# Patient Record
Sex: FEMALE | Race: BLACK OR AFRICAN AMERICAN | Employment: OTHER | URBAN - METROPOLITAN AREA
[De-identification: names, ages, dates, MRNs, and addresses within clinical notes are randomized per-mention and may not be internally consistent; named-entity substitution may affect disease eponyms.]

---

## 2017-05-24 ENCOUNTER — HOSPITAL ENCOUNTER (OUTPATIENT)
Dept: ULTRASOUND IMAGING | Age: 62
Discharge: HOME | End: 2017-05-24
Attending: INTERNAL MEDICINE | Admitting: INTERNAL MEDICINE

## 2017-05-24 DIAGNOSIS — R59.0 AXILLARY ADENOPATHY: ICD-10-CM

## 2017-05-24 DIAGNOSIS — R92.8 ABNORMAL MAMMOGRAM: ICD-10-CM

## 2017-05-24 DIAGNOSIS — Z09 FOLLOW-UP EXAM, 3-6 MONTHS SINCE PREVIOUS EXAM: ICD-10-CM

## 2017-06-08 ENCOUNTER — HOSPITAL ENCOUNTER (OUTPATIENT)
Dept: GENERAL RADIOLOGY | Age: 62
Discharge: HOME | End: 2017-06-08
Attending: INTERNAL MEDICINE | Admitting: INTERNAL MEDICINE

## 2017-06-08 DIAGNOSIS — M54.2 NECK PAIN ON LEFT SIDE: ICD-10-CM

## 2017-06-20 LAB
CHOLESTEROL, TOTAL: 234 MG/DL
CHOLESTEROL/HDL RATIO: NORMAL
HDLC SERPL-MCNC: 66 MG/DL (ref 35–70)
LDL CHOLESTEROL CALCULATED: 142 MG/DL (ref 0–160)
TRIGL SERPL-MCNC: 130 MG/DL
VLDLC SERPL CALC-MCNC: 26 MG/DL

## 2017-12-29 ENCOUNTER — HOSPITAL ENCOUNTER (OUTPATIENT)
Dept: ULTRASOUND IMAGING | Age: 62
Discharge: HOME | End: 2017-12-29
Attending: FAMILY MEDICINE | Admitting: FAMILY MEDICINE

## 2017-12-29 DIAGNOSIS — R92.8 ABNORMAL ULTRASOUND OF BREAST: ICD-10-CM

## 2018-09-06 LAB — HIV AG/AB: NORMAL

## 2019-02-01 ENCOUNTER — HOSPITAL ENCOUNTER (OUTPATIENT)
Dept: WOMENS IMAGING | Age: 64
Discharge: HOME | End: 2019-02-01
Payer: COMMERCIAL

## 2019-02-01 ENCOUNTER — HOSPITAL ENCOUNTER (OUTPATIENT)
Dept: ULTRASOUND IMAGING | Age: 64
Discharge: HOME | End: 2019-02-01
Payer: COMMERCIAL

## 2019-02-01 DIAGNOSIS — Z09 FOLLOW-UP EXAM: ICD-10-CM

## 2019-02-01 DIAGNOSIS — R92.8 ABNORMAL MAMMOGRAM: ICD-10-CM

## 2019-02-01 PROCEDURE — 76882 US LMTD JT/FCL EVL NVASC XTR: CPT

## 2019-02-01 PROCEDURE — 77066 DX MAMMO INCL CAD BI: CPT

## 2019-03-04 ENCOUNTER — OFFICE VISIT (OUTPATIENT)
Dept: SURGERY | Age: 64
End: 2019-03-04
Payer: COMMERCIAL

## 2019-03-04 VITALS
DIASTOLIC BLOOD PRESSURE: 80 MMHG | BODY MASS INDEX: 26.05 KG/M2 | HEART RATE: 72 BPM | SYSTOLIC BLOOD PRESSURE: 122 MMHG | RESPIRATION RATE: 16 BRPM | WEIGHT: 147 LBS

## 2019-03-04 DIAGNOSIS — R59.0 LYMPHADENOPATHY, AXILLARY: Primary | ICD-10-CM

## 2019-03-04 PROCEDURE — 99203 OFFICE O/P NEW LOW 30 MIN: CPT | Performed by: SURGERY

## 2019-03-04 ASSESSMENT — ENCOUNTER SYMPTOMS
STRIDOR: 0
ANAL BLEEDING: 0
BACK PAIN: 0
SORE THROAT: 0
APNEA: 0
CONSTIPATION: 0
COLOR CHANGE: 0
PHOTOPHOBIA: 0
EYE ITCHING: 0
CHOKING: 0
RECTAL PAIN: 0
EYE REDNESS: 0

## 2019-03-11 ENCOUNTER — ANESTHESIA EVENT (OUTPATIENT)
Dept: OPERATING ROOM | Age: 64
End: 2019-03-11
Payer: COMMERCIAL

## 2019-03-12 ENCOUNTER — ANESTHESIA (OUTPATIENT)
Dept: OPERATING ROOM | Age: 64
End: 2019-03-12
Payer: COMMERCIAL

## 2019-03-12 ENCOUNTER — HOSPITAL ENCOUNTER (OUTPATIENT)
Age: 64
Setting detail: OUTPATIENT SURGERY
Discharge: HOME | End: 2019-03-12
Attending: SURGERY | Admitting: SURGERY
Payer: COMMERCIAL

## 2019-03-12 VITALS
SYSTOLIC BLOOD PRESSURE: 105 MMHG | TEMPERATURE: 97.3 F | DIASTOLIC BLOOD PRESSURE: 65 MMHG | HEART RATE: 91 BPM | WEIGHT: 147 LBS | BODY MASS INDEX: 26.05 KG/M2 | OXYGEN SATURATION: 93 % | HEIGHT: 63 IN | RESPIRATION RATE: 16 BRPM

## 2019-03-12 VITALS
OXYGEN SATURATION: 95 % | SYSTOLIC BLOOD PRESSURE: 120 MMHG | DIASTOLIC BLOOD PRESSURE: 79 MMHG | RESPIRATION RATE: 8 BRPM

## 2019-03-12 DIAGNOSIS — Z80.0 FAMILY HISTORY OF MALIGNANT NEOPLASM OF GASTROINTESTINAL TRACT: Primary | ICD-10-CM

## 2019-03-12 PROCEDURE — 2580000003 HC RX 258: Performed by: NURSE ANESTHETIST, CERTIFIED REGISTERED

## 2019-03-12 PROCEDURE — 3600000003 HC SURGERY LEVEL 3 BASE: Performed by: SURGERY

## 2019-03-12 PROCEDURE — 3700000001 HC ADD 15 MINUTES (ANESTHESIA): Performed by: SURGERY

## 2019-03-12 PROCEDURE — 88307 TISSUE EXAM BY PATHOLOGIST: CPT

## 2019-03-12 PROCEDURE — 38525 BIOPSY/REMOVAL LYMPH NODES: CPT | Performed by: SURGERY

## 2019-03-12 PROCEDURE — 6360000002 HC RX W HCPCS: Performed by: NURSE ANESTHETIST, CERTIFIED REGISTERED

## 2019-03-12 PROCEDURE — 2500000003 HC RX 250 WO HCPCS: Performed by: SURGERY

## 2019-03-12 PROCEDURE — 3700000000 HC ANESTHESIA ATTENDED CARE: Performed by: SURGERY

## 2019-03-12 PROCEDURE — 87186 SC STD MICRODIL/AGAR DIL: CPT

## 2019-03-12 PROCEDURE — 87071 CULTURE AEROBIC QUANT OTHER: CPT

## 2019-03-12 PROCEDURE — 3600000013 HC SURGERY LEVEL 3 ADDTL 15MIN: Performed by: SURGERY

## 2019-03-12 PROCEDURE — 88312 SPECIAL STAINS GROUP 1: CPT

## 2019-03-12 PROCEDURE — 7100000011 HC PHASE II RECOVERY - ADDTL 15 MIN: Performed by: SURGERY

## 2019-03-12 PROCEDURE — 87073 CULTURE BACTERIA ANAEROBIC: CPT

## 2019-03-12 PROCEDURE — 87205 SMEAR GRAM STAIN: CPT

## 2019-03-12 PROCEDURE — 2709999900 HC NON-CHARGEABLE SUPPLY: Performed by: SURGERY

## 2019-03-12 PROCEDURE — 87076 CULTURE ANAEROBE IDENT EACH: CPT

## 2019-03-12 PROCEDURE — 7100000010 HC PHASE II RECOVERY - FIRST 15 MIN: Performed by: SURGERY

## 2019-03-12 PROCEDURE — 2580000003 HC RX 258: Performed by: ANESTHESIOLOGY

## 2019-03-12 RX ORDER — MIDAZOLAM HYDROCHLORIDE 1 MG/ML
INJECTION INTRAMUSCULAR; INTRAVENOUS PRN
Status: DISCONTINUED | OUTPATIENT
Start: 2019-03-12 | End: 2019-03-12 | Stop reason: SDUPTHER

## 2019-03-12 RX ORDER — ONDANSETRON 2 MG/ML
INJECTION INTRAMUSCULAR; INTRAVENOUS PRN
Status: DISCONTINUED | OUTPATIENT
Start: 2019-03-12 | End: 2019-03-12 | Stop reason: SDUPTHER

## 2019-03-12 RX ORDER — ACETAMINOPHEN 10 MG/ML
1000 INJECTION, SOLUTION INTRAVENOUS ONCE
Status: COMPLETED | OUTPATIENT
Start: 2019-03-12 | End: 2019-03-12

## 2019-03-12 RX ORDER — PROPOFOL 10 MG/ML
INJECTION, EMULSION INTRAVENOUS PRN
Status: DISCONTINUED | OUTPATIENT
Start: 2019-03-12 | End: 2019-03-12 | Stop reason: SDUPTHER

## 2019-03-12 RX ORDER — DEXAMETHASONE SODIUM PHOSPHATE 4 MG/ML
INJECTION, SOLUTION INTRA-ARTICULAR; INTRALESIONAL; INTRAMUSCULAR; INTRAVENOUS; SOFT TISSUE PRN
Status: DISCONTINUED | OUTPATIENT
Start: 2019-03-12 | End: 2019-03-12 | Stop reason: SDUPTHER

## 2019-03-12 RX ORDER — HYDROCODONE BITARTRATE AND ACETAMINOPHEN 5; 325 MG/1; MG/1
1 TABLET ORAL EVERY 6 HOURS PRN
Qty: 10 TABLET | Refills: 0 | Status: SHIPPED | OUTPATIENT
Start: 2019-03-12 | End: 2019-03-19

## 2019-03-12 RX ORDER — SODIUM CHLORIDE, SODIUM LACTATE, POTASSIUM CHLORIDE, CALCIUM CHLORIDE 600; 310; 30; 20 MG/100ML; MG/100ML; MG/100ML; MG/100ML
INJECTION, SOLUTION INTRAVENOUS CONTINUOUS PRN
Status: DISCONTINUED | OUTPATIENT
Start: 2019-03-12 | End: 2019-03-12 | Stop reason: SDUPTHER

## 2019-03-12 RX ORDER — FENTANYL CITRATE 50 UG/ML
INJECTION, SOLUTION INTRAMUSCULAR; INTRAVENOUS PRN
Status: DISCONTINUED | OUTPATIENT
Start: 2019-03-12 | End: 2019-03-12 | Stop reason: SDUPTHER

## 2019-03-12 RX ORDER — LIDOCAINE HYDROCHLORIDE 10 MG/ML
INJECTION, SOLUTION EPIDURAL; INFILTRATION; INTRACAUDAL; PERINEURAL
Status: COMPLETED | OUTPATIENT
Start: 2019-03-12 | End: 2019-03-12

## 2019-03-12 RX ORDER — SODIUM CHLORIDE, SODIUM LACTATE, POTASSIUM CHLORIDE, CALCIUM CHLORIDE 600; 310; 30; 20 MG/100ML; MG/100ML; MG/100ML; MG/100ML
INJECTION, SOLUTION INTRAVENOUS ONCE
Status: COMPLETED | OUTPATIENT
Start: 2019-03-12 | End: 2019-03-12

## 2019-03-12 RX ORDER — LIDOCAINE HYDROCHLORIDE 10 MG/ML
INJECTION, SOLUTION INFILTRATION; PERINEURAL
Status: COMPLETED | OUTPATIENT
Start: 2019-03-12 | End: 2019-03-12

## 2019-03-12 RX ADMIN — LIDOCAINE HYDROCHLORIDE 100 MG: 20 INJECTION, SOLUTION INTRAVENOUS at 12:57

## 2019-03-12 RX ADMIN — ACETAMINOPHEN 1000 MG: 10 INJECTION, SOLUTION INTRAVENOUS at 12:59

## 2019-03-12 RX ADMIN — FENTANYL CITRATE 50 MCG: 50 INJECTION INTRAMUSCULAR; INTRAVENOUS at 12:57

## 2019-03-12 RX ADMIN — PROPOFOL 10 MG: 10 INJECTION, EMULSION INTRAVENOUS at 13:11

## 2019-03-12 RX ADMIN — ONDANSETRON 4 MG: 2 INJECTION INTRAMUSCULAR; INTRAVENOUS at 12:54

## 2019-03-12 RX ADMIN — PROPOFOL 30 MG: 10 INJECTION, EMULSION INTRAVENOUS at 12:56

## 2019-03-12 RX ADMIN — PROPOFOL 20 MG: 10 INJECTION, EMULSION INTRAVENOUS at 12:57

## 2019-03-12 RX ADMIN — SODIUM CHLORIDE, POTASSIUM CHLORIDE, SODIUM LACTATE AND CALCIUM CHLORIDE: 600; 310; 30; 20 INJECTION, SOLUTION INTRAVENOUS at 11:23

## 2019-03-12 RX ADMIN — DEXAMETHASONE SODIUM PHOSPHATE 8 MG: 4 INJECTION, SOLUTION INTRAMUSCULAR; INTRAVENOUS at 12:54

## 2019-03-12 RX ADMIN — PROPOFOL 10 MG: 10 INJECTION, EMULSION INTRAVENOUS at 13:00

## 2019-03-12 RX ADMIN — MIDAZOLAM HYDROCHLORIDE 2 MG: 1 INJECTION, SOLUTION INTRAMUSCULAR; INTRAVENOUS at 12:50

## 2019-03-12 RX ADMIN — SODIUM CHLORIDE, POTASSIUM CHLORIDE, SODIUM LACTATE AND CALCIUM CHLORIDE: 600; 310; 30; 20 INJECTION, SOLUTION INTRAVENOUS at 12:50

## 2019-03-12 RX ADMIN — FENTANYL CITRATE 50 MCG: 50 INJECTION INTRAMUSCULAR; INTRAVENOUS at 12:54

## 2019-03-12 ASSESSMENT — PULMONARY FUNCTION TESTS
PIF_VALUE: 2
PIF_VALUE: 1
PIF_VALUE: 0
PIF_VALUE: 1
PIF_VALUE: 0
PIF_VALUE: 1
PIF_VALUE: 1
PIF_VALUE: 0
PIF_VALUE: 0
PIF_VALUE: 1
PIF_VALUE: 1
PIF_VALUE: 0
PIF_VALUE: 1
PIF_VALUE: 1
PIF_VALUE: 0
PIF_VALUE: 1
PIF_VALUE: 35
PIF_VALUE: 1
PIF_VALUE: 10
PIF_VALUE: 21
PIF_VALUE: 1
PIF_VALUE: 1
PIF_VALUE: 0
PIF_VALUE: 0
PIF_VALUE: 1

## 2019-03-12 ASSESSMENT — PAIN - FUNCTIONAL ASSESSMENT: PAIN_FUNCTIONAL_ASSESSMENT: 0-10

## 2019-03-16 LAB
CULTURE: ABNORMAL
GRAM SMEAR: ABNORMAL
Lab: ABNORMAL
SPECIMEN: ABNORMAL

## 2019-03-25 ENCOUNTER — OFFICE VISIT (OUTPATIENT)
Dept: SURGERY | Age: 64
End: 2019-03-25

## 2019-03-25 VITALS
OXYGEN SATURATION: 98 % | BODY MASS INDEX: 26.05 KG/M2 | WEIGHT: 147.05 LBS | DIASTOLIC BLOOD PRESSURE: 72 MMHG | HEART RATE: 91 BPM | HEIGHT: 63 IN | SYSTOLIC BLOOD PRESSURE: 110 MMHG

## 2019-03-25 DIAGNOSIS — R59.0 LYMPHADENOPATHY, AXILLARY: Primary | ICD-10-CM

## 2019-03-25 PROCEDURE — 99024 POSTOP FOLLOW-UP VISIT: CPT | Performed by: SURGERY

## 2019-04-28 NOTE — PROGRESS NOTES
Chief Complaint   Patient presents with    Post-Op Check     Right Axillary Node Excision, 3/12/19         SUBJECTIVE:  Patient here for post op visit. Pain is minimal.  Wounds: minbruising and no discharge.     Past Surgical History:   Procedure Laterality Date    COLONOSCOPY  2015    normal colon    TONSILLECTOMY  as a kid   [facility]ndt TUBAL LIGATION  20+ yrs ago     Past Medical History:   Diagnosis Date    Bipolar 1 disorder ([address]     \"on Depakote for this\"     Family History   Problem Relation Age of Onset    Breast Cancer Mother     High Blood Pressure Mother     Cancer Father         \"cancer in the bowel\"     Social History     Socioeconomic History    Marital status:      Spouse name: Not on file    Number of children: Not on file    Years of education: Not on file    Highest education level: Not on file   Occupational History    Not on file   Social Needs    Financial resource strain: Not on file    Food insecurity:     Worry: Not on file     Inability: Not on file    Transportation needs:     Medical: Not on file     Non-medical: Not on file   Tobacco Use    Smoking status: Former Smoker     Packs/day: 1.00     Years: 10.00     Pack years: 10.00     Types: Cigarettes     Last attempt to quit: 1988     Years since quittin.3    Smokeless tobacco: Never Used   Substance and Sexual Activity    Alcohol use: No    Drug use: No    Sexual activity: Not on file   Lifestyle    Physical activity:     Days per week: Not on file     Minutes per session: Not on file    Stress: Not on file   Relationships    Social connections:     Talks on phone: Not on file     Gets together: Not on file     Attends Muslim service: Not on file     Active member of club or organization: Not on file     Attends meetings of clubs or organizations: Not on file     Relationship status: Not on file    Intimate partner violence:     Fear of current or ex partner: Not on file     Emotionally abused: Not on file     Physically abused: Not on file     Forced sexual activity: Not on file   Other Topics Concern    Not on file   Social History Narrative    Not on file       OBJECTIVE:   Physical Exam    Wound well healed without signs of active infection. Suture line intact. Abdomen soft, nontender, nondistended. Path reveals:   Final Pathologic Diagnosis:  Lymph node, right axillary, excision:  - Granulomatous lymphadenitis. See comment and microscopic  description. ASSESSMENT:  Patient doing well on this post operative check. Wounds well healed. 1. Lymphadenopathy, axillary        PLAN:  Continue same  Increase activity as tolerated        No orders of the defined types were placed in this encounter. No orders of the defined types were placed in this encounter. Follow Up: No follow-ups on file.     Ankush Coulter MD

## 2019-10-23 ENCOUNTER — OFFICE VISIT (OUTPATIENT)
Dept: FAMILY MEDICINE CLINIC | Age: 64
End: 2019-10-23
Payer: COMMERCIAL

## 2019-10-23 VITALS
SYSTOLIC BLOOD PRESSURE: 124 MMHG | BODY MASS INDEX: 25.02 KG/M2 | WEIGHT: 141.2 LBS | HEART RATE: 95 BPM | OXYGEN SATURATION: 98 % | DIASTOLIC BLOOD PRESSURE: 88 MMHG | HEIGHT: 63 IN

## 2019-10-23 DIAGNOSIS — K21.9 GASTROESOPHAGEAL REFLUX DISEASE WITHOUT ESOPHAGITIS: ICD-10-CM

## 2019-10-23 DIAGNOSIS — Z80.0 FAMILY HISTORY OF MALIGNANT NEOPLASM OF GASTROINTESTINAL TRACT: ICD-10-CM

## 2019-10-23 DIAGNOSIS — R19.7 DIARRHEA IN ADULT PATIENT: Primary | ICD-10-CM

## 2019-10-23 PROCEDURE — 99213 OFFICE O/P EST LOW 20 MIN: CPT | Performed by: PHYSICIAN ASSISTANT

## 2019-10-23 RX ORDER — IBUPROFEN 800 MG/1
800 TABLET ORAL 3 TIMES DAILY PRN
Qty: 90 TABLET | Refills: 2 | Status: CANCELLED | OUTPATIENT
Start: 2019-10-23

## 2019-10-23 RX ORDER — DIVALPROEX SODIUM 500 MG/1
TABLET, DELAYED RELEASE ORAL
Qty: 90 TABLET | Refills: 2 | Status: CANCELLED | OUTPATIENT
Start: 2019-10-23

## 2019-10-23 ASSESSMENT — PATIENT HEALTH QUESTIONNAIRE - PHQ9
2. FEELING DOWN, DEPRESSED OR HOPELESS: 0
SUM OF ALL RESPONSES TO PHQ9 QUESTIONS 1 & 2: 0
1. LITTLE INTEREST OR PLEASURE IN DOING THINGS: 0
SUM OF ALL RESPONSES TO PHQ QUESTIONS 1-9: 0
SUM OF ALL RESPONSES TO PHQ QUESTIONS 1-9: 0

## 2019-11-06 ENCOUNTER — TELEPHONE (OUTPATIENT)
Dept: FAMILY MEDICINE CLINIC | Age: 64
End: 2019-11-06

## 2019-11-06 DIAGNOSIS — R19.7 DIARRHEA IN ADULT PATIENT: Primary | ICD-10-CM

## 2019-11-06 DIAGNOSIS — Z80.0 FAMILY HISTORY OF MALIGNANT NEOPLASM OF GASTROINTESTINAL TRACT: ICD-10-CM

## 2019-11-06 DIAGNOSIS — K21.9 GASTROESOPHAGEAL REFLUX DISEASE WITHOUT ESOPHAGITIS: ICD-10-CM

## 2019-11-20 ENCOUNTER — HOSPITAL ENCOUNTER (OUTPATIENT)
Age: 64
Discharge: HOME | End: 2019-11-20
Payer: COMMERCIAL

## 2019-11-20 LAB
ALBUMIN SERPL-MCNC: 4.9 GM/DL (ref 3.4–5)
ALP BLD-CCNC: 66 IU/L (ref 40–129)
ALT SERPL-CCNC: 11 U/L (ref 10–40)
ANION GAP SERPL CALCULATED.3IONS-SCNC: 14 MMOL/L (ref 4–16)
AST SERPL-CCNC: 33 IU/L (ref 15–37)
BASOPHILS ABSOLUTE: 0 K/CU MM
BASOPHILS RELATIVE PERCENT: 0.4 % (ref 0–1)
BILIRUB SERPL-MCNC: 0.5 MG/DL (ref 0–1)
BUN BLDV-MCNC: 15 MG/DL (ref 6–23)
C-REACTIVE PROTEIN, HIGH SENSITIVITY: 2.5 MG/L
CALCIUM SERPL-MCNC: 10.9 MG/DL (ref 8.3–10.6)
CHLORIDE BLD-SCNC: 98 MMOL/L (ref 99–110)
CO2: 25 MMOL/L (ref 21–32)
CREAT SERPL-MCNC: 1.2 MG/DL (ref 0.6–1.1)
DIFFERENTIAL TYPE: ABNORMAL
EOSINOPHILS ABSOLUTE: 0.1 K/CU MM
EOSINOPHILS RELATIVE PERCENT: 1.1 % (ref 0–3)
ERYTHROCYTE SEDIMENTATION RATE: 14 MM/HR (ref 0–30)
FERRITIN: 507 NG/ML (ref 15–150)
FOLATE: 19.1 NG/ML (ref 3.1–17.5)
GFR AFRICAN AMERICAN: 55 ML/MIN/1.73M2
GFR NON-AFRICAN AMERICAN: 45 ML/MIN/1.73M2
GLUCOSE BLD-MCNC: 73 MG/DL (ref 70–99)
HCT VFR BLD CALC: 37.6 % (ref 37–47)
HEMOGLOBIN: 11.7 GM/DL (ref 12.5–16)
IGA: 87 MG/DL (ref 69–382)
IMMATURE NEUTROPHIL %: 1.1 % (ref 0–0.43)
IRON: 31 UG/DL (ref 37–145)
LYMPHOCYTES ABSOLUTE: 1.2 K/CU MM
LYMPHOCYTES RELATIVE PERCENT: 25.7 % (ref 24–44)
MCH RBC QN AUTO: 27.3 PG (ref 27–31)
MCHC RBC AUTO-ENTMCNC: 31.1 % (ref 32–36)
MCV RBC AUTO: 87.9 FL (ref 78–100)
MONOCYTES ABSOLUTE: 0.5 K/CU MM
MONOCYTES RELATIVE PERCENT: 11.5 % (ref 0–4)
NUCLEATED RBC %: 0 %
PCT TRANSFERRIN: 12 % (ref 10–44)
PDW BLD-RTO: 13.3 % (ref 11.7–14.9)
PLATELET # BLD: 264 K/CU MM (ref 140–440)
PMV BLD AUTO: 10.6 FL (ref 7.5–11.1)
POTASSIUM SERPL-SCNC: 4.3 MMOL/L (ref 3.5–5.1)
RBC # BLD: 4.28 M/CU MM (ref 4.2–5.4)
SEGMENTED NEUTROPHILS ABSOLUTE COUNT: 2.7 K/CU MM
SEGMENTED NEUTROPHILS RELATIVE PERCENT: 60.2 % (ref 36–66)
SODIUM BLD-SCNC: 137 MMOL/L (ref 135–145)
TOTAL IMMATURE NEUTOROPHIL: 0.05 K/CU MM
TOTAL IRON BINDING CAPACITY: 249 UG/DL (ref 250–450)
TOTAL NUCLEATED RBC: 0 K/CU MM
TOTAL PROTEIN: 7.3 GM/DL (ref 6.4–8.2)
TSH HIGH SENSITIVITY: 1.84 UIU/ML (ref 0.27–4.2)
UNSATURATED IRON BINDING CAPACITY: 218 UG/DL (ref 110–370)
VITAMIN B-12: 1167 PG/ML (ref 211–911)
WBC # BLD: 4.5 K/CU MM (ref 4–10.5)

## 2019-11-20 PROCEDURE — 84443 ASSAY THYROID STIM HORMONE: CPT

## 2019-11-20 PROCEDURE — 85025 COMPLETE CBC W/AUTO DIFF WBC: CPT

## 2019-11-20 PROCEDURE — 82746 ASSAY OF FOLIC ACID SERUM: CPT

## 2019-11-20 PROCEDURE — 86140 C-REACTIVE PROTEIN: CPT

## 2019-11-20 PROCEDURE — 83540 ASSAY OF IRON: CPT

## 2019-11-20 PROCEDURE — 83550 IRON BINDING TEST: CPT

## 2019-11-20 PROCEDURE — 85652 RBC SED RATE AUTOMATED: CPT

## 2019-11-20 PROCEDURE — 82607 VITAMIN B-12: CPT

## 2019-11-20 PROCEDURE — 80053 COMPREHEN METABOLIC PANEL: CPT

## 2019-11-20 PROCEDURE — 83516 IMMUNOASSAY NONANTIBODY: CPT

## 2019-11-20 PROCEDURE — 82784 ASSAY IGA/IGD/IGG/IGM EACH: CPT

## 2019-11-20 PROCEDURE — 36415 COLL VENOUS BLD VENIPUNCTURE: CPT

## 2019-11-20 PROCEDURE — 82728 ASSAY OF FERRITIN: CPT

## 2019-11-21 ENCOUNTER — HOSPITAL ENCOUNTER (OUTPATIENT)
Age: 64
Setting detail: SPECIMEN
Discharge: HOME | End: 2019-11-21
Payer: COMMERCIAL

## 2019-11-21 PROCEDURE — 83993 ASSAY FOR CALPROTECTIN FECAL: CPT

## 2019-11-22 LAB
TISSUE TRANSGLUTAMINASE ANTIBODY: 1 U/ML (ref 0–5)
TRANSGLUTAMINASE IGA: 0 U/ML (ref 0–3)

## 2019-11-24 LAB
CALPROTECTIN, FECAL: 615 UG/G
CALPROTECTIN, FECAL: ABNORMAL UG/G

## 2019-12-04 ENCOUNTER — HOSPITAL ENCOUNTER (OUTPATIENT)
Age: 64
Setting detail: SPECIMEN
Discharge: HOME | End: 2019-12-04
Payer: COMMERCIAL

## 2019-12-04 PROCEDURE — 88342 IMHCHEM/IMCYTCHM 1ST ANTB: CPT

## 2019-12-04 PROCEDURE — 88305 TISSUE EXAM BY PATHOLOGIST: CPT

## 2019-12-20 ENCOUNTER — HOSPITAL ENCOUNTER (OUTPATIENT)
Age: 64
Setting detail: SPECIMEN
Discharge: HOME | End: 2019-12-20
Payer: COMMERCIAL

## 2019-12-20 PROCEDURE — 88305 TISSUE EXAM BY PATHOLOGIST: CPT

## 2019-12-31 NOTE — TELEPHONE ENCOUNTER
MED REFILL: (COMPLETELY OUT OF THIS) FOUND SHE HAS CANCER AND ALREADY HAS SHINGLES    DEPAKOTE 500 MG      PHARMACY:   [address] [place] [address]

## 2020-01-02 ENCOUNTER — HOSPITAL ENCOUNTER (OUTPATIENT)
Age: 65
Discharge: HOME | End: 2020-01-02
Payer: COMMERCIAL

## 2020-01-02 LAB
ALBUMIN SERPL-MCNC: 4.1 GM/DL (ref 3.4–5)
ALP BLD-CCNC: 57 IU/L (ref 40–128)
ALT SERPL-CCNC: 7 U/L (ref 10–40)
ANION GAP SERPL CALCULATED.3IONS-SCNC: 16 MMOL/L (ref 4–16)
AST SERPL-CCNC: 22 IU/L (ref 15–37)
BILIRUB SERPL-MCNC: 0.5 MG/DL (ref 0–1)
BUN BLDV-MCNC: 13 MG/DL (ref 6–23)
CALCIUM SERPL-MCNC: 10.2 MG/DL (ref 8.3–10.6)
CHLORIDE BLD-SCNC: 100 MMOL/L (ref 99–110)
CO2: 20 MMOL/L (ref 21–32)
CREAT SERPL-MCNC: 1 MG/DL (ref 0.6–1.1)
ERYTHROCYTE SEDIMENTATION RATE: 18 MM/HR (ref 0–30)
GFR AFRICAN AMERICAN: >60 ML/MIN/1.73M2
GFR NON-AFRICAN AMERICAN: 56 ML/MIN/1.73M2
GLUCOSE BLD-MCNC: 92 MG/DL (ref 70–99)
HAV IGM SER IA-ACNC: NON REACTIVE
HCT VFR BLD CALC: 30.5 % (ref 37–47)
HEMOGLOBIN: 9.5 GM/DL (ref 12.5–16)
HEPATITIS B CORE IGM ANTIBODY: NON REACTIVE
HEPATITIS B SURFACE ANTIGEN: NON REACTIVE
HEPATITIS C ANTIBODY: NON REACTIVE
LACTATE DEHYDROGENASE: 381 IU/L (ref 120–246)
MCH RBC QN AUTO: 27.4 PG (ref 27–31)
MCHC RBC AUTO-ENTMCNC: 31.1 % (ref 32–36)
MCV RBC AUTO: 87.9 FL (ref 78–100)
PDW BLD-RTO: 14.5 % (ref 11.7–14.9)
PLATELET # BLD: 379 K/CU MM (ref 140–440)
PMV BLD AUTO: 9.9 FL (ref 7.5–11.1)
POTASSIUM SERPL-SCNC: 4.2 MMOL/L (ref 3.5–5.1)
RBC # BLD: 3.47 M/CU MM (ref 4.2–5.4)
SODIUM BLD-SCNC: 136 MMOL/L (ref 135–145)
TOTAL PROTEIN: 6.4 GM/DL (ref 6.4–8.2)
WBC # BLD: 4.9 K/CU MM (ref 4–10.5)

## 2020-01-02 PROCEDURE — 83615 LACTATE (LD) (LDH) ENZYME: CPT

## 2020-01-02 PROCEDURE — 80053 COMPREHEN METABOLIC PANEL: CPT

## 2020-01-02 PROCEDURE — 85027 COMPLETE CBC AUTOMATED: CPT

## 2020-01-02 PROCEDURE — 87389 HIV-1 AG W/HIV-1&-2 AB AG IA: CPT

## 2020-01-02 PROCEDURE — 36415 COLL VENOUS BLD VENIPUNCTURE: CPT

## 2020-01-02 PROCEDURE — 85652 RBC SED RATE AUTOMATED: CPT

## 2020-01-02 PROCEDURE — 80074 ACUTE HEPATITIS PANEL: CPT

## 2020-01-03 ENCOUNTER — TELEPHONE (OUTPATIENT)
Dept: SURGERY | Age: 65
End: 2020-01-03

## 2020-01-03 LAB — HIV SCREEN: NON REACTIVE

## 2020-01-03 RX ORDER — VALACYCLOVIR HYDROCHLORIDE 1 G/1
1000 TABLET, FILM COATED ORAL 3 TIMES DAILY
COMMUNITY
End: 2020-02-11

## 2020-01-03 RX ORDER — ONDANSETRON HYDROCHLORIDE 8 MG/1
8 TABLET, FILM COATED ORAL EVERY 8 HOURS PRN
COMMUNITY
End: 2020-02-11

## 2020-01-03 RX ORDER — PREDNISONE 50 MG/1
50 TABLET ORAL DAILY
Status: ON HOLD | COMMUNITY
End: 2020-02-18 | Stop reason: ALTCHOICE

## 2020-01-03 NOTE — PROGRESS NOTES
Surgery:  [email or web address]                        Arrival time: 1230  Nothing to eat or drink after midnight unless instructed to take certain medications by the doctor or the nurse the am of surgery  Arrive at the front information desk -1st floor /[facility] is on [address] [facility]  Please leave money and all other valuables at home. Wear comfortable clothing. If you wear contacts please bring a case. No make up. You may be asked to remove rings or body piercing. Please bring insurance cards and picture ID am of procedure. Please bring any consent or paper work from your doctor. If you become ill,such as a cold, sore throat or fever contact your doctor. Please bathe or shower am of procedure.   Medications to take AM of procedure:     Any questions call [facility]  [phone]

## 2020-01-03 NOTE — TELEPHONE ENCOUNTER
Dr. Mason Ou pt returned call; provided surgery information, however pt wanted to verify if she was able to take an ibuprofen the day before surgery like she normally does. Please advise.

## 2020-01-06 ENCOUNTER — ANESTHESIA EVENT (OUTPATIENT)
Dept: OPERATING ROOM | Age: 65
End: 2020-01-06
Payer: COMMERCIAL

## 2020-01-06 RX ORDER — DIVALPROEX SODIUM 500 MG/1
TABLET, DELAYED RELEASE ORAL
Qty: 90 TABLET | Refills: 1 | Status: ON HOLD | OUTPATIENT
Start: 2020-01-06 | End: 2020-03-02

## 2020-01-06 ASSESSMENT — ENCOUNTER SYMPTOMS
NAUSEA: 0
STRIDOR: 0
DIARRHEA: 1
CONSTIPATION: 0
VOMITING: 0
BACK PAIN: 0
EYE REDNESS: 0
PHOTOPHOBIA: 0
ABDOMINAL PAIN: 0
SORE THROAT: 0
EYE ITCHING: 0
APNEA: 0
ANAL BLEEDING: 0
RECTAL PAIN: 0
COLOR CHANGE: 0
CHOKING: 0

## 2020-01-06 NOTE — ANESTHESIA PRE PROCEDURE
CMP:   Lab Results   Component Value Date     01/02/2020    K 4.2 01/02/2020     01/02/2020    CO2 20 01/02/2020    BUN 13 01/02/2020    CREATININE 1.0 01/02/2020    GFRAA >60 01/02/2020    LABGLOM 56 01/02/2020    GLUCOSE 92 01/02/2020    PROT 6.4 01/02/2020    CALCIUM 10.2 01/02/2020    BILITOT 0.5 01/02/2020    ALKPHOS 57 01/02/2020    AST 22 01/02/2020    ALT 7 01/02/2020       POC Tests: No results for input(s): POCGLU, POCNA, POCK, POCCL, POCBUN, POCHEMO, POCHCT in the last 72 hours. Coags: No results found for: PROTIME, INR, APTT    HCG (If Applicable): No results found for: PREGTESTUR, PREGSERUM, HCG, HCGQUANT     ABGs: No results found for: PHART, PO2ART, DMA1EOF, JGB8HQF, BEART, I5KYMOLE     Type & Screen (If Applicable):  No results found for: LABABO, [address]    Anesthesia Evaluation  Patient summary reviewed  Airway: Mallampati: II  TM distance: >3 FB   Neck ROM: full  Mouth opening: > = 3 FB Dental:    (+) partials      Pulmonary: breath sounds clear to auscultation                             Cardiovascular:            Rhythm: regular  Rate: normal           Beta Blocker:  Not on Beta Blocker         Neuro/Psych:   (+) psychiatric history: stable with treatment            GI/Hepatic/Renal:             Endo/Other:              Pt had no PAT visit       Abdominal:           Vascular:                                    Anesthesia Plan      MAC     ASA 2     (Chart review)  Induction: intravenous. Anesthetic plan and risks discussed with patient. Plan discussed with CRNA.     Attending anesthesiologist reviewed and agrees with Pre Eval content            DIONI Baker - CRNA   1/6/2020

## 2020-01-06 NOTE — H&P
Cresencio Stephenson MD      General Surgery       Subjective:     Patient is a 59 y.o.  female scheduled for mediport placement. Indications for procedure are B-cell lymphoma requiring chemotherapy. Patient Active Problem List    Diagnosis Date Noted    Family history of malignant neoplasm of gastrointestinal tract      Past Medical History:   Diagnosis Date    Bipolar 1 disorder ([facility] [address]     \"on Depakote for this\"      Past Surgical History:   Procedure Laterality Date    COLONOSCOPY  9/4/2015    normal colon    TONSILLECTOMY  as a kid    TUBAL LIGATION  20+ yrs ago      Prior to Admission medications    Medication Sig Start Date End Date Taking? Authorizing Provider   Polysaccharide Iron Complex (PROFE PO) Take 100 mg by mouth 3 times daily   Yes Historical Provider, MD   predniSONE (DELTASONE) 50 MG tablet Take 50 mg by mouth daily   Yes Historical Provider, MD   valACYclovir (VALTREX) 1 g tablet Take 1,000 mg by mouth 3 times daily   Yes Historical Provider, MD   ondansetron (ZOFRAN) 8 MG tablet Take 8 mg by mouth every 8 hours as needed for Nausea or Vomiting   Yes Historical Provider, MD   divalproex (DEPAKOTE) 500 MG DR tablet TAKE 1 TAB IN THE MORNING AND 2 TABLETS IN THE EVENING. 1/6/20   Matt Barbosa MD   esomeprazole (NEXIUM) 20 MG delayed release capsule Take 1 capsule by mouth every morning (before breakfast) 10/23/19   Deion Gimenez PA-C   MULTIPLE VITAMIN PO Take by mouth daily    Historical Provider, MD   calcium carbonate 600 MG TABS tablet Take 1 tablet by mouth daily    Historical Provider, MD   ibuprofen (ADVIL;MOTRIN) 800 MG tablet Take 800 mg by mouth every 6 hours as needed for Pain.     Historical Provider, MD     Allergies   Allergen Reactions    Abilify [Aripiprazole]     Codeine Nausea And Vomiting      Social History     Tobacco Use    Smoking status: Former Smoker     Packs/day: 1.00     Years: 10.00     Pack years: 10.00     Types: Cigarettes Last attempt to quit: 1988     Years since quittin.0    Smokeless tobacco: Never Used   Substance Use Topics    Alcohol use: No      Family History   Problem Relation Age of Onset    Breast Cancer Mother     High Blood Pressure Mother     Cancer Father         \"cancer in the bowel\"          Review of Systems  Review of Systems   Constitutional: Negative for chills and fever. HENT: Negative for ear pain, mouth sores, sore throat and tinnitus. Eyes: Negative for photophobia, redness and itching. Respiratory: Negative for apnea, choking and stridor. Cardiovascular: Negative for chest pain and palpitations. Gastrointestinal: Positive for diarrhea. Negative for abdominal pain, anal bleeding, constipation, nausea, rectal pain and vomiting. Endocrine: Negative for polydipsia. Genitourinary: Negative for enuresis, flank pain and hematuria. Musculoskeletal: Negative for back pain, joint swelling and myalgias. Skin: Negative for color change and pallor. Allergic/Immunologic: Negative for environmental allergies. Neurological: Negative for syncope and speech difficulty. Psychiatric/Behavioral: Negative for confusion and hallucinations. Objective:     No data found. Physical Exam  Constitutional:       Appearance: She is well-developed. HENT:      Head: Normocephalic. Eyes:      Pupils: Pupils are equal, round, and reactive to light. Neck:      Musculoskeletal: Normal range of motion and neck supple. Cardiovascular:      Rate and Rhythm: Normal rate. Pulmonary:      Effort: Pulmonary effort is normal.   Abdominal:      General: There is no distension. Palpations: Abdomen is soft. There is no mass. Tenderness: There is no tenderness. There is no guarding or rebound. Musculoskeletal: Normal range of motion. Skin:     General: Skin is warm. Neurological:      Mental Status: She is alert and oriented to person, place, and time.          Data Review  CBC:

## 2020-01-07 ENCOUNTER — ANESTHESIA (OUTPATIENT)
Dept: OPERATING ROOM | Age: 65
End: 2020-01-07
Payer: COMMERCIAL

## 2020-01-07 ENCOUNTER — APPOINTMENT (OUTPATIENT)
Dept: GENERAL RADIOLOGY | Age: 65
End: 2020-01-07
Attending: SURGERY
Payer: COMMERCIAL

## 2020-01-07 ENCOUNTER — HOSPITAL ENCOUNTER (OUTPATIENT)
Age: 65
Setting detail: OUTPATIENT SURGERY
Discharge: HOME | End: 2020-01-07
Attending: SURGERY | Admitting: SURGERY
Payer: COMMERCIAL

## 2020-01-07 VITALS
HEIGHT: 63 IN | DIASTOLIC BLOOD PRESSURE: 75 MMHG | BODY MASS INDEX: 24.98 KG/M2 | HEART RATE: 68 BPM | RESPIRATION RATE: 16 BRPM | SYSTOLIC BLOOD PRESSURE: 127 MMHG | OXYGEN SATURATION: 94 % | TEMPERATURE: 97.4 F | WEIGHT: 141 LBS

## 2020-01-07 VITALS — SYSTOLIC BLOOD PRESSURE: 108 MMHG | DIASTOLIC BLOOD PRESSURE: 66 MMHG | OXYGEN SATURATION: 98 %

## 2020-01-07 PROCEDURE — 2580000003 HC RX 258: Performed by: SURGERY

## 2020-01-07 PROCEDURE — 2580000003 HC RX 258: Performed by: NURSE ANESTHETIST, CERTIFIED REGISTERED

## 2020-01-07 PROCEDURE — 2500000003 HC RX 250 WO HCPCS: Performed by: NURSE ANESTHETIST, CERTIFIED REGISTERED

## 2020-01-07 PROCEDURE — 2709999900 HC NON-CHARGEABLE SUPPLY: Performed by: SURGERY

## 2020-01-07 PROCEDURE — 6360000002 HC RX W HCPCS: Performed by: PHYSICIAN ASSISTANT

## 2020-01-07 PROCEDURE — 3700000000 HC ANESTHESIA ATTENDED CARE: Performed by: SURGERY

## 2020-01-07 PROCEDURE — 36561 INSERT TUNNELED CV CATH: CPT | Performed by: SURGERY

## 2020-01-07 PROCEDURE — 3600000013 HC SURGERY LEVEL 3 ADDTL 15MIN: Performed by: SURGERY

## 2020-01-07 PROCEDURE — 2580000003 HC RX 258

## 2020-01-07 PROCEDURE — 99999 PR OFFICE/OUTPT VISIT,PROCEDURE ONLY: CPT | Performed by: PHYSICIAN ASSISTANT

## 2020-01-07 PROCEDURE — 76000 FLUOROSCOPY <1 HR PHYS/QHP: CPT

## 2020-01-07 PROCEDURE — 3700000001 HC ADD 15 MINUTES (ANESTHESIA): Performed by: SURGERY

## 2020-01-07 PROCEDURE — 71045 X-RAY EXAM CHEST 1 VIEW: CPT

## 2020-01-07 PROCEDURE — 2500000003 HC RX 250 WO HCPCS: Performed by: SURGERY

## 2020-01-07 PROCEDURE — C1788 PORT, INDWELLING, IMP: HCPCS | Performed by: SURGERY

## 2020-01-07 PROCEDURE — 6360000002 HC RX W HCPCS: Performed by: NURSE ANESTHETIST, CERTIFIED REGISTERED

## 2020-01-07 PROCEDURE — 7100000010 HC PHASE II RECOVERY - FIRST 15 MIN: Performed by: SURGERY

## 2020-01-07 PROCEDURE — 7100000011 HC PHASE II RECOVERY - ADDTL 15 MIN: Performed by: SURGERY

## 2020-01-07 PROCEDURE — 3600000003 HC SURGERY LEVEL 3 BASE: Performed by: SURGERY

## 2020-01-07 PROCEDURE — 6360000002 HC RX W HCPCS: Performed by: SURGERY

## 2020-01-07 DEVICE — PORT INFUS L55CM 0.016ML 0.4ML CATH OD2.2MM ID1.4MM INTRO: Type: IMPLANTABLE DEVICE | Site: CHEST | Status: FUNCTIONAL

## 2020-01-07 RX ORDER — FENTANYL CITRATE 50 UG/ML
INJECTION, SOLUTION INTRAMUSCULAR; INTRAVENOUS PRN
Status: DISCONTINUED | OUTPATIENT
Start: 2020-01-07 | End: 2020-01-07 | Stop reason: SDUPTHER

## 2020-01-07 RX ORDER — SODIUM CHLORIDE, SODIUM LACTATE, POTASSIUM CHLORIDE, CALCIUM CHLORIDE 600; 310; 30; 20 MG/100ML; MG/100ML; MG/100ML; MG/100ML
INJECTION, SOLUTION INTRAVENOUS ONCE
Status: COMPLETED | OUTPATIENT
Start: 2020-01-07 | End: 2020-01-07

## 2020-01-07 RX ORDER — LIDOCAINE HYDROCHLORIDE 20 MG/ML
INJECTION, SOLUTION INFILTRATION; PERINEURAL PRN
Status: DISCONTINUED | OUTPATIENT
Start: 2020-01-07 | End: 2020-01-07 | Stop reason: SDUPTHER

## 2020-01-07 RX ORDER — SODIUM CHLORIDE, SODIUM LACTATE, POTASSIUM CHLORIDE, CALCIUM CHLORIDE 600; 310; 30; 20 MG/100ML; MG/100ML; MG/100ML; MG/100ML
INJECTION, SOLUTION INTRAVENOUS
Status: COMPLETED
Start: 2020-01-07 | End: 2020-01-07

## 2020-01-07 RX ORDER — CEFAZOLIN SODIUM 2 G/100ML
INJECTION, SOLUTION INTRAVENOUS
Status: COMPLETED
Start: 2020-01-07 | End: 2020-01-07

## 2020-01-07 RX ORDER — PROPOFOL 10 MG/ML
INJECTION, EMULSION INTRAVENOUS PRN
Status: DISCONTINUED | OUTPATIENT
Start: 2020-01-07 | End: 2020-01-07 | Stop reason: SDUPTHER

## 2020-01-07 RX ORDER — SODIUM CHLORIDE 9 MG/ML
INJECTION, SOLUTION INTRAVENOUS CONTINUOUS PRN
Status: COMPLETED | OUTPATIENT
Start: 2020-01-07 | End: 2020-01-07

## 2020-01-07 RX ORDER — PROPOFOL 10 MG/ML
INJECTION, EMULSION INTRAVENOUS CONTINUOUS PRN
Status: DISCONTINUED | OUTPATIENT
Start: 2020-01-07 | End: 2020-01-07 | Stop reason: SDUPTHER

## 2020-01-07 RX ORDER — HEPARIN SODIUM 5000 [USP'U]/ML
INJECTION, SOLUTION INTRAVENOUS; SUBCUTANEOUS
Status: COMPLETED | OUTPATIENT
Start: 2020-01-07 | End: 2020-01-07

## 2020-01-07 RX ORDER — CEFAZOLIN SODIUM 2 G/100ML
2 INJECTION, SOLUTION INTRAVENOUS ONCE
Status: COMPLETED | OUTPATIENT
Start: 2020-01-07 | End: 2020-01-07

## 2020-01-07 RX ORDER — LIDOCAINE HYDROCHLORIDE 10 MG/ML
INJECTION, SOLUTION INFILTRATION; PERINEURAL
Status: COMPLETED | OUTPATIENT
Start: 2020-01-07 | End: 2020-01-07

## 2020-01-07 RX ORDER — SODIUM CHLORIDE, SODIUM LACTATE, POTASSIUM CHLORIDE, CALCIUM CHLORIDE 600; 310; 30; 20 MG/100ML; MG/100ML; MG/100ML; MG/100ML
INJECTION, SOLUTION INTRAVENOUS CONTINUOUS PRN
Status: DISCONTINUED | OUTPATIENT
Start: 2020-01-07 | End: 2020-01-07 | Stop reason: SDUPTHER

## 2020-01-07 RX ADMIN — FENTANYL CITRATE 25 MCG: 50 INJECTION INTRAMUSCULAR; INTRAVENOUS at 15:46

## 2020-01-07 RX ADMIN — PROPOFOL 20 MG: 10 INJECTION, EMULSION INTRAVENOUS at 15:17

## 2020-01-07 RX ADMIN — PROPOFOL 120 MCG/KG/MIN: 10 INJECTION, EMULSION INTRAVENOUS at 15:20

## 2020-01-07 RX ADMIN — CEFAZOLIN SODIUM 2 G: 2 INJECTION, SOLUTION INTRAVENOUS at 15:17

## 2020-01-07 RX ADMIN — FENTANYL CITRATE 25 MCG: 50 INJECTION INTRAMUSCULAR; INTRAVENOUS at 15:29

## 2020-01-07 RX ADMIN — PROPOFOL 20 MG: 10 INJECTION, EMULSION INTRAVENOUS at 15:20

## 2020-01-07 RX ADMIN — PHENYLEPHRINE HYDROCHLORIDE 100 MCG: 10 INJECTION INTRAVENOUS at 15:49

## 2020-01-07 RX ADMIN — PHENYLEPHRINE HYDROCHLORIDE 100 MCG: 10 INJECTION INTRAVENOUS at 15:42

## 2020-01-07 RX ADMIN — FENTANYL CITRATE 25 MCG: 50 INJECTION INTRAMUSCULAR; INTRAVENOUS at 15:38

## 2020-01-07 RX ADMIN — LIDOCAINE HYDROCHLORIDE 60 MG: 20 INJECTION, SOLUTION INFILTRATION; PERINEURAL at 15:14

## 2020-01-07 RX ADMIN — FENTANYL CITRATE 25 MCG: 50 INJECTION INTRAMUSCULAR; INTRAVENOUS at 15:20

## 2020-01-07 RX ADMIN — SODIUM CHLORIDE, SODIUM LACTATE, POTASSIUM CHLORIDE, CALCIUM CHLORIDE: 600; 310; 30; 20 INJECTION, SOLUTION INTRAVENOUS at 12:10

## 2020-01-07 RX ADMIN — SODIUM CHLORIDE, POTASSIUM CHLORIDE, SODIUM LACTATE AND CALCIUM CHLORIDE: 600; 310; 30; 20 INJECTION, SOLUTION INTRAVENOUS at 15:02

## 2020-01-07 RX ADMIN — SODIUM CHLORIDE, POTASSIUM CHLORIDE, SODIUM LACTATE AND CALCIUM CHLORIDE: 600; 310; 30; 20 INJECTION, SOLUTION INTRAVENOUS at 12:10

## 2020-01-07 RX ADMIN — PROPOFOL 60 MG: 10 INJECTION, EMULSION INTRAVENOUS at 15:14

## 2020-01-07 RX ADMIN — PHENYLEPHRINE HYDROCHLORIDE 100 MCG: 10 INJECTION INTRAVENOUS at 15:32

## 2020-01-07 ASSESSMENT — PULMONARY FUNCTION TESTS
PIF_VALUE: 1
PIF_VALUE: 1
PIF_VALUE: 0
PIF_VALUE: 1
PIF_VALUE: 0
PIF_VALUE: 1
PIF_VALUE: 0
PIF_VALUE: 1
PIF_VALUE: 0
PIF_VALUE: 1
PIF_VALUE: 0
PIF_VALUE: 1
PIF_VALUE: 0
PIF_VALUE: 1
PIF_VALUE: 0
PIF_VALUE: 1
PIF_VALUE: 1
PIF_VALUE: 0
PIF_VALUE: 1
PIF_VALUE: 0

## 2020-01-07 ASSESSMENT — PAIN - FUNCTIONAL ASSESSMENT: PAIN_FUNCTIONAL_ASSESSMENT: 0-10

## 2020-01-07 ASSESSMENT — PAIN SCALES - GENERAL
PAINLEVEL_OUTOF10: 0

## 2020-01-07 NOTE — PROGRESS NOTES
Resting quietly. States nausea down. No c/o pain. Home inst given w/understanding voiced.  Incisions dry/intact

## 2020-01-08 NOTE — OP NOTE
C-arm to the proper length just at the superior vena cava. The wire was exchanged with the catheter using a Seldinger technique and the wire was visualized at the end in the proper position. The incision sites were all closed in 2 layers with 3-0 Vicryl deep dermal and 4-0 Vicryl in subcuticular fashion. Steri-Strips and 2 x 2 followed by Tegaderm were applied as the final dressing. I did access the port using the Rogers needle and the port flushes easily. Instrument and lap counts were correct at the end of the case.      Aiden Rodriguez MD

## 2020-01-16 ENCOUNTER — HOSPITAL ENCOUNTER (OUTPATIENT)
Dept: INFUSION THERAPY | Age: 65
Discharge: HOME | End: 2020-01-16
Payer: COMMERCIAL

## 2020-01-16 ENCOUNTER — OFFICE VISIT (OUTPATIENT)
Dept: SURGERY | Age: 65
End: 2020-01-16

## 2020-01-16 ENCOUNTER — HOSPITAL ENCOUNTER (OUTPATIENT)
Dept: NON INVASIVE DIAGNOSTICS | Age: 65
Discharge: HOME | End: 2020-01-16
Payer: COMMERCIAL

## 2020-01-16 VITALS
SYSTOLIC BLOOD PRESSURE: 98 MMHG | HEIGHT: 63 IN | BODY MASS INDEX: 25 KG/M2 | OXYGEN SATURATION: 97 % | DIASTOLIC BLOOD PRESSURE: 68 MMHG | WEIGHT: 141.09 LBS

## 2020-01-16 LAB
ALBUMIN SERPL-MCNC: 4.3 GM/DL (ref 3.4–5)
ALP BLD-CCNC: 63 IU/L (ref 40–129)
ALT SERPL-CCNC: 5 U/L (ref 10–40)
ANION GAP SERPL CALCULATED.3IONS-SCNC: 15 MMOL/L (ref 4–16)
AST SERPL-CCNC: 24 IU/L (ref 15–37)
BILIRUB SERPL-MCNC: 0.7 MG/DL (ref 0–1)
BUN BLDV-MCNC: 11 MG/DL (ref 6–23)
CALCIUM SERPL-MCNC: 10.4 MG/DL (ref 8.3–10.6)
CHLORIDE BLD-SCNC: 99 MMOL/L (ref 99–110)
CO2: 26 MMOL/L (ref 21–32)
CREAT SERPL-MCNC: 1 MG/DL (ref 0.6–1.1)
DIFFERENTIAL TYPE: ABNORMAL
EOSINOPHILS ABSOLUTE: 0.1 K/CU MM
EOSINOPHILS RELATIVE PERCENT: 1 % (ref 0–3)
ERYTHROCYTE SEDIMENTATION RATE: 25 MM/HR (ref 0–30)
GFR AFRICAN AMERICAN: >60 ML/MIN/1.73M2
GFR NON-AFRICAN AMERICAN: 56 ML/MIN/1.73M2
GLUCOSE BLD-MCNC: 94 MG/DL (ref 70–99)
HAV IGM SER IA-ACNC: NON REACTIVE
HBV SURFACE AB TITR SER: 282.3 {TITER}
HCT VFR BLD CALC: 30 % (ref 37–47)
HEMOGLOBIN: 10.2 GM/DL (ref 12.5–16)
HEPATITIS B CORE IGM ANTIBODY: NON REACTIVE
HEPATITIS C ANTIBODY: NON REACTIVE
LACTATE DEHYDROGENASE: 339 IU/L (ref 120–246)
LV EF: 50 %
LVEF MODALITY: NORMAL
LYMPHOCYTES ABSOLUTE: 0.9 K/CU MM
LYMPHOCYTES RELATIVE PERCENT: 17.6 % (ref 24–44)
MCH RBC QN AUTO: 28.6 PG (ref 27–31)
MCHC RBC AUTO-ENTMCNC: 34 % (ref 32–36)
MCV RBC AUTO: 84 FL (ref 78–100)
MONOCYTES ABSOLUTE: 0.7 K/CU MM
MONOCYTES RELATIVE PERCENT: 13.9 % (ref 0–4)
PDW BLD-RTO: 15.1 % (ref 11.7–14.9)
PLATELET # BLD: 256 K/CU MM (ref 140–440)
PMV BLD AUTO: 9.8 FL (ref 7.5–11.1)
POTASSIUM SERPL-SCNC: 4.7 MMOL/L (ref 3.5–5.1)
RBC # BLD: 3.57 M/CU MM (ref 4.2–5.4)
SEGMENTED NEUTROPHILS ABSOLUTE COUNT: 3.2 K/CU MM
SEGMENTED NEUTROPHILS RELATIVE PERCENT: 67.5 % (ref 36–66)
SEGMENTED NEUTROPHILS RELATIVE PERCENT: ABNORMAL % (ref 36–66)
SODIUM BLD-SCNC: 140 MMOL/L (ref 135–145)
TOTAL PROTEIN: 6.7 GM/DL (ref 6.4–8.2)
WBC # BLD: 4.9 K/CU MM (ref 4–10.5)

## 2020-01-16 PROCEDURE — 93306 TTE W/DOPPLER COMPLETE: CPT

## 2020-01-16 PROCEDURE — 99024 POSTOP FOLLOW-UP VISIT: CPT | Performed by: SURGERY

## 2020-01-16 PROCEDURE — 85652 RBC SED RATE AUTOMATED: CPT

## 2020-01-16 PROCEDURE — 85025 COMPLETE CBC W/AUTO DIFF WBC: CPT

## 2020-01-16 PROCEDURE — 86706 HEP B SURFACE ANTIBODY: CPT

## 2020-01-16 PROCEDURE — 86709 HEPATITIS A IGM ANTIBODY: CPT

## 2020-01-16 PROCEDURE — 36415 COLL VENOUS BLD VENIPUNCTURE: CPT

## 2020-01-16 PROCEDURE — 86803 HEPATITIS C AB TEST: CPT

## 2020-01-16 PROCEDURE — 83615 LACTATE (LD) (LDH) ENZYME: CPT

## 2020-01-16 PROCEDURE — 80053 COMPREHEN METABOLIC PANEL: CPT

## 2020-01-16 PROCEDURE — 86705 HEP B CORE ANTIBODY IGM: CPT

## 2020-01-16 RX ORDER — ONDANSETRON 8 MG/1
TABLET, ORALLY DISINTEGRATING ORAL
COMMUNITY
Start: 2019-12-31 | End: 2020-08-27

## 2020-01-16 RX ORDER — ALLOPURINOL 100 MG/1
100 TABLET ORAL DAILY
COMMUNITY
End: 2020-11-09 | Stop reason: SDUPTHER

## 2020-01-17 ENCOUNTER — HOSPITAL ENCOUNTER (OUTPATIENT)
Dept: INFUSION THERAPY | Age: 65
Discharge: HOME | End: 2020-01-17
Payer: COMMERCIAL

## 2020-01-17 PROCEDURE — 6370000000 HC RX 637 (ALT 250 FOR IP)

## 2020-01-17 PROCEDURE — 96413 CHEMO IV INFUSION 1 HR: CPT

## 2020-01-17 PROCEDURE — 96375 TX/PRO/DX INJ NEW DRUG ADDON: CPT

## 2020-01-17 PROCEDURE — 2580000003 HC RX 258: Performed by: INTERNAL MEDICINE

## 2020-01-17 PROCEDURE — 6360000002 HC RX W HCPCS: Performed by: INTERNAL MEDICINE

## 2020-01-17 PROCEDURE — 6360000002 HC RX W HCPCS

## 2020-01-17 PROCEDURE — 96415 CHEMO IV INFUSION ADDL HR: CPT

## 2020-01-17 PROCEDURE — 96411 CHEMO IV PUSH ADDL DRUG: CPT

## 2020-01-17 PROCEDURE — 96417 CHEMO IV INFUS EACH ADDL SEQ: CPT

## 2020-01-17 PROCEDURE — 96367 TX/PROPH/DG ADDL SEQ IV INF: CPT

## 2020-01-17 RX ORDER — PALONOSETRON 0.05 MG/ML
0.25 INJECTION, SOLUTION INTRAVENOUS ONCE
Status: DISCONTINUED | OUTPATIENT
Start: 2020-01-17 | End: 2020-01-18 | Stop reason: HOSPADM

## 2020-01-17 RX ORDER — ACETAMINOPHEN 325 MG/1
TABLET ORAL
Status: DISPENSED
Start: 2020-01-17 | End: 2020-01-17

## 2020-01-17 RX ORDER — DOXORUBICIN HYDROCHLORIDE 2 MG/ML
77 INJECTION, SOLUTION INTRAVENOUS ONCE
Status: DISCONTINUED | OUTPATIENT
Start: 2020-01-17 | End: 2020-01-18 | Stop reason: HOSPADM

## 2020-01-17 RX ORDER — PALONOSETRON 0.05 MG/ML
INJECTION, SOLUTION INTRAVENOUS
Status: DISCONTINUED
Start: 2020-01-17 | End: 2020-01-17 | Stop reason: HOSPADM

## 2020-01-17 RX ORDER — ACETAMINOPHEN 325 MG/1
650 TABLET ORAL ONCE
Status: DISCONTINUED | OUTPATIENT
Start: 2020-01-17 | End: 2020-01-18 | Stop reason: HOSPADM

## 2020-01-17 RX ORDER — DIPHENHYDRAMINE HYDROCHLORIDE 50 MG/ML
25 INJECTION INTRAMUSCULAR; INTRAVENOUS ONCE
Status: DISCONTINUED | OUTPATIENT
Start: 2020-01-17 | End: 2020-01-18 | Stop reason: HOSPADM

## 2020-01-17 RX ORDER — ACETAMINOPHEN 325 MG/1
TABLET ORAL
Status: DISCONTINUED
Start: 2020-01-17 | End: 2020-01-17 | Stop reason: HOSPADM

## 2020-01-17 RX ORDER — DIPHENHYDRAMINE HYDROCHLORIDE 50 MG/ML
INJECTION INTRAMUSCULAR; INTRAVENOUS
Status: DISCONTINUED
Start: 2020-01-17 | End: 2020-01-17 | Stop reason: HOSPADM

## 2020-01-27 ENCOUNTER — HOSPITAL ENCOUNTER (OUTPATIENT)
Dept: PET IMAGING | Age: 65
Discharge: HOME | End: 2020-01-27
Payer: COMMERCIAL

## 2020-01-27 PROCEDURE — 2580000003 HC RX 258: Performed by: INTERNAL MEDICINE

## 2020-01-27 PROCEDURE — 78815 PET IMAGE W/CT SKULL-THIGH: CPT

## 2020-01-27 PROCEDURE — 3430000000 HC RX DIAGNOSTIC RADIOPHARMACEUTICAL: Performed by: INTERNAL MEDICINE

## 2020-01-27 PROCEDURE — A9552 F18 FDG: HCPCS | Performed by: INTERNAL MEDICINE

## 2020-01-27 RX ORDER — FLUDEOXYGLUCOSE F 18 200 MCI/ML
12.97 INJECTION, SOLUTION INTRAVENOUS
Status: COMPLETED | OUTPATIENT
Start: 2020-01-27 | End: 2020-01-27

## 2020-01-27 RX ORDER — SODIUM CHLORIDE 0.9 % (FLUSH) 0.9 %
10 SYRINGE (ML) INJECTION PRN
Status: COMPLETED | OUTPATIENT
Start: 2020-01-27 | End: 2020-01-27

## 2020-01-27 RX ADMIN — FLUDEOXYGLUCOSE F 18 12.97 MILLICURIE: 200 INJECTION, SOLUTION INTRAVENOUS at 14:20

## 2020-01-27 RX ADMIN — SODIUM CHLORIDE, PRESERVATIVE FREE 10 ML: 5 INJECTION INTRAVENOUS at 14:20

## 2020-01-27 NOTE — PROGRESS NOTES
Chief Complaint   Patient presents with    Post-Op Check     PO Mediport placement 20         SUBJECTIVE:  Patient here for post op visit. Pain is minimal.  Wounds: minbruising and no discharge.     Past Surgical History:   Procedure Laterality Date    COLONOSCOPY  2015    normal colon    PORT SURGERY N/A 2020    PORT INSERTION performed by Missy Bill MD at [address] [place] [address]  as a kid    TUBAL LIGATION  20+ yrs ago     Past Medical History:   Diagnosis Date    [facility]cell [facility] ([facility] [address]     Bipolar 1 disorder (H[facility])     \"on Depakote for this\"     Family History   Problem Relation Age of Onset    Breast Cancer Mother     High Blood Pressure Mother     Cancer Father         \"cancer in the bowel\"     Social History     Socioeconomic History    Marital status:      Spouse name: Not on file    Number of children: Not on file    Years of education: Not on file    Highest education level: Not on file   Occupational History    Not on file   Social Needs    Financial resource strain: Not on file    Food insecurity:     Worry: Not on file     Inability: Not on file    Transportation needs:     Medical: Not on file     Non-medical: Not on file   Tobacco Use    Smoking status: Former Smoker     Packs/day: 1.00     Years: 10.00     Pack years: 10.00     Types: Cigarettes     Last attempt to quit: 1988     Years since quittin.0    Smokeless tobacco: Never Used   Substance and Sexual Activity    Alcohol use: No    Drug use: No    Sexual activity: Not on file   Lifestyle    Physical activity:     Days per week: Not on file     Minutes per session: Not on file    Stress: Not on file   Relationships    Social connections:     Talks on phone: Not on file     Gets together: Not on file     Attends Buddhism service: Not on file     Active member of club or organization: Not on file     Attends meetings of clubs or organizations: Not on file     Relationship status: Not on file

## 2020-01-28 ENCOUNTER — HOSPITAL ENCOUNTER (OUTPATIENT)
Dept: INFUSION THERAPY | Age: 65
Discharge: HOME | End: 2020-01-28
Payer: COMMERCIAL

## 2020-01-28 LAB
ALBUMIN SERPL-MCNC: 3.9 GM/DL (ref 3.4–5)
ALP BLD-CCNC: 59 IU/L (ref 40–129)
ALT SERPL-CCNC: 6 U/L (ref 10–40)
ANION GAP SERPL CALCULATED.3IONS-SCNC: 17 MMOL/L (ref 4–16)
AST SERPL-CCNC: 11 IU/L (ref 15–37)
BASOPHILS ABSOLUTE: 0 K/CU MM
BASOPHILS RELATIVE PERCENT: 9 % (ref 0–1)
BILIRUB SERPL-MCNC: 0.8 MG/DL (ref 0–1)
BUN BLDV-MCNC: 9 MG/DL (ref 6–23)
CALCIUM SERPL-MCNC: 9.8 MG/DL (ref 8.3–10.6)
CHLORIDE BLD-SCNC: 93 MMOL/L (ref 99–110)
CO2: 23 MMOL/L (ref 21–32)
CREAT SERPL-MCNC: 0.9 MG/DL (ref 0.6–1.1)
DIFFERENTIAL TYPE: ABNORMAL
GFR AFRICAN AMERICAN: >60 ML/MIN/1.73M2
GFR NON-AFRICAN AMERICAN: >60 ML/MIN/1.73M2
GLUCOSE BLD-MCNC: 149 MG/DL (ref 70–99)
HCT VFR BLD CALC: 24.4 % (ref 37–47)
HEMOGLOBIN: 8.6 GM/DL (ref 12.5–16)
LACTATE DEHYDROGENASE: 167 IU/L (ref 120–246)
LYMPHOCYTES ABSOLUTE: 0.2 K/CU MM
LYMPHOCYTES RELATIVE PERCENT: 43 % (ref 24–44)
MCH RBC QN AUTO: 28.8 PG (ref 27–31)
MCHC RBC AUTO-ENTMCNC: 35.2 % (ref 32–36)
MCV RBC AUTO: 81.6 FL (ref 78–100)
MONOCYTES ABSOLUTE: 0.1 K/CU MM
MONOCYTES RELATIVE PERCENT: 42 % (ref 0–4)
PDW BLD-RTO: 14.3 % (ref 11.7–14.9)
PLATELET # BLD: 139 K/CU MM (ref 140–440)
PMV BLD AUTO: 10.2 FL (ref 7.5–11.1)
POTASSIUM SERPL-SCNC: 4.5 MMOL/L (ref 3.5–5.1)
RBC # BLD: 2.99 M/CU MM (ref 4.2–5.4)
SEGMENTED NEUTROPHILS ABSOLUTE COUNT: 0 K/CU MM
SEGMENTED NEUTROPHILS RELATIVE PERCENT: 6 % (ref 36–66)
SODIUM BLD-SCNC: 133 MMOL/L (ref 135–145)
TOTAL PROTEIN: 6.4 GM/DL (ref 6.4–8.2)
WBC # BLD: ABNORMAL K/CU MM (ref 4–10.5)

## 2020-01-28 PROCEDURE — 96372 THER/PROPH/DIAG INJ SC/IM: CPT

## 2020-01-28 PROCEDURE — 99214 OFFICE O/P EST MOD 30 MIN: CPT

## 2020-01-28 PROCEDURE — 85025 COMPLETE CBC W/AUTO DIFF WBC: CPT

## 2020-01-28 PROCEDURE — 83615 LACTATE (LD) (LDH) ENZYME: CPT

## 2020-01-28 PROCEDURE — 6360000002 HC RX W HCPCS: Performed by: INTERNAL MEDICINE

## 2020-01-28 PROCEDURE — 36415 COLL VENOUS BLD VENIPUNCTURE: CPT

## 2020-01-28 PROCEDURE — 80053 COMPREHEN METABOLIC PANEL: CPT

## 2020-01-29 ENCOUNTER — HOSPITAL ENCOUNTER (OUTPATIENT)
Dept: INFUSION THERAPY | Age: 65
Discharge: HOME | End: 2020-01-29
Payer: COMMERCIAL

## 2020-01-29 PROCEDURE — 96372 THER/PROPH/DIAG INJ SC/IM: CPT

## 2020-01-29 PROCEDURE — 6360000002 HC RX W HCPCS: Performed by: INTERNAL MEDICINE

## 2020-01-30 ENCOUNTER — HOSPITAL ENCOUNTER (OUTPATIENT)
Dept: INFUSION THERAPY | Age: 65
Discharge: HOME | End: 2020-01-30
Payer: COMMERCIAL

## 2020-01-30 PROCEDURE — 96372 THER/PROPH/DIAG INJ SC/IM: CPT

## 2020-01-30 PROCEDURE — 6360000002 HC RX W HCPCS: Performed by: INTERNAL MEDICINE

## 2020-02-06 ENCOUNTER — HOSPITAL ENCOUNTER (OUTPATIENT)
Dept: INFUSION THERAPY | Age: 65
Discharge: HOME | End: 2020-02-06
Payer: COMMERCIAL

## 2020-02-06 ENCOUNTER — OFFICE VISIT (OUTPATIENT)
Dept: SURGERY | Age: 65
End: 2020-02-06

## 2020-02-06 VITALS
DIASTOLIC BLOOD PRESSURE: 68 MMHG | WEIGHT: 102.3 LBS | HEIGHT: 63 IN | OXYGEN SATURATION: 91 % | SYSTOLIC BLOOD PRESSURE: 100 MMHG | BODY MASS INDEX: 18.12 KG/M2 | HEART RATE: 93 BPM

## 2020-02-06 LAB
ALBUMIN SERPL-MCNC: 4 GM/DL (ref 3.4–5)
ALP BLD-CCNC: 70 IU/L (ref 40–129)
ALT SERPL-CCNC: <5 U/L (ref 10–40)
ANION GAP SERPL CALCULATED.3IONS-SCNC: 17 MMOL/L (ref 4–16)
AST SERPL-CCNC: 15 IU/L (ref 15–37)
BILIRUB SERPL-MCNC: 0.4 MG/DL (ref 0–1)
BUN BLDV-MCNC: 9 MG/DL (ref 6–23)
CALCIUM SERPL-MCNC: 9.6 MG/DL (ref 8.3–10.6)
CHLORIDE BLD-SCNC: 96 MMOL/L (ref 99–110)
CO2: 23 MMOL/L (ref 21–32)
CREAT SERPL-MCNC: 0.8 MG/DL (ref 0.6–1.1)
DIFFERENTIAL TYPE: ABNORMAL
GFR AFRICAN AMERICAN: >60 ML/MIN/1.73M2
GFR NON-AFRICAN AMERICAN: >60 ML/MIN/1.73M2
GLUCOSE BLD-MCNC: 83 MG/DL (ref 70–99)
HCT VFR BLD CALC: 27.7 % (ref 37–47)
HEMOGLOBIN: 9.3 GM/DL (ref 12.5–16)
LACTATE DEHYDROGENASE: 315 IU/L (ref 120–246)
LYMPHOCYTES ABSOLUTE: 0.2 K/CU MM
LYMPHOCYTES RELATIVE PERCENT: 4 % (ref 24–44)
MCH RBC QN AUTO: 28.9 PG (ref 27–31)
MCHC RBC AUTO-ENTMCNC: 33.6 % (ref 32–36)
MCV RBC AUTO: 86 FL (ref 78–100)
MONOCYTES ABSOLUTE: 0.9 K/CU MM
MONOCYTES RELATIVE PERCENT: 14 % (ref 0–4)
PDW BLD-RTO: 16.8 % (ref 11.7–14.9)
PLATELET # BLD: 473 K/CU MM (ref 140–440)
PMV BLD AUTO: 9.5 FL (ref 7.5–11.1)
POTASSIUM SERPL-SCNC: 4.1 MMOL/L (ref 3.5–5.1)
RBC # BLD: 3.22 M/CU MM (ref 4.2–5.4)
SEGMENTED NEUTROPHILS ABSOLUTE COUNT: 5 K/CU MM
SEGMENTED NEUTROPHILS RELATIVE PERCENT: 82 % (ref 36–66)
SODIUM BLD-SCNC: 136 MMOL/L (ref 135–145)
TOTAL PROTEIN: 6.4 GM/DL (ref 6.4–8.2)
WBC # BLD: ABNORMAL K/CU MM (ref 4–10.5)

## 2020-02-06 PROCEDURE — 36415 COLL VENOUS BLD VENIPUNCTURE: CPT

## 2020-02-06 PROCEDURE — 83615 LACTATE (LD) (LDH) ENZYME: CPT

## 2020-02-06 PROCEDURE — 6360000002 HC RX W HCPCS

## 2020-02-06 PROCEDURE — 85025 COMPLETE CBC W/AUTO DIFF WBC: CPT

## 2020-02-06 PROCEDURE — 36593 DECLOT VASCULAR DEVICE: CPT

## 2020-02-06 PROCEDURE — 96523 IRRIG DRUG DELIVERY DEVICE: CPT

## 2020-02-06 PROCEDURE — 6360000002 HC RX W HCPCS: Performed by: INTERNAL MEDICINE

## 2020-02-06 PROCEDURE — 80053 COMPREHEN METABOLIC PANEL: CPT

## 2020-02-06 PROCEDURE — 99024 POSTOP FOLLOW-UP VISIT: CPT | Performed by: SURGERY

## 2020-02-06 RX ORDER — HEPARIN SODIUM (PORCINE) LOCK FLUSH IV SOLN 100 UNIT/ML 100 UNIT/ML
SOLUTION INTRAVENOUS
Status: DISCONTINUED
Start: 2020-02-06 | End: 2020-02-07 | Stop reason: HOSPADM

## 2020-02-06 RX ORDER — MEGESTROL ACETATE 125 MG/ML
625 SUSPENSION ORAL DAILY
Qty: 150 ML | Refills: 1 | Status: SHIPPED | OUTPATIENT
Start: 2020-02-06 | End: 2020-08-27

## 2020-02-07 ENCOUNTER — HOSPITAL ENCOUNTER (OUTPATIENT)
Dept: INFUSION THERAPY | Age: 65
Discharge: HOME | End: 2020-02-07
Payer: COMMERCIAL

## 2020-02-07 PROCEDURE — 96411 CHEMO IV PUSH ADDL DRUG: CPT

## 2020-02-07 PROCEDURE — 96415 CHEMO IV INFUSION ADDL HR: CPT

## 2020-02-07 PROCEDURE — 2580000003 HC RX 258: Performed by: INTERNAL MEDICINE

## 2020-02-07 PROCEDURE — 6370000000 HC RX 637 (ALT 250 FOR IP)

## 2020-02-07 PROCEDURE — 96375 TX/PRO/DX INJ NEW DRUG ADDON: CPT

## 2020-02-07 PROCEDURE — 96417 CHEMO IV INFUS EACH ADDL SEQ: CPT

## 2020-02-07 PROCEDURE — 6360000002 HC RX W HCPCS: Performed by: INTERNAL MEDICINE

## 2020-02-07 PROCEDURE — 96377 APPLICATON ON-BODY INJECTOR: CPT

## 2020-02-07 PROCEDURE — 96367 TX/PROPH/DG ADDL SEQ IV INF: CPT

## 2020-02-07 PROCEDURE — 6360000002 HC RX W HCPCS

## 2020-02-07 PROCEDURE — 96413 CHEMO IV INFUSION 1 HR: CPT

## 2020-02-07 RX ORDER — ACETAMINOPHEN 325 MG/1
650 TABLET ORAL ONCE
Status: DISCONTINUED | OUTPATIENT
Start: 2020-02-07 | End: 2020-02-08 | Stop reason: HOSPADM

## 2020-02-07 RX ORDER — HEPARIN SODIUM (PORCINE) LOCK FLUSH IV SOLN 100 UNIT/ML 100 UNIT/ML
SOLUTION INTRAVENOUS
Status: DISCONTINUED
Start: 2020-02-07 | End: 2020-02-08 | Stop reason: HOSPADM

## 2020-02-07 RX ORDER — PALONOSETRON 0.05 MG/ML
INJECTION, SOLUTION INTRAVENOUS
Status: DISPENSED
Start: 2020-02-07 | End: 2020-02-07

## 2020-02-07 RX ORDER — DIPHENHYDRAMINE HYDROCHLORIDE 50 MG/ML
INJECTION INTRAMUSCULAR; INTRAVENOUS
Status: DISPENSED
Start: 2020-02-07 | End: 2020-02-07

## 2020-02-07 RX ORDER — ACETAMINOPHEN 325 MG/1
TABLET ORAL
Status: DISPENSED
Start: 2020-02-07 | End: 2020-02-07

## 2020-02-07 RX ORDER — DIPHENHYDRAMINE HYDROCHLORIDE 50 MG/ML
25 INJECTION INTRAMUSCULAR; INTRAVENOUS ONCE
Status: DISCONTINUED | OUTPATIENT
Start: 2020-02-07 | End: 2020-02-08 | Stop reason: HOSPADM

## 2020-02-07 RX ORDER — DOXORUBICIN HYDROCHLORIDE 2 MG/ML
70 INJECTION, SOLUTION INTRAVENOUS ONCE
Status: DISCONTINUED | OUTPATIENT
Start: 2020-02-07 | End: 2020-02-08 | Stop reason: HOSPADM

## 2020-02-07 RX ORDER — PALONOSETRON 0.05 MG/ML
0.25 INJECTION, SOLUTION INTRAVENOUS ONCE
Status: DISCONTINUED | OUTPATIENT
Start: 2020-02-07 | End: 2020-02-08 | Stop reason: HOSPADM

## 2020-02-07 NOTE — PROGRESS NOTES
Chief Complaint   Patient presents with    Follow-up     Pt mediport malfunction         SUBJECTIVE:  Patient here for post op visit. Patient  Pain is minimal.  Wounds: minbruising and no discharge.     Past Surgical History:   Procedure Laterality Date    COLONOSCOPY  2015    normal colon    PORT SURGERY N/A 2020    PORT INSERTION performed by Brionna Yoon MD at [address] [place] [address]  as a kid    TUBAL LIGATION  20+ yrs ago     Past Medical History:   Diagnosis Date    [facility] [address]     Bipolar 1 disorder (HCC)     \"on Depakote for this\"     Family History   Problem Relation Age of Onset    Breast Cancer Mother     High Blood Pressure Mother     Cancer Father         \"cancer in the bowel\"     Social History     Socioeconomic History    Marital status:      Spouse name: Not on file    Number of children: Not on file    Years of education: Not on file    Highest education level: Not on file   Occupational History    Not on file   Social Needs    Financial resource strain: Not on file    Food insecurity:     Worry: Not on file     Inability: Not on file    Transportation needs:     Medical: Not on file     Non-medical: Not on file   Tobacco Use    Smoking status: Former Smoker     Packs/day: 1.00     Years: 10.00     Pack years: 10.00     Types: Cigarettes     Last attempt to quit: 1988     Years since quittin.1    Smokeless tobacco: Never Used   Substance and Sexual Activity    Alcohol use: No    Drug use: No    Sexual activity: Not on file   Lifestyle    Physical activity:     Days per week: Not on file     Minutes per session: Not on file    Stress: Not on file   Relationships    Social connections:     Talks on phone: Not on file     Gets together: Not on file     Attends Taoist service: Not on file     Active member of club or organization: Not on file     Attends meetings of clubs or organizations: Not on file     Relationship status: Not on file

## 2020-02-11 ENCOUNTER — INITIAL CONSULT (OUTPATIENT)
Dept: CARDIOLOGY CLINIC | Age: 65
End: 2020-02-11
Payer: COMMERCIAL

## 2020-02-11 VITALS
SYSTOLIC BLOOD PRESSURE: 80 MMHG | DIASTOLIC BLOOD PRESSURE: 50 MMHG | HEART RATE: 88 BPM | WEIGHT: 106.2 LBS | BODY MASS INDEX: 18.82 KG/M2 | HEIGHT: 63 IN

## 2020-02-11 PROBLEM — I05.9 MITRAL VALVE DISEASE: Status: ACTIVE | Noted: 2020-02-11

## 2020-02-11 PROBLEM — I95.0 IDIOPATHIC HYPOTENSION: Status: ACTIVE | Noted: 2020-02-11

## 2020-02-11 PROCEDURE — 99244 OFF/OP CNSLTJ NEW/EST MOD 40: CPT | Performed by: INTERNAL MEDICINE

## 2020-02-11 PROCEDURE — 93000 ELECTROCARDIOGRAM COMPLETE: CPT | Performed by: INTERNAL MEDICINE

## 2020-02-11 RX ORDER — MIDODRINE HYDROCHLORIDE 5 MG/1
5 TABLET ORAL 3 TIMES DAILY
Qty: 90 TABLET | Refills: 3 | Status: SHIPPED | OUTPATIENT
Start: 2020-02-11 | End: 2020-10-06

## 2020-02-11 RX ORDER — POLYETHYLENE GLYCOL 3350 17 G/17G
17 POWDER, FOR SOLUTION ORAL DAILY PRN
COMMUNITY
End: 2020-07-06

## 2020-02-11 RX ORDER — ASPIRIN 81 MG
100 TABLET, DELAYED RELEASE (ENTERIC COATED) ORAL PRN
COMMUNITY
End: 2020-07-06

## 2020-02-11 RX ORDER — DICYCLOMINE HCL 20 MG
20 TABLET ORAL EVERY 6 HOURS PRN
COMMUNITY
End: 2020-09-23

## 2020-02-11 RX ORDER — FAMOTIDINE 20 MG/1
20 TABLET, FILM COATED ORAL NIGHTLY
COMMUNITY
End: 2020-08-27

## 2020-02-11 NOTE — PROGRESS NOTES
CARDIOLOGY CONSULT  NOTE    Chief Complaint: Hypotention    HPI:   David Sanchez is a 59y.o. year old who has Past medical history as noted below. Very pleasant lady who has history of bipolar disorder was recently diagnosed of lymphoma. She has had 2 sessions of chemotherapy so far she has been feeling lightheaded dizzy blood pressures been running in the 80s. She was not on any medication before she is was diagnosed of came of lymphoma her echo showed possible mitral valve prolapse although there is moderate mitral regurgitation  Denies any signs of congestive heart failure like ankle swelling shortness of breath her main complaints of feeling tired and fatigued low energy      Current Outpatient Medications   Medication Sig Dispense Refill    famotidine (PEPCID) 20 MG tablet Take 20 mg by mouth nightly      dicyclomine (BENTYL) 20 MG tablet Take 20 mg by mouth every 6 hours as needed      Docusate Sodium 100 MG TABS Take 100 mg by mouth as needed      polyethylene glycol (GLYCOLAX) packet Take 17 g by mouth daily as needed for Constipation      Loratadine-Pseudoephedrine (CLARITIN-D 12 HOUR PO) Take by mouth Daily      midodrine (PROAMATINE) 5 MG tablet Take 1 tablet by mouth 3 times daily 90 tablet 3    megestrol (MEGACE ES) 625 MG/5ML suspension Take 5 mLs by mouth daily 150 mL 1    ondansetron (ZOFRAN-ODT) 8 MG TBDP disintegrating tablet       allopurinol (ZYLOPRIM) 100 MG tablet Take 100 mg by mouth daily      divalproex (DEPAKOTE) 500 MG DR tablet TAKE 1 TAB IN THE MORNING AND 2 TABLETS IN THE EVENING. 90 tablet 1    Polysaccharide Iron Complex (PROFE PO) Take 100 mg by mouth 3 times daily      predniSONE (DELTASONE) 50 MG tablet Take 50 mg by mouth daily      MULTIPLE VITAMIN PO Take by mouth daily      calcium carbonate 600 MG TABS tablet Take 1 tablet by mouth daily       No current facility-administered medications for this visit.         Allergies: lb 1.5 oz (64 kg)     Body mass index is 18.81 kg/m². Vitals:    02/11/20 1455   BP: (!) 80/50   Pulse: 88        General Appearance:  No distress, conversant  Constitutional:  Well developed, Well nourished, No acute distress, Non-toxic appearance. HENT:  Normocephalic, Atraumatic, Bilateral external ears normal, Oropharynx moist, No oral exudates, Nose normal. Neck- Normal range of motion, No tenderness, Supple, No stridor,no apical-carotid delay  Eyes:  PERRL, EOMI, Conjunctiva normal, No discharge. Respiratory:  Normal breath sounds, No respiratory distress, No wheezing, No chest tenderness. ,no use of accessory muscles, NO crackles  Cardiovascular: (PMI) apex non displaced,no lifts no thrills,S1 and S2 audible, systolic murmur sounds, No signs of ankle edema, or volume overload, No evidence of JVD, No crackles  GI:  Bowel sounds normal, Soft, No tenderness, No masses, No gross visceromegaly   :  No costovertebral angle tenderness   Musculoskeletal:  No edema, no tenderness, no deformities. Back- no tenderness  Integument:  Well hydrated, no rash   Lymphatic:  No lymphadenopathy noted   Neurologic:  Alert & oriented x 3, CN 2-12 normal, normal motor function, normal sensory function, no focal deficits noted   Psychiatric:  Speech and behavior appropriate       Medical decision making and Data review:  DATA:  No results found for: TROPONINT  BNP:  No results found for: PROBNP  PT/INR:  No results found for: PTINR  No results found for: LABA1C  Lab Results   Component Value Date    CHOL 234 06/19/2017    TRIG 130 06/19/2017    HDL 66 06/19/2017    LDLCALC 142 06/19/2017     Lab Results   Component Value Date    ALT <5 (L) 02/06/2020    AST 15 02/06/2020     No results for input(s): WBC, HGB, HCT, MCV, PLT in the last 72 hours.   TSH: No results found for: TSH  Lab Results   Component Value Date    AST 15 02/06/2020    ALT <5 (L) 02/06/2020    BILITOT 0.4 02/06/2020    ALKPHOS 70 02/06/2020     No results found for: PROBNP  No results found for: LABA1C  Lab Results   Component Value Date    WBC  02/06/2020     6.1  WBC/DIFFERENTIAL SUSPECT FLAG NOTED ON ANALYZER. PLEASE REVIEW AND CONSIDER SENDING OUT IF CLINICALLY INDICATED. HGB 9.3 (L) 02/06/2020    HCT 27.7 (L) 02/06/2020     (H) 02/06/2020     Echo 1/16/2020   Summary   Left ventricular systolic function is low normal.   Ejection fraction is visually estimated at 50%. Paradoxical motion of the interventricular septum; possible conduction   delay. Indeterminate diastolic function; E/A flow reversal is noted. Mitral valve prolapse is present. Moderate to severe mitral regurgitation is present. Moderate tricuspid regurgitation is present. RVSP is 25 mmHg. No evidence of any pericardial effusion. Mobile interatrial septum. Incidental finding: cyst vs mass near/attached to the right kidney. All labs, medications and tests reviewed by myself including data and history from outside source , patient and available family . Assessment & Plan:      1. Essential hypertension    2. B-cell lymphoma of intrapelvic lymph nodes, unspecified B-cell lymphoma type ([facility] [address]    3. Idiopathic hypotension    4. Mitral valve disease         Idiopathic hypotension  Very concerned about her low blood pressures will start her on midodrine 5 mg 3 times daily. Her TSH was normal if it continues to be low I think she needs to be ruled out for infection or sepsis although clinically she does not appear to be that sick she tells me that her blood pressure chronically runs low she may need stress dose steroids. Tells me that she may need surgery for kidney mass?   We will continue to monitor for now    Mitral valve disease  She does seems to have moderate mitral regurgitation with mitral valve prolapse we will plan a STEVEN when she is more physically improved at this stage we will continue to monitor her mitral valve she does not appear to be in heart failure or seem

## 2020-02-11 NOTE — LETTER
CLINICAL STAFF DOCUMENTATION    [facility]  1955  [id number]    Have you had any Chest Pain - No      Have you had any Shortness of Breath - Yes  If Yes - When on exertion    Have you had any dizziness - No      Have you had any palpitations - No      Do you have any edema - No    Do you have a surgery or procedure scheduled in the near future - No      Ask patient if they want to sign up for MyChart if they are not already signed up    Check to see if we have an E-MAIL on file for the patient    Check medication list thoroughly!!!  BE SURE TO ASK PATIENT IF THEY NEED MEDICATION REFILLS

## 2020-02-18 ENCOUNTER — APPOINTMENT (OUTPATIENT)
Dept: GENERAL RADIOLOGY | Age: 65
DRG: 981 | End: 2020-02-18
Attending: INTERNAL MEDICINE
Payer: COMMERCIAL

## 2020-02-18 ENCOUNTER — HOSPITAL ENCOUNTER (INPATIENT)
Age: 65
LOS: 21 days | Discharge: NURSING, REHAB OR LONG-TERM CARE | DRG: 981 | End: 2020-03-10
Attending: INTERNAL MEDICINE | Admitting: INTERNAL MEDICINE
Payer: COMMERCIAL

## 2020-02-18 ENCOUNTER — HOSPITAL ENCOUNTER (OUTPATIENT)
Dept: ULTRASOUND IMAGING | Age: 65
Discharge: HOME | DRG: 981 | End: 2020-02-18
Payer: COMMERCIAL

## 2020-02-18 ENCOUNTER — HOSPITAL ENCOUNTER (OUTPATIENT)
Dept: INFUSION THERAPY | Age: 65
Discharge: HOME | DRG: 981 | End: 2020-02-18
Payer: COMMERCIAL

## 2020-02-18 PROBLEM — D61.818 PANCYTOPENIA (HCC): Status: ACTIVE | Noted: 2020-02-18

## 2020-02-18 LAB
ALBUMIN SERPL-MCNC: 3.1 GM/DL (ref 3.4–5)
ALP BLD-CCNC: 50 IU/L (ref 40–128)
ALT SERPL-CCNC: 10 U/L (ref 10–40)
ANION GAP SERPL CALCULATED.3IONS-SCNC: 15 MMOL/L (ref 4–16)
AST SERPL-CCNC: 14 IU/L (ref 15–37)
BACTERIA: NEGATIVE /HPF
BILIRUB SERPL-MCNC: 0.8 MG/DL (ref 0–1)
BILIRUBIN URINE: NEGATIVE MG/DL
BLOOD, URINE: ABNORMAL
BUN BLDV-MCNC: 12 MG/DL (ref 6–23)
CALCIUM SERPL-MCNC: 8.2 MG/DL (ref 8.3–10.6)
CHLORIDE BLD-SCNC: 102 MMOL/L (ref 99–110)
CLARITY: CLEAR
CO2: 17 MMOL/L (ref 21–32)
COLOR: YELLOW
CREAT SERPL-MCNC: 0.9 MG/DL (ref 0.6–1.1)
GFR AFRICAN AMERICAN: >60 ML/MIN/1.73M2
GFR NON-AFRICAN AMERICAN: >60 ML/MIN/1.73M2
GLUCOSE BLD-MCNC: 128 MG/DL (ref 70–99)
GLUCOSE, URINE: 50 MG/DL
KETONES, URINE: NEGATIVE MG/DL
LACTATE DEHYDROGENASE: 176 IU/L (ref 120–246)
LEUKOCYTE ESTERASE, URINE: NEGATIVE
MUCUS: ABNORMAL HPF
NITRITE URINE, QUANTITATIVE: NEGATIVE
PH, URINE: 5 (ref 5–8)
POTASSIUM SERPL-SCNC: 4.8 MMOL/L (ref 3.5–5.1)
PROTEIN UA: 30 MG/DL
RBC URINE: <1 /HPF (ref 0–6)
SODIUM BLD-SCNC: 134 MMOL/L (ref 135–145)
SPECIFIC GRAVITY UA: 1.01 (ref 1–1.03)
SQUAMOUS EPITHELIAL: 1 /HPF
TOTAL PROTEIN: 5.5 GM/DL (ref 6.4–8.2)
TRICHOMONAS: ABNORMAL /HPF
UROBILINOGEN, URINE: 2 MG/DL (ref 0.2–1)
WBC CLUMP: ABNORMAL /HPF
WBC UA: <1 /HPF (ref 0–5)

## 2020-02-18 PROCEDURE — 96374 THER/PROPH/DIAG INJ IV PUSH: CPT

## 2020-02-18 PROCEDURE — 36593 DECLOT VASCULAR DEVICE: CPT

## 2020-02-18 PROCEDURE — 87040 BLOOD CULTURE FOR BACTERIA: CPT

## 2020-02-18 PROCEDURE — 86850 RBC ANTIBODY SCREEN: CPT

## 2020-02-18 PROCEDURE — 2580000003 HC RX 258: Performed by: INTERNAL MEDICINE

## 2020-02-18 PROCEDURE — 6370000000 HC RX 637 (ALT 250 FOR IP): Performed by: PHYSICIAN ASSISTANT

## 2020-02-18 PROCEDURE — 36415 COLL VENOUS BLD VENIPUNCTURE: CPT

## 2020-02-18 PROCEDURE — 83615 LACTATE (LD) (LDH) ENZYME: CPT

## 2020-02-18 PROCEDURE — 81001 URINALYSIS AUTO W/SCOPE: CPT

## 2020-02-18 PROCEDURE — 86901 BLOOD TYPING SEROLOGIC RH(D): CPT

## 2020-02-18 PROCEDURE — 87186 SC STD MICRODIL/AGAR DIL: CPT

## 2020-02-18 PROCEDURE — 6360000002 HC RX W HCPCS

## 2020-02-18 PROCEDURE — 86922 COMPATIBILITY TEST ANTIGLOB: CPT

## 2020-02-18 PROCEDURE — 6360000002 HC RX W HCPCS: Performed by: INTERNAL MEDICINE

## 2020-02-18 PROCEDURE — 36430 TRANSFUSION BLD/BLD COMPNT: CPT

## 2020-02-18 PROCEDURE — 85027 COMPLETE CBC AUTOMATED: CPT

## 2020-02-18 PROCEDURE — 86900 BLOOD TYPING SEROLOGIC ABO: CPT

## 2020-02-18 PROCEDURE — 96361 HYDRATE IV INFUSION ADD-ON: CPT

## 2020-02-18 PROCEDURE — 96375 TX/PRO/DX INJ NEW DRUG ADDON: CPT

## 2020-02-18 PROCEDURE — 85007 BL SMEAR W/DIFF WBC COUNT: CPT

## 2020-02-18 PROCEDURE — 6370000000 HC RX 637 (ALT 250 FOR IP): Performed by: INTERNAL MEDICINE

## 2020-02-18 PROCEDURE — 87150 DNA/RNA AMPLIFIED PROBE: CPT

## 2020-02-18 PROCEDURE — 80053 COMPREHEN METABOLIC PANEL: CPT

## 2020-02-18 PROCEDURE — 2000000000 HC ICU R&B

## 2020-02-18 PROCEDURE — P9016 RBC LEUKOCYTES REDUCED: HCPCS

## 2020-02-18 PROCEDURE — 71046 X-RAY EXAM CHEST 2 VIEWS: CPT

## 2020-02-18 PROCEDURE — 93971 EXTREMITY STUDY: CPT

## 2020-02-18 RX ORDER — DIVALPROEX SODIUM 250 MG/1
1000 TABLET, DELAYED RELEASE ORAL EVERY EVENING
Status: DISCONTINUED | OUTPATIENT
Start: 2020-02-18 | End: 2020-03-02 | Stop reason: SDUPTHER

## 2020-02-18 RX ORDER — DIPHENHYDRAMINE HCL 25 MG
25 TABLET ORAL ONCE
Status: COMPLETED | OUTPATIENT
Start: 2020-02-18 | End: 2020-02-18

## 2020-02-18 RX ORDER — SODIUM CHLORIDE 0.9 % (FLUSH) 0.9 %
10 SYRINGE (ML) INJECTION EVERY 12 HOURS SCHEDULED
Status: DISCONTINUED | OUTPATIENT
Start: 2020-02-18 | End: 2020-02-27 | Stop reason: SDUPTHER

## 2020-02-18 RX ORDER — SODIUM CHLORIDE 0.9 % (FLUSH) 0.9 %
10 SYRINGE (ML) INJECTION PRN
Status: DISCONTINUED | OUTPATIENT
Start: 2020-02-18 | End: 2020-03-11 | Stop reason: HOSPADM

## 2020-02-18 RX ORDER — ACETAMINOPHEN 325 MG/1
650 TABLET ORAL ONCE
Status: COMPLETED | OUTPATIENT
Start: 2020-02-18 | End: 2020-02-18

## 2020-02-18 RX ORDER — 0.9 % SODIUM CHLORIDE 0.9 %
20 INTRAVENOUS SOLUTION INTRAVENOUS ONCE
Status: DISCONTINUED | OUTPATIENT
Start: 2020-02-18 | End: 2020-03-05

## 2020-02-18 RX ORDER — MIDODRINE HYDROCHLORIDE 5 MG/1
5 TABLET ORAL 3 TIMES DAILY
Status: DISCONTINUED | OUTPATIENT
Start: 2020-02-18 | End: 2020-02-18

## 2020-02-18 RX ORDER — MIDODRINE HYDROCHLORIDE 5 MG/1
5 TABLET ORAL 3 TIMES DAILY
Status: DISCONTINUED | OUTPATIENT
Start: 2020-02-19 | End: 2020-02-25

## 2020-02-18 RX ORDER — ALLOPURINOL 100 MG/1
100 TABLET ORAL DAILY
Status: DISCONTINUED | OUTPATIENT
Start: 2020-02-18 | End: 2020-03-11 | Stop reason: HOSPADM

## 2020-02-18 RX ORDER — DIVALPROEX SODIUM 250 MG/1
500 TABLET, DELAYED RELEASE ORAL EVERY MORNING
Status: DISCONTINUED | OUTPATIENT
Start: 2020-02-19 | End: 2020-03-02 | Stop reason: SDUPTHER

## 2020-02-18 RX ORDER — POLYETHYLENE GLYCOL 3350 17 G/17G
17 POWDER, FOR SOLUTION ORAL DAILY PRN
Status: DISCONTINUED | OUTPATIENT
Start: 2020-02-18 | End: 2020-03-11 | Stop reason: HOSPADM

## 2020-02-18 RX ORDER — FUROSEMIDE 10 MG/ML
20 INJECTION INTRAMUSCULAR; INTRAVENOUS ONCE
Status: COMPLETED | OUTPATIENT
Start: 2020-02-18 | End: 2020-02-18

## 2020-02-18 RX ORDER — UREA 10 %
1 LOTION (ML) TOPICAL NIGHTLY PRN
Status: DISCONTINUED | OUTPATIENT
Start: 2020-02-18 | End: 2020-03-11 | Stop reason: HOSPADM

## 2020-02-18 RX ORDER — DEXAMETHASONE SODIUM PHOSPHATE 4 MG/ML
4 INJECTION, SOLUTION INTRA-ARTICULAR; INTRALESIONAL; INTRAMUSCULAR; INTRAVENOUS; SOFT TISSUE EVERY 6 HOURS
Status: DISCONTINUED | OUTPATIENT
Start: 2020-02-18 | End: 2020-02-19 | Stop reason: HOSPADM

## 2020-02-18 RX ORDER — ONDANSETRON 2 MG/ML
4 INJECTION INTRAMUSCULAR; INTRAVENOUS ONCE
Status: DISCONTINUED | OUTPATIENT
Start: 2020-02-18 | End: 2020-02-19 | Stop reason: HOSPADM

## 2020-02-18 RX ORDER — HEPARIN SODIUM (PORCINE) LOCK FLUSH IV SOLN 100 UNIT/ML 100 UNIT/ML
SOLUTION INTRAVENOUS
Status: DISCONTINUED
Start: 2020-02-18 | End: 2020-02-19 | Stop reason: HOSPADM

## 2020-02-18 RX ORDER — CALCIUM CARBONATE 500(1250)
500 TABLET ORAL
Status: DISCONTINUED | OUTPATIENT
Start: 2020-02-19 | End: 2020-03-11 | Stop reason: HOSPADM

## 2020-02-18 RX ORDER — ACETAMINOPHEN 325 MG/1
650 TABLET ORAL EVERY 4 HOURS PRN
Status: DISCONTINUED | OUTPATIENT
Start: 2020-02-18 | End: 2020-03-11 | Stop reason: HOSPADM

## 2020-02-18 RX ORDER — ONDANSETRON 2 MG/ML
4 INJECTION INTRAMUSCULAR; INTRAVENOUS EVERY 6 HOURS PRN
Status: DISCONTINUED | OUTPATIENT
Start: 2020-02-18 | End: 2020-03-11 | Stop reason: HOSPADM

## 2020-02-18 RX ORDER — 0.9 % SODIUM CHLORIDE 0.9 %
20 INTRAVENOUS SOLUTION INTRAVENOUS ONCE
Status: COMPLETED | OUTPATIENT
Start: 2020-02-18 | End: 2020-02-18

## 2020-02-18 RX ADMIN — SODIUM CHLORIDE 20 ML: 9 INJECTION, SOLUTION INTRAVENOUS at 17:54

## 2020-02-18 RX ADMIN — DIVALPROEX SODIUM 1000 MG: 250 TABLET, DELAYED RELEASE ORAL at 21:22

## 2020-02-18 RX ADMIN — MIDODRINE HYDROCHLORIDE 5 MG: 5 TABLET ORAL at 18:31

## 2020-02-18 RX ADMIN — ACETAMINOPHEN 650 MG: 325 TABLET ORAL at 21:21

## 2020-02-18 RX ADMIN — ACETAMINOPHEN 650 MG: 325 TABLET ORAL at 17:49

## 2020-02-18 RX ADMIN — SODIUM CHLORIDE, PRESERVATIVE FREE 10 ML: 5 INJECTION INTRAVENOUS at 21:22

## 2020-02-18 RX ADMIN — FUROSEMIDE 20 MG: 10 INJECTION, SOLUTION INTRAVENOUS at 21:22

## 2020-02-18 RX ADMIN — ALLOPURINOL 100 MG: 100 TABLET ORAL at 18:31

## 2020-02-18 RX ADMIN — Medication 1 MG: at 21:22

## 2020-02-18 RX ADMIN — TBO-FILGRASTIM 300 MCG: 300 INJECTION, SOLUTION SUBCUTANEOUS at 21:21

## 2020-02-18 RX ADMIN — DIPHENHYDRAMINE HYDROCHLORIDE 25 MG: 25 TABLET ORAL at 17:49

## 2020-02-18 ASSESSMENT — PAIN SCALES - GENERAL
PAINLEVEL_OUTOF10: 0
PAINLEVEL_OUTOF10: 0
PAINLEVEL_OUTOF10: 8

## 2020-02-18 NOTE — PROGRESS NOTES
Received patient direct admit from cancer center. Prior to patient arrival, RN noted patient Hgb was 3.6 and WBC count 0.2. Phone call made to Charge RN Eliud Caceres) who spoke with Alexandra Pugh (Supervisor). Per supervisor, patient ok to come to the unit since plan is for transfusion and to dc home. TC made to Dr. Majo Cheung upon patient arrival to verify that it is ok to access patient mediport. Patient states that her mediport does not always draw. While speaking with Dr. Majo Cheung, RN informed hat patient will be admitted and that Dr. Majo Cheung had spoken to the hospitalist. Dr. Londono Cure in to see the patient and order received to transfer the patient to ICU due to Hgb, WBC count, and hypotension. Patient transferred to 2129 and report given at bedside to [facility].

## 2020-02-18 NOTE — H&P
History and Physical  Internal Medicine Hospitalist      Name:  Melina Olivarez /Age/Sex: 1955  (59 y.o. female)   MRN & CSN:  [id number] & [id number] Admission Date/Time: 2020  3:46 PM   Location:  [address] [place] [address]-A PCP: Melanie Varela, [facility] Day: 1          Chief Complaint:  Abnormal CBC       Assessment and Plan:   Melina Olivarez is a 59 y.o.  female who presents with pancytopenia    - Pancytopenia ? BM involvement by lymphoma vs chemotherapy induced  - symptomatic severe anemia (hb 3.6 g/dl). Baseline Hb 9.3 g/dL on 20.   - severe Leukopenia , Neutropenia (total )  - neutropenic fever. Need to r/o sepsis  - Thrombocytopenia (67K)  - Lymphoma dx dec 2019 on R-CHOP, last received 20  - Sinus tachycardia due to anemia  - Bipolar disorder - continue depakote  - Extensive left upper extremity acute DVT ( IJ, subclavian, axillary, brachial and basili veins). ? Likely related to vascular catheter (she has left IJ mediport). Plan  - transfuse 2 units of RBC  - Get blood Cx, CXR, UA, resp virus panel  - empiric Cefepime   - daily CBC with differentials  - get retic count, peripheral smear and LDH  - Lovenox 50 mg daily for DVT per oncology as long as no active bleeding.   - Oncology consult. She may need BM biopsy  - Admit to ICU. - Monitor closely for bleeding  - plan discussed with oncologist     Current diagnosis and plan of management discussed with the patient  and family at the time of admission in lay language who agree to the above plan and disposition of admission for further care. All concerns and questions addressed.          Diet DIET GENERAL;    DVT Prophylaxis [x] Lovenox, []  Heparin, [x] SCDs, [] Ambulation  [] Long term AC   GI Prophylaxis [x] PPI,  [] H2 Blocker,  [] Carafate,  [] Diet,  [] No GI prophylaxis,   Code Status Full Code   Disposition Patient requires admission due to pancytopenia   MDM [] Low, [] Moderate,[x]  High     History of Present ([facility]     \"on Depakote for this\"     Past Surgery History:  Patient  has a past surgical history that includes Tubal ligation (20+ yrs ago); Tonsillectomy (as a kid); Colonoscopy (2015); and Port Surgery (N/A, 2020). Social History:    FAM HX: Assessed: family history includes Breast Cancer in her mother; Cancer in her father; High Blood Pressure in her mother. Soc HX:   Social History     Socioeconomic History    Marital status:      Spouse name: Not on file    Number of children: Not on file    Years of education: Not on file    Highest education level: Not on file   Occupational History    Not on file   Social Needs    Financial resource strain: Not on file    Food insecurity:     Worry: Not on file     Inability: Not on file    Transportation needs:     Medical: Not on file     Non-medical: Not on file   Tobacco Use    Smoking status: Former Smoker     Packs/day: 1.00     Years: 10.00     Pack years: 10.00     Types: Cigarettes     Last attempt to quit: 1988     Years since quittin.1    Smokeless tobacco: Never Used   Substance and Sexual Activity    Alcohol use: No    Drug use: No    Sexual activity: Not on file   Lifestyle    Physical activity:     Days per week: Not on file     Minutes per session: Not on file    Stress: Not on file   Relationships    Social connections:     Talks on phone: Not on file     Gets together: Not on file     Attends [employer] service: Not on file     Active member of club or organization: Not on file     Attends meetings of clubs or organizations: Not on file     Relationship status: Not on file    Intimate partner violence:     Fear of current or ex partner: Not on file     Emotionally abused: Not on file     Physically abused: Not on file     Forced sexual activity: Not on file   Other Topics Concern    Not on file   Social History Narrative    Not on file     TOBACCO:   reports that she quit smoking about 32 years ago.  Her smoking use included cigarettes. She has a 10.00 pack-year smoking history. She has never used smokeless tobacco.  ETOH:   reports no history of alcohol use. Drugs:  reports no history of drug use. Allergies: Allergies   Allergen Reactions    Abilify [Aripiprazole]     Codeine Nausea And Vomiting     Medications:   Medications:    sodium chloride  20 mL Intravenous Once    acetaminophen  650 mg Oral Once    diphenhydrAMINE  25 mg Oral Once    furosemide  20 mg Intravenous Once    tbo-filgrastim  300 mcg Subcutaneous QPM    cefepime  2 g Intravenous Q12H    allopurinol  100 mg Oral Daily    calcium carbonate  1 tablet Oral Daily    [START ON 2/19/2020] divalproex  500 mg Oral QAM    divalproex  1,000 mg Oral QPM    midodrine  5 mg Oral TID    sodium chloride flush  10 mL Intravenous 2 times per day      Infusions:   PRN Meds: sodium chloride flush, 10 mL, PRN  ondansetron, 4 mg, Q6H PRN  acetaminophen, 650 mg, Q4H PRN  polyethylene glycol, 17 g, Daily PRN      Prior to Admission Meds:  Prior to Admission medications    Medication Sig Start Date End Date Taking?  Authorizing Provider   famotidine (PEPCID) 20 MG tablet Take 20 mg by mouth nightly    Historical Provider, MD   dicyclomine (BENTYL) 20 MG tablet Take 20 mg by mouth every 6 hours as needed    Historical Provider, MD   Docusate Sodium 100 MG TABS Take 100 mg by mouth as needed    Historical Provider, MD   polyethylene glycol (GLYCOLAX) packet Take 17 g by mouth daily as needed for Constipation    Historical Provider, MD   Loratadine-Pseudoephedrine (CLARITIN-D 12 HOUR PO) Take by mouth Daily    Historical Provider, MD   midodrine (PROAMATINE) 5 MG tablet Take 1 tablet by mouth 3 times daily 2/11/20   Bull Don MD   megestrol (MEGACE ES) 625 MG/5ML suspension Take 5 mLs by mouth daily 2/6/20   Marguerite Garrett MD   ondansetron (ZOFRAN-ODT) 8 MG TBDP disintegrating tablet  12/31/19   Historical Provider, MD   allopurinol (ZYLOPRIM) 100 MG tablet Take 100 mg by mouth daily    Historical Provider, MD   divalproex (DEPAKOTE) 500 MG DR tablet TAKE 1 TAB IN THE MORNING AND 2 TABLETS IN THE EVENING. 1/6/20   Breann Quach MD   Polysaccharide Iron Complex (PROFE PO) Take 100 mg by mouth 3 times daily    Historical Provider, MD   predniSONE (DELTASONE) 50 MG tablet Take 50 mg by mouth daily    Historical Provider, MD   MULTIPLE VITAMIN PO Take by mouth daily    Historical Provider, MD   calcium carbonate 600 MG TABS tablet Take 1 tablet by mouth daily    Historical Provider, MD     Data:     Laboratory this visit:  Reviewed  Recent Labs     02/18/20  1356   WBC 0.2  WBC & HGB GIVEN TO  [facility] ON 02/18/2020 BY SALOME MLS   RESULTS READ BACK  *   HGB 3.6*   HCT 10.9*   PLT 67*      No results for input(s): NA, K, CL, CO2, PHOS, BUN, CREATININE in the last 72 hours. Invalid input(s): CA  No results for input(s): AST, ALT, ALB, BILIDIR, BILITOT, ALKPHOS in the last 72 hours. No results for input(s): INR in the last 72 hours. No results for input(s): CKTOTAL, CKMB, CKMBINDEX in the last 72 hours. Invalid input(s): Marlee Reynolds input(s): PRO-BNP    Radiology this visit:  Reviewed. Pet Ct Skull Base To Mid Thigh    Addendum Date: 2/6/2020    ADDENDUM: CT scan of the abdomen and pelvis dated 12/23/2019 is now available for comparison. The large mesenteric mass measuring 8 x 4.7 cm is decreased in size from 10.9 x 5.9 cm 12/23/2019. The right pelvic mass measuring 6 x 4.3 cm measured 6.6 x 6.3 cm previously. Direct comparison to the left pelvic mass is limited due to absence of contrast on the current exam. Right hydronephrosis is mildly improved. Overall impression: The metabolically active abdominal and pelvic masses have mildly decreased in size since the CT scan 12/23/2019.      Result Date: 2/6/2020  EXAMINATION: WHOLE BODY PET/CT 1/27/2020 TECHNIQUE: Following IV injection of 13 mCi of F 18 -FDG, PET  tumor imaging was acquired from the

## 2020-02-19 ENCOUNTER — APPOINTMENT (OUTPATIENT)
Dept: CT IMAGING | Age: 65
DRG: 981 | End: 2020-02-19
Attending: INTERNAL MEDICINE
Payer: COMMERCIAL

## 2020-02-19 PROBLEM — E43 SEVERE MALNUTRITION (HCC): Chronic | Status: ACTIVE | Noted: 2020-02-19

## 2020-02-19 LAB
ADENOVIRUS DETECTION BY PCR: NOT DETECTED
ANION GAP SERPL CALCULATED.3IONS-SCNC: 17 MMOL/L (ref 4–16)
ANISOCYTOSIS: ABNORMAL
ANISOCYTOSIS: ABNORMAL
BANDED NEUTROPHILS ABSOLUTE COUNT: 0.02 K/CU MM
BANDED NEUTROPHILS ABSOLUTE COUNT: 0.05 K/CU MM
BANDED NEUTROPHILS ABSOLUTE COUNT: ABNORMAL K/CU MM
BANDED NEUTROPHILS RELATIVE PERCENT: 12 % (ref 5–11)
BANDED NEUTROPHILS RELATIVE PERCENT: 16 % (ref 5–11)
BANDED NEUTROPHILS RELATIVE PERCENT: ABNORMAL % (ref 5–11)
BORDETELLA PERTUSSIS PCR: NOT DETECTED
BUN BLDV-MCNC: 10 MG/DL (ref 6–23)
CALCIUM SERPL-MCNC: 8.7 MG/DL (ref 8.3–10.6)
CELLS COUNTED: 14
CHLAMYDOPHILA PNEUMONIA PCR: NOT DETECTED
CHLORIDE BLD-SCNC: 103 MMOL/L (ref 99–110)
CO2: 17 MMOL/L (ref 21–32)
COMMENT: ABNORMAL
COMMENT: ABNORMAL
CORONAVIRUS 229E PCR: NOT DETECTED
CORONAVIRUS HKU1 PCR: NOT DETECTED
CORONAVIRUS NL63 PCR: NOT DETECTED
CORONAVIRUS OC43 PCR: NOT DETECTED
CREAT SERPL-MCNC: 1 MG/DL (ref 0.6–1.1)
DIFFERENTIAL TYPE: ABNORMAL
GFR AFRICAN AMERICAN: >60 ML/MIN/1.73M2
GFR NON-AFRICAN AMERICAN: 56 ML/MIN/1.73M2
GLUCOSE BLD-MCNC: 130 MG/DL (ref 70–99)
HCT VFR BLD CALC: 10.9 % (ref 37–47)
HCT VFR BLD CALC: 19.8 % (ref 37–47)
HCT VFR BLD CALC: 27.1 % (ref 37–47)
HCT VFR BLD CALC: ABNORMAL % (ref 37–47)
HCT VFR BLD CALC: ABNORMAL % (ref 37–47)
HEMOCCULT SP1 STL QL: NEGATIVE
HEMOGLOBIN: 3.6 GM/DL (ref 12.5–16)
HEMOGLOBIN: 8.6 GM/DL (ref 12.5–16)
HEMOGLOBIN: ABNORMAL GM/DL (ref 12.5–16)
HUMAN METAPNEUMOVIRUS PCR: NOT DETECTED
HYPOCHROMIA: ABNORMAL
INFLUENZA A BY PCR: NOT DETECTED
INFLUENZA A H1 (2009) PCR: NOT DETECTED
INFLUENZA A H1 PANDEMIC PCR: NOT DETECTED
INFLUENZA A H3 PCR: NOT DETECTED
INFLUENZA B BY PCR: NOT DETECTED
LYMPHOCYTES ABSOLUTE: 0 K/CU MM
LYMPHOCYTES ABSOLUTE: 0.1 K/CU MM
LYMPHOCYTES ABSOLUTE: ABNORMAL K/CU MM
LYMPHOCYTES RELATIVE PERCENT: 16 % (ref 24–44)
LYMPHOCYTES RELATIVE PERCENT: 24 % (ref 24–44)
LYMPHOCYTES RELATIVE PERCENT: ABNORMAL % (ref 24–44)
MCH RBC QN AUTO: 27.1 PG (ref 27–31)
MCH RBC QN AUTO: 28.3 PG (ref 27–31)
MCH RBC QN AUTO: 29.1 PG (ref 27–31)
MCH RBC QN AUTO: 29.2 PG (ref 27–31)
MCHC RBC AUTO-ENTMCNC: 31.7 % (ref 32–36)
MCHC RBC AUTO-ENTMCNC: 31.9 % (ref 32–36)
MCHC RBC AUTO-ENTMCNC: 32.2 % (ref 32–36)
MCHC RBC AUTO-ENTMCNC: 33 % (ref 32–36)
MCV RBC AUTO: 85.5 FL (ref 78–100)
MCV RBC AUTO: 85.8 FL (ref 78–100)
MCV RBC AUTO: 90.3 FL (ref 78–100)
MCV RBC AUTO: 91.5 FL (ref 78–100)
METAMYELOCYTES ABSOLUTE COUNT: 0.01 K/CU MM
METAMYELOCYTES PERCENT: 2 %
MONOCYTES ABSOLUTE: 0.1 K/CU MM
MONOCYTES ABSOLUTE: 0.1 K/CU MM
MONOCYTES ABSOLUTE: ABNORMAL K/CU MM
MONOCYTES RELATIVE PERCENT: 28 % (ref 0–4)
MONOCYTES RELATIVE PERCENT: 32 % (ref 0–4)
MONOCYTES RELATIVE PERCENT: ABNORMAL % (ref 0–4)
MYCOPLASMA PNEUMONIAE PCR: NOT DETECTED
NUCLEATED RED BLOOD CELLS: 2
OTHER CELL MORPHOLOGY: ABNORMAL
OTHER CELL MORPHOLOGY: ABNORMAL
PARAINFLUENZA 1 PCR: NOT DETECTED
PARAINFLUENZA 2 PCR: NOT DETECTED
PARAINFLUENZA 3 PCR: NOT DETECTED
PARAINFLUENZA 4 PCR: NOT DETECTED
PDW BLD-RTO: 16.3 % (ref 11.7–14.9)
PDW BLD-RTO: 16.7 % (ref 11.7–14.9)
PDW BLD-RTO: 17.3 % (ref 11.7–14.9)
PDW BLD-RTO: 17.6 % (ref 11.7–14.9)
PLATELET # BLD: 67 K/CU MM (ref 140–440)
PLATELET # BLD: 74 K/CU MM (ref 140–440)
PLATELET # BLD: 78 K/CU MM (ref 140–440)
PLATELET # BLD: 82 K/CU MM (ref 140–440)
PMV BLD AUTO: 10.1 FL (ref 7.5–11.1)
PMV BLD AUTO: 10.8 FL (ref 7.5–11.1)
PMV BLD AUTO: 11.2 FL (ref 7.5–11.1)
PMV BLD AUTO: 9.7 FL (ref 7.5–11.1)
POTASSIUM SERPL-SCNC: 4.6 MMOL/L (ref 3.5–5.1)
RBC # BLD: 1.27 M/CU MM (ref 4.2–5.4)
RBC # BLD: 1.3 M/CU MM (ref 4.2–5.4)
RBC # BLD: 1.34 M/CU MM (ref 4.2–5.4)
RBC # BLD: 3.17 M/CU MM (ref 4.2–5.4)
RBC # BLD: ABNORMAL 10*6/UL
RBC # BLD: ABNORMAL 10*6/UL
RETICULOCYTE COUNT PCT: 0.8 % (ref 0.2–2.2)
RHINOVIRUS ENTEROVIRUS PCR: NOT DETECTED
RSV PCR: NOT DETECTED
SEGMENTED NEUTROPHILS ABSOLUTE COUNT: 0 K/CU MM
SEGMENTED NEUTROPHILS ABSOLUTE COUNT: 0.1 K/CU MM
SEGMENTED NEUTROPHILS ABSOLUTE COUNT: ABNORMAL K/CU MM
SEGMENTED NEUTROPHILS RELATIVE PERCENT: 16 % (ref 36–66)
SEGMENTED NEUTROPHILS RELATIVE PERCENT: 32 % (ref 36–66)
SEGMENTED NEUTROPHILS RELATIVE PERCENT: ABNORMAL % (ref 36–66)
SMEAR REVIEW: NORMAL
SODIUM BLD-SCNC: 137 MMOL/L (ref 135–145)
UNDIFFERENTIATED CELL: 14 %
UNDIFFERENTIATED CELL: 8 %
UNDIFFERENTIATED CELL: ABNORMAL %
WBC # BLD: ABNORMAL K/CU MM (ref 4–10.5)

## 2020-02-19 PROCEDURE — 6370000000 HC RX 637 (ALT 250 FOR IP): Performed by: INTERNAL MEDICINE

## 2020-02-19 PROCEDURE — 2580000003 HC RX 258: Performed by: INTERNAL MEDICINE

## 2020-02-19 PROCEDURE — G0328 FECAL BLOOD SCRN IMMUNOASSAY: HCPCS

## 2020-02-19 PROCEDURE — 85045 AUTOMATED RETICULOCYTE COUNT: CPT

## 2020-02-19 PROCEDURE — 36415 COLL VENOUS BLD VENIPUNCTURE: CPT

## 2020-02-19 PROCEDURE — 2500000003 HC RX 250 WO HCPCS: Performed by: INTERNAL MEDICINE

## 2020-02-19 PROCEDURE — 87486 CHLMYD PNEUM DNA AMP PROBE: CPT

## 2020-02-19 PROCEDURE — 6360000002 HC RX W HCPCS: Performed by: INTERNAL MEDICINE

## 2020-02-19 PROCEDURE — 85007 BL SMEAR W/DIFF WBC COUNT: CPT

## 2020-02-19 PROCEDURE — 85014 HEMATOCRIT: CPT

## 2020-02-19 PROCEDURE — 74176 CT ABD & PELVIS W/O CONTRAST: CPT

## 2020-02-19 PROCEDURE — 87581 M.PNEUMON DNA AMP PROBE: CPT

## 2020-02-19 PROCEDURE — C1751 CATH, INF, PER/CENT/MIDLINE: HCPCS

## 2020-02-19 PROCEDURE — 99223 1ST HOSP IP/OBS HIGH 75: CPT | Performed by: INTERNAL MEDICINE

## 2020-02-19 PROCEDURE — 85027 COMPLETE CBC AUTOMATED: CPT

## 2020-02-19 PROCEDURE — 36410 VNPNXR 3YR/> PHY/QHP DX/THER: CPT

## 2020-02-19 PROCEDURE — 85018 HEMOGLOBIN: CPT

## 2020-02-19 PROCEDURE — 76937 US GUIDE VASCULAR ACCESS: CPT

## 2020-02-19 PROCEDURE — 80048 BASIC METABOLIC PNL TOTAL CA: CPT

## 2020-02-19 PROCEDURE — 99254 IP/OBS CNSLTJ NEW/EST MOD 60: CPT | Performed by: SURGERY

## 2020-02-19 PROCEDURE — 2000000000 HC ICU R&B

## 2020-02-19 PROCEDURE — 36430 TRANSFUSION BLD/BLD COMPNT: CPT

## 2020-02-19 PROCEDURE — 87633 RESP VIRUS 12-25 TARGETS: CPT

## 2020-02-19 PROCEDURE — 87798 DETECT AGENT NOS DNA AMP: CPT

## 2020-02-19 PROCEDURE — P9016 RBC LEUKOCYTES REDUCED: HCPCS

## 2020-02-19 PROCEDURE — 02HV33Z INSERTION OF INFUSION DEVICE INTO SUPERIOR VENA CAVA, PERCUTANEOUS APPROACH: ICD-10-PCS | Performed by: INTERNAL MEDICINE

## 2020-02-19 PROCEDURE — APPSS60 APP SPLIT SHARED TIME 46-60 MINUTES: Performed by: PHYSICIAN ASSISTANT

## 2020-02-19 RX ORDER — 0.9 % SODIUM CHLORIDE 0.9 %
20 INTRAVENOUS SOLUTION INTRAVENOUS ONCE
Status: COMPLETED | OUTPATIENT
Start: 2020-02-19 | End: 2020-02-19

## 2020-02-19 RX ORDER — SODIUM CHLORIDE 0.9 % (FLUSH) 0.9 %
10 SYRINGE (ML) INJECTION EVERY 12 HOURS SCHEDULED
Status: DISCONTINUED | OUTPATIENT
Start: 2020-02-19 | End: 2020-02-27 | Stop reason: SDUPTHER

## 2020-02-19 RX ORDER — TRAMADOL HYDROCHLORIDE 50 MG/1
50 TABLET ORAL EVERY 6 HOURS PRN
Status: DISCONTINUED | OUTPATIENT
Start: 2020-02-19 | End: 2020-03-11 | Stop reason: HOSPADM

## 2020-02-19 RX ORDER — SODIUM CHLORIDE 0.9 % (FLUSH) 0.9 %
10 SYRINGE (ML) INJECTION PRN
Status: DISCONTINUED | OUTPATIENT
Start: 2020-02-19 | End: 2020-02-27 | Stop reason: SDUPTHER

## 2020-02-19 RX ORDER — LIDOCAINE HYDROCHLORIDE 10 MG/ML
5 INJECTION, SOLUTION EPIDURAL; INFILTRATION; INTRACAUDAL; PERINEURAL ONCE
Status: COMPLETED | OUTPATIENT
Start: 2020-02-19 | End: 2020-02-19

## 2020-02-19 RX ORDER — 0.9 % SODIUM CHLORIDE 0.9 %
250 INTRAVENOUS SOLUTION INTRAVENOUS ONCE
Status: COMPLETED | OUTPATIENT
Start: 2020-02-19 | End: 2020-02-20

## 2020-02-19 RX ADMIN — ALLOPURINOL 100 MG: 100 TABLET ORAL at 08:33

## 2020-02-19 RX ADMIN — SODIUM CHLORIDE, PRESERVATIVE FREE 10 ML: 5 INJECTION INTRAVENOUS at 20:50

## 2020-02-19 RX ADMIN — SODIUM CHLORIDE 20 ML: 9 INJECTION, SOLUTION INTRAVENOUS at 16:00

## 2020-02-19 RX ADMIN — LIDOCAINE HYDROCHLORIDE ANHYDROUS 5 ML: 10 INJECTION, SOLUTION INFILTRATION at 14:45

## 2020-02-19 RX ADMIN — CEFEPIME HYDROCHLORIDE 2 G: 2 INJECTION, POWDER, FOR SOLUTION INTRAVENOUS at 16:00

## 2020-02-19 RX ADMIN — TRAMADOL HYDROCHLORIDE 50 MG: 50 TABLET, FILM COATED ORAL at 08:46

## 2020-02-19 RX ADMIN — TBO-FILGRASTIM 300 MCG: 300 INJECTION, SOLUTION SUBCUTANEOUS at 20:49

## 2020-02-19 RX ADMIN — ENOXAPARIN SODIUM 50 MG: 60 INJECTION SUBCUTANEOUS at 08:33

## 2020-02-19 RX ADMIN — DIVALPROEX SODIUM 500 MG: 250 TABLET, DELAYED RELEASE ORAL at 08:42

## 2020-02-19 RX ADMIN — MIDODRINE HYDROCHLORIDE 5 MG: 5 TABLET ORAL at 15:18

## 2020-02-19 RX ADMIN — Medication 500 MG: at 08:33

## 2020-02-19 RX ADMIN — CEFEPIME HYDROCHLORIDE 2 G: 2 INJECTION, POWDER, FOR SOLUTION INTRAVENOUS at 00:14

## 2020-02-19 RX ADMIN — DIVALPROEX SODIUM 1000 MG: 250 TABLET, DELAYED RELEASE ORAL at 20:49

## 2020-02-19 RX ADMIN — MIDODRINE HYDROCHLORIDE 5 MG: 5 TABLET ORAL at 06:05

## 2020-02-19 RX ADMIN — MIDODRINE HYDROCHLORIDE 5 MG: 5 TABLET ORAL at 20:50

## 2020-02-19 ASSESSMENT — PAIN DESCRIPTION - PROGRESSION
CLINICAL_PROGRESSION: NOT CHANGED

## 2020-02-19 ASSESSMENT — ENCOUNTER SYMPTOMS
CHOKING: 0
ANAL BLEEDING: 0
RECTAL PAIN: 0
BACK PAIN: 0
APNEA: 0
STRIDOR: 0
EYE ITCHING: 0
SORE THROAT: 0
EYE REDNESS: 0
COLOR CHANGE: 0
PHOTOPHOBIA: 0
CONSTIPATION: 0

## 2020-02-19 ASSESSMENT — PAIN DESCRIPTION - LOCATION
LOCATION: OTHER (COMMENT)
LOCATION: OTHER (COMMENT)

## 2020-02-19 ASSESSMENT — PAIN DESCRIPTION - FREQUENCY
FREQUENCY: CONTINUOUS

## 2020-02-19 ASSESSMENT — PAIN DESCRIPTION - ONSET
ONSET: ON-GOING

## 2020-02-19 ASSESSMENT — PAIN DESCRIPTION - ORIENTATION
ORIENTATION: LEFT

## 2020-02-19 ASSESSMENT — PAIN SCALES - GENERAL
PAINLEVEL_OUTOF10: 8
PAINLEVEL_OUTOF10: 8
PAINLEVEL_OUTOF10: 0
PAINLEVEL_OUTOF10: 6

## 2020-02-19 ASSESSMENT — PAIN DESCRIPTION - PAIN TYPE
TYPE: ACUTE PAIN

## 2020-02-19 ASSESSMENT — PAIN - FUNCTIONAL ASSESSMENT
PAIN_FUNCTIONAL_ASSESSMENT: ACTIVITIES ARE NOT PREVENTED

## 2020-02-19 ASSESSMENT — PAIN DESCRIPTION - DESCRIPTORS
DESCRIPTORS: ACHING;DISCOMFORT

## 2020-02-19 NOTE — CONSULTS
[facility]  Gastroenterology Consultation    2020  9:18 AM    Patient:    Jennifer Shelley  : 1955   59 y.o. MRN: [id number]  Admitted: 2020  3:46 PM ATT: Meagan Acevedo MD   -A  AdmitDx: Hemoglobin low [D64.9]  Pancytopenia ([facility] [B76.232]  PCP: Jovanni Montgomery MD    Reason for Consult:  Anemia, concern for GI bleed    Requesting Physician:  Meagan Aceveod MD      History Obtained From:  Patient and review of all records    HISTORY OF PRESENT ILLNESS:                The patient is a 59 y.o. female with significant   Past Medical History:   Diagnosis Date    [facility]cell [facility] ([facility]     Bipolar 1 disorder ([facility]     \"on Depakote for this\"    who presented to [facility] per recommendations from Dr. Janet Sr due to decline in hemoglobin. Per Dr. Shanice Orozco note:  significant past medical history of DLBCL with colon ulcer who presents with above. She has B-symptoms. She started R-CHOP in 2020. Dose modification was made and she received body injector of G-CSF. Second cycle of chemo was on 2020. Yesterday at cancer ctr she was found to have pancytopenia  With Hg 3.6. She was admitted for further evaluation. He did rectal exam and sent stool for occult bleeding. Rectal vault was empty. Last BM was 2 days ago. She received 3 unit of PRBC. She was placed on cefepime due to neutropenic fever. BC, UC were ordered. She is afebrile this morning. GI consulted by Dr. Janet Sr d/t concern for GI bleed secondary to colon ulcer. No gross blood noted. Denies abdominal pain  No N/V   Last BM yesterday, reports loose stool. States she is lactose intolerant. Reports decreased po intake d/t not sure of what she is able to eat. Discussed at length need for nutrition. Advised will consult dietitian for meal planning     DVT noted CHEKO, was receiving Lovenox. Received am dose. Discontinued after decliine in hgb.  Discontinue with hematology prior to discontinuing, agree with plan. Dr. Triny Neely consulted for possible removal of port    History of EGD  History of colonoscopy 12/2019    Past Medical History:        Diagnosis Date    [facility]cell [facility] ([facility]     Bipolar 1 disorder ([facility]     \"on Depakote for this\"       Past Surgical History:        Procedure Laterality Date    COLONOSCOPY  9/4/2015    normal colon    PORT SURGERY N/A 1/7/2020    PORT INSERTION performed by Pieter Langston MD at [address] [place] [address]  as a kid    TUBAL LIGATION  20+ yrs ago         Current Medications:    Medications    Scheduled Medications:    sodium chloride  250 mL Intravenous Once    tbo-filgrastim  300 mcg Subcutaneous QPM    cefepime  2 g Intravenous Q12H    allopurinol  100 mg Oral Daily    calcium elemental  500 mg Oral Daily with breakfast    divalproex  500 mg Oral QAM    divalproex  1,000 mg Oral QPM    sodium chloride flush  10 mL Intravenous 2 times per day    midodrine  5 mg Oral TID    sodium chloride  20 mL Intravenous Once     PRN Medications: traMADol, sodium chloride flush, ondansetron, acetaminophen, polyethylene glycol, melatonin      Allergies:  Abilify [aripiprazole]; Iodine; Penicillins; and Codeine    Social History:   TOBACCO:   reports that she quit smoking about 32 years ago. Her smoking use included cigarettes. She has a 10.00 pack-year smoking history. She has never used smokeless tobacco.  ETOH:   reports no history of alcohol use. Family History:       Problem Relation Age of Onset    Breast Cancer Mother     High Blood Pressure Mother     Cancer Father         \"cancer in the bowel\"       REVIEW OF SYSTEMS:    The positive ROS will be identified in bold, otherwise ROS are negative     CONSTITUTIONAL:  Neg fever, chills or night sweats, + fatigue, weight loss  EYES:  Neg  Blurriness, earing, itching or acute change in vision  EARS:  Neg  hearing loss, tinnitus, vertigo, discharge or earache.   NOSE:  Neg  Rhinorrhea, sneezing, initiated chemotherapy, R-CHOP 1/2020  Hgb 3.9, receiving PRBCs, currently on 4th unit. Planning CBC Q8 hrs  Discontinued Lovenox after discussion with Dr. Mccracken Headings may be secondary to chemotherapy. Will monitor hgb and for evidence of gross blood    Nutrition:  Patient reports lactose intolerance  Recommend dietitian consult   Encourage po diet      Discussed plan of care with patient and family     Patient clinical, biochemical, and radiological information discussed with Dr. Marleen Gallegos. He agrees with the assessment and plan. Teofilo Diamond CNP  2/19/2020  9:18 AM     No evidence of GI bleed  Anemia probably bone marrow suppression  Continue supportive care  Will hold off on GI work up at this time    I have seen and examined this patient personally, and independently of the nurse practitioner. The plan was developed mutually at the time of the visit with the patient. Markel Spencer and myself have spoken with patient, nursing staff and provided written and verbal instructions .     The above note has been reviewed and I agree with the Assessment,  Diagnosis, and Treatment plan as suggested by Markel Spencer CNP      [address] [facility] gastroenterology

## 2020-02-19 NOTE — PROGRESS NOTES
Lab called at 0030 to draw post PRBCs transfusion H&H at 0130. At [address] [facility] called lab to verify lab was coming to room to draw H&H, lab verified phlebot to room; at [address] [place] [address]  called for results of H&H, lab had not been drawn and RN was informed that  had made H&H \"STAT\" so that floor phlebot would see lab and come draw H&H-  calling floor phlebot again to draw lab.

## 2020-02-19 NOTE — CONSULTS
300 mcg, 300 mcg, Subcutaneous, QPM  cefepime (MAXIPIME) 2 g in dextrose 5 % 50 mL IVPB, 2 g, Intravenous, Q12H  allopurinol (ZYLOPRIM) tablet 100 mg, 100 mg, Oral, Daily  calcium elemental (OSCAL) tablet 500 mg, 500 mg, Oral, Daily with breakfast  divalproex (DEPAKOTE) DR tablet 500 mg, 500 mg, Oral, QAM  divalproex (DEPAKOTE) DR tablet 1,000 mg, 1,000 mg, Oral, QPM  sodium chloride flush 0.9 % injection 10 mL, 10 mL, Intravenous, 2 times per day  sodium chloride flush 0.9 % injection 10 mL, 10 mL, Intravenous, PRN  ondansetron (ZOFRAN) injection 4 mg, 4 mg, Intravenous, Q6H PRN  acetaminophen (TYLENOL) tablet 650 mg, 650 mg, Oral, Q4H PRN  polyethylene glycol (GLYCOLAX) packet 17 g, 17 g, Oral, Daily PRN  enoxaparin (LOVENOX) injection 50 mg, 1 mg/kg, Subcutaneous, Daily  melatonin tablet 1 mg, 1 mg, Oral, Nightly PRN  midodrine (PROAMATINE) tablet 5 mg, 5 mg, Oral, TID  0.9 % sodium chloride bolus, 20 mL, Intravenous, Once    Allergies:  Abilify [aripiprazole]; Iodine; Penicillins; and Codeine    Social History:    TOBACCO:   reports that she quit smoking about 32 years ago. Her smoking use included cigarettes. She has a 10.00 pack-year smoking history. She has never used smokeless tobacco.  ETOH:   reports no history of alcohol use. DRUGS:   reports no history of drug use. Family History:       Problem Relation Age of Onset    Breast Cancer Mother     High Blood Pressure Mother     Cancer Father         \"cancer in the bowel\"     REVIEW OF SYSTEMS:    Weak, no headache. Pain to mediport site. No NVD. No fever or chills this morning. The remainder of ROS is unremarkable.     PHYSICAL EXAM:      Vitals:    /70   Pulse 110   Temp 97.9 °F (36.6 °C) (Oral)   Resp 17   Ht 5' 2.99\" (1.6 m)   Wt 106 lb 3.2 oz (48.2 kg)   LMP 01/11/2000   SpO2 100%   BMI 18.82 kg/m²     CONSTITUTIONAL:  awake, alert, cooperative and no apparent distress  EYES:  pale, extra-ocular muscles intact and sclera clear  NECK: GI, Dr Adelfo Mejia to see her for ? GIB. Receiving blood transfusion. If she received 5 units PRBC within one day, I may consider to transfuse FFP and platelet. I check stat FOB. 3. LUE DVT related to mediport. Dr Capo Wilburn put mediport. On lovenox for now. Thank for allowing me to participate in her care. Will follow up.

## 2020-02-20 ENCOUNTER — ANESTHESIA EVENT (OUTPATIENT)
Dept: OPERATING ROOM | Age: 65
DRG: 981 | End: 2020-02-20
Payer: COMMERCIAL

## 2020-02-20 ENCOUNTER — ANESTHESIA (OUTPATIENT)
Dept: OPERATING ROOM | Age: 65
DRG: 981 | End: 2020-02-20
Payer: COMMERCIAL

## 2020-02-20 VITALS — OXYGEN SATURATION: 100 % | DIASTOLIC BLOOD PRESSURE: 50 MMHG | SYSTOLIC BLOOD PRESSURE: 66 MMHG

## 2020-02-20 LAB
HCT VFR BLD CALC: 26.9 % (ref 37–47)
HEMOGLOBIN: 8.6 GM/DL (ref 12.5–16)
HIGH SENSITIVE C-REACTIVE PROTEIN: 141.3 MG/L
MCH RBC QN AUTO: 27.2 PG (ref 27–31)
MCHC RBC AUTO-ENTMCNC: 32 % (ref 32–36)
MCV RBC AUTO: 85.1 FL (ref 78–100)
PDW BLD-RTO: 16.5 % (ref 11.7–14.9)
PLATELET # BLD: 86 K/CU MM (ref 140–440)
PMV BLD AUTO: 11.1 FL (ref 7.5–11.1)
PROCALCITONIN: 2.31
RBC # BLD: 3.16 M/CU MM (ref 4.2–5.4)
WBC # BLD: 2.5 K/CU MM (ref 4–10.5)

## 2020-02-20 PROCEDURE — 02HV33Z INSERTION OF INFUSION DEVICE INTO SUPERIOR VENA CAVA, PERCUTANEOUS APPROACH: ICD-10-PCS | Performed by: SURGERY

## 2020-02-20 PROCEDURE — 0JPV0WZ REMOVAL OF TOTALLY IMPLANTABLE VASCULAR ACCESS DEVICE FROM UPPER EXTREMITY SUBCUTANEOUS TISSUE AND FASCIA, OPEN APPROACH: ICD-10-PCS | Performed by: SURGERY

## 2020-02-20 PROCEDURE — 6360000002 HC RX W HCPCS: Performed by: INTERNAL MEDICINE

## 2020-02-20 PROCEDURE — 99233 SBSQ HOSP IP/OBS HIGH 50: CPT | Performed by: PHYSICIAN ASSISTANT

## 2020-02-20 PROCEDURE — 93005 ELECTROCARDIOGRAM TRACING: CPT | Performed by: INTERNAL MEDICINE

## 2020-02-20 PROCEDURE — 2580000003 HC RX 258: Performed by: PHYSICIAN ASSISTANT

## 2020-02-20 PROCEDURE — 36589 REMOVAL TUNNELED CV CATH: CPT | Performed by: PHYSICIAN ASSISTANT

## 2020-02-20 PROCEDURE — 99255 IP/OBS CONSLTJ NEW/EST HI 80: CPT | Performed by: INTERNAL MEDICINE

## 2020-02-20 PROCEDURE — 2580000003 HC RX 258: Performed by: NURSE ANESTHETIST, CERTIFIED REGISTERED

## 2020-02-20 PROCEDURE — 3700000001 HC ADD 15 MINUTES (ANESTHESIA): Performed by: SURGERY

## 2020-02-20 PROCEDURE — 99232 SBSQ HOSP IP/OBS MODERATE 35: CPT | Performed by: NURSE PRACTITIONER

## 2020-02-20 PROCEDURE — 84145 PROCALCITONIN (PCT): CPT

## 2020-02-20 PROCEDURE — 6360000002 HC RX W HCPCS: Performed by: PHYSICIAN ASSISTANT

## 2020-02-20 PROCEDURE — 3700000000 HC ANESTHESIA ATTENDED CARE: Performed by: SURGERY

## 2020-02-20 PROCEDURE — 0JH60WZ INSERTION OF TOTALLY IMPLANTABLE VASCULAR ACCESS DEVICE INTO CHEST SUBCUTANEOUS TISSUE AND FASCIA, OPEN APPROACH: ICD-10-PCS | Performed by: SURGERY

## 2020-02-20 PROCEDURE — 2709999900 HC NON-CHARGEABLE SUPPLY: Performed by: SURGERY

## 2020-02-20 PROCEDURE — 6370000000 HC RX 637 (ALT 250 FOR IP): Performed by: PHYSICIAN ASSISTANT

## 2020-02-20 PROCEDURE — 3600000012 HC SURGERY LEVEL 2 ADDTL 15MIN: Performed by: SURGERY

## 2020-02-20 PROCEDURE — 2000000000 HC ICU R&B

## 2020-02-20 PROCEDURE — 2580000003 HC RX 258: Performed by: INTERNAL MEDICINE

## 2020-02-20 PROCEDURE — 2500000003 HC RX 250 WO HCPCS: Performed by: SURGERY

## 2020-02-20 PROCEDURE — 3600000002 HC SURGERY LEVEL 2 BASE: Performed by: SURGERY

## 2020-02-20 PROCEDURE — 86141 C-REACTIVE PROTEIN HS: CPT

## 2020-02-20 PROCEDURE — 87040 BLOOD CULTURE FOR BACTERIA: CPT

## 2020-02-20 PROCEDURE — 88300 SURGICAL PATH GROSS: CPT

## 2020-02-20 PROCEDURE — 6360000002 HC RX W HCPCS: Performed by: NURSE ANESTHETIST, CERTIFIED REGISTERED

## 2020-02-20 PROCEDURE — 36589 REMOVAL TUNNELED CV CATH: CPT | Performed by: SURGERY

## 2020-02-20 PROCEDURE — 85027 COMPLETE CBC AUTOMATED: CPT

## 2020-02-20 RX ORDER — PROPOFOL 10 MG/ML
INJECTION, EMULSION INTRAVENOUS CONTINUOUS PRN
Status: DISCONTINUED | OUTPATIENT
Start: 2020-02-20 | End: 2020-02-20 | Stop reason: SDUPTHER

## 2020-02-20 RX ORDER — SODIUM CHLORIDE 9 MG/ML
INJECTION, SOLUTION INTRAVENOUS CONTINUOUS PRN
Status: DISCONTINUED | OUTPATIENT
Start: 2020-02-20 | End: 2020-02-20 | Stop reason: SDUPTHER

## 2020-02-20 RX ORDER — LIDOCAINE HYDROCHLORIDE 10 MG/ML
INJECTION, SOLUTION INFILTRATION; PERINEURAL
Status: COMPLETED | OUTPATIENT
Start: 2020-02-20 | End: 2020-02-20

## 2020-02-20 RX ORDER — SODIUM CHLORIDE 9 MG/ML
INJECTION, SOLUTION INTRAVENOUS CONTINUOUS
Status: DISCONTINUED | OUTPATIENT
Start: 2020-02-20 | End: 2020-02-22

## 2020-02-20 RX ORDER — LIDOCAINE HYDROCHLORIDE 20 MG/ML
INJECTION, SOLUTION INTRAVENOUS PRN
Status: DISCONTINUED | OUTPATIENT
Start: 2020-02-20 | End: 2020-02-20 | Stop reason: SDUPTHER

## 2020-02-20 RX ADMIN — SODIUM CHLORIDE, PRESERVATIVE FREE 10 ML: 5 INJECTION INTRAVENOUS at 20:56

## 2020-02-20 RX ADMIN — SODIUM CHLORIDE: 9 INJECTION, SOLUTION INTRAVENOUS at 12:47

## 2020-02-20 RX ADMIN — ONDANSETRON 4 MG: 2 INJECTION INTRAMUSCULAR; INTRAVENOUS at 15:36

## 2020-02-20 RX ADMIN — SODIUM CHLORIDE 250 ML: 9 INJECTION, SOLUTION INTRAVENOUS at 13:56

## 2020-02-20 RX ADMIN — MIDODRINE HYDROCHLORIDE 5 MG: 5 TABLET ORAL at 17:23

## 2020-02-20 RX ADMIN — PHENYLEPHRINE HYDROCHLORIDE 150 MCG: 10 INJECTION INTRAVENOUS at 13:08

## 2020-02-20 RX ADMIN — SODIUM CHLORIDE, PRESERVATIVE FREE 10 ML: 5 INJECTION INTRAVENOUS at 20:57

## 2020-02-20 RX ADMIN — PROPOFOL 200 MCG/KG/MIN: 10 INJECTION, EMULSION INTRAVENOUS at 12:55

## 2020-02-20 RX ADMIN — CEFEPIME HYDROCHLORIDE 2 G: 2 INJECTION, POWDER, FOR SOLUTION INTRAVENOUS at 03:41

## 2020-02-20 RX ADMIN — PHENYLEPHRINE HYDROCHLORIDE 150 MCG: 10 INJECTION INTRAVENOUS at 13:12

## 2020-02-20 RX ADMIN — HYDROMORPHONE HYDROCHLORIDE 1 MG: 1 INJECTION, SOLUTION INTRAMUSCULAR; INTRAVENOUS; SUBCUTANEOUS at 17:10

## 2020-02-20 RX ADMIN — LIDOCAINE HYDROCHLORIDE 100 MG: 20 INJECTION, SOLUTION INTRAVENOUS at 12:55

## 2020-02-20 RX ADMIN — PHENYLEPHRINE HYDROCHLORIDE 150 MCG: 10 INJECTION INTRAVENOUS at 13:04

## 2020-02-20 RX ADMIN — SODIUM CHLORIDE, PRESERVATIVE FREE 10 ML: 5 INJECTION INTRAVENOUS at 11:32

## 2020-02-20 RX ADMIN — PHENYLEPHRINE HYDROCHLORIDE 50 MCG: 10 INJECTION INTRAVENOUS at 13:01

## 2020-02-20 RX ADMIN — CEFEPIME HYDROCHLORIDE 2 G: 2 INJECTION, POWDER, FOR SOLUTION INTRAVENOUS at 15:59

## 2020-02-20 RX ADMIN — SODIUM CHLORIDE: 9 INJECTION, SOLUTION INTRAVENOUS at 20:55

## 2020-02-20 RX ADMIN — PHENYLEPHRINE HYDROCHLORIDE 150 MCG: 10 INJECTION INTRAVENOUS at 13:14

## 2020-02-20 ASSESSMENT — ENCOUNTER SYMPTOMS
CHOKING: 0
STRIDOR: 0
EYE REDNESS: 0
COLOR CHANGE: 0
RECTAL PAIN: 0
ANAL BLEEDING: 0
BACK PAIN: 0
APNEA: 0
CONSTIPATION: 0
EYE ITCHING: 0
PHOTOPHOBIA: 0
SORE THROAT: 0

## 2020-02-20 ASSESSMENT — PULMONARY FUNCTION TESTS
PIF_VALUE: 0
PIF_VALUE: 1
PIF_VALUE: 0
PIF_VALUE: 1
PIF_VALUE: 0
PIF_VALUE: 1
PIF_VALUE: 0
PIF_VALUE: 1
PIF_VALUE: 0
PIF_VALUE: 1
PIF_VALUE: 0
PIF_VALUE: 1
PIF_VALUE: 2
PIF_VALUE: 0
PIF_VALUE: 1
PIF_VALUE: 0
PIF_VALUE: 0

## 2020-02-20 ASSESSMENT — PAIN SCALES - GENERAL
PAINLEVEL_OUTOF10: 1
PAINLEVEL_OUTOF10: 0
PAINLEVEL_OUTOF10: 5
PAINLEVEL_OUTOF10: 5
PAINLEVEL_OUTOF10: 0
PAINLEVEL_OUTOF10: 0

## 2020-02-20 ASSESSMENT — PAIN DESCRIPTION - DESCRIPTORS: DESCRIPTORS: CRAMPING;OTHER (COMMENT)

## 2020-02-20 ASSESSMENT — PAIN DESCRIPTION - PROGRESSION: CLINICAL_PROGRESSION: GRADUALLY WORSENING

## 2020-02-20 ASSESSMENT — PAIN DESCRIPTION - ORIENTATION: ORIENTATION: MID

## 2020-02-20 ASSESSMENT — PAIN DESCRIPTION - PAIN TYPE: TYPE: ACUTE PAIN

## 2020-02-20 ASSESSMENT — PAIN DESCRIPTION - ONSET: ONSET: PROGRESSIVE

## 2020-02-20 ASSESSMENT — PAIN - FUNCTIONAL ASSESSMENT: PAIN_FUNCTIONAL_ASSESSMENT: PREVENTS OR INTERFERES SOME ACTIVE ACTIVITIES AND ADLS

## 2020-02-20 ASSESSMENT — PAIN DESCRIPTION - LOCATION: LOCATION: ABDOMEN

## 2020-02-20 ASSESSMENT — PAIN DESCRIPTION - FREQUENCY: FREQUENCY: CONTINUOUS

## 2020-02-20 NOTE — PROGRESS NOTES
General Surgery-[facility] Day: 3    ChiefComplaint on Admission: pancytopenia      Subjective:     Margaret Rainey is a 59 y.o. female with a LUE DVT, likely related to her mediport. Patient reports that her pain to the left upper chest and shoulder pain is improved. Tolerating Dietary Nutrition Supplements: Standard High Calorie Oral Supplement  DIET GENERAL;. + BM.     ROS:  Review of Systems   Constitutional: Negative for chills and fever. HENT: Negative for ear pain, mouth sores, sore throat and tinnitus. Eyes: Negative for photophobia, redness and itching. Respiratory: Negative for apnea, choking and stridor. Cardiovascular: Negative for chest pain and palpitations. Gastrointestinal: Negative for anal bleeding, constipation and rectal pain. Endocrine: Negative for polydipsia. Genitourinary: Negative for enuresis, flank pain and hematuria. Musculoskeletal: Positive for arthralgias and joint swelling. Negative for back pain and myalgias. Skin: Negative for color change and pallor. Allergic/Immunologic: Negative for environmental allergies. Neurological: Negative for syncope and speech difficulty. Psychiatric/Behavioral: Negative for confusion and hallucinations. Allergies  Abilify [aripiprazole]; Iodine; Penicillins; and Codeine          Diagnosis Date    B-cell lymphoma ([facility]     Bipolar 1 disorder ([facility]     \"on Depakote for this\"       Objective:     Vitals:    20 1358   BP: 103/68   Pulse: 104   Resp: 13   Temp: 97.8 °F (36.6 °C)   SpO2: 99%       TEMPERATURE:  Current - Temp: 97.8 °F (36.6 °C); Max - Temp  Av.7 °F (36.5 °C)  Min: 97 °F (36.1 °C)  Max: 98.2 °F (36.8 °C)    I/O this shift:  In: 200 [I.V.:200]  Out: 2 [Blood:2]I/O last 3 completed shifts: In: 1375.8 [Blood:1375.8]  Out: 1150 [Urine:1150]      Physical Exam:  Physical Exam  Constitutional:       Appearance: She is well-developed. HENT:      Head: Normocephalic.    Eyes:      Pupils: Pupils are equal, round, and reactive to light. Neck:      Musculoskeletal: Normal range of motion and neck supple. Cardiovascular:      Rate and Rhythm: Normal rate. Pulmonary:      Effort: Pulmonary effort is normal.   Abdominal:      General: There is no distension. Palpations: Abdomen is soft. There is no mass. Tenderness: There is no abdominal tenderness. There is no guarding or rebound. Musculoskeletal: Normal range of motion. General: Swelling (LUE) and tenderness (L shoulder and upper chest) present. Skin:     General: Skin is warm. Neurological:      Mental Status: She is alert and oriented to person, place, and time.              Scheduled Meds:   sodium chloride  250 mL Intravenous Once    sodium chloride flush  10 mL Intravenous 2 times per day    cefepime  2 g Intravenous Q12H    allopurinol  100 mg Oral Daily    calcium elemental  500 mg Oral Daily with breakfast    divalproex  500 mg Oral QAM    divalproex  1,000 mg Oral QPM    sodium chloride flush  10 mL Intravenous 2 times per day    midodrine  5 mg Oral TID    sodium chloride  20 mL Intravenous Once     Continuous Infusions:  PRN Meds:traMADol, sodium chloride flush, sodium chloride flush, ondansetron, acetaminophen, polyethylene glycol, melatonin      Labs/Imaging Results:   Lab Results   Component Value Date    WBC 2.5 (L) 02/20/2020    HGB 8.6 (L) 02/20/2020    HCT 26.9 (L) 02/20/2020    MCV 85.1 02/20/2020    PLT 86 (L) 02/20/2020     Lab Results   Component Value Date     02/19/2020    K 4.6 02/19/2020     02/19/2020    CO2 17 (L) 02/19/2020    BUN 10 02/19/2020    CREATININE 1.0 02/19/2020    GLUCOSE 130 (H) 02/19/2020    CALCIUM 8.7 02/19/2020    PROT 5.5 (L) 02/18/2020    LABALBU 3.1 (L) 02/18/2020    BILITOT 0.8 02/18/2020    ALKPHOS 50 02/18/2020    AST 14 (L) 02/18/2020    ALT 10 02/18/2020    LABGLOM 56 (L) 02/19/2020    GFRAA >60 02/19/2020       Assessment:     Patient Active Problem

## 2020-02-20 NOTE — ANESTHESIA PRE PROCEDURE
Department of Anesthesiology  Preprocedure Note       Name:  Cherelle Matias   Age:  59 y.o.  :  1955                                          MRN:  [id number]         Date:  2020      Surgeon: Rober Ivy):  Frank Ibrahim MD    Procedure: PORT REMOVAL LEFT (Left )    Medications prior to admission:   Prior to Admission medications    Medication Sig Start Date End Date Taking? Authorizing Provider   famotidine (PEPCID) 20 MG tablet Take 20 mg by mouth nightly    Historical Provider, MD   dicyclomine (BENTYL) 20 MG tablet Take 20 mg by mouth every 6 hours as needed    Historical Provider, MD   Docusate Sodium 100 MG TABS Take 100 mg by mouth as needed    Historical Provider, MD   polyethylene glycol (GLYCOLAX) packet Take 17 g by mouth daily as needed for Constipation    Historical Provider, MD   midodrine (PROAMATINE) 5 MG tablet Take 1 tablet by mouth 3 times daily 20   Romero Dougherty MD   megestrol (MEGACE ES) 625 MG/5ML suspension Take 5 mLs by mouth daily 20   Frank Ibrahim MD   ondansetron (ZOFRAN-ODT) 8 MG TBDP disintegrating tablet  19   Historical Provider, MD   allopurinol (ZYLOPRIM) 100 MG tablet Take 100 mg by mouth daily    Historical Provider, MD   divalproex (DEPAKOTE) 500 MG DR tablet TAKE 1 TAB IN THE MORNING AND 2 TABLETS IN THE EVENING. 20   Travis Sykes MD   MULTIPLE VITAMIN PO Take by mouth daily    Historical Provider, MD   calcium carbonate 600 MG TABS tablet Take 1 tablet by mouth daily    Historical Provider, MD       Current medications:    No current facility-administered medications for this visit. No current outpatient medications on file.      Facility-Administered Medications Ordered in Other Visits   Medication Dose Route Frequency Provider Last Rate Last Dose    0.9 % sodium chloride bolus  250 mL Intravenous Once Lisa Jin PA-C        traMADol (ULTRAM) tablet 50 mg  50 mg Oral Q6H PRN Andres Hatfield MD   50 mg at 20 0846    sodium chloride flush 0.9 % injection 10 mL  10 mL Intravenous 2 times per day Barbara Abbott MD   10 mL at 02/19/20 2050    sodium chloride flush 0.9 % injection 10 mL  10 mL Intravenous PRN Barbara Abbott MD        cefepime (MAXIPIME) 2 g in dextrose 5 % 50 mL IVPB  2 g Intravenous Q12H Barbara Abbott MD   Stopped at 02/20/20 0522    allopurinol (ZYLOPRIM) tablet 100 mg  100 mg Oral Daily Danielle Aiken MD   Stopped at 02/20/20 1131    calcium elemental (OSCAL) tablet 500 mg  500 mg Oral Daily with breakfast Danielle Aiken MD   Stopped at 02/20/20 1131    divalproex (DEPAKOTE) DR tablet 500 mg  500 mg Oral QAM Danielle Aiken MD   Stopped at 02/20/20 1132    divalproex (DEPAKOTE) DR tablet 1,000 mg  1,000 mg Oral QPM Danielle Aiken MD   1,000 mg at 02/19/20 2049    sodium chloride flush 0.9 % injection 10 mL  10 mL Intravenous 2 times per day Danielle Aiken MD   10 mL at 02/20/20 1132    sodium chloride flush 0.9 % injection 10 mL  10 mL Intravenous PRN Danielle Aiken MD        ondansetron (ZOFRAN) injection 4 mg  4 mg Intravenous Q6H PRN Danielle Aiken MD        acetaminophen (TYLENOL) tablet 650 mg  650 mg Oral Q4H PRN Danielle Aiken MD   650 mg at 02/18/20 2121    polyethylene glycol (GLYCOLAX) packet 17 g  17 g Oral Daily PRN Danielle Aiken MD        melatonin tablet 1 mg  1 mg Oral Nightly PRN FRANKLYN Weaver   1 mg at 02/18/20 2122    midodrine (PROAMATINE) tablet 5 mg  5 mg Oral TID Danielle Aiken MD   5 mg at 02/19/20 2050    0.9 % sodium chloride bolus  20 mL Intravenous Once Barbara Abbott MD           Allergies:     Allergies   Allergen Reactions    Abilify [Aripiprazole]     Iodine     Penicillins     Codeine Nausea And Vomiting       Problem List:    Patient Active Problem List   Diagnosis Code    Family history of malignant neoplasm of gastrointestinal tract Z80.0    B-cell lymphoma (HCC) C85.10    Idiopathic hypotension I95.0    Mitral valve

## 2020-02-20 NOTE — ANESTHESIA POSTPROCEDURE EVALUATION
Department of Anesthesiology  Postprocedure Note    Patient: Ernesto Turcios  MRN: [id number]  YOB: 1955  Date of evaluation: 2/20/2020  Time:  1:44 PM     Procedure Summary     Date:  02/20/20 Room / Location:  [address]    Anesthesia Start:  1247 Anesthesia Stop:  1344    Procedure:  PORT REMOVAL LEFT (Left ) Diagnosis:  (LEFT ARM DVT)    Surgeon:  Diana Chacon MD Responsible Provider:  Farrah Allred MD    Anesthesia Type:  MAC ASA Status:  3          Anesthesia Type: MAC    Jimena Phase I:      Jimena Phase II:      Last vitals: Reviewed and per EMR flowsheets.        Anesthesia Post Evaluation    Patient location during evaluation: bedside  Patient participation: complete - patient participated  Level of consciousness: awake and alert  Airway patency: patent  Nausea & Vomiting: no vomiting and no nausea  Complications: no  Cardiovascular status: blood pressure returned to baseline and hemodynamically stable  Respiratory status: acceptable, nonlabored ventilation, room air and spontaneous ventilation  Hydration status: stable

## 2020-02-20 NOTE — PROGRESS NOTES
[facility]        Progress Note       2020  10:31 AM    Patient:    Rodrigo Abbasi  : 1955   59 y.o. MRN: [id number]  Admitted: 2020  3:46 PM ATT: Lexus Naranjo MD   -A  AdmitDx: Hemoglobin low [D64.9]  Pancytopenia ([address] [C59.399]  PCP: Myrna Ridley MD    SUBJECTIVE:  Chart reviewed, events noted  Patient feeling better today. No BM today. Tolerating po diet. No N/V. Denies abdominal pain.     ROS:  The positive ROS will be identified in bold     CONSTITUTIONAL:  Neg  Fever, + weight loss, fatigue   MOUTH/THROAT:  Neg  Bleeding gums, hoarseness or sore throat  RESPIRATORY:   Neg SOB, wheeze, cough, hemoptysis or bronchitis  CARDIOVASCULAR:  Neg Chest pain, palpitations, dyspnea on exertion, edema  GASTROINTESTINAL:  SEE HPI  HEMATOLOGIC/LYMPHATIC:  + Anemia, bleeding tendency  MUSCULOSKELETAL: Neg  New myalgias, joint pain, swelling or stiffness  NEUROLOGICAL:  Neg  Loss of Consciousness, memory loss, forgetfulness, periods of confusion, difficulty concentrating, seizures, insomnia, aphasia    SKIN:  Neg No itching, rashes, or sores  PSYCHIATRIC:  Neg Depression, personality changes, anxiety    OBJECTIVE:      BP 96/72   Pulse 88   Temp 98.2 °F (36.8 °C) (Oral)   Resp 18   Ht 5' 2.99\" (1.6 m)   Wt 106 lb 3.2 oz (48.2 kg)   LMP 2000   SpO2 100%   BMI 18.82 kg/m²     NAD, appears comfortable, sitting up in bed  Lips and mucous membranes pink and moist  RRR, Nl s1s2   Lungs CTA bilaterally, respirations even and unlabored   Abdomen soft, ND, NT, no HSM, bowel sounds normal  Skin pink, warm, dry and CR brisk   No edema bilateral lower extremities   LUE swelling noted, skin warm to touch  AAOx3     CBC:   Recent Labs     20  1229 20  2310 20  0425   WBC 0.9  CALLED 2N JASSON WANG [id number] CECY MLT  RESULTS READ BACK  * 1.6  WBC CALLED TO 2N TO [facility] DUMONT,RN AT 23:40 ON 20 BY SISSY GARCIA MLS  RESULTS READ

## 2020-02-20 NOTE — PROGRESS NOTES
bacteremia   MDM [] Low, [] Moderate,[x]  High     History of Present Illness:     Patient seen and examined. She is feeling better today. Her left breast hurts. Denies  Fever or chills. Blood counts are improving. C showed large left breast hematoma. Ten point ROS reviewed negative, unless as noted above    Objective: Intake/Output Summary (Last 24 hours) at 2/20/2020 1027  Last data filed at 2/20/2020 0347  Gross per 24 hour   Intake 917.5 ml   Output 950 ml   Net -32.5 ml      Vitals:   Vitals:    02/20/20 0702   BP: 96/72   Pulse: 88   Resp: 18   Temp:    SpO2:      Physical Exam:   GEN  -Awake, alert, female, Appears given age. RESP  -LS CTA equal bilat, no wheezes, rales or rhonchi. Symmetric chest movement. No respiratory distress noted. C/V  -S1/S2 auscultated. RRR without appreciable M/R/G. Left chest wall mediport present. GI  -Abdomen is soft non-distended, no significant tenderness. No masses or guarding. + BS in all quadrants. MS  - LUE swelling present. SKIN  -Normal coloration, warm, dry. No open wounds or ulcers. NEURO  -normal speech, no lateralizing weakness.   [facility]  -Awake, alert, oriented x 3    Medications:   Medications:    sodium chloride  250 mL Intravenous Once    sodium chloride flush  10 mL Intravenous 2 times per day    cefepime  2 g Intravenous Q12H    allopurinol  100 mg Oral Daily    calcium elemental  500 mg Oral Daily with breakfast    divalproex  500 mg Oral QAM    divalproex  1,000 mg Oral QPM    sodium chloride flush  10 mL Intravenous 2 times per day    midodrine  5 mg Oral TID    sodium chloride  20 mL Intravenous Once      Infusions:   PRN Meds: traMADol, 50 mg, Q6H PRN  sodium chloride flush, 10 mL, PRN  sodium chloride flush, 10 mL, PRN  ondansetron, 4 mg, Q6H PRN  acetaminophen, 650 mg, Q4H PRN  polyethylene glycol, 17 g, Daily PRN  melatonin, 1 mg, Nightly PRN        CBC   Recent Labs     02/19/20  1229 02/19/20  2310 02/20/20  0425   WBC 0. 9  CALLED 2N JASSON WANG [id number] [address] MLT  RESULTS READ BACK  * 1.6  WBC CALLED TO 2N TO [facility] DUMONT,RN AT 23:40 ON 2.19.20 BY SISSY GARCIAMLS  RESULTS READ BACK  * 2.5*   HGB 3.8  CALLED 2N JASSON WANG [id number] [address]ime[facility] MLT  RESULTS READ BACK  * 8.6* 8.6*   HCT 11.9  CALLED 2N TKEYS RN [id number] Animeeple[facility]NEY MLT  RESULTS READ BACK  * 27.1* 26.9*   PLT 82* 74* 86*      BMP   Recent Labs     02/18/20  1352 02/19/20  0339   * 137   K 4.8 4.6    103   CO2 17* 17*   BUN 12 10   CREATININE 0.9 1.0       Radiology report reviewed     Electronically signed by Cherie Russ MD on 2/20/2020 at 10:27 AM

## 2020-02-20 NOTE — PROGRESS NOTES
HEMATOLOGY & ONCOLOGY progress note  [facility]      Patient was seen and examined today. Not in any acute distress and no overnight events. Reports she slept well. Has discomfort in left arm and over port site. Hgb improved 8.6  WBC 2.5  No signs of bleeding    PHYSICAL EXAM    Vitals: BP 96/72   Pulse 88   Temp 98.2 °F (36.8 °C) (Oral)   Resp 18   Ht 5' 2.99\" (1.6 m)   Wt 106 lb 3.2 oz (48.2 kg)   LMP 01/11/2000   SpO2 100%   BMI 18.82 kg/m²   General appearance: alert, appears stated age and cooperative. Skin: Skin color, texture, turgor normal. No rashes or lesions  HEENT: Head: Normocephalic, no lesions, without obvious abnormality. Neck: no adenopathy,   Heart: regular rate and rhythm, S1, S2 normal, no murmur, click, rub or gallop  Abdomen: soft, non-tender; bowel sounds normal; no masses,  no organomegaly  Extremities: Left upper extremity edema -   Neurologic: Mental status: Alert, oriented, thought content appropriate    LABORATORY RESULTS  CBC:   Recent Labs     02/19/20  1229 02/19/20  2310 02/20/20  0425   WBC 0.9  CALLED 2N JASSON WANG [id number] [facility] MLT  RESULTS READ BACK  * 1.6  WBC CALLED TO 2N TO [facility] DUMONT,RN AT 23:40 ON 2.19.20 BY SISSY GARCIA MLS  RESULTS READ BACK  * 2.5*   HGB 3.8  CALLED 2N JASSON WANG [id number] [facility] MLT  RESULTS READ BACK  * 8.6* 8.6*   PLT 82* 74* 86*     BMP:    Recent Labs     02/18/20  1352 02/19/20  0339   * 137   K 4.8 4.6    103   CO2 17* 17*   BUN 12 10   CREATININE 0.9 1.0   GLUCOSE 128* 130*     Hepatic:   Recent Labs     02/18/20  1352   AST 14*   ALT 10   BILITOT 0.8   ALKPHOS 50     INR: No results for input(s): INR in the last 72 hours.     RADIOLOGY REPORTS  Narrative   EXAMINATION:   CT OF THE ABDOMEN AND PELVIS WITHOUT CONTRAST 2/19/2020 3:51 pm       TECHNIQUE:   CT of the abdomen and pelvis was performed without the administration of   intravenous contrast. Multiplanar reformatted images are provided for review. Dose modulation, iterative reconstruction, and/or weight based adjustment of   the mA/kV was utilized to reduce the radiation dose to as low as reasonably   achievable.       COMPARISON:   12/23/2019.  PET-CT 01/27/2020.       HISTORY:   ORDERING SYSTEM PROVIDED HISTORY: poss bleed   TECHNOLOGIST PROVIDED HISTORY:   Reason for exam:->poss bleed   Additional Contrast?->None   Reason for Exam: poss bleed   Acuity: Acute   Type of Exam: Initial   Additional signs and symptoms: none   Relevant Medical/Surgical History: no sx       FINDINGS:   Lower Chest: Trace left pleural effusion.  Dependent left lower lobe   opacification, likely atelectasis or possibly infiltrate.       Organs: The unenhanced liver, spleen, pancreas and adrenal glands are   unremarkable.  No renal or ureteral calculi or significant hydronephrosis. The gallbladder is contracted.  Increased attenuation within the gallbladder   lumen, possibly sludge.       GI/Bowel: Mild gas and stool throughout the colon.  The appendix is   unremarkable.  No small bowel distension. Miachel Paolo is redemonstration of   involvement of the 3rd portion of the duodenum by the kiki mass in the   mesenteric region.  The stomach and duodenal sweep are otherwise unremarkable.       Pelvis: There is redemonstration of pelvic masses, similar in size to the   recent PET-CT imaging and improved compared to the earlier conventional CT.    Direct correlation is difficult due to the lack of intravenous contrast.  No   free pelvic fluid.  No evidence of significant pelvic hematoma.  Mild   distention of the urinary bladder.       Peritoneum/Retroperitoneum: There is continued interval decrease in size of   the kiki mass in the base of the mesentery measuring 3.5 x 5.5 cm compared   with 4.7 x 8.0 cm on the PET-CT.  Additional retroperitoneal adenopathy   primarily in the upper abdomen appears grossly stable although is difficult   to quantitate.  No evidence

## 2020-02-20 NOTE — CONSULTS
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 Anemic heart failure   Multi-morbidity: per PMHx  Plan:   Agree with cefepime, pending  identification and susceptibility of cultures, will then adjust antimicrobials if necessary   Anticipate 2 weeks of IV abx after removal of MediPort   Check CRP and pct   Repeat blood cx after removal of MediPort  Thank you for allowing me to consult in the care of this patient.  ------------------------  REASON FOR CONSULT: Infective syndrome   Requested by: Dr. Mansoor Diallo is a 59 y.o. -American female with DLBCL of colon on R-CHOP via MediPort (s/p 2nd cycle on 2020) who was admitted 2020 for further evaluation and management of generalized weakness, and a fall at home. Found to have pancytopenia with severe anemia. Cefepime was commenced empirically after 2 sets of blood cx went positive for P aeruginosa. Denies cough, abdominal pain, diarrhea. ?  Infectious diseases service was consulted to evaluate the pt, and recommend further investigative and therapeutic measures. ROS: Other systems reviewed Including eyes, ENT, respiratory, cardiovascular, GI, , dermatologic, neurologic, psych, hem/lymphatic, musculoskeletal and endocrine were negative other than what is mentioned above.    Patient Active Problem List    Diagnosis Date Noted    Severe malnutrition ([address] 2020    Pancytopenia ([address] 2020    Idiopathic hypotension 2020    Mitral valve disease 2020    B-cell lymphoma ([address]     Family history of malignant neoplasm of gastrointestinal tract      Past Medical History:   Diagnosis pain  Catheter Site: Left ACW Mediport site is tender, right arm PICC line without erythema or tenderness  Neuro: Mental status intact. CN 2-12 intact and no focal sensory or motor deficits    ? Diagnostic Studies: reviewed  ? ? I have examined this patient and available medical records on this date and have made the above observations, conclusions and recommendations.   Electronically signed by: Electronically signed by Shagufta Ramos MD on 2/20/2020 at 11:19 AM

## 2020-02-21 LAB
ANISOCYTOSIS: ABNORMAL
BANDED NEUTROPHILS ABSOLUTE COUNT: 2.92 K/CU MM
BANDED NEUTROPHILS RELATIVE PERCENT: 27 % (ref 5–11)
CULTURE: ABNORMAL
CULTURE: ABNORMAL
DIFFERENTIAL TYPE: ABNORMAL
DOHLE BODIES: PRESENT
EKG ATRIAL RATE: 135 BPM
EKG DIAGNOSIS: NORMAL
EKG P AXIS: 71 DEGREES
EKG P-R INTERVAL: 96 MS
EKG Q-T INTERVAL: 284 MS
EKG QRS DURATION: 66 MS
EKG QTC CALCULATION (BAZETT): 426 MS
EKG R AXIS: 38 DEGREES
EKG T AXIS: 260 DEGREES
EKG VENTRICULAR RATE: 135 BPM
HCT VFR BLD CALC: 22.9 % (ref 37–47)
HCT VFR BLD CALC: 23.5 % (ref 37–47)
HCT VFR BLD CALC: 25 % (ref 37–47)
HEMOGLOBIN: 7.4 GM/DL (ref 12.5–16)
HEMOGLOBIN: 8 GM/DL (ref 12.5–16)
HEMOGLOBIN: ABNORMAL GM/DL (ref 12.5–16)
HYPOCHROMIA: ABNORMAL
LYMPHOCYTES ABSOLUTE: 1.5 K/CU MM
LYMPHOCYTES RELATIVE PERCENT: 14 % (ref 24–44)
Lab: ABNORMAL
MCH RBC QN AUTO: 27.5 PG (ref 27–31)
MCHC RBC AUTO-ENTMCNC: 31.5 % (ref 32–36)
MCV RBC AUTO: 87.4 FL (ref 78–100)
METAMYELOCYTES ABSOLUTE COUNT: 0.11 K/CU MM
METAMYELOCYTES PERCENT: 1 %
MICROCYTES: ABNORMAL
MONOCYTES ABSOLUTE: 1 K/CU MM
MONOCYTES RELATIVE PERCENT: 9 % (ref 0–4)
MYELOCYTE PERCENT: 2 %
MYELOCYTES ABSOLUTE COUNT: 0.22 K/CU MM
NUCLEATED RED BLOOD CELLS: 2
PDW BLD-RTO: 17.3 % (ref 11.7–14.9)
PLATELET # BLD: 107 K/CU MM (ref 140–440)
PMV BLD AUTO: 11.5 FL (ref 7.5–11.1)
RBC # BLD: 2.69 M/CU MM (ref 4.2–5.4)
SEGMENTED NEUTROPHILS ABSOLUTE COUNT: 5 K/CU MM
SEGMENTED NEUTROPHILS RELATIVE PERCENT: 47 % (ref 36–66)
SPECIMEN: ABNORMAL
TOXIC GRANULATION: PRESENT
WBC # BLD: 10.8 K/CU MM (ref 4–10.5)

## 2020-02-21 PROCEDURE — 6370000000 HC RX 637 (ALT 250 FOR IP): Performed by: PHYSICIAN ASSISTANT

## 2020-02-21 PROCEDURE — 36415 COLL VENOUS BLD VENIPUNCTURE: CPT

## 2020-02-21 PROCEDURE — 2580000003 HC RX 258: Performed by: PHYSICIAN ASSISTANT

## 2020-02-21 PROCEDURE — 36430 TRANSFUSION BLD/BLD COMPNT: CPT

## 2020-02-21 PROCEDURE — P9016 RBC LEUKOCYTES REDUCED: HCPCS

## 2020-02-21 PROCEDURE — 85018 HEMOGLOBIN: CPT

## 2020-02-21 PROCEDURE — 85014 HEMATOCRIT: CPT

## 2020-02-21 PROCEDURE — 94761 N-INVAS EAR/PLS OXIMETRY MLT: CPT

## 2020-02-21 PROCEDURE — 2580000003 HC RX 258: Performed by: INTERNAL MEDICINE

## 2020-02-21 PROCEDURE — 99024 POSTOP FOLLOW-UP VISIT: CPT | Performed by: PHYSICIAN ASSISTANT

## 2020-02-21 PROCEDURE — 99232 SBSQ HOSP IP/OBS MODERATE 35: CPT | Performed by: INTERNAL MEDICINE

## 2020-02-21 PROCEDURE — 6360000002 HC RX W HCPCS: Performed by: INTERNAL MEDICINE

## 2020-02-21 PROCEDURE — 93010 ELECTROCARDIOGRAM REPORT: CPT | Performed by: INTERNAL MEDICINE

## 2020-02-21 PROCEDURE — 1200000000 HC SEMI PRIVATE

## 2020-02-21 PROCEDURE — 6360000002 HC RX W HCPCS: Performed by: PHYSICIAN ASSISTANT

## 2020-02-21 PROCEDURE — 85007 BL SMEAR W/DIFF WBC COUNT: CPT

## 2020-02-21 PROCEDURE — APPNB30 APP NON BILLABLE TIME 0-30 MINS: Performed by: PHYSICIAN ASSISTANT

## 2020-02-21 PROCEDURE — 85027 COMPLETE CBC AUTOMATED: CPT

## 2020-02-21 RX ORDER — 0.9 % SODIUM CHLORIDE 0.9 %
20 INTRAVENOUS SOLUTION INTRAVENOUS ONCE
Status: COMPLETED | OUTPATIENT
Start: 2020-02-21 | End: 2020-02-21

## 2020-02-21 RX ORDER — LIDOCAINE HYDROCHLORIDE 10 MG/ML
5 INJECTION, SOLUTION EPIDURAL; INFILTRATION; INTRACAUDAL; PERINEURAL ONCE
Status: DISCONTINUED | OUTPATIENT
Start: 2020-02-21 | End: 2020-02-24

## 2020-02-21 RX ORDER — SODIUM CHLORIDE 0.9 % (FLUSH) 0.9 %
10 SYRINGE (ML) INJECTION EVERY 12 HOURS SCHEDULED
Status: DISCONTINUED | OUTPATIENT
Start: 2020-02-21 | End: 2020-02-27 | Stop reason: SDUPTHER

## 2020-02-21 RX ORDER — SODIUM CHLORIDE 0.9 % (FLUSH) 0.9 %
10 SYRINGE (ML) INJECTION PRN
Status: DISCONTINUED | OUTPATIENT
Start: 2020-02-21 | End: 2020-02-27 | Stop reason: SDUPTHER

## 2020-02-21 RX ADMIN — MIDODRINE HYDROCHLORIDE 5 MG: 5 TABLET ORAL at 22:07

## 2020-02-21 RX ADMIN — SODIUM CHLORIDE, PRESERVATIVE FREE 10 ML: 5 INJECTION INTRAVENOUS at 09:00

## 2020-02-21 RX ADMIN — ENOXAPARIN SODIUM 50 MG: 60 INJECTION SUBCUTANEOUS at 22:06

## 2020-02-21 RX ADMIN — SODIUM CHLORIDE, PRESERVATIVE FREE 10 ML: 5 INJECTION INTRAVENOUS at 08:59

## 2020-02-21 RX ADMIN — TRAMADOL HYDROCHLORIDE 50 MG: 50 TABLET, FILM COATED ORAL at 23:10

## 2020-02-21 RX ADMIN — TRAMADOL HYDROCHLORIDE 50 MG: 50 TABLET, FILM COATED ORAL at 16:58

## 2020-02-21 RX ADMIN — DIVALPROEX SODIUM 500 MG: 250 TABLET, DELAYED RELEASE ORAL at 08:59

## 2020-02-21 RX ADMIN — MIDODRINE HYDROCHLORIDE 5 MG: 5 TABLET ORAL at 06:43

## 2020-02-21 RX ADMIN — MIDODRINE HYDROCHLORIDE 5 MG: 5 TABLET ORAL at 14:19

## 2020-02-21 RX ADMIN — SODIUM CHLORIDE, PRESERVATIVE FREE 10 ML: 5 INJECTION INTRAVENOUS at 23:11

## 2020-02-21 RX ADMIN — Medication 500 MG: at 08:58

## 2020-02-21 RX ADMIN — SODIUM CHLORIDE, PRESERVATIVE FREE 10 ML: 5 INJECTION INTRAVENOUS at 10:35

## 2020-02-21 RX ADMIN — DIVALPROEX SODIUM 1000 MG: 250 TABLET, DELAYED RELEASE ORAL at 22:07

## 2020-02-21 RX ADMIN — ENOXAPARIN SODIUM 50 MG: 60 INJECTION SUBCUTANEOUS at 11:23

## 2020-02-21 RX ADMIN — ALLOPURINOL 100 MG: 100 TABLET ORAL at 08:58

## 2020-02-21 RX ADMIN — SODIUM CHLORIDE, PRESERVATIVE FREE 10 ML: 5 INJECTION INTRAVENOUS at 22:07

## 2020-02-21 RX ADMIN — CEFEPIME HYDROCHLORIDE 2 G: 2 INJECTION, POWDER, FOR SOLUTION INTRAVENOUS at 16:13

## 2020-02-21 RX ADMIN — CEFEPIME HYDROCHLORIDE 2 G: 2 INJECTION, POWDER, FOR SOLUTION INTRAVENOUS at 04:41

## 2020-02-21 RX ADMIN — SODIUM CHLORIDE 20 ML: 9 INJECTION, SOLUTION INTRAVENOUS at 16:50

## 2020-02-21 RX ADMIN — MIDODRINE HYDROCHLORIDE 5 MG: 5 TABLET ORAL at 00:08

## 2020-02-21 RX ADMIN — SODIUM CHLORIDE: 9 INJECTION, SOLUTION INTRAVENOUS at 11:08

## 2020-02-21 ASSESSMENT — ENCOUNTER SYMPTOMS
PHOTOPHOBIA: 0
BACK PAIN: 0
CHOKING: 0
EYE REDNESS: 0
STRIDOR: 0
COLOR CHANGE: 0
CONSTIPATION: 0
APNEA: 0
RECTAL PAIN: 0
EYE ITCHING: 0
ANAL BLEEDING: 0
SORE THROAT: 0

## 2020-02-21 ASSESSMENT — PAIN SCALES - GENERAL
PAINLEVEL_OUTOF10: 6
PAINLEVEL_OUTOF10: 5
PAINLEVEL_OUTOF10: 6
PAINLEVEL_OUTOF10: 0

## 2020-02-21 ASSESSMENT — PAIN DESCRIPTION - ONSET
ONSET: ON-GOING
ONSET: ON-GOING

## 2020-02-21 ASSESSMENT — PAIN DESCRIPTION - FREQUENCY
FREQUENCY: INTERMITTENT
FREQUENCY: INTERMITTENT

## 2020-02-21 ASSESSMENT — PAIN DESCRIPTION - PROGRESSION
CLINICAL_PROGRESSION: GRADUALLY IMPROVING
CLINICAL_PROGRESSION: NOT CHANGED

## 2020-02-21 ASSESSMENT — PAIN DESCRIPTION - ORIENTATION
ORIENTATION: LEFT
ORIENTATION: LEFT

## 2020-02-21 ASSESSMENT — PAIN DESCRIPTION - LOCATION
LOCATION: ARM;SHOULDER
LOCATION: ARM;SHOULDER

## 2020-02-21 NOTE — PROGRESS NOTES
Received report of KATHLEEN Yao. All questions answered. Pt in bed, resting at this time. This RN will continue to monitor.

## 2020-02-21 NOTE — PROGRESS NOTES
hypotension    Mitral valve disease    Pancytopenia (HCC)    Severe malnutrition (HCC)    Acute deep vein thrombosis (DVT) of left upper extremity (HCC)       Active Problems  Active Problems:    Pancytopenia (HCC)    Severe malnutrition (HCC)    Acute deep vein thrombosis (DVT) of left upper extremity (HCC)  Resolved Problems:    * No resolved hospital problems.  *    Electronically signed by: Electronically signed by Helena Wild MD on 2/21/2020 at 12:36 PM

## 2020-02-21 NOTE — CONSULTS
Consult completed. Both lumens of #5Fr Double Lumen PowerMidLine return blood briskly with application of tourniquet and extension of arm perpendicular to body. Both lumens flush without any resistance or abnormalities. New caps applied, infusion reinitiated, and SwabCap applied to other lumen. Pt denies other c/o or needs. Quang Ayala, Primary RN, notified.

## 2020-02-21 NOTE — PROGRESS NOTES
Unable to assess  · Anthropometric Measures:  · Ht: 5' 2.99\" (160 cm)   · Current Body Wt: 106 lb (48.1 kg)  · Admission Body Wt: 106 lb (48.1 kg)  · Usual Body Wt: 147 lb (66.7 kg)  · % Weight Change: -25% in 1 month  · Ideal Body Wt: 115 lb (52.2 kg), % Ideal Body 92%  · BMI Classification: BMI 18.5 - 24.9 Normal Weight    Nutrition Interventions:   Continue current diet, Continue current ONS  Continued Inpatient Monitoring, Education Completed, Coordination of Care    Nutrition Evaluation:   · Evaluation: Progressing toward goals   · Goals: pt will consume greater than 75% of her meals and supplements    · Monitoring: Meal Intake, Supplement Intake, Pertinent Labs, Weight, Monitor Bowel Function      Electronically signed by Jarrod Serra RD, LD on 9/92/27 at 1:57 PM    Contact Number: [phone]

## 2020-02-22 LAB
ABO/RH: NORMAL
ABO/RH: NORMAL
ANTIBODY SCREEN: NEGATIVE
ANTIBODY SCREEN: NEGATIVE
COMPONENT: NORMAL
CROSSMATCH RESULT: NORMAL
HCT VFR BLD CALC: 32.4 % (ref 37–47)
HCT VFR BLD CALC: 33.2 % (ref 37–47)
HCT VFR BLD CALC: 40.1 % (ref 37–47)
HEMOGLOBIN: 10.4 GM/DL (ref 12.5–16)
HEMOGLOBIN: 10.9 GM/DL (ref 12.5–16)
HEMOGLOBIN: 11.8 GM/DL (ref 12.5–16)
MCH RBC QN AUTO: 28.2 PG (ref 27–31)
MCHC RBC AUTO-ENTMCNC: 29.4 % (ref 32–36)
MCV RBC AUTO: 95.9 FL (ref 78–100)
PDW BLD-RTO: 16.1 % (ref 11.7–14.9)
PLATELET # BLD: 122 K/CU MM (ref 140–440)
PMV BLD AUTO: 10.7 FL (ref 7.5–11.1)
RBC # BLD: 4.18 M/CU MM (ref 4.2–5.4)
STATUS: NORMAL
TRANSFUSION STATUS: NORMAL
UNIT DIVISION: 0
UNIT NUMBER: NORMAL
WBC # BLD: 18.1 K/CU MM (ref 4–10.5)

## 2020-02-22 PROCEDURE — 86900 BLOOD TYPING SEROLOGIC ABO: CPT

## 2020-02-22 PROCEDURE — 6370000000 HC RX 637 (ALT 250 FOR IP): Performed by: PHYSICIAN ASSISTANT

## 2020-02-22 PROCEDURE — 99024 POSTOP FOLLOW-UP VISIT: CPT | Performed by: SURGERY

## 2020-02-22 PROCEDURE — 97116 GAIT TRAINING THERAPY: CPT

## 2020-02-22 PROCEDURE — 85018 HEMOGLOBIN: CPT

## 2020-02-22 PROCEDURE — 85027 COMPLETE CBC AUTOMATED: CPT

## 2020-02-22 PROCEDURE — 85014 HEMATOCRIT: CPT

## 2020-02-22 PROCEDURE — 2580000003 HC RX 258: Performed by: PHYSICIAN ASSISTANT

## 2020-02-22 PROCEDURE — 97530 THERAPEUTIC ACTIVITIES: CPT

## 2020-02-22 PROCEDURE — 99232 SBSQ HOSP IP/OBS MODERATE 35: CPT | Performed by: INTERNAL MEDICINE

## 2020-02-22 PROCEDURE — 94761 N-INVAS EAR/PLS OXIMETRY MLT: CPT

## 2020-02-22 PROCEDURE — 6360000002 HC RX W HCPCS: Performed by: PHYSICIAN ASSISTANT

## 2020-02-22 PROCEDURE — 86850 RBC ANTIBODY SCREEN: CPT

## 2020-02-22 PROCEDURE — 36415 COLL VENOUS BLD VENIPUNCTURE: CPT

## 2020-02-22 PROCEDURE — 86901 BLOOD TYPING SEROLOGIC RH(D): CPT

## 2020-02-22 PROCEDURE — 97162 PT EVAL MOD COMPLEX 30 MIN: CPT

## 2020-02-22 PROCEDURE — 6360000002 HC RX W HCPCS: Performed by: INTERNAL MEDICINE

## 2020-02-22 PROCEDURE — 1200000000 HC SEMI PRIVATE

## 2020-02-22 PROCEDURE — 2580000003 HC RX 258: Performed by: INTERNAL MEDICINE

## 2020-02-22 RX ADMIN — DIVALPROEX SODIUM 1000 MG: 250 TABLET, DELAYED RELEASE ORAL at 22:09

## 2020-02-22 RX ADMIN — SODIUM CHLORIDE, PRESERVATIVE FREE 10 ML: 5 INJECTION INTRAVENOUS at 11:15

## 2020-02-22 RX ADMIN — ONDANSETRON 4 MG: 2 INJECTION INTRAMUSCULAR; INTRAVENOUS at 19:51

## 2020-02-22 RX ADMIN — DIVALPROEX SODIUM 500 MG: 250 TABLET, DELAYED RELEASE ORAL at 11:14

## 2020-02-22 RX ADMIN — Medication 500 MG: at 11:13

## 2020-02-22 RX ADMIN — ENOXAPARIN SODIUM 50 MG: 60 INJECTION SUBCUTANEOUS at 22:09

## 2020-02-22 RX ADMIN — TRAMADOL HYDROCHLORIDE 50 MG: 50 TABLET, FILM COATED ORAL at 22:09

## 2020-02-22 RX ADMIN — CEFEPIME HYDROCHLORIDE 2 G: 2 INJECTION, POWDER, FOR SOLUTION INTRAVENOUS at 04:17

## 2020-02-22 RX ADMIN — MIDODRINE HYDROCHLORIDE 5 MG: 5 TABLET ORAL at 22:09

## 2020-02-22 RX ADMIN — CEFEPIME HYDROCHLORIDE 2 G: 2 INJECTION, POWDER, FOR SOLUTION INTRAVENOUS at 17:15

## 2020-02-22 RX ADMIN — MIDODRINE HYDROCHLORIDE 5 MG: 5 TABLET ORAL at 05:01

## 2020-02-22 RX ADMIN — MIDODRINE HYDROCHLORIDE 5 MG: 5 TABLET ORAL at 15:20

## 2020-02-22 RX ADMIN — ENOXAPARIN SODIUM 50 MG: 60 INJECTION SUBCUTANEOUS at 11:14

## 2020-02-22 RX ADMIN — ALLOPURINOL 100 MG: 100 TABLET ORAL at 11:14

## 2020-02-22 RX ADMIN — SODIUM CHLORIDE, PRESERVATIVE FREE 10 ML: 5 INJECTION INTRAVENOUS at 19:51

## 2020-02-22 RX ADMIN — SODIUM CHLORIDE: 9 INJECTION, SOLUTION INTRAVENOUS at 04:17

## 2020-02-22 ASSESSMENT — PAIN DESCRIPTION - ORIENTATION: ORIENTATION: LEFT

## 2020-02-22 ASSESSMENT — PAIN DESCRIPTION - LOCATION: LOCATION: SHOULDER

## 2020-02-22 ASSESSMENT — PAIN SCALES - GENERAL
PAINLEVEL_OUTOF10: 0
PAINLEVEL_OUTOF10: 7

## 2020-02-22 ASSESSMENT — PAIN DESCRIPTION - PROGRESSION
CLINICAL_PROGRESSION: GRADUALLY WORSENING
CLINICAL_PROGRESSION: GRADUALLY WORSENING

## 2020-02-22 ASSESSMENT — PAIN DESCRIPTION - DESCRIPTORS: DESCRIPTORS: ACHING

## 2020-02-22 ASSESSMENT — PAIN DESCRIPTION - PAIN TYPE: TYPE: ACUTE PAIN

## 2020-02-22 ASSESSMENT — PAIN DESCRIPTION - FREQUENCY: FREQUENCY: CONTINUOUS

## 2020-02-22 ASSESSMENT — PAIN DESCRIPTION - ONSET: ONSET: ON-GOING

## 2020-02-22 NOTE — PROGRESS NOTES
PRN.    Recommendations for NURSING mobility: CGA x 1 with gait belt and FWW. Requires inc time for ambulation due to fear of falling.      Time:   Time in: 10:30  Time out: 11:45  Timed treatment minutes: 60  Total time: 75    Electronically signed by:    Sophie Rosario, PT  2/22/2020, 2:09 PM

## 2020-02-22 NOTE — PROGRESS NOTES
ONCOLOGY HEMATOLOGY CARE ([facility])  PROGRESS NOTE      2020  6:47 PM    Patient:    Kristopher Soulier  : 1955   59 y.o. MRN: [id number]  Admitted: 2020  3:46 PM ATT: Jud Mendoza MD   [id number]/[id number]  AdmitDx: Hemoglobin low [D64.9]  Pancytopenia ([facility] [H72.837]  PCP: Jaret Hodges MD      Patient was seen and examined today. Not in any acute distress and no overnight events. Feels much better today. Left upper chest pain resolving. No fever. No cough. No bleeding      REVIEW OF SYSTEMS      Constitutional:  Denies fever, chills  HEENT:  Denies swelling of neck glands  Respiratory:  Denies cough, shortness of breath or hemoptysis  Cardiovascular:  Denies chest pain, palpitations or swelling   GI:  Denies abdominal pain, nausea, vomiting, constipation or diarrhea   Musculoskeletal:  Denies back pain   Skin: hematoma  Neurologic:  Denies headache, focal weakness or sensory changes   PSYCHIATRIC:  Neg depression, personality changes, anxiety.   ALL/IMM:  Neg reactions to drugs other than listed    All systems negative except  for symptoms of HPI and above    PHYSICAL EXAM :    Vitals: BP (!) 99/58   Pulse 94   Temp 97.7 °F (36.5 °C) (Oral)   Resp 17   Ht 5' 2.99\" (1.6 m)   Wt 106 lb 3.2 oz (48.2 kg)   LMP 2000   SpO2 99%   BMI 18.82 kg/m²     CONSTITUTIONAL: awake, alert, cooperative, on the chair, tired appearing  EYES:No palor or icetrus  ENT: ATNC  NECK: supple, symmetrical, no jugular venous distension and no carotid bruits   HEMATOLOGIC/LYMPHATIC: no cervical, supraclavicular or axillary lymphadenopathy   LUNGS: ctab, sutures intact, hematoma left upper chest  CARDIOVASCULAR: regular rate and rhythm, normal S1 and S2, no murmur noted  ABDOMEN: normal bowel sounds x 4, soft, non-distended, non-tender, no masses palpated, no hepatosplenomgaly   NEUROLOGIC: GI  SKIN: as above  EXTREMITIES: no LE edema edyta    LABORATORY RESULTS  CBC:   Recent Labs     20  7335 02/21/20  0406  02/22/20  0009 02/22/20  0643 02/22/20  1408   WBC 2.5*  --  10.8*  --   --   --  18.1*   HGB 8.6*   < > 7.4*   < > 10.4* 10.9* 11.8*   PLT 86*  --  107*  --   --   --  122*    < > = values in this interval not displayed. BMP:  No results for input(s): NA, K, CL, CO2, BUN, CREATININE, GLUCOSE in the last 72 hours. Hepatic: No results for input(s): AST, ALT, ALB, BILITOT, ALKPHOS in the last 72 hours. INR: No results for input(s): INR in the last 72 hours. RADIOLOGY REPORTS  2/18/20: Venous dopplers:  Extensive left upper extremity DVT with thrombosis of the internal jugular,   subclavian, axillary, brachial and basilic veins. 2/19/20: CT abdomen and pelvis:  1. Partially imaged large hematoma with gas in the inferior aspect of the   left breast.  If further imaging is indicated, consider either ultrasound or   chest CT. 2. Redemonstration of kiki masses in the abdomen and pelvis.  Interval   decrease in size of the mesenteric mass, presumably indicating response to   treatment. 3. No evidence of retroperitoneal or pelvic bleed or significant free fluid. 4. Trace left pleural effusion.  Dependent left lower lobe opacification,   possibly infiltrate or atelectasis. ASSESSMENT & RECOMMENDATIONS:  DLBCL- stage IIB s/p 2 cycles R-CHOP    Pancytopenia: sec to chemotherapy, blood loss. Counts normalizing. Continue transfusion support. Left breast hematoma: sec to fall. Monitor for now    Blood cultures +; pseudomonas  - ID recommended IV antibiotic for 2 weeks. LUE DVT- Port removed. AC if OK with surgery and no active bleeding     This plan was discussed with the patient and she verbalized understanding. We will continue to follow the patient. Thank you for allowing us to participate in the care of this patient.      Charly Awan MD, 2/22/2020, 6:47 PM

## 2020-02-22 NOTE — PROGRESS NOTES
Hospitalist Progress Note      Name:  Margaret Rainey /Age/Sex: 1955  (59 y.o. female)   MRN & CSN:  [id number] & [id number] Admission Date/Time: 2020  3:46 PM   Location:  [address] [place] [address]-A PCP: Heidi Acevedo MD         Hospital Day: 5    Assessment and Plan:   Margaret Rainey is a 59 y.o.  female who presents with pancytopenia     - Pancytopenia suspected to chemotherapy induced. Improving s/p pegfilgratim X 3 doses. - Severe anemia (hb 3.6 g/dl on admission) due to BM suppression and blood loss. S/p total 8 units of RBC. Hb today is 10.2 g/dL. Baseline Hb 9.3 g/dL. FOBT neg. No endoscopic eval by GI.  - Severe Leukopenia (0.3) - resolved. - Neutropenia  - resolved. - Neutropenic fever - better. Viral panel neg. No PNA on CXR. UA neg for pyuria or bacteriuria. - Pseudomonas aeruginosa bacteremia 20. Repeat cultures  no growth so far. - Thrombocytopenia (78K) - improving.    - Left breast hematoma (11.5 X 4.2cm), likely secondary to fall at home. - Biopsy proven DLBCL of rectum dx dec 20 2019 on R-CHOP, s/p 2 cycles, last received 20 - CT abd shows response to treatment. - Sinus tachycardia due to anemia - better. - Bipolar disorder - continue depakote  - Extensive left upper extremity acute DVT ( IJ, subclavian, axillary, brachial and basili veins). Likely related to vascular catheter (she has left IJ mediport). Off Lovenox due to hematoma. Mediport removed on 20.  - severe malnutrition due to chronic medical problems. On general diet and supplements. Dietitian on board. Plan  - get CBC  - continue cefepime. ID recommends at least 2 weeks of IV antibiotics from date of mediport removal.   - stop IV fluid. - keep left UE elevated.   - neutropenic precautions  - Monitor closely for bleeding    Diet Dietary Nutrition Supplements: Standard High Calorie Oral Supplement  DIET GENERAL;   DVT Prophylaxis [] Lovenox, []  Heparin, [x] SCDs, [] Ambulation   GI Prophylaxis Patient's SO, Meghna called and asked if they could cancel the patient's post hospital follow up clinic appointment as the patient is doing fine, he is getting home care and he did not have surgery and therefore shouldn't follow up with the CV surgery PAs.  Meghna states she spoke to someone by phone, but she couldn't remember who, and was told they did not need the follow up appointment and that Dr Bowers will contact them when he is ready to speak with the patient about replacing his aortic valve. Patient has history of aortic stenosis with ascending aortic aneurysm/CAD s/p aortic valve replacement and aortic root replacement in April 2016).  Patient was hospitalized on 10/06/17 with altered mental status.  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   Patient is to have 3 months of antibiotics and Neurocritical Care agreed he would be best optimized by allowing his neurological insult to correct/improve prior to attempting cardiothoracic surgery.  Patient's SO would like a return call letting them know when to follow up with Dr Bowers in clinic.  Will speak to Dr Bowers and call patient's SO back.     Per Dr Bowers, he will call the patient's SO tomorrow (12/06) with his recommendations.  Meghna notified and she verbalized understanding.     PRN  traMADol, 50 mg, Q6H PRN  sodium chloride flush, 10 mL, PRN  sodium chloride flush, 10 mL, PRN  ondansetron, 4 mg, Q6H PRN  acetaminophen, 650 mg, Q4H PRN  polyethylene glycol, 17 g, Daily PRN  melatonin, 1 mg, Nightly PRN        CBC   Recent Labs     02/19/20  2310 02/20/20  0425  02/21/20  0406 02/21/20  1404 02/22/20  0009 02/22/20  0643   WBC 1.6  WBC CALLED TO 2N TO [facility] DUMONT,RN AT 23:40 ON 2.19.20 BY SISSY GARCIA MLS  RESULTS READ BACK  * 2.5*  --  10.8*  --   --   --    HGB 8.6* 8.6*   < > 7.4* 6.5  HGB CALLED TO ICU CHARGE KATHLEEN NUNES @1423 [id number] GHASSAN PATEL   RESULTS READ BACK  * 10.4* 10.9*   HCT 27.1* 26.9*   < > 23.5* 22.9* 32.4* 33.2*   PLT 74* 86*  --  107*  --   --   --     < > = values in this interval not displayed. BMP   No results for input(s): NA, K, CL, CO2, PHOS, BUN, CREATININE in the last 72 hours.     Invalid input(s): CA    Radiology report reviewed     Electronically signed by Marlene Guthrie MD on 2/22/2020 at 9:36 AM

## 2020-02-23 LAB
CULTURE: NORMAL
HCT VFR BLD CALC: 38 % (ref 37–47)
HEMOGLOBIN: 12.5 GM/DL (ref 12.5–16)
Lab: NORMAL
MCH RBC QN AUTO: 28.7 PG (ref 27–31)
MCHC RBC AUTO-ENTMCNC: 32.9 % (ref 32–36)
MCV RBC AUTO: 87.4 FL (ref 78–100)
PDW BLD-RTO: 16.2 % (ref 11.7–14.9)
PLATELET # BLD: 146 K/CU MM (ref 140–440)
PMV BLD AUTO: 10.3 FL (ref 7.5–11.1)
RBC # BLD: 4.35 M/CU MM (ref 4.2–5.4)
SPECIMEN: NORMAL
WBC # BLD: 17.9 K/CU MM (ref 4–10.5)

## 2020-02-23 PROCEDURE — 94761 N-INVAS EAR/PLS OXIMETRY MLT: CPT

## 2020-02-23 PROCEDURE — 85027 COMPLETE CBC AUTOMATED: CPT

## 2020-02-23 PROCEDURE — 2580000003 HC RX 258: Performed by: INTERNAL MEDICINE

## 2020-02-23 PROCEDURE — 99232 SBSQ HOSP IP/OBS MODERATE 35: CPT | Performed by: INTERNAL MEDICINE

## 2020-02-23 PROCEDURE — 2580000003 HC RX 258: Performed by: PHYSICIAN ASSISTANT

## 2020-02-23 PROCEDURE — 6360000002 HC RX W HCPCS: Performed by: PHYSICIAN ASSISTANT

## 2020-02-23 PROCEDURE — 1200000000 HC SEMI PRIVATE

## 2020-02-23 PROCEDURE — 6370000000 HC RX 637 (ALT 250 FOR IP): Performed by: PHYSICIAN ASSISTANT

## 2020-02-23 PROCEDURE — 6360000002 HC RX W HCPCS: Performed by: INTERNAL MEDICINE

## 2020-02-23 RX ADMIN — MIDODRINE HYDROCHLORIDE 5 MG: 5 TABLET ORAL at 21:57

## 2020-02-23 RX ADMIN — ONDANSETRON 4 MG: 2 INJECTION INTRAMUSCULAR; INTRAVENOUS at 14:06

## 2020-02-23 RX ADMIN — ALLOPURINOL 100 MG: 100 TABLET ORAL at 10:02

## 2020-02-23 RX ADMIN — DIVALPROEX SODIUM 1000 MG: 250 TABLET, DELAYED RELEASE ORAL at 21:57

## 2020-02-23 RX ADMIN — SODIUM CHLORIDE, PRESERVATIVE FREE 10 ML: 5 INJECTION INTRAVENOUS at 21:58

## 2020-02-23 RX ADMIN — Medication 500 MG: at 10:02

## 2020-02-23 RX ADMIN — SODIUM CHLORIDE, PRESERVATIVE FREE 10 ML: 5 INJECTION INTRAVENOUS at 10:03

## 2020-02-23 RX ADMIN — CEFEPIME HYDROCHLORIDE 2 G: 2 INJECTION, POWDER, FOR SOLUTION INTRAVENOUS at 04:11

## 2020-02-23 RX ADMIN — TRAMADOL HYDROCHLORIDE 50 MG: 50 TABLET, FILM COATED ORAL at 21:57

## 2020-02-23 RX ADMIN — ENOXAPARIN SODIUM 50 MG: 60 INJECTION SUBCUTANEOUS at 10:02

## 2020-02-23 RX ADMIN — MIDODRINE HYDROCHLORIDE 5 MG: 5 TABLET ORAL at 14:06

## 2020-02-23 RX ADMIN — ENOXAPARIN SODIUM 50 MG: 60 INJECTION SUBCUTANEOUS at 21:57

## 2020-02-23 RX ADMIN — DIVALPROEX SODIUM 500 MG: 250 TABLET, DELAYED RELEASE ORAL at 10:02

## 2020-02-23 RX ADMIN — MIDODRINE HYDROCHLORIDE 5 MG: 5 TABLET ORAL at 06:32

## 2020-02-23 RX ADMIN — CEFEPIME HYDROCHLORIDE 2 G: 2 INJECTION, POWDER, FOR SOLUTION INTRAVENOUS at 16:09

## 2020-02-23 ASSESSMENT — PAIN DESCRIPTION - ORIENTATION: ORIENTATION: LEFT

## 2020-02-23 ASSESSMENT — PAIN DESCRIPTION - ONSET: ONSET: ON-GOING

## 2020-02-23 ASSESSMENT — PAIN - FUNCTIONAL ASSESSMENT: PAIN_FUNCTIONAL_ASSESSMENT: PREVENTS OR INTERFERES SOME ACTIVE ACTIVITIES AND ADLS

## 2020-02-23 ASSESSMENT — PAIN SCALES - GENERAL
PAINLEVEL_OUTOF10: 0
PAINLEVEL_OUTOF10: 7

## 2020-02-23 ASSESSMENT — PAIN DESCRIPTION - LOCATION: LOCATION: SHOULDER

## 2020-02-23 ASSESSMENT — PAIN DESCRIPTION - PROGRESSION: CLINICAL_PROGRESSION: GRADUALLY WORSENING

## 2020-02-23 ASSESSMENT — PAIN DESCRIPTION - FREQUENCY: FREQUENCY: CONTINUOUS

## 2020-02-23 ASSESSMENT — PAIN DESCRIPTION - DESCRIPTORS: DESCRIPTORS: ACHING

## 2020-02-23 NOTE — CONSULTS
Consult for Midline not drawing blood. Please see note from PICC RN on 2/21/20. Arm must per perpendicular and application of tourniquet to withdraw blood. As this is a midline catheter, Cathflo declot can not be performed.

## 2020-02-23 NOTE — PROGRESS NOTES
Hospitalist Progress Note      Name:  Jesus Lozano /Age/Sex: 1955  (59 y.o. female)   MRN & CSN:  [id number] & [id number] Admission Date/Time: 2020  3:46 PM   [facility]  [facility] PCP: Volodymyr Hernandez MD         Hospital Day: 6    Assessment and Plan:   Jesus Lozano is a 59 y.o.  female who presents with pancytopenia     - Pancytopenia suspected to chemotherapy induced. Resolved s/p pegfilgratim X 3 doses. - Severe anemia (hb 3.6 g/dl on admission) due to BM suppression and blood loss. S/p total 8 units of RBC. Hb today is 12.5 g/dL. Baseline Hb 9.3 g/dL. FOBT neg. No endoscopic eval by GI.  - Severe Leukopenia (0.3) - resolved. - Neutropenia  - resolved. - Neutropenic fever - better. Viral panel neg. No PNA on CXR. UA neg for pyuria or bacteriuria. - Pseudomonas aeruginosa bacteremia 20. Repeat cultures  no growth so far. - Thrombocytopenia (78K) - resolved   - Left breast hematoma (11.5 X 4.2cm), likely secondary to fall at home - improving.  - Biopsy proven DLBCL of rectum dx dec 20 2019 on R-CHOP, s/p 2 cycles, last received 20 - CT abd shows response to treatment. - Sinus tachycardia due to anemia - better. - Bipolar disorder - continue depakote  - extensive left upper extremity acute DVT ( IJ, subclavian, axillary, brachial and basili veins). Related to vascular catheter (she has left IJ mediport). On Lovenox. Mediport removed on 20.  - severe malnutrition due to chronic medical problems. On general diet and supplements. Dietitian on board. Plan  - continue cefepime. ID recommends at least 2 weeks of IV antibiotics from date of mediport removal.   - keep left UE elevated.   - Possible replacement of picc line tomorrow (unable to draw labs from it)    Diet Dietary Nutrition Supplements: Standard High Calorie Oral Supplement  DIET GENERAL;   DVT Prophylaxis [] Lovenox, []  Heparin, [x] SCDs, [] Ambulation   GI Prophylaxis [x] PPI,  [] H2 Blocker,  []

## 2020-02-23 NOTE — PROGRESS NOTES
02/21/20  0406  02/22/20  0643 02/22/20  1408 02/23/20  0632   WBC 10.8*  --   --  18.1* 17.9*   HGB 7.4*   < > 10.9* 11.8* 12.5   *  --   --  122* 146    < > = values in this interval not displayed. BMP:  No results for input(s): NA, K, CL, CO2, BUN, CREATININE, GLUCOSE in the last 72 hours. Hepatic: No results for input(s): AST, ALT, ALB, BILITOT, ALKPHOS in the last 72 hours. INR: No results for input(s): INR in the last 72 hours. RADIOLOGY REPORTS    2/18/20: Venous dopplers:  Extensive left upper extremity DVT with thrombosis of the internal jugular,   subclavian, axillary, brachial and basilic veins.      6/38/10: CT abdomen and pelvis:  1. Partially imaged large hematoma with gas in the inferior aspect of the   left breast.  If further imaging is indicated, consider either ultrasound or   chest CT. 2. Redemonstration of kiki masses in the abdomen and pelvis.  Interval   decrease in size of the mesenteric mass, presumably indicating response to   treatment. 3. No evidence of retroperitoneal or pelvic bleed or significant free fluid. 4. Trace left pleural effusion.  Dependent left lower lobe opacification,   possibly infiltrate or atelectasis. ASSESSMENT & RECOMMENDATIONS:  DLBCL- stage IIB s/p 2 cycles R-CHOP. Followed By Dr Atul Llanes     Pancytopenia: sec to chemotherapy, blood loss. Counts normalizing. Transfusional support as needed     Left breast hematoma: sec to fall. Monitor closely     Blood cultures +; pseudomonas  - ID recommended IV antibiotic for 2 weeks.     LUE DVT- Port removed. Continue Lovenox for a week and transition to [address] if no bleeding, worsening hematoma. Continue AC for 6 weeks to 3M. Continue other medical care. She is Ok to be discharged per oncology    This plan was discussed with the patient and she verbalized understanding. We will continue to follow the patient. Thank you for allowing us to participate in the care of this patient. Cheryl Maloney MD, 2/23/2020, 4:23 PM

## 2020-02-24 ENCOUNTER — APPOINTMENT (OUTPATIENT)
Dept: CT IMAGING | Age: 65
DRG: 981 | End: 2020-02-24
Attending: INTERNAL MEDICINE
Payer: COMMERCIAL

## 2020-02-24 LAB
ANION GAP SERPL CALCULATED.3IONS-SCNC: 15 MMOL/L (ref 4–16)
BUN BLDV-MCNC: 19 MG/DL (ref 6–23)
CALCIUM SERPL-MCNC: 8.9 MG/DL (ref 8.3–10.6)
CHLORIDE BLD-SCNC: 112 MMOL/L (ref 99–110)
CO2: 15 MMOL/L (ref 21–32)
CREAT SERPL-MCNC: 0.8 MG/DL (ref 0.6–1.1)
GFR AFRICAN AMERICAN: >60 ML/MIN/1.73M2
GFR NON-AFRICAN AMERICAN: >60 ML/MIN/1.73M2
GLUCOSE BLD-MCNC: 107 MG/DL (ref 70–99)
HCT VFR BLD CALC: 42.3 % (ref 37–47)
HEMOGLOBIN: 12.8 GM/DL (ref 12.5–16)
MCH RBC QN AUTO: 28.4 PG (ref 27–31)
MCHC RBC AUTO-ENTMCNC: 30.3 % (ref 32–36)
MCV RBC AUTO: 94 FL (ref 78–100)
PDW BLD-RTO: 16.9 % (ref 11.7–14.9)
PLATELET # BLD: 210 K/CU MM (ref 140–440)
PMV BLD AUTO: 10.6 FL (ref 7.5–11.1)
POTASSIUM SERPL-SCNC: 3.7 MMOL/L (ref 3.5–5.1)
RBC # BLD: 4.5 M/CU MM (ref 4.2–5.4)
SODIUM BLD-SCNC: 142 MMOL/L (ref 135–145)
WBC # BLD: 25 K/CU MM (ref 4–10.5)

## 2020-02-24 PROCEDURE — 6370000000 HC RX 637 (ALT 250 FOR IP): Performed by: PHYSICIAN ASSISTANT

## 2020-02-24 PROCEDURE — 97116 GAIT TRAINING THERAPY: CPT

## 2020-02-24 PROCEDURE — 6360000002 HC RX W HCPCS: Performed by: INTERNAL MEDICINE

## 2020-02-24 PROCEDURE — 85027 COMPLETE CBC AUTOMATED: CPT

## 2020-02-24 PROCEDURE — 99232 SBSQ HOSP IP/OBS MODERATE 35: CPT | Performed by: INTERNAL MEDICINE

## 2020-02-24 PROCEDURE — 80048 BASIC METABOLIC PNL TOTAL CA: CPT

## 2020-02-24 PROCEDURE — 6360000002 HC RX W HCPCS: Performed by: PHYSICIAN ASSISTANT

## 2020-02-24 PROCEDURE — 36415 COLL VENOUS BLD VENIPUNCTURE: CPT

## 2020-02-24 PROCEDURE — 2580000003 HC RX 258: Performed by: INTERNAL MEDICINE

## 2020-02-24 PROCEDURE — 99231 SBSQ HOSP IP/OBS SF/LOW 25: CPT | Performed by: NURSE PRACTITIONER

## 2020-02-24 PROCEDURE — 1200000000 HC SEMI PRIVATE

## 2020-02-24 PROCEDURE — 97530 THERAPEUTIC ACTIVITIES: CPT

## 2020-02-24 PROCEDURE — 2580000003 HC RX 258: Performed by: PHYSICIAN ASSISTANT

## 2020-02-24 PROCEDURE — 94761 N-INVAS EAR/PLS OXIMETRY MLT: CPT

## 2020-02-24 PROCEDURE — 70450 CT HEAD/BRAIN W/O DYE: CPT

## 2020-02-24 RX ORDER — SODIUM CHLORIDE 0.9 % (FLUSH) 0.9 %
10 SYRINGE (ML) INJECTION EVERY 12 HOURS SCHEDULED
Status: DISCONTINUED | OUTPATIENT
Start: 2020-02-24 | End: 2020-03-11 | Stop reason: HOSPADM

## 2020-02-24 RX ORDER — CEFEPIME HYDROCHLORIDE 2 G/50ML
2 INJECTION, SOLUTION INTRAVENOUS EVERY 12 HOURS
Qty: 20 EACH | Refills: 0 | Status: SHIPPED | OUTPATIENT
Start: 2020-02-24 | End: 2020-03-05

## 2020-02-24 RX ORDER — LIDOCAINE HYDROCHLORIDE 10 MG/ML
5 INJECTION, SOLUTION EPIDURAL; INFILTRATION; INTRACAUDAL; PERINEURAL ONCE
Status: DISCONTINUED | OUTPATIENT
Start: 2020-02-24 | End: 2020-03-05

## 2020-02-24 RX ORDER — SODIUM CHLORIDE 0.9 % (FLUSH) 0.9 %
10 SYRINGE (ML) INJECTION PRN
Status: DISCONTINUED | OUTPATIENT
Start: 2020-02-24 | End: 2020-03-11 | Stop reason: HOSPADM

## 2020-02-24 RX ADMIN — SODIUM CHLORIDE, PRESERVATIVE FREE 10 ML: 5 INJECTION INTRAVENOUS at 10:23

## 2020-02-24 RX ADMIN — ALLOPURINOL 100 MG: 100 TABLET ORAL at 09:50

## 2020-02-24 RX ADMIN — MIDODRINE HYDROCHLORIDE 5 MG: 5 TABLET ORAL at 05:57

## 2020-02-24 RX ADMIN — CEFEPIME HYDROCHLORIDE 2 G: 2 INJECTION, POWDER, FOR SOLUTION INTRAVENOUS at 16:08

## 2020-02-24 RX ADMIN — SODIUM CHLORIDE, PRESERVATIVE FREE 10 ML: 5 INJECTION INTRAVENOUS at 10:24

## 2020-02-24 RX ADMIN — SODIUM CHLORIDE, PRESERVATIVE FREE 10 ML: 5 INJECTION INTRAVENOUS at 21:30

## 2020-02-24 RX ADMIN — DIVALPROEX SODIUM 1000 MG: 250 TABLET, DELAYED RELEASE ORAL at 21:29

## 2020-02-24 RX ADMIN — MIDODRINE HYDROCHLORIDE 5 MG: 5 TABLET ORAL at 21:29

## 2020-02-24 RX ADMIN — ONDANSETRON 4 MG: 2 INJECTION INTRAMUSCULAR; INTRAVENOUS at 12:55

## 2020-02-24 RX ADMIN — ENOXAPARIN SODIUM 50 MG: 60 INJECTION SUBCUTANEOUS at 09:49

## 2020-02-24 RX ADMIN — DIVALPROEX SODIUM 500 MG: 250 TABLET, DELAYED RELEASE ORAL at 09:50

## 2020-02-24 RX ADMIN — CEFEPIME HYDROCHLORIDE 2 G: 2 INJECTION, POWDER, FOR SOLUTION INTRAVENOUS at 04:20

## 2020-02-24 RX ADMIN — ENOXAPARIN SODIUM 50 MG: 60 INJECTION SUBCUTANEOUS at 21:29

## 2020-02-24 RX ADMIN — Medication 500 MG: at 09:50

## 2020-02-24 RX ADMIN — SODIUM CHLORIDE, PRESERVATIVE FREE 10 ML: 5 INJECTION INTRAVENOUS at 22:28

## 2020-02-24 RX ADMIN — MIDODRINE HYDROCHLORIDE 5 MG: 5 TABLET ORAL at 15:03

## 2020-02-24 RX ADMIN — SODIUM CHLORIDE, PRESERVATIVE FREE 10 ML: 5 INJECTION INTRAVENOUS at 10:22

## 2020-02-24 ASSESSMENT — PAIN SCALES - GENERAL
PAINLEVEL_OUTOF10: 0

## 2020-02-24 NOTE — PROGRESS NOTES
single UE support. 20ft with RW CGA to S[place] for safety and occasional weight shift assistance laterally. Assistance needed for navigating RW (unable to only follow verbal cues given). Very slow, step to step gait pattern. Minimal foot clearance and dec stride bilat. Cues for attending to task and directional. Was able to take a few steps with reciprocal gait pattern when verbally cued and therapist assisting with consistent fwd advancement of RW. Fatigue at end of distance. Standing balance: CGA to Saba with at least single UE support (performed with [facility] and at [facility]). Slightly posterior upon standing. Unable to assist with managing pull ups after toileting needing total assist to complete ADL task. Sitting balance: SBA at [facility]. Unable to assist with donning/doffing socks needing total assist from therapist.   Educated pt on POC, role of PT, DME, discharge recommendations, SNF vs  PT. VCs for sequencing, UE/LE placement, and weight shift to inc safety and indep with mobility. Assessment / Impression:    Patient's tolerance of treatment:  Good    Adverse Reaction: no  Significant change in status and impact:  Inc assistance required for functional transfers and dec ambulation tolerance since initial eval.   Barriers to improvement:  Activity tolerance ,strength, balance, pain, cognition. Discharge recommendations: Since initial eval, pt has shown a functional decline needing increased overall assistance to complete functional tasks and is limited with activity tolerance now ambulating less than household distances. At baseline, she is indep without use of AD. She is functioning well below her typical baseline and would benefit from continued therapy services in subacute setting to address her current deficits, dec potential fall risk, dec burden of care, and restore function. Plan for Next Session:    Activity tolerance, strength, balance, gait, transfers, bed mobility.    Time in:  3795  Time out:

## 2020-02-24 NOTE — PROGRESS NOTES
ONCOLOGY HEMATOLOGY CARE ([facility])  PROGRESS NOTE      2020  8:21 AM    Patient:    Myesha Pedersen  : 1955   59 y.o. MRN: [id number]  Admitted: 2020  3:46 PM ATT: Orestes Crisostomo MD   [id number]-[id number]  AdmitDx: Hemoglobin low [D64.9]  Pancytopenia ([address] [S26.389]  PCP: Lan Alonso MD      Patient was seen and examined today. Has been up to shower this morning, now feeling tired. Not in any acute distress and no overnight events. Reports she is a bit nervous about returning home- she does have in home support of , son and daughter in law. REVIEW OF SYSTEMS      Constitutional:  Denies fever, chills, loss of appetite, weight loss  HEENT:  Denies swelling of neck glands  Respiratory:  Denies cough, shortness of breath or hemoptysis  Cardiovascular:  Denies chest pain, palpitations or swelling   GI:  Denies abdominal pain, nausea, vomiting, constipation or diarrhea   Musculoskeletal:  Denies back pain   Skin:  Denies rash   Neurologic:  Denies headache, focal weakness or sensory changes   PSYCHIATRIC:  Neg depression, personality changes, anxiety.   ALL/IMM:  Neg  reactions to drugs other than listed    All systems negative except  for symptoms of HPI and as marked as above    PHYSICAL EXAM :    Vitals: /76   Pulse 108   Temp 97.9 °F (36.6 °C) (Oral)   Resp 16   Ht 5' 2.99\" (1.6 m)   Wt 123 lb 14.4 oz (56.2 kg)   LMP 2000   SpO2 99%   BMI 21.95 kg/m²     CONSTITUTIONAL: awake, alert, cooperative, on the chair, tired appearing  EYES:No palor or icetrus  ENT: ATNC  NECK: supple, symmetrical, no jugular venous distension and no carotid bruits   HEMATOLOGIC/LYMPHATIC: no cervical, supraclavicular or axillary lymphadenopathy   LUNGS: ctab, sutures intact, hematoma left upper chest  CARDIOVASCULAR: regular rate and rhythm, normal S1 and S2, no murmur noted  ABDOMEN: normal bowel sounds x 4, soft, non-distended, non-tender, no masses palpated, no hepatosplenomgaly   NEUROLOGIC: GI  SKIN: as above  EXTREMITIES: no LE edema edyta    LABORATORY RESULTS  CBC:   Recent Labs     02/22/20  0643 02/22/20  1408 02/23/20  0632   WBC  --  18.1* 17.9*   HGB 10.9* 11.8* 12.5   PLT  --  122* 146     BMP:  No results for input(s): NA, K, CL, CO2, BUN, CREATININE, GLUCOSE in the last 72 hours. Hepatic: No results for input(s): AST, ALT, ALB, BILITOT, ALKPHOS in the last 72 hours. INR: No results for input(s): INR in the last 72 hours. RADIOLOGY REPORTS    2/18/20: Venous dopplers:  Extensive left upper extremity DVT with thrombosis of the internal jugular,   subclavian, axillary, brachial and basilic veins.      0/96/09: CT abdomen and pelvis:  1. Partially imaged large hematoma with gas in the inferior aspect of the   left breast.  If further imaging is indicated, consider either ultrasound or   chest CT. 2. Redemonstration of kiki masses in the abdomen and pelvis.  Interval   decrease in size of the mesenteric mass, presumably indicating response to   treatment. 3. No evidence of retroperitoneal or pelvic bleed or significant free fluid. 4. Trace left pleural effusion.  Dependent left lower lobe opacification,   possibly infiltrate or atelectasis. ASSESSMENT & RECOMMENDATIONS:  DLBCL- stage IIB s/p 2 cycles R-CHOP. Followed By Dr Renata Aguiar     Pancytopenia: sec to chemotherapy, blood loss. Counts normalizing. Transfusional support as needed     Left breast hematoma: sec to fall. Monitor closely     Blood cultures +; pseudomonas  - ID recommended IV antibiotic for 2 weeks.     BLANCAE DVT- Port removed. Continue Lovenox. We will consider to change to DOAC at follow up in office. Continue AC for 6 weeks to 3M. Continue other medical care. She is Ok to be discharged per oncology    This plan was discussed with the patient and she verbalized understanding. We will continue to follow the patient.     Thank you for allowing us to participate in the care of this patient.        DIONI Mckay - CNP, 2/24/2020, 8:21 AM

## 2020-02-25 ENCOUNTER — APPOINTMENT (OUTPATIENT)
Dept: CT IMAGING | Age: 65
DRG: 981 | End: 2020-02-25
Attending: INTERNAL MEDICINE
Payer: COMMERCIAL

## 2020-02-25 LAB
ANION GAP SERPL CALCULATED.3IONS-SCNC: 19 MMOL/L (ref 4–16)
BACTERIA: ABNORMAL /HPF
BILIRUBIN URINE: NEGATIVE MG/DL
BLOOD, URINE: NEGATIVE
BUN BLDV-MCNC: 18 MG/DL (ref 6–23)
CALCIUM SERPL-MCNC: 8.1 MG/DL (ref 8.3–10.6)
CHLORIDE BLD-SCNC: 109 MMOL/L (ref 99–110)
CLARITY: CLEAR
CO2: 8 MMOL/L (ref 21–32)
COLOR: YELLOW
CREAT SERPL-MCNC: 0.8 MG/DL (ref 0.6–1.1)
CULTURE: NORMAL
CULTURE: NORMAL
EKG ATRIAL RATE: 172 BPM
EKG DIAGNOSIS: NORMAL
EKG P AXIS: 80 DEGREES
EKG P-R INTERVAL: 148 MS
EKG Q-T INTERVAL: 224 MS
EKG QRS DURATION: 56 MS
EKG QTC CALCULATION (BAZETT): 378 MS
EKG R AXIS: 50 DEGREES
EKG T AXIS: -76 DEGREES
EKG VENTRICULAR RATE: 172 BPM
GFR AFRICAN AMERICAN: >60 ML/MIN/1.73M2
GFR NON-AFRICAN AMERICAN: >60 ML/MIN/1.73M2
GLUCOSE BLD-MCNC: 132 MG/DL (ref 70–99)
GLUCOSE BLD-MCNC: 135 MG/DL (ref 70–99)
GLUCOSE, URINE: NEGATIVE MG/DL
HCT VFR BLD CALC: 26.7 % (ref 37–47)
HCT VFR BLD CALC: 31.8 % (ref 37–47)
HCT VFR BLD CALC: 32.1 % (ref 37–47)
HEMOGLOBIN: 7.6 GM/DL (ref 12.5–16)
HEMOGLOBIN: 9 GM/DL (ref 12.5–16)
HEMOGLOBIN: 9.3 GM/DL (ref 12.5–16)
HIGH SENSITIVE C-REACTIVE PROTEIN: 277 MG/L
HYALINE CASTS: 2 /LPF
KETONES, URINE: ABNORMAL MG/DL
LACTATE: 4.3 MMOL/L (ref 0.4–2)
LACTATE: ABNORMAL MMOL/L (ref 0.4–2)
LEUKOCYTE ESTERASE, URINE: NEGATIVE
Lab: NORMAL
Lab: NORMAL
MAGNESIUM: 1.9 MG/DL (ref 1.8–2.4)
MAGNESIUM: 2 MG/DL (ref 1.8–2.4)
MCH RBC QN AUTO: 28.5 PG (ref 27–31)
MCH RBC QN AUTO: 28.8 PG (ref 27–31)
MCH RBC QN AUTO: 29.2 PG (ref 27–31)
MCHC RBC AUTO-ENTMCNC: 28.3 % (ref 32–36)
MCHC RBC AUTO-ENTMCNC: 28.5 % (ref 32–36)
MCHC RBC AUTO-ENTMCNC: 29 % (ref 32–36)
MCV RBC AUTO: 100 FL (ref 78–100)
MCV RBC AUTO: 100.9 FL (ref 78–100)
MCV RBC AUTO: 101.6 FL (ref 78–100)
MUCUS: ABNORMAL HPF
NITRITE URINE, QUANTITATIVE: NEGATIVE
PDW BLD-RTO: 17 % (ref 11.7–14.9)
PDW BLD-RTO: 17.1 % (ref 11.7–14.9)
PDW BLD-RTO: 17.1 % (ref 11.7–14.9)
PH, URINE: 6 (ref 5–8)
PLATELET # BLD: 192 K/CU MM (ref 140–440)
PLATELET # BLD: 215 K/CU MM (ref 140–440)
PLATELET # BLD: 267 K/CU MM (ref 140–440)
PMV BLD AUTO: 10.7 FL (ref 7.5–11.1)
PMV BLD AUTO: 10.9 FL (ref 7.5–11.1)
PMV BLD AUTO: 11.2 FL (ref 7.5–11.1)
POTASSIUM SERPL-SCNC: 4.6 MMOL/L (ref 3.5–5.1)
PROCALCITONIN: 14.22
PROTEIN UA: 30 MG/DL
RBC # BLD: 2.67 M/CU MM (ref 4.2–5.4)
RBC # BLD: 3.13 M/CU MM (ref 4.2–5.4)
RBC # BLD: 3.18 M/CU MM (ref 4.2–5.4)
RBC URINE: 1 /HPF (ref 0–6)
REASON FOR REJECTION: NORMAL
REASON FOR REJECTION: NORMAL
REJECTED TEST: NORMAL
SODIUM BLD-SCNC: 136 MMOL/L (ref 135–145)
SPECIFIC GRAVITY UA: 1.01 (ref 1–1.03)
SPECIMEN: NORMAL
SPECIMEN: NORMAL
SQUAMOUS EPITHELIAL: 1 /HPF
TRANSITIONAL EPITHELIAL: <1 /HPF
TRICHOMONAS: ABNORMAL /HPF
TSH HIGH SENSITIVITY: 1.28 UIU/ML (ref 0.27–4.2)
UROBILINOGEN, URINE: NORMAL MG/DL (ref 0.2–1)
WBC # BLD: 24 K/CU MM (ref 4–10.5)
WBC # BLD: 37.7 K/CU MM (ref 4–10.5)
WBC # BLD: ABNORMAL K/CU MM (ref 4–10.5)
WBC UA: 2 /HPF (ref 0–5)
YEAST: ABNORMAL /HPF

## 2020-02-25 PROCEDURE — 85027 COMPLETE CBC AUTOMATED: CPT

## 2020-02-25 PROCEDURE — 99233 SBSQ HOSP IP/OBS HIGH 50: CPT | Performed by: INTERNAL MEDICINE

## 2020-02-25 PROCEDURE — 82962 GLUCOSE BLOOD TEST: CPT

## 2020-02-25 PROCEDURE — 6360000002 HC RX W HCPCS: Performed by: PHYSICIAN ASSISTANT

## 2020-02-25 PROCEDURE — 6370000000 HC RX 637 (ALT 250 FOR IP): Performed by: PHYSICIAN ASSISTANT

## 2020-02-25 PROCEDURE — 36415 COLL VENOUS BLD VENIPUNCTURE: CPT

## 2020-02-25 PROCEDURE — 2580000003 HC RX 258: Performed by: PHYSICIAN ASSISTANT

## 2020-02-25 PROCEDURE — 93005 ELECTROCARDIOGRAM TRACING: CPT | Performed by: PHYSICIAN ASSISTANT

## 2020-02-25 PROCEDURE — 6360000002 HC RX W HCPCS: Performed by: INTERNAL MEDICINE

## 2020-02-25 PROCEDURE — 6360000004 HC RX CONTRAST MEDICATION: Performed by: INTERNAL MEDICINE

## 2020-02-25 PROCEDURE — 6360000002 HC RX W HCPCS

## 2020-02-25 PROCEDURE — 2580000003 HC RX 258: Performed by: INTERNAL MEDICINE

## 2020-02-25 PROCEDURE — 2000000000 HC ICU R&B

## 2020-02-25 PROCEDURE — 87040 BLOOD CULTURE FOR BACTERIA: CPT

## 2020-02-25 PROCEDURE — 2580000003 HC RX 258: Performed by: FAMILY MEDICINE

## 2020-02-25 PROCEDURE — 2500000003 HC RX 250 WO HCPCS: Performed by: INTERNAL MEDICINE

## 2020-02-25 PROCEDURE — 99253 IP/OBS CNSLTJ NEW/EST LOW 45: CPT | Performed by: INTERNAL MEDICINE

## 2020-02-25 PROCEDURE — 87086 URINE CULTURE/COLONY COUNT: CPT

## 2020-02-25 PROCEDURE — 2500000003 HC RX 250 WO HCPCS: Performed by: FAMILY MEDICINE

## 2020-02-25 PROCEDURE — 80048 BASIC METABOLIC PNL TOTAL CA: CPT

## 2020-02-25 PROCEDURE — 2580000003 HC RX 258

## 2020-02-25 PROCEDURE — 84145 PROCALCITONIN (PCT): CPT

## 2020-02-25 PROCEDURE — 81001 URINALYSIS AUTO W/SCOPE: CPT

## 2020-02-25 PROCEDURE — 51702 INSERT TEMP BLADDER CATH: CPT

## 2020-02-25 PROCEDURE — 86141 C-REACTIVE PROTEIN HS: CPT

## 2020-02-25 PROCEDURE — 71250 CT THORAX DX C-: CPT

## 2020-02-25 PROCEDURE — 99232 SBSQ HOSP IP/OBS MODERATE 35: CPT | Performed by: INTERNAL MEDICINE

## 2020-02-25 PROCEDURE — 83605 ASSAY OF LACTIC ACID: CPT

## 2020-02-25 PROCEDURE — 87324 CLOSTRIDIUM AG IA: CPT

## 2020-02-25 PROCEDURE — 84443 ASSAY THYROID STIM HORMONE: CPT

## 2020-02-25 PROCEDURE — 94761 N-INVAS EAR/PLS OXIMETRY MLT: CPT

## 2020-02-25 PROCEDURE — 93010 ELECTROCARDIOGRAM REPORT: CPT | Performed by: INTERNAL MEDICINE

## 2020-02-25 PROCEDURE — 99255 IP/OBS CONSLTJ NEW/EST HI 80: CPT | Performed by: NURSE PRACTITIONER

## 2020-02-25 PROCEDURE — 83735 ASSAY OF MAGNESIUM: CPT

## 2020-02-25 RX ORDER — PROMETHAZINE HYDROCHLORIDE 25 MG/ML
12.5 INJECTION, SOLUTION INTRAMUSCULAR; INTRAVENOUS EVERY 6 HOURS PRN
Status: DISCONTINUED | OUTPATIENT
Start: 2020-02-25 | End: 2020-02-26

## 2020-02-25 RX ORDER — ADENOSINE 3 MG/ML
INJECTION, SOLUTION INTRAVENOUS
Status: COMPLETED | OUTPATIENT
Start: 2020-02-25 | End: 2020-02-25

## 2020-02-25 RX ORDER — DIGOXIN 0.25 MG/ML
500 INJECTION INTRAMUSCULAR; INTRAVENOUS ONCE
Status: COMPLETED | OUTPATIENT
Start: 2020-02-25 | End: 2020-02-25

## 2020-02-25 RX ORDER — PROMETHAZINE HYDROCHLORIDE 25 MG/ML
INJECTION, SOLUTION INTRAMUSCULAR; INTRAVENOUS
Status: COMPLETED
Start: 2020-02-25 | End: 2020-02-25

## 2020-02-25 RX ORDER — SODIUM CHLORIDE 9 MG/ML
INJECTION, SOLUTION INTRAVENOUS CONTINUOUS
Status: DISCONTINUED | OUTPATIENT
Start: 2020-02-25 | End: 2020-02-25

## 2020-02-25 RX ORDER — SODIUM CHLORIDE 9 MG/ML
INJECTION, SOLUTION INTRAVENOUS CONTINUOUS PRN
Status: COMPLETED | OUTPATIENT
Start: 2020-02-25 | End: 2020-02-25

## 2020-02-25 RX ORDER — METOPROLOL TARTRATE 5 MG/5ML
5 INJECTION INTRAVENOUS EVERY 6 HOURS
Status: DISCONTINUED | OUTPATIENT
Start: 2020-02-25 | End: 2020-03-07

## 2020-02-25 RX ORDER — DIGOXIN 0.25 MG/ML
INJECTION INTRAMUSCULAR; INTRAVENOUS
Status: COMPLETED
Start: 2020-02-25 | End: 2020-02-25

## 2020-02-25 RX ADMIN — CEFTOLOZANE AND TAZOBACTAM 1.5 G: 1; .5 INJECTION, POWDER, LYOPHILIZED, FOR SOLUTION INTRAVENOUS at 15:20

## 2020-02-25 RX ADMIN — ONDANSETRON 4 MG: 2 INJECTION INTRAMUSCULAR; INTRAVENOUS at 09:04

## 2020-02-25 RX ADMIN — METRONIDAZOLE 500 MG: 500 INJECTION, SOLUTION INTRAVENOUS at 14:07

## 2020-02-25 RX ADMIN — METOPROLOL TARTRATE 5 MG: 5 INJECTION INTRAVENOUS at 15:52

## 2020-02-25 RX ADMIN — ADENOSINE 12 MG: 3 INJECTION, SOLUTION INTRAVENOUS at 05:57

## 2020-02-25 RX ADMIN — DIGOXIN 500 MCG: 0.25 INJECTION INTRAMUSCULAR; INTRAVENOUS at 06:15

## 2020-02-25 RX ADMIN — SODIUM CHLORIDE 1000 ML: 9 INJECTION, SOLUTION INTRAVENOUS at 06:05

## 2020-02-25 RX ADMIN — IOHEXOL 50 ML: 240 INJECTION, SOLUTION INTRATHECAL; INTRAVASCULAR; INTRAVENOUS; ORAL at 11:35

## 2020-02-25 RX ADMIN — SODIUM BICARBONATE: 84 INJECTION, SOLUTION INTRAVENOUS at 17:32

## 2020-02-25 RX ADMIN — CEFTOLOZANE AND TAZOBACTAM 1.5 G: 1; .5 INJECTION, POWDER, LYOPHILIZED, FOR SOLUTION INTRAVENOUS at 22:15

## 2020-02-25 RX ADMIN — CEFEPIME HYDROCHLORIDE 2 G: 2 INJECTION, POWDER, FOR SOLUTION INTRAVENOUS at 06:12

## 2020-02-25 RX ADMIN — VANCOMYCIN HYDROCHLORIDE 750 MG: 5 INJECTION, POWDER, LYOPHILIZED, FOR SOLUTION INTRAVENOUS at 15:20

## 2020-02-25 RX ADMIN — METRONIDAZOLE 500 MG: 500 INJECTION, SOLUTION INTRAVENOUS at 20:55

## 2020-02-25 RX ADMIN — ENOXAPARIN SODIUM 40 MG: 40 INJECTION SUBCUTANEOUS at 15:52

## 2020-02-25 RX ADMIN — SODIUM CHLORIDE: 9 INJECTION, SOLUTION INTRAVENOUS at 10:30

## 2020-02-25 RX ADMIN — PROMETHAZINE HYDROCHLORIDE 12.5 MG: 25 INJECTION INTRAMUSCULAR; INTRAVENOUS at 12:17

## 2020-02-25 RX ADMIN — SODIUM CHLORIDE, PRESERVATIVE FREE 10 ML: 5 INJECTION INTRAVENOUS at 08:44

## 2020-02-25 RX ADMIN — PROMETHAZINE HYDROCHLORIDE 12.5 MG: 25 INJECTION, SOLUTION INTRAMUSCULAR; INTRAVENOUS at 12:17

## 2020-02-25 RX ADMIN — METOPROLOL TARTRATE 5 MG: 5 INJECTION INTRAVENOUS at 10:29

## 2020-02-25 RX ADMIN — SODIUM CHLORIDE, PRESERVATIVE FREE 10 ML: 5 INJECTION INTRAVENOUS at 08:43

## 2020-02-25 RX ADMIN — SODIUM CHLORIDE, PRESERVATIVE FREE 10 ML: 5 INJECTION INTRAVENOUS at 08:42

## 2020-02-25 RX ADMIN — ADENOSINE 6 MG: 3 INJECTION, SOLUTION INTRAVENOUS at 05:55

## 2020-02-25 ASSESSMENT — PAIN SCALES - GENERAL
PAINLEVEL_OUTOF10: 6
PAINLEVEL_OUTOF10: 0

## 2020-02-25 NOTE — CONSULTS
flush 0.9 % injection 10 mL, PRN  cefepime (MAXIPIME) 2 g in dextrose 5 % 50 mL IVPB, Q12H  allopurinol (ZYLOPRIM) tablet 100 mg, Daily  calcium elemental (OSCAL) tablet 500 mg, Daily with breakfast  divalproex (DEPAKOTE) DR tablet 500 mg, QAM  divalproex (DEPAKOTE) DR tablet 1,000 mg, QPM  sodium chloride flush 0.9 % injection 10 mL, 2 times per day  sodium chloride flush 0.9 % injection 10 mL, PRN  ondansetron (ZOFRAN) injection 4 mg, Q6H PRN  acetaminophen (TYLENOL) tablet 650 mg, Q4H PRN  polyethylene glycol (GLYCOLAX) packet 17 g, Daily PRN  melatonin tablet 1 mg, Nightly PRN  midodrine (PROAMATINE) tablet 5 mg, TID  0.9 % sodium chloride bolus, Once      Current Facility-Administered Medications   Medication Dose Route Frequency Provider Last Rate Last Dose    0.9 % sodium chloride infusion   Intravenous Continuous Roman Felder MD        metoprolol (LOPRESSOR) injection 5 mg  5 mg Intravenous Q6H Lorena Arshad MD        lidocaine PF 1 % injection 5 mL  5 mL Intradermal Once Jericho Mitchell MD        sodium chloride flush 0.9 % injection 10 mL  10 mL Intravenous 2 times per day Jericho Mitchell MD   10 mL at 02/25/20 0842    sodium chloride flush 0.9 % injection 10 mL  10 mL Intravenous PRN Jericho Mitchell MD        sodium chloride flush 0.9 % injection 10 mL  10 mL Intravenous 2 times per day Jericho Mitchell MD   10 mL at 02/25/20 0843    sodium chloride flush 0.9 % injection 10 mL  10 mL Intravenous PRN Jericho Mitchell MD        traMADol Wadell Hidalgo) tablet 50 mg  50 mg Oral Q6H PRN Jeimy Marrufo PA-C   50 mg at 02/23/20 2157    sodium chloride flush 0.9 % injection 10 mL  10 mL Intravenous 2 times per day Jeimy Marrufo PA-C   10 mL at 02/25/20 0843    sodium chloride flush 0.9 % injection 10 mL  10 mL Intravenous PRN Jeimy Marrufo PA-C        cefepime (MAXIPIME) 2 g in dextrose 5 % 50 mL IVPB  2 g Intravenous Q12H Isabella Galeas PA-C   Stopped at 02/25/20 0700    allopurinol (ZYLOPRIM) tablet 100 mg  100 mg Oral Daily [place] Bur, PA-C   100 mg at 02/24/20 2802    calcium elemental (OSCAL) tablet 500 mg  500 mg Oral Daily with breakfast Springfieldvijaya Quiroga, PA-C   500 mg at 02/24/20 0987    divalproex (DEPAKOTE) DR tablet 500 mg  500 mg Oral QAM Michealvijaya Quiroga, PA-C   500 mg at 02/24/20 6845    divalproex (DEPAKOTE) DR tablet 1,000 mg  1,000 mg Oral QPM Micheal Bur, PA-C   1,000 mg at 02/24/20 2129    sodium chloride flush 0.9 % injection 10 mL  10 mL Intravenous 2 times per day Micheal Bur, PA-C   10 mL at 02/25/20 2627    sodium chloride flush 0.9 % injection 10 mL  10 mL Intravenous PRN Micheal Bur, PA-C        ondansetron [facility]) injection 4 mg  4 mg Intravenous Q6H PRN Micheal Bur, PA-C   4 mg at 02/25/20 5304    acetaminophen (TYLENOL) tablet 650 mg  650 mg Oral Q4H PRN [place] Bur, PA-C   650 mg at 02/18/20 2121    polyethylene glycol (GLYCOLAX) packet 17 g  17 g Oral Daily PRN Micheal Bur, PA-C        melatonin tablet 1 mg  1 mg Oral Nightly PRN [place] Bur, PA-C   1 mg at 02/18/20 2122    midodrine (PROAMATINE) tablet 5 mg  5 mg Oral TID Springfieldvijaya Quiroga, PA-C   5 mg at 02/24/20 2129    0.9 % sodium chloride bolus  20 mL Intravenous Once Micheal Quiroga, PA-C         Review of Systems:   · Constitutional: No Fever or Weight Loss   · Eyes: No Decreased Vision  · ENT: No Headaches, Hearing Loss or Vertigo  · Cardiovascular: No chest pain, dyspnea on exertion, palpitations or loss of consciousness  · Respiratory: No cough or wheezing    · Gastrointestinal: No abdominal pain, appetite loss, blood in stools, constipation, diarrhea or heartburn  · Genitourinary: No dysuria, trouble voiding, or hematuria  · Musculoskeletal:  No gait disturbance, weakness or joint complaints  · Integumentary: No rash or pruritis  · Neurological: No TIA or stroke symptoms  · Psychiatric: No anxiety or depression  · Endocrine: No malaise, fatigue or temperature intolerance  · Hematologic/Lymphatic: No bleeding problems, blood clots or swollen lymph nodes  · Allergic/Immunologic: No nasal congestion or hives  All systems negative except as marked. ·   ·      Physical Examination:    Vitals:    02/25/20 1002   BP: 100/62   Pulse: 150   Resp: 29   Temp: 100.2 °F (37.9 °C)   SpO2: 97%      Wt Readings from Last 3 Encounters:   02/25/20 95 lb 7.4 oz (43.3 kg)   02/11/20 106 lb 3.2 oz (48.2 kg)   02/06/20 102 lb 4.8 oz (46.4 kg)     Body mass index is 16.91 kg/m². General Appearance:  No distress, conversant    Constitutional:  Well developed, Well nourished, No acute distress, Non-toxic appearance. HENT:  Normocephalic, Atraumatic, Bilateral external ears normal, Oropharynx moist, No oral exudates, Nose normal. Neck- Normal range of motion, No tenderness, Supple, No stridor,no apical-carotid delay, no carotid bruit  Eyes:  PERRL, EOMI, Conjunctiva normal, No discharge. Respiratory:  Normal breath sounds, No respiratory distress, No wheezing, No chest tenderness. ,no use of accessory muscles, diaphragm movement is normal  Cardiovascular: (PMI) apex non displaced,no lifts no thrills, no s3,no s4, Normal heart rate, Normal rhythm, No murmurs, No rubs, No gallops. Carotid arteries pulse and amplitude are normal no bruit, no abdominal bruit noted ( normal abdominal aorta ausculation), femoral arteries pulse and amplitude are normal no bruit, pedal pulses are normal  GI:  Bowel sounds normal, Soft, No tenderness, No masses, No pulsatile masses, no hepatosplenomegally, no bruits  : External genitalia appear normal, No masses or lesions. No discharge. No CVA tenderness. Musculoskeletal:  Intact distal pulses, No edema, No tenderness, No cyanosis, No clubbing. Good range of motion in all major joints. No tenderness to palpation or major deformities noted. Back- No tenderness. Integument:  Warm, Dry, No erythema, No rash. Skin: no rash, no ulcers  Lymphatic:  No lymphadenopathy noted.    Neurologic:  Alert & oriented x 3, Normal motor function, Normal sensory function, No focal deficits noted. Psychiatric:  Affect normal, Judgment normal, Mood normal.   Lab Review   Recent Labs     02/25/20  0857   WBC 40.2  CHECKS  *   HGB 9.3*   HCT 32.1*         Recent Labs     02/25/20  0603      K 4.6      CO2 8*   BUN 18   CREATININE 0.8     No results for input(s): AST, ALT, ALB, BILIDIR, BILITOT, ALKPHOS in the last 72 hours. No results for input(s): TROPONINI in the last 72 hours. No results found for: BNP  No results found for: INR, PROTIME      EKG:sinus tachycardia 172    Chest Xray:    ECHO:  Labs, echo, meds reviewed  Assessment: 59 y. o.year old with PMH of  has a past medical history of B-cell lymphoma ([facility] [address] and Bipolar 1 disorder ([facility] [address] Recommendations:    1. Sinus tachycardia: secondary to her underlying acute illness and possinle PE, as she already had left arm DVT, WILL get VQ SCAN to r/o PE, start IV fluids NS 75CC. HR, continue anticoaulation ( it was stopped due to anemia, if required, can transfuse PRBC), recommend to d.c midodrine as it causes tachycardia,, if she requires pressors, will recommend neosynephrine . 2. Lymphoma\" as per oncology  3. Anemia: as per oncology  All labs, medications and tests reviewed, continue all other medications of all above medical condition listed as is.          Zain Ramírez MD, 2/25/2020 10:16 AM

## 2020-02-25 NOTE — CONSULTS
lb 7.4 oz (43.3 kg)   LMP 01/11/2000   SpO2 100%   BMI 16.91 kg/m²       CONSTITUTIONAL:  sleepy, no apparent distress, and appears stated age  NECK:  Supple, symmetrical, skin normal  LUNGS: BBS clear to auscultation. No wheeze, rhonchi, no rales. No accessory muscle use, dressing in place over left chest at site of recent Mediport removal - dressing dry and intact. CARDIOVASCULAR:  normal S1 and S2, no edema and no JVD, elevated HR - 120  ABDOMEN:  normal bowel sounds, non-distended and no masses palpated, and no tenderness to palpation. No hepatospleenomegaly  LYMPHADENOPATHY:  no axillary or supraclavicular adenopathy. No cervical adnenopathy  PSYCHIATRIC: Oriented to person place and time. No obvious depression or anxiety. MUSCULOSKELETAL: No obvious misalignment or effusion of the joints. No clubbing, cyanosis of the digits. SKIN:  normal skin color, texture, turgor and no redness, warmth, or swelling. No palpable nodules    DATA:    CBC:  Recent Labs     02/24/20  0840 02/25/20  0603 02/25/20  0857   WBC 25.0* 37.7* 40.2  CHECKS  *   RBC 4.50 3.13* 3.18*   HGB 12.8 9.0* 9.3*   HCT 42.3 31.8* 32.1*    215 267   MCV 94.0 101.6* 100.9*   MCH 28.4 28.8 29.2   MCHC 30.3* 28.3* 29.0*   RDW 16.9* 17.1* 17.0*      BMP:  Recent Labs     02/24/20  1303 02/25/20  0603    136   K 3.7 4.6   * 109   CO2 15* 8*   BUN 19 18   CREATININE 0.8 0.8   CALCIUM 8.9 8.1*   GLUCOSE 107* 135*      ABG:  No results for input(s): PH, PO2ART, QSG6PMA, HCO3, BEART, O2SAT in the last 72 hours. BNP  No results found for: BNP   D-Dimer:  No results found for: DDIMER     Cultures:  Blood: Blood culture #1 from 2/18/2020 positive for pseudomonas aeruginosa, Blood culture #2 from 2/18/2020 negative  for growth at 5 days.   Blood cultures 2 of 2 from 2/20/2020 negative for growth at 5 days  Sputum: not collected  Urine: 2/25/2020 pending  C difficile: 2/25/2020 pending    Radiology:  2/24/2020 CT head without

## 2020-02-25 NOTE — PROGRESS NOTES
subcutaneous fat, Severe muscle loss, Diet history of poor intake, Weight loss greater than or equal to 5% in 1 month    Objective Information:  · Wound Type: None  · Current Nutrition Therapies:  · Oral Diet Orders: General   · Oral Diet intake: 1-25%, 26-50%  · Oral Nutrition Supplement (ONS) Orders: Standard High Calorie Oral Supplement  · ONS intake: 26-50%  · Anthropometric Measures:  · Ht: 5' 3\" (160 cm)   · Current Body Wt: 95 lb (43.1 kg)  · Admission Body Wt: 106 lb (48.1 kg)  · Usual Body Wt: 147 lb (66.7 kg)  · % Weight Change: -25% in 1 month  · Ideal Body Wt: 115 lb (52.2 kg), % Ideal Body 83%  · BMI Classification: BMI <18.5 Underweight(16.9)    Nutrition Interventions:   Continue current diet, Continue current ONS, Start Tube Feeding  Continued Inpatient Monitoring, Education Not Indicated, Coordination of Care    Nutrition Evaluation:   · Evaluation: Progress towards goals declining   · Goals: pt will consume greater than 75% of her meals and supplements    · Monitoring: Meal Intake, Supplement Intake, Pertinent Labs, Weight, Monitor Bowel Function    Electronically signed by Jewel Caro RD, LD on 2/25/20 at 1:01 PM    Contact Number: [phone]

## 2020-02-25 NOTE — PROGRESS NOTES
PHARMACY CONSULT FOR RENAL DOSING OF ZERBAXA PER DR PHILLIP    RENAL LAB EVALUATION  CrCl cannot be calculated (Unknown ideal weight. ).   Recent Labs     02/24/20  1303 02/25/20  0603   BUN 19 18   CREATININE 0.8 0.8     Indication: Pseudomonas bacteremia   Goal dose: Zerbaxa 3 g q8h IV    CrCL estimated to be between 45-50 mL/min, dose adjusted to 1/5 g q8h IV    Sushil PascualFreeman Orthopaedics & Sports Medicine  2/25/2020  4:12 PM

## 2020-02-25 NOTE — SIGNIFICANT EVENT
Responded to rapid response 5:45 AM.  Patient had SVT heart rate 170s initial blood pressure 95/50. Patient was given adenosine 6 mg IV with no response subsequently was given 12 mg IV heart rate went down to the 80s briefly then went back up to 150s. Normal saline 500 IV bolus infusing. Blood pressure 105/60. Cardiology has been contacted. Commended digoxin 0.5 mg IV x1 if no response initiate amiodarone drip. If patient became hypotensive will need synchronized cardioversion. Transfer patient to stepdown.

## 2020-02-25 NOTE — PROGRESS NOTES
Infectious Disease Progress Note  2020   Patient Name: Tanner Lares : 1955   Impression  · Acute blood loss Anemia  ? Marked leukocytosis and 3g/dL drop in anemia  ? Likely stress reaction from GI bleed, will still need to consider infective causes GENE, CLABSI, C diff  · SVT  ? ?PE but seems less likely as she was on Lovenox  · CRBSI-Pseudomonas aeruginosa  ? P aeruginosa in blood cx:  on 2020, pansusceptible  ? Repeat blood cx -  NGTD  ? MediPort removed on , apparently not sent for cultures  · Chemotherapy induced pancytopenia  ? Resolved with G-CSF  · Extensive left upper extremity DVT  · Anaemia  · Multi-morbidity: per PMHx  Plan:  · discontinue cefepime  · Repeat blood cx, crp, ESR, pct  · Start vancomycin, Zerbaxa and metronidazole  · Needs urgent GI consult  · F/u CT chest, abd/pelvis  · D/c C diff order  · D/w Dr. Antonio Toribio    Ongoing Antimicrobial Therapy  Vancomycin   Zerbaxa   Metronidazole ? Completed Antimicrobial Therapy  Cefepime -? History:? Interval history noted: CRBSI P aeruginosa, chemotherapy induced pancytopenia  Denies n/v/d/f or untoward effects of antibiotics. Feels weak, vomiting dark red blood   Physical Exam:  Vital Signs: BP 98/67   Pulse 115   Temp 99.7 °F (37.6 °C) (Rectal)   Resp 24   Ht 5' 3\" (1.6 m)   Wt 95 lb 7.4 oz (43.3 kg)   LMP 2000   SpO2 100%   BMI 16.91 kg/m²     Gen: alert and oriented X3, no distress  Skin: no stigmata of endocarditis  Wounds: Left ACW surgical site is clean, glue intact, there is surrounding hematoma, right arm swelling  HEMT: AT/NC Oropharynx pink, moist, and without lesions or exudates; dentition in good state of repair  Eyes: PERRLA, EOMI, conjunctiva pink, sclera anicteric. Neck: Supple. Trachea midline. No LAD. Chest: no distress and CTA. Good air movement. Heart: RRR and no MRG. Abd: soft, non-distended, umbilical tenderness, no hepatomegaly. Normoactive bowel sounds.   Ext: left arm swelling, ecchymoses and pain  Catheter Site:  right arm PICC line without erythema or tenderness  Neuro: Mental status intact. CN 2-12 intact and no focal sensory or motor deficits     Radiologic / Imaging / TESTING  No results found.      Labs:    Recent Results (from the past 24 hour(s))   Basic metabolic panel    Collection Time: 02/24/20  1:03 PM   Result Value Ref Range    Sodium 142 135 - 145 MMOL/L    Potassium 3.7 3.5 - 5.1 MMOL/L    Chloride 112 (H) 99 - 110 mMol/L    CO2 15 (L) 21 - 32 MMOL/L    Anion Gap 15 4 - 16    BUN 19 6 - 23 MG/DL    CREATININE 0.8 0.6 - 1.1 MG/DL    Glucose 107 (H) 70 - 99 MG/DL    Calcium 8.9 8.3 - 10.6 MG/DL    GFR Non-African American >60 >60 mL/min/1.73m2    GFR African American >60 >60 mL/min/1.73m2   EKG 12 Lead    Collection Time: 02/25/20  5:48 AM   Result Value Ref Range    Ventricular Rate 172 BPM    Atrial Rate 172 BPM    P-R Interval 148 ms    QRS Duration 56 ms    Q-T Interval 224 ms    QTc Calculation (Bazett) 378 ms    P Axis 80 degrees    R Axis 50 degrees    T Axis -76 degrees    Diagnosis       Sinus tachycardia  Nonspecific ST and T wave abnormality  Abnormal ECG  When compared with ECG of 20-FEB-2020 16:38,  ST now depressed in Anterior leads     POCT Glucose    Collection Time: 02/25/20  6:01 AM   Result Value Ref Range    POC Glucose 132 (H) 70 - 99 MG/DL   CBC    Collection Time: 02/25/20  6:03 AM   Result Value Ref Range    WBC 37.7 (HH) 4.0 - 10.5 K/CU MM    RBC 3.13 (L) 4.2 - 5.4 M/CU MM    Hemoglobin 9.0 (L) 12.5 - 16.0 GM/DL    Hematocrit 31.8 (L) 37 - 47 %    .6 (H) 78 - 100 FL    MCH 28.8 27 - 31 PG    MCHC 28.3 (L) 32.0 - 36.0 %    RDW 17.1 (H) 11.7 - 14.9 %    Platelets 720 884 - 135 K/CU MM    MPV 10.7 7.5 - 11.1 FL   Basic metabolic panel    Collection Time: 02/25/20  6:03 AM   Result Value Ref Range    Sodium 136 135 - 145 MMOL/L    Potassium 4.6 3.5 - 5.1 MMOL/L    Chloride 109 99 - 110 mMol/L    CO2 8 (L) 21 - 32 MMOL/L    Anion Gap 19 (H) 4 - 16    BUN 18 6 - 23 MG/DL    CREATININE 0.8 0.6 - 1.1 MG/DL    Glucose 135 (H) 70 - 99 MG/DL    Calcium 8.1 (L) 8.3 - 10.6 MG/DL    GFR Non-African American >60 >60 mL/min/1.73m2    GFR African American >60 >60 mL/min/1.73m2   Magnesium    Collection Time: 02/25/20  6:03 AM   Result Value Ref Range    Magnesium 1.9 1.8 - 2.4 mg/dl   SPECIMEN REJECTION    Collection Time: 02/25/20  6:03 AM   Result Value Ref Range    Rejected Test TSHS     Reason for Rejection UNABLE TO PERFORM TESTING:     Reason for Rejection SPECIMEN QUANTITY NOT SUFFICIENT    Lactic acid, plasma    Collection Time: 02/25/20  6:29 AM   Result Value Ref Range    Lactate (HH) 0.4 - 2.0 mMOL/L     3.6  LACTIC CALLED TO LEIDY BISHOP RN @ [phone] BY NYLA FERRELL  RESULTS READ BACK     TSH without Reflex    Collection Time: 02/25/20  8:57 AM   Result Value Ref Range    TSH, High Sensitivity 1.280 0.270 - 4.20 uIu/ml   Lactic Acid, Plasma    Collection Time: 02/25/20  8:57 AM   Result Value Ref Range    Lactate 4.3 (HH) 0.4 - 2.0 mMOL/L   CBC    Collection Time: 02/25/20  8:57 AM   Result Value Ref Range    WBC 40.2  CHECKS   (HH) 4.0 - 10.5 K/CU MM    RBC 3.18 (L) 4.2 - 5.4 M/CU MM    Hemoglobin 9.3 (L) 12.5 - 16.0 GM/DL    Hematocrit 32.1 (L) 37 - 47 %    .9 (H) 78 - 100 FL    MCH 29.2 27 - 31 PG    MCHC 29.0 (L) 32.0 - 36.0 %    RDW 17.0 (H) 11.7 - 14.9 %    Platelets 537 558 - 627 K/CU MM    MPV 10.9 7.5 - 11.1 FL   Urinalysis    Collection Time: 02/25/20  9:15 AM   Result Value Ref Range    Color, UA YELLOW YELLOW    Clarity, UA CLEAR CLEAR    Glucose, Urine NEGATIVE NEGATIVE MG/DL    Bilirubin Urine NEGATIVE NEGATIVE MG/DL    Ketones, Urine SMALL (A) NEGATIVE MG/DL    Specific Gravity, UA 1.012 1.001 - 1.035    Blood, Urine NEGATIVE NEGATIVE    pH, Urine 6.0 5.0 - 8.0    Protein, UA 30 (A) NEGATIVE MG/DL    Urobilinogen, Urine NORMAL 0.2 - 1.0 MG/DL    Nitrite Urine, Quantitative NEGATIVE NEGATIVE    Leukocyte Esterase, Urine NEGATIVE NEGATIVE    RBC, UA 1 0 - 6 /HPF    WBC, UA 2 0 - 5 /HPF    Bacteria, UA RARE (A) NEGATIVE /HPF    Yeast, UA RARE /HPF    Squam Epithel, UA 1 /HPF    Trans Epithel, UA <1 /HPF    Mucus, UA RARE (A) NEGATIVE HPF    Trichomonas, UA NONE SEEN NONE SEEN /HPF    Hyaline Casts, UA 2 /LPF     CULTURE results: Invalid input(s): BLOOD CULTURE,  URINE CULTURE, SURGICAL CULTURE    Diagnosis:  Patient Active Problem List   Diagnosis    Family history of malignant neoplasm of gastrointestinal tract    B-cell lymphoma (HCC)    Idiopathic hypotension    Mitral valve disease    Pancytopenia (HCC)    Severe malnutrition (HCC)    Acute deep vein thrombosis (DVT) of left upper extremity (HCC)    Hematoma       Active Problems  Active Problems:    Pancytopenia (HCC)    Severe malnutrition (HCC)    Acute deep vein thrombosis (DVT) of left upper extremity (HCC)    Hematoma  Resolved Problems:    * No resolved hospital problems.  *    Electronically signed by: Electronically signed by Mark Russell MD on 2/25/2020 at 12:09 PM

## 2020-02-26 ENCOUNTER — APPOINTMENT (OUTPATIENT)
Dept: CT IMAGING | Age: 65
DRG: 981 | End: 2020-02-26
Attending: INTERNAL MEDICINE
Payer: COMMERCIAL

## 2020-02-26 ENCOUNTER — APPOINTMENT (OUTPATIENT)
Dept: ULTRASOUND IMAGING | Age: 65
DRG: 981 | End: 2020-02-26
Attending: INTERNAL MEDICINE
Payer: COMMERCIAL

## 2020-02-26 ENCOUNTER — APPOINTMENT (OUTPATIENT)
Dept: INTERVENTIONAL RADIOLOGY/VASCULAR | Age: 65
DRG: 981 | End: 2020-02-26
Attending: INTERNAL MEDICINE
Payer: COMMERCIAL

## 2020-02-26 LAB
ANION GAP SERPL CALCULATED.3IONS-SCNC: 13 MMOL/L (ref 4–16)
BUN BLDV-MCNC: 36 MG/DL (ref 6–23)
CALCIUM SERPL-MCNC: 7.9 MG/DL (ref 8.3–10.6)
CHLORIDE BLD-SCNC: 110 MMOL/L (ref 99–110)
CO2: 18 MMOL/L (ref 21–32)
CREAT SERPL-MCNC: 0.8 MG/DL (ref 0.6–1.1)
GFR AFRICAN AMERICAN: >60 ML/MIN/1.73M2
GFR NON-AFRICAN AMERICAN: >60 ML/MIN/1.73M2
GLUCOSE BLD-MCNC: 104 MG/DL (ref 70–99)
HCT VFR BLD CALC: 16.9 % (ref 37–47)
HCT VFR BLD CALC: 22.1 % (ref 37–47)
HCT VFR BLD CALC: 23.3 % (ref 37–47)
HEMOGLOBIN: 7.8 GM/DL (ref 12.5–16)
HEMOGLOBIN: ABNORMAL GM/DL (ref 12.5–16)
HEMOGLOBIN: ABNORMAL GM/DL (ref 12.5–16)
LACTATE: 1.9 MMOL/L (ref 0.4–2)
MCH RBC QN AUTO: 28.8 PG (ref 27–31)
MCH RBC QN AUTO: 29.5 PG (ref 27–31)
MCH RBC QN AUTO: 29.5 PG (ref 27–31)
MCHC RBC AUTO-ENTMCNC: 29.9 % (ref 32–36)
MCHC RBC AUTO-ENTMCNC: 33.1 % (ref 32–36)
MCHC RBC AUTO-ENTMCNC: 33.5 % (ref 32–36)
MCV RBC AUTO: 88.3 FL (ref 78–100)
MCV RBC AUTO: 88.9 FL (ref 78–100)
MCV RBC AUTO: 96.5 FL (ref 78–100)
PDW BLD-RTO: 15.6 % (ref 11.7–14.9)
PDW BLD-RTO: 15.9 % (ref 11.7–14.9)
PDW BLD-RTO: 17 % (ref 11.7–14.9)
PLATELET # BLD: 150 K/CU MM (ref 140–440)
PLATELET # BLD: 190 K/CU MM (ref 140–440)
PLATELET # BLD: 202 K/CU MM (ref 140–440)
PMV BLD AUTO: 10.8 FL (ref 7.5–11.1)
PMV BLD AUTO: 11 FL (ref 7.5–11.1)
PMV BLD AUTO: 11.3 FL (ref 7.5–11.1)
POTASSIUM SERPL-SCNC: 4 MMOL/L (ref 3.5–5.1)
PROCALCITONIN: 13.1
RBC # BLD: 1.9 M/CU MM (ref 4.2–5.4)
RBC # BLD: 2.29 M/CU MM (ref 4.2–5.4)
RBC # BLD: 2.64 M/CU MM (ref 4.2–5.4)
REASON FOR REJECTION: NORMAL
REASON FOR REJECTION: NORMAL
REJECTED TEST: NORMAL
SODIUM BLD-SCNC: 141 MMOL/L (ref 135–145)
WBC # BLD: 14 K/CU MM (ref 4–10.5)
WBC # BLD: 16.7 K/CU MM (ref 4–10.5)
WBC # BLD: 18.2 K/CU MM (ref 4–10.5)

## 2020-02-26 PROCEDURE — 86850 RBC ANTIBODY SCREEN: CPT

## 2020-02-26 PROCEDURE — 6360000004 HC RX CONTRAST MEDICATION: Performed by: HOSPITALIST

## 2020-02-26 PROCEDURE — 93971 EXTREMITY STUDY: CPT

## 2020-02-26 PROCEDURE — 2709999900 HC NON-CHARGEABLE SUPPLY

## 2020-02-26 PROCEDURE — 6360000002 HC RX W HCPCS: Performed by: NURSE PRACTITIONER

## 2020-02-26 PROCEDURE — 86900 BLOOD TYPING SEROLOGIC ABO: CPT

## 2020-02-26 PROCEDURE — 99233 SBSQ HOSP IP/OBS HIGH 50: CPT | Performed by: INTERNAL MEDICINE

## 2020-02-26 PROCEDURE — 6370000000 HC RX 637 (ALT 250 FOR IP): Performed by: PHYSICIAN ASSISTANT

## 2020-02-26 PROCEDURE — 36430 TRANSFUSION BLD/BLD COMPNT: CPT

## 2020-02-26 PROCEDURE — 86922 COMPATIBILITY TEST ANTIGLOB: CPT

## 2020-02-26 PROCEDURE — 36592 COLLECT BLOOD FROM PICC: CPT

## 2020-02-26 PROCEDURE — 84145 PROCALCITONIN (PCT): CPT

## 2020-02-26 PROCEDURE — 2580000003 HC RX 258: Performed by: INTERNAL MEDICINE

## 2020-02-26 PROCEDURE — 6360000002 HC RX W HCPCS: Performed by: HOSPITALIST

## 2020-02-26 PROCEDURE — 80048 BASIC METABOLIC PNL TOTAL CA: CPT

## 2020-02-26 PROCEDURE — 36556 INSERT NON-TUNNEL CV CATH: CPT

## 2020-02-26 PROCEDURE — 83605 ASSAY OF LACTIC ACID: CPT

## 2020-02-26 PROCEDURE — C9113 INJ PANTOPRAZOLE SODIUM, VIA: HCPCS | Performed by: NURSE PRACTITIONER

## 2020-02-26 PROCEDURE — 99232 SBSQ HOSP IP/OBS MODERATE 35: CPT | Performed by: INTERNAL MEDICINE

## 2020-02-26 PROCEDURE — P9016 RBC LEUKOCYTES REDUCED: HCPCS

## 2020-02-26 PROCEDURE — 6360000002 HC RX W HCPCS: Performed by: INTERNAL MEDICINE

## 2020-02-26 PROCEDURE — 2500000003 HC RX 250 WO HCPCS: Performed by: FAMILY MEDICINE

## 2020-02-26 PROCEDURE — 2580000003 HC RX 258: Performed by: FAMILY MEDICINE

## 2020-02-26 PROCEDURE — 6360000002 HC RX W HCPCS: Performed by: PHYSICIAN ASSISTANT

## 2020-02-26 PROCEDURE — 6370000000 HC RX 637 (ALT 250 FOR IP): Performed by: HOSPITALIST

## 2020-02-26 PROCEDURE — 76937 US GUIDE VASCULAR ACCESS: CPT

## 2020-02-26 PROCEDURE — 2500000003 HC RX 250 WO HCPCS: Performed by: INTERNAL MEDICINE

## 2020-02-26 PROCEDURE — 06H033Z INSERTION OF INFUSION DEVICE INTO INFERIOR VENA CAVA, PERCUTANEOUS APPROACH: ICD-10-PCS | Performed by: HOSPITALIST

## 2020-02-26 PROCEDURE — 85027 COMPLETE CBC AUTOMATED: CPT

## 2020-02-26 PROCEDURE — 2580000003 HC RX 258: Performed by: PHYSICIAN ASSISTANT

## 2020-02-26 PROCEDURE — 6360000002 HC RX W HCPCS

## 2020-02-26 PROCEDURE — 74174 CTA ABD&PLVS W/CONTRAST: CPT

## 2020-02-26 PROCEDURE — 86901 BLOOD TYPING SEROLOGIC RH(D): CPT

## 2020-02-26 PROCEDURE — C1894 INTRO/SHEATH, NON-LASER: HCPCS

## 2020-02-26 PROCEDURE — C1751 CATH, INF, PER/CENT/MIDLINE: HCPCS

## 2020-02-26 PROCEDURE — 71275 CT ANGIOGRAPHY CHEST: CPT

## 2020-02-26 PROCEDURE — 99211 OFF/OP EST MAY X REQ PHY/QHP: CPT

## 2020-02-26 PROCEDURE — 77001 FLUOROGUIDE FOR VEIN DEVICE: CPT

## 2020-02-26 PROCEDURE — 2000000000 HC ICU R&B

## 2020-02-26 RX ORDER — DIPHENHYDRAMINE HYDROCHLORIDE 50 MG/ML
12.5 INJECTION INTRAMUSCULAR; INTRAVENOUS
Status: DISCONTINUED | OUTPATIENT
Start: 2020-02-26 | End: 2020-02-26

## 2020-02-26 RX ORDER — METHYLPREDNISOLONE SODIUM SUCCINATE 125 MG/2ML
60 INJECTION, POWDER, LYOPHILIZED, FOR SOLUTION INTRAMUSCULAR; INTRAVENOUS ONCE
Status: COMPLETED | OUTPATIENT
Start: 2020-02-26 | End: 2020-02-26

## 2020-02-26 RX ORDER — PANTOPRAZOLE SODIUM 40 MG/1
40 TABLET, DELAYED RELEASE ORAL ONCE
Status: DISCONTINUED | OUTPATIENT
Start: 2020-02-26 | End: 2020-02-27

## 2020-02-26 RX ORDER — METOCLOPRAMIDE HYDROCHLORIDE 5 MG/ML
5 INJECTION INTRAMUSCULAR; INTRAVENOUS
Status: DISCONTINUED | OUTPATIENT
Start: 2020-02-26 | End: 2020-03-01

## 2020-02-26 RX ORDER — 0.9 % SODIUM CHLORIDE 0.9 %
20 INTRAVENOUS SOLUTION INTRAVENOUS ONCE
Status: DISCONTINUED | OUTPATIENT
Start: 2020-02-26 | End: 2020-02-26

## 2020-02-26 RX ORDER — METHYLPREDNISOLONE SODIUM SUCCINATE 40 MG/ML
40 INJECTION, POWDER, LYOPHILIZED, FOR SOLUTION INTRAMUSCULAR; INTRAVENOUS EVERY 6 HOURS
Status: DISCONTINUED | OUTPATIENT
Start: 2020-02-26 | End: 2020-02-26

## 2020-02-26 RX ORDER — DIPHENHYDRAMINE HYDROCHLORIDE 50 MG/ML
50 INJECTION INTRAMUSCULAR; INTRAVENOUS
Status: COMPLETED | OUTPATIENT
Start: 2020-02-26 | End: 2020-02-26

## 2020-02-26 RX ORDER — PROMETHAZINE HYDROCHLORIDE 25 MG/ML
INJECTION, SOLUTION INTRAMUSCULAR; INTRAVENOUS
Status: COMPLETED
Start: 2020-02-26 | End: 2020-02-26

## 2020-02-26 RX ORDER — METOCLOPRAMIDE HYDROCHLORIDE 5 MG/ML
10 INJECTION INTRAMUSCULAR; INTRAVENOUS ONCE
Status: DISCONTINUED | OUTPATIENT
Start: 2020-02-26 | End: 2020-03-11 | Stop reason: HOSPADM

## 2020-02-26 RX ORDER — PROMETHAZINE HYDROCHLORIDE 25 MG/ML
12.5 INJECTION, SOLUTION INTRAMUSCULAR; INTRAVENOUS ONCE
Status: COMPLETED | OUTPATIENT
Start: 2020-02-26 | End: 2020-02-26

## 2020-02-26 RX ORDER — ONDANSETRON 4 MG/1
4 TABLET, ORALLY DISINTEGRATING ORAL ONCE
Status: COMPLETED | OUTPATIENT
Start: 2020-02-26 | End: 2020-02-26

## 2020-02-26 RX ORDER — PANTOPRAZOLE SODIUM 40 MG/10ML
40 INJECTION, POWDER, LYOPHILIZED, FOR SOLUTION INTRAVENOUS 2 TIMES DAILY
Status: DISCONTINUED | OUTPATIENT
Start: 2020-02-26 | End: 2020-02-27

## 2020-02-26 RX ADMIN — ALLOPURINOL 100 MG: 100 TABLET ORAL at 11:48

## 2020-02-26 RX ADMIN — IOPAMIDOL 100 ML: 755 INJECTION, SOLUTION INTRAVENOUS at 15:42

## 2020-02-26 RX ADMIN — METHYLPREDNISOLONE SODIUM SUCCINATE 40 MG: 40 INJECTION, POWDER, FOR SOLUTION INTRAMUSCULAR; INTRAVENOUS at 14:00

## 2020-02-26 RX ADMIN — METOCLOPRAMIDE 5 MG: 5 INJECTION, SOLUTION INTRAMUSCULAR; INTRAVENOUS at 11:48

## 2020-02-26 RX ADMIN — CEFTOLOZANE AND TAZOBACTAM 1.5 G: 1; .5 INJECTION, POWDER, LYOPHILIZED, FOR SOLUTION INTRAVENOUS at 22:00

## 2020-02-26 RX ADMIN — CEFTOLOZANE AND TAZOBACTAM 1.5 G: 1; .5 INJECTION, POWDER, LYOPHILIZED, FOR SOLUTION INTRAVENOUS at 12:15

## 2020-02-26 RX ADMIN — METOPROLOL TARTRATE 5 MG: 5 INJECTION INTRAVENOUS at 16:06

## 2020-02-26 RX ADMIN — PROMETHAZINE HYDROCHLORIDE 12.5 MG: 25 INJECTION INTRAMUSCULAR; INTRAVENOUS at 14:28

## 2020-02-26 RX ADMIN — DIPHENHYDRAMINE HYDROCHLORIDE 50 MG: 50 INJECTION, SOLUTION INTRAMUSCULAR; INTRAVENOUS at 14:00

## 2020-02-26 RX ADMIN — PANTOPRAZOLE SODIUM 40 MG: 40 INJECTION, POWDER, FOR SOLUTION INTRAVENOUS at 21:39

## 2020-02-26 RX ADMIN — METHYLPREDNISOLONE SODIUM SUCCINATE 60 MG: 125 INJECTION, POWDER, FOR SOLUTION INTRAMUSCULAR; INTRAVENOUS at 11:47

## 2020-02-26 RX ADMIN — SODIUM BICARBONATE: 84 INJECTION, SOLUTION INTRAVENOUS at 11:49

## 2020-02-26 RX ADMIN — METOPROLOL TARTRATE 5 MG: 5 INJECTION INTRAVENOUS at 22:40

## 2020-02-26 RX ADMIN — SODIUM CHLORIDE, PRESERVATIVE FREE 10 ML: 5 INJECTION INTRAVENOUS at 09:00

## 2020-02-26 RX ADMIN — METOPROLOL TARTRATE 5 MG: 5 INJECTION INTRAVENOUS at 12:35

## 2020-02-26 RX ADMIN — PANTOPRAZOLE SODIUM 40 MG: 40 INJECTION, POWDER, FOR SOLUTION INTRAVENOUS at 11:48

## 2020-02-26 RX ADMIN — METRONIDAZOLE 500 MG: 500 INJECTION, SOLUTION INTRAVENOUS at 12:25

## 2020-02-26 RX ADMIN — METRONIDAZOLE 500 MG: 500 INJECTION, SOLUTION INTRAVENOUS at 20:30

## 2020-02-26 RX ADMIN — METRONIDAZOLE 500 MG: 500 INJECTION, SOLUTION INTRAVENOUS at 04:40

## 2020-02-26 RX ADMIN — VANCOMYCIN HYDROCHLORIDE 750 MG: 5 INJECTION, POWDER, LYOPHILIZED, FOR SOLUTION INTRAVENOUS at 11:49

## 2020-02-26 RX ADMIN — METOCLOPRAMIDE 5 MG: 5 INJECTION, SOLUTION INTRAMUSCULAR; INTRAVENOUS at 16:06

## 2020-02-26 RX ADMIN — ONDANSETRON 4 MG: 2 INJECTION INTRAMUSCULAR; INTRAVENOUS at 03:35

## 2020-02-26 RX ADMIN — TRAMADOL HYDROCHLORIDE 50 MG: 50 TABLET, FILM COATED ORAL at 16:57

## 2020-02-26 RX ADMIN — DIVALPROEX SODIUM 500 MG: 250 TABLET, DELAYED RELEASE ORAL at 11:48

## 2020-02-26 RX ADMIN — ONDANSETRON 4 MG: 4 TABLET, ORALLY DISINTEGRATING ORAL at 12:34

## 2020-02-26 RX ADMIN — PROMETHAZINE HYDROCHLORIDE 12.5 MG: 25 INJECTION, SOLUTION INTRAMUSCULAR; INTRAVENOUS at 14:28

## 2020-02-26 ASSESSMENT — PAIN - FUNCTIONAL ASSESSMENT: PAIN_FUNCTIONAL_ASSESSMENT: ACTIVITIES ARE NOT PREVENTED

## 2020-02-26 ASSESSMENT — PAIN DESCRIPTION - PROGRESSION: CLINICAL_PROGRESSION: NOT CHANGED

## 2020-02-26 ASSESSMENT — PAIN DESCRIPTION - PAIN TYPE: TYPE: ACUTE PAIN

## 2020-02-26 ASSESSMENT — PAIN DESCRIPTION - ONSET: ONSET: ON-GOING

## 2020-02-26 ASSESSMENT — PAIN DESCRIPTION - DESCRIPTORS: DESCRIPTORS: DISCOMFORT

## 2020-02-26 ASSESSMENT — PAIN DESCRIPTION - FREQUENCY: FREQUENCY: CONTINUOUS

## 2020-02-26 ASSESSMENT — PAIN DESCRIPTION - LOCATION: LOCATION: GENERALIZED

## 2020-02-26 ASSESSMENT — PAIN SCALES - GENERAL
PAINLEVEL_OUTOF10: 0
PAINLEVEL_OUTOF10: 5
PAINLEVEL_OUTOF10: 0

## 2020-02-26 NOTE — CONSULTS
[facility] Continence Nurse  Consult Note       Jennifer Shelley  AGE: 59 y.o. GENDER: female  : 1955  TODAY'S DATE:  2020    Subjective:     Reason for [facility] Evaluation and Assessment: wound consult      Jennifer Shelley is a 59 y.o. female referred by:   [x] Physician  [] Nursing  [] Other:     Wound Identification:  Wound Type: pressure  Contributing Factors: chronic pressure, decreased mobility and malnutrition        PAST MEDICAL HISTORY        Diagnosis Date    [facility]     Bipolar 1 disorder ([facility]     \"on Depakote for this\"       PAST SURGICAL HISTORY    Past Surgical History:   Procedure Laterality Date    COLONOSCOPY  2015    normal colon    PORT SURGERY N/A 2020    PORT INSERTION performed by Vanessa Heath MD at [address] Left 2020    PORT REMOVAL LEFT performed by Vanessa Heath MD at [address]  as a kid    TUBAL LIGATION  20+ yrs ago       FAMILY HISTORY    Family History   Problem Relation Age of Onset    Breast Cancer Mother     High Blood Pressure Mother     Cancer Father         \"cancer in the bowel\"       SOCIAL HISTORY    Social History     Tobacco Use    Smoking status: Former Smoker     Packs/day: 1.00     Years: 10.00     Pack years: 10.00     Types: Cigarettes     Last attempt to quit: 1988     Years since quittin.1    Smokeless tobacco: Never Used   Substance Use Topics    Alcohol use: No    Drug use: No       ALLERGIES    Allergies   Allergen Reactions    Abilify [Aripiprazole]     Iodine     Penicillins     Codeine Nausea And Vomiting       MEDICATIONS    No current facility-administered medications on file prior to encounter.       Current Outpatient Medications on File Prior to Encounter   Medication Sig Dispense Refill    famotidine (PEPCID) 20 MG tablet Take 20 mg by mouth nightly      dicyclomine (BENTYL) 20 MG tablet Take 20 mg by mouth every 6 hours as needed      Docusate Sodium hypotension    Mitral valve disease    Pancytopenia (HCC)    Severe malnutrition (HCC)    Acute deep vein thrombosis (DVT) of left upper extremity (HCC)    Hematoma       Measurements:  Wound 02/25/20 Buttocks Left (Active)   Wound Pressure Stage  2 2/26/2020  2:59 PM   Dressing/Treatment Moisture barrier 2/26/2020  2:59 PM   Wound Length (cm) 0.7 cm 2/26/2020  2:59 PM   Wound Width (cm) 0.3 cm 2/26/2020  2:59 PM   Wound Depth (cm) 0.1 cm 2/26/2020  2:59 PM   Wound Surface Area (cm^2) 0.21 cm^2 2/26/2020  2:59 PM   Wound Volume (cm^3) 0.02 cm^3 2/26/2020  2:59 PM   Distance Tunneling (cm) 0 cm 2/26/2020  2:59 PM   Tunneling Position ___ O'Clock 0 2/26/2020  2:59 PM   Undermining Starts ___ O'Clock 0 2/26/2020  2:59 PM   Undermining Ends___ O'Clock 0 2/26/2020  2:59 PM   Undermining Maxium Distance (cm) 0 2/26/2020  2:59 PM   Wound Assessment Pink 2/26/2020  2:59 PM   Drainage Amount None 2/26/2020  2:59 PM   Odor None 2/26/2020  2:59 PM   Margins Defined edges 2/26/2020  2:59 PM   Micheline-wound Assessment Other (Comment) 2/26/2020  2:59 PM   [place]%Wound Bed 100 2/26/2020  2:59 PM   Red%Wound Bed 0 2/26/2020  2:59 PM   Yellow%Wound Bed 0 2/26/2020  2:59 PM   Black%Wound Bed 0 2/26/2020  2:59 PM   Purple%Wound Bed 0 2/26/2020  2:59 PM   Other%Wound Bed 0 2/26/2020  2:59 PM   Number of days: 1       Wound 02/25/20 Buttocks Right (Active)   Wound Pressure Stage  2 2/26/2020  1:15 PM   Wound Assessment Other (Comment) 2/26/2020  2:59 PM   Drainage Amount None 2/26/2020  1:15 PM   Micheline-wound Assessment Pink; Intact 2/26/2020  1:15 PM   Number of days: 1       Wound 02/26/20 Sacrum Stage 2 PI (Active)   Wound Pressure Stage  2 2/26/2020  2:59 PM   Dressing/Treatment Moisture barrier 2/26/2020  2:59 PM   Wound Length (cm) 0.3 cm 2/26/2020  2:59 PM   Wound Width (cm) 0.2 cm 2/26/2020  2:59 PM   Wound Depth (cm) 0.1 cm 2/26/2020  2:59 PM   Wound Surface Area (cm^2) 0.06 cm^2 2/26/2020  2:59 PM   Wound Volume (cm^3) 0.01 cm^3

## 2020-02-26 NOTE — CONSULTS
Electrophysiology Consult Note      Reason for consultation: Tachycardia    Chief complaint : Abnormal blood count    Referring physician: Beverley Nath      Primary care physician: Kellee Hernandez MD      History of Present Illness:     Patient is a 30-year-old female history of B-cell lymphoma, hypertension presents to the hospital as a direct admit from oncology office because she was having pancytopenia. She is currently undergoing chemotherapy. Patient has altered mental status at that time and is unable to provide much information family is at the bedside and reports she has been weak and had a recent fall. They stated that she was complaining of dizziness. Family additionally stated that patient recently had port placed and she has not been doing well since then. EP consult for tachycardia    Pastmedical history:   Past Medical History:   Diagnosis Date    [facility]     Bipolar 1 disorder ([facility]     \"on Depakote for this\"       Surgical history :   Past Surgical History:   Procedure Laterality Date    COLONOSCOPY  9/4/2015    normal colon    PORT SURGERY N/A 1/7/2020    PORT INSERTION performed by Bhavna Nova MD at [address] Left 2/20/2020    PORT REMOVAL LEFT [facility] [address] [facility] [address] [facility]  as a kid    TUBAL LIGATION  20+ yrs ago       Family history:   Family History   Problem Relation Age of Onset    Breast Cancer Mother     High Blood Pressure Mother     Cancer Father         \"cancer in the bowel\"       Social history :  reports that she quit smoking about 32 years ago. Her smoking use included cigarettes. She has a 10.00 pack-year smoking history. She has never used smokeless tobacco. She reports that she does not drink alcohol or use drugs.     Allergies   Allergen Reactions    Abilify [Aripiprazole]     Iodine     Penicillins     Codeine Nausea And Vomiting       No current Rhythm: Regular rhythm. Tachycardia present. Heart sounds: No murmur. Pulmonary:      Effort: Pulmonary effort is normal.      Breath sounds: No rhonchi or rales. Abdominal:      General: Abdomen is flat. There is no distension. Palpations: Abdomen is soft. Musculoskeletal:         General: No swelling or tenderness. Skin:     General: Skin is warm. Coloration: Skin is pale. Neurological:      Comments: Altered mental status           CBC:   Lab Results   Component Value Date    WBC 40.2  CHECKS   02/25/2020    HGB 9.3 02/25/2020    HCT 32.1 02/25/2020     02/25/2020     Lipids:  Lab Results   Component Value Date    CHOL 234 06/19/2017    TRIG 130 06/19/2017    HDL 66 06/19/2017    LDLCALC 142 06/19/2017     PT/INR: No results found for: INR     BMP:    Lab Results   Component Value Date     02/25/2020    K 4.6 02/25/2020     02/25/2020    CO2 8 (L) 02/25/2020    BUN 18 02/25/2020     CMP:   Lab Results   Component Value Date    AST 14 (L) 02/18/2020    PROT 5.5 (L) 02/18/2020    BILITOT 0.8 02/18/2020    ALKPHOS 50 02/18/2020     TSH:  No results found for: TSH    EKGINTERPRETATION - EKG Interpretation:        IMPRESSION / RECOMMENDATIONS:     1. Sinus tachycardia  2. Altered mental status  3. Pesudomonas infection  4. Chemotherapy induced pancytopenia  -resolved  5. Anemia  6. LUE DVT  7. Severe malnurition  8. Bipolar disorder  9. Sepsis  10. Lymphoma     Patient EKG reviewed and patient has sinus tachycardia which I think is response to underlying patient condition.     Patient heart rate at 110s to 115s right now which is probably optimal with her low blood pressure. This is predominantly sinus tachycardia with secondary to patient underlying comorbidities.     She is off anticoagulation despite DVT because of anemia     CT shows intramuscular hematoma in the left pectoralis major muscle.   There has been reported for 6 high of gas within the muscle and 1.6 cm

## 2020-02-26 NOTE — PROGRESS NOTES
ONCOLOGY HEMATOLOGY CARE ([facility])  PROGRESS NOTE      Patient was seen and examined today. Not in any acute distress and no overnight events. Hg dropped again, lovenox was held. I discussed with family, hospitalist and consultants. She received one unit PRBC. Tachycardia is improving. CT chest, abdomen and pelvis without contrast 2/25/2020 :  1. Hypermetabolic lesions seen in the small bowel mesentery and bilateral   adnexa on the 01/27/2020 PET have decreased in size, suggesting treatment   response. 2. Foci of gas along the superior margin of the mesenteric lesion may be   within the adjacent duodenum.  However, this could also related to abscess or   a sinus tract from the duodenum into the lesion.  Lack of intravenous and   oral contrast administration limits further characterization. 3. Intramuscular hematoma in the left pectoralis major muscle.  Foci of gas   within the muscle and in an overlying 1.6 cm loculated fluid collection are   presumably related to removal of a port catheter, although infection could   appear similar.  Lack of intravenous contrast administration again limits   further characterization. 4. Patchy infectious or inflammatory bronchiolitis in the right upper lobe. 5. Small to moderate left and trace right pleural effusions, trace   pericardial effusion, small pelvic ascites, and mild to moderate anasarca. Cardiologist does not suggest thrombolytic therapy for LUE DVT. GI has seen her and started on PPI. Dr Jam Nielson will reassess her today to look for source of bleeding. ID has added antibiotics. Reportedly stool last night was formed and brown. Abdominal pain has improved. GI wants to start clear liquid diet. Dr Kike Dillard will schedule CT with contrast today.     PHYSICAL EXAM    Vitals: BP (!) 123/56   Pulse 115   Temp 100 °F (37.8 °C) (Rectal)   Resp 18   Ht 5' 3\" (1.6 m)   Wt 95 lb 7.4 oz (43.3 kg)   LMP 01/11/2000   SpO2 99%   BMI 16.91 kg/m²

## 2020-02-26 NOTE — PROGRESS NOTES
0.4 - 2.0 mMOL/L   CBC    Collection Time: 02/25/20  8:57 AM   Result Value Ref Range    WBC 40.2  CHECKS   (HH) 4.0 - 10.5 K/CU MM    RBC 3.18 (L) 4.2 - 5.4 M/CU MM    Hemoglobin 9.3 (L) 12.5 - 16.0 GM/DL    Hematocrit 32.1 (L) 37 - 47 %    .9 (H) 78 - 100 FL    MCH 29.2 27 - 31 PG    MCHC 29.0 (L) 32.0 - 36.0 %    RDW 17.0 (H) 11.7 - 14.9 %    Platelets 227 927 - 981 K/CU MM    MPV 10.9 7.5 - 11.1 FL   Urinalysis    Collection Time: 02/25/20  9:15 AM   Result Value Ref Range    Color, UA YELLOW YELLOW    Clarity, UA CLEAR CLEAR    Glucose, Urine NEGATIVE NEGATIVE MG/DL    Bilirubin Urine NEGATIVE NEGATIVE MG/DL    Ketones, Urine SMALL (A) NEGATIVE MG/DL    Specific Gravity, UA 1.012 1.001 - 1.035    Blood, Urine NEGATIVE NEGATIVE    pH, Urine 6.0 5.0 - 8.0    Protein, UA 30 (A) NEGATIVE MG/DL    Urobilinogen, Urine NORMAL 0.2 - 1.0 MG/DL    Nitrite Urine, Quantitative NEGATIVE NEGATIVE    Leukocyte Esterase, Urine NEGATIVE NEGATIVE    RBC, UA 1 0 - 6 /HPF    WBC, UA 2 0 - 5 /HPF    Bacteria, UA RARE (A) NEGATIVE /HPF    Yeast, UA RARE /HPF    Squam Epithel, UA 1 /HPF    Trans Epithel, UA <1 /HPF    Mucus, UA RARE (A) NEGATIVE HPF    Trichomonas, UA NONE SEEN NONE SEEN /HPF    Hyaline Casts, UA 2 /LPF   Procalcitonin    Collection Time: 02/25/20 12:15 PM   Result Value Ref Range    Procalcitonin 14.22    C-Reactive Protein    Collection Time: 02/25/20 12:15 PM   Result Value Ref Range    CRP, High Sensitivity 277.0 mg/L   Magnesium    Collection Time: 02/25/20 12:15 PM   Result Value Ref Range    Magnesium 2.0 1.8 - 2.4 mg/dl   CBC    Collection Time: 02/25/20  6:42 PM   Result Value Ref Range    WBC 24.0 (H) 4.0 - 10.5 K/CU MM    RBC 2.67 (L) 4.2 - 5.4 M/CU MM    Hemoglobin 7.6 (L) 12.5 - 16.0 GM/DL    Hematocrit 26.7 (L) 37 - 47 %    .0 78 - 100 FL    MCH 28.5 27 - 31 PG    MCHC 28.5 (L) 32.0 - 36.0 %    RDW 17.1 (H) 11.7 - 14.9 %    Platelets 835 657 - 321 K/CU MM    MPV 11.2 (H) 7.5 - 11.1 FL

## 2020-02-27 ENCOUNTER — ANESTHESIA EVENT (OUTPATIENT)
Dept: ENDOSCOPY | Age: 65
DRG: 981 | End: 2020-02-27
Payer: COMMERCIAL

## 2020-02-27 ENCOUNTER — APPOINTMENT (OUTPATIENT)
Dept: NUCLEAR MEDICINE | Age: 65
DRG: 981 | End: 2020-02-27
Attending: INTERNAL MEDICINE
Payer: COMMERCIAL

## 2020-02-27 ENCOUNTER — ANESTHESIA (OUTPATIENT)
Dept: ENDOSCOPY | Age: 65
DRG: 981 | End: 2020-02-27
Payer: COMMERCIAL

## 2020-02-27 VITALS — DIASTOLIC BLOOD PRESSURE: 46 MMHG | SYSTOLIC BLOOD PRESSURE: 90 MMHG | OXYGEN SATURATION: 100 %

## 2020-02-27 LAB
ANION GAP SERPL CALCULATED.3IONS-SCNC: 11 MMOL/L (ref 4–16)
BUN BLDV-MCNC: 38 MG/DL (ref 6–23)
CALCIUM SERPL-MCNC: 7.3 MG/DL (ref 8.3–10.6)
CHLORIDE BLD-SCNC: 108 MMOL/L (ref 99–110)
CO2: 22 MMOL/L (ref 21–32)
CREAT SERPL-MCNC: 0.8 MG/DL (ref 0.6–1.1)
CULTURE: NORMAL
GFR AFRICAN AMERICAN: >60 ML/MIN/1.73M2
GFR NON-AFRICAN AMERICAN: >60 ML/MIN/1.73M2
GLUCOSE BLD-MCNC: 129 MG/DL (ref 70–99)
HCT VFR BLD CALC: 19.9 % (ref 37–47)
HCT VFR BLD CALC: 22.4 % (ref 37–47)
HCT VFR BLD CALC: 22.6 % (ref 37–47)
HCT VFR BLD CALC: 24.6 % (ref 37–47)
HCT VFR BLD CALC: 26 % (ref 37–47)
HEMOGLOBIN: 7 GM/DL (ref 12.5–16)
HEMOGLOBIN: 7.1 GM/DL (ref 12.5–16)
HEMOGLOBIN: 8.1 GM/DL (ref 12.5–16)
HEMOGLOBIN: 8.5 GM/DL (ref 12.5–16)
HEMOGLOBIN: ABNORMAL GM/DL (ref 12.5–16)
HIGH SENSITIVE C-REACTIVE PROTEIN: 159.1 MG/L
INR BLD: 1.33 INDEX
INTERPRETATION: NORMAL
Lab: NORMAL
MCH RBC QN AUTO: 27.9 PG (ref 27–31)
MCHC RBC AUTO-ENTMCNC: 31.7 % (ref 32–36)
MCV RBC AUTO: 88.1 FL (ref 78–100)
PDW BLD-RTO: 16.5 % (ref 11.7–14.9)
PLATELET # BLD: 132 K/CU MM (ref 140–440)
PMV BLD AUTO: 11.1 FL (ref 7.5–11.1)
POTASSIUM SERPL-SCNC: 4 MMOL/L (ref 3.5–5.1)
PREGNANCY, URINE: NEGATIVE
PROTHROMBIN TIME: 16.1 SECONDS (ref 11.7–14.5)
RBC # BLD: 2.26 M/CU MM (ref 4.2–5.4)
SODIUM BLD-SCNC: 141 MMOL/L (ref 135–145)
SPECIFIC GRAVITY, URINE: 1.03 (ref 1–1.03)
SPECIMEN: NORMAL
TROPONIN T: <0.01 NG/ML
WBC # BLD: 13.3 K/CU MM (ref 4–10.5)

## 2020-02-27 PROCEDURE — 2580000003 HC RX 258: Performed by: SPECIALIST

## 2020-02-27 PROCEDURE — 86141 C-REACTIVE PROTEIN HS: CPT

## 2020-02-27 PROCEDURE — C9113 INJ PANTOPRAZOLE SODIUM, VIA: HCPCS | Performed by: NURSE PRACTITIONER

## 2020-02-27 PROCEDURE — 2580000003 HC RX 258: Performed by: PHYSICIAN ASSISTANT

## 2020-02-27 PROCEDURE — 85018 HEMOGLOBIN: CPT

## 2020-02-27 PROCEDURE — 2500000003 HC RX 250 WO HCPCS: Performed by: FAMILY MEDICINE

## 2020-02-27 PROCEDURE — 2000000000 HC ICU R&B

## 2020-02-27 PROCEDURE — 2580000003 HC RX 258: Performed by: FAMILY MEDICINE

## 2020-02-27 PROCEDURE — 2500000003 HC RX 250 WO HCPCS: Performed by: INTERNAL MEDICINE

## 2020-02-27 PROCEDURE — 6360000002 HC RX W HCPCS: Performed by: NURSE ANESTHETIST, CERTIFIED REGISTERED

## 2020-02-27 PROCEDURE — 78278 ACUTE GI BLOOD LOSS IMAGING: CPT

## 2020-02-27 PROCEDURE — 2580000003 HC RX 258: Performed by: NURSE ANESTHETIST, CERTIFIED REGISTERED

## 2020-02-27 PROCEDURE — 6360000002 HC RX W HCPCS: Performed by: INTERNAL MEDICINE

## 2020-02-27 PROCEDURE — 2500000003 HC RX 250 WO HCPCS: Performed by: NURSE ANESTHETIST, CERTIFIED REGISTERED

## 2020-02-27 PROCEDURE — 86900 BLOOD TYPING SEROLOGIC ABO: CPT

## 2020-02-27 PROCEDURE — 81025 URINE PREGNANCY TEST: CPT

## 2020-02-27 PROCEDURE — 84484 ASSAY OF TROPONIN QUANT: CPT

## 2020-02-27 PROCEDURE — 6360000002 HC RX W HCPCS: Performed by: SPECIALIST

## 2020-02-27 PROCEDURE — 3700000000 HC ANESTHESIA ATTENDED CARE: Performed by: SPECIALIST

## 2020-02-27 PROCEDURE — 6360000002 HC RX W HCPCS: Performed by: NURSE PRACTITIONER

## 2020-02-27 PROCEDURE — 80202 ASSAY OF VANCOMYCIN: CPT

## 2020-02-27 PROCEDURE — 85027 COMPLETE CBC AUTOMATED: CPT

## 2020-02-27 PROCEDURE — A9560 TC99M LABELED RBC: HCPCS | Performed by: SPECIALIST

## 2020-02-27 PROCEDURE — 0DJ08ZZ INSPECTION OF UPPER INTESTINAL TRACT, VIA NATURAL OR ARTIFICIAL OPENING ENDOSCOPIC: ICD-10-PCS | Performed by: SPECIALIST

## 2020-02-27 PROCEDURE — 85610 PROTHROMBIN TIME: CPT

## 2020-02-27 PROCEDURE — 2500000003 HC RX 250 WO HCPCS: Performed by: SPECIALIST

## 2020-02-27 PROCEDURE — 80048 BASIC METABOLIC PNL TOTAL CA: CPT

## 2020-02-27 PROCEDURE — 2580000003 HC RX 258: Performed by: INTERNAL MEDICINE

## 2020-02-27 PROCEDURE — 99231 SBSQ HOSP IP/OBS SF/LOW 25: CPT | Performed by: NURSE PRACTITIONER

## 2020-02-27 PROCEDURE — 3609017100 HC EGD: Performed by: SPECIALIST

## 2020-02-27 PROCEDURE — 86901 BLOOD TYPING SEROLOGIC RH(D): CPT

## 2020-02-27 PROCEDURE — 85014 HEMATOCRIT: CPT

## 2020-02-27 PROCEDURE — 2709999900 HC NON-CHARGEABLE SUPPLY: Performed by: SPECIALIST

## 2020-02-27 PROCEDURE — 36430 TRANSFUSION BLD/BLD COMPNT: CPT

## 2020-02-27 PROCEDURE — 3430000000 HC RX DIAGNOSTIC RADIOPHARMACEUTICAL: Performed by: SPECIALIST

## 2020-02-27 PROCEDURE — 86850 RBC ANTIBODY SCREEN: CPT

## 2020-02-27 PROCEDURE — 93005 ELECTROCARDIOGRAM TRACING: CPT | Performed by: HOSPITALIST

## 2020-02-27 PROCEDURE — 3700000001 HC ADD 15 MINUTES (ANESTHESIA): Performed by: SPECIALIST

## 2020-02-27 PROCEDURE — 94761 N-INVAS EAR/PLS OXIMETRY MLT: CPT

## 2020-02-27 PROCEDURE — P9016 RBC LEUKOCYTES REDUCED: HCPCS

## 2020-02-27 RX ORDER — PROPOFOL 10 MG/ML
INJECTION, EMULSION INTRAVENOUS PRN
Status: DISCONTINUED | OUTPATIENT
Start: 2020-02-27 | End: 2020-02-27 | Stop reason: SDUPTHER

## 2020-02-27 RX ORDER — LIDOCAINE HYDROCHLORIDE 20 MG/ML
INJECTION, SOLUTION EPIDURAL; INFILTRATION; INTRACAUDAL; PERINEURAL PRN
Status: DISCONTINUED | OUTPATIENT
Start: 2020-02-27 | End: 2020-02-27 | Stop reason: SDUPTHER

## 2020-02-27 RX ORDER — FENTANYL CITRATE 50 UG/ML
INJECTION, SOLUTION INTRAMUSCULAR; INTRAVENOUS PRN
Status: DISCONTINUED | OUTPATIENT
Start: 2020-02-27 | End: 2020-02-27 | Stop reason: SDUPTHER

## 2020-02-27 RX ORDER — SODIUM CHLORIDE 9 MG/ML
INJECTION, SOLUTION INTRAVENOUS CONTINUOUS PRN
Status: DISCONTINUED | OUTPATIENT
Start: 2020-02-27 | End: 2020-02-27 | Stop reason: SDUPTHER

## 2020-02-27 RX ORDER — 0.9 % SODIUM CHLORIDE 0.9 %
20 INTRAVENOUS SOLUTION INTRAVENOUS ONCE
Status: COMPLETED | OUTPATIENT
Start: 2020-02-27 | End: 2020-02-27

## 2020-02-27 RX ORDER — PANTOPRAZOLE SODIUM 40 MG/10ML
40 INJECTION, POWDER, LYOPHILIZED, FOR SOLUTION INTRAVENOUS DAILY
Status: DISCONTINUED | OUTPATIENT
Start: 2020-02-28 | End: 2020-03-11 | Stop reason: HOSPADM

## 2020-02-27 RX ADMIN — SODIUM BICARBONATE: 84 INJECTION, SOLUTION INTRAVENOUS at 17:22

## 2020-02-27 RX ADMIN — SODIUM CHLORIDE, PRESERVATIVE FREE 10 ML: 5 INJECTION INTRAVENOUS at 08:14

## 2020-02-27 RX ADMIN — PROPOFOL 50 MG: 10 INJECTION, EMULSION INTRAVENOUS at 08:37

## 2020-02-27 RX ADMIN — VANCOMYCIN HYDROCHLORIDE 750 MG: 5 INJECTION, POWDER, LYOPHILIZED, FOR SOLUTION INTRAVENOUS at 03:14

## 2020-02-27 RX ADMIN — CEFTOLOZANE AND TAZOBACTAM 1.5 G: 1; .5 INJECTION, POWDER, LYOPHILIZED, FOR SOLUTION INTRAVENOUS at 23:37

## 2020-02-27 RX ADMIN — SODIUM CHLORIDE: 9 INJECTION, SOLUTION INTRAVENOUS at 08:35

## 2020-02-27 RX ADMIN — METRONIDAZOLE 500 MG: 500 INJECTION, SOLUTION INTRAVENOUS at 14:51

## 2020-02-27 RX ADMIN — FENTANYL CITRATE 25 MCG: 50 INJECTION INTRAMUSCULAR; INTRAVENOUS at 08:37

## 2020-02-27 RX ADMIN — LIDOCAINE HYDROCHLORIDE 50 MG: 20 INJECTION, SOLUTION EPIDURAL; INFILTRATION; INTRACAUDAL; PERINEURAL at 08:37

## 2020-02-27 RX ADMIN — SODIUM BICARBONATE: 84 INJECTION, SOLUTION INTRAVENOUS at 03:24

## 2020-02-27 RX ADMIN — PHENYLEPHRINE HYDROCHLORIDE 100 MCG: 10 INJECTION INTRAVENOUS at 08:56

## 2020-02-27 RX ADMIN — METRONIDAZOLE 500 MG: 500 INJECTION, SOLUTION INTRAVENOUS at 04:37

## 2020-02-27 RX ADMIN — PANTOPRAZOLE SODIUM 40 MG: 40 INJECTION, POWDER, FOR SOLUTION INTRAVENOUS at 08:12

## 2020-02-27 RX ADMIN — METRONIDAZOLE 500 MG: 500 INJECTION, SOLUTION INTRAVENOUS at 20:48

## 2020-02-27 RX ADMIN — SODIUM CHLORIDE, PRESERVATIVE FREE 10 ML: 5 INJECTION INTRAVENOUS at 20:52

## 2020-02-27 RX ADMIN — SODIUM CHLORIDE, PRESERVATIVE FREE 10 ML: 5 INJECTION INTRAVENOUS at 20:53

## 2020-02-27 RX ADMIN — SODIUM CHLORIDE, PRESERVATIVE FREE 10 ML: 5 INJECTION INTRAVENOUS at 08:13

## 2020-02-27 RX ADMIN — SODIUM CHLORIDE 20 ML: 9 INJECTION, SOLUTION INTRAVENOUS at 07:49

## 2020-02-27 RX ADMIN — Medication 25 MILLICURIE: at 11:15

## 2020-02-27 RX ADMIN — AZITHROMYCIN MONOHYDRATE 500 MG: 500 INJECTION, POWDER, LYOPHILIZED, FOR SOLUTION INTRAVENOUS at 07:45

## 2020-02-27 RX ADMIN — CEFTOLOZANE AND TAZOBACTAM 1.5 G: 1; .5 INJECTION, POWDER, LYOPHILIZED, FOR SOLUTION INTRAVENOUS at 06:15

## 2020-02-27 RX ADMIN — CEFTOLOZANE AND TAZOBACTAM 1.5 G: 1; .5 INJECTION, POWDER, LYOPHILIZED, FOR SOLUTION INTRAVENOUS at 15:07

## 2020-02-27 ASSESSMENT — PAIN SCALES - GENERAL
PAINLEVEL_OUTOF10: 4
PAINLEVEL_OUTOF10: 0
PAINLEVEL_OUTOF10: 2
PAINLEVEL_OUTOF10: 0

## 2020-02-27 ASSESSMENT — PAIN DESCRIPTION - LOCATION
LOCATION: CHEST

## 2020-02-27 ASSESSMENT — PAIN DESCRIPTION - ORIENTATION
ORIENTATION: LEFT
ORIENTATION: LEFT

## 2020-02-27 ASSESSMENT — PAIN DESCRIPTION - PAIN TYPE
TYPE: ACUTE PAIN

## 2020-02-27 NOTE — FLOWSHEET NOTE
Spoke with [facility] Radiology, tech made aware there should be STAT images (from this evening) to view from previous scan this afternoon, she stated she will get that done stat and call her radiologist to call to Dr. Rojas Doctor.

## 2020-02-27 NOTE — PROGRESS NOTES
chloride flush, sodium chloride flush, traMADol, sodium chloride flush, sodium chloride flush, ondansetron, acetaminophen, polyethylene glycol, melatonin    Lab Data:  CBC:   Recent Labs     02/26/20  1215 02/26/20  1950  02/27/20  0245 02/27/20  0502 02/27/20  1244   WBC 16.7* 14.0*  --   --  13.3*  --    HGB 7.8* 5.6  HGB AND HCT CALLED TO ICU KATHLEEN RANDHAWA @ 2007 [id number] MLT JORGE   RESULTS READ BACK  *   < > 7.0* 6.3  HGB CALLED TO 2N TO SYDNEE CULLEN RN AT 05:18 ON 2.27.20 BY KIM GARCIA MLS  RESULTS READ BACK  * 8.1*   HCT 23.3* 16.9*   < > 22.4* 19.9* 24.6*   MCV 88.3 88.9  --   --  88.1  --     150  --   --  132*  --     < > = values in this interval not displayed.      BMP:   Recent Labs     02/25/20  0603 02/26/20  0311 02/27/20  0245    141 141   K 4.6 4.0 4.0    110 108   CO2 8* 18* 22   BUN 18 36* 38*   CREATININE 0.8 0.8 0.8     PT/INR:   Recent Labs     02/27/20  0628   PROTIME 16.1*   INR 1.33           Assessment and Plan    Sinus Tachycardia  Altered Mental status- resolved  Pseudomonas infection  Chemotherapy induced pancytopenia- resolved  Anemia  LUE DVT  Severe malnutrition  Bipolar disorder  Sepsis  Lymphoma      Patient is feeling well  Hr has improved  Hasn't required IV metoprolol today due to not meeting parameters  Sinus tachycardia is secondary to patient underlying comorbidities            Sharyn Goode, DIONI-CNP 2/27/2020 2:35 PM

## 2020-02-27 NOTE — ANESTHESIA PRE PROCEDURE
Jovi Becerril, PA-C   10 mL at 02/26/20 0900    sodium chloride flush 0.9 % injection 10 mL  10 mL Intravenous PRN Kathy Sabal, PA-C        allopurinol (ZYLOPRIM) tablet 100 mg  100 mg Oral Daily Kathy Sabal, PA-C   100 mg at 02/26/20 1148    calcium elemental (OSCAL) tablet 500 mg  500 mg Oral Daily with breakfast Kathy Torres, PA-C   Stopped at 02/26/20 0800    divalproex (DEPAKOTE) DR tablet 500 mg  500 mg Oral QAM Kathy Sabal, PA-C   500 mg at 02/26/20 1148    divalproex (DEPAKOTE) DR tablet 1,000 mg  1,000 mg Oral QPM Kathy Sabal, PA-C   1,000 mg at 02/24/20 2129    sodium chloride flush 0.9 % injection 10 mL  10 mL Intravenous 2 times per day Kathy Sabal, PA-C   10 mL at 02/26/20 0900    sodium chloride flush 0.9 % injection 10 mL  10 mL Intravenous PRN Kathy Sabal, PA-C        ondansetron (ZOFRAN) injection 4 mg  4 mg Intravenous Q6H PRN Kathy Sabal, PA-C   4 mg at 02/26/20 0335    acetaminophen (TYLENOL) tablet 650 mg  650 mg Oral Q4H PRN Kathy Sabal, PA-C   650 mg at 02/18/20 2121    polyethylene glycol (GLYCOLAX) packet 17 g  17 g Oral Daily PRN Kathy Sabal, PA-C        melatonin tablet 1 mg  1 mg Oral Nightly PRN Kathy Sabal, PA-C   1 mg at 02/18/20 2122    0.9 % sodium chloride bolus  20 mL Intravenous Once Kathy Sabal, PA-C           Allergies: Allergies   Allergen Reactions    Abilify [Aripiprazole]     Iodine     Penicillins     Codeine Nausea And Vomiting       Problem List:    Patient Active Problem List   Diagnosis Code    Family history of malignant neoplasm of gastrointestinal tract Z80.0    B-cell lymphoma (HCC) C85.10    Idiopathic hypotension I95.0    Mitral valve disease I05.9    Pancytopenia ([address] D61.818    Severe malnutrition ([facility] E43    Acute deep vein thrombosis (DVT) of left upper extremity (HCC) I82.622    Hematoma T14. 8XXA       Past Medical History:        Diagnosis Date    B-cell lymphoma ([facility]     [facility]     \"on Depakote for this\"       Past Surgical History:        Procedure Laterality Date    COLONOSCOPY  2015    normal colon    PORT SURGERY N/A 2020    PORT INSERTION performed by Steven Sherwood MD [address] Left 2020    PORT REMOVAL LEFT performed by Steven Sherwood MD at [address] [facility]  as a kid    TUBAL LIGATION  20+ yrs ago       Social History:    Social History     Tobacco Use    Smoking status: Former Smoker     Packs/day: 1.00     Years: 10.00     Pack years: 10.00     Types: Cigarettes     Last attempt to quit: 1988     Years since quittin.1    Smokeless tobacco: Never Used   Substance Use Topics    Alcohol use: No                                Counseling given: Not Answered      Vital Signs (Current):   Vitals:    20 0402 20 0517 20 0520 20 0525   BP: (!) 81/49 (!) 96/56     Pulse: 94 96 96 93   Resp: 15 15     Temp:       TempSrc:       SpO2: 99% 100%     Weight:       Height:                                                  BP Readings from Last 3 Encounters:   20 (!) 96/56   20 (!) 66/50   20 (!) 80/50       NPO Status: Time of last liquid consumption: 0                        Time of last solid consumption:                         Date of last liquid consumption: 20                        Date of last solid food consumption: 20    BMI:   Wt Readings from Last 3 Encounters:   20 95 lb 7.4 oz (43.3 kg)   20 106 lb 3.2 oz (48.2 kg)   20 102 lb 4.8 oz (46.4 kg)     Body mass index is 16.91 kg/m².     CBC:   Lab Results   Component Value Date    WBC 13.3 2020    RBC 2.26 2020    HGB  2020     6.3  HGB CALLED TO 2N TO SYDNEE CULLEN RN AT 05:18 ON 20 BY KIM GARCIA MLS  RESULTS READ BACK      HCT 19.9 2020    MCV 88.1 2020    RDW 16.5 2020     2020       CMP:   Lab Results   Component Value Date     2020    K 4.0 2020     2020

## 2020-02-27 NOTE — PROGRESS NOTES
resp. rate 15, height 5' 3\" (1.6 m), weight 95 lb 7.4 oz (43.3 kg), last menstrual period 01/11/2000, SpO2 100 %, not currently breastfeeding. Subjective:  Symptoms:  Stable. Diet:  Poor intake. Pain:  She complains of pain that is mild. Objective:  General Appearance:  Comfortable. Vital signs: (most recent): Blood pressure (!) 96/56, pulse 93, temperature 99 °F (37.2 °C), temperature source Rectal, resp. rate 15, height 5' 3\" (1.6 m), weight 95 lb 7.4 oz (43.3 kg), last menstrual period 01/11/2000, SpO2 100 %, not currently breastfeeding. (Tachy). Lungs:  Normal effort. Heart: Tachycardia. Abdomen: Abdomen is soft. There is no abdominal tenderness. Extremities: Decreased range of motion. Neurological: Patient is alert. Pupils:  Pupils are equal, round, and reactive to light. Skin:  Warm.       Assessment & Plan  Acute encephalopathy(Resolved)  -CT head neg  Acute GI bleed with anemia  -s/p transfusion and egd with gastritis  -CTA abd neg for any GI bleed  -bleeding scan neg  Leukocytosis and diarrhea  -cdiff panel neg  Pancytopenia 2/2 chemo  -s/p11 RBC  -S/p pegfilgram x 3doses  Neutropenic fevers with pseudomonas bacteremia  -due to mediport and remove  -vrial panel neg  -CXR neg, and UA neg  -repeat bld cx neg 48hrs  -Cefepime changed to zerbaxa, flagyl and vancand 2 wks from mediport removal  Tachycardia  -due to anemia  -CT PE neg for PE   Left breast hematoma  -2/2 fall at home improviing  -CT chest with decrease  DLBCL  Lymphoma   -s/p 2cycles of CHOPD  Bipolar  -continue depakote  Ext left upper DVT and also right arm DVT  -2/2 vasc catheter  -lovnox 50bid and holding with anemia  -check us right arm  -mediport removed 2/20  -right mid line  For IV abx  Severe PCM  SVT  -had dig  -EP recommends ivrabadine if continues  -IV BB  ABd pain  -CT abd with gas mesenteric lesion  -ID and GI  AGMA  -bicarb drip  DVT prophyalxis  -lovenox held with anemia    Critical care time

## 2020-02-27 NOTE — PROGRESS NOTES
Pt to have stat nuclear bleed scan per RN in report back to unit. Contacted Nuc Med, state no orders have been placed at this time. Will follow up w GI.    10:25am Spoke with Dr. Neda Dinero, orders were signed and held. Did not release orders upon return to ICU. Spoke with nuclear med, they are ready for patient now.

## 2020-02-27 NOTE — PROGRESS NOTES
Hgb this AM is 6.3. Dr. Norris Rhodes paged as well as Dr. Shruthi Vidal. Per Dr. Norris Rhodes transfuse a unit of blood. Will follow out these orders.  Will continue to monitor

## 2020-02-27 NOTE — PROGRESS NOTES
Covering for Dr Tova Mcfadden  Patient passing excessive dark bloody stool--- vomited blood 2 days ago and BUN has increased from 18 to 38. Hb 6+ after two transfusions.  No recent INR  Impression:    1) sigmoid lymphoma    2) acute GI bleed- suspect upper source         Plan:   1) EGD this am   2) stat INR

## 2020-02-27 NOTE — PROGRESS NOTES
Pt had a medium to large dark, bloody stool with visible clots. Dr. Choco Dobson was notified. Advised pt to be sent down for bleeding scan STAT. Nuc Med had left for the day, ICU Charge RN spoke with Radiology charge regarding STAT order. Team being called in.

## 2020-02-27 NOTE — PROGRESS NOTES
Did repeat hgb it is now 7.0. Dr. Te Martinez paged for the update. No new orders at this time. Will continue to monitor.

## 2020-02-28 ENCOUNTER — ANESTHESIA EVENT (OUTPATIENT)
Dept: ENDOSCOPY | Age: 65
DRG: 981 | End: 2020-02-28
Payer: COMMERCIAL

## 2020-02-28 ENCOUNTER — ANESTHESIA (OUTPATIENT)
Dept: ENDOSCOPY | Age: 65
DRG: 981 | End: 2020-02-28
Payer: COMMERCIAL

## 2020-02-28 VITALS — DIASTOLIC BLOOD PRESSURE: 49 MMHG | OXYGEN SATURATION: 99 % | SYSTOLIC BLOOD PRESSURE: 85 MMHG

## 2020-02-28 LAB
ANION GAP SERPL CALCULATED.3IONS-SCNC: 10 MMOL/L (ref 4–16)
APTT: 36.9 SECONDS (ref 25.1–37.1)
BUN BLDV-MCNC: 28 MG/DL (ref 6–23)
CALCIUM IONIZED: 4.6 MG/DL (ref 4.48–5.28)
CALCIUM SERPL-MCNC: 7.6 MG/DL (ref 8.3–10.6)
CHLORIDE BLD-SCNC: 112 MMOL/L (ref 99–110)
CO2: 28 MMOL/L (ref 21–32)
CREAT SERPL-MCNC: 0.7 MG/DL (ref 0.6–1.1)
DOSE AMOUNT: ABNORMAL
DOSE TIME: ABNORMAL
EKG ATRIAL RATE: 95 BPM
EKG DIAGNOSIS: NORMAL
EKG P AXIS: 54 DEGREES
EKG P-R INTERVAL: 96 MS
EKG Q-T INTERVAL: 312 MS
EKG QRS DURATION: 66 MS
EKG QTC CALCULATION (BAZETT): 392 MS
EKG R AXIS: 13 DEGREES
EKG T AXIS: -1 DEGREES
EKG VENTRICULAR RATE: 95 BPM
FIBRINOGEN LEVEL: 434 MG/DL (ref 196.9–442.1)
GFR AFRICAN AMERICAN: >60 ML/MIN/1.73M2
GFR NON-AFRICAN AMERICAN: >60 ML/MIN/1.73M2
GLUCOSE BLD-MCNC: 78 MG/DL (ref 70–99)
HCT VFR BLD CALC: 17.2 % (ref 37–47)
HCT VFR BLD CALC: 23.6 % (ref 37–47)
HCT VFR BLD CALC: 24.2 % (ref 37–47)
HCT VFR BLD CALC: 24.5 % (ref 37–47)
HEMOGLOBIN: 7.5 GM/DL (ref 12.5–16)
HEMOGLOBIN: 7.6 GM/DL (ref 12.5–16)
HEMOGLOBIN: 7.9 GM/DL (ref 12.5–16)
HEMOGLOBIN: ABNORMAL GM/DL (ref 12.5–16)
HIGH SENSITIVE C-REACTIVE PROTEIN: 49 MG/L
INR BLD: 1.36 INDEX
IONIZED CA: 1.15 MMOL/L (ref 1.12–1.32)
MCH RBC QN AUTO: 28.1 PG (ref 27–31)
MCH RBC QN AUTO: 28.3 PG (ref 27–31)
MCHC RBC AUTO-ENTMCNC: 30.8 % (ref 32–36)
MCHC RBC AUTO-ENTMCNC: 31 % (ref 32–36)
MCV RBC AUTO: 90.6 FL (ref 78–100)
MCV RBC AUTO: 92 FL (ref 78–100)
PDW BLD-RTO: 16 % (ref 11.7–14.9)
PDW BLD-RTO: 16.6 % (ref 11.7–14.9)
PLATELET # BLD: 185 K/CU MM (ref 140–440)
PLATELET # BLD: 190 K/CU MM (ref 140–440)
PMV BLD AUTO: 10.3 FL (ref 7.5–11.1)
PMV BLD AUTO: 10.5 FL (ref 7.5–11.1)
POTASSIUM SERPL-SCNC: 3.5 MMOL/L (ref 3.5–5.1)
PROCALCITONIN: 3.53
PROTHROMBIN TIME: 16.5 SECONDS (ref 11.7–14.5)
RBC # BLD: 1.87 M/CU MM (ref 4.2–5.4)
RBC # BLD: 2.67 M/CU MM (ref 4.2–5.4)
SODIUM BLD-SCNC: 150 MMOL/L (ref 135–145)
VANCOMYCIN TROUGH: 8.8 UG/ML (ref 10–20)
WBC # BLD: 14.5 K/CU MM (ref 4–10.5)
WBC # BLD: 18.9 K/CU MM (ref 4–10.5)

## 2020-02-28 PROCEDURE — 2580000003 HC RX 258: Performed by: INTERNAL MEDICINE

## 2020-02-28 PROCEDURE — 94761 N-INVAS EAR/PLS OXIMETRY MLT: CPT

## 2020-02-28 PROCEDURE — 80048 BASIC METABOLIC PNL TOTAL CA: CPT

## 2020-02-28 PROCEDURE — 2500000003 HC RX 250 WO HCPCS: Performed by: SPECIALIST

## 2020-02-28 PROCEDURE — 86141 C-REACTIVE PROTEIN HS: CPT

## 2020-02-28 PROCEDURE — 82330 ASSAY OF CALCIUM: CPT

## 2020-02-28 PROCEDURE — 84145 PROCALCITONIN (PCT): CPT

## 2020-02-28 PROCEDURE — 6360000002 HC RX W HCPCS: Performed by: SPECIALIST

## 2020-02-28 PROCEDURE — 0DJD8ZZ INSPECTION OF LOWER INTESTINAL TRACT, VIA NATURAL OR ARTIFICIAL OPENING ENDOSCOPIC: ICD-10-PCS | Performed by: SPECIALIST

## 2020-02-28 PROCEDURE — 6370000000 HC RX 637 (ALT 250 FOR IP): Performed by: SPECIALIST

## 2020-02-28 PROCEDURE — 36430 TRANSFUSION BLD/BLD COMPNT: CPT

## 2020-02-28 PROCEDURE — 2580000003 HC RX 258: Performed by: SPECIALIST

## 2020-02-28 PROCEDURE — 3700000000 HC ANESTHESIA ATTENDED CARE: Performed by: SPECIALIST

## 2020-02-28 PROCEDURE — 93010 ELECTROCARDIOGRAM REPORT: CPT | Performed by: INTERNAL MEDICINE

## 2020-02-28 PROCEDURE — 99231 SBSQ HOSP IP/OBS SF/LOW 25: CPT | Performed by: NURSE PRACTITIONER

## 2020-02-28 PROCEDURE — 2709999900 HC NON-CHARGEABLE SUPPLY: Performed by: SPECIALIST

## 2020-02-28 PROCEDURE — 3609027000 HC COLONOSCOPY: Performed by: SPECIALIST

## 2020-02-28 PROCEDURE — 36593 DECLOT VASCULAR DEVICE: CPT

## 2020-02-28 PROCEDURE — 85384 FIBRINOGEN ACTIVITY: CPT

## 2020-02-28 PROCEDURE — 2500000003 HC RX 250 WO HCPCS: Performed by: NURSE ANESTHETIST, CERTIFIED REGISTERED

## 2020-02-28 PROCEDURE — 6360000002 HC RX W HCPCS

## 2020-02-28 PROCEDURE — 85014 HEMATOCRIT: CPT

## 2020-02-28 PROCEDURE — 85730 THROMBOPLASTIN TIME PARTIAL: CPT

## 2020-02-28 PROCEDURE — 6360000002 HC RX W HCPCS: Performed by: HOSPITALIST

## 2020-02-28 PROCEDURE — P9016 RBC LEUKOCYTES REDUCED: HCPCS

## 2020-02-28 PROCEDURE — 36415 COLL VENOUS BLD VENIPUNCTURE: CPT

## 2020-02-28 PROCEDURE — 6360000002 HC RX W HCPCS: Performed by: NURSE ANESTHETIST, CERTIFIED REGISTERED

## 2020-02-28 PROCEDURE — 6360000002 HC RX W HCPCS: Performed by: INTERNAL MEDICINE

## 2020-02-28 PROCEDURE — 3700000001 HC ADD 15 MINUTES (ANESTHESIA): Performed by: SPECIALIST

## 2020-02-28 PROCEDURE — 2580000003 HC RX 258

## 2020-02-28 PROCEDURE — 6360000002 HC RX W HCPCS: Performed by: SURGERY

## 2020-02-28 PROCEDURE — 51702 INSERT TEMP BLADDER CATH: CPT

## 2020-02-28 PROCEDURE — 2580000003 HC RX 258: Performed by: NURSE ANESTHETIST, CERTIFIED REGISTERED

## 2020-02-28 PROCEDURE — 2000000000 HC ICU R&B

## 2020-02-28 PROCEDURE — 99232 SBSQ HOSP IP/OBS MODERATE 35: CPT | Performed by: INTERNAL MEDICINE

## 2020-02-28 PROCEDURE — C9113 INJ PANTOPRAZOLE SODIUM, VIA: HCPCS | Performed by: SPECIALIST

## 2020-02-28 PROCEDURE — 85027 COMPLETE CBC AUTOMATED: CPT

## 2020-02-28 PROCEDURE — 85610 PROTHROMBIN TIME: CPT

## 2020-02-28 PROCEDURE — 85018 HEMOGLOBIN: CPT

## 2020-02-28 RX ORDER — SODIUM CHLORIDE, SODIUM LACTATE, POTASSIUM CHLORIDE, CALCIUM CHLORIDE 600; 310; 30; 20 MG/100ML; MG/100ML; MG/100ML; MG/100ML
INJECTION, SOLUTION INTRAVENOUS CONTINUOUS PRN
Status: DISCONTINUED | OUTPATIENT
Start: 2020-02-28 | End: 2020-02-28 | Stop reason: SDUPTHER

## 2020-02-28 RX ORDER — POTASSIUM CHLORIDE AND SODIUM CHLORIDE 450; 150 MG/100ML; MG/100ML
INJECTION, SOLUTION INTRAVENOUS CONTINUOUS
Status: DISCONTINUED | OUTPATIENT
Start: 2020-02-28 | End: 2020-03-06

## 2020-02-28 RX ORDER — PROPOFOL 10 MG/ML
INJECTION, EMULSION INTRAVENOUS PRN
Status: DISCONTINUED | OUTPATIENT
Start: 2020-02-28 | End: 2020-02-28 | Stop reason: SDUPTHER

## 2020-02-28 RX ORDER — LIDOCAINE HYDROCHLORIDE 20 MG/ML
INJECTION, SOLUTION INFILTRATION; PERINEURAL PRN
Status: DISCONTINUED | OUTPATIENT
Start: 2020-02-28 | End: 2020-02-28 | Stop reason: SDUPTHER

## 2020-02-28 RX ORDER — 0.9 % SODIUM CHLORIDE 0.9 %
20 INTRAVENOUS SOLUTION INTRAVENOUS ONCE
Status: COMPLETED | OUTPATIENT
Start: 2020-02-28 | End: 2020-02-29

## 2020-02-28 RX ADMIN — PHENYLEPHRINE HYDROCHLORIDE 50 MCG: 10 INJECTION INTRAVENOUS at 13:23

## 2020-02-28 RX ADMIN — METRONIDAZOLE 500 MG: 500 INJECTION, SOLUTION INTRAVENOUS at 21:39

## 2020-02-28 RX ADMIN — POTASSIUM CHLORIDE AND SODIUM CHLORIDE: 450; 150 INJECTION, SOLUTION INTRAVENOUS at 22:29

## 2020-02-28 RX ADMIN — PANTOPRAZOLE SODIUM 40 MG: 40 INJECTION, POWDER, FOR SOLUTION INTRAVENOUS at 08:40

## 2020-02-28 RX ADMIN — SODIUM CHLORIDE 20 ML: 9 INJECTION, SOLUTION INTRAVENOUS at 23:53

## 2020-02-28 RX ADMIN — CEFTOLOZANE AND TAZOBACTAM 1.5 G: 1; .5 INJECTION, POWDER, LYOPHILIZED, FOR SOLUTION INTRAVENOUS at 14:58

## 2020-02-28 RX ADMIN — PHENYLEPHRINE HYDROCHLORIDE 50 MCG: 10 INJECTION INTRAVENOUS at 13:32

## 2020-02-28 RX ADMIN — SODIUM CHLORIDE, POTASSIUM CHLORIDE, SODIUM LACTATE AND CALCIUM CHLORIDE: 600; 310; 30; 20 INJECTION, SOLUTION INTRAVENOUS at 13:07

## 2020-02-28 RX ADMIN — METOPROLOL TARTRATE 5 MG: 5 INJECTION INTRAVENOUS at 19:34

## 2020-02-28 RX ADMIN — POTASSIUM CHLORIDE AND SODIUM CHLORIDE: 450; 150 INJECTION, SOLUTION INTRAVENOUS at 08:39

## 2020-02-28 RX ADMIN — CEFTOLOZANE AND TAZOBACTAM 1.5 G: 1; .5 INJECTION, POWDER, LYOPHILIZED, FOR SOLUTION INTRAVENOUS at 05:26

## 2020-02-28 RX ADMIN — LIDOCAINE HYDROCHLORIDE 100 MG: 20 INJECTION, SOLUTION INFILTRATION; PERINEURAL at 13:13

## 2020-02-28 RX ADMIN — ALTEPLASE 2 MG: 2.2 INJECTION, POWDER, LYOPHILIZED, FOR SOLUTION INTRAVENOUS at 18:51

## 2020-02-28 RX ADMIN — PHENYLEPHRINE HYDROCHLORIDE 50 MCG: 10 INJECTION INTRAVENOUS at 13:37

## 2020-02-28 RX ADMIN — POLYETHYLENE GLYCOL 3350, SODIUM SULFATE ANHYDROUS, SODIUM BICARBONATE, SODIUM CHLORIDE, POTASSIUM CHLORIDE 2000 ML: 236; 22.74; 6.74; 5.86; 2.97 POWDER, FOR SOLUTION ORAL at 08:25

## 2020-02-28 RX ADMIN — VANCOMYCIN HYDROCHLORIDE 750 MG: 5 INJECTION, POWDER, LYOPHILIZED, FOR SOLUTION INTRAVENOUS at 17:04

## 2020-02-28 RX ADMIN — METOCLOPRAMIDE 5 MG: 5 INJECTION, SOLUTION INTRAMUSCULAR; INTRAVENOUS at 17:04

## 2020-02-28 RX ADMIN — CEFTOLOZANE AND TAZOBACTAM 1.5 G: 1; .5 INJECTION, POWDER, LYOPHILIZED, FOR SOLUTION INTRAVENOUS at 22:26

## 2020-02-28 RX ADMIN — VANCOMYCIN HYDROCHLORIDE 750 MG: 5 INJECTION, POWDER, LYOPHILIZED, FOR SOLUTION INTRAVENOUS at 05:25

## 2020-02-28 RX ADMIN — SODIUM CHLORIDE, PRESERVATIVE FREE 10 ML: 5 INJECTION INTRAVENOUS at 22:36

## 2020-02-28 RX ADMIN — METRONIDAZOLE 500 MG: 500 INJECTION, SOLUTION INTRAVENOUS at 06:02

## 2020-02-28 RX ADMIN — TRAMADOL HYDROCHLORIDE 50 MG: 50 TABLET, FILM COATED ORAL at 02:29

## 2020-02-28 RX ADMIN — PHENYLEPHRINE HYDROCHLORIDE 50 MCG: 10 INJECTION INTRAVENOUS at 13:27

## 2020-02-28 RX ADMIN — METOPROLOL TARTRATE 5 MG: 5 INJECTION INTRAVENOUS at 10:05

## 2020-02-28 RX ADMIN — PHENYLEPHRINE HYDROCHLORIDE 50 MCG: 10 INJECTION INTRAVENOUS at 13:19

## 2020-02-28 RX ADMIN — ALTEPLASE 2 MG: 2.2 INJECTION, POWDER, LYOPHILIZED, FOR SOLUTION INTRAVENOUS at 17:45

## 2020-02-28 RX ADMIN — PROPOFOL 80 MG: 10 INJECTION, EMULSION INTRAVENOUS at 13:13

## 2020-02-28 RX ADMIN — DIVALPROEX SODIUM 1000 MG: 250 TABLET, DELAYED RELEASE ORAL at 23:50

## 2020-02-28 RX ADMIN — METRONIDAZOLE 500 MG: 500 INJECTION, SOLUTION INTRAVENOUS at 12:22

## 2020-02-28 RX ADMIN — ONDANSETRON 4 MG: 2 INJECTION INTRAMUSCULAR; INTRAVENOUS at 22:31

## 2020-02-28 RX ADMIN — SODIUM CHLORIDE, PRESERVATIVE FREE 10 ML: 5 INJECTION INTRAVENOUS at 08:39

## 2020-02-28 ASSESSMENT — PAIN - FUNCTIONAL ASSESSMENT: PAIN_FUNCTIONAL_ASSESSMENT: PREVENTS OR INTERFERES SOME ACTIVE ACTIVITIES AND ADLS

## 2020-02-28 ASSESSMENT — PAIN SCALES - GENERAL
PAINLEVEL_OUTOF10: 0
PAINLEVEL_OUTOF10: 4
PAINLEVEL_OUTOF10: 0
PAINLEVEL_OUTOF10: 0

## 2020-02-28 ASSESSMENT — PAIN DESCRIPTION - DESCRIPTORS: DESCRIPTORS: DISCOMFORT

## 2020-02-28 ASSESSMENT — PAIN DESCRIPTION - PAIN TYPE: TYPE: ACUTE PAIN

## 2020-02-28 ASSESSMENT — PAIN DESCRIPTION - FREQUENCY: FREQUENCY: INTERMITTENT

## 2020-02-28 ASSESSMENT — PAIN DESCRIPTION - LOCATION: LOCATION: GENERALIZED

## 2020-02-28 ASSESSMENT — PAIN DESCRIPTION - ONSET: ONSET: AWAKENED FROM SLEEP

## 2020-02-28 ASSESSMENT — PAIN DESCRIPTION - PROGRESSION: CLINICAL_PROGRESSION: NOT CHANGED

## 2020-02-28 NOTE — PROGRESS NOTES
ONCOLOGY HEMATOLOGY CARE ([facility])  PROGRESS NOTE      Patient was seen and examined today. Clinically stable. She had Golytely and rectal bleed. PHYSICAL EXAM    Vitals: /79   Pulse 121   Temp 97.6 °F (36.4 °C) (Oral)   Resp 16   Ht 5' 3\" (1.6 m)   Wt 95 lb 7.3 oz (43.3 kg)   LMP 01/11/2000   SpO2 99%   BMI 16.91 kg/m²   CONSTITUTIONAL: awake, alert, cooperative, no apparent distress   EYES: pupils equal, round and reactive to light, sclera clear and conjunctiva pallor  ENT: Normocephalic, without obvious abnormality, atraumatic  NECK: supple, symmetrical, no jugular venous distension and no carotid bruits   HEMATOLOGIC/LYMPHATIC: no cervical, supraclavicular or axillary lymphadenopathy   LUNGS: no increased work of breathing and clear to auscultation   CARDIOVASCULAR: regular rate and rhythm, normal S1 and S2, no murmur noted  ABDOMEN: normal bowel sounds x 4, soft, non-distended, non-tender, no masses palpated, no hepatosplenomgaly   MUSCULOSKELETAL: full range of motion noted, tone is normal  NEUROLOGIC: awake, alert, oriented to name, place and time. Motor skills grossly intact. SKIN: Normal skin color, texture, turgor and no jaundice. appears intact   EXTREMITIES: no LE edema     LABORATORY RESULTS  CBC:   Recent Labs     02/26/20  1215 02/26/20  1950  02/27/20  0502  02/27/20  1536 02/27/20  2355 02/28/20  0530   WBC 16.7* 14.0*  --  13.3*  --   --   --   --    HGB 7.8* 5.6  HGB AND HCT CALLED TO ICU KATHLEEN RANDHAWA @ 2007 [id number] MLT JMCCLOSKEY   RESULTS READ BACK  *   < > 6.3  HGB CALLED TO 2N TO SYDNEE CULLEN RN AT 05:18 ON 2.27.20 BY KIM GARCIA MLS  RESULTS READ BACK  *   < > 8.5* 7.6* 7.9*    150  --  132*  --   --   --   --     < > = values in this interval not displayed.      BMP:    Recent Labs     02/26/20  0311 02/27/20  0245 02/28/20  0600    141 150*   K 4.0 4.0 3.5    108 112*   CO2 18* 22 28   BUN 36* 38* 28*   CREATININE 0.8 0.8 0.7   GLUCOSE 104* 129* 78

## 2020-02-28 NOTE — PROGRESS NOTES
Admit Date:  2/18/2020    Admission diagnosis / Complaint :       Subjective:  Ms. Demetris Marrero is resting in bed. Started bleeding rectally lastnight. To have colonoscopy today. She appears tired. Objective:   BP 89/62   Pulse 125   Temp 97.4 °F (36.3 °C) (Oral)   Resp 14   Ht 5' 3\" (1.6 m)   Wt 95 lb 7.3 oz (43.3 kg)   LMP 01/11/2000   SpO2 100%   BMI 16.91 kg/m²       Intake/Output Summary (Last 24 hours) at 2/28/2020 1237  Last data filed at 2/28/2020 0725  Gross per 24 hour   Intake 3176 ml   Output 1425 ml   Net 1751 ml       TELEMETRY: Sinus tachycardia   has a past medical history of B-cell lymphoma ([facility] and Bipolar 1 disorder ([facility] has a past surgical history that includes Tubal ligation (20+ yrs ago); Tonsillectomy (as a kid); Colonoscopy (9/4/2015); [facility] Surgery (N/A, 1/7/2020); Port Surgery (Left, 2/20/2020); and Upper gastrointestinal endoscopy (N/A, 2/27/2020).      Physical Exam:  General:  Awake, alert, NAD  Skin:  Warm and dry  Neck:  JVD not present  Chest:  Clear to auscultation, respiration easy  Cardiovascular:  RRR S1S2  Abdomen:  Soft nontender  Extremities:  No edema    Medications:    vancomycin  750 mg Intravenous Q12H    pantoprazole  40 mg Intravenous Daily    metoclopramide  5 mg Intravenous TID AC    metoclopramide  10 mg Intramuscular Once    metoprolol  5 mg Intravenous Q6H    metroNIDAZOLE  500 mg Intravenous Q8H    ceftolozane-tazobactam (ZERBAXA) IVPB  1.5 g Intravenous Q8H    lidocaine PF  5 mL Intradermal Once    sodium chloride flush  10 mL Intravenous 2 times per day    allopurinol  100 mg Oral Daily    calcium elemental  500 mg Oral Daily with breakfast    divalproex  500 mg Oral QAM    divalproex  1,000 mg Oral QPM    sodium chloride  20 mL Intravenous Once      0.45 % NaCl with KCl 20 mEq 75 mL/hr at 02/28/20 0839     iohexol, sodium chloride flush, traMADol, sodium chloride flush, ondansetron, acetaminophen, polyethylene glycol, melatonin    Lab

## 2020-02-28 NOTE — PROGRESS NOTES
[address]  consulted by Dr. Sherice Walker for monitoring and adjustment. Indication for treatment: Worsening Leukocytosis  Goal trough: 15 mcg/mL    VANCOMYCIN LAB REVIEW    TROUGH:    Recent Labs     02/27/20  2359   VANCOTROUGH 8.8*     CrCl cannot be calculated (Unknown ideal weight. ). Recent Labs     02/25/20  0603 02/25/20  0629 02/25/20  0857  02/26/20  0311 02/26/20  1215 02/26/20  1950 02/27/20  0245 02/27/20  0502   WBC 37.7*  --  40.2  CHECKS  *   < > 18.2* 16.7* 14.0*  --  13.3*   BUN 18  --   --   --  36*  --   --  38*  --    CREATININE 0.8  --   --   --  0.8  --   --  0.8  --    LACTATE  --  3.6  LACTIC CALLED TO LEIDY BISHOP RN @ 0751 [id number] BY NYLA MLT  RESULTS READ BACK  * 4.3*  --  1.9  --   --   --   --     < > = values in this interval not displayed.      WBC   Date Value Ref Range Status   02/27/2020 13.3 (H) 4.0 - 10.5 K/CU MM Final     Lactate   Date Value Ref Range Status   02/26/2020 1.9 0.4 - 2.0 mMOL/L Final   02/25/2020 4.3 (HH) 0.4 - 2.0 mMOL/L Final   02/25/2020 (HH) 0.4 - 2.0 mMOL/L Final    3.6  LACTIC CALLED TO LEIDY BISHOP RN @ 0751 [id number] BY Globial MLT  RESULTS READ BACK       Temp Readings from Last 3 Encounters:   02/27/20 98.3 °F (36.8 °C) (Oral)   01/07/20 97.4 °F (36.3 °C) (Temporal)   03/12/19 97.3 °F (36.3 °C) (Temporal)       Intake/Output Summary (Last 24 hours) at 2/28/2020 0403  Last data filed at 2/27/2020 1631  Gross per 24 hour   Intake 6819.5 ml   Output 625 ml   Net 6194.5 ml     -Vancomycin trough = 8.8 (~ 20 hours post dose)  -Adjust to Vancomycin 750 mg IV Q 12 Hours  -Check Vancomycin trough 02/29/2020 @ 16:30  ______________________________________________________________________

## 2020-02-28 NOTE — PROGRESS NOTES
Margaret Rainey is a 59 y.o. female patient belly pain better    Current Facility-Administered Medications   Medication Dose Route Frequency Provider Last Rate Last Dose    vancomycin (VANCOCIN) 750 mg in dextrose 5 % 250 mL IVPB  750 mg Intravenous Q12H Flaquita Calderón  mL/hr at 02/28/20 0525 750 mg at 02/28/20 0525    polyethylene glycol (GoLYTELY) solution 2,000 mL  2,000 mL Oral Once Ananth Hughes MD        0.45 % NaCl with KCl 20 mEq infusion   Intravenous Continuous Trebenjamin Ramirez MD        pantoprazole (PROTONIX) injection 40 mg  40 mg Intravenous Daily Anatnh Hughes MD        metoclopramide (REGLAN) injection 5 mg  5 mg Intravenous TID AC Ananth Hughes MD   5 mg at 02/26/20 1606    metoclopramide (REGLAN) injection 10 mg  10 mg Intramuscular Once Ananth Hughes MD        iohexol (OMNIPAQUE 240) injection 50 mL  50 mL Oral ONCE PRN Ananth Hughes MD        metoprolol (LOPRESSOR) injection 5 mg  5 mg Intravenous Q6H Ananth Hugehs MD   5 mg at 02/26/20 2240    metronidazole (FLAGYL) 500 mg in NaCl 100 mL IVPB premix  500 mg Intravenous Citlali Ascencio  mL/hr at 02/28/20 0602 500 mg at 02/28/20 0602    ceftolozane-tazobactam (ZERBAXA) 1.5 g in dextrose 5 % 100 mL IVPB  1.5 g Intravenous Citlali Ascencio  mL/hr at 02/28/20 0526 1.5 g at 02/28/20 0526    lidocaine PF 1 % injection 5 mL  5 mL Intradermal Once Ananth Hughes MD        sodium chloride flush 0.9 % injection 10 mL  10 mL Intravenous 2 times per day Ananth Hughes MD   10 mL at 02/27/20 2052    sodium chloride flush 0.9 % injection 10 mL  10 mL Intravenous PRN Ananth Hughes MD        traMADol Nicole Shall) tablet 50 mg  50 mg Oral Q6H PRN Ananth Hughes MD   50 mg at 02/28/20 0229    allopurinol (ZYLOPRIM) tablet 100 mg  100 mg Oral Daily Ananth Hughes MD   100 mg at 02/26/20 1148    calcium elemental (OSCAL) tablet 500 mg  500 mg Oral Daily with breakfast Ananth Hughes MD   Stopped at 02/26/20 0800  divalproex (DEPAKOTE) DR tablet 500 mg  500 mg Oral QAM Marcel Correia MD   500 mg at 02/26/20 1148    divalproex (DEPAKOTE) DR tablet 1,000 mg  1,000 mg Oral QPM Marcel Correia MD   1,000 mg at 02/24/20 2129    sodium chloride flush 0.9 % injection 10 mL  10 mL Intravenous PRN Marcel Correia MD        ondansetron [facility]) injection 4 mg  4 mg Intravenous Q6H PRN Marcel Correia MD   4 mg at 02/26/20 0335    acetaminophen (TYLENOL) tablet 650 mg  650 mg Oral Q4H PRN Marcel Correia MD   650 mg at 02/18/20 2121    polyethylene glycol (GLYCOLAX) packet 17 g  17 g Oral Daily PRN Marcel Correia MD        melatonin tablet 1 mg  1 mg Oral Nightly PRN Marcel Correia MD   1 mg at 02/18/20 2122    0.9 % sodium chloride bolus  20 mL Intravenous Once Marcel Correia MD         Allergies   Allergen Reactions    Abilify [Aripiprazole]     Iodine     Penicillins     Codeine Nausea And Vomiting     Active Problems:    Pancytopenia ([address]    Severe malnutrition ([address]    Acute deep vein thrombosis (DVT) of left upper extremity ([address]    Hematoma  Resolved Problems:    * No resolved hospital problems. *    Blood pressure 120/79, pulse 121, temperature 97.6 °F (36.4 °C), temperature source Oral, resp. rate 16, height 5' 3\" (1.6 m), weight 95 lb 7.3 oz (43.3 kg), last menstrual period 01/11/2000, SpO2 99 %, not currently breastfeeding. Subjective:  Symptoms:  Worsening. Diet:  Poor intake. Pain:  She complains of pain that is mild. Objective:  General Appearance:  Comfortable. Vital signs: (most recent): Blood pressure 120/79, pulse 121, temperature 97.6 °F (36.4 °C), temperature source Oral, resp. rate 16, height 5' 3\" (1.6 m), weight 95 lb 7.3 oz (43.3 kg), last menstrual period 01/11/2000, SpO2 99 %, not currently breastfeeding. (Tachy). Lungs:  Normal effort. Heart: Tachycardia. Abdomen: Abdomen is soft. There is no abdominal tenderness. Extremities: Decreased range of motion.

## 2020-02-28 NOTE — ANESTHESIA PRE PROCEDURE
intravenously every 12 hours for 10 days 20 each [address] by Does not apply route 1 each 0     Facility-Administered Medications Ordered in Other Visits   Medication Dose Route Frequency Provider Last Rate Last Dose    vancomycin (VANCOCIN) 750 mg in dextrose 5 % 250 mL IVPB  750 mg Intravenous Q12H Caitlin Beth  mL/hr at 02/28/20 0525 750 mg at 02/28/20 0525    polyethylene glycol (GoLYTELY) solution 2,000 mL  2,000 mL Oral Once Juan Hardy MD        pantoprazole (PROTONIX) injection 40 mg  40 mg Intravenous Daily Juan Hardy MD        metoclopramide (REGLAN) injection 5 mg  5 mg Intravenous TID [facility] Juan Hardy MD   5 mg at 02/26/20 1606    metoclopramide (REGLAN) injection 10 mg  10 mg Intramuscular Once Juan Hardy MD        iohexol (OMNIPAQUE 240) injection 50 mL  50 mL Oral ONCE PRN Juan Hardy MD        metoprolol (LOPRESSOR) injection 5 mg  5 mg Intravenous Q6H Juan Hardy MD   5 mg at 02/26/20 2240    metronidazole (FLAGYL) 500 mg in NaCl 100 mL IVPB premix  500 mg Intravenous Judy Horowitz  mL/hr at 02/28/20 0602 500 mg at 02/28/20 0602    ceftolozane-tazobactam (ZERBAXA) 1.5 g in dextrose 5 % 100 mL IVPB  1.5 g Intravenous Judy Horowitz  mL/hr at 02/28/20 0526 1.5 g at 02/28/20 0526    sodium bicarbonate 150 mEq in sodium chloride 0.45 % 1,000 mL infusion   Intravenous Continuous Juan Hardy MD 75 mL/hr at 02/27/20 1722      lidocaine PF 1 % injection 5 mL  5 mL Intradermal Once Juan Hardy MD        sodium chloride flush 0.9 % injection 10 mL  10 mL Intravenous 2 times per day Juan Hardy MD   10 mL at 02/27/20 2052    sodium chloride flush 0.9 % injection 10 mL  10 mL Intravenous PRN Juan Hardy MD        traMADol Leitha Mill) tablet 50 mg  50 mg Oral Q6H PRN Juan Hardy MD   50 mg at 02/28/20 0229    allopurinol (ZYLOPRIM) tablet 100 mg  100 mg Oral Daily Juan Hardy MD   100 mg at 02/26/20 1142    calcium elemental (OSCAL) tablet 500 mg  500 mg Oral Daily with breakfast Merced Read MD   Stopped at 02/26/20 0800    divalproex (DEPAKOTE) DR tablet 500 mg  500 mg Oral QAM Merced Read MD   500 mg at 02/26/20 1148    divalproex (DEPAKOTE) DR tablet 1,000 mg  1,000 mg Oral QPM Merced Read MD   1,000 mg at 02/24/20 2129    sodium chloride flush 0.9 % injection 10 mL  10 mL Intravenous PRN Merced Read MD        ondansetron [facility] injection 4 mg  4 mg Intravenous Q6H PRN Merced Read MD   4 mg at 02/26/20 0335    acetaminophen (TYLENOL) tablet 650 mg  650 mg Oral Q4H PRN Merced Read MD   650 mg at 02/18/20 2121    polyethylene glycol (GLYCOLAX) packet 17 g  17 g Oral Daily PRN Merced Read MD        melatonin tablet 1 mg  1 mg Oral Nightly PRN Merced Read MD   1 mg at 02/18/20 2122    0.9 % sodium chloride bolus  20 mL Intravenous Once Merced Read MD           Allergies: Allergies   Allergen Reactions    Abilify [Aripiprazole]     Iodine     Penicillins     Codeine Nausea And Vomiting       Problem List:    Patient Active Problem List   Diagnosis Code    Family history of malignant neoplasm of gastrointestinal tract Z80.0    B-cell lymphoma (HCC) C85.10    Idiopathic hypotension I95.0    Mitral valve disease I05.9    Pancytopenia ([address] D61.818    Severe malnutrition ([address] E43    Acute deep vein thrombosis (DVT) of left upper extremity (HCC) I82.622    Hematoma T14. 8XXA       Past Medical History:        Diagnosis Date    B-cell lymphoma ([address]     Bipolar 1 disorder ([address]     \"on Depakote for this\"       Past Surgical History:        Procedure Laterality Date    COLONOSCOPY  9/4/2015    normal colon    PORT SURGERY N/A 1/7/2020    PORT INSERTION performed by Mattie Yang MD at [address] Left 2/20/2020    PORT REMOVAL LEFT performed by Mattie Yang MD at [address] [place] [address]  as a kid    TUBAL LIGATION  20+ yrs ago    UPPER GASTROINTESTINAL ENDOSCOPY N/A 2020    EGD DIAGNOSTIC ONLY performed by Emeli Trinidad MD at [facility] ENDOSCOPY       Social History:    Social History     Tobacco Use    Smoking status: Former Smoker     Packs/day: 1.00     Years: 10.00     Pack years: 10.00     Types: Cigarettes     Last attempt to quit: 1988     Years since quittin.1    Smokeless tobacco: Never Used   Substance Use Topics    Alcohol use: No                                Counseling given: Not Answered      Vital Signs (Current): There were no vitals filed for this visit. BP Readings from Last 3 Encounters:   20 120/79   20 (!) 90/46   20 (!) 66/50       NPO Status:                                            12 hrs. BMI:   Wt Readings from Last 3 Encounters:   20 95 lb 7.3 oz (43.3 kg)   20 106 lb 3.2 oz (48.2 kg)   20 102 lb 4.8 oz (46.4 kg)     There is no height or weight on file to calculate BMI.    CBC:   Lab Results   Component Value Date    WBC 13.3 2020    RBC 2.26 2020    HGB 7.9 2020    HCT 24.5 2020    MCV 88.1 2020    RDW 16.5 2020     2020       CMP:   Lab Results   Component Value Date     2020    K 4.0 2020     2020    CO2 22 2020    BUN 38 2020    CREATININE 0.8 2020    GFRAA >60 2020    LABGLOM >60 2020    GLUCOSE 129 2020    PROT 5.5 2020    CALCIUM 7.3 2020    BILITOT 0.8 2020    ALKPHOS 50 2020    AST 14 2020    ALT 10 2020       POC Tests:   No results for input(s): POCGLU, POCNA, POCK, POCCL, POCBUN, POCHEMO, POCHCT in the last 72 hours.     Coags:   Lab Results   Component Value Date    PROTIME 16.1 2020    INR 1.33 2020       HCG (If Applicable):   Lab Results   Component Value Date    PREGTESTUR NEGATIVE 2020        ABGs: No

## 2020-02-28 NOTE — H&P
I have examined the patient within 24 hours  before the procedure and there is no change in the previous history and physical exam,which has been reviewed. There is no history of sleep apnea, snoring, or stridor. There has been no  previous adverse experience with sedation/anesthesia. There is no increased risk for aspiration of gastric contents. The patient has been instructed that all resuscitative measures (during the operative and immediate perioperative period) will be instituted in the unlikely event that they will be needed. The patient has no pertinent past surgical or FH other than listed in the original H&P.     ASA Class: 4  AIRWAY Class: 1    Consent form signed, witnessed and in soft chart

## 2020-02-29 LAB
ABO/RH: NORMAL
ANION GAP SERPL CALCULATED.3IONS-SCNC: 10 MMOL/L (ref 4–16)
ANTIBODY SCREEN: NEGATIVE
BUN BLDV-MCNC: 18 MG/DL (ref 6–23)
CALCIUM SERPL-MCNC: 7.1 MG/DL (ref 8.3–10.6)
CHLORIDE BLD-SCNC: 114 MMOL/L (ref 99–110)
CO2: 22 MMOL/L (ref 21–32)
CREAT SERPL-MCNC: 0.7 MG/DL (ref 0.6–1.1)
DOSE AMOUNT: NORMAL
DOSE TIME: NORMAL
GFR AFRICAN AMERICAN: >60 ML/MIN/1.73M2
GFR NON-AFRICAN AMERICAN: >60 ML/MIN/1.73M2
GLUCOSE BLD-MCNC: 134 MG/DL (ref 70–99)
HCT VFR BLD CALC: 26.8 % (ref 37–47)
HCT VFR BLD CALC: 27.3 % (ref 37–47)
HCT VFR BLD CALC: 30.9 % (ref 37–47)
HEMOGLOBIN: 10.1 GM/DL (ref 12.5–16)
HEMOGLOBIN: 9.1 GM/DL (ref 12.5–16)
HEMOGLOBIN: 9.2 GM/DL (ref 12.5–16)
HIGH SENSITIVE C-REACTIVE PROTEIN: 93.1 MG/L
MCH RBC QN AUTO: 29.8 PG (ref 27–31)
MCH RBC QN AUTO: 29.9 PG (ref 27–31)
MCH RBC QN AUTO: 30.5 PG (ref 27–31)
MCHC RBC AUTO-ENTMCNC: 32.7 % (ref 32–36)
MCHC RBC AUTO-ENTMCNC: 33.3 % (ref 32–36)
MCHC RBC AUTO-ENTMCNC: 34.3 % (ref 32–36)
MCV RBC AUTO: 88.7 FL (ref 78–100)
MCV RBC AUTO: 89.5 FL (ref 78–100)
MCV RBC AUTO: 91.4 FL (ref 78–100)
PDW BLD-RTO: 14.1 % (ref 11.7–14.9)
PDW BLD-RTO: 14.5 % (ref 11.7–14.9)
PDW BLD-RTO: 14.8 % (ref 11.7–14.9)
PLATELET # BLD: 163 K/CU MM (ref 140–440)
PLATELET # BLD: 178 K/CU MM (ref 140–440)
PLATELET # BLD: 192 K/CU MM (ref 140–440)
PMV BLD AUTO: 10.2 FL (ref 7.5–11.1)
PMV BLD AUTO: 10.2 FL (ref 7.5–11.1)
PMV BLD AUTO: 9.6 FL (ref 7.5–11.1)
POTASSIUM SERPL-SCNC: 3.3 MMOL/L (ref 3.5–5.1)
RBC # BLD: 3.02 M/CU MM (ref 4.2–5.4)
RBC # BLD: 3.05 M/CU MM (ref 4.2–5.4)
RBC # BLD: 3.38 M/CU MM (ref 4.2–5.4)
SODIUM BLD-SCNC: 146 MMOL/L (ref 135–145)
VANCOMYCIN TROUGH: 13.1 UG/ML (ref 10–20)
WBC # BLD: 14.9 K/CU MM (ref 4–10.5)
WBC # BLD: 15 K/CU MM (ref 4–10.5)
WBC # BLD: 16 K/CU MM (ref 4–10.5)

## 2020-02-29 PROCEDURE — P9016 RBC LEUKOCYTES REDUCED: HCPCS

## 2020-02-29 PROCEDURE — 6360000002 HC RX W HCPCS: Performed by: SPECIALIST

## 2020-02-29 PROCEDURE — 36592 COLLECT BLOOD FROM PICC: CPT

## 2020-02-29 PROCEDURE — 86850 RBC ANTIBODY SCREEN: CPT

## 2020-02-29 PROCEDURE — 36430 TRANSFUSION BLD/BLD COMPNT: CPT

## 2020-02-29 PROCEDURE — 86900 BLOOD TYPING SEROLOGIC ABO: CPT

## 2020-02-29 PROCEDURE — 2580000003 HC RX 258: Performed by: INTERNAL MEDICINE

## 2020-02-29 PROCEDURE — 2580000003 HC RX 258: Performed by: SPECIALIST

## 2020-02-29 PROCEDURE — 86927 PLASMA FRESH FROZEN: CPT

## 2020-02-29 PROCEDURE — 2000000000 HC ICU R&B

## 2020-02-29 PROCEDURE — 85027 COMPLETE CBC AUTOMATED: CPT

## 2020-02-29 PROCEDURE — 86901 BLOOD TYPING SEROLOGIC RH(D): CPT

## 2020-02-29 PROCEDURE — 80048 BASIC METABOLIC PNL TOTAL CA: CPT

## 2020-02-29 PROCEDURE — 86141 C-REACTIVE PROTEIN HS: CPT

## 2020-02-29 PROCEDURE — 6370000000 HC RX 637 (ALT 250 FOR IP): Performed by: SPECIALIST

## 2020-02-29 PROCEDURE — 6360000002 HC RX W HCPCS: Performed by: INTERNAL MEDICINE

## 2020-02-29 PROCEDURE — 99233 SBSQ HOSP IP/OBS HIGH 50: CPT | Performed by: SURGERY

## 2020-02-29 PROCEDURE — P9017 PLASMA 1 DONOR FRZ W/IN 8 HR: HCPCS

## 2020-02-29 PROCEDURE — 2500000003 HC RX 250 WO HCPCS: Performed by: SPECIALIST

## 2020-02-29 PROCEDURE — 99232 SBSQ HOSP IP/OBS MODERATE 35: CPT | Performed by: INTERNAL MEDICINE

## 2020-02-29 PROCEDURE — C9113 INJ PANTOPRAZOLE SODIUM, VIA: HCPCS | Performed by: SPECIALIST

## 2020-02-29 PROCEDURE — 80202 ASSAY OF VANCOMYCIN: CPT

## 2020-02-29 RX ORDER — 0.9 % SODIUM CHLORIDE 0.9 %
20 INTRAVENOUS SOLUTION INTRAVENOUS ONCE
Status: COMPLETED | OUTPATIENT
Start: 2020-02-29 | End: 2020-02-29

## 2020-02-29 RX ORDER — 0.9 % SODIUM CHLORIDE 0.9 %
20 INTRAVENOUS SOLUTION INTRAVENOUS ONCE
Status: DISCONTINUED | OUTPATIENT
Start: 2020-02-29 | End: 2020-03-05

## 2020-02-29 RX ADMIN — DIVALPROEX SODIUM 1000 MG: 250 TABLET, DELAYED RELEASE ORAL at 21:16

## 2020-02-29 RX ADMIN — DIVALPROEX SODIUM 500 MG: 250 TABLET, DELAYED RELEASE ORAL at 09:56

## 2020-02-29 RX ADMIN — POTASSIUM CHLORIDE AND SODIUM CHLORIDE: 450; 150 INJECTION, SOLUTION INTRAVENOUS at 12:31

## 2020-02-29 RX ADMIN — CEFTOLOZANE AND TAZOBACTAM 1.5 G: 1; .5 INJECTION, POWDER, LYOPHILIZED, FOR SOLUTION INTRAVENOUS at 15:04

## 2020-02-29 RX ADMIN — METRONIDAZOLE 500 MG: 500 INJECTION, SOLUTION INTRAVENOUS at 12:13

## 2020-02-29 RX ADMIN — ALLOPURINOL 100 MG: 100 TABLET ORAL at 09:56

## 2020-02-29 RX ADMIN — CEFTOLOZANE AND TAZOBACTAM 1.5 G: 1; .5 INJECTION, POWDER, LYOPHILIZED, FOR SOLUTION INTRAVENOUS at 22:55

## 2020-02-29 RX ADMIN — METRONIDAZOLE 500 MG: 500 INJECTION, SOLUTION INTRAVENOUS at 04:58

## 2020-02-29 RX ADMIN — VANCOMYCIN HYDROCHLORIDE 750 MG: 5 INJECTION, POWDER, LYOPHILIZED, FOR SOLUTION INTRAVENOUS at 16:24

## 2020-02-29 RX ADMIN — CALCIUM GLUCONATE 1 G: 98 INJECTION, SOLUTION INTRAVENOUS at 12:13

## 2020-02-29 RX ADMIN — CEFTOLOZANE AND TAZOBACTAM 1.5 G: 1; .5 INJECTION, POWDER, LYOPHILIZED, FOR SOLUTION INTRAVENOUS at 06:16

## 2020-02-29 RX ADMIN — Medication 500 MG: at 09:55

## 2020-02-29 RX ADMIN — VANCOMYCIN HYDROCHLORIDE 750 MG: 5 INJECTION, POWDER, LYOPHILIZED, FOR SOLUTION INTRAVENOUS at 05:15

## 2020-02-29 RX ADMIN — SODIUM CHLORIDE, PRESERVATIVE FREE 10 ML: 5 INJECTION INTRAVENOUS at 09:57

## 2020-02-29 RX ADMIN — METRONIDAZOLE 500 MG: 500 INJECTION, SOLUTION INTRAVENOUS at 21:13

## 2020-02-29 RX ADMIN — PANTOPRAZOLE SODIUM 40 MG: 40 INJECTION, POWDER, FOR SOLUTION INTRAVENOUS at 09:56

## 2020-02-29 RX ADMIN — SODIUM CHLORIDE, PRESERVATIVE FREE 10 ML: 5 INJECTION INTRAVENOUS at 21:17

## 2020-02-29 RX ADMIN — SODIUM CHLORIDE 20 ML: 9 INJECTION, SOLUTION INTRAVENOUS at 16:20

## 2020-02-29 ASSESSMENT — PAIN SCALES - GENERAL: PAINLEVEL_OUTOF10: 0

## 2020-02-29 NOTE — PROGRESS NOTES
GENERAL SURGERY PROGRESS NOTE    CC/HPI:           Patient feels the same. .. Vitals:    02/29/20 0517 02/29/20 0602 02/29/20 0702 02/29/20 0848   BP: 101/64 101/61 (!) 107/59 112/77   Pulse: 106 97 100 104   Resp: 17 16 17 14   Temp:    98.4 °F (36.9 °C)   TempSrc:       SpO2: 99% 98% 98%    Weight:       Height:         I/O last 3 completed shifts: In: 6030.6 [P.O.:600; I.V.:3610; Blood:730.8; IV Piggyback:1089.8]  Out: 425 [Urine:425]  No intake/output data recorded. DIET CLEAR LIQUID;    Recent Results (from the past 48 hour(s))   Hemoglobin and Hematocrit, Blood    Collection Time: 02/27/20 12:44 PM   Result Value Ref Range    Hemoglobin 8.1 (L) 12.5 - 16.0 GM/DL    Hematocrit 24.6 (L) 37 - 47 %   Hemoglobin and Hematocrit, Blood    Collection Time: 02/27/20  3:36 PM   Result Value Ref Range    Hemoglobin 8.5 (L) 12.5 - 16.0 GM/DL    Hematocrit 26.0 (L) 37 - 47 %   EKG 12 Lead    Collection Time: 02/27/20  5:09 PM   Result Value Ref Range    Ventricular Rate 95 BPM    Atrial Rate 95 BPM    P-R Interval 96 ms    QRS Duration 66 ms    Q-T Interval 312 ms    QTc Calculation (Bazett) 392 ms    P Axis 54 degrees    R Axis 13 degrees    T Axis -1 degrees    Diagnosis       Sinus rhythm with short CT  Low voltage QRS  Borderline ECG  When compared with ECG of 25-FEB-2020 05:48,  CT interval has decreased  Vent. rate has decreased BY  77 BPM  ST no longer depressed in Anterolateral leads  Confirmed by [facility] Jovita RIOS ([id number]) on 2/28/2020 3:03:47 PM     Troponin    Collection Time: 02/27/20  6:04 PM   Result Value Ref Range    Troponin T <0.010 <0.01 NG/ML   Hemoglobin and Hematocrit, Blood    Collection Time: 02/27/20 11:55 PM   Result Value Ref Range    Hemoglobin 7.6 (L) 12.5 - 16.0 GM/DL    Hematocrit 23.6 (L) 37 - 47 %   Vancomycin, trough    Collection Time: 02/27/20 11:59 PM   Result Value Ref Range    Vancomycin Tr 8.8 (L) 10 - 20 UG/ML    DOSE AMOUNT DOSE AMT.  GIVEN - UNKNOWN     DOSE TIME DOSE TIME GIVEN - UNKNOWN    Hemoglobin and Hematocrit, Blood    Collection Time: 02/28/20  5:30 AM   Result Value Ref Range    Hemoglobin 7.9 (L) 12.5 - 16.0 GM/DL    Hematocrit 24.5 (L) 37 - 47 %   Basic metabolic panel    Collection Time: 02/28/20  6:00 AM   Result Value Ref Range    Sodium 150 (H) 135 - 145 MMOL/L    Potassium 3.5 3.5 - 5.1 MMOL/L    Chloride 112 (H) 99 - 110 mMol/L    CO2 28 21 - 32 MMOL/L    Anion Gap 10 4 - 16    BUN 28 (H) 6 - 23 MG/DL    CREATININE 0.7 0.6 - 1.1 MG/DL    Glucose 78 70 - 99 MG/DL    Calcium 7.6 (L) 8.3 - 10.6 MG/DL    GFR Non-African American >60 >60 mL/min/1.73m2    GFR African American >60 >60 mL/min/1.73m2   C-Reactive Protein    Collection Time: 02/28/20  6:00 AM   Result Value Ref Range    CRP, High Sensitivity 49.0 mg/L   Procalcitonin    Collection Time: 02/28/20  6:00 AM   Result Value Ref Range    Procalcitonin 3.53    CBC    Collection Time: 02/28/20  9:50 AM   Result Value Ref Range    WBC 18.9 (H) 4.0 - 10.5 K/CU MM    RBC 2.67 (L) 4.2 - 5.4 M/CU MM    Hemoglobin 7.5 (L) 12.5 - 16.0 GM/DL    Hematocrit 24.2 (L) 37 - 47 %    MCV 90.6 78 - 100 FL    MCH 28.1 27 - 31 PG    MCHC 31.0 (L) 32.0 - 36.0 %    RDW 16.0 (H) 11.7 - 14.9 %    Platelets 100 460 - 586 K/CU MM    MPV 10.3 7.5 - 11.1 FL   Fibrinogen    Collection Time: 02/28/20 11:50 AM   Result Value Ref Range    Fibrinogen 434 196.9 - 442.1 MG/DL   Protime/INR & PTT    Collection Time: 02/28/20 11:50 AM   Result Value Ref Range    Protime 16.5 (H) 11.7 - 14.5 SECONDS    INR 1.36 INDEX    aPTT 36.9 25.1 - 37.1 SECONDS   CBC    Collection Time: 02/28/20 10:00 PM   Result Value Ref Range    WBC 14.5 (H) 4.0 - 10.5 K/CU MM    RBC 1.87 (L) 4.2 - 5.4 M/CU MM    Hemoglobin (LL) 12.5 - 16.0 GM/DL     5.3  CALLED TO  KATHLEEN CASH/[id number] 3411/MLT GOPAL SARAVIA  RESULTS READ BACK      Hematocrit 17.2 (LL) 37 - 47 %    MCV 92.0 78 - 100 FL    MCH 28.3 27 - 31 PG    MCHC 30.8 (L) 32.0 - 36.0 %    RDW 16.6 (H) 11.7 - 14.9 %

## 2020-02-29 NOTE — PROGRESS NOTES
right)  Neurological: Patient is alert. Pupils:  Pupils are equal, round, and reactive to light. Skin:  Warm.       Assessment & Plan  Acute encephalopathy(Resolved)  -CT head neg  Acute GI bleed with anemia due to suspected rectosigmoid ulcer  -s/p transfusion and egd with gastritis  -CTA abd neg for any GI bleed  -bleeding scan neg x2  -cscope with ulcer rectogmiod   -Transfused total of 17PRBC and transfuse FFP for milldy elevated PT  -fibrinogen > 100  Leukocytosis and diarrhea  -cdiff panel neg  Pancytopenia(now with leukocytosis) 2/2 chemo  -s/p17RBC  -S/p pegfilgram x 3doses  Neutropenic fevers with pseudomonas bacteremia  -due to mediport and remove  -vrial panel neg  -CXR neg, and UA neg  -repeat bld cx neg so far  - zerbaxa, flagyl and vanc  Tachycardia  -due to anemia  -CT PE neg for PE   Left breast hematoma  -2/2 fall at home improviing  -CTA chest with decrease in hematoma  DLBCL  Lymphoma   -s/p 2cycles of CHOPD  Bipolar  -continue depakote  Ext left upper DVT and also right arm DVT  -2/2 vasc catheter  -lovnox 50bid and holding with anemia/GI bleed  -mediport removed 2/20  -right mid line  For IV abx  Severe PCM  SVT  -EP recommends ivrabadine if continues  -IV BB  ABd pain  -CT abd with gas mesenteric lesion  -CTA abd with mass like area mesentary  -ID and GI  AGMA(resolved)  -bicarb drip stopped  Hypernatremia  -due to bicarb and place on hypotonic IVF  DVT prophyalxis  -lovenox held with anemia    Critical care time 30min 9-930am  Arianna Oscar MD  2/29/2020

## 2020-02-29 NOTE — PROGRESS NOTES
Hb decreased 2 grams over the last 24 hours down to 5.3--- 2 units ordered  Abdomen soft, non-tender, non-distended  Plan:   1) continue to monitor closely   2) will give 2 units FFP   3) will notify surgical consultants if bleeding continues

## 2020-02-29 NOTE — PROGRESS NOTES
[address] [facility]  consulted by Dr. Ronit Max for monitoring and adjustment. Indication for treatment: Worsening Leukocytosis  Goal trough: 15 mcg/mL     Pertinent Laboratory Values:   Temp Readings from Last 3 Encounters:   02/29/20 97.8 °F (36.6 °C)   01/07/20 97.4 °F (36.3 °C) (Temporal)   03/12/19 97.3 °F (36.3 °C) (Temporal)     Recent Labs     02/28/20  2200 02/29/20  0600 02/29/20  1510   WBC 14.5* 15.0* 14.9*     Recent Labs     02/27/20  0245 02/28/20  0600 02/29/20  0600   BUN 38* 28* 18   CREATININE 0.8 0.7 0.7     CrCl cannot be calculated (Unknown ideal weight.). Intake/Output Summary (Last 24 hours) at 2/29/2020 1713  Last data filed at 2/29/2020 0517  Gross per 24 hour   Intake 2629.63 ml   Output --   Net 2629.63 ml       Vancomycin level:   TROUGH:    Recent Labs     02/27/20  2359 02/29/20  1510   VANCOTROUGH 8.8* 13.1     RANDOM:  No results for input(s): VANCORANDOM in the last 72 hours. Assessment:  · WBC and temperature: Elevated; Afebrile. · SCr, BUN, and urine output: WNL, stable  · Day(s) of therapy: #5  · Vancomycin level: 13.1, therapeutic    Plan:  · Vancomycin dosing was increased to 750 mg IVPB q12h following sub-therapeutic level. · Continue vancomycin 750 mg IVPB q12h as expect some accumulation with repeat doses. · Repeat trough level in 48h. .  · A trough level is ordered: 03-02-20 @ 1430. · Pharmacy will continue to monitor patient and adjust therapy as indicated.     Thank you for the consult,    Luz Marina Kim, PharmD, [facility]

## 2020-02-29 NOTE — PROGRESS NOTES
ONCOLOGY HEMATOLOGY CARE ([facility])  PROGRESS NOTE      Patient was seen and examined today. She has more than 2 grams drop in hemoglobin over last 24 hours. She received 2 units of PRBC last night and she is currently receiving 2 units of FFP. Her hemoglobin this morning was 10.1 and platelet count is 212. She is not in distress now. No new complaints. PHYSICAL EXAM    Vitals: /77   Pulse 104   Temp 98.4 °F (36.9 °C)   Resp 14   Ht 5' 3\" (1.6 m)   Wt 95 lb 7.3 oz (43.3 kg)   LMP 01/11/2000   SpO2 98%   BMI 16.91 kg/m²   CONSTITUTIONAL: awake, alert, cooperative, no apparent distress, tired and fatigue   EYES: pupils equal, round and reactive to light, sclera clear and conjunctiva normal. Pale. ENT: Normocephalic, without obvious abnormality, atraumatic  NECK: supple, symmetrical, no jugular venous distension and no carotid bruits   HEMATOLOGIC/LYMPHATIC: no cervical, supraclavicular or axillary lymphadenopathy   LUNGS: no increased work of breathing and clear to auscultation   CARDIOVASCULAR: regular rate and rhythm, normal S1 and S2, no murmur noted  ABDOMEN: normal bowel sounds x 4, soft, non-distended, non-tender, no masses palpated, no hepatosplenomgaly   MUSCULOSKELETAL: full range of motion noted, tone is normal  NEUROLOGIC: awake, alert, oriented to name, place and time. Motor skills grossly intact. SKIN: Normal skin color, texture, turgor and no jaundice.  appears intact   EXTREMITIES: no LE edema     LABORATORY RESULTS  CBC:   Recent Labs     02/28/20  0950 02/28/20  2200 02/29/20  0600   WBC 18.9* 14.5* 15.0*   HGB 7.5* 5.3  CALLED TO  KATHLEEN CASH/[id number] 6084/MLT GOPAL SARAVIA  RESULTS READ BACK  * 10.1*    185 163     BMP:    Recent Labs     02/27/20  0245 02/28/20  0600 02/29/20  0600    150* 146*   K 4.0 3.5 3.3*    112* 114*   CO2 22 28 22   BUN 38* 28* 18   CREATININE 0.8 0.7 0.7   GLUCOSE 129* 78 134*     Hepatic: No results for input(s): AST, ALT,

## 2020-02-29 NOTE — CONSULTS
Consult completed. CVC declot performed with Cathflo instill into [place]. No blood return from either lumen noted /p 1h dwell. /P 2h max dwell & aspiration per protocol Brown lumen now returns blood briskly & flushes without any resistance or any abnormalities noted. Blue Lumen returns blood wnl and flushes without any resistance. White lumen does not return blood but flushes without any resistance or other abnormalities noted. Chantal, Primary RN, notified and no other c/o or needs noted or reported.

## 2020-03-01 LAB
ABO/RH: NORMAL
ANION GAP SERPL CALCULATED.3IONS-SCNC: 9 MMOL/L (ref 4–16)
ANTIBODY SCREEN: NEGATIVE
BUN BLDV-MCNC: 10 MG/DL (ref 6–23)
CALCIUM SERPL-MCNC: 7.5 MG/DL (ref 8.3–10.6)
CHLORIDE BLD-SCNC: 109 MMOL/L (ref 99–110)
CO2: 22 MMOL/L (ref 21–32)
COMPONENT: NORMAL
CREAT SERPL-MCNC: 0.6 MG/DL (ref 0.6–1.1)
CROSSMATCH RESULT: NORMAL
CULTURE: NORMAL
CULTURE: NORMAL
GFR AFRICAN AMERICAN: >60 ML/MIN/1.73M2
GFR NON-AFRICAN AMERICAN: >60 ML/MIN/1.73M2
GLUCOSE BLD-MCNC: 106 MG/DL (ref 70–99)
HCT VFR BLD CALC: 27.4 % (ref 37–47)
HCT VFR BLD CALC: 30.4 % (ref 37–47)
HCT VFR BLD CALC: 32 % (ref 37–47)
HCT VFR BLD CALC: 32.5 % (ref 37–47)
HEMOGLOBIN: 10.2 GM/DL (ref 12.5–16)
HEMOGLOBIN: 10.9 GM/DL (ref 12.5–16)
HEMOGLOBIN: 11 GM/DL (ref 12.5–16)
HEMOGLOBIN: 9.4 GM/DL (ref 12.5–16)
Lab: NORMAL
Lab: NORMAL
MCH RBC QN AUTO: 29.9 PG (ref 27–31)
MCH RBC QN AUTO: 30.4 PG (ref 27–31)
MCH RBC QN AUTO: 30.5 PG (ref 27–31)
MCHC RBC AUTO-ENTMCNC: 33.5 % (ref 32–36)
MCHC RBC AUTO-ENTMCNC: 33.6 % (ref 32–36)
MCHC RBC AUTO-ENTMCNC: 34.3 % (ref 32–36)
MCV RBC AUTO: 89 FL (ref 78–100)
MCV RBC AUTO: 89 FL (ref 78–100)
MCV RBC AUTO: 90.5 FL (ref 78–100)
PDW BLD-RTO: 15 % (ref 11.7–14.9)
PDW BLD-RTO: 15 % (ref 11.7–14.9)
PDW BLD-RTO: 15.4 % (ref 11.7–14.9)
PLATELET # BLD: 187 K/CU MM (ref 140–440)
PLATELET # BLD: 210 K/CU MM (ref 140–440)
PLATELET # BLD: 244 K/CU MM (ref 140–440)
PMV BLD AUTO: 9.7 FL (ref 7.5–11.1)
PMV BLD AUTO: 9.7 FL (ref 7.5–11.1)
PMV BLD AUTO: 9.9 FL (ref 7.5–11.1)
POTASSIUM SERPL-SCNC: 3.3 MMOL/L (ref 3.5–5.1)
PROCALCITONIN: 1.1
RBC # BLD: 3.08 M/CU MM (ref 4.2–5.4)
RBC # BLD: 3.36 M/CU MM (ref 4.2–5.4)
RBC # BLD: 3.65 M/CU MM (ref 4.2–5.4)
SODIUM BLD-SCNC: 140 MMOL/L (ref 135–145)
SPECIMEN: NORMAL
SPECIMEN: NORMAL
STATUS: NORMAL
TRANSFUSION STATUS: NORMAL
UNIT DIVISION: 0
UNIT NUMBER: NORMAL
WBC # BLD: 16.8 K/CU MM (ref 4–10.5)
WBC # BLD: 16.9 K/CU MM (ref 4–10.5)
WBC # BLD: 18 K/CU MM (ref 4–10.5)

## 2020-03-01 PROCEDURE — 2580000003 HC RX 258: Performed by: SPECIALIST

## 2020-03-01 PROCEDURE — 85014 HEMATOCRIT: CPT

## 2020-03-01 PROCEDURE — 80048 BASIC METABOLIC PNL TOTAL CA: CPT

## 2020-03-01 PROCEDURE — 6360000002 HC RX W HCPCS: Performed by: INTERNAL MEDICINE

## 2020-03-01 PROCEDURE — 6370000000 HC RX 637 (ALT 250 FOR IP): Performed by: HOSPITALIST

## 2020-03-01 PROCEDURE — 2580000003 HC RX 258: Performed by: INTERNAL MEDICINE

## 2020-03-01 PROCEDURE — 6360000002 HC RX W HCPCS: Performed by: SPECIALIST

## 2020-03-01 PROCEDURE — 2000000000 HC ICU R&B

## 2020-03-01 PROCEDURE — C9113 INJ PANTOPRAZOLE SODIUM, VIA: HCPCS | Performed by: SPECIALIST

## 2020-03-01 PROCEDURE — 85018 HEMOGLOBIN: CPT

## 2020-03-01 PROCEDURE — 2500000003 HC RX 250 WO HCPCS: Performed by: SPECIALIST

## 2020-03-01 PROCEDURE — 87324 CLOSTRIDIUM AG IA: CPT

## 2020-03-01 PROCEDURE — 99232 SBSQ HOSP IP/OBS MODERATE 35: CPT | Performed by: INTERNAL MEDICINE

## 2020-03-01 PROCEDURE — 84145 PROCALCITONIN (PCT): CPT

## 2020-03-01 PROCEDURE — 99233 SBSQ HOSP IP/OBS HIGH 50: CPT | Performed by: SURGERY

## 2020-03-01 PROCEDURE — 85384 FIBRINOGEN ACTIVITY: CPT

## 2020-03-01 PROCEDURE — 6370000000 HC RX 637 (ALT 250 FOR IP): Performed by: SPECIALIST

## 2020-03-01 PROCEDURE — 85027 COMPLETE CBC AUTOMATED: CPT

## 2020-03-01 RX ORDER — POTASSIUM CHLORIDE 20 MEQ/1
40 TABLET, EXTENDED RELEASE ORAL PRN
Status: DISCONTINUED | OUTPATIENT
Start: 2020-03-01 | End: 2020-03-11 | Stop reason: HOSPADM

## 2020-03-01 RX ORDER — POTASSIUM CHLORIDE 7.45 MG/ML
10 INJECTION INTRAVENOUS PRN
Status: DISCONTINUED | OUTPATIENT
Start: 2020-03-01 | End: 2020-03-11 | Stop reason: HOSPADM

## 2020-03-01 RX ADMIN — POTASSIUM CHLORIDE AND SODIUM CHLORIDE: 450; 150 INJECTION, SOLUTION INTRAVENOUS at 05:03

## 2020-03-01 RX ADMIN — METRONIDAZOLE 500 MG: 500 INJECTION, SOLUTION INTRAVENOUS at 20:44

## 2020-03-01 RX ADMIN — METRONIDAZOLE 500 MG: 500 INJECTION, SOLUTION INTRAVENOUS at 05:03

## 2020-03-01 RX ADMIN — ALLOPURINOL 100 MG: 100 TABLET ORAL at 08:58

## 2020-03-01 RX ADMIN — METRONIDAZOLE 500 MG: 500 INJECTION, SOLUTION INTRAVENOUS at 14:10

## 2020-03-01 RX ADMIN — POTASSIUM CHLORIDE 40 MEQ: 20 TABLET, EXTENDED RELEASE ORAL at 09:02

## 2020-03-01 RX ADMIN — CEFTOLOZANE AND TAZOBACTAM 1.5 G: 1; .5 INJECTION, POWDER, LYOPHILIZED, FOR SOLUTION INTRAVENOUS at 06:24

## 2020-03-01 RX ADMIN — CALCIUM GLUCONATE 1 G: 98 INJECTION, SOLUTION INTRAVENOUS at 11:20

## 2020-03-01 RX ADMIN — DIVALPROEX SODIUM 500 MG: 250 TABLET, DELAYED RELEASE ORAL at 09:00

## 2020-03-01 RX ADMIN — Medication 500 MG: at 09:00

## 2020-03-01 RX ADMIN — SODIUM CHLORIDE, PRESERVATIVE FREE 10 ML: 5 INJECTION INTRAVENOUS at 08:58

## 2020-03-01 RX ADMIN — SODIUM CHLORIDE, PRESERVATIVE FREE 10 ML: 5 INJECTION INTRAVENOUS at 20:55

## 2020-03-01 RX ADMIN — DIVALPROEX SODIUM 1000 MG: 250 TABLET, DELAYED RELEASE ORAL at 20:44

## 2020-03-01 RX ADMIN — PANTOPRAZOLE SODIUM 40 MG: 40 INJECTION, POWDER, FOR SOLUTION INTRAVENOUS at 08:58

## 2020-03-01 RX ADMIN — CEFTOLOZANE AND TAZOBACTAM 3 G: 1; .5 INJECTION, POWDER, LYOPHILIZED, FOR SOLUTION INTRAVENOUS at 22:00

## 2020-03-01 RX ADMIN — POTASSIUM CHLORIDE AND SODIUM CHLORIDE: 450; 150 INJECTION, SOLUTION INTRAVENOUS at 22:39

## 2020-03-01 RX ADMIN — VANCOMYCIN HYDROCHLORIDE 750 MG: 5 INJECTION, POWDER, LYOPHILIZED, FOR SOLUTION INTRAVENOUS at 14:10

## 2020-03-01 RX ADMIN — CEFTOLOZANE AND TAZOBACTAM 1.5 G: 1; .5 INJECTION, POWDER, LYOPHILIZED, FOR SOLUTION INTRAVENOUS at 14:11

## 2020-03-01 RX ADMIN — VANCOMYCIN HYDROCHLORIDE 750 MG: 5 INJECTION, POWDER, LYOPHILIZED, FOR SOLUTION INTRAVENOUS at 03:30

## 2020-03-01 ASSESSMENT — PAIN SCALES - GENERAL
PAINLEVEL_OUTOF10: 0
PAINLEVEL_OUTOF10: 0

## 2020-03-01 NOTE — PROGRESS NOTES
Judy Corey is a 59 y.o. female patient hoping her blood counts will be stabl    Current Facility-Administered Medications   Medication Dose Route Frequency Provider Last Rate Last Dose    0.9 % sodium chloride bolus  20 mL Intravenous Once Chele Gould MD        vancomycin (VANCOCIN) 750 mg in dextrose 5 % 250 mL IVPB  750 mg Intravenous Q12H Chele Gould MD   Stopped at 03/01/20 0450    0.45 % NaCl with KCl 20 mEq infusion   Intravenous Continuous Chele Gould MD 75 mL/hr at 03/01/20 0503      pantoprazole (PROTONIX) injection 40 mg  40 mg Intravenous Daily Chele Gould MD   40 mg at 02/29/20 0956    metoclopramide (REGLAN) injection 10 mg  10 mg Intramuscular Once Chele Gould MD        iohexol (OMNIPAQUE 240) injection 50 mL  50 mL Oral ONCE PRN Chele Gould MD        metoprolol (LOPRESSOR) injection 5 mg  5 mg Intravenous Q6H Chele Gould MD   Stopped at 02/29/20 2111    metronidazole (FLAGYL) 500 mg in NaCl 100 mL IVPB premix  500 mg Intravenous Delfina Melendez MD   Stopped at 03/01/20 0603    ceftolozane-tazobactam (ZERBAXA) 1.5 g in dextrose 5 % 100 mL IVPB  1.5 g Intravenous Delfina Melendez MD   Stopped at 03/01/20 0736    lidocaine PF 1 % injection 5 mL  5 mL Intradermal Once Chele Gould MD        sodium chloride flush 0.9 % injection 10 mL  10 mL Intravenous 2 times per day Chele Gould MD   10 mL at 02/29/20 2117    sodium chloride flush 0.9 % injection 10 mL  10 mL Intravenous PRN Chele Gould MD        traMADol [place]) tablet 50 mg  50 mg Oral Q6H PRN Chele Gould MD   50 mg at 02/28/20 0229    allopurinol (ZYLOPRIM) tablet 100 mg  100 mg Oral Daily Chele Gould MD   100 mg at 02/29/20 9409    calcium elemental (OSCAL) tablet 500 mg  500 mg Oral Daily with breakfast Chele Gould MD   500 mg at 02/29/20 0955    divalproex (DEPAKOTE) DR tablet 500 mg  500 mg Oral QAM Tereasa Bryanna, MD   500 mg at 02/29/20 0956    divalproex

## 2020-03-01 NOTE — PROGRESS NOTES
ONCOLOGY HEMATOLOGY CARE ([facility])  PROGRESS NOTE      Patient was seen and examined today. She received 2 units of PRBC on 2/28/20. She received 2 units of FFP. Her hemoglobin this morning was 9.4 and platelet count is 175. Calcium is getting a little better and still has mild hypokalemia. She is not in distress now. No new complaints. PHYSICAL EXAM    Vitals: /65   Pulse 102   Temp 98.1 °F (36.7 °C) (Oral)   Resp 16   Ht 5' 3\" (1.6 m)   Wt 95 lb 7.3 oz (43.3 kg)   LMP 01/11/2000   SpO2 98%   BMI 16.91 kg/m²   CONSTITUTIONAL: awake, alert, cooperative, no apparent distress, tired and fatigue   EYES: pupils equal, round and reactive to light, sclera clear and conjunctiva normal. Pale. ENT: Normocephalic, without obvious abnormality, atraumatic  NECK: supple, symmetrical, no jugular venous distension and no carotid bruits   HEMATOLOGIC/LYMPHATIC: no cervical, supraclavicular or axillary lymphadenopathy   LUNGS: no increased work of breathing and clear to auscultation   CARDIOVASCULAR: regular rate and rhythm, normal S1 and S2, no murmur noted  ABDOMEN: normal bowel sounds x 4, soft, non-distended, non-tender, no masses palpated, no hepatosplenomgaly   MUSCULOSKELETAL: full range of motion noted, tone is normal  NEUROLOGIC: awake, alert, oriented to name, place and time. Motor skills grossly intact. SKIN: Normal skin color, texture, turgor and no jaundice. appears intact   EXTREMITIES: no LE edema     LABORATORY RESULTS  CBC:   Recent Labs     02/29/20  1510 02/29/20  2141 03/01/20  0414   WBC 14.9* 16.0* 16.8*   HGB 9.1* 9.2* 9.4*    192 187     BMP:    Recent Labs     02/28/20  0600 02/29/20  0600 03/01/20  0414   * 146* 140   K 3.5 3.3* 3.3*   * 114* 109   CO2 28 22 22   BUN 28* 18 10   CREATININE 0.7 0.7 0.6   GLUCOSE 78 134* 106*     Hepatic: No results for input(s): AST, ALT, ALB, BILITOT, ALKPHOS in the last 72 hours.   INR:   Recent Labs     02/28/20  1150   INR

## 2020-03-01 NOTE — PROGRESS NOTES
Result Value Ref Range    WBC 15.0 (H) 4.0 - 10.5 K/CU MM    RBC 3.38 (L) 4.2 - 5.4 M/CU MM    Hemoglobin 10.1 (L) 12.5 - 16.0 GM/DL    Hematocrit 30.9 (L) 37 - 47 %    MCV 91.4 78 - 100 FL    MCH 29.9 27 - 31 PG    MCHC 32.7 32.0 - 36.0 %    RDW 14.1 11.7 - 14.9 %    Platelets 563 617 - 904 K/CU MM    MPV 10.2 7.5 - 11.1 FL   Basic metabolic panel    Collection Time: 02/29/20  6:00 AM   Result Value Ref Range    Sodium 146 (H) 135 - 145 MMOL/L    Potassium 3.3 (L) 3.5 - 5.1 MMOL/L    Chloride 114 (H) 99 - 110 mMol/L    CO2 22 21 - 32 MMOL/L    Anion Gap 10 4 - 16    BUN 18 6 - 23 MG/DL    CREATININE 0.7 0.6 - 1.1 MG/DL    Glucose 134 (H) 70 - 99 MG/DL    Calcium 7.1 (L) 8.3 - 10.6 MG/DL    GFR Non-African American >60 >60 mL/min/1.73m2    GFR African American >60 >60 mL/min/1.73m2   C-Reactive Protein    Collection Time: 02/29/20  6:00 AM   Result Value Ref Range    CRP, High Sensitivity 93.1 mg/L   TYPE AND SCREEN    Collection Time: 02/29/20  6:00 AM   Result Value Ref Range    ABO/Rh O POSITIVE     Antibody Screen NEGATIVE    PREPARE FRESH FROZEN PLASMA, 1 Units    Collection Time: 02/29/20  8:25 AM   Result Value Ref Range    Unit Number [id number]     Component FRESH PLASMA     Unit Divison 00     Status ISSUED     Transfusion Status OK TO TRANSFUSE    PREPARE FRESH FROZEN PLASMA, 1 Units    Collection Time: 02/29/20 11:46 AM   Result Value Ref Range    Unit Number [id number]     Component FRESH PLASMA     Unit Divison 00     Status ISSUED     Transfusion Status OK TO TRANSFUSE    CBC    Collection Time: 02/29/20  3:10 PM   Result Value Ref Range    WBC 14.9 (H) 4.0 - 10.5 K/CU MM    RBC 3.05 (L) 4.2 - 5.4 M/CU MM    Hemoglobin 9.1 (L) 12.5 - 16.0 GM/DL    Hematocrit 27.3 (L) 37 - 47 %    MCV 89.5 78 - 100 FL    MCH 29.8 27 - 31 PG    MCHC 33.3 32.0 - 36.0 %    RDW 14.5 11.7 - 14.9 %    Platelets 940 221 - 043 K/CU MM    MPV 9.6 7.5 - 11.1 FL   Vancomycin, trough    Collection Time: 02/29/20  3:10 PM

## 2020-03-01 NOTE — PROGRESS NOTES
PHARMACY CONSULT FOR RENAL DOSING OF ZERBAXA PER DR PHILLIP    RENAL LAB EVALUATION  Recent Labs     02/28/20  0600 02/29/20  0600 03/01/20  0414   BUN 28* 18 10   CREATININE 0.7 0.7 0.6     Indication: Pseudomonas bacteremia   Goal dose: Zerbaxa 3g IVPB q8h. Renal trends improved  CrCl estimated to be >50 mL/min, plan to increase dose to goal: Zerbaxa 3g IVPB q8h.     Pharmacy will continue to follow and adjust dosing as appropriate,    April Gamino, BrissaD, [facility]  _______________________________________________________________________

## 2020-03-02 LAB
ANION GAP SERPL CALCULATED.3IONS-SCNC: 10 MMOL/L (ref 4–16)
BUN BLDV-MCNC: 8 MG/DL (ref 6–23)
CALCIUM SERPL-MCNC: 7.7 MG/DL (ref 8.3–10.6)
CHLORIDE BLD-SCNC: 109 MMOL/L (ref 99–110)
CO2: 16 MMOL/L (ref 21–32)
CREAT SERPL-MCNC: 0.5 MG/DL (ref 0.6–1.1)
GFR AFRICAN AMERICAN: >60 ML/MIN/1.73M2
GFR NON-AFRICAN AMERICAN: >60 ML/MIN/1.73M2
GLUCOSE BLD-MCNC: 88 MG/DL (ref 70–99)
HCT VFR BLD CALC: 30.1 % (ref 37–47)
HEMOGLOBIN: 10.2 GM/DL (ref 12.5–16)
MCH RBC QN AUTO: 30.3 PG (ref 27–31)
MCHC RBC AUTO-ENTMCNC: 33.9 % (ref 32–36)
MCV RBC AUTO: 89.3 FL (ref 78–100)
PDW BLD-RTO: 15.4 % (ref 11.7–14.9)
PLATELET # BLD: 237 K/CU MM (ref 140–440)
PMV BLD AUTO: 9.7 FL (ref 7.5–11.1)
POTASSIUM SERPL-SCNC: 4.1 MMOL/L (ref 3.5–5.1)
RBC # BLD: 3.37 M/CU MM (ref 4.2–5.4)
SODIUM BLD-SCNC: 135 MMOL/L (ref 135–145)
WBC # BLD: 17.7 K/CU MM (ref 4–10.5)

## 2020-03-02 PROCEDURE — 2580000003 HC RX 258: Performed by: INTERNAL MEDICINE

## 2020-03-02 PROCEDURE — 97164 PT RE-EVAL EST PLAN CARE: CPT

## 2020-03-02 PROCEDURE — 85027 COMPLETE CBC AUTOMATED: CPT

## 2020-03-02 PROCEDURE — 2580000003 HC RX 258: Performed by: SPECIALIST

## 2020-03-02 PROCEDURE — 6370000000 HC RX 637 (ALT 250 FOR IP): Performed by: SPECIALIST

## 2020-03-02 PROCEDURE — 2500000003 HC RX 250 WO HCPCS: Performed by: SPECIALIST

## 2020-03-02 PROCEDURE — 2000000000 HC ICU R&B

## 2020-03-02 PROCEDURE — 85018 HEMOGLOBIN: CPT

## 2020-03-02 PROCEDURE — 99232 SBSQ HOSP IP/OBS MODERATE 35: CPT | Performed by: NURSE PRACTITIONER

## 2020-03-02 PROCEDURE — C9113 INJ PANTOPRAZOLE SODIUM, VIA: HCPCS | Performed by: SPECIALIST

## 2020-03-02 PROCEDURE — 6360000002 HC RX W HCPCS: Performed by: SPECIALIST

## 2020-03-02 PROCEDURE — 80048 BASIC METABOLIC PNL TOTAL CA: CPT

## 2020-03-02 PROCEDURE — 6360000002 HC RX W HCPCS: Performed by: HOSPITALIST

## 2020-03-02 PROCEDURE — 6360000002 HC RX W HCPCS: Performed by: INTERNAL MEDICINE

## 2020-03-02 PROCEDURE — 94761 N-INVAS EAR/PLS OXIMETRY MLT: CPT

## 2020-03-02 PROCEDURE — 97166 OT EVAL MOD COMPLEX 45 MIN: CPT

## 2020-03-02 PROCEDURE — 97530 THERAPEUTIC ACTIVITIES: CPT

## 2020-03-02 PROCEDURE — 99232 SBSQ HOSP IP/OBS MODERATE 35: CPT | Performed by: INTERNAL MEDICINE

## 2020-03-02 RX ORDER — DIVALPROEX SODIUM 500 MG/1
1000 TABLET, EXTENDED RELEASE ORAL NIGHTLY
Status: ON HOLD | COMMUNITY
End: 2020-03-10 | Stop reason: HOSPADM

## 2020-03-02 RX ORDER — HEPARIN SODIUM 5000 [USP'U]/ML
5000 INJECTION, SOLUTION INTRAVENOUS; SUBCUTANEOUS DAILY
Status: DISCONTINUED | OUTPATIENT
Start: 2020-03-02 | End: 2020-03-05

## 2020-03-02 RX ORDER — DIVALPROEX SODIUM 500 MG/1
1000 TABLET, EXTENDED RELEASE ORAL NIGHTLY
Status: DISCONTINUED | OUTPATIENT
Start: 2020-03-03 | End: 2020-03-11 | Stop reason: HOSPADM

## 2020-03-02 RX ADMIN — PANTOPRAZOLE SODIUM 40 MG: 40 INJECTION, POWDER, FOR SOLUTION INTRAVENOUS at 08:18

## 2020-03-02 RX ADMIN — HEPARIN SODIUM 5000 UNITS: 5000 INJECTION, SOLUTION INTRAVENOUS; SUBCUTANEOUS at 09:16

## 2020-03-02 RX ADMIN — METRONIDAZOLE 500 MG: 500 INJECTION, SOLUTION INTRAVENOUS at 20:54

## 2020-03-02 RX ADMIN — SODIUM CHLORIDE, PRESERVATIVE FREE 10 ML: 5 INJECTION INTRAVENOUS at 08:18

## 2020-03-02 RX ADMIN — CEFTOLOZANE AND TAZOBACTAM 3 G: 1; .5 INJECTION, POWDER, LYOPHILIZED, FOR SOLUTION INTRAVENOUS at 21:41

## 2020-03-02 RX ADMIN — DIVALPROEX SODIUM 1000 MG: 250 TABLET, DELAYED RELEASE ORAL at 20:55

## 2020-03-02 RX ADMIN — METRONIDAZOLE 500 MG: 500 INJECTION, SOLUTION INTRAVENOUS at 04:42

## 2020-03-02 RX ADMIN — METRONIDAZOLE 500 MG: 500 INJECTION, SOLUTION INTRAVENOUS at 12:15

## 2020-03-02 RX ADMIN — ALLOPURINOL 100 MG: 100 TABLET ORAL at 08:18

## 2020-03-02 RX ADMIN — CALCIUM GLUCONATE 1 G: 98 INJECTION, SOLUTION INTRAVENOUS at 09:16

## 2020-03-02 RX ADMIN — CEFTOLOZANE AND TAZOBACTAM 3 G: 1; .5 INJECTION, POWDER, LYOPHILIZED, FOR SOLUTION INTRAVENOUS at 14:48

## 2020-03-02 RX ADMIN — POTASSIUM CHLORIDE AND SODIUM CHLORIDE: 450; 150 INJECTION, SOLUTION INTRAVENOUS at 14:48

## 2020-03-02 RX ADMIN — Medication 500 MG: at 08:18

## 2020-03-02 RX ADMIN — CEFTOLOZANE AND TAZOBACTAM 3 G: 1; .5 INJECTION, POWDER, LYOPHILIZED, FOR SOLUTION INTRAVENOUS at 05:39

## 2020-03-02 RX ADMIN — DIVALPROEX SODIUM 500 MG: 250 TABLET, DELAYED RELEASE ORAL at 08:18

## 2020-03-02 RX ADMIN — SODIUM CHLORIDE, PRESERVATIVE FREE 10 ML: 5 INJECTION INTRAVENOUS at 20:55

## 2020-03-02 RX ADMIN — VANCOMYCIN HYDROCHLORIDE 750 MG: 5 INJECTION, POWDER, LYOPHILIZED, FOR SOLUTION INTRAVENOUS at 03:00

## 2020-03-02 ASSESSMENT — PAIN SCALES - GENERAL
PAINLEVEL_OUTOF10: 0

## 2020-03-02 NOTE — PROGRESS NOTES
Alyson Foley is a 59 y.o. female patient feels encouraged today.      Current Facility-Administered Medications   Medication Dose Route Frequency Provider Last Rate Last Dose    potassium chloride (KLOR-CON M) extended release tablet 40 mEq  40 mEq Oral PRN Eyad Navarrete MD   40 mEq at 03/01/20 0902    Or    potassium bicarb-citric acid (EFFER-K) effervescent tablet 40 mEq  40 mEq Oral PRN Eyad Navarrete MD        Or    potassium chloride 10 mEq/100 mL IVPB (Peripheral Line)  10 mEq Intravenous PRN Eyad Navarrete MD        ceftolozane-tazobactam (ZERBAXA) 3 g in dextrose 5 % 100 mL IVPB  3 g Intravenous Amanda Aiken  mL/hr at 03/02/20 0539 3 g at 03/02/20 0539    0.9 % sodium chloride bolus  20 mL Intravenous Once Ammy Disla MD        vancomycin (VANCOCIN) 750 mg in dextrose 5 % 250 mL IVPB  750 mg Intravenous Q12H Ammy Disla MD   Stopped at 03/02/20 0400    0.45 % NaCl with KCl 20 mEq infusion   Intravenous Continuous Ammy Disla MD 75 mL/hr at 03/01/20 2239      pantoprazole (PROTONIX) injection 40 mg  40 mg Intravenous Daily Ammy Disla MD   40 mg at 03/01/20 0858    metoclopramide (REGLAN) injection 10 mg  10 mg Intramuscular Once Ammy Disla MD        iohexol (OMNIPAQUE 240) injection 50 mL  50 mL Oral ONCE PRN Ammy Disla MD        metoprolol (LOPRESSOR) injection 5 mg  5 mg Intravenous Q6H Ammy Disla MD   Stopped at 02/29/20 2111    metronidazole (FLAGYL) 500 mg in NaCl 100 mL IVPB premix  500 mg Intravenous Amanda Aiken MD   Stopped at 03/02/20 0552    lidocaine PF 1 % injection 5 mL  5 mL Intradermal Once Ammy Disla MD        sodium chloride flush 0.9 % injection 10 mL  10 mL Intravenous 2 times per day Ammy Disla MD   10 mL at 03/01/20 2055    sodium chloride flush 0.9 % injection 10 mL  10 mL Intravenous PRN Ammy Disla MD        traMADol [place]) tablet 50 mg  50 mg Oral Q6H PRN Ammy Disla MD   50 mg at 02/28/20 1037    breastfeeding. Vital signs are normal.    HEENT: Normal HEENT exam.    Lungs:  Normal effort. Heart: Tachycardia. Abdomen: Abdomen is soft. Bowel sounds are normal.     Extremities: Decreased range of motion. There is dependent edema. (Left arm edema > right)  Neurological: Patient is alert. Pupils:  Pupils are equal, round, and reactive to light. Skin:  Warm. Assessment & Plan  Acute encephalopathy(Resolved)  -CT head neg  Acute GI bleed with anemia due to suspected rectosigmoid ulcer  -s/p transfusion and egd with gastritis  -CTA abd neg for any GI bleed  -bleeding scan neg x2  -cscope with ulcer rectogmiod   -Transfused total of 17PRBC and transfused  FFP 2 days ago for milldy elevated PT  -fibrinogen > 100  diarrhea  -cdiff panel not taken as diarrhea resolved  Pancytopenia(now with leukocytosis) 2/2 chemo  -s/p17RBC  -S/p pegfilgram x 3doses  Neutropenic fevers with pseudomonas bacteremia  -due to mediport and remove  -vrial panel neg  -CXR neg, and UA neg  -repeat bld cx neg   - zerbaxa, flagyl and vanc  Tachycardia  -due to anemia  -CT PE neg for PE   Left breast hematoma  -2/2 fall at home improviing  -CTA chest with decrease in hematoma  DLBCL  Lymphoma   -s/p 2cycles of CHOPD  Bipolar  -continue depakote  Ext left upper DVT and also right arm DVT  -2/2 vasc catheter  -lovnox 50bid and holding with anemia/GI bleed  -mediport removed 2/20  -right mid line  For IV abx  Severe PCM  SVT  -EP recommends ivrabadine if continues  -IV BB  ABd pain  -CT abd with gas mesenteric lesion  -CTA abd with mass like area mesentary  -ID and GI  AGMA(resolved)  -bicarb drip stopped  Hypernatremia(Resolved)  -due to bicarb and place on hypotonic IVF  DVT prophyalxis  -low dose heparin      Hb is stable today and on low dose heparin. If Hb continues to be stable then increase anticoagulation with her bilateral arm DVT but will have to be very cautious especially with her GI bleed.  Monitor IVF as hypernatremia and hypokelamia resolved and adjust as needed .  PT/OT eval today    Sneha Tyler MD  3/2/2020

## 2020-03-02 NOTE — PROGRESS NOTES
[facility] Gastroenterology        Progress Note       3/2/2020  7:54 AM    Patient:    Richard Baldwin  : 1955   59 y.o. MRN: [id number]  Admitted: 2020  3:46 PM ATT: Silvano Ruiz MD   [id number]  AdmitDx: Hemoglobin low [D64.9]  Pancytopenia ([address] [Q31.981]  PCP: Derick Eastman MD    SUBJECTIVE:  Chart reviewed, events noted  Today is the \"best she felt in two days\". Says shes feeling stronger. Reports stools are still soft but not liquid. Denies brbpr and melena. Denies abdominal pain. Doing well on full liquid diet.  Denies N/V.     ROS:  The positive ROS will be identified in bold     CONSTITUTIONAL:  Neg  Weight loss, fatigue, fever  MOUTH/THROAT:  Neg  Bleeding gums, hoarseness or sore throat  RESPIRATORY:   Neg SOB, wheeze, cough, hemoptysis or bronchitis  CARDIOVASCULAR:  Neg Chest pain, palpitations, dyspnea on exertion, edema  GASTROINTESTINAL:  SEE HPI  HEMATOLOGIC/LYMPHATIC:  Neg  Anemia, bleeding tendency  MUSCULOSKELETAL: Neg  New myalgias, joint pain, swelling or stiffness  NEUROLOGICAL:  Neg  Loss of Consciousness, memory loss, forgetfulness, periods of confusion, difficulty concentrating, seizures, insomnia, aphasia    SKIN:  Neg No itching, rashes, or sores  PSYCHIATRIC:  Neg Depression, personality changes, anxiety    OBJECTIVE:      /73   Pulse 91   Temp 98.4 °F (36.9 °C) (Oral)   Resp 19   Ht 5' 3\" (1.6 m)   Wt 97 lb (44 kg)   LMP 2000   SpO2 97%   BMI 17.18 kg/m²     NAD, appears comfortable, sitting up in bed  Lips and mucous membranes pink and moist  RRR, Nl s1s2  Lungs CTA bilaterally, respirations even and unlabored   Abdomen soft, ND, NT, bowel sounds normal  Skin warm and dry  1+ pitting edema bilateral lower extremities, 2+ pitting edema LUE   AAOx3    CBC:   Recent Labs     20  1840 20  2045 20  0455   WBC 18.0* 16.9* 17.7*   HGB 10.9*  11.0* 10.2* 10.2*    210 237     BMP:    Recent Labs     02/29/20  0600 03/01/20  0414 03/02/20  0455   * 140 135   K 3.3* 3.3* 4.1   * 109 109   CO2 22 22 16*   BUN 18 10 8   CREATININE 0.7 0.6 0.5*   GLUCOSE 134* 106* 88     Magnesium:   Lab Results   Component Value Date    MG 2.0 02/25/2020     Hepatic: No results for input(s): AST, ALT, ALB, BILITOT, ALKPHOS in the last 72 hours.   INR:   Recent Labs     02/28/20  1150   INR 1.36         Intake/Output Summary (Last 24 hours) at 3/2/2020 0754  Last data filed at 3/2/2020 0651  Gross per 24 hour   Intake 1554.19 ml   Output 3300 ml   Net -1745.81 ml         Medications    Scheduled Medications:    ceftolozane-tazobactam (ZERBAXA) IVPB  3 g Intravenous Q8H    sodium chloride  20 mL Intravenous Once    vancomycin  750 mg Intravenous Q12H    pantoprazole  40 mg Intravenous Daily    metoclopramide  10 mg Intramuscular Once    metoprolol  5 mg Intravenous Q6H    metroNIDAZOLE  500 mg Intravenous Q8H    lidocaine PF  5 mL Intradermal Once    sodium chloride flush  10 mL Intravenous 2 times per day    allopurinol  100 mg Oral Daily    calcium elemental  500 mg Oral Daily with breakfast    divalproex  500 mg Oral QAM    divalproex  1,000 mg Oral QPM    sodium chloride  20 mL Intravenous Once     PRN Medications: potassium chloride **OR** potassium alternative oral replacement **OR** potassium chloride, iohexol, sodium chloride flush, traMADol, sodium chloride flush, ondansetron, acetaminophen, polyethylene glycol, melatonin  Infusions:    0.45 % NaCl with KCl 20 mEq 75 mL/hr at 03/01/20 2239           Patient Active Problem List   Diagnosis Code    Family history of malignant neoplasm of gastrointestinal tract Z80.0    B-cell lymphoma (HCC) C85.10    Idiopathic hypotension I95.0    Mitral valve disease I05.9    Pancytopenia (HCC) D61.818    Severe malnutrition ([facility] [address] E43    Acute deep vein thrombosis (DVT) of left upper extremity (HCC) I82.622    Hematoma

## 2020-03-02 NOTE — PROGRESS NOTES
UP: Yes  Discharge Recommendations: [address]    Pt is a 59year old F admitted from home with pancytopenia. Per pt report, prior to her admission she was independent in all ADLs and IADLs and as of lately she requires assist with \"everything. \" On this date, pt performed supine to seated transfer with ModA. While seated EOB pt BP dropped to 81/71. Pt reported dizziness and was placed in Trendelenburg position where her vitals stabilized. Pt was left lying in supine with HOB elevated and doctor present. Currently, pt presents below her baseline and would benefit from skilled OT services (once vitals are stabilized) at SNF. Plan:  Plan  Times per week: 2x  Times per day: Daily  Note: Check with RN before tx d/t low BP. Check vitals once in seated position to ensure stable. Goals:  1. Pt will complete all aspects of bed mobility for EOB/OOB ADLs with Imtiaz. 2. Pt will complete UB/LB bathing with CGA. 3. Pt will complete all aspects of LB dressing with CGA. 4. Pt will complete all functional transfers to and from bed, chair, toilet, shower chair with Imtiaz and RW.   5. Pt will ambulate HH distance to bathroom for toileting with Imtiaz and RW. 6. Pt will complete all aspects of toileting task with CGA. 7. Pt will complete oral hygiene/grooming routine in standing at sink with CGA. 8. Pt will complete ther ex/ther act with focus on UB strengthening. Time:   Time in: 1422  Time out: 1446  Timed treatment minutes: 10  Total time: 24      Electronically signed by:       CLAY Arellano/L, [facility], [phone]

## 2020-03-02 NOTE — PROGRESS NOTES
ONCOLOGY HEMATOLOGY CARE ([facility])  PROGRESS NOTE      Patient was seen and examined today. She received 2 units of PRBC on 2/28/20. She received 2 units of FFP. Her hemoglobin this morning was 10.2 and platelet count is 475. Calcium is improved. She is not in distress now. No new complaints. She has not been up to chair for several days. She is fearful of falling. PHYSICAL EXAM    Vitals: /73   Pulse 91   Temp 98.4 °F (36.9 °C) (Oral)   Resp 19   Ht 5' 3\" (1.6 m)   Wt 97 lb (44 kg)   LMP 01/11/2000   SpO2 97%   BMI 17.18 kg/m²   CONSTITUTIONAL: awake, alert, cooperative, no apparent distress, tired and fatigue   EYES: sclera clear and conjunctiva normal. Pale. ENT: Normocephalic, without obvious abnormality, atraumatic  NECK: supple, symmetrical, no jugular venous distension and no carotid bruits   HEMATOLOGIC/LYMPHATIC: no cervical, supraclavicular or axillary lymphadenopathy   LUNGS: no increased work of breathing and clear to auscultation   CARDIOVASCULAR: regular rate and rhythm, normal S1 and S2, no murmur noted  ABDOMEN: normal bowel sounds x 4, soft, non-distended, non-tender, no masses palpated, no hepatosplenomgaly   MUSCULOSKELETAL: full range of motion noted, tone is normal  NEUROLOGIC: awake, alert, oriented to name, place and time. Motor skills grossly intact. SKIN: Pale, texture, turgor and no jaundice. appears intact   EXTREMITIES: no LE edema Left arm edema    LABORATORY RESULTS  CBC:   Recent Labs     03/01/20  1840 03/01/20  2045 03/02/20  0455   WBC 18.0* 16.9* 17.7*   HGB 10.9*  11.0* 10.2* 10.2*    210 237     BMP:    Recent Labs     02/29/20  0600 03/01/20  0414 03/02/20  0455   * 140 135   K 3.3* 3.3* 4.1   * 109 109   CO2 22 22 16*   BUN 18 10 8   CREATININE 0.7 0.6 0.5*   GLUCOSE 134* 106* 88     Hepatic: No results for input(s): AST, ALT, ALB, BILITOT, ALKPHOS in the last 72 hours.   INR:   Recent Labs     02/28/20  1150   INR 1.36

## 2020-03-02 NOTE — CONSULTS
[facility] [place] PHYSICAL THERAPY ReEVALUATION  Mariely Nobles, 1955, 2103/2103-A, 3/2/2020     Discharge Recommendation: SNF    Equipment: defer    History  [place]:  The encounter diagnosis was Poor venous access. Subjective:  Patient states:  Agreeable to PT reeval. She is discouraged by her current condition, weakness    Pain:  denies. Communication with other providers: OT, RN  Restrictions: fall risk     Home Setup/Prior level of function  Social/Functional History  Lives With: Spouse(son and daughter in law live nearby)  Type of Home: House  Home Layout: One level  Home Access: Stairs to enter with rails  Entrance Stairs - Number of Steps: 2  Entrance Stairs - Rails: Both  Bathroom Shower/Tub: Tub/Shower unit  Bathroom Toilet: Standard  Bathroom Equipment: Grab bars in shower, Shower chair  Bathroom Accessibility: Accessible  Receives Help From: Family, Friend(s)  ADL Assistance: Independent  Homemaking Assistance: Independent  Homemaking Responsibilities: Yes  Ambulation Assistance: Independent  Transfer Assistance: Independent  Active : Yes  Mode of Transportation: Car  Occupation: Full time employment  Type of occupation: part time [employer]. 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 Examination of body systems (includes body structures/functions, activity/participation limitations):  · Observation:  Supine in bed upon arrival, 's, 's   · Vision:  WFL  · Hearing:  [facility]  · Cardiopulmonary: Pt tachycardic and hypotensive supine to sit. See assessment    · Cognition: WFL, see OT/SLP note for further evaluation    Body Structures/Function  · ROM R/L:  L UE edema s/p . · Strength R/L:  Did not formally assess, unable to perform SLR weakness observed in function.     · Neuro:  [facility]      Mobility:  · Rolling L/R:  Mod A   · Supine to sit:  Mod A-- sequencing cues, left UE reach/grasp dysfunctional from edema/soreness, mod trunk boost and seated scooting assist. Pt c/o

## 2020-03-02 NOTE — PROGRESS NOTES
CDIFF r/o discontinued, pt has not had anymore diarrhea. States only had it once after drinking Ensure- which pt is lactose intolerant. Pt had also received stool softener prior to ordering. Call placed to infection control to remove.

## 2020-03-02 NOTE — PROGRESS NOTES
Infectious Disease Progress Note  3/2/2020   Patient Name: Alyson Foley : 1955   Impression  · Sepsis  ? Treated for catheter related bloodstream infection secondary to P aeruginosa  ? Pct on a downward trend  · Acute blood loss Anemia   ? Lower GI bleed  · SVT  ? ?PE but seems less likely as she was on Lovenox  · CRBSI-Pseudomonas aeruginosa  ? P aeruginosa in blood cx:  on 2020, pansusceptible  ? Repeat blood cx -  NGTD  · Chemotherapy induced pancytopenia  ? Resolved with G-CSF  · Extensive left upper extremity DVT  · Anaemia  · Multi-morbidity: per PMHx  Plan:  · D/c vancomycin,  · continue Zerbaxa and metronidazole  · Trend CRP and procalcitonin      Ongoing Antimicrobial Therapy  Zerbaxa   Metronidazole ? Completed Antimicrobial Therapy  Cefepime   Vancomycin -3/1? History:? Interval history noted: CRBSI P aeruginosa, chemotherapy induced pancytopenia  Denies n/v/d/f or untoward effects of antibiotics. Feels weak, and continues to have abdominal pain. Physical Exam:  Vital Signs: /86   Pulse 105   Temp 98.4 °F (36.9 °C) (Oral)   Resp 19   Ht 5' 3\" (1.6 m)   Wt 97 lb (44 kg)   LMP 2000   SpO2 98%   BMI 17.18 kg/m²     Gen: alert and oriented X3, no distress  Skin: no stigmata of endocarditis  Wounds: Left ACW surgical site is clean, glue intact, there is surrounding hematoma, right arm swelling  HEMT: AT/NC Oropharynx pink, moist, and without lesions or exudates; dentition in good state of repair  Eyes: PERRLA, EOMI, conjunctiva pink, sclera anicteric. Neck: Supple. Trachea midline. No LAD. Chest: no distress and CTA. Good air movement. Heart: RRR and no MRG. Abd: soft, non-distended, umbilical tenderness, no hepatomegaly. Normoactive bowel sounds. Ext: left arm swelling, ecchymoses and pain  Catheter Site:  right arm PICC line without erythema or tenderness  Neuro: Mental status intact.  CN 2-12 intact and no focal sensory or motor deficits     Radiologic / Imaging / TESTING  CT chest, abdomen and pelvis 2/25/2020  Impression:   1. Hypermetabolic lesions seen in the small bowel mesentery and bilateral  adnexa on the 01/27/2020 PET have decreased in size, suggesting treatment  response. 2. Foci of gas along the superior margin of the mesenteric lesion may be  within the adjacent duodenum. However, this could also related to abscess or  a sinus tract from the duodenum into the lesion. Lack of intravenous and  oral contrast administration limits further characterization. 3. Intramuscular hematoma in the left pectoralis major muscle. Foci of gas  within the muscle and in an overlying 1.6 cm loculated fluid collection are  presumably related to removal of a port catheter, although infection could  appear similar. Lack of intravenous contrast administration again limits  further characterization. 4. Patchy infectious or inflammatory bronchiolitis in the right upper lobe. 5. Small to moderate left and trace right pleural effusions, trace  pericardial effusion, small pelvic ascites, and mild to moderate anasarca.        Labs:    Recent Results (from the past 24 hour(s))   CBC    Collection Time: 03/01/20  6:40 PM   Result Value Ref Range    WBC 18.0 (H) 4.0 - 10.5 K/CU MM    RBC 3.65 (L) 4.2 - 5.4 M/CU MM    Hemoglobin 10.9 (L) 12.5 - 16.0 GM/DL    Hematocrit 32.5 (L) 37 - 47 %    MCV 89.0 78 - 100 FL    MCH 29.9 27 - 31 PG    MCHC 33.5 32.0 - 36.0 %    RDW 15.0 (H) 11.7 - 14.9 %    Platelets 774 043 - 443 K/CU MM    MPV 9.7 7.5 - 11.1 FL   Hemoglobin and Hematocrit, Blood    Collection Time: 03/01/20  6:40 PM   Result Value Ref Range    Hemoglobin 11.0 (L) 12.5 - 16.0 GM/DL    Hematocrit 32.0 (L) 37 - 47 %   CBC    Collection Time: 03/01/20  8:45 PM   Result Value Ref Range    WBC 16.9 (H) 4.0 - 10.5 K/CU MM    RBC 3.36 (L) 4.2 - 5.4 M/CU MM    Hemoglobin 10.2 (L) 12.5 - 16.0 GM/DL    Hematocrit 30.4 (L) 37 - 47 %    MCV 90.5 78 - 100 FL

## 2020-03-03 LAB
ANION GAP SERPL CALCULATED.3IONS-SCNC: 10 MMOL/L (ref 4–16)
BUN BLDV-MCNC: 8 MG/DL (ref 6–23)
CALCIUM SERPL-MCNC: 7.8 MG/DL (ref 8.3–10.6)
CHLORIDE BLD-SCNC: 111 MMOL/L (ref 99–110)
CO2: 16 MMOL/L (ref 21–32)
CREAT SERPL-MCNC: 0.5 MG/DL (ref 0.6–1.1)
GFR AFRICAN AMERICAN: >60 ML/MIN/1.73M2
GFR NON-AFRICAN AMERICAN: >60 ML/MIN/1.73M2
GLUCOSE BLD-MCNC: 85 MG/DL (ref 70–99)
HCT VFR BLD CALC: 36.4 % (ref 37–47)
HEMOGLOBIN: 10.8 GM/DL (ref 12.5–16)
MCH RBC QN AUTO: 29.8 PG (ref 27–31)
MCHC RBC AUTO-ENTMCNC: 29.7 % (ref 32–36)
MCV RBC AUTO: 100.6 FL (ref 78–100)
PDW BLD-RTO: 15.9 % (ref 11.7–14.9)
PLATELET # BLD: 250 K/CU MM (ref 140–440)
PMV BLD AUTO: 9.8 FL (ref 7.5–11.1)
POTASSIUM SERPL-SCNC: 4.4 MMOL/L (ref 3.5–5.1)
RBC # BLD: 3.62 M/CU MM (ref 4.2–5.4)
SODIUM BLD-SCNC: 137 MMOL/L (ref 135–145)
WBC # BLD: 16.5 K/CU MM (ref 4–10.5)

## 2020-03-03 PROCEDURE — 6360000002 HC RX W HCPCS: Performed by: HOSPITALIST

## 2020-03-03 PROCEDURE — 99232 SBSQ HOSP IP/OBS MODERATE 35: CPT | Performed by: INTERNAL MEDICINE

## 2020-03-03 PROCEDURE — 80048 BASIC METABOLIC PNL TOTAL CA: CPT

## 2020-03-03 PROCEDURE — 6360000002 HC RX W HCPCS: Performed by: SPECIALIST

## 2020-03-03 PROCEDURE — 6370000000 HC RX 637 (ALT 250 FOR IP): Performed by: HOSPITALIST

## 2020-03-03 PROCEDURE — 2580000003 HC RX 258: Performed by: SPECIALIST

## 2020-03-03 PROCEDURE — 94761 N-INVAS EAR/PLS OXIMETRY MLT: CPT

## 2020-03-03 PROCEDURE — C9113 INJ PANTOPRAZOLE SODIUM, VIA: HCPCS | Performed by: SPECIALIST

## 2020-03-03 PROCEDURE — 2500000003 HC RX 250 WO HCPCS: Performed by: SPECIALIST

## 2020-03-03 PROCEDURE — 6370000000 HC RX 637 (ALT 250 FOR IP): Performed by: SPECIALIST

## 2020-03-03 PROCEDURE — 2060000000 HC ICU INTERMEDIATE R&B

## 2020-03-03 PROCEDURE — 6360000002 HC RX W HCPCS: Performed by: INTERNAL MEDICINE

## 2020-03-03 PROCEDURE — 85027 COMPLETE CBC AUTOMATED: CPT

## 2020-03-03 RX ORDER — SODIUM BICARBONATE 650 MG/1
650 TABLET ORAL 4 TIMES DAILY
Status: DISCONTINUED | OUTPATIENT
Start: 2020-03-03 | End: 2020-03-11 | Stop reason: HOSPADM

## 2020-03-03 RX ADMIN — POTASSIUM CHLORIDE AND SODIUM CHLORIDE: 450; 150 INJECTION, SOLUTION INTRAVENOUS at 15:01

## 2020-03-03 RX ADMIN — HEPARIN SODIUM 5000 UNITS: 5000 INJECTION, SOLUTION INTRAVENOUS; SUBCUTANEOUS at 10:10

## 2020-03-03 RX ADMIN — SODIUM BICARBONATE 650 MG TABLET 650 MG: at 12:56

## 2020-03-03 RX ADMIN — ALLOPURINOL 100 MG: 100 TABLET ORAL at 08:59

## 2020-03-03 RX ADMIN — SODIUM CHLORIDE, PRESERVATIVE FREE 10 ML: 5 INJECTION INTRAVENOUS at 08:59

## 2020-03-03 RX ADMIN — SODIUM BICARBONATE 650 MG TABLET 650 MG: at 22:20

## 2020-03-03 RX ADMIN — PANTOPRAZOLE SODIUM 40 MG: 40 INJECTION, POWDER, FOR SOLUTION INTRAVENOUS at 08:59

## 2020-03-03 RX ADMIN — Medication 500 MG: at 08:59

## 2020-03-03 RX ADMIN — METRONIDAZOLE 500 MG: 500 INJECTION, SOLUTION INTRAVENOUS at 21:08

## 2020-03-03 RX ADMIN — SODIUM BICARBONATE 650 MG TABLET 650 MG: at 18:09

## 2020-03-03 RX ADMIN — CEFTOLOZANE AND TAZOBACTAM 3 G: 1; .5 INJECTION, POWDER, LYOPHILIZED, FOR SOLUTION INTRAVENOUS at 22:20

## 2020-03-03 RX ADMIN — METRONIDAZOLE 500 MG: 500 INJECTION, SOLUTION INTRAVENOUS at 04:55

## 2020-03-03 RX ADMIN — DIVALPROEX SODIUM 1000 MG: 500 TABLET, EXTENDED RELEASE ORAL at 21:10

## 2020-03-03 RX ADMIN — CEFTOLOZANE AND TAZOBACTAM 3 G: 1; .5 INJECTION, POWDER, LYOPHILIZED, FOR SOLUTION INTRAVENOUS at 15:01

## 2020-03-03 RX ADMIN — CEFTOLOZANE AND TAZOBACTAM 3 G: 1; .5 INJECTION, POWDER, LYOPHILIZED, FOR SOLUTION INTRAVENOUS at 06:09

## 2020-03-03 RX ADMIN — METRONIDAZOLE 500 MG: 500 INJECTION, SOLUTION INTRAVENOUS at 12:35

## 2020-03-03 NOTE — DISCHARGE INSTR - COC
Problems:  Patient Active Problem List   Diagnosis Code    Family history of malignant neoplasm of gastrointestinal tract Z80.0    B-cell lymphoma (HCC) C85.10    Idiopathic hypotension I95.0    Mitral valve disease I05.9    Pancytopenia (HCC) D61.818    Severe malnutrition ([facility] [address] E43    Acute deep vein thrombosis (DVT) of left upper extremity (HCC) I82.622    Hematoma T14. 8XXA       Isolation/Infection:   Isolation          No Isolation        Patient Infection Status     Infection Onset Added Last Indicated Last Indicated By Review Planned Expiration Resolved Resolved By    None active    Resolved    C-diff Rule Out  20 C difficile Molecular/PCR (Ordered)   20 Heber Carias RN    C-diff Rule Out  20 C difficile Molecular/PCR (Ordered)   20 Heber Carias RN          Nurse Assessment:  Last Vital Signs: /82   Pulse 96   Temp 98.4 °F (36.9 °C) (Oral)   Resp 13   Ht 5' 3\" (1.6 m)   Wt 97 lb (44 kg)   LMP 2000   SpO2 99%   BMI 17.18 kg/m²     Last documented pain score (0-10 scale): Pain Level: 0  Last Weight:   Wt Readings from Last 1 Encounters:   20 97 lb (44 kg)     Mental Status:  {IP PT MENTAL STATUS:}    IV Access:  { RAYMOND IV ACCESS:[id number]}    Nursing Mobility/ADLs:  Walking   {[facility] DME JGTB:082184176}  Transfer  {[facility] DME YOKF:350183928}  Bathing  {[facility] DME MZRP:686276054}  Dressing  {[facility] DME IQPQ:999318738}  Toileting  {[facility] DME RUA}  Feeding  {[facility] DME ZZTU:301754998}  Med Admin  {[facility] DME PAED:398981892}  Med Delivery   { RAYMOND MED Delivery:113389576}    Wound Care Documentation and Therapy:  Wound 20 Buttocks Left (Active)   Wound Pressure Stage  2 3/3/2020  4:30 AM   Dressing Status Other (Comment) 3/3/2020  4:30 AM   Dressing Changed Changed/New 3/2/2020  8:15 AM   Dressing/Treatment Moisture barrier;Calmoseptine 3/3/2020  4:30 AM   Wound Cleansed Soap and water 3/2/2020  8:15 AM   Wound Length (cm) 0.7 cm

## 2020-03-03 NOTE — PROGRESS NOTES
[facility] Gastroenterology        Progress Note       3/3/2020  8:01 AM    Patient:    Asha Peña  : 1955   59 y.o. MRN: [id number]  Admitted: 2020  3:46 PM ATT: Tejas Zamorano MD   -A  AdmitDx: Hemoglobin low [D64.9]  Pancytopenia ([facility] [address] [T81.025]  PCP: Saniya Anne MD    SUBJECTIVE:  Chart reviewed, events noted  Patient feeling \"good\" today. Sitting up in bed eating breakfast. Son is at bedside. Tolerating full liquid diet. Denies N/V. Denies abdominal pain. Had soft, formed BM. Denies brbpr and melena.      ROS:  The positive ROS will be identified in bold     CONSTITUTIONAL:  Neg  Weight loss, fatigue, fever  MOUTH/THROAT:  Neg  Bleeding gums, hoarseness or sore throat  RESPIRATORY:   Neg SOB, wheeze, cough, hemoptysis or bronchitis  CARDIOVASCULAR:  Neg Chest pain, palpitations, dyspnea on exertion, edema  GASTROINTESTINAL:  SEE HPI  HEMATOLOGIC/LYMPHATIC:  Neg  Anemia, bleeding tendency  MUSCULOSKELETAL: Neg  New myalgias, joint pain, swelling or stiffness  NEUROLOGICAL:  Neg  Loss of Consciousness, memory loss, forgetfulness, periods of confusion, difficulty concentrating, seizures, insomnia, aphasia    SKIN:  Neg No itching, rashes, or sores  PSYCHIATRIC:  Neg Depression, personality changes, anxiety    OBJECTIVE:      /82   Pulse 99   Temp 98 °F (36.7 °C) (Oral)   Resp 19   Ht 5' 3\" (1.6 m)   Wt 97 lb (44 kg)   LMP 2000   SpO2 98%   BMI 17.18 kg/m²     NAD, appears comfortable  Lips and mucous membranes pink and moist  RRR, Nl s1s2  Lungs CTA bilaterally, respirations even and unlabored   Abdomen soft, ND, NT bowel sounds normal  Skin pink, warm and dry  1+ pitting edema bilateral lower extremities, 2+ pitting edema LUE, 1+ edema RUE   AAOx3    CBC:   Recent Labs     20  2045 20  0455 20  0242   WBC 16.9* 17.7* 16.5*   HGB 10.2* 10.2* 10.8*    237 250     BMP: Recent Labs     03/01/20  0414 03/02/20  0455 03/03/20  0243    135 137   K 3.3* 4.1 4.4    109 111*   CO2 22 16* 16*   BUN 10 8 8   CREATININE 0.6 0.5* 0.5*   GLUCOSE 106* 88 85     Magnesium:   Lab Results   Component Value Date    MG 2.0 02/25/2020     Hepatic: No results for input(s): AST, ALT, ALB, BILITOT, ALKPHOS in the last 72 hours. INR: No results for input(s): INR in the last 72 hours. Intake/Output Summary (Last 24 hours) at 3/3/2020 0801  Last data filed at 3/3/2020 0620  Gross per 24 hour   Intake --   Output 2350 ml   Net -2350 ml         Medications    Scheduled Medications:    heparin (porcine)  5,000 Units Subcutaneous Daily    divalproex  1,000 mg Oral Nightly    ceftolozane-tazobactam (ZERBAXA) IVPB  3 g Intravenous Q8H    sodium chloride  20 mL Intravenous Once    pantoprazole  40 mg Intravenous Daily    metoclopramide  10 mg Intramuscular Once    metoprolol  5 mg Intravenous Q6H    metroNIDAZOLE  500 mg Intravenous Q8H    lidocaine PF  5 mL Intradermal Once    sodium chloride flush  10 mL Intravenous 2 times per day    allopurinol  100 mg Oral Daily    calcium elemental  500 mg Oral Daily with breakfast    sodium chloride  20 mL Intravenous Once     PRN Medications: potassium chloride **OR** potassium alternative oral replacement **OR** potassium chloride, iohexol, sodium chloride flush, traMADol, sodium chloride flush, ondansetron, acetaminophen, polyethylene glycol, melatonin  Infusions:    0.45 % NaCl with KCl 20 mEq 50 mL/hr at 03/02/20 1448           Patient Active Problem List   Diagnosis Code    Family history of malignant neoplasm of gastrointestinal tract Z80.0    B-cell lymphoma (HCC) C85.10    Idiopathic hypotension I95.0    Mitral valve disease I05.9    Pancytopenia ([address] D61.818    Severe malnutrition ([facility] [address] E43    Acute deep vein thrombosis (DVT) of left upper extremity (HCC) I82.622    Hematoma T14. Pinkie Fell ASSESSMENT/RECOMMENDATIONS:     Anemia:  Recently initiated chemotherapy, R-CHOP 1/2020  Hgb 10.8 today    GI bleed- Bleed from residual ulcer after therapy for Lymphoma  S/p EGD 2/27/2020               1)mild erosive gastritis with flecks of coffee-grounds in stomach              2) no potential bleeding site seen- no ulcer, varices, angiodysplasia, neoplasm  S/P EGD 2/28/2020              1) large amount of blood and clot throughout the colon              2) no focal bleeding seen              3) 4-5 cm, deep ulcer in rectosigmoid at 10 cm- no visible              vessel, adherent clot or active bleeding associated with the              ulcer-- but likely the source of her bleeding              4) otherwise normal but very limited (by the blood and clot) exam to the cecum     Nutrition:  Patient reports lactose intolerance     Diarrhea- resolved  C.diff in process     Recommendations:  Encouraged PO intake  Monitor CBC q12 hr  Transfuse for hgb <7.5  Agree with restarting anticoagulation at [address] [place] [address]- will monitor closely for active bleeding and decline of H&H- if stable can increase to therapeutic dose    Discussed plan of care with patient and family     Patient clinical, biochemical, and radiological information discussed with Dr. Arabella Parra. He agrees with the assessment and plan. Israel Albarran CNP  3/3/2020  8:01 AM      Feeling better  HB stable  I have seen and examined this patient personally, and independently of the nurse practitioner. The plan was developed mutually at the time of the visit with the patient. Israel Albarran CNP and myself have spoken with patient, nursing staff and provided written and verbal instructions .     The above note has been reviewed and I agree with the Assessment,  Diagnosis, and Treatment plan as suggested by Israel Albarran CNP      [address] [place] [address] gastroenterology

## 2020-03-04 LAB
ALBUMIN SERPL-MCNC: 1.9 GM/DL (ref 3.4–5)
ANION GAP SERPL CALCULATED.3IONS-SCNC: 10 MMOL/L (ref 4–16)
BUN BLDV-MCNC: 11 MG/DL (ref 6–23)
CALCIUM SERPL-MCNC: 7.9 MG/DL (ref 8.3–10.6)
CHLORIDE BLD-SCNC: 109 MMOL/L (ref 99–110)
CO2: 18 MMOL/L (ref 21–32)
CREAT SERPL-MCNC: 0.6 MG/DL (ref 0.6–1.1)
GFR AFRICAN AMERICAN: >60 ML/MIN/1.73M2
GFR NON-AFRICAN AMERICAN: >60 ML/MIN/1.73M2
GLUCOSE BLD-MCNC: 94 MG/DL (ref 70–99)
HCT VFR BLD CALC: 33 % (ref 37–47)
HEMOGLOBIN: 10.5 GM/DL (ref 12.5–16)
HIGH SENSITIVE C-REACTIVE PROTEIN: 47.9 MG/L
MCH RBC QN AUTO: 30.1 PG (ref 27–31)
MCHC RBC AUTO-ENTMCNC: 31.8 % (ref 32–36)
MCV RBC AUTO: 94.6 FL (ref 78–100)
PDW BLD-RTO: 15.7 % (ref 11.7–14.9)
PHOSPHORUS: 2.6 MG/DL (ref 2.5–4.9)
PLATELET # BLD: 291 K/CU MM (ref 140–440)
PMV BLD AUTO: 9.6 FL (ref 7.5–11.1)
POTASSIUM SERPL-SCNC: 3.8 MMOL/L (ref 3.5–5.1)
PROCALCITONIN: 0.83
RBC # BLD: 3.49 M/CU MM (ref 4.2–5.4)
SODIUM BLD-SCNC: 137 MMOL/L (ref 135–145)
WBC # BLD: 14 K/CU MM (ref 4–10.5)

## 2020-03-04 PROCEDURE — C9113 INJ PANTOPRAZOLE SODIUM, VIA: HCPCS | Performed by: SPECIALIST

## 2020-03-04 PROCEDURE — 2500000003 HC RX 250 WO HCPCS: Performed by: SPECIALIST

## 2020-03-04 PROCEDURE — 80048 BASIC METABOLIC PNL TOTAL CA: CPT

## 2020-03-04 PROCEDURE — 99232 SBSQ HOSP IP/OBS MODERATE 35: CPT | Performed by: NURSE PRACTITIONER

## 2020-03-04 PROCEDURE — 86141 C-REACTIVE PROTEIN HS: CPT

## 2020-03-04 PROCEDURE — 2580000003 HC RX 258: Performed by: SPECIALIST

## 2020-03-04 PROCEDURE — 6360000002 HC RX W HCPCS: Performed by: SPECIALIST

## 2020-03-04 PROCEDURE — 2500000003 HC RX 250 WO HCPCS: Performed by: HOSPITALIST

## 2020-03-04 PROCEDURE — 36415 COLL VENOUS BLD VENIPUNCTURE: CPT

## 2020-03-04 PROCEDURE — 2580000003 HC RX 258: Performed by: INTERNAL MEDICINE

## 2020-03-04 PROCEDURE — 6370000000 HC RX 637 (ALT 250 FOR IP): Performed by: HOSPITALIST

## 2020-03-04 PROCEDURE — 6360000002 HC RX W HCPCS: Performed by: INTERNAL MEDICINE

## 2020-03-04 PROCEDURE — 99232 SBSQ HOSP IP/OBS MODERATE 35: CPT | Performed by: INTERNAL MEDICINE

## 2020-03-04 PROCEDURE — 6360000002 HC RX W HCPCS: Performed by: HOSPITALIST

## 2020-03-04 PROCEDURE — 84145 PROCALCITONIN (PCT): CPT

## 2020-03-04 PROCEDURE — 85027 COMPLETE CBC AUTOMATED: CPT

## 2020-03-04 PROCEDURE — 6370000000 HC RX 637 (ALT 250 FOR IP): Performed by: SPECIALIST

## 2020-03-04 PROCEDURE — 2060000000 HC ICU INTERMEDIATE R&B

## 2020-03-04 PROCEDURE — 80069 RENAL FUNCTION PANEL: CPT

## 2020-03-04 PROCEDURE — 94761 N-INVAS EAR/PLS OXIMETRY MLT: CPT

## 2020-03-04 PROCEDURE — 99211 OFF/OP EST MAY X REQ PHY/QHP: CPT

## 2020-03-04 RX ADMIN — METRONIDAZOLE 500 MG: 500 INJECTION, SOLUTION INTRAVENOUS at 04:49

## 2020-03-04 RX ADMIN — METRONIDAZOLE 500 MG: 500 INJECTION, SOLUTION INTRAVENOUS at 12:52

## 2020-03-04 RX ADMIN — SODIUM BICARBONATE 650 MG TABLET 650 MG: at 18:37

## 2020-03-04 RX ADMIN — SODIUM BICARBONATE 650 MG TABLET 650 MG: at 22:32

## 2020-03-04 RX ADMIN — CEFTOLOZANE AND TAZOBACTAM 3 G: 1; .5 INJECTION, POWDER, LYOPHILIZED, FOR SOLUTION INTRAVENOUS at 06:08

## 2020-03-04 RX ADMIN — SODIUM BICARBONATE 650 MG TABLET 650 MG: at 08:37

## 2020-03-04 RX ADMIN — METRONIDAZOLE 500 MG: 500 INJECTION, SOLUTION INTRAVENOUS at 22:41

## 2020-03-04 RX ADMIN — SODIUM BICARBONATE 650 MG TABLET 650 MG: at 14:03

## 2020-03-04 RX ADMIN — HEPARIN SODIUM 5000 UNITS: 5000 INJECTION, SOLUTION INTRAVENOUS; SUBCUTANEOUS at 08:38

## 2020-03-04 RX ADMIN — POTASSIUM CHLORIDE AND SODIUM CHLORIDE: 450; 150 INJECTION, SOLUTION INTRAVENOUS at 18:44

## 2020-03-04 RX ADMIN — CEFEPIME HYDROCHLORIDE 2 G: 2 INJECTION, POWDER, FOR SOLUTION INTRAVENOUS at 22:30

## 2020-03-04 RX ADMIN — ALLOPURINOL 100 MG: 100 TABLET ORAL at 08:37

## 2020-03-04 RX ADMIN — CEFTOLOZANE AND TAZOBACTAM 3 G: 1; .5 INJECTION, POWDER, LYOPHILIZED, FOR SOLUTION INTRAVENOUS at 14:03

## 2020-03-04 RX ADMIN — PANTOPRAZOLE SODIUM 40 MG: 40 INJECTION, POWDER, FOR SOLUTION INTRAVENOUS at 08:38

## 2020-03-04 RX ADMIN — MICONAZOLE NITRATE: 20 POWDER TOPICAL at 22:31

## 2020-03-04 RX ADMIN — SODIUM CHLORIDE, PRESERVATIVE FREE 10 ML: 5 INJECTION INTRAVENOUS at 22:30

## 2020-03-04 RX ADMIN — DIVALPROEX SODIUM 1000 MG: 500 TABLET, EXTENDED RELEASE ORAL at 22:46

## 2020-03-04 RX ADMIN — SODIUM CHLORIDE, PRESERVATIVE FREE 10 ML: 5 INJECTION INTRAVENOUS at 08:42

## 2020-03-04 RX ADMIN — Medication 500 MG: at 08:38

## 2020-03-04 NOTE — PROGRESS NOTES
extremities. No gross joint deformities. SKIN Normal coloration, warm, dry. NEURO Cranial nerves appear grossly intact, normal speech, no lateralizing weakness. PSYCH Awake, alert, oriented x 4. Affect appropriate. INTAKE: In: 450 [I.V.:250]  Out: 1850   OUTPUT: In: 450   Out: 1850 [Urine:1850]    LABS  Recent Labs     03/02/20  0455 03/03/20  0242 03/04/20  0704   WBC 17.7* 16.5* 14.0*   HGB 10.2* 10.8* 10.5*   HCT 30.1* 36.4* 33.0*    250 291      Recent Labs     03/02/20  0455 03/03/20  0243 03/04/20  0704    137 137   K 4.1 4.4 3.8    111* 109   CO2 16* 16* 18*   PHOS  --   --  2.6   BUN 8 8 11   CREATININE 0.5* 0.5* 0.6     No results for input(s): AST, ALT, ALB, BILIDIR, BILITOT, ALKPHOS in the last 72 hours. No results for input(s): INR in the last 72 hours. No results for input(s): CKTOTAL, CKMB, CKMBINDEX, TROPONINT in the last 72 hours.        Abnormal labs for today noted      Imaging:     ECHO:    Microbiology:  Blood culture:    Urine culture:    Sputum culture:    Procedures done this admission:    MEDS  Scheduled Meds:   sodium bicarbonate  650 mg Oral 4x Daily    heparin (porcine)  5,000 Units Subcutaneous Daily    divalproex  1,000 mg Oral Nightly    ceftolozane-tazobactam (ZERBAXA) IVPB  3 g Intravenous Q8H    sodium chloride  20 mL Intravenous Once    pantoprazole  40 mg Intravenous Daily    metoclopramide  10 mg Intramuscular Once    metoprolol  5 mg Intravenous Q6H    metroNIDAZOLE  500 mg Intravenous Q8H    lidocaine PF  5 mL Intradermal Once    sodium chloride flush  10 mL Intravenous 2 times per day    allopurinol  100 mg Oral Daily    calcium elemental  500 mg Oral Daily with breakfast    sodium chloride  20 mL Intravenous Once     Continuous Infusions:   0.45 % NaCl with KCl 20 mEq 50 mL/hr at 03/03/20 1501     PRN Meds:potassium chloride **OR** potassium alternative oral replacement **OR** potassium chloride, iohexol, sodium chloride flush, traMADol, sodium chloride flush, ondansetron, acetaminophen, polyethylene glycol, melatonin        ASSESSMENT and PLAN  Hospital Day: 16    1-Pseudomonas bacteremia with sepsis- blood culture positive on 2/18- ?due to mediport which was removed on 2/20- repeat NGTD and ID on board- abx per ID- has piccline in place  2-Acute GI bleed with acute blood loss anemia- EGD with gastritis, C scope with rectosigmoid ulcer- monitor H/H  3-Chemotherapy related neutropenia- resolved with G-CSF  4-Extensive LUE DVT- on very low dose heparin SC-oncology to discuss with GI on AC  5-Left large chest wall hematoma- due to fall- monitor H/H  6-Lymphoma-D:BCL- chemotherapy to be resumed after abx per oncology  7-Reported metabolic encephalopathy- resolved  8-Bipolar disorder- on home medication  9-Metabolic acidosis- trial of oral bicarb-improved  10-Debility- placement pending.      - if continues to be stable- medically stable to DC to ECF in am    Disp: needs ECF- CM working on it    Diet DIET FULL LIQUID;  Dietary Nutrition Supplements: Clear Liquid Oral Supplement, Protein Modular   DVT Prophylaxis [] Lovenox, []  Heparin, [] SCDs, [] Ambulation   GI Prophylaxis [] PPI,  [] H2 Blocker,  [] Carafate,  [] Diet/Tube Feeds   Code Status Full Code   Disposition Patient requires continued admission due to sepsis   CMS Level of Risk [] Low, [] Moderate,[x]  High  Patient's risk as above due to sepsis     ASHLEE THOMAS MD 3/4/2020 11:43 AM

## 2020-03-04 NOTE — PROGRESS NOTES
[facility] Gastroenterology        Progress Note       3/4/2020  8:00 AM    Patient:    General Dom  : 1955   59 y.o. MRN: [id number]  Admitted: 2020  3:46 PM ATT: Hayes Ackerman MD   -A  AdmitDx: Hemoglobin low [D64.9]  Pancytopenia ([address] [E53.211]  PCP: Andra Garcia MD    SUBJECTIVE:  Chart reviewed, events noted  Patient feeling \"good\" today. Reports that she is \"a little anxious about the blood thinners\". Denies N/V. Denies abdominal pain. Denies brbpr and melena. Tolerating full liquid diet.       ROS:  The positive ROS will be identified in bold     CONSTITUTIONAL:  Neg  Weight loss, fatigue, fever  MOUTH/THROAT:  Neg  Bleeding gums, hoarseness or sore throat  RESPIRATORY:   Neg SOB, wheeze, cough, hemoptysis or bronchitis  CARDIOVASCULAR:  Neg Chest pain, palpitations, dyspnea on exertion, edema  GASTROINTESTINAL:  SEE HPI  HEMATOLOGIC/LYMPHATIC:  Neg  Anemia, bleeding tendency  MUSCULOSKELETAL: Neg  New myalgias, joint pain, swelling or stiffness  NEUROLOGICAL:  Neg  Loss of Consciousness, memory loss, forgetfulness, periods of confusion, difficulty concentrating, seizures, insomnia, aphasia    SKIN:  Neg No itching, rashes, or sores  PSYCHIATRIC:  Neg Depression, personality changes, anxiety    OBJECTIVE:      /70   Pulse 107   Temp 98.4 °F (36.9 °C) (Oral)   Resp 24   Ht 5' 3\" (1.6 m)   Wt 97 lb (44 kg)   LMP 2000   SpO2 99%   BMI 17.18 kg/m²     NAD, appears comfortable  Lips and mucous membranes pink and moist  RRR, Nl s1s2  Lungs CTA bilaterally, respirations even and unlabored   Abdomen soft, ND, NT, bowel sounds normal  Skin pink, warm and dry  2+ pitting edema bilateral lower extremities, 3+ pitting edema RUE   AAOx3    CBC:   Recent Labs     20  0455 20  0242 20  0704   WBC 17.7* 16.5* 14.0*   HGB 10.2* 10.8* 10.5*    250 291     BMP:    Recent Labs 03/02/20  0455 03/03/20  0243    137   K 4.1 4.4    111*   CO2 16* 16*   BUN 8 8   CREATININE 0.5* 0.5*   GLUCOSE 88 85     Magnesium:   Lab Results   Component Value Date    MG 2.0 02/25/2020     Hepatic: No results for input(s): AST, ALT, ALB, BILITOT, ALKPHOS in the last 72 hours. INR: No results for input(s): INR in the last 72 hours. Intake/Output Summary (Last 24 hours) at 3/4/2020 0800  Last data filed at 3/4/2020 0453  Gross per 24 hour   Intake 690 ml   Output 1850 ml   Net -1160 ml         Medications    Scheduled Medications:    sodium bicarbonate  650 mg Oral 4x Daily    heparin (porcine)  5,000 Units Subcutaneous Daily    divalproex  1,000 mg Oral Nightly    ceftolozane-tazobactam (ZERBAXA) IVPB  3 g Intravenous Q8H    sodium chloride  20 mL Intravenous Once    pantoprazole  40 mg Intravenous Daily    metoclopramide  10 mg Intramuscular Once    metoprolol  5 mg Intravenous Q6H    metroNIDAZOLE  500 mg Intravenous Q8H    lidocaine PF  5 mL Intradermal Once    sodium chloride flush  10 mL Intravenous 2 times per day    allopurinol  100 mg Oral Daily    calcium elemental  500 mg Oral Daily with breakfast    sodium chloride  20 mL Intravenous Once     PRN Medications: potassium chloride **OR** potassium alternative oral replacement **OR** potassium chloride, iohexol, sodium chloride flush, traMADol, sodium chloride flush, ondansetron, acetaminophen, polyethylene glycol, melatonin  Infusions:    0.45 % NaCl with KCl 20 mEq 50 mL/hr at 03/03/20 1501           Patient Active Problem List   Diagnosis Code    Family history of malignant neoplasm of gastrointestinal tract Z80.0    B-cell lymphoma (HCC) C85.10    Idiopathic hypotension I95.0    Mitral valve disease I05.9    Pancytopenia (HCC) D61.818    Severe malnutrition ([address] E43    Acute deep vein thrombosis (DVT) of left upper extremity (HCC) I82.622    Hematoma T14. Mary Beth Sa ASSESSMENT/RECOMMENDATIONS:     Anemia:  Recently initiated chemotherapy, R-CHOP 1/2020  Hgb 10.5 - stable    GI bleed- Bleed from residual ulcer after therapy for Lymphoma  S/p EGD 2/27/2020               1)mild erosive gastritis with flecks of coffee-grounds in stomach              2) no potential bleeding site seen- no ulcer, varices, angiodysplasia, neoplasm  S/P EGD 2/28/2020              1) large amount of blood and clot throughout the colon              2) no focal bleeding seen              3) 4-5 cm, deep ulcer in rectosigmoid at 10 cm- no visible              vessel, adherent clot or active bleeding associated with the              ulcer-- but likely the source of her bleeding              4) otherwise normal but very limited (by the blood and clot) exam to the cecum     Nutrition:  Patient reports lactose intolerance     Diarrhea- resolved     Recommendations:  Encouraged PO intake  Monitor CBC q12 hr  Transfuse for hgb <7.5  Agree with restarting anticoagulation at [address] [place] [address]- will monitor closely for active bleeding and decline of H&H- if stable can increase to therapeutic dose tomorrow    Discussed plan of care with patient    Patient clinical, biochemical, and radiological information discussed with Dr. Monserrat Hernandez. He agrees with the assessment and plan. Dena Perez CNP  3/4/2020  8:00 AM     Much better  Ant Line for full anticoagulation after 48 hours    I have seen and examined this patient personally, and independently of the nurse practitioner. The plan was developed mutually at the time of the visit with the patient. Dena Perez CNP and myself have spoken with patient, nursing staff and provided written and verbal instructions .     The above note has been reviewed and I agree with the Assessment,  Diagnosis, and Treatment plan as suggested by Dena Perez CNP      [address] [place] [address] gastroenterology

## 2020-03-04 NOTE — CONSULTS
no  Patient appropriate for Outpatient [address]: no    Patient/Caregiver Teaching:  Level of patient/caregiver understanding able to:   Pt. Voices understanding of care.        Electronically signed by Pedro Pablo Chapa RN, [address] [place] [address],[address] on 3/4/2020 at 3:32 PM

## 2020-03-04 NOTE — PROGRESS NOTES
sounds. Ext: left arm swelling  Catheter Site:  right arm PICC line without erythema or tenderness  Neuro: Mental status intact. CN 2-12 intact and no focal sensory or motor deficits     Radiologic / Imaging / TESTING  CT chest, abdomen and pelvis 2/25/2020  Impression:   1. Hypermetabolic lesions seen in the small bowel mesentery and bilateral  adnexa on the 01/27/2020 PET have decreased in size, suggesting treatment  response. 2. Foci of gas along the superior margin of the mesenteric lesion may be  within the adjacent duodenum. However, this could also related to abscess or  a sinus tract from the duodenum into the lesion. Lack of intravenous and  oral contrast administration limits further characterization. 3. Intramuscular hematoma in the left pectoralis major muscle. Foci of gas  within the muscle and in an overlying 1.6 cm loculated fluid collection are  presumably related to removal of a port catheter, although infection could  appear similar. Lack of intravenous contrast administration again limits  further characterization. 4. Patchy infectious or inflammatory bronchiolitis in the right upper lobe. 5. Small to moderate left and trace right pleural effusions, trace  pericardial effusion, small pelvic ascites, and mild to moderate anasarca.        Labs:    Recent Results (from the past 24 hour(s))   CBC    Collection Time: 03/04/20  7:04 AM   Result Value Ref Range    WBC 14.0 (H) 4.0 - 10.5 K/CU MM    RBC 3.49 (L) 4.2 - 5.4 M/CU MM    Hemoglobin 10.5 (L) 12.5 - 16.0 GM/DL    Hematocrit 33.0 (L) 37 - 47 %    MCV 94.6 78 - 100 FL    MCH 30.1 27 - 31 PG    MCHC 31.8 (L) 32.0 - 36.0 %    RDW 15.7 (H) 11.7 - 14.9 %    Platelets 476 886 - 467 K/CU MM    MPV 9.6 7.5 - 11.1 FL     CULTURE results: Invalid input(s): BLOOD CULTURE,  URINE CULTURE, SURGICAL CULTURE    Diagnosis:  Patient Active Problem List   Diagnosis    Family history of malignant neoplasm of gastrointestinal tract    B-cell [address] ([facility] [address]

## 2020-03-05 LAB
ALBUMIN SERPL-MCNC: 1.9 GM/DL (ref 3.4–5)
ANION GAP SERPL CALCULATED.3IONS-SCNC: 6 MMOL/L (ref 4–16)
BUN BLDV-MCNC: 9 MG/DL (ref 6–23)
CALCIUM SERPL-MCNC: 8.2 MG/DL (ref 8.3–10.6)
CHLORIDE BLD-SCNC: 110 MMOL/L (ref 99–110)
CO2: 20 MMOL/L (ref 21–32)
CREAT SERPL-MCNC: 0.6 MG/DL (ref 0.6–1.1)
GFR AFRICAN AMERICAN: >60 ML/MIN/1.73M2
GFR NON-AFRICAN AMERICAN: >60 ML/MIN/1.73M2
GLUCOSE BLD-MCNC: 88 MG/DL (ref 70–99)
HCT VFR BLD CALC: 30.2 % (ref 37–47)
HEMOGLOBIN: 9.8 GM/DL (ref 12.5–16)
HIGH SENSITIVE C-REACTIVE PROTEIN: 37.3 MG/L
MCH RBC QN AUTO: 30.3 PG (ref 27–31)
MCHC RBC AUTO-ENTMCNC: 32.5 % (ref 32–36)
MCV RBC AUTO: 93.5 FL (ref 78–100)
PDW BLD-RTO: 15.4 % (ref 11.7–14.9)
PHOSPHORUS: 2.7 MG/DL (ref 2.5–4.9)
PLATELET # BLD: 300 K/CU MM (ref 140–440)
PMV BLD AUTO: 9.4 FL (ref 7.5–11.1)
POTASSIUM SERPL-SCNC: 4.4 MMOL/L (ref 3.5–5.1)
PROCALCITONIN: 0.71
RBC # BLD: 3.23 M/CU MM (ref 4.2–5.4)
SODIUM BLD-SCNC: 136 MMOL/L (ref 135–145)
WBC # BLD: 11.5 K/CU MM (ref 4–10.5)

## 2020-03-05 PROCEDURE — 6360000002 HC RX W HCPCS: Performed by: INTERNAL MEDICINE

## 2020-03-05 PROCEDURE — 97530 THERAPEUTIC ACTIVITIES: CPT

## 2020-03-05 PROCEDURE — 82040 ASSAY OF SERUM ALBUMIN: CPT

## 2020-03-05 PROCEDURE — 2580000003 HC RX 258: Performed by: HOSPITALIST

## 2020-03-05 PROCEDURE — 2500000003 HC RX 250 WO HCPCS: Performed by: SPECIALIST

## 2020-03-05 PROCEDURE — 80069 RENAL FUNCTION PANEL: CPT

## 2020-03-05 PROCEDURE — 6360000002 HC RX W HCPCS: Performed by: HOSPITALIST

## 2020-03-05 PROCEDURE — 6370000000 HC RX 637 (ALT 250 FOR IP): Performed by: HOSPITALIST

## 2020-03-05 PROCEDURE — 6360000002 HC RX W HCPCS: Performed by: SPECIALIST

## 2020-03-05 PROCEDURE — 84145 PROCALCITONIN (PCT): CPT

## 2020-03-05 PROCEDURE — 80048 BASIC METABOLIC PNL TOTAL CA: CPT

## 2020-03-05 PROCEDURE — C9113 INJ PANTOPRAZOLE SODIUM, VIA: HCPCS | Performed by: SPECIALIST

## 2020-03-05 PROCEDURE — 97116 GAIT TRAINING THERAPY: CPT

## 2020-03-05 PROCEDURE — 97535 SELF CARE MNGMENT TRAINING: CPT

## 2020-03-05 PROCEDURE — 97110 THERAPEUTIC EXERCISES: CPT

## 2020-03-05 PROCEDURE — 36415 COLL VENOUS BLD VENIPUNCTURE: CPT

## 2020-03-05 PROCEDURE — 84100 ASSAY OF PHOSPHORUS: CPT

## 2020-03-05 PROCEDURE — 2060000000 HC ICU INTERMEDIATE R&B

## 2020-03-05 PROCEDURE — 2580000003 HC RX 258: Performed by: INTERNAL MEDICINE

## 2020-03-05 PROCEDURE — 2580000003 HC RX 258: Performed by: SPECIALIST

## 2020-03-05 PROCEDURE — 99232 SBSQ HOSP IP/OBS MODERATE 35: CPT | Performed by: INTERNAL MEDICINE

## 2020-03-05 PROCEDURE — 6370000000 HC RX 637 (ALT 250 FOR IP): Performed by: INTERNAL MEDICINE

## 2020-03-05 PROCEDURE — 86141 C-REACTIVE PROTEIN HS: CPT

## 2020-03-05 PROCEDURE — 85027 COMPLETE CBC AUTOMATED: CPT

## 2020-03-05 PROCEDURE — 94761 N-INVAS EAR/PLS OXIMETRY MLT: CPT

## 2020-03-05 PROCEDURE — 6370000000 HC RX 637 (ALT 250 FOR IP): Performed by: SPECIALIST

## 2020-03-05 RX ORDER — PREDNISONE 20 MG/1
40 TABLET ORAL DAILY
Status: COMPLETED | OUTPATIENT
Start: 2020-03-05 | End: 2020-03-08

## 2020-03-05 RX ORDER — SODIUM CHLORIDE 0.9 % (FLUSH) 0.9 %
10 SYRINGE (ML) INJECTION PRN
Status: DISCONTINUED | OUTPATIENT
Start: 2020-03-05 | End: 2020-03-11 | Stop reason: HOSPADM

## 2020-03-05 RX ORDER — LIDOCAINE HYDROCHLORIDE 10 MG/ML
5 INJECTION, SOLUTION EPIDURAL; INFILTRATION; INTRACAUDAL; PERINEURAL ONCE
Status: DISCONTINUED | OUTPATIENT
Start: 2020-03-05 | End: 2020-03-11 | Stop reason: HOSPADM

## 2020-03-05 RX ORDER — SODIUM CHLORIDE 0.9 % (FLUSH) 0.9 %
10 SYRINGE (ML) INJECTION EVERY 12 HOURS SCHEDULED
Status: DISCONTINUED | OUTPATIENT
Start: 2020-03-05 | End: 2020-03-11 | Stop reason: HOSPADM

## 2020-03-05 RX ADMIN — METOPROLOL TARTRATE 5 MG: 5 INJECTION INTRAVENOUS at 10:35

## 2020-03-05 RX ADMIN — Medication 500 MG: at 10:35

## 2020-03-05 RX ADMIN — CEFEPIME HYDROCHLORIDE 2 G: 2 INJECTION, POWDER, FOR SOLUTION INTRAVENOUS at 13:41

## 2020-03-05 RX ADMIN — MICONAZOLE NITRATE: 20 POWDER TOPICAL at 22:49

## 2020-03-05 RX ADMIN — CEFEPIME HYDROCHLORIDE 2 G: 2 INJECTION, POWDER, FOR SOLUTION INTRAVENOUS at 20:00

## 2020-03-05 RX ADMIN — SODIUM BICARBONATE 650 MG TABLET 650 MG: at 14:53

## 2020-03-05 RX ADMIN — SODIUM CHLORIDE, PRESERVATIVE FREE 10 ML: 5 INJECTION INTRAVENOUS at 10:38

## 2020-03-05 RX ADMIN — ALLOPURINOL 100 MG: 100 TABLET ORAL at 10:35

## 2020-03-05 RX ADMIN — SODIUM BICARBONATE 650 MG TABLET 650 MG: at 18:41

## 2020-03-05 RX ADMIN — MICONAZOLE NITRATE: 20 POWDER TOPICAL at 10:37

## 2020-03-05 RX ADMIN — METRONIDAZOLE 500 MG: 500 INJECTION, SOLUTION INTRAVENOUS at 05:45

## 2020-03-05 RX ADMIN — POTASSIUM CHLORIDE AND SODIUM CHLORIDE: 450; 150 INJECTION, SOLUTION INTRAVENOUS at 15:33

## 2020-03-05 RX ADMIN — SODIUM BICARBONATE 650 MG TABLET 650 MG: at 21:01

## 2020-03-05 RX ADMIN — ENOXAPARIN SODIUM 40 MG: 30 INJECTION SUBCUTANEOUS at 10:35

## 2020-03-05 RX ADMIN — DIVALPROEX SODIUM 1000 MG: 500 TABLET, EXTENDED RELEASE ORAL at 22:49

## 2020-03-05 RX ADMIN — PREDNISONE 40 MG: 20 TABLET ORAL at 10:35

## 2020-03-05 RX ADMIN — ENOXAPARIN SODIUM 40 MG: 30 INJECTION SUBCUTANEOUS at 21:01

## 2020-03-05 RX ADMIN — PANTOPRAZOLE SODIUM 40 MG: 40 INJECTION, POWDER, FOR SOLUTION INTRAVENOUS at 10:38

## 2020-03-05 RX ADMIN — SODIUM BICARBONATE 650 MG TABLET 650 MG: at 10:35

## 2020-03-05 RX ADMIN — SODIUM CHLORIDE, PRESERVATIVE FREE 10 ML: 5 INJECTION INTRAVENOUS at 22:50

## 2020-03-05 RX ADMIN — METOPROLOL TARTRATE 5 MG: 5 INJECTION INTRAVENOUS at 05:46

## 2020-03-05 RX ADMIN — CEFEPIME HYDROCHLORIDE 2 G: 2 INJECTION, POWDER, FOR SOLUTION INTRAVENOUS at 05:46

## 2020-03-05 RX ADMIN — METRONIDAZOLE 500 MG: 500 INJECTION, SOLUTION INTRAVENOUS at 13:41

## 2020-03-05 RX ADMIN — METRONIDAZOLE 500 MG: 500 INJECTION, SOLUTION INTRAVENOUS at 21:44

## 2020-03-05 RX ADMIN — METOPROLOL TARTRATE 5 MG: 5 INJECTION INTRAVENOUS at 21:01

## 2020-03-05 ASSESSMENT — PAIN SCALES - GENERAL
PAINLEVEL_OUTOF10: 0

## 2020-03-05 NOTE — CONSULTS
Midline catheter to RUE removed without difficulty. Insertion site clean, no s/s of infection, covered with sterile gauze and tegaderm.

## 2020-03-05 NOTE — CONSULTS
Left Upper Arm Midline evaluated for report of leaking. The midline is leaking from insertion site and is therefore not meeting therapeutic needs. Recommend removal of midline catheter today, re-evaluate vasculature in 24 hours for possible new access in RUE or place tunnelled catheter in IR as patient will need access for receiving chemo at completion of IV antibiotics. Dr. Madalyn Angel notified of above recommendations.

## 2020-03-05 NOTE — PROGRESS NOTES
gross joint deformities. SKIN Normal coloration, warm, dry. NEURO Cranial nerves appear grossly intact, normal speech, no lateralizing weakness. PSYCH Awake, alert, oriented x 4. Affect appropriate. INTAKE: In: 1927.6 [P.O.:150; I.V.:1577.6]  Out: 2425   OUTPUT: In: 1927.6   Out: 2425 [Urine:2425]    LABS  Recent Labs     03/03/20 0242 03/04/20  0704 03/05/20  0716   WBC 16.5* 14.0* 11.5*   HGB 10.8* 10.5* 9.8*   HCT 36.4* 33.0* 30.2*    291 300      Recent Labs     03/03/20 0243 03/04/20  0704 03/05/20  0716    137 136   K 4.4 3.8 4.4   * 109 110   CO2 16* 18* 20*   PHOS  --  2.6 2.7   BUN 8 11 9   CREATININE 0.5* 0.6 0.6     No results for input(s): AST, ALT, ALB, BILIDIR, BILITOT, ALKPHOS in the last 72 hours. No results for input(s): INR in the last 72 hours. No results for input(s): CKTOTAL, CKMB, CKMBINDEX, TROPONINT in the last 72 hours.        Abnormal labs for today noted      Imaging:     ECHO:    Microbiology:  Blood culture:    Urine culture:    Sputum culture:    Procedures done this admission:    MEDS  Scheduled Meds:   lidocaine PF  5 mL Intradermal Once    sodium chloride flush  10 mL Intravenous 2 times per day    enoxaparin  40 mg Subcutaneous BID    predniSONE  40 mg Oral Daily    cefepime  2 g Intravenous Q8H    miconazole   Topical BID    sodium bicarbonate  650 mg Oral 4x Daily    divalproex  1,000 mg Oral Nightly    pantoprazole  40 mg Intravenous Daily    metoclopramide  10 mg Intramuscular Once    metoprolol  5 mg Intravenous Q6H    metroNIDAZOLE  500 mg Intravenous Q8H    sodium chloride flush  10 mL Intravenous 2 times per day    allopurinol  100 mg Oral Daily    calcium elemental  500 mg Oral Daily with breakfast     Continuous Infusions:   0.45 % NaCl with KCl 20 mEq 50 mL/hr at 03/04/20 1844     PRN Meds:sodium chloride flush, potassium chloride **OR** potassium alternative oral replacement **OR** potassium chloride, iohexol, sodium chloride flush, traMADol, sodium chloride flush, ondansetron, acetaminophen, polyethylene glycol, melatonin        ASSESSMENT and [facility] Day: 17    1-Pseudomonas bacteremia with sepsis- blood culture positive on 2/18- ?due to mediport which was removed on 2/20- repeat NGTD and ID on board- abx per ID- with end date of 3/10  2-Acute GI bleed with acute blood loss anemia- EGD with gastritis, C scope with rectosigmoid ulcer- monitor H/H  3-Chemotherapy related neutropenia- resolved with G-CSF  4-Extensive LUE DVT-was on very low dose heparin SC-oncology discussed with GI on AC- started on lovenox and needs daily CBC per oncology  5-Left large chest wall hematoma- due to fall- monitor H/H  6-Lymphoma-D:BCL- chemotherapy to be resumed after abx per oncology  7-Reported metabolic encephalopathy- resolved  8-Bipolar disorder- on home medication  9-Metabolic acidosis- trial of oral bicarb-improved  10-Debility- placement pending. Awaiting placement.     Disp: needs ECF- CM working on it    Diet Dietary Nutrition Supplements: Clear Liquid Oral Supplement, Protein Modular  DIET LOW FAT;   DVT Prophylaxis [] Lovenox, []  Heparin, [] SCDs, [] Ambulation   GI Prophylaxis [] PPI,  [] H2 Blocker,  [] Carafate,  [] Diet/Tube Feeds   Code Status Full Code   Disposition Patient requires continued admission due to sepsis   CMS Level of Risk [] Low, [] Moderate,[x]  High  Patient's risk as above due to sepsis     ASHLEE THOMAS MD 3/5/2020 11:05 AM

## 2020-03-05 NOTE — PROGRESS NOTES
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 Short term goals  Time Frame for Short term goals: 3-5 days  Short term goal 1: Pt will ambulate with FWW for 150 ft at supervision. Short term goal 2: Pt will perform transfers with supervision. Short term goal 3: Pt will perform bed mobility with supervision. Long term goals  Time Frame for Long term goals : 7-10 days  Long term goal 1: Pt will ambulate with FWW for 150 ft at mod I.  Long term goal 2: Pt will perform transfers with independence. Long term goal 3: Pt will perform bed mobility with independence.     Electronically signed by:    Tanvir Zaragoza PT  3/5/2020, 3:31 PM

## 2020-03-05 NOTE — PROGRESS NOTES
scan    ASSESSMENT/RECOMMENDATION    1. DLBCL s/p 2 cycles of R-CHOP with response. Will resume chemo as outpatient after completion of IV antibiotic and rehab. I will start pulse steroid for 5 days. 2. LUE DVT. Will start lovenox today. CBC was reviewed. Recommend to check daily CBC for now. To continue with PT. If she is discharged to Rehab, I recommend to follow up at cancer ctr. Thanks. We will continue to follow the patient. Thank you.

## 2020-03-06 LAB
ANION GAP SERPL CALCULATED.3IONS-SCNC: 13 MMOL/L (ref 4–16)
BUN BLDV-MCNC: 8 MG/DL (ref 6–23)
CALCIUM SERPL-MCNC: 8.4 MG/DL (ref 8.3–10.6)
CHLORIDE BLD-SCNC: 108 MMOL/L (ref 99–110)
CO2: 15 MMOL/L (ref 21–32)
CREAT SERPL-MCNC: 0.5 MG/DL (ref 0.6–1.1)
GFR AFRICAN AMERICAN: >60 ML/MIN/1.73M2
GFR NON-AFRICAN AMERICAN: >60 ML/MIN/1.73M2
GLUCOSE BLD-MCNC: 102 MG/DL (ref 70–99)
HCT VFR BLD CALC: 36 % (ref 37–47)
HEMOGLOBIN: 10.2 GM/DL (ref 12.5–16)
MCH RBC QN AUTO: 29.8 PG (ref 27–31)
MCHC RBC AUTO-ENTMCNC: 28.3 % (ref 32–36)
MCV RBC AUTO: 105.3 FL (ref 78–100)
PDW BLD-RTO: 15.7 % (ref 11.7–14.9)
PLATELET # BLD: 306 K/CU MM (ref 140–440)
PMV BLD AUTO: 9.4 FL (ref 7.5–11.1)
POTASSIUM SERPL-SCNC: 4 MMOL/L (ref 3.5–5.1)
RBC # BLD: 3.42 M/CU MM (ref 4.2–5.4)
REASON FOR REJECTION: NORMAL
REJECTED TEST: NORMAL
SODIUM BLD-SCNC: 136 MMOL/L (ref 135–145)
WBC # BLD: 11.1 K/CU MM (ref 4–10.5)

## 2020-03-06 PROCEDURE — C9113 INJ PANTOPRAZOLE SODIUM, VIA: HCPCS | Performed by: SPECIALIST

## 2020-03-06 PROCEDURE — 6360000002 HC RX W HCPCS: Performed by: INTERNAL MEDICINE

## 2020-03-06 PROCEDURE — 99232 SBSQ HOSP IP/OBS MODERATE 35: CPT | Performed by: NURSE PRACTITIONER

## 2020-03-06 PROCEDURE — 97530 THERAPEUTIC ACTIVITIES: CPT

## 2020-03-06 PROCEDURE — 6370000000 HC RX 637 (ALT 250 FOR IP): Performed by: SPECIALIST

## 2020-03-06 PROCEDURE — 2500000003 HC RX 250 WO HCPCS: Performed by: SPECIALIST

## 2020-03-06 PROCEDURE — 6370000000 HC RX 637 (ALT 250 FOR IP): Performed by: HOSPITALIST

## 2020-03-06 PROCEDURE — 94761 N-INVAS EAR/PLS OXIMETRY MLT: CPT

## 2020-03-06 PROCEDURE — 6370000000 HC RX 637 (ALT 250 FOR IP): Performed by: INTERNAL MEDICINE

## 2020-03-06 PROCEDURE — 80048 BASIC METABOLIC PNL TOTAL CA: CPT

## 2020-03-06 PROCEDURE — 97110 THERAPEUTIC EXERCISES: CPT

## 2020-03-06 PROCEDURE — 97116 GAIT TRAINING THERAPY: CPT

## 2020-03-06 PROCEDURE — 6360000002 HC RX W HCPCS: Performed by: SPECIALIST

## 2020-03-06 PROCEDURE — 2140000000 HC CCU INTERMEDIATE R&B

## 2020-03-06 PROCEDURE — 2580000003 HC RX 258: Performed by: INTERNAL MEDICINE

## 2020-03-06 PROCEDURE — 2580000003 HC RX 258: Performed by: SPECIALIST

## 2020-03-06 PROCEDURE — 2580000003 HC RX 258: Performed by: HOSPITALIST

## 2020-03-06 PROCEDURE — 99232 SBSQ HOSP IP/OBS MODERATE 35: CPT | Performed by: INTERNAL MEDICINE

## 2020-03-06 PROCEDURE — 85027 COMPLETE CBC AUTOMATED: CPT

## 2020-03-06 RX ADMIN — PANTOPRAZOLE SODIUM 40 MG: 40 INJECTION, POWDER, FOR SOLUTION INTRAVENOUS at 09:11

## 2020-03-06 RX ADMIN — METOPROLOL TARTRATE 5 MG: 5 INJECTION INTRAVENOUS at 09:11

## 2020-03-06 RX ADMIN — METRONIDAZOLE 500 MG: 500 INJECTION, SOLUTION INTRAVENOUS at 20:36

## 2020-03-06 RX ADMIN — METOPROLOL TARTRATE 5 MG: 5 INJECTION INTRAVENOUS at 18:49

## 2020-03-06 RX ADMIN — SODIUM CHLORIDE, PRESERVATIVE FREE 10 ML: 5 INJECTION INTRAVENOUS at 09:12

## 2020-03-06 RX ADMIN — SODIUM BICARBONATE 650 MG TABLET 650 MG: at 18:41

## 2020-03-06 RX ADMIN — PREDNISONE 40 MG: 20 TABLET ORAL at 09:11

## 2020-03-06 RX ADMIN — SODIUM CHLORIDE, PRESERVATIVE FREE 10 ML: 5 INJECTION INTRAVENOUS at 20:39

## 2020-03-06 RX ADMIN — SODIUM CHLORIDE, PRESERVATIVE FREE 10 ML: 5 INJECTION INTRAVENOUS at 21:52

## 2020-03-06 RX ADMIN — ENOXAPARIN SODIUM 40 MG: 30 INJECTION SUBCUTANEOUS at 21:51

## 2020-03-06 RX ADMIN — DIVALPROEX SODIUM 1000 MG: 500 TABLET, EXTENDED RELEASE ORAL at 21:51

## 2020-03-06 RX ADMIN — SODIUM BICARBONATE 650 MG TABLET 650 MG: at 09:11

## 2020-03-06 RX ADMIN — CEFEPIME HYDROCHLORIDE 2 G: 2 INJECTION, POWDER, FOR SOLUTION INTRAVENOUS at 03:56

## 2020-03-06 RX ADMIN — METRONIDAZOLE 500 MG: 500 INJECTION, SOLUTION INTRAVENOUS at 14:51

## 2020-03-06 RX ADMIN — Medication 500 MG: at 09:11

## 2020-03-06 RX ADMIN — SODIUM BICARBONATE 650 MG TABLET 650 MG: at 15:00

## 2020-03-06 RX ADMIN — SODIUM BICARBONATE 650 MG TABLET 650 MG: at 21:51

## 2020-03-06 RX ADMIN — ENOXAPARIN SODIUM 40 MG: 30 INJECTION SUBCUTANEOUS at 09:11

## 2020-03-06 RX ADMIN — CEFEPIME HYDROCHLORIDE 2 G: 2 INJECTION, POWDER, FOR SOLUTION INTRAVENOUS at 18:39

## 2020-03-06 RX ADMIN — ALLOPURINOL 100 MG: 100 TABLET ORAL at 09:11

## 2020-03-06 RX ADMIN — MICONAZOLE NITRATE: 20 POWDER TOPICAL at 21:53

## 2020-03-06 RX ADMIN — METOPROLOL TARTRATE 5 MG: 5 INJECTION INTRAVENOUS at 03:54

## 2020-03-06 RX ADMIN — METRONIDAZOLE 500 MG: 500 INJECTION, SOLUTION INTRAVENOUS at 03:56

## 2020-03-06 ASSESSMENT — PAIN SCALES - GENERAL: PAINLEVEL_OUTOF10: 0

## 2020-03-06 NOTE — PROGRESS NOTES
iohexol, sodium chloride flush, traMADol, sodium chloride flush, ondansetron, acetaminophen, polyethylene glycol, melatonin        ASSESSMENT and PLAN  Hospital Day: 18    1-Pseudomonas bacteremia with sepsis- blood culture positive on 2/18- ?due to mediport which was removed on 2/20- repeat NGTD and ID on board- abx per ID- with end date of 3/10  2-Acute GI bleed with acute blood loss anemia- EGD with gastritis, C scope with rectosigmoid ulcer- monitor H/H  3-Chemotherapy related neutropenia- resolved with G-CSF  4-Extensive LUE DVT-was on very low dose heparin SC-oncology discussed with GI on AC- started on lovenox and needs daily CBC per oncology  5-Left large chest wall hematoma- due to fall- monitor H/H  6-Lymphoma-D:BCL- chemotherapy to be resumed after abx per oncology  7-Reported metabolic encephalopathy- resolved  8-Bipolar disorder- on home medication  9-Metabolic acidosis- trial of oral bicarb-improved  10-Debility- placement pending. Awaiting placement-precert pending.     -IV access- discussed with IR- only option she has is R IJ- given that she will need chemotherapy down the road- asked the nurse to place PIV then remove femoral CVC- will likely discharge on PIV for remaining few days of IV abx to ECF    Disp: needs ECF- CM working on it    Diet Dietary Nutrition Supplements: Clear Liquid Oral Supplement, Protein Modular  DIET LOW FAT;   DVT Prophylaxis [] Lovenox, []  Heparin, [] SCDs, [] Ambulation   GI Prophylaxis [] PPI,  [] H2 Blocker,  [] Carafate,  [] Diet/Tube Feeds   Code Status Full Code   Disposition Patient requires continued admission due to sepsis   CMS Level of Risk [] Low, [] Moderate,[x]  High  Patient's risk as above due to sepsis     ASHLEE THOMAS MD 3/6/2020 10:29 AM

## 2020-03-06 NOTE — PROGRESS NOTES
days.   She will require access for antibiotic treatment - ok for tunneled catheter. 2. LUE DVT. Lovenox started 3/6/2020. Hgb 3/5/2020 9.8. CBC ordered for today. Will monitor Hgb. To continue with PT. If she is discharged to Rehab, I recommend to follow up at cancer ctr. Thanks. We will continue to follow the patient. Thank you.

## 2020-03-06 NOTE — PROGRESS NOTES
Calcium 8.2 (L) 8.3 - 10.6 MG/DL    GFR Non-African American >60 >60 mL/min/1.73m2    GFR African American >60 >60 mL/min/1.73m2   C-Reactive Protein    Collection Time: 03/05/20  7:16 AM   Result Value Ref Range    CRP, High Sensitivity 37.3 mg/L   Procalcitonin    Collection Time: 03/05/20  7:16 AM   Result Value Ref Range    Procalcitonin 0.706    Albumin    Collection Time: 03/05/20  7:16 AM   Result Value Ref Range    Alb 1.9 (L) 3.4 - 5.0 GM/DL   Phosphorus    Collection Time: 03/05/20  7:16 AM   Result Value Ref Range    Phosphorus 2.7 2.5 - 4.9 MG/DL     CULTURE results: Invalid input(s): BLOOD CULTURE,  URINE CULTURE, SURGICAL CULTURE    Diagnosis:  Patient Active Problem List   Diagnosis    Family history of malignant neoplasm of gastrointestinal tract    [facility] [address] ([facility] [address]    Idiopathic hypotension    Mitral valve disease    Pancytopenia (HCC)    Severe malnutrition (HCC)    Acute deep vein thrombosis (DVT) of left upper extremity (HCC)    Hematoma       Active Problems  Active Problems:    Pancytopenia (HCC)    Severe malnutrition (HCC)    Acute deep vein thrombosis (DVT) of left upper extremity ([address]    Hematoma  Resolved Problems:    * No resolved hospital problems.  *    Electronically signed by: Electronically signed by Janeth Shaikh MD on 3/5/2020 at 8:02 PM

## 2020-03-06 NOTE — PROGRESS NOTES
0.6   GLUCOSE 94 88     Magnesium:   Lab Results   Component Value Date    MG 2.0 02/25/2020     Hepatic: No results for input(s): AST, ALT, ALB, BILITOT, ALKPHOS in the last 72 hours. INR: No results for input(s): INR in the last 72 hours. Intake/Output Summary (Last 24 hours) at 3/6/2020 0637  Last data filed at 3/6/2020 8972  Gross per 24 hour   Intake 1735.83 ml   Output 1100 ml   Net 635.83 ml         Medications    Scheduled Medications:    lidocaine PF  5 mL Intradermal Once    sodium chloride flush  10 mL Intravenous 2 times per day    enoxaparin  40 mg Subcutaneous BID    predniSONE  40 mg Oral Daily    cefepime  2 g Intravenous Q8H    miconazole   Topical BID    sodium bicarbonate  650 mg Oral 4x Daily    divalproex  1,000 mg Oral Nightly    pantoprazole  40 mg Intravenous Daily    metoclopramide  10 mg Intramuscular Once    metoprolol  5 mg Intravenous Q6H    metroNIDAZOLE  500 mg Intravenous Q8H    sodium chloride flush  10 mL Intravenous 2 times per day    allopurinol  100 mg Oral Daily    calcium elemental  500 mg Oral Daily with breakfast     PRN Medications: sodium chloride flush, potassium chloride **OR** potassium alternative oral replacement **OR** potassium chloride, iohexol, sodium chloride flush, traMADol, sodium chloride flush, ondansetron, acetaminophen, polyethylene glycol, melatonin  Infusions:    0.45 % NaCl with KCl 20 mEq 50 mL/hr at 03/05/20 1533           Patient Active Problem List   Diagnosis Code    Family history of malignant neoplasm of gastrointestinal tract Z80.0    B-cell lymphoma (HCC) C85.10    Idiopathic hypotension I95.0    Mitral valve disease I05.9    Pancytopenia (HCC) D61.818    Severe malnutrition ([facility] [address] E43    Acute deep vein thrombosis (DVT) of left upper extremity (HCC) I82.622    Hematoma T14. 8XXA         ASSESSMENT/RECOMMENDATIONS:     Anemia:  Recently initiated chemotherapy, R-CHOP 1/2020  Hgb 10.2 - stable    GI bleed- Bleed from residual ulcer after therapy for Lymphoma  S/p EGD 2/27/2020               1)mild erosive gastritis with flecks of coffee-grounds in stomach              2) no potential bleeding site seen- no ulcer, varices, angiodysplasia, neoplasm  S/P EGD 2/28/2020              1) large amount of blood and clot throughout the colon              2) no focal bleeding seen              3) 4-5 cm, deep ulcer in rectosigmoid at 10 cm- no visible              vessel, adherent clot or active bleeding associated with the              ulcer-- but likely the source of her bleeding              4) otherwise normal but very limited (by the blood and clot) exam to the cecum     Nutrition:  Patient reports lactose intolerance     Diarrhea- resolved     Recommendations:  Encouraged PO intake  Monitor CBC q12 hr  Transfuse for hgb <7.5  Agree with restarting anticoagulation -will monitor closely for active bleeding and decline of H&H    Stable from GI standpoint. Call if needed    Discussed plan of care with patient     Patient clinical, biochemical, and radiological information discussed with Dr. Arianna Haas. He agrees with the assessment and plan. Ishmael Hunter CNP  3/6/2020  6:37 AM      Stable  abd soft    Advance anticoagulation    I have seen and examined this patient personally, and independently of the nurse practitioner. The plan was developed mutually at the time of the visit with the patient. Ishmael Hunter CNP and myself have spoken with patient, nursing staff and provided written and verbal instructions .     The above note has been reviewed and I agree with the Assessment,  Diagnosis, and Treatment plan as suggested by Ishmael Hunter CNP      [facility] gastroenterology

## 2020-03-06 NOTE — PROGRESS NOTES
Infectious Disease Progress Note  3/6/2020   Patient Name: Jesus Lozano : 1955   Impression  · Sepsis  ? Treated for catheter related bloodstream infection secondary to P aeruginosa  ? Pct maintains downward trend after the start of cefepime  ? Afebruke  · Chemotherapy induced pancytopenia  ? Resolved with G-CSF  · Extensive left upper extremity DVT  · Anaemia  · Multi-morbidity: per PMHx DLBCL on R-CHOP  Plan:  · Continue cefepime 2g q 8h and continue metronidazole 500 mg IV q 8h (end-date 3/10/2020)  · F/u in clinic within one week    Ongoing Antimicrobial Therapy  Cefepime -;3/4-  Metronidazole ? Completed Antimicrobial Therapy  Vancomycin -3/1  Zerbaxa -3/4? History:? Interval history noted: CRBSI P aeruginosa, chemotherapy induced pancytopenia, lower GI bleed  Denies n/v/d/f or untoward effects of antibiotics. Feels better. Physical Exam:  Vital Signs: /65   Pulse 91   Temp 97.5 °F (36.4 °C) (Oral)   Resp 20   Ht 5' 3\" (1.6 m)   Wt 97 lb (44 kg)   LMP 2000   SpO2 98%   BMI 17.18 kg/m²     Gen: alert and oriented X3, no distress  Skin: no stigmata of endocarditis  Wounds: Left ACW surgical site is clean, glue intact, there is surrounding hematoma, left arm swelling  HEMT: AT/NC Oropharynx pink, moist, and without lesions or exudates; dentition in good state of repair  Eyes: PERRLA, EOMI, conjunctiva pink, sclera anicteric. Neck: Supple. Trachea midline. No LAD. Chest: no distress and CTA. Good air movement. Heart: tachycardia   Abd: soft, non-distended, umbilical tenderness, no hepatomegaly. Normoactive bowel sounds. Ext: left arm swelling  Catheter Site:  right femoral CVC without erythema or tenderness  Neuro: Mental status intact. CN 2-12 intact and no focal sensory or motor deficits     Radiologic / Imaging / TESTING  CT chest, abdomen and pelvis 2020  Impression:   1.  Hypermetabolic lesions seen in the small bowel mesentery and bilateral  adnexa Hematoma  Resolved Problems:    * No resolved hospital problems.  *    Electronically signed by: Electronically signed by Shaan Hawkins MD on 3/6/2020 at 10:23 AM

## 2020-03-07 LAB
ANION GAP SERPL CALCULATED.3IONS-SCNC: 8 MMOL/L (ref 4–16)
BUN BLDV-MCNC: 8 MG/DL (ref 6–23)
CALCIUM SERPL-MCNC: 8 MG/DL (ref 8.3–10.6)
CHLORIDE BLD-SCNC: 109 MMOL/L (ref 99–110)
CO2: 20 MMOL/L (ref 21–32)
CREAT SERPL-MCNC: 0.6 MG/DL (ref 0.6–1.1)
GFR AFRICAN AMERICAN: >60 ML/MIN/1.73M2
GFR NON-AFRICAN AMERICAN: >60 ML/MIN/1.73M2
GLUCOSE BLD-MCNC: 84 MG/DL (ref 70–99)
HCT VFR BLD CALC: 27.5 % (ref 37–47)
HEMOGLOBIN: 8.9 GM/DL (ref 12.5–16)
MCH RBC QN AUTO: 30.3 PG (ref 27–31)
MCHC RBC AUTO-ENTMCNC: 32.4 % (ref 32–36)
MCV RBC AUTO: 93.5 FL (ref 78–100)
PDW BLD-RTO: 15.5 % (ref 11.7–14.9)
PLATELET # BLD: 306 K/CU MM (ref 140–440)
PMV BLD AUTO: 9.5 FL (ref 7.5–11.1)
POTASSIUM SERPL-SCNC: 3.9 MMOL/L (ref 3.5–5.1)
RBC # BLD: 2.94 M/CU MM (ref 4.2–5.4)
SODIUM BLD-SCNC: 137 MMOL/L (ref 135–145)
WBC # BLD: 10.4 K/CU MM (ref 4–10.5)

## 2020-03-07 PROCEDURE — 6370000000 HC RX 637 (ALT 250 FOR IP): Performed by: SPECIALIST

## 2020-03-07 PROCEDURE — 2580000003 HC RX 258: Performed by: HOSPITALIST

## 2020-03-07 PROCEDURE — 99232 SBSQ HOSP IP/OBS MODERATE 35: CPT | Performed by: INTERNAL MEDICINE

## 2020-03-07 PROCEDURE — 6360000002 HC RX W HCPCS: Performed by: INTERNAL MEDICINE

## 2020-03-07 PROCEDURE — 2580000003 HC RX 258: Performed by: INTERNAL MEDICINE

## 2020-03-07 PROCEDURE — 6370000000 HC RX 637 (ALT 250 FOR IP): Performed by: HOSPITALIST

## 2020-03-07 PROCEDURE — 97530 THERAPEUTIC ACTIVITIES: CPT

## 2020-03-07 PROCEDURE — 36415 COLL VENOUS BLD VENIPUNCTURE: CPT

## 2020-03-07 PROCEDURE — 2580000003 HC RX 258: Performed by: SPECIALIST

## 2020-03-07 PROCEDURE — 2140000000 HC CCU INTERMEDIATE R&B

## 2020-03-07 PROCEDURE — 85027 COMPLETE CBC AUTOMATED: CPT

## 2020-03-07 PROCEDURE — 94761 N-INVAS EAR/PLS OXIMETRY MLT: CPT

## 2020-03-07 PROCEDURE — 6360000002 HC RX W HCPCS: Performed by: SPECIALIST

## 2020-03-07 PROCEDURE — 2500000003 HC RX 250 WO HCPCS: Performed by: SPECIALIST

## 2020-03-07 PROCEDURE — 6370000000 HC RX 637 (ALT 250 FOR IP): Performed by: INTERNAL MEDICINE

## 2020-03-07 PROCEDURE — 80048 BASIC METABOLIC PNL TOTAL CA: CPT

## 2020-03-07 PROCEDURE — 2500000003 HC RX 250 WO HCPCS: Performed by: HOSPITALIST

## 2020-03-07 PROCEDURE — C9113 INJ PANTOPRAZOLE SODIUM, VIA: HCPCS | Performed by: SPECIALIST

## 2020-03-07 RX ORDER — METOPROLOL TARTRATE 50 MG/1
50 TABLET, FILM COATED ORAL 2 TIMES DAILY
Status: DISCONTINUED | OUTPATIENT
Start: 2020-03-07 | End: 2020-03-11 | Stop reason: HOSPADM

## 2020-03-07 RX ADMIN — SODIUM CHLORIDE, PRESERVATIVE FREE 10 ML: 5 INJECTION INTRAVENOUS at 21:38

## 2020-03-07 RX ADMIN — MICONAZOLE NITRATE: 20 POWDER TOPICAL at 21:39

## 2020-03-07 RX ADMIN — SODIUM CHLORIDE, PRESERVATIVE FREE 10 ML: 5 INJECTION INTRAVENOUS at 21:28

## 2020-03-07 RX ADMIN — SODIUM BICARBONATE 650 MG TABLET 650 MG: at 21:27

## 2020-03-07 RX ADMIN — CEFEPIME HYDROCHLORIDE 2 G: 2 INJECTION, POWDER, FOR SOLUTION INTRAVENOUS at 11:54

## 2020-03-07 RX ADMIN — SODIUM BICARBONATE 650 MG TABLET 650 MG: at 19:03

## 2020-03-07 RX ADMIN — PREDNISONE 40 MG: 20 TABLET ORAL at 09:19

## 2020-03-07 RX ADMIN — METOPROLOL TARTRATE 50 MG: 50 TABLET, FILM COATED ORAL at 11:54

## 2020-03-07 RX ADMIN — MICONAZOLE NITRATE: 20 POWDER TOPICAL at 09:19

## 2020-03-07 RX ADMIN — CEFEPIME HYDROCHLORIDE 2 G: 2 INJECTION, POWDER, FOR SOLUTION INTRAVENOUS at 02:15

## 2020-03-07 RX ADMIN — METRONIDAZOLE 500 MG: 500 INJECTION, SOLUTION INTRAVENOUS at 21:26

## 2020-03-07 RX ADMIN — DIVALPROEX SODIUM 1000 MG: 500 TABLET, EXTENDED RELEASE ORAL at 21:36

## 2020-03-07 RX ADMIN — PANTOPRAZOLE SODIUM 40 MG: 40 INJECTION, POWDER, FOR SOLUTION INTRAVENOUS at 09:10

## 2020-03-07 RX ADMIN — METRONIDAZOLE 500 MG: 500 INJECTION, SOLUTION INTRAVENOUS at 11:54

## 2020-03-07 RX ADMIN — ENOXAPARIN SODIUM 40 MG: 30 INJECTION SUBCUTANEOUS at 09:09

## 2020-03-07 RX ADMIN — SODIUM CHLORIDE, PRESERVATIVE FREE 10 ML: 5 INJECTION INTRAVENOUS at 09:10

## 2020-03-07 RX ADMIN — ALLOPURINOL 100 MG: 100 TABLET ORAL at 09:09

## 2020-03-07 RX ADMIN — METRONIDAZOLE 500 MG: 500 INJECTION, SOLUTION INTRAVENOUS at 04:39

## 2020-03-07 RX ADMIN — ENOXAPARIN SODIUM 40 MG: 30 INJECTION SUBCUTANEOUS at 21:27

## 2020-03-07 RX ADMIN — Medication 500 MG: at 09:09

## 2020-03-07 RX ADMIN — SODIUM BICARBONATE 650 MG TABLET 650 MG: at 09:09

## 2020-03-07 RX ADMIN — CEFEPIME HYDROCHLORIDE 2 G: 2 INJECTION, POWDER, FOR SOLUTION INTRAVENOUS at 19:03

## 2020-03-07 RX ADMIN — SODIUM BICARBONATE 650 MG TABLET 650 MG: at 11:54

## 2020-03-07 ASSESSMENT — PAIN SCALES - GENERAL
PAINLEVEL_OUTOF10: 0

## 2020-03-07 NOTE — PROGRESS NOTES
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 Short term goal 2: Pt will perform transfers with supervision. Short term goal 3: Pt will perform bed mobility with supervision. Long term goals  Time Frame for Long term goals : 7-10 days  Long term goal 1: Pt will ambulate with FWW for 150 ft at mod I.  Long term goal 2: Pt will perform transfers with independence. Long term goal 3: Pt will perform bed mobility with independence.     Electronically signed by:    Joslyn Barker PT  3/7/2020, 8:54 AM

## 2020-03-07 NOTE — PROGRESS NOTES
[address] [facility] - [facility] [place]       Progress Note       3/7/2020  8:12 AM    Patient:    Jennifer Shelley  : 1955   59 y.o. MRN: [id number]  Admitted: 2020  3:46 PM ATT: Guerda Zaragoza MD   [id number]  AdmitDx: Hemoglobin low [D64.9]  Pancytopenia ([facility] [address] [R71.987]  PCP: Jovanni Montgomery MD    ASSESSMENT AND PLAN :  Doing well  No gi bleed  Hb stable    CPM  Will sign off  Call if needed    Patient Active Problem List   Diagnosis Code    Family history of malignant neoplasm of gastrointestinal tract Z80.0    B-cell lymphoma ([facility] [address] C85.10    Idiopathic hypotension I95.0    Mitral valve disease I05.9    Pancytopenia ([address] D61.818    Severe malnutrition ([address] E43    Acute deep vein thrombosis (DVT) of left upper extremity (HCC) I82.622    Hematoma T14. 8XXA       Dr Arnold Ava  3/7/2020  8:12 AM      SUBJECTIVE:  Chart reviewed, events noted  Patient feeling well. No complaints. Having BM. Tolerating po diet. No N/V. No n abdominal pain.     ROS:  Cardiovascular ROS: No chest pain  Gastrointestinal ROS: see above  Respiratory ROS: No SOB    OBJECTIVE:      /74   Pulse 73   Temp 98 °F (36.7 °C) (Oral)   Resp 16   Ht 5' 3\" (1.6 m)   Wt 146 lb (66.2 kg)   LMP 2000   SpO2 97%   BMI 25.86 kg/m²     NAD  RRR, Nl s1s2  Lungs CTA Bilaterally, normal effort  Abdomen soft, ND, NT, no HSM, Bowel sounds normal  AAOx3, No asterixis     CBC:   Recent Labs     20  0716 20  0939 20  0455   WBC 11.5* 11.1* 10.4   HGB 9.8* 10.2* 8.9*    306 306     BMP:    Recent Labs     20  0716 20  1133 20  0455    136 137   K 4.4 4.0 3.9    108 109   CO2 20* 15* 20*   BUN 9 8 8   CREATININE 0.6 0.5* 0.6   GLUCOSE 88 102* 84     Magnesium:   Lab Results   Component Value Date    MG 2.0 2020     Hepatic: No results for input(s): AST, ALT, ALB, BILITOT, ALKPHOS in the last 72

## 2020-03-07 NOTE — PROGRESS NOTES
ecchymoses. MSK Spontaneous movement of all extremities. No gross joint deformities. SKIN Normal coloration, warm, dry. NEURO Cranial nerves appear grossly intact, normal speech, no lateralizing weakness. PSYCH Awake, alert, oriented x 4. Affect appropriate. INTAKE: In: 160 [I.V.:10]  Out: 1500   OUTPUT: In: 160   Out: 1500 [Urine:1500]    LABS  Recent Labs     03/05/20  0716 03/06/20  0939 03/07/20  0455   WBC 11.5* 11.1* 10.4   HGB 9.8* 10.2* 8.9*   HCT 30.2* 36.0* 27.5*    306 306      Recent Labs     03/05/20  0716 03/06/20  1133 03/07/20  0455    136 137   K 4.4 4.0 3.9    108 109   CO2 20* 15* 20*   PHOS 2.7  --   --    BUN 9 8 8   CREATININE 0.6 0.5* 0.6     No results for input(s): AST, ALT, ALB, BILIDIR, BILITOT, ALKPHOS in the last 72 hours. No results for input(s): INR in the last 72 hours. No results for input(s): CKTOTAL, CKMB, CKMBINDEX, TROPONINT in the last 72 hours.        Abnormal labs for today noted      Imaging:     ECHO:    Microbiology:  Blood culture:    Urine culture:    Sputum culture:    Procedures done this admission:    MEDS  Scheduled Meds:   metoprolol tartrate  50 mg Oral BID    cefepime  2 g Intravenous Q8H    lidocaine PF  5 mL Intradermal Once    sodium chloride flush  10 mL Intravenous 2 times per day    enoxaparin  40 mg Subcutaneous BID    predniSONE  40 mg Oral Daily    miconazole   Topical BID    sodium bicarbonate  650 mg Oral 4x Daily    divalproex  1,000 mg Oral Nightly    pantoprazole  40 mg Intravenous Daily    metoclopramide  10 mg Intramuscular Once    metroNIDAZOLE  500 mg Intravenous Q8H    sodium chloride flush  10 mL Intravenous 2 times per day    allopurinol  100 mg Oral Daily    calcium elemental  500 mg Oral Daily with breakfast     Continuous Infusions:    PRN Meds:sodium chloride flush, potassium chloride **OR** potassium alternative oral replacement **OR** potassium chloride, iohexol, sodium chloride flush, traMADol,

## 2020-03-08 LAB
ANION GAP SERPL CALCULATED.3IONS-SCNC: 9 MMOL/L (ref 4–16)
ANISOCYTOSIS: ABNORMAL
BANDED NEUTROPHILS ABSOLUTE COUNT: 0.19 K/CU MM
BANDED NEUTROPHILS RELATIVE PERCENT: 2 % (ref 5–11)
BUN BLDV-MCNC: 13 MG/DL (ref 6–23)
CALCIUM SERPL-MCNC: 8.5 MG/DL (ref 8.3–10.6)
CHLORIDE BLD-SCNC: 108 MMOL/L (ref 99–110)
CO2: 22 MMOL/L (ref 21–32)
CREAT SERPL-MCNC: 0.6 MG/DL (ref 0.6–1.1)
DIFFERENTIAL TYPE: ABNORMAL
GFR AFRICAN AMERICAN: >60 ML/MIN/1.73M2
GFR NON-AFRICAN AMERICAN: >60 ML/MIN/1.73M2
GLUCOSE BLD-MCNC: 79 MG/DL (ref 70–99)
HCT VFR BLD CALC: 29.8 % (ref 37–47)
HEMOGLOBIN: 9.5 GM/DL (ref 12.5–16)
LYMPHOCYTES ABSOLUTE: 0.7 K/CU MM
LYMPHOCYTES RELATIVE PERCENT: 7 % (ref 24–44)
MCH RBC QN AUTO: 29.8 PG (ref 27–31)
MCHC RBC AUTO-ENTMCNC: 31.9 % (ref 32–36)
MCV RBC AUTO: 93.4 FL (ref 78–100)
METAMYELOCYTES ABSOLUTE COUNT: 0.19 K/CU MM
METAMYELOCYTES PERCENT: 2 %
MONOCYTES ABSOLUTE: 0.7 K/CU MM
MONOCYTES RELATIVE PERCENT: 7 % (ref 0–4)
PDW BLD-RTO: 15.5 % (ref 11.7–14.9)
PLATELET # BLD: 307 K/CU MM (ref 140–440)
PMV BLD AUTO: 9.4 FL (ref 7.5–11.1)
POLYCHROMASIA: ABNORMAL
POTASSIUM SERPL-SCNC: 3.5 MMOL/L (ref 3.5–5.1)
RBC # BLD: 3.19 M/CU MM (ref 4.2–5.4)
SEGMENTED NEUTROPHILS ABSOLUTE COUNT: 7.9 K/CU MM
SEGMENTED NEUTROPHILS RELATIVE PERCENT: 82 % (ref 36–66)
SODIUM BLD-SCNC: 139 MMOL/L (ref 135–145)
WBC # BLD: 9.7 K/CU MM (ref 4–10.5)

## 2020-03-08 PROCEDURE — 2580000003 HC RX 258: Performed by: SPECIALIST

## 2020-03-08 PROCEDURE — 6360000002 HC RX W HCPCS: Performed by: INTERNAL MEDICINE

## 2020-03-08 PROCEDURE — 6360000002 HC RX W HCPCS: Performed by: SPECIALIST

## 2020-03-08 PROCEDURE — 85027 COMPLETE CBC AUTOMATED: CPT

## 2020-03-08 PROCEDURE — 2500000003 HC RX 250 WO HCPCS: Performed by: SPECIALIST

## 2020-03-08 PROCEDURE — 2580000003 HC RX 258: Performed by: HOSPITALIST

## 2020-03-08 PROCEDURE — C9113 INJ PANTOPRAZOLE SODIUM, VIA: HCPCS | Performed by: SPECIALIST

## 2020-03-08 PROCEDURE — 6370000000 HC RX 637 (ALT 250 FOR IP): Performed by: INTERNAL MEDICINE

## 2020-03-08 PROCEDURE — 2140000000 HC CCU INTERMEDIATE R&B

## 2020-03-08 PROCEDURE — 94761 N-INVAS EAR/PLS OXIMETRY MLT: CPT

## 2020-03-08 PROCEDURE — 36415 COLL VENOUS BLD VENIPUNCTURE: CPT

## 2020-03-08 PROCEDURE — 6370000000 HC RX 637 (ALT 250 FOR IP): Performed by: SPECIALIST

## 2020-03-08 PROCEDURE — 99232 SBSQ HOSP IP/OBS MODERATE 35: CPT | Performed by: INTERNAL MEDICINE

## 2020-03-08 PROCEDURE — 85007 BL SMEAR W/DIFF WBC COUNT: CPT

## 2020-03-08 PROCEDURE — 80048 BASIC METABOLIC PNL TOTAL CA: CPT

## 2020-03-08 PROCEDURE — 2580000003 HC RX 258: Performed by: INTERNAL MEDICINE

## 2020-03-08 PROCEDURE — 6370000000 HC RX 637 (ALT 250 FOR IP): Performed by: HOSPITALIST

## 2020-03-08 RX ADMIN — CEFEPIME HYDROCHLORIDE 2 G: 2 INJECTION, POWDER, FOR SOLUTION INTRAVENOUS at 03:30

## 2020-03-08 RX ADMIN — SODIUM CHLORIDE, PRESERVATIVE FREE 10 ML: 5 INJECTION INTRAVENOUS at 20:45

## 2020-03-08 RX ADMIN — CEFEPIME HYDROCHLORIDE 2 G: 2 INJECTION, POWDER, FOR SOLUTION INTRAVENOUS at 17:51

## 2020-03-08 RX ADMIN — SODIUM BICARBONATE 650 MG TABLET 650 MG: at 07:58

## 2020-03-08 RX ADMIN — SODIUM BICARBONATE 650 MG TABLET 650 MG: at 12:49

## 2020-03-08 RX ADMIN — ENOXAPARIN SODIUM 40 MG: 30 INJECTION SUBCUTANEOUS at 20:47

## 2020-03-08 RX ADMIN — SODIUM BICARBONATE 650 MG TABLET 650 MG: at 17:51

## 2020-03-08 RX ADMIN — ENOXAPARIN SODIUM 40 MG: 30 INJECTION SUBCUTANEOUS at 07:59

## 2020-03-08 RX ADMIN — SODIUM CHLORIDE, PRESERVATIVE FREE 10 ML: 5 INJECTION INTRAVENOUS at 07:59

## 2020-03-08 RX ADMIN — ALLOPURINOL 100 MG: 100 TABLET ORAL at 07:59

## 2020-03-08 RX ADMIN — METRONIDAZOLE 500 MG: 500 INJECTION, SOLUTION INTRAVENOUS at 04:58

## 2020-03-08 RX ADMIN — METOPROLOL TARTRATE 50 MG: 50 TABLET, FILM COATED ORAL at 07:59

## 2020-03-08 RX ADMIN — MICONAZOLE NITRATE: 20 POWDER TOPICAL at 20:48

## 2020-03-08 RX ADMIN — PREDNISONE 40 MG: 20 TABLET ORAL at 07:59

## 2020-03-08 RX ADMIN — MICONAZOLE NITRATE: 20 POWDER TOPICAL at 12:49

## 2020-03-08 RX ADMIN — DIVALPROEX SODIUM 1000 MG: 500 TABLET, EXTENDED RELEASE ORAL at 20:47

## 2020-03-08 RX ADMIN — METOPROLOL TARTRATE 50 MG: 50 TABLET, FILM COATED ORAL at 20:47

## 2020-03-08 RX ADMIN — Medication 500 MG: at 07:59

## 2020-03-08 RX ADMIN — CEFEPIME HYDROCHLORIDE 2 G: 2 INJECTION, POWDER, FOR SOLUTION INTRAVENOUS at 12:49

## 2020-03-08 RX ADMIN — PANTOPRAZOLE SODIUM 40 MG: 40 INJECTION, POWDER, FOR SOLUTION INTRAVENOUS at 07:59

## 2020-03-08 RX ADMIN — SODIUM CHLORIDE, PRESERVATIVE FREE 10 ML: 5 INJECTION INTRAVENOUS at 20:48

## 2020-03-08 RX ADMIN — METRONIDAZOLE 500 MG: 500 INJECTION, SOLUTION INTRAVENOUS at 20:47

## 2020-03-08 RX ADMIN — SODIUM BICARBONATE 650 MG TABLET 650 MG: at 20:47

## 2020-03-08 RX ADMIN — METRONIDAZOLE 500 MG: 500 INJECTION, SOLUTION INTRAVENOUS at 12:49

## 2020-03-08 ASSESSMENT — PAIN SCALES - GENERAL
PAINLEVEL_OUTOF10: 0

## 2020-03-08 NOTE — PROGRESS NOTES
102* 84 79     Hepatic: No results for input(s): AST, ALT, ALB, BILITOT, ALKPHOS in the last 72 hours. INR: No results for input(s): INR in the last 72 hours. RADIOLOGY REPORTS  Reviewed    ASSESSMENT & RECOMMENDATIONS:  DLBCL s/p 2 cycles of R-CHOP with response. She will resume chemotherapy as an outpatient once IV antibiotics are completed and she has regained strength. She was started on steroid for five days.      Sepsis: On Antimicrobials and is being followed by [facility]. Lovenox started 3/6/2020. Monitor very closely for any bleeding     Anemia : Hb 9.5. Continue close observation    Plan noted to discharge her for rehab.     Continue other medical care. Improve nutritional status     This plan was discussed with the patient and she verbalized understanding.      We will continue to follow the patient.     Thank you for allowing us to participate in the care of this patient.        Dannielle Santana MD, 3/8/2020, 11:11 AM

## 2020-03-08 NOTE — PROGRESS NOTES
[address] [place] [address]  HOSPITALIST PROGRESS NOTE                       Name:  Fiorella Ruano /Age/Sex: 1955  (59 y.o. female)   MRN & CSN:  [id number] & [id number] Admission Date/Time: 2020  3:46 PM   Location:  [facility] Attending:  Lissette Bhatti MD                                                  HPI  Fiorella Ruano is a 59 y.o. female who was admitted on  with pancytopenia/sepsis    SUBJECTIVE  Appears in good spirits, no shortness of breath    10 point review of systems reviewed and negative unless noted above. ALLERGIES:   Allergies   Allergen Reactions    Abilify [Aripiprazole]     Iodine     Penicillins     Codeine Nausea And Vomiting       PCP: Annie Dozier MD    PAST MEDICAL HISTORY, SURGICAL HISTORY, SOCIAL HISTORY and  HOME MEDICATIONS all reviewed. OBJECTIVE  Vitals:    20 1650 20 2126 20 2129 20 0329   BP: 119/77 (!) 104/57 (!) 104/57 120/78   Pulse: 89 69 69 96   Resp:    Temp:   97.9 °F (36.6 °C) 98 °F (36.7 °C)   TempSrc:   Oral Oral   SpO2:   95% 96%   Weight:    135 lb 9.6 oz (61.5 kg)   Height:           PHYSICAL EXAM  GEN Awake female, sitting upright in bed in no apparent distress. EYES Pupils are equally round. No scleral erythema, discharge, or conjunctivitis. HENT Mucous membranes are moist. Oral pharynx without exudates, no evidence of thrush. NECK Supple, no apparent thyromegaly or masses. RESP Unlabored respiration, decreased breath sounds bilaterally  CARDIO/VASC S1/S2 auscultated. Regular rate without appreciable murmurs, rubs, or gallops. No JVD or carotid bruits. Peripheral pulses equal bilaterally and palpable. Positive peripheral edema. GI Abdomen is soft without significant tenderness, masses, or guarding. Bowel sounds are normoactive.  No costovertebral angle tenderness. Normal appearing external genitalia. HEME/LYMPH No palpable cervical lymphadenopathy and no hepatosplenomegaly.  No petechiae or acetaminophen, polyethylene glycol, melatonin        ASSESSMENT and PLAN  Hospital Day: 20    1-Pseudomonas bacteremia with sepsis- blood culture positive on 2/18- ?due to mediport which was removed on 2/20- repeat NGTD and ID on board- abx per ID- with end date of 3/10  2-Acute GI bleed with acute blood loss anemia- EGD with gastritis, C scope with rectosigmoid ulcer- monitor H/H  3-Chemotherapy related neutropenia- resolved with G-CSF  4-Extensive LUE DVT-was on very low dose heparin SC-oncology discussed with GI on AC- started on lovenox and needs daily CBC per oncology  5-Left large chest wall hematoma- due to fall- monitor H/H  6-Lymphoma-D:BCL- chemotherapy to be resumed after abx per oncology  7-Reported metabolic encephalopathy- resolved  8-Bipolar disorder- on home medication  9-Metabolic acidosis- trial of oral bicarb-improved  10-Debility- placement pending. Awaiting placement-precert pending.         Disp: needs ECF- CM working on it    Diet Dietary Nutrition Supplements: Clear Liquid Oral Supplement, Protein Modular  DIET LOW FAT;   DVT Prophylaxis [] Lovenox, []  Heparin, [] SCDs, [] Ambulation   GI Prophylaxis [] PPI,  [] H2 Blocker,  [] Carafate,  [] Diet/Tube Feeds   Code Status Full Code   Disposition Patient requires continued admission due to sepsis   CMS Level of Risk [] Low, [] Moderate,[x]  High  Patient's risk as above due to sepsis     ASHLEE THOMAS MD 3/8/2020 10:34 AM

## 2020-03-09 LAB
ANION GAP SERPL CALCULATED.3IONS-SCNC: 11 MMOL/L (ref 4–16)
BUN BLDV-MCNC: 14 MG/DL (ref 6–23)
CALCIUM SERPL-MCNC: 8.5 MG/DL (ref 8.3–10.6)
CHLORIDE BLD-SCNC: 110 MMOL/L (ref 99–110)
CO2: 19 MMOL/L (ref 21–32)
CREAT SERPL-MCNC: 0.6 MG/DL (ref 0.6–1.1)
GFR AFRICAN AMERICAN: >60 ML/MIN/1.73M2
GFR NON-AFRICAN AMERICAN: >60 ML/MIN/1.73M2
GLUCOSE BLD-MCNC: 74 MG/DL (ref 70–99)
HCT VFR BLD CALC: 29.5 % (ref 37–47)
HEMOGLOBIN: 9.6 GM/DL (ref 12.5–16)
MCH RBC QN AUTO: 30.2 PG (ref 27–31)
MCHC RBC AUTO-ENTMCNC: 32.5 % (ref 32–36)
MCV RBC AUTO: 92.8 FL (ref 78–100)
PDW BLD-RTO: 15.8 % (ref 11.7–14.9)
PLATELET # BLD: 279 K/CU MM (ref 140–440)
PMV BLD AUTO: 9.7 FL (ref 7.5–11.1)
POTASSIUM SERPL-SCNC: 3.1 MMOL/L (ref 3.5–5.1)
RBC # BLD: 3.18 M/CU MM (ref 4.2–5.4)
SODIUM BLD-SCNC: 140 MMOL/L (ref 135–145)
WBC # BLD: 10.1 K/CU MM (ref 4–10.5)

## 2020-03-09 PROCEDURE — 6370000000 HC RX 637 (ALT 250 FOR IP): Performed by: HOSPITALIST

## 2020-03-09 PROCEDURE — 97530 THERAPEUTIC ACTIVITIES: CPT

## 2020-03-09 PROCEDURE — 97110 THERAPEUTIC EXERCISES: CPT

## 2020-03-09 PROCEDURE — 85027 COMPLETE CBC AUTOMATED: CPT

## 2020-03-09 PROCEDURE — 2580000003 HC RX 258: Performed by: HOSPITALIST

## 2020-03-09 PROCEDURE — 80048 BASIC METABOLIC PNL TOTAL CA: CPT

## 2020-03-09 PROCEDURE — 99231 SBSQ HOSP IP/OBS SF/LOW 25: CPT | Performed by: NURSE PRACTITIONER

## 2020-03-09 PROCEDURE — 97116 GAIT TRAINING THERAPY: CPT

## 2020-03-09 PROCEDURE — 6360000002 HC RX W HCPCS: Performed by: INTERNAL MEDICINE

## 2020-03-09 PROCEDURE — 2140000000 HC CCU INTERMEDIATE R&B

## 2020-03-09 PROCEDURE — 6370000000 HC RX 637 (ALT 250 FOR IP): Performed by: SPECIALIST

## 2020-03-09 PROCEDURE — 99232 SBSQ HOSP IP/OBS MODERATE 35: CPT | Performed by: INTERNAL MEDICINE

## 2020-03-09 PROCEDURE — 2500000003 HC RX 250 WO HCPCS: Performed by: SPECIALIST

## 2020-03-09 PROCEDURE — 6360000002 HC RX W HCPCS: Performed by: SPECIALIST

## 2020-03-09 PROCEDURE — 2580000003 HC RX 258: Performed by: SPECIALIST

## 2020-03-09 PROCEDURE — 2580000003 HC RX 258: Performed by: INTERNAL MEDICINE

## 2020-03-09 PROCEDURE — C9113 INJ PANTOPRAZOLE SODIUM, VIA: HCPCS | Performed by: SPECIALIST

## 2020-03-09 PROCEDURE — 2500000003 HC RX 250 WO HCPCS: Performed by: INTERNAL MEDICINE

## 2020-03-09 PROCEDURE — 36415 COLL VENOUS BLD VENIPUNCTURE: CPT

## 2020-03-09 RX ADMIN — SODIUM BICARBONATE 650 MG TABLET 650 MG: at 18:29

## 2020-03-09 RX ADMIN — CEFEPIME HYDROCHLORIDE 2 G: 2 INJECTION, POWDER, FOR SOLUTION INTRAVENOUS at 10:28

## 2020-03-09 RX ADMIN — SODIUM CHLORIDE, PRESERVATIVE FREE 10 ML: 5 INJECTION INTRAVENOUS at 21:33

## 2020-03-09 RX ADMIN — MICONAZOLE NITRATE: 20 POWDER TOPICAL at 10:34

## 2020-03-09 RX ADMIN — CEFEPIME HYDROCHLORIDE 2 G: 2 INJECTION, POWDER, FOR SOLUTION INTRAVENOUS at 02:12

## 2020-03-09 RX ADMIN — METRONIDAZOLE 500 MG: 500 INJECTION, SOLUTION INTRAVENOUS at 04:26

## 2020-03-09 RX ADMIN — SODIUM CHLORIDE, PRESERVATIVE FREE 10 ML: 5 INJECTION INTRAVENOUS at 10:31

## 2020-03-09 RX ADMIN — DIVALPROEX SODIUM 1000 MG: 500 TABLET, EXTENDED RELEASE ORAL at 21:30

## 2020-03-09 RX ADMIN — ENOXAPARIN SODIUM 40 MG: 30 INJECTION SUBCUTANEOUS at 10:29

## 2020-03-09 RX ADMIN — SODIUM BICARBONATE 650 MG TABLET 650 MG: at 13:04

## 2020-03-09 RX ADMIN — ALLOPURINOL 100 MG: 100 TABLET ORAL at 10:28

## 2020-03-09 RX ADMIN — METOPROLOL TARTRATE 50 MG: 50 TABLET, FILM COATED ORAL at 10:28

## 2020-03-09 RX ADMIN — CEFEPIME HYDROCHLORIDE 2 G: 2 INJECTION, POWDER, FOR SOLUTION INTRAVENOUS at 18:29

## 2020-03-09 RX ADMIN — ENOXAPARIN SODIUM 40 MG: 30 INJECTION SUBCUTANEOUS at 21:32

## 2020-03-09 RX ADMIN — METRONIDAZOLE 500 MG: 500 INJECTION, SOLUTION INTRAVENOUS at 21:31

## 2020-03-09 RX ADMIN — PANTOPRAZOLE SODIUM 40 MG: 40 INJECTION, POWDER, FOR SOLUTION INTRAVENOUS at 10:28

## 2020-03-09 RX ADMIN — Medication 500 MG: at 10:28

## 2020-03-09 RX ADMIN — SODIUM BICARBONATE 650 MG TABLET 650 MG: at 10:28

## 2020-03-09 RX ADMIN — METRONIDAZOLE 500 MG: 500 INJECTION, SOLUTION INTRAVENOUS at 13:05

## 2020-03-09 RX ADMIN — MICONAZOLE NITRATE: 20 POWDER TOPICAL at 21:30

## 2020-03-09 RX ADMIN — SODIUM BICARBONATE 650 MG TABLET 650 MG: at 21:31

## 2020-03-09 ASSESSMENT — PAIN DESCRIPTION - PROGRESSION
CLINICAL_PROGRESSION: NOT CHANGED

## 2020-03-09 ASSESSMENT — PAIN SCALES - GENERAL
PAINLEVEL_OUTOF10: 0

## 2020-03-09 NOTE — PROGRESS NOTES
nearby)  Type of Home: House  Home Layout: One level  Home Access: Stairs to enter with rails  Entrance Stairs - Number of Steps: 2  Entrance Stairs - Rails: Both  Bathroom Shower/Tub: Tub/Shower unit  Bathroom Toilet: Standard  Bathroom Equipment: Grab bars in shower, Shower chair  Bathroom Accessibility: Accessible  Receives Help From: Family, Friend(s)  ADL Assistance: Independent  Homemaking Assistance: Independent  Homemaking Responsibilities: Yes  Ambulation Assistance: Independent  Transfer Assistance: Independent  Active : Yes  Mode of Transportation: Car  Occupation: Full time employment  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 Short term goals  Time Frame for Short term goals: 3-5 days  Short term goal 1: Pt will ambulate with FWW for 150 ft at supervision. Short term goal 2: Pt will perform transfers with supervision. Short term goal 3: Pt will perform bed mobility with supervision. Long term goals  Time Frame for Long term goals : 7-10 days  Long term goal 1: Pt will ambulate with FWW for 150 ft at mod I.  Long term goal 2: Pt will perform transfers with independence. Long term goal 3: Pt will perform bed mobility with independence.     Electronically signed by:    Mu Rachel PT  3/9/2020, 9:40 AM

## 2020-03-09 NOTE — PROGRESS NOTES
Infectious Disease Progress Note  3/9/2020   Patient Name: Kathia Anna : 1955   Impression  · Sepsis  ? Treated for catheter related bloodstream infection secondary to P aeruginosa  ? Afebrile, leukocytosis has resolved. · Chemotherapy induced pancytopenia  ? Resolved with G-CSF  · Extensive left upper extremity DVT  · Anaemia  · Multi-morbidity: per PMHx DLBCL on R-CHOP  Plan:  · Continue cefepime 2g q 8h and continue metronidazole 500 mg IV q 8h (end-date 3/10/2020)  · Ok with plans for [facility]. Patient told that risk for CRBSI still exist.   · F/u in clinic within one week    Ongoing Antimicrobial Therapy  Cefepime -;3/4-  Metronidazole ? Completed Antimicrobial Therapy  Vancomycin -3/1  Zerbaxa -3/4? History:? Interval history noted: CRBSI P aeruginosa, chemotherapy induced pancytopenia, lower GI bleed  Denies n/v/d/f or untoward effects of antibiotics. Feels better. Physical Exam:  Vital Signs: /69   Pulse 84   Temp 97.2 °F (36.2 °C) (Oral)   Resp 17   Ht 5' 3\" (1.6 m)   Wt 135 lb 9.6 oz (61.5 kg)   LMP 2000   SpO2 95%   BMI 24.02 kg/m²     Gen: alert and oriented X3, no distress  Skin: no stigmata of endocarditis  Wounds: Left ACW surgical site is clean, glue intact, there is surrounding hematoma, left arm swelling  HEMT: AT/NC Oropharynx pink, moist, and without lesions or exudates; dentition in good state of repair  Eyes: PERRLA, EOMI, conjunctiva pink, sclera anicteric. Neck: Supple. Trachea midline. No LAD. Chest: no distress and CTA. Good air movement. Heart: tachycardia   Abd: soft, non-distended, umbilical tenderness, no hepatomegaly. Normoactive bowel sounds. Ext: left arm swelling, bilateral lower extremity non-pitting edema  Catheter Site:  right femoral CVC without erythema or tenderness  Neuro: Mental status intact.  CN 2-12 intact and no focal sensory or motor deficits     Radiologic / Imaging / TESTING  CT chest, abdomen and pelvis

## 2020-03-09 NOTE — PROGRESS NOTES
ONCOLOGY HEMATOLOGY CARE ([facility])  PROGRESS NOTE      3/9/2020  9:01 AM    Patient:    Irma Dai  : 1955   59 y.o. MRN: [id number]  Admitted: 2020  3:46 PM ATT: Jennifer Leal MD   [id number]  AdmitDx: Hemoglobin low [D64.9]  Pancytopenia ([address] [G73.736]  PCP: Jacquie Altamirano MD      Patient was seen and examined today. Not in any acute distress and no overnight events. Swelling to left arm is improved. REVIEW OF SYSTEMS    Constitutional:  Denies fever, chills, loss of appetite, weight loss   HEENT:  Denies swelling of neck glands  Respiratory:  Denies cough, shortness of breath or hemoptysis  Cardiovascular:  Denies chest pain, palpitations or swelling   GI:  Denies abdominal pain, nausea, vomiting, constipation or diarrhea   Musculoskeletal:  Denies back pain   Skin:  Denies rash   Neurologic:  Denies headache, focal weakness or sensory changes   PSYCHIATRIC:  Neg depression, personality changes, anxiety.     All systems negative except  for symptoms of HPI and as marked as above    PHYSICAL EXAM :    Vitals: /69   Pulse 84   Temp 97.2 °F (36.2 °C) (Oral)   Resp 17   Ht 5' 3\" (1.6 m)   Wt 135 lb 9.6 oz (61.5 kg)   LMP 2000   SpO2 95%   BMI 24.02 kg/m²     CONSTITUTIONAL: tired appearing  EYES: MISTY  ENT: ATNC  NECK: no JVD  HEMATOLOGIC/LYMPHATIC: no LAD  LUNGS: ctab  CARDIOVASCULAR: s1s2 rrr  ABDOMEN: soft ntnd bs pos, left fem line intact  MUSCULOSKELETAL: full range of motion noted, tone is normal  NEUROLOGIC: GI  SKIN: hematoma left upper chest, nontender  EXTREMITIES: bilateral LE 2+ edema    LABORATORY RESULTS  CBC:   Recent Labs     20  04520  0727   WBC 10.4 9.7 10.1   HGB 8.9* 9.5* 9.6*    307 279     BMP:    Recent Labs     20  0520  07    139 140   K 3.9 3.5 3.1*    108 110   CO2 20* 22 19*   BUN 8 13 14   CREATININE 0.6 0.6 0.6   GLUCOSE 84 79 74

## 2020-03-10 ENCOUNTER — ANESTHESIA EVENT (OUTPATIENT)
Dept: OPERATING ROOM | Age: 65
DRG: 981 | End: 2020-03-10
Payer: COMMERCIAL

## 2020-03-10 ENCOUNTER — ANESTHESIA (OUTPATIENT)
Dept: OPERATING ROOM | Age: 65
DRG: 981 | End: 2020-03-10
Payer: COMMERCIAL

## 2020-03-10 ENCOUNTER — APPOINTMENT (OUTPATIENT)
Dept: GENERAL RADIOLOGY | Age: 65
DRG: 981 | End: 2020-03-10
Attending: INTERNAL MEDICINE
Payer: COMMERCIAL

## 2020-03-10 VITALS
RESPIRATION RATE: 15 BRPM | DIASTOLIC BLOOD PRESSURE: 76 MMHG | TEMPERATURE: 98.2 F | HEIGHT: 63 IN | OXYGEN SATURATION: 98 % | WEIGHT: 132.2 LBS | HEART RATE: 103 BPM | BODY MASS INDEX: 23.42 KG/M2 | SYSTOLIC BLOOD PRESSURE: 117 MMHG

## 2020-03-10 VITALS — SYSTOLIC BLOOD PRESSURE: 114 MMHG | DIASTOLIC BLOOD PRESSURE: 56 MMHG | OXYGEN SATURATION: 100 %

## 2020-03-10 LAB
ANION GAP SERPL CALCULATED.3IONS-SCNC: 9 MMOL/L (ref 4–16)
BUN BLDV-MCNC: 14 MG/DL (ref 6–23)
CALCIUM SERPL-MCNC: 8.4 MG/DL (ref 8.3–10.6)
CHLORIDE BLD-SCNC: 113 MMOL/L (ref 99–110)
CO2: 21 MMOL/L (ref 21–32)
CREAT SERPL-MCNC: 0.6 MG/DL (ref 0.6–1.1)
GFR AFRICAN AMERICAN: >60 ML/MIN/1.73M2
GFR NON-AFRICAN AMERICAN: >60 ML/MIN/1.73M2
GLUCOSE BLD-MCNC: 80 MG/DL (ref 70–99)
HCT VFR BLD CALC: 26.5 % (ref 37–47)
HEMOGLOBIN: 8.7 GM/DL (ref 12.5–16)
MCH RBC QN AUTO: 30 PG (ref 27–31)
MCHC RBC AUTO-ENTMCNC: 32.8 % (ref 32–36)
MCV RBC AUTO: 91.4 FL (ref 78–100)
PDW BLD-RTO: 15.9 % (ref 11.7–14.9)
PLATELET # BLD: 234 K/CU MM (ref 140–440)
PMV BLD AUTO: 9.6 FL (ref 7.5–11.1)
POTASSIUM SERPL-SCNC: 3.5 MMOL/L (ref 3.5–5.1)
POTASSIUM SERPL-SCNC: ABNORMAL MMOL/L (ref 3.5–5.1)
RBC # BLD: 2.9 M/CU MM (ref 4.2–5.4)
SODIUM BLD-SCNC: 143 MMOL/L (ref 135–145)
WBC # BLD: 7 K/CU MM (ref 4–10.5)

## 2020-03-10 PROCEDURE — 6360000002 HC RX W HCPCS: Performed by: INTERNAL MEDICINE

## 2020-03-10 PROCEDURE — 2580000003 HC RX 258: Performed by: SPECIALIST

## 2020-03-10 PROCEDURE — 2580000003 HC RX 258: Performed by: HOSPITALIST

## 2020-03-10 PROCEDURE — C1788 PORT, INDWELLING, IMP: HCPCS | Performed by: SURGERY

## 2020-03-10 PROCEDURE — 84132 ASSAY OF SERUM POTASSIUM: CPT

## 2020-03-10 PROCEDURE — 94761 N-INVAS EAR/PLS OXIMETRY MLT: CPT

## 2020-03-10 PROCEDURE — 3600000003 HC SURGERY LEVEL 3 BASE: Performed by: SURGERY

## 2020-03-10 PROCEDURE — 3600000013 HC SURGERY LEVEL 3 ADDTL 15MIN: Performed by: SURGERY

## 2020-03-10 PROCEDURE — 3700000000 HC ANESTHESIA ATTENDED CARE: Performed by: SURGERY

## 2020-03-10 PROCEDURE — 99231 SBSQ HOSP IP/OBS SF/LOW 25: CPT | Performed by: NURSE PRACTITIONER

## 2020-03-10 PROCEDURE — 99233 SBSQ HOSP IP/OBS HIGH 50: CPT | Performed by: PHYSICIAN ASSISTANT

## 2020-03-10 PROCEDURE — 2500000003 HC RX 250 WO HCPCS: Performed by: INTERNAL MEDICINE

## 2020-03-10 PROCEDURE — 2709999900 HC NON-CHARGEABLE SUPPLY: Performed by: SURGERY

## 2020-03-10 PROCEDURE — 36415 COLL VENOUS BLD VENIPUNCTURE: CPT

## 2020-03-10 PROCEDURE — 99232 SBSQ HOSP IP/OBS MODERATE 35: CPT | Performed by: INTERNAL MEDICINE

## 2020-03-10 PROCEDURE — 2580000003 HC RX 258: Performed by: ANESTHESIOLOGY

## 2020-03-10 PROCEDURE — 2580000003 HC RX 258: Performed by: INTERNAL MEDICINE

## 2020-03-10 PROCEDURE — 80048 BASIC METABOLIC PNL TOTAL CA: CPT

## 2020-03-10 PROCEDURE — 6360000002 HC RX W HCPCS: Performed by: HOSPITALIST

## 2020-03-10 PROCEDURE — 6370000000 HC RX 637 (ALT 250 FOR IP): Performed by: SPECIALIST

## 2020-03-10 PROCEDURE — 2580000003 HC RX 258: Performed by: NURSE ANESTHETIST, CERTIFIED REGISTERED

## 2020-03-10 PROCEDURE — 6370000000 HC RX 637 (ALT 250 FOR IP): Performed by: HOSPITALIST

## 2020-03-10 PROCEDURE — 85027 COMPLETE CBC AUTOMATED: CPT

## 2020-03-10 PROCEDURE — 6360000002 HC RX W HCPCS: Performed by: NURSE ANESTHETIST, CERTIFIED REGISTERED

## 2020-03-10 PROCEDURE — C9113 INJ PANTOPRAZOLE SODIUM, VIA: HCPCS | Performed by: SPECIALIST

## 2020-03-10 PROCEDURE — 6360000002 HC RX W HCPCS: Performed by: SPECIALIST

## 2020-03-10 PROCEDURE — 2500000003 HC RX 250 WO HCPCS: Performed by: SURGERY

## 2020-03-10 PROCEDURE — 77001 FLUOROGUIDE FOR VEIN DEVICE: CPT | Performed by: SURGERY

## 2020-03-10 PROCEDURE — 6360000002 HC RX W HCPCS: Performed by: SURGERY

## 2020-03-10 PROCEDURE — 36561 INSERT TUNNELED CV CATH: CPT | Performed by: SURGERY

## 2020-03-10 PROCEDURE — 2580000003 HC RX 258: Performed by: SURGERY

## 2020-03-10 PROCEDURE — 3700000001 HC ADD 15 MINUTES (ANESTHESIA): Performed by: SURGERY

## 2020-03-10 PROCEDURE — 76000 FLUOROSCOPY <1 HR PHYS/QHP: CPT

## 2020-03-10 DEVICE — PORT INFUS L55CM 0.016ML 0.4ML CATH OD2.2MM ID1.4MM INTRO: Type: IMPLANTABLE DEVICE | Site: CHEST | Status: FUNCTIONAL

## 2020-03-10 RX ORDER — PROPOFOL 10 MG/ML
INJECTION, EMULSION INTRAVENOUS PRN
Status: DISCONTINUED | OUTPATIENT
Start: 2020-03-10 | End: 2020-03-10 | Stop reason: SDUPTHER

## 2020-03-10 RX ORDER — METOPROLOL TARTRATE 50 MG/1
50 TABLET, FILM COATED ORAL 2 TIMES DAILY
Qty: 60 TABLET | Refills: 0 | Status: SHIPPED | OUTPATIENT
Start: 2020-03-10 | End: 2020-05-12

## 2020-03-10 RX ORDER — DIVALPROEX SODIUM 500 MG/1
1000 TABLET, EXTENDED RELEASE ORAL NIGHTLY
Qty: 30 TABLET | Refills: 0 | Status: SHIPPED | OUTPATIENT
Start: 2020-03-10

## 2020-03-10 RX ORDER — SODIUM CHLORIDE, SODIUM LACTATE, POTASSIUM CHLORIDE, CALCIUM CHLORIDE 600; 310; 30; 20 MG/100ML; MG/100ML; MG/100ML; MG/100ML
INJECTION, SOLUTION INTRAVENOUS ONCE
Status: COMPLETED | OUTPATIENT
Start: 2020-03-10 | End: 2020-03-10

## 2020-03-10 RX ORDER — SODIUM BICARBONATE 650 MG/1
650 TABLET ORAL 4 TIMES DAILY
Qty: 120 TABLET | Refills: 0 | Status: SHIPPED | OUTPATIENT
Start: 2020-03-10 | End: 2020-04-09

## 2020-03-10 RX ORDER — SODIUM CHLORIDE 9 MG/ML
INJECTION, SOLUTION INTRAVENOUS CONTINUOUS PRN
Status: COMPLETED | OUTPATIENT
Start: 2020-03-10 | End: 2020-03-10

## 2020-03-10 RX ORDER — SODIUM CHLORIDE, SODIUM LACTATE, POTASSIUM CHLORIDE, CALCIUM CHLORIDE 600; 310; 30; 20 MG/100ML; MG/100ML; MG/100ML; MG/100ML
INJECTION, SOLUTION INTRAVENOUS CONTINUOUS PRN
Status: DISCONTINUED | OUTPATIENT
Start: 2020-03-10 | End: 2020-03-10 | Stop reason: SDUPTHER

## 2020-03-10 RX ORDER — TRAMADOL HYDROCHLORIDE 50 MG/1
50 TABLET ORAL EVERY 6 HOURS PRN
Qty: 3 TABLET | Refills: 0 | Status: SHIPPED | OUTPATIENT
Start: 2020-03-10 | End: 2020-03-13

## 2020-03-10 RX ORDER — LIDOCAINE HYDROCHLORIDE 10 MG/ML
INJECTION, SOLUTION INFILTRATION; PERINEURAL
Status: COMPLETED | OUTPATIENT
Start: 2020-03-10 | End: 2020-03-10

## 2020-03-10 RX ORDER — HEPARIN SODIUM 5000 [USP'U]/ML
INJECTION, SOLUTION INTRAVENOUS; SUBCUTANEOUS
Status: COMPLETED | OUTPATIENT
Start: 2020-03-10 | End: 2020-03-10

## 2020-03-10 RX ORDER — LIDOCAINE HYDROCHLORIDE 20 MG/ML
INJECTION, SOLUTION INTRAVENOUS PRN
Status: DISCONTINUED | OUTPATIENT
Start: 2020-03-10 | End: 2020-03-10 | Stop reason: SDUPTHER

## 2020-03-10 RX ADMIN — METRONIDAZOLE 500 MG: 500 INJECTION, SOLUTION INTRAVENOUS at 04:30

## 2020-03-10 RX ADMIN — PROPOFOL 250 MG: 10 INJECTION, EMULSION INTRAVENOUS at 18:14

## 2020-03-10 RX ADMIN — POTASSIUM CHLORIDE 10 MEQ: 10 INJECTION, SOLUTION INTRAVENOUS at 11:29

## 2020-03-10 RX ADMIN — METRONIDAZOLE 500 MG: 500 INJECTION, SOLUTION INTRAVENOUS at 13:08

## 2020-03-10 RX ADMIN — Medication 500 MG: at 08:47

## 2020-03-10 RX ADMIN — POTASSIUM CHLORIDE 10 MEQ: 10 INJECTION, SOLUTION INTRAVENOUS at 08:46

## 2020-03-10 RX ADMIN — SODIUM CHLORIDE, POTASSIUM CHLORIDE, SODIUM LACTATE AND CALCIUM CHLORIDE: 600; 310; 30; 20 INJECTION, SOLUTION INTRAVENOUS at 16:58

## 2020-03-10 RX ADMIN — POTASSIUM CHLORIDE 10 MEQ: 10 INJECTION, SOLUTION INTRAVENOUS at 14:07

## 2020-03-10 RX ADMIN — ALLOPURINOL 100 MG: 100 TABLET ORAL at 08:46

## 2020-03-10 RX ADMIN — SODIUM BICARBONATE 650 MG TABLET 650 MG: at 08:47

## 2020-03-10 RX ADMIN — METOPROLOL TARTRATE 50 MG: 50 TABLET, FILM COATED ORAL at 08:46

## 2020-03-10 RX ADMIN — ENOXAPARIN SODIUM 40 MG: 30 INJECTION SUBCUTANEOUS at 08:46

## 2020-03-10 RX ADMIN — LIDOCAINE HYDROCHLORIDE 50 MG: 20 INJECTION, SOLUTION INTRAVENOUS at 18:14

## 2020-03-10 RX ADMIN — SODIUM CHLORIDE, PRESERVATIVE FREE 10 ML: 5 INJECTION INTRAVENOUS at 08:46

## 2020-03-10 RX ADMIN — PANTOPRAZOLE SODIUM 40 MG: 40 INJECTION, POWDER, FOR SOLUTION INTRAVENOUS at 08:46

## 2020-03-10 RX ADMIN — METRONIDAZOLE 500 MG: 500 INJECTION, SOLUTION INTRAVENOUS at 20:12

## 2020-03-10 RX ADMIN — SODIUM CHLORIDE, PRESERVATIVE FREE 10 ML: 5 INJECTION INTRAVENOUS at 08:47

## 2020-03-10 RX ADMIN — SODIUM CHLORIDE, POTASSIUM CHLORIDE, SODIUM LACTATE AND CALCIUM CHLORIDE: 600; 310; 30; 20 INJECTION, SOLUTION INTRAVENOUS at 18:13

## 2020-03-10 RX ADMIN — POTASSIUM CHLORIDE 10 MEQ: 10 INJECTION, SOLUTION INTRAVENOUS at 10:19

## 2020-03-10 RX ADMIN — CEFEPIME HYDROCHLORIDE 2 G: 2 INJECTION, POWDER, FOR SOLUTION INTRAVENOUS at 18:13

## 2020-03-10 RX ADMIN — CEFEPIME HYDROCHLORIDE 2 G: 2 INJECTION, POWDER, FOR SOLUTION INTRAVENOUS at 06:11

## 2020-03-10 RX ADMIN — POTASSIUM CHLORIDE 10 MEQ: 10 INJECTION, SOLUTION INTRAVENOUS at 13:06

## 2020-03-10 RX ADMIN — POTASSIUM CHLORIDE 10 MEQ: 10 INJECTION, SOLUTION INTRAVENOUS at 12:29

## 2020-03-10 ASSESSMENT — PULMONARY FUNCTION TESTS
PIF_VALUE: 1
PIF_VALUE: 1
PIF_VALUE: 0
PIF_VALUE: 1
PIF_VALUE: 0
PIF_VALUE: 1

## 2020-03-10 ASSESSMENT — PAIN DESCRIPTION - PROGRESSION
CLINICAL_PROGRESSION: NOT CHANGED

## 2020-03-10 ASSESSMENT — ENCOUNTER SYMPTOMS
APNEA: 0
CONSTIPATION: 0
ANAL BLEEDING: 0
BACK PAIN: 0
RECTAL PAIN: 0
COLOR CHANGE: 0
CHOKING: 0
PHOTOPHOBIA: 0
STRIDOR: 0
EYE REDNESS: 0
SORE THROAT: 0
EYE ITCHING: 0

## 2020-03-10 ASSESSMENT — PAIN SCALES - GENERAL: PAINLEVEL_OUTOF10: 0

## 2020-03-10 NOTE — ANESTHESIA PRE PROCEDURE
Department of Anesthesiology  Preprocedure Note       Name:  Teetee Read   Age:  59 y.o.  :  1955                                          MRN:  [id number]         Date:  3/10/2020      Surgeon: Marcela Ruiz):  Mattie Yang MD    Procedure: RIGHT PORT INSERTION (Right Chest)    Medications prior to admission:   Prior to Admission medications    Medication Sig Start Date End Date Taking? Authorizing Provider   divalproex (DEPAKOTE ER) 500 MG extended release tablet Take 2 tablets by mouth nightly 3/10/20   Equilla Dakin, DO   sodium bicarbonate 650 MG tablet Take 1 tablet by mouth 4 times daily 3/10/20 4/9/20  Equilla Dakin, DO   metoprolol tartrate (LOPRESSOR) 50 MG tablet Take 1 tablet by mouth 2 times daily 3/10/20   Equilla Dakin, DO   traMADol (ULTRAM) 50 MG tablet Take 1 tablet by mouth every 6 hours as needed for Pain for up to 3 days.  3/10/20 3/13/20  Equilla Dakin, DO   Hospital Bed MISC by Does not apply route 20   Nessa Turcios MD   famotidine (PEPCID) 20 MG tablet Take 20 mg by mouth nightly    Historical Provider, MD   dicyclomine (BENTYL) 20 MG tablet Take 20 mg by mouth every 6 hours as needed    Historical Provider, MD   Docusate Sodium 100 MG TABS Take 100 mg by mouth as needed    Historical Provider, MD   polyethylene glycol (GLYCOLAX) packet Take 17 g by mouth daily as needed for Constipation    Historical Provider, MD   midodrine (PROAMATINE) 5 MG tablet Take 1 tablet by mouth 3 times daily 20   Darcy Montilla MD   megestrol (MEGACE ES) 625 MG/5ML suspension Take 5 mLs by mouth daily 20   Mattie Yang MD   ondansetron (ZOFRAN-ODT) 8 MG TBDP disintegrating tablet  19   Historical Provider, MD   allopurinol (ZYLOPRIM) 100 MG tablet Take 100 mg by mouth daily    Historical Provider, MD   MULTIPLE VITAMIN PO Take by mouth daily    Historical Provider, MD   calcium carbonate 600 MG TABS tablet Take 1 tablet by mouth daily    Historical Provider, MD Current medications:    No current facility-administered medications for this visit. Current Outpatient Medications   Medication Sig Dispense Refill    divalproex (DEPAKOTE ER) 500 MG extended release tablet Take 2 tablets by mouth nightly 30 tablet 0    sodium bicarbonate 650 MG tablet Take 1 tablet by mouth 4 times daily 120 tablet 0    metoprolol tartrate (LOPRESSOR) 50 MG tablet Take 1 tablet by mouth 2 times daily 60 tablet 0    traMADol (ULTRAM) 50 MG tablet Take 1 tablet by mouth every 6 hours as needed for Pain for up to 3 days.  3 tablet [address] by Does not apply route 1 each 0     Facility-Administered Medications Ordered in Other Visits   Medication Dose Route Frequency Provider Last Rate Last Dose    metoprolol tartrate (LOPRESSOR) tablet 50 mg  50 mg Oral BID James Lin MD   50 mg at 03/10/20 0846    ceFEPIme (MAXIPIME) 2 g in dextrose 5 % 50 mL IVPB  2 g Intravenous Q8H Minnie Ryan MD   Stopped at 03/10/20 0641    lidocaine PF 1 % injection 5 mL  5 mL Intradermal Once April Metz MD        sodium chloride flush 0.9 % injection 10 mL  10 mL Intravenous 2 times per day April Mezt MD   10 mL at 03/10/20 0846    sodium chloride flush 0.9 % injection 10 mL  10 mL Intravenous PRN James Rivero MD        enoxaparin (LOVENOX) injection 40 mg  40 mg Subcutaneous BID Cleveland Ayon MD   40 mg at 03/10/20 0846    miconazole (MICOTIN) 2 % powder   Topical BID April Metz MD        sodium bicarbonate tablet 650 mg  650 mg Oral 4x Daily James Rivero MD   650 mg at 03/10/20 0847    divalproex (DEPAKOTE ER) extended release tablet 1,000 mg  1,000 mg Oral Nightly Sophie Jae, APRN - CNP   1,000 mg at 03/09/20 2130    potassium chloride (KLOR-CON M) extended release tablet 40 mEq  40 mEq Oral PRN Sharan Larson MD   40 mEq at 03/01/20 0902    Or    potassium bicarb-citric acid (EFFER-K) effervescent tablet 40 mEq  40 mEq Oral PRN Sharan Larson MD Idiopathic hypotension I95.0    Mitral valve disease I05.9    Pancytopenia (HCC) D61.818    Severe malnutrition (HCC) E43    Acute deep vein thrombosis (DVT) of left upper extremity (HCC) I82.622    Hematoma T14. 8XXA       Past Medical History:        Diagnosis Date    [facility]     Bipolar 1 disorder ([facility]     \"on Depakote for this\"       Past Surgical History:        Procedure Laterality Date    COLONOSCOPY  2015    normal colon    COLONOSCOPY N/A 2020    COLONOSCOPY DIAGNOSTIC performed by Reyes Lee MD at [address] [place] [address] N/A 2020    PORT INSERTION performed by April Amador MD at [facility] Left 2020    PORT REMOVAL LEFT performed by April Amador MD at [place] [address]  as a kid    TUBAL LIGATION  20+ yrs ago    UPPER GASTROINTESTINAL ENDOSCOPY N/A 2020    EGD DIAGNOSTIC ONLY performed by Reyes Lee MD at [place] [facility] ENDOSCOPY       Social History:    Social History     Tobacco Use    Smoking status: Former Smoker     Packs/day: 1.00     Years: 10.00     Pack years: 10.00     Types: Cigarettes     Last attempt to quit: 1988     Years since quittin.1    Smokeless tobacco: Never Used   Substance Use Topics    Alcohol use: No                                Counseling given: Not Answered      Vital Signs (Current): There were no vitals filed for this visit. BP Readings from Last 3 Encounters:   03/10/20 117/76   20 (!) 85/49   20 (!) 90/46       NPO Status:                                            12 hrs.                                      BMI:   Wt Readings from Last 3 Encounters:   03/10/20 132 lb 3.2 oz (60 kg)   20 106 lb 3.2 oz (48.2 kg)   20 102 lb 4.8 oz (46.4 kg)     There is no height or weight on file to calculate BMI.    CBC:   Lab Results   Component Value Date    WBC 7.0 03/10/2020    RBC 2.90 03/10/2020    HGB 8.7 03/10/2020    HCT 26.5 03/10/2020    MCV 91.4 03/10/2020    RDW 15.9 03/10/2020     03/10/2020       CMP:   Lab Results   Component Value Date     03/10/2020    K  03/10/2020     3.0  K CALLED TO Clayton Steinberg RN AT 0512, KYOUNG MLS  RESULTS READ BACK       03/10/2020    CO2 21 03/10/2020    BUN 14 03/10/2020    CREATININE 0.6 03/10/2020    GFRAA >60 03/10/2020    LABGLOM >60 03/10/2020    GLUCOSE 80 03/10/2020    PROT 5.5 2020    CALCIUM 8.4 03/10/2020    BILITOT 0.8 2020    ALKPHOS 50 2020    AST 14 2020    ALT 10 2020       POC Tests:   No results for input(s): POCGLU, POCNA, POCK, POCCL, POCBUN, POCHEMO, POCHCT in the last 72 hours. Coags:   Lab Results   Component Value Date    PROTIME 16.5 2020    INR 1.36 2020    APTT 36.9 2020       HCG (If Applicable):   Lab Results   Component Value Date    PREGTESTUR NEGATIVE 2020        ABGs: No results found for: PHART, PO2ART, DKP7CKA, SSR4DNY, BEART, Q8HPTQUF     Type & Screen (If Applicable):  No results found for: LABABO, [address]    Anesthesia Evaluation  Patient summary reviewed and Nursing notes reviewed no history of anesthetic complications:   Airway: Mallampati: III  TM distance: >3 FB   Neck ROM: full  Mouth opening: > = 3 FB Dental:          Pulmonary:normal exam  breath sounds clear to auscultation            Patient did not smoke on day of surgery. ROS comment: Former Smoker - 10 pack years  Quit Smokin88       Cardiovascular:  Exercise tolerance: poor (<4 METS),   (+) valvular problems/murmurs: MVP,         Rhythm: regular             Beta Blocker:  Not on Beta Blocker      ROS comment: Echo 2020:  Summary   Left ventricular systolic function is low normal.   Ejection fraction is visually estimated at 50%. Paradoxical motion of the interventricular septum; possible conduction   delay. Indeterminate diastolic function; E/A flow reversal is noted.    Mitral valve prolapse is present. Moderate to severe mitral regurgitation is present. Moderate tricuspid regurgitation is present. RVSP is 25 mmHg. No evidence of any pericardial effusion. Mobile interatrial septum. Incidental finding: cyst vs mass near/attached to the right kidney. Neuro/Psych:   (+) psychiatric history:depression/anxiety              ROS comment: Bipolar 1 disorder GI/Hepatic/Renal:   (+) GERD:, bowel prep,          ROS comment: GI bleed. Endo/Other:    (+) blood dyscrasia::., malignancy/cancer. Pt had no PAT visit        ROS comment: B-cell lymphoma  Pancytopenia Abdominal:           Vascular:   + DVT, . Anesthesia Plan      MAC     ASA 4     (Chart review only )  Induction: intravenous. Anesthetic plan and risks discussed with patient. Plan discussed with CRNA. Attending anesthesiologist reviewed and agrees with Pre Eval content            DIONI Haynes CRNA   3/10/2020        Pre Anesthesia Evaluation complete. Anesthesia plan, risks, benefits, alternatives, and personnel discussed with patient and/or legal guardian. Patient and/or legal guardian verbalized an understanding and agreed to proceed. Anesthesia plan discussed with care team members and agreed upon.   DIONI Haynes CRNA  3/10/2020

## 2020-03-10 NOTE — ANESTHESIA POSTPROCEDURE EVALUATION
Department of Anesthesiology  Postprocedure Note    Patient: Ludy Seo  MRN: [id number]  YOB: 1955  Date of evaluation: 3/10/2020  Time:  7:41 PM     Procedure Summary     Date:  03/10/20 Room / Location:  [address]    Anesthesia Start:  1805 Anesthesia Stop:  1915    Procedure:  RIGHT PORT INSERTION (Right Chest) Diagnosis:  (.)    Surgeon:  Naresh Miles MD Responsible Provider:  DIONI Mahan CRNA    Anesthesia Type:  MAC ASA Status:  4          Anesthesia Type: MAC    Jimena Phase I: Jimena Score: 10    Jimena Phase II:  10    Last vitals: Reviewed and per EMR flowsheets.        Anesthesia Post Evaluation    Patient location during evaluation: bedside  Patient participation: complete - patient participated  Level of consciousness: awake and alert  Pain score: 0  Airway patency: patent  Nausea & Vomiting: no nausea and no vomiting  Complications: no  Cardiovascular status: hemodynamically stable  Respiratory status: acceptable, nonlabored ventilation, spontaneous ventilation and room air  Hydration status: euvolemic

## 2020-03-10 NOTE — PROGRESS NOTES
Occupational Therapy  Withheld per nursing request due pt having multiple procedures.   Mahesh MARTINEZ/ASHWIN [id number]

## 2020-03-10 NOTE — CARE COORDINATION
CM reviewed chart. POC for d/c. Ventura Gonzalez spoke w/Dr. Aki Lama. Pt will receive last dose of Flagyl at 2030 and d/c to [place]. CM saw pt, pt has no preference to transportation. CM set up transport w/ [facility] at 2145. CM notified, pt, pt's RN and Patel.   CM available if needed
Cm in to see pt this morning and pt states that she is having a colonoscopy around 1100. Pt states that if they do not find what hey are looking for or cannot correct the situation that [facility] and she [facility] to stay in [facility] if possible. Pt's son at bedside. Cm continues to follow for discharge planning and anticipates need for SNF placement at discharge. Per previous CM notes pt has been given list of SNFs in contract with her insurance. Cm to follow.
passar completed and submitted electronically.
walker but on hold. .   1500 Family are very concerned regarding pts weakness. CM requested that PT reeval pt due to her decline. CM collaborated with PT who informed CM that pt is declining and weaker than previous eval that recommended home care. PT is recommending SNF placement. CM provided copy of facilities for family if needed for SNF options. pts , niece and sister in room. At this time they are not sure of discharge plan. CM spoke with son on the phone and he feels that SNF would be appropriate. The Plan for Transition of Care is related to the following treatment goals: SNF    The Patient, son and family were  provided with a choice of provider and agrees   with the discharge plan. [x] Yes [] No    Freedom of choice list was provided with basic dialogue that supports the patient's individualized plan of care/goals, treatment preferences and shares the quality data associated with the providers. [x] Yes [] No  1630 Family is unsure of facility for SNF placement. CM team will continue to follow.  [facility]

## 2020-03-10 NOTE — PROGRESS NOTES
Physical Therapy  Nursing states pt is on hold for an hour d/t testing. Will cont. as able. Fallon Salazar.  Beverly Mao PTA

## 2020-03-10 NOTE — PROGRESS NOTES
CO2 22 19* 21   BUN 13 14 14   CREATININE 0.6 0.6 0.6   GLUCOSE 79 74 80     Hepatic: No results for input(s): AST, ALT, ALB, BILITOT, ALKPHOS in the last 72 hours. INR: No results for input(s): INR in the last 72 hours. RADIOLOGY REPORTS  Reviewed    ASSESSMENT & RECOMMENDATIONS:  DLBCL s/p 2 cycles of R-CHOP with response. She will resume chemotherapy as an outpatient once IV antibiotics are completed and she has regained strength. Completed bust steroid 3/9/2020      Sepsis: On Antimicrobials and is being followed by ID. Antimicrobials to be completed today. Access: ok per ID for [employer] Dr. Adolfo Best informed; requested adria     LUE DVT. Lovenox started 3/6/2020. Monitor very closely for any bleeding     Anemia : Hb 8.7 today. Continue close observation. Will need frequent CBC after discharge. Plan noted to discharge her for rehab. Awaiting placement.      Continue other medical care. Improve nutritional status     This plan was discussed with the patient and she verbalized understanding.      We will continue to follow the patient.     Thank you for allowing us to participate in the care of this patient.            Christos Laird, DIONI - CNP, 3/10/2020, 8:23 AM

## 2020-03-10 NOTE — PLAN OF CARE
Nutrition Problem: Severe malnutrition, In context of chronic illness  Intervention: Food and/or Nutrient Delivery: Continue current diet, Continue current ONS  Nutritional Goals: pt will consume greater than 75% of her meals and supplements
Nutrition Problem: Severe malnutrition, In context of chronic illness  Intervention: Food and/or Nutrient Delivery: Continue current diet, Continue current ONS, Start Tube Feeding  Nutritional Goals: pt will consume greater than 75% of her meals and supplements
Ongoing
Patient seen. She felt nauseated when she ate after returning from her mediport removal procedure. At time of my interview she still c/o some abdominal discomfort but her nausea is better with Zofran. Telemetry shows . 12 lead ECG was sinus tachycardia. CT abd/pel reviewed. Plan will be to give IV analgesic and monitor for now.
Problem: Falls - Risk of:  Goal: Will remain free from falls  Description  Will remain free from falls  2/25/2020 0431 by Pauline Obrien LPN  Outcome: Ongoing  2/24/2020 1735 by Souleymane Jeffrey RN  Outcome: Ongoing  Goal: Absence of physical injury  Description  Absence of physical injury  2/25/2020 0431 by Pauline Obrien LPN  Outcome: Ongoing  2/24/2020 1735 by Souleymane Jeffrey RN  Outcome: Ongoing     Problem: Risk for Impaired Skin Integrity  Goal: Tissue integrity - skin and mucous membranes  Description  Structural intactness and normal physiological function of skin and  mucous membranes.   2/25/2020 0431 by Pauline Obrien LPN  Outcome: Ongoing  2/24/2020 1735 by Souleymane Jeffrey RN  Outcome: Ongoing     Problem: Pain:  Goal: Pain level will decrease  Description  Pain level will decrease  2/25/2020 0431 by Pauline Obrien LPN  Outcome: Ongoing  2/24/2020 1735 by Souleymane Jeffrey RN  Outcome: Ongoing  Goal: Control of acute pain  Description  Control of acute pain  2/25/2020 0431 by Pauline Obrien LPN  Outcome: Ongoing  2/24/2020 1735 by Souleymane Jeffrey RN  Outcome: Ongoing  Goal: Control of chronic pain  Description  Control of chronic pain  2/25/2020 0431 by Pauline Obrien LPN  Outcome: Ongoing  2/24/2020 1735 by Souleymane Jeffrey RN  Outcome: Ongoing     Problem: Nutrition  Goal: Optimal nutrition therapy  2/25/2020 0431 by Pauline Obrien LPN  Outcome: Ongoing  2/24/2020 1735 by Souleymane Jeffrey RN  Outcome: Ongoing
Problem: Falls - Risk of:  Goal: Will remain free from falls  Description  Will remain free from falls  3/6/2020 1112 by Iza Miles RN  Outcome: Ongoing  3/6/2020 0228 by Serena Chung RN  Outcome: Ongoing  Goal: Absence of physical injury  Description  Absence of physical injury  3/6/2020 1112 by Iza Miles RN  Outcome: Ongoing  3/6/2020 0228 by Serena Chung RN  Outcome: Ongoing
Problem: Falls - Risk of:  Goal: Will remain free from falls  Description  Will remain free from falls  Outcome: Met This Shift  Goal: Absence of physical injury  Description  Absence of physical injury  Outcome: Met This Shift     Problem: Risk for Impaired Skin Integrity  Goal: Tissue integrity - skin and mucous membranes  Description  Structural intactness and normal physiological function of skin and  mucous membranes.   Outcome: Ongoing     Problem: Pain:  Goal: Pain level will decrease  Description  Pain level will decrease  Outcome: Ongoing  Goal: Control of acute pain  Description  Control of acute pain  Outcome: Ongoing  Goal: Control of chronic pain  Description  Control of chronic pain  Outcome: Ongoing
Problem: Falls - Risk of:  Goal: Will remain free from falls  Description  Will remain free from falls  Outcome: Ongoing  Goal: Absence of physical injury  Description  Absence of physical injury  Outcome: Ongoing     Problem: Risk for Impaired Skin Integrity  Goal: Tissue integrity - skin and mucous membranes  Description  Structural intactness and normal physiological function of skin and  mucous membranes.   Outcome: Ongoing     Problem: Pain:  Goal: Pain level will decrease  Description  Pain level will decrease  Outcome: Ongoing  Goal: Control of acute pain  Description  Control of acute pain  Outcome: Ongoing  Goal: Control of chronic pain  Description  Control of chronic pain  Outcome: Ongoing     Problem: Nutrition  Goal: Optimal nutrition therapy  Outcome: Ongoing
Problem: Falls - Risk of:  Goal: Will remain free from falls  Description  Will remain free from falls  Outcome: Ongoing  Goal: Absence of physical injury  Description  Absence of physical injury  Outcome: Ongoing     Problem: Risk for Impaired Skin Integrity  Goal: Tissue integrity - skin and mucous membranes  Description  Structural intactness and normal physiological function of skin and  mucous membranes. Outcome: Ongoing     Problem: Pain:  Goal: Pain level will decrease  Description  Pain level will decrease  Outcome: Ongoing  Goal: Control of acute pain  Description  Control of acute pain  Outcome: Ongoing  Goal: Control of chronic pain  Description  Control of chronic pain  Outcome: Ongoing     Problem: Nutrition  Goal: Optimal nutrition therapy  Outcome: Ongoing     Problem: Activity:  Goal: Fatigue will decrease  Description  Fatigue will decrease  Outcome: Ongoing  Goal: Ability to tolerate increased activity will improve  Description  Ability to tolerate increased activity will improve  Outcome: Ongoing  Goal: Ability to maintain optimal joint mobility will improve  Description  Ability to maintain optimal joint mobility will improve  Outcome: Ongoing     Problem:  Bowel/Gastric:  Goal: Ability to achieve a regular elimination pattern will improve  Description  Ability to achieve a regular elimination pattern will improve  Outcome: Ongoing     Problem: Cardiac:  Goal: Ability to maintain an adequate cardiac output will improve  Description  Ability to maintain an adequate cardiac output will improve  Outcome: Ongoing  Goal: Ability to maintain adequate ventilation will improve  Description  Ability to maintain adequate ventilation will improve  Outcome: Ongoing  Goal: Ability to achieve and maintain adequate cardiopulmonary perfusion will improve  Description  Ability to achieve and maintain adequate cardiopulmonary perfusion will improve  Outcome: Ongoing     Problem: Coping:  Goal: Ability to adjust
Problem: Falls - Risk of:  Goal: Will remain free from falls  Description: Will remain free from falls  Outcome: Ongoing  Goal: Absence of physical injury  Description: Absence of physical injury  Outcome: Ongoing     Problem: Risk for Impaired Skin Integrity  Goal: Tissue integrity - skin and mucous membranes  Description: Structural intactness and normal physiological function of skin and  mucous membranes. Outcome: Ongoing     Problem: Pain:  Goal: Pain level will decrease  Description: Pain level will decrease  Outcome: Ongoing  Goal: Control of acute pain  Description: Control of acute pain  Outcome: Ongoing  Goal: Control of chronic pain  Description: Control of chronic pain  Outcome: Ongoing     Problem: Nutrition  Goal: Optimal nutrition therapy  Outcome: Ongoing     Problem: Activity:  Goal: Fatigue will decrease  Description: Fatigue will decrease  Outcome: Ongoing  Goal: Ability to tolerate increased activity will improve  Description: Ability to tolerate increased activity will improve  Outcome: Ongoing  Goal: Ability to maintain optimal joint mobility will improve  Description: Ability to maintain optimal joint mobility will improve  Outcome: Ongoing     Problem:  Bowel/Gastric:  Goal: Ability to achieve a regular elimination pattern will improve  Description: Ability to achieve a regular elimination pattern will improve  Outcome: Ongoing     Problem: Cardiac:  Goal: Ability to maintain an adequate cardiac output will improve  Description: Ability to maintain an adequate cardiac output will improve  Outcome: Ongoing  Goal: Ability to maintain adequate ventilation will improve  Description: Ability to maintain adequate ventilation will improve  Outcome: Ongoing  Goal: Ability to achieve and maintain adequate cardiopulmonary perfusion will improve  Description: Ability to achieve and maintain adequate cardiopulmonary perfusion will improve  Outcome: Ongoing     Problem: Coping:  Goal: Ability to adjust
ongoing
to condition or change in health will improve  Description: Ability to adjust to condition or change in health will improve  Outcome: Ongoing  Goal: Communication of feelings regarding changes in body function or appearance will improve  Description: Communication of feelings regarding changes in body function or appearance will improve  Outcome: Ongoing     Problem: Health Behavior:  Goal: Identification of resources available to assist in meeting health care needs will improve  Description: Identification of resources available to assist in meeting health care needs will improve  Outcome: Ongoing     Problem: Physical Regulation:  Goal: Signs and symptoms of infection will decrease  Description: Signs and symptoms of infection will decrease  Outcome: Ongoing  Goal: Will show no signs and symptoms of excessive bleeding  Description: Will show no signs and symptoms of excessive bleeding  Outcome: Ongoing  Goal: Complications related to the disease process, condition or treatment will be avoided or minimized  Description: Complications related to the disease process, condition or treatment will be avoided or minimized  Outcome: Ongoing     Problem: Sensory:  Goal: Pain level will decrease  Description: Pain level will decrease  Outcome: Ongoing  Goal: General experience of comfort will improve  Description: General experience of comfort will improve  Outcome: Ongoing     Problem: Tissue Perfusion:  Goal: Ability to maintain adequate tissue perfusion will improve  Description: Ability to maintain adequate tissue perfusion will improve  Outcome: Ongoing  Goal: Ability to maintain a stable neurologic state will improve  Description: Ability to maintain a stable neurologic state will improve  Outcome: Ongoing     Problem: Fluid Volume - Imbalance:  Goal: Absence of imbalanced fluid volume signs and symptoms  Description: Absence of imbalanced fluid volume signs and symptoms  Outcome: Ongoing

## 2020-03-12 ENCOUNTER — HOSPITAL ENCOUNTER (OUTPATIENT)
Age: 65
Setting detail: SPECIMEN
Discharge: HOME | End: 2020-03-12

## 2020-03-12 LAB
ALBUMIN SERPL-MCNC: 2.2 GM/DL (ref 3.4–5)
ALP BLD-CCNC: 59 IU/L (ref 40–128)
ALT SERPL-CCNC: <5 U/L (ref 10–40)
ANION GAP SERPL CALCULATED.3IONS-SCNC: 10 MMOL/L (ref 4–16)
AST SERPL-CCNC: 8 IU/L (ref 15–37)
BILIRUB SERPL-MCNC: 0.4 MG/DL (ref 0–1)
BUN BLDV-MCNC: 6 MG/DL (ref 6–23)
CALCIUM SERPL-MCNC: 8.2 MG/DL (ref 8.3–10.6)
CHLORIDE BLD-SCNC: 110 MMOL/L (ref 99–110)
CO2: 21 MMOL/L (ref 21–32)
CREAT SERPL-MCNC: 0.6 MG/DL (ref 0.6–1.1)
GFR AFRICAN AMERICAN: >60 ML/MIN/1.73M2
GFR NON-AFRICAN AMERICAN: >60 ML/MIN/1.73M2
GLUCOSE BLD-MCNC: 73 MG/DL (ref 70–99)
HCT VFR BLD CALC: 26.3 % (ref 37–47)
HEMOGLOBIN: 8.3 GM/DL (ref 12.5–16)
MCH RBC QN AUTO: 29.7 PG (ref 27–31)
MCHC RBC AUTO-ENTMCNC: 31.6 % (ref 32–36)
MCV RBC AUTO: 94.3 FL (ref 78–100)
PDW BLD-RTO: 16.3 % (ref 11.7–14.9)
PLATELET # BLD: 181 K/CU MM (ref 140–440)
PMV BLD AUTO: 10.2 FL (ref 7.5–11.1)
POTASSIUM SERPL-SCNC: 3.4 MMOL/L (ref 3.5–5.1)
RBC # BLD: 2.79 M/CU MM (ref 4.2–5.4)
SODIUM BLD-SCNC: 141 MMOL/L (ref 135–145)
TOTAL PROTEIN: 3.8 GM/DL (ref 6.4–8.2)
WBC # BLD: 6.4 K/CU MM (ref 4–10.5)

## 2020-03-12 PROCEDURE — 36415 COLL VENOUS BLD VENIPUNCTURE: CPT

## 2020-03-12 PROCEDURE — 85027 COMPLETE CBC AUTOMATED: CPT

## 2020-03-12 PROCEDURE — 80053 COMPREHEN METABOLIC PANEL: CPT

## 2020-03-13 ENCOUNTER — HOSPITAL ENCOUNTER (OUTPATIENT)
Age: 65
Setting detail: SPECIMEN
Discharge: HOME | End: 2020-03-13
Payer: COMMERCIAL

## 2020-03-13 LAB
HCT VFR BLD CALC: 24.8 % (ref 37–47)
HEMOGLOBIN: 7.9 GM/DL (ref 12.5–16)
MCH RBC QN AUTO: 30 PG (ref 27–31)
MCHC RBC AUTO-ENTMCNC: 31.9 % (ref 32–36)
MCV RBC AUTO: 94.3 FL (ref 78–100)
PDW BLD-RTO: 16.8 % (ref 11.7–14.9)
PLATELET # BLD: 179 K/CU MM (ref 140–440)
PMV BLD AUTO: 10.1 FL (ref 7.5–11.1)
RBC # BLD: 2.63 M/CU MM (ref 4.2–5.4)
WBC # BLD: 5.8 K/CU MM (ref 4–10.5)

## 2020-03-13 PROCEDURE — 85027 COMPLETE CBC AUTOMATED: CPT

## 2020-03-13 PROCEDURE — 36415 COLL VENOUS BLD VENIPUNCTURE: CPT

## 2020-03-14 ENCOUNTER — HOSPITAL ENCOUNTER (OUTPATIENT)
Age: 65
Setting detail: SPECIMEN
Discharge: HOME | End: 2020-03-14
Payer: COMMERCIAL

## 2020-03-14 LAB
HCT VFR BLD CALC: 29.7 % (ref 37–47)
HEMOGLOBIN: 9.7 GM/DL (ref 12.5–16)
MCH RBC QN AUTO: 30.3 PG (ref 27–31)
MCHC RBC AUTO-ENTMCNC: 32.7 % (ref 32–36)
MCV RBC AUTO: 92.8 FL (ref 78–100)
PDW BLD-RTO: 16.7 % (ref 11.7–14.9)
PLATELET # BLD: 194 K/CU MM (ref 140–440)
PMV BLD AUTO: 10 FL (ref 7.5–11.1)
RBC # BLD: 3.2 M/CU MM (ref 4.2–5.4)
WBC # BLD: 6.8 K/CU MM (ref 4–10.5)

## 2020-03-14 PROCEDURE — 85027 COMPLETE CBC AUTOMATED: CPT

## 2020-03-14 PROCEDURE — 36415 COLL VENOUS BLD VENIPUNCTURE: CPT

## 2020-03-15 ENCOUNTER — HOSPITAL ENCOUNTER (OUTPATIENT)
Age: 65
Setting detail: SPECIMEN
Discharge: HOME | End: 2020-03-15

## 2020-03-15 LAB
HCT VFR BLD CALC: 25.5 % (ref 37–47)
HEMOGLOBIN: 8.3 GM/DL (ref 12.5–16)
MCH RBC QN AUTO: 30.4 PG (ref 27–31)
MCHC RBC AUTO-ENTMCNC: 32.5 % (ref 32–36)
MCV RBC AUTO: 93.4 FL (ref 78–100)
PDW BLD-RTO: 17.1 % (ref 11.7–14.9)
PLATELET # BLD: 165 K/CU MM (ref 140–440)
PMV BLD AUTO: 9.7 FL (ref 7.5–11.1)
RBC # BLD: 2.73 M/CU MM (ref 4.2–5.4)
WBC # BLD: 5.1 K/CU MM (ref 4–10.5)

## 2020-03-15 PROCEDURE — 85027 COMPLETE CBC AUTOMATED: CPT

## 2020-03-15 PROCEDURE — 36415 COLL VENOUS BLD VENIPUNCTURE: CPT

## 2020-03-16 ENCOUNTER — HOSPITAL ENCOUNTER (OUTPATIENT)
Dept: INFUSION THERAPY | Age: 65
Discharge: HOME | End: 2020-03-16
Payer: COMMERCIAL

## 2020-03-16 ENCOUNTER — HOSPITAL ENCOUNTER (OUTPATIENT)
Age: 65
Setting detail: SPECIMEN
Discharge: HOME | End: 2020-03-16
Payer: COMMERCIAL

## 2020-03-16 LAB
ALBUMIN SERPL-MCNC: 2.8 GM/DL (ref 3.4–5)
ALP BLD-CCNC: 60 IU/L (ref 40–129)
ALT SERPL-CCNC: 5 U/L (ref 10–40)
ANION GAP SERPL CALCULATED.3IONS-SCNC: 12 MMOL/L (ref 4–16)
AST SERPL-CCNC: 13 IU/L (ref 15–37)
BASOPHILS ABSOLUTE: 0.1 K/CU MM
BASOPHILS RELATIVE PERCENT: 0.8 % (ref 0–1)
BILIRUB SERPL-MCNC: 0.3 MG/DL (ref 0–1)
BUN BLDV-MCNC: 6 MG/DL (ref 6–23)
CALCIUM SERPL-MCNC: 8.7 MG/DL (ref 8.3–10.6)
CHLORIDE BLD-SCNC: 113 MMOL/L (ref 99–110)
CO2: 20 MMOL/L (ref 21–32)
CREAT SERPL-MCNC: 0.5 MG/DL (ref 0.6–1.1)
DIFFERENTIAL TYPE: ABNORMAL
EOSINOPHILS ABSOLUTE: 0 K/CU MM
EOSINOPHILS RELATIVE PERCENT: 0.6 % (ref 0–3)
GFR AFRICAN AMERICAN: >60 ML/MIN/1.73M2
GFR NON-AFRICAN AMERICAN: >60 ML/MIN/1.73M2
GLUCOSE BLD-MCNC: 115 MG/DL (ref 70–99)
HCT VFR BLD CALC: 24.7 % (ref 37–47)
HCT VFR BLD CALC: 26.1 % (ref 37–47)
HEMOGLOBIN: 7.7 GM/DL (ref 12.5–16)
HEMOGLOBIN: 8.7 GM/DL (ref 12.5–16)
LACTATE DEHYDROGENASE: 369 IU/L (ref 120–246)
LYMPHOCYTES ABSOLUTE: 0.3 K/CU MM
LYMPHOCYTES RELATIVE PERCENT: 4.5 % (ref 24–44)
MCH RBC QN AUTO: 30.3 PG (ref 27–31)
MCH RBC QN AUTO: 30.4 PG (ref 27–31)
MCHC RBC AUTO-ENTMCNC: 31.2 % (ref 32–36)
MCHC RBC AUTO-ENTMCNC: 33.3 % (ref 32–36)
MCV RBC AUTO: 90.9 FL (ref 78–100)
MCV RBC AUTO: 97.6 FL (ref 78–100)
MONOCYTES ABSOLUTE: 0.5 K/CU MM
MONOCYTES RELATIVE PERCENT: 8.2 % (ref 0–4)
PDW BLD-RTO: 17.2 % (ref 11.7–14.9)
PDW BLD-RTO: 17.6 % (ref 11.7–14.9)
PLATELET # BLD: 161 K/CU MM (ref 140–440)
PLATELET # BLD: 174 K/CU MM (ref 140–440)
PMV BLD AUTO: 10 FL (ref 7.5–11.1)
PMV BLD AUTO: 9 FL (ref 7.5–11.1)
POTASSIUM SERPL-SCNC: 3.2 MMOL/L (ref 3.5–5.1)
RBC # BLD: 2.53 M/CU MM (ref 4.2–5.4)
RBC # BLD: 2.87 M/CU MM (ref 4.2–5.4)
SEGMENTED NEUTROPHILS ABSOLUTE COUNT: 5.5 K/CU MM
SEGMENTED NEUTROPHILS RELATIVE PERCENT: 85.9 % (ref 36–66)
SODIUM BLD-SCNC: 145 MMOL/L (ref 135–145)
TOTAL PROTEIN: 4.5 GM/DL (ref 6.4–8.2)
WBC # BLD: 4.7 K/CU MM (ref 4–10.5)
WBC # BLD: ABNORMAL K/CU MM (ref 4–10.5)

## 2020-03-16 PROCEDURE — 80053 COMPREHEN METABOLIC PANEL: CPT

## 2020-03-16 PROCEDURE — 36415 COLL VENOUS BLD VENIPUNCTURE: CPT

## 2020-03-16 PROCEDURE — 83615 LACTATE (LD) (LDH) ENZYME: CPT

## 2020-03-16 PROCEDURE — 85027 COMPLETE CBC AUTOMATED: CPT

## 2020-03-16 PROCEDURE — 85025 COMPLETE CBC W/AUTO DIFF WBC: CPT

## 2020-03-16 PROCEDURE — 99211 OFF/OP EST MAY X REQ PHY/QHP: CPT

## 2020-03-17 ENCOUNTER — HOSPITAL ENCOUNTER (OUTPATIENT)
Age: 65
Setting detail: SPECIMEN
Discharge: HOME | End: 2020-03-17
Payer: COMMERCIAL

## 2020-03-17 LAB
HCT VFR BLD CALC: 24.8 % (ref 37–47)
HEMOGLOBIN: 8 GM/DL (ref 12.5–16)
MCH RBC QN AUTO: 30.7 PG (ref 27–31)
MCHC RBC AUTO-ENTMCNC: 32.3 % (ref 32–36)
MCV RBC AUTO: 95 FL (ref 78–100)
PDW BLD-RTO: 17.2 % (ref 11.7–14.9)
PLATELET # BLD: 156 K/CU MM (ref 140–440)
PMV BLD AUTO: 10 FL (ref 7.5–11.1)
RBC # BLD: 2.61 M/CU MM (ref 4.2–5.4)
WBC # BLD: 5 K/CU MM (ref 4–10.5)

## 2020-03-17 PROCEDURE — 36415 COLL VENOUS BLD VENIPUNCTURE: CPT

## 2020-03-17 PROCEDURE — 85027 COMPLETE CBC AUTOMATED: CPT

## 2020-03-18 ENCOUNTER — HOSPITAL ENCOUNTER (OUTPATIENT)
Age: 65
Setting detail: SPECIMEN
Discharge: HOME | End: 2020-03-18

## 2020-03-18 LAB
HCT VFR BLD CALC: 24.8 % (ref 37–47)
HEMOGLOBIN: 7.9 GM/DL (ref 12.5–16)
MCH RBC QN AUTO: 30.6 PG (ref 27–31)
MCHC RBC AUTO-ENTMCNC: 31.9 % (ref 32–36)
MCV RBC AUTO: 96.1 FL (ref 78–100)
PDW BLD-RTO: 17.6 % (ref 11.7–14.9)
PLATELET # BLD: 160 K/CU MM (ref 140–440)
PMV BLD AUTO: 9.9 FL (ref 7.5–11.1)
RBC # BLD: 2.58 M/CU MM (ref 4.2–5.4)
WBC # BLD: 5 K/CU MM (ref 4–10.5)

## 2020-03-18 PROCEDURE — 85027 COMPLETE CBC AUTOMATED: CPT

## 2020-03-18 PROCEDURE — 36415 COLL VENOUS BLD VENIPUNCTURE: CPT

## 2020-03-19 ENCOUNTER — HOSPITAL ENCOUNTER (OUTPATIENT)
Age: 65
Setting detail: SPECIMEN
Discharge: HOME | End: 2020-03-19

## 2020-03-19 LAB
HCT VFR BLD CALC: 25.4 % (ref 37–47)
HEMOGLOBIN: 8 GM/DL (ref 12.5–16)
MCH RBC QN AUTO: 30.5 PG (ref 27–31)
MCHC RBC AUTO-ENTMCNC: 31.5 % (ref 32–36)
MCV RBC AUTO: 96.9 FL (ref 78–100)
PDW BLD-RTO: 17.8 % (ref 11.7–14.9)
PLATELET # BLD: 159 K/CU MM (ref 140–440)
PMV BLD AUTO: 9.9 FL (ref 7.5–11.1)
RBC # BLD: 2.62 M/CU MM (ref 4.2–5.4)
WBC # BLD: 4.2 K/CU MM (ref 4–10.5)

## 2020-03-19 PROCEDURE — 85027 COMPLETE CBC AUTOMATED: CPT

## 2020-03-19 PROCEDURE — 36415 COLL VENOUS BLD VENIPUNCTURE: CPT

## 2020-03-20 ENCOUNTER — HOSPITAL ENCOUNTER (OUTPATIENT)
Age: 65
Setting detail: SPECIMEN
Discharge: HOME | End: 2020-03-20

## 2020-03-20 LAB
HCT VFR BLD CALC: 26.2 % (ref 37–47)
HEMOGLOBIN: 8.2 GM/DL (ref 12.5–16)
MCH RBC QN AUTO: 30.6 PG (ref 27–31)
MCHC RBC AUTO-ENTMCNC: 31.3 % (ref 32–36)
MCV RBC AUTO: 97.8 FL (ref 78–100)
PDW BLD-RTO: 17.8 % (ref 11.7–14.9)
PLATELET # BLD: 145 K/CU MM (ref 140–440)
PMV BLD AUTO: 10.3 FL (ref 7.5–11.1)
RBC # BLD: 2.68 M/CU MM (ref 4.2–5.4)
WBC # BLD: 3.6 K/CU MM (ref 4–10.5)

## 2020-03-20 PROCEDURE — 36415 COLL VENOUS BLD VENIPUNCTURE: CPT

## 2020-03-20 PROCEDURE — 85027 COMPLETE CBC AUTOMATED: CPT

## 2020-03-21 ENCOUNTER — HOSPITAL ENCOUNTER (OUTPATIENT)
Age: 65
Setting detail: SPECIMEN
Discharge: HOME | End: 2020-03-21

## 2020-03-21 LAB
HCT VFR BLD CALC: 27.4 % (ref 37–47)
HEMOGLOBIN: 8.7 GM/DL (ref 12.5–16)
MCH RBC QN AUTO: 30.9 PG (ref 27–31)
MCHC RBC AUTO-ENTMCNC: 31.8 % (ref 32–36)
MCV RBC AUTO: 97.2 FL (ref 78–100)
PDW BLD-RTO: 17.8 % (ref 11.7–14.9)
PLATELET # BLD: 151 K/CU MM (ref 140–440)
PMV BLD AUTO: 9.7 FL (ref 7.5–11.1)
RBC # BLD: 2.82 M/CU MM (ref 4.2–5.4)
WBC # BLD: 3.9 K/CU MM (ref 4–10.5)

## 2020-03-21 PROCEDURE — 85027 COMPLETE CBC AUTOMATED: CPT

## 2020-03-21 PROCEDURE — 36415 COLL VENOUS BLD VENIPUNCTURE: CPT

## 2020-03-22 ENCOUNTER — HOSPITAL ENCOUNTER (OUTPATIENT)
Age: 65
Setting detail: SPECIMEN
Discharge: HOME | End: 2020-03-22

## 2020-03-22 LAB
HCT VFR BLD CALC: 28.7 % (ref 37–47)
HEMOGLOBIN: 9 GM/DL (ref 12.5–16)
MCH RBC QN AUTO: 30.6 PG (ref 27–31)
MCHC RBC AUTO-ENTMCNC: 31.4 % (ref 32–36)
MCV RBC AUTO: 97.6 FL (ref 78–100)
PDW BLD-RTO: 18 % (ref 11.7–14.9)
PLATELET # BLD: 164 K/CU MM (ref 140–440)
PMV BLD AUTO: 9.5 FL (ref 7.5–11.1)
RBC # BLD: 2.94 M/CU MM (ref 4.2–5.4)
WBC # BLD: 3.9 K/CU MM (ref 4–10.5)

## 2020-03-22 PROCEDURE — 36415 COLL VENOUS BLD VENIPUNCTURE: CPT

## 2020-03-22 PROCEDURE — 85027 COMPLETE CBC AUTOMATED: CPT

## 2020-03-23 ENCOUNTER — HOSPITAL ENCOUNTER (OUTPATIENT)
Age: 65
Setting detail: SPECIMEN
Discharge: HOME | End: 2020-03-23

## 2020-03-23 ENCOUNTER — HOSPITAL ENCOUNTER (OUTPATIENT)
Dept: INFUSION THERAPY | Age: 65
Discharge: HOME | End: 2020-03-23
Payer: COMMERCIAL

## 2020-03-23 LAB
ALBUMIN SERPL-MCNC: 2.8 GM/DL (ref 3.4–5)
ALBUMIN SERPL-MCNC: 3.2 GM/DL (ref 3.4–5)
ALP BLD-CCNC: 59 IU/L (ref 40–128)
ALP BLD-CCNC: 66 IU/L (ref 40–129)
ALT SERPL-CCNC: 25 U/L (ref 10–40)
ALT SERPL-CCNC: 30 U/L (ref 10–40)
ANION GAP SERPL CALCULATED.3IONS-SCNC: 11 MMOL/L (ref 4–16)
ANION GAP SERPL CALCULATED.3IONS-SCNC: 14 MMOL/L (ref 4–16)
AST SERPL-CCNC: 36 IU/L (ref 15–37)
AST SERPL-CCNC: 41 IU/L (ref 15–37)
BASOPHILS ABSOLUTE: 0 K/CU MM
BASOPHILS RELATIVE PERCENT: 0.3 % (ref 0–1)
BILIRUB SERPL-MCNC: 0.3 MG/DL (ref 0–1)
BILIRUB SERPL-MCNC: 0.3 MG/DL (ref 0–1)
BUN BLDV-MCNC: 8 MG/DL (ref 6–23)
BUN BLDV-MCNC: 9 MG/DL (ref 6–23)
CALCIUM SERPL-MCNC: 8.5 MG/DL (ref 8.3–10.6)
CALCIUM SERPL-MCNC: 8.6 MG/DL (ref 8.3–10.6)
CHLORIDE BLD-SCNC: 104 MMOL/L (ref 99–110)
CHLORIDE BLD-SCNC: 112 MMOL/L (ref 99–110)
CO2: 16 MMOL/L (ref 21–32)
CO2: 22 MMOL/L (ref 21–32)
CREAT SERPL-MCNC: 0.5 MG/DL (ref 0.6–1.1)
CREAT SERPL-MCNC: 0.6 MG/DL (ref 0.6–1.1)
DIFFERENTIAL TYPE: ABNORMAL
EOSINOPHILS ABSOLUTE: 0 K/CU MM
EOSINOPHILS RELATIVE PERCENT: 1 % (ref 0–3)
GFR AFRICAN AMERICAN: >60 ML/MIN/1.73M2
GFR AFRICAN AMERICAN: >60 ML/MIN/1.73M2
GFR NON-AFRICAN AMERICAN: >60 ML/MIN/1.73M2
GFR NON-AFRICAN AMERICAN: >60 ML/MIN/1.73M2
GLUCOSE BLD-MCNC: 101 MG/DL (ref 70–99)
GLUCOSE BLD-MCNC: 68 MG/DL (ref 70–99)
HCT VFR BLD CALC: 27.2 % (ref 37–47)
HCT VFR BLD CALC: 27.8 % (ref 37–47)
HEMOGLOBIN: 8.6 GM/DL (ref 12.5–16)
HEMOGLOBIN: 9 GM/DL (ref 12.5–16)
LACTATE DEHYDROGENASE: 453 IU/L (ref 120–246)
LYMPHOCYTES ABSOLUTE: 0.5 K/CU MM
LYMPHOCYTES RELATIVE PERCENT: 16.4 % (ref 24–44)
MCH RBC QN AUTO: 30.4 PG (ref 27–31)
MCH RBC QN AUTO: 30.9 PG (ref 27–31)
MCHC RBC AUTO-ENTMCNC: 30.9 % (ref 32–36)
MCHC RBC AUTO-ENTMCNC: 33.1 % (ref 32–36)
MCV RBC AUTO: 93.5 FL (ref 78–100)
MCV RBC AUTO: 98.2 FL (ref 78–100)
MONOCYTES ABSOLUTE: 0.3 K/CU MM
MONOCYTES RELATIVE PERCENT: 10.7 % (ref 0–4)
PDW BLD-RTO: 18 % (ref 11.7–14.9)
PDW BLD-RTO: 18.4 % (ref 11.7–14.9)
PLATELET # BLD: 167 K/CU MM (ref 140–440)
PLATELET # BLD: 194 K/CU MM (ref 140–440)
PMV BLD AUTO: 9.1 FL (ref 7.5–11.1)
PMV BLD AUTO: 9.4 FL (ref 7.5–11.1)
POTASSIUM SERPL-SCNC: 4.1 MMOL/L (ref 3.5–5.1)
POTASSIUM SERPL-SCNC: 5.7 MMOL/L (ref 3.5–5.1)
RBC # BLD: 2.83 M/CU MM (ref 4.2–5.4)
RBC # BLD: 2.91 M/CU MM (ref 4.2–5.4)
REASON FOR REJECTION: NORMAL
REASON FOR REJECTION: NORMAL
REJECTED TEST: NORMAL
SEGMENTED NEUTROPHILS ABSOLUTE COUNT: 2.1 K/CU MM
SEGMENTED NEUTROPHILS RELATIVE PERCENT: 71.6 % (ref 36–66)
SODIUM BLD-SCNC: 137 MMOL/L (ref 135–145)
SODIUM BLD-SCNC: 142 MMOL/L (ref 135–145)
TOTAL PROTEIN: 4.4 GM/DL (ref 6.4–8.2)
TOTAL PROTEIN: 5.1 GM/DL (ref 6.4–8.2)
WBC # BLD: 3.1 K/CU MM (ref 4–10.5)
WBC # BLD: ABNORMAL K/CU MM (ref 4–10.5)

## 2020-03-23 PROCEDURE — 85025 COMPLETE CBC W/AUTO DIFF WBC: CPT

## 2020-03-23 PROCEDURE — 36415 COLL VENOUS BLD VENIPUNCTURE: CPT

## 2020-03-23 PROCEDURE — 99211 OFF/OP EST MAY X REQ PHY/QHP: CPT

## 2020-03-23 PROCEDURE — 83615 LACTATE (LD) (LDH) ENZYME: CPT

## 2020-03-23 PROCEDURE — 80053 COMPREHEN METABOLIC PANEL: CPT

## 2020-03-23 PROCEDURE — 85027 COMPLETE CBC AUTOMATED: CPT

## 2020-03-24 ENCOUNTER — HOSPITAL ENCOUNTER (OUTPATIENT)
Age: 65
Setting detail: SPECIMEN
Discharge: HOME | End: 2020-03-24

## 2020-03-24 LAB
HCT VFR BLD CALC: 27.3 % (ref 37–47)
HEMOGLOBIN: 8.7 GM/DL (ref 12.5–16)
MCH RBC QN AUTO: 30.9 PG (ref 27–31)
MCHC RBC AUTO-ENTMCNC: 31.9 % (ref 32–36)
MCV RBC AUTO: 96.8 FL (ref 78–100)
PDW BLD-RTO: 18.1 % (ref 11.7–14.9)
PLATELET # BLD: 168 K/CU MM (ref 140–440)
PMV BLD AUTO: 9.5 FL (ref 7.5–11.1)
RBC # BLD: 2.82 M/CU MM (ref 4.2–5.4)
WBC # BLD: 2.9 K/CU MM (ref 4–10.5)

## 2020-03-24 PROCEDURE — 36415 COLL VENOUS BLD VENIPUNCTURE: CPT

## 2020-03-24 PROCEDURE — 85027 COMPLETE CBC AUTOMATED: CPT

## 2020-03-25 ENCOUNTER — HOSPITAL ENCOUNTER (OUTPATIENT)
Age: 65
Setting detail: SPECIMEN
Discharge: HOME | End: 2020-03-25

## 2020-03-25 ENCOUNTER — OFFICE VISIT (OUTPATIENT)
Dept: INFECTIOUS DISEASES | Age: 65
End: 2020-03-25
Payer: COMMERCIAL

## 2020-03-25 VITALS
TEMPERATURE: 96.8 F | BODY MASS INDEX: 20.27 KG/M2 | WEIGHT: 114.4 LBS | HEART RATE: 65 BPM | RESPIRATION RATE: 20 BRPM | SYSTOLIC BLOOD PRESSURE: 101 MMHG | DIASTOLIC BLOOD PRESSURE: 62 MMHG

## 2020-03-25 LAB
HCT VFR BLD CALC: 25.9 % (ref 37–47)
HEMOGLOBIN: 8.1 GM/DL (ref 12.5–16)
MCH RBC QN AUTO: 30.7 PG (ref 27–31)
MCHC RBC AUTO-ENTMCNC: 31.3 % (ref 32–36)
MCV RBC AUTO: 98.1 FL (ref 78–100)
PDW BLD-RTO: 18.1 % (ref 11.7–14.9)
PLATELET # BLD: 168 K/CU MM (ref 140–440)
PMV BLD AUTO: 9.6 FL (ref 7.5–11.1)
RBC # BLD: 2.64 M/CU MM (ref 4.2–5.4)
WBC # BLD: 2.7 K/CU MM (ref 4–10.5)

## 2020-03-25 PROCEDURE — 85027 COMPLETE CBC AUTOMATED: CPT

## 2020-03-25 PROCEDURE — 99212 OFFICE O/P EST SF 10 MIN: CPT | Performed by: INTERNAL MEDICINE

## 2020-03-25 PROCEDURE — 36415 COLL VENOUS BLD VENIPUNCTURE: CPT

## 2020-03-26 ENCOUNTER — HOSPITAL ENCOUNTER (OUTPATIENT)
Age: 65
Setting detail: SPECIMEN
Discharge: HOME | End: 2020-03-26
Payer: COMMERCIAL

## 2020-03-26 LAB
ALBUMIN SERPL-MCNC: 3 GM/DL (ref 3.4–5)
ALP BLD-CCNC: 57 IU/L (ref 40–129)
ALT SERPL-CCNC: 31 U/L (ref 10–40)
ANION GAP SERPL CALCULATED.3IONS-SCNC: 11 MMOL/L (ref 4–16)
AST SERPL-CCNC: 32 IU/L (ref 15–37)
BILIRUB SERPL-MCNC: 0.3 MG/DL (ref 0–1)
BUN BLDV-MCNC: 11 MG/DL (ref 6–23)
CALCIUM SERPL-MCNC: 8.6 MG/DL (ref 8.3–10.6)
CHLORIDE BLD-SCNC: 108 MMOL/L (ref 99–110)
CO2: 24 MMOL/L (ref 21–32)
CREAT SERPL-MCNC: 0.6 MG/DL (ref 0.6–1.1)
GFR AFRICAN AMERICAN: >60 ML/MIN/1.73M2
GFR NON-AFRICAN AMERICAN: >60 ML/MIN/1.73M2
GLUCOSE BLD-MCNC: 81 MG/DL (ref 70–99)
HCT VFR BLD CALC: 24.8 % (ref 37–47)
HEMOGLOBIN: 7.7 GM/DL (ref 12.5–16)
MCH RBC QN AUTO: 30.8 PG (ref 27–31)
MCHC RBC AUTO-ENTMCNC: 31 % (ref 32–36)
MCV RBC AUTO: 99.2 FL (ref 78–100)
PDW BLD-RTO: 18.2 % (ref 11.7–14.9)
PLATELET # BLD: 162 K/CU MM (ref 140–440)
PMV BLD AUTO: 9.7 FL (ref 7.5–11.1)
POTASSIUM SERPL-SCNC: 5 MMOL/L (ref 3.5–5.1)
RBC # BLD: 2.5 M/CU MM (ref 4.2–5.4)
SODIUM BLD-SCNC: 143 MMOL/L (ref 135–145)
TOTAL PROTEIN: 4.5 GM/DL (ref 6.4–8.2)
WBC # BLD: 2.8 K/CU MM (ref 4–10.5)

## 2020-03-26 PROCEDURE — 85027 COMPLETE CBC AUTOMATED: CPT

## 2020-03-26 PROCEDURE — 36415 COLL VENOUS BLD VENIPUNCTURE: CPT

## 2020-03-26 PROCEDURE — 80053 COMPREHEN METABOLIC PANEL: CPT

## 2020-04-03 ENCOUNTER — HOSPITAL ENCOUNTER (OUTPATIENT)
Dept: INFUSION THERAPY | Age: 65
Discharge: HOME | End: 2020-04-03
Payer: COMMERCIAL

## 2020-04-03 LAB
ALBUMIN SERPL-MCNC: 3.7 GM/DL (ref 3.4–5)
ALP BLD-CCNC: 62 IU/L (ref 40–128)
ALT SERPL-CCNC: 24 U/L (ref 10–40)
ANION GAP SERPL CALCULATED.3IONS-SCNC: 14 MMOL/L (ref 4–16)
AST SERPL-CCNC: 22 IU/L (ref 15–37)
BASOPHILS ABSOLUTE: 0 K/CU MM
BASOPHILS RELATIVE PERCENT: 0 % (ref 0–1)
BILIRUB SERPL-MCNC: 0.2 MG/DL (ref 0–1)
BUN BLDV-MCNC: 18 MG/DL (ref 6–23)
CALCIUM SERPL-MCNC: 9.2 MG/DL (ref 8.3–10.6)
CHLORIDE BLD-SCNC: 109 MMOL/L (ref 99–110)
CO2: 19 MMOL/L (ref 21–32)
CREAT SERPL-MCNC: 0.9 MG/DL (ref 0.6–1.1)
DIFFERENTIAL TYPE: ABNORMAL
EOSINOPHILS ABSOLUTE: 0.1 K/CU MM
EOSINOPHILS RELATIVE PERCENT: 1.3 % (ref 0–3)
GFR AFRICAN AMERICAN: >60 ML/MIN/1.73M2
GFR NON-AFRICAN AMERICAN: >60 ML/MIN/1.73M2
GLUCOSE BLD-MCNC: 115 MG/DL (ref 70–99)
HCT VFR BLD CALC: 27.8 % (ref 37–47)
HEMOGLOBIN: 9 GM/DL (ref 12.5–16)
LYMPHOCYTES ABSOLUTE: 1.4 K/CU MM
LYMPHOCYTES RELATIVE PERCENT: 35.7 % (ref 24–44)
MCH RBC QN AUTO: 32 PG (ref 27–31)
MCHC RBC AUTO-ENTMCNC: 32.4 % (ref 32–36)
MCV RBC AUTO: 98.9 FL (ref 78–100)
MONOCYTES ABSOLUTE: 0.5 K/CU MM
MONOCYTES RELATIVE PERCENT: 13.6 % (ref 0–4)
PDW BLD-RTO: 18.3 % (ref 11.7–14.9)
PLATELET # BLD: 219 K/CU MM (ref 140–440)
PMV BLD AUTO: 9.2 FL (ref 7.5–11.1)
POTASSIUM SERPL-SCNC: 4.5 MMOL/L (ref 3.5–5.1)
RBC # BLD: 2.81 M/CU MM (ref 4.2–5.4)
SEGMENTED NEUTROPHILS ABSOLUTE COUNT: 1.9 K/CU MM
SEGMENTED NEUTROPHILS RELATIVE PERCENT: 49.4 % (ref 36–66)
SODIUM BLD-SCNC: 142 MMOL/L (ref 135–145)
TOTAL PROTEIN: 5.4 GM/DL (ref 6.4–8.2)
WBC # BLD: 3.9 K/CU MM (ref 4–10.5)

## 2020-04-03 PROCEDURE — 36415 COLL VENOUS BLD VENIPUNCTURE: CPT

## 2020-04-03 PROCEDURE — 96415 CHEMO IV INFUSION ADDL HR: CPT

## 2020-04-03 PROCEDURE — 2580000003 HC RX 258: Performed by: INTERNAL MEDICINE

## 2020-04-03 PROCEDURE — 80053 COMPREHEN METABOLIC PANEL: CPT

## 2020-04-03 PROCEDURE — 6360000002 HC RX W HCPCS

## 2020-04-03 PROCEDURE — 96411 CHEMO IV PUSH ADDL DRUG: CPT

## 2020-04-03 PROCEDURE — 36591 DRAW BLOOD OFF VENOUS DEVICE: CPT

## 2020-04-03 PROCEDURE — 96375 TX/PRO/DX INJ NEW DRUG ADDON: CPT

## 2020-04-03 PROCEDURE — 96413 CHEMO IV INFUSION 1 HR: CPT

## 2020-04-03 PROCEDURE — 6360000002 HC RX W HCPCS: Performed by: INTERNAL MEDICINE

## 2020-04-03 PROCEDURE — 96367 TX/PROPH/DG ADDL SEQ IV INF: CPT

## 2020-04-03 PROCEDURE — 96377 APPLICATON ON-BODY INJECTOR: CPT

## 2020-04-03 PROCEDURE — 85025 COMPLETE CBC W/AUTO DIFF WBC: CPT

## 2020-04-03 PROCEDURE — 96417 CHEMO IV INFUS EACH ADDL SEQ: CPT

## 2020-04-03 RX ORDER — DIPHENHYDRAMINE HYDROCHLORIDE 50 MG/ML
25 INJECTION INTRAMUSCULAR; INTRAVENOUS ONCE
Status: DISCONTINUED | OUTPATIENT
Start: 2020-04-03 | End: 2020-04-04 | Stop reason: HOSPADM

## 2020-04-03 RX ORDER — ACETAMINOPHEN 325 MG/1
650 TABLET ORAL ONCE
Status: DISCONTINUED | OUTPATIENT
Start: 2020-04-03 | End: 2020-04-04 | Stop reason: HOSPADM

## 2020-04-03 RX ORDER — DOXORUBICIN HYDROCHLORIDE 2 MG/ML
60 INJECTION, SOLUTION INTRAVENOUS ONCE
Status: DISCONTINUED | OUTPATIENT
Start: 2020-04-03 | End: 2020-04-04 | Stop reason: HOSPADM

## 2020-04-03 RX ORDER — HEPARIN SODIUM (PORCINE) LOCK FLUSH IV SOLN 100 UNIT/ML 100 UNIT/ML
SOLUTION INTRAVENOUS
Status: DISPENSED
Start: 2020-04-03 | End: 2020-04-03

## 2020-04-03 RX ORDER — PALONOSETRON 0.05 MG/ML
0.25 INJECTION, SOLUTION INTRAVENOUS ONCE
Status: DISCONTINUED | OUTPATIENT
Start: 2020-04-03 | End: 2020-04-04 | Stop reason: HOSPADM

## 2020-04-05 PROBLEM — Z09 HOSPITAL DISCHARGE FOLLOW-UP: Status: ACTIVE | Noted: 2020-04-05

## 2020-04-14 ENCOUNTER — HOSPITAL ENCOUNTER (OUTPATIENT)
Dept: INFUSION THERAPY | Age: 65
Discharge: HOME | End: 2020-04-14
Payer: COMMERCIAL

## 2020-04-14 LAB
ALBUMIN SERPL-MCNC: 4 GM/DL (ref 3.4–5)
ALP BLD-CCNC: 55 IU/L (ref 40–129)
ALT SERPL-CCNC: 6 U/L (ref 10–40)
ANION GAP SERPL CALCULATED.3IONS-SCNC: 13 MMOL/L (ref 4–16)
AST SERPL-CCNC: 11 IU/L (ref 15–37)
BASOPHILS ABSOLUTE: 0 K/CU MM
BASOPHILS RELATIVE PERCENT: 0.1 % (ref 0–1)
BILIRUB SERPL-MCNC: 0.2 MG/DL (ref 0–1)
BUN BLDV-MCNC: 14 MG/DL (ref 6–23)
CALCIUM SERPL-MCNC: 9.2 MG/DL (ref 8.3–10.6)
CHLORIDE BLD-SCNC: 102 MMOL/L (ref 99–110)
CO2: 22 MMOL/L (ref 21–32)
CREAT SERPL-MCNC: 1.1 MG/DL (ref 0.6–1.1)
DIFFERENTIAL TYPE: ABNORMAL
EOSINOPHILS ABSOLUTE: 0.1 K/CU MM
EOSINOPHILS RELATIVE PERCENT: 0.9 % (ref 0–3)
FERRITIN: 2206 NG/ML (ref 15–150)
FOLATE: >20 NG/ML (ref 3.1–17.5)
GFR AFRICAN AMERICAN: >60 ML/MIN/1.73M2
GFR NON-AFRICAN AMERICAN: 50 ML/MIN/1.73M2
GLUCOSE BLD-MCNC: 86 MG/DL (ref 70–99)
HCT VFR BLD CALC: 25.4 % (ref 37–47)
HEMOGLOBIN: 8.3 GM/DL (ref 12.5–16)
IRON: 102 UG/DL (ref 37–145)
LACTATE DEHYDROGENASE: 233 IU/L (ref 120–246)
LYMPHOCYTES ABSOLUTE: 0.6 K/CU MM
LYMPHOCYTES RELATIVE PERCENT: 7.2 % (ref 24–44)
MCH RBC QN AUTO: 32.2 PG (ref 27–31)
MCHC RBC AUTO-ENTMCNC: 32.7 % (ref 32–36)
MCV RBC AUTO: 98.4 FL (ref 78–100)
MONOCYTES ABSOLUTE: 0.7 K/CU MM
MONOCYTES RELATIVE PERCENT: 7.6 % (ref 0–4)
PCT TRANSFERRIN: 46 % (ref 10–44)
PDW BLD-RTO: 15.7 % (ref 11.7–14.9)
PLATELET # BLD: 101 K/CU MM (ref 140–440)
PMV BLD AUTO: 10.9 FL (ref 7.5–11.1)
POTASSIUM SERPL-SCNC: 4.1 MMOL/L (ref 3.5–5.1)
RBC # BLD: 2.58 M/CU MM (ref 4.2–5.4)
SEGMENTED NEUTROPHILS ABSOLUTE COUNT: 7.4 K/CU MM
SEGMENTED NEUTROPHILS RELATIVE PERCENT: 84.2 % (ref 36–66)
SODIUM BLD-SCNC: 137 MMOL/L (ref 135–145)
T4 FREE: 1.28 NG/DL (ref 0.9–1.8)
TOTAL IRON BINDING CAPACITY: 222 UG/DL (ref 250–450)
TOTAL PROTEIN: 6 GM/DL (ref 6.4–8.2)
TSH HIGH SENSITIVITY: 2.35 UIU/ML (ref 0.27–4.2)
UNSATURATED IRON BINDING CAPACITY: 120 UG/DL (ref 110–370)
VITAMIN B-12: 1180 PG/ML (ref 211–911)
WBC # BLD: ABNORMAL K/CU MM (ref 4–10.5)

## 2020-04-14 PROCEDURE — 82607 VITAMIN B-12: CPT

## 2020-04-14 PROCEDURE — 80053 COMPREHEN METABOLIC PANEL: CPT

## 2020-04-14 PROCEDURE — 99211 OFF/OP EST MAY X REQ PHY/QHP: CPT

## 2020-04-14 PROCEDURE — 84443 ASSAY THYROID STIM HORMONE: CPT

## 2020-04-14 PROCEDURE — 82728 ASSAY OF FERRITIN: CPT

## 2020-04-14 PROCEDURE — 84439 ASSAY OF FREE THYROXINE: CPT

## 2020-04-14 PROCEDURE — 83550 IRON BINDING TEST: CPT

## 2020-04-14 PROCEDURE — 83615 LACTATE (LD) (LDH) ENZYME: CPT

## 2020-04-14 PROCEDURE — 83540 ASSAY OF IRON: CPT

## 2020-04-14 PROCEDURE — 36415 COLL VENOUS BLD VENIPUNCTURE: CPT

## 2020-04-14 PROCEDURE — 82746 ASSAY OF FOLIC ACID SERUM: CPT

## 2020-04-14 PROCEDURE — 85025 COMPLETE CBC W/AUTO DIFF WBC: CPT

## 2020-04-24 ENCOUNTER — HOSPITAL ENCOUNTER (OUTPATIENT)
Dept: INFUSION THERAPY | Age: 65
Discharge: HOME | End: 2020-04-24
Payer: COMMERCIAL

## 2020-04-24 LAB
ALBUMIN SERPL-MCNC: 3.9 GM/DL (ref 3.4–5)
ALP BLD-CCNC: 60 IU/L (ref 40–128)
ALT SERPL-CCNC: 10 U/L (ref 10–40)
ANION GAP SERPL CALCULATED.3IONS-SCNC: 15 MMOL/L (ref 4–16)
AST SERPL-CCNC: 17 IU/L (ref 15–37)
BASOPHILS ABSOLUTE: 0.1 K/CU MM
BASOPHILS RELATIVE PERCENT: 0.6 % (ref 0–1)
BILIRUB SERPL-MCNC: 0.2 MG/DL (ref 0–1)
BUN BLDV-MCNC: 24 MG/DL (ref 6–23)
CALCIUM SERPL-MCNC: 9.3 MG/DL (ref 8.3–10.6)
CHLORIDE BLD-SCNC: 106 MMOL/L (ref 99–110)
CO2: 19 MMOL/L (ref 21–32)
CREAT SERPL-MCNC: 1 MG/DL (ref 0.6–1.1)
DIFFERENTIAL TYPE: ABNORMAL
EOSINOPHILS ABSOLUTE: 0 K/CU MM
EOSINOPHILS RELATIVE PERCENT: 0 % (ref 0–3)
GFR AFRICAN AMERICAN: >60 ML/MIN/1.73M2
GFR NON-AFRICAN AMERICAN: 56 ML/MIN/1.73M2
GLUCOSE BLD-MCNC: 98 MG/DL (ref 70–99)
HCT VFR BLD CALC: 24.6 % (ref 37–47)
HEMOGLOBIN: 7.8 GM/DL (ref 12.5–16)
LYMPHOCYTES ABSOLUTE: 1.1 K/CU MM
LYMPHOCYTES RELATIVE PERCENT: 6.2 % (ref 24–44)
MCH RBC QN AUTO: 32.5 PG (ref 27–31)
MCHC RBC AUTO-ENTMCNC: 31.7 % (ref 32–36)
MCV RBC AUTO: 102.5 FL (ref 78–100)
MONOCYTES ABSOLUTE: 0.6 K/CU MM
MONOCYTES RELATIVE PERCENT: 3.6 % (ref 0–4)
PDW BLD-RTO: 18.1 % (ref 11.7–14.9)
PLATELET # BLD: 349 K/CU MM (ref 140–440)
PMV BLD AUTO: 9.1 FL (ref 7.5–11.1)
POTASSIUM SERPL-SCNC: 5.4 MMOL/L (ref 3.5–5.1)
RBC # BLD: 2.4 M/CU MM (ref 4.2–5.4)
SEGMENTED NEUTROPHILS ABSOLUTE COUNT: 15.2 K/CU MM
SEGMENTED NEUTROPHILS RELATIVE PERCENT: 89.6 % (ref 36–66)
SODIUM BLD-SCNC: 140 MMOL/L (ref 135–145)
TOTAL PROTEIN: 5.7 GM/DL (ref 6.4–8.2)
WBC # BLD: 17 K/CU MM (ref 4–10.5)

## 2020-04-24 PROCEDURE — 96415 CHEMO IV INFUSION ADDL HR: CPT

## 2020-04-24 PROCEDURE — 36591 DRAW BLOOD OFF VENOUS DEVICE: CPT

## 2020-04-24 PROCEDURE — 86900 BLOOD TYPING SEROLOGIC ABO: CPT

## 2020-04-24 PROCEDURE — 6360000002 HC RX W HCPCS

## 2020-04-24 PROCEDURE — 6360000002 HC RX W HCPCS: Performed by: INTERNAL MEDICINE

## 2020-04-24 PROCEDURE — 86922 COMPATIBILITY TEST ANTIGLOB: CPT

## 2020-04-24 PROCEDURE — 96367 TX/PROPH/DG ADDL SEQ IV INF: CPT

## 2020-04-24 PROCEDURE — 96375 TX/PRO/DX INJ NEW DRUG ADDON: CPT

## 2020-04-24 PROCEDURE — 6370000000 HC RX 637 (ALT 250 FOR IP)

## 2020-04-24 PROCEDURE — 96413 CHEMO IV INFUSION 1 HR: CPT

## 2020-04-24 PROCEDURE — 96417 CHEMO IV INFUS EACH ADDL SEQ: CPT

## 2020-04-24 PROCEDURE — 80053 COMPREHEN METABOLIC PANEL: CPT

## 2020-04-24 PROCEDURE — 86850 RBC ANTIBODY SCREEN: CPT

## 2020-04-24 PROCEDURE — 85025 COMPLETE CBC W/AUTO DIFF WBC: CPT

## 2020-04-24 PROCEDURE — 86901 BLOOD TYPING SEROLOGIC RH(D): CPT

## 2020-04-24 PROCEDURE — 96411 CHEMO IV PUSH ADDL DRUG: CPT

## 2020-04-24 PROCEDURE — 36415 COLL VENOUS BLD VENIPUNCTURE: CPT

## 2020-04-24 PROCEDURE — 2580000003 HC RX 258: Performed by: INTERNAL MEDICINE

## 2020-04-24 RX ORDER — ACETAMINOPHEN 325 MG/1
TABLET ORAL
Status: DISPENSED
Start: 2020-04-24 | End: 2020-04-24

## 2020-04-24 RX ORDER — DIPHENHYDRAMINE HYDROCHLORIDE 50 MG/ML
INJECTION INTRAMUSCULAR; INTRAVENOUS
Status: DISPENSED
Start: 2020-04-24 | End: 2020-04-24

## 2020-04-24 RX ORDER — ACETAMINOPHEN 325 MG/1
650 TABLET ORAL ONCE
Status: DISCONTINUED | OUTPATIENT
Start: 2020-04-24 | End: 2020-04-25 | Stop reason: HOSPADM

## 2020-04-24 RX ORDER — DOXORUBICIN HYDROCHLORIDE 2 MG/ML
60 INJECTION, SOLUTION INTRAVENOUS ONCE
Status: DISCONTINUED | OUTPATIENT
Start: 2020-04-24 | End: 2020-04-25 | Stop reason: HOSPADM

## 2020-04-24 RX ORDER — DIPHENHYDRAMINE HYDROCHLORIDE 50 MG/ML
25 INJECTION INTRAMUSCULAR; INTRAVENOUS ONCE
Status: DISCONTINUED | OUTPATIENT
Start: 2020-04-24 | End: 2020-04-25 | Stop reason: HOSPADM

## 2020-04-24 RX ORDER — HEPARIN SODIUM (PORCINE) LOCK FLUSH IV SOLN 100 UNIT/ML 100 UNIT/ML
SOLUTION INTRAVENOUS
Status: DISPENSED
Start: 2020-04-24 | End: 2020-04-24

## 2020-04-24 RX ORDER — PALONOSETRON 0.05 MG/ML
INJECTION, SOLUTION INTRAVENOUS
Status: DISPENSED
Start: 2020-04-24 | End: 2020-04-24

## 2020-04-24 RX ORDER — PALONOSETRON 0.05 MG/ML
0.25 INJECTION, SOLUTION INTRAVENOUS ONCE
Status: DISCONTINUED | OUTPATIENT
Start: 2020-04-24 | End: 2020-04-25 | Stop reason: HOSPADM

## 2020-04-27 ENCOUNTER — HOSPITAL ENCOUNTER (OUTPATIENT)
Dept: INFUSION THERAPY | Age: 65
Setting detail: INFUSION SERIES
Discharge: HOME | End: 2020-04-27
Payer: COMMERCIAL

## 2020-04-27 VITALS
TEMPERATURE: 98 F | RESPIRATION RATE: 14 BRPM | HEART RATE: 87 BPM | SYSTOLIC BLOOD PRESSURE: 106 MMHG | DIASTOLIC BLOOD PRESSURE: 63 MMHG | OXYGEN SATURATION: 100 %

## 2020-04-27 PROCEDURE — 6360000002 HC RX W HCPCS: Performed by: INTERNAL MEDICINE

## 2020-04-27 PROCEDURE — P9016 RBC LEUKOCYTES REDUCED: HCPCS

## 2020-04-27 PROCEDURE — 2580000003 HC RX 258: Performed by: INTERNAL MEDICINE

## 2020-04-27 PROCEDURE — 99211 OFF/OP EST MAY X REQ PHY/QHP: CPT

## 2020-04-27 PROCEDURE — 36430 TRANSFUSION BLD/BLD COMPNT: CPT

## 2020-04-27 PROCEDURE — 96374 THER/PROPH/DIAG INJ IV PUSH: CPT

## 2020-04-27 PROCEDURE — 6370000000 HC RX 637 (ALT 250 FOR IP): Performed by: INTERNAL MEDICINE

## 2020-04-27 RX ORDER — ACETAMINOPHEN 325 MG/1
650 TABLET ORAL SEE ADMIN INSTRUCTIONS
Status: COMPLETED | OUTPATIENT
Start: 2020-04-27 | End: 2020-04-27

## 2020-04-27 RX ORDER — HEPARIN SODIUM (PORCINE) LOCK FLUSH IV SOLN 100 UNIT/ML 100 UNIT/ML
500 SOLUTION INTRAVENOUS PRN
Status: DISCONTINUED | OUTPATIENT
Start: 2020-04-27 | End: 2020-04-28 | Stop reason: HOSPADM

## 2020-04-27 RX ORDER — DIPHENHYDRAMINE HCL 25 MG
25 TABLET ORAL SEE ADMIN INSTRUCTIONS
Status: COMPLETED | OUTPATIENT
Start: 2020-04-27 | End: 2020-04-27

## 2020-04-27 RX ORDER — METHYLPREDNISOLONE SODIUM SUCCINATE 40 MG/ML
20 INJECTION, POWDER, LYOPHILIZED, FOR SOLUTION INTRAMUSCULAR; INTRAVENOUS ONCE
Status: COMPLETED | OUTPATIENT
Start: 2020-04-27 | End: 2020-04-27

## 2020-04-27 RX ORDER — FUROSEMIDE 10 MG/ML
20 INJECTION INTRAMUSCULAR; INTRAVENOUS SEE ADMIN INSTRUCTIONS
Status: DISCONTINUED | OUTPATIENT
Start: 2020-04-27 | End: 2020-04-28 | Stop reason: HOSPADM

## 2020-04-27 RX ORDER — 0.9 % SODIUM CHLORIDE 0.9 %
20 VIAL (ML) INJECTION PRN
Status: DISCONTINUED | OUTPATIENT
Start: 2020-04-27 | End: 2020-04-28 | Stop reason: HOSPADM

## 2020-04-27 RX ORDER — 0.9 % SODIUM CHLORIDE 0.9 %
250 INTRAVENOUS SOLUTION INTRAVENOUS ONCE
Status: COMPLETED | OUTPATIENT
Start: 2020-04-27 | End: 2020-04-27

## 2020-04-27 RX ORDER — 0.9 % SODIUM CHLORIDE 0.9 %
10 VIAL (ML) INJECTION PRN
Status: DISCONTINUED | OUTPATIENT
Start: 2020-04-27 | End: 2020-04-28 | Stop reason: HOSPADM

## 2020-04-27 RX ADMIN — SODIUM CHLORIDE, PRESERVATIVE FREE 10 ML: 5 INJECTION INTRAVENOUS at 10:35

## 2020-04-27 RX ADMIN — METHYLPREDNISOLONE SODIUM SUCCINATE 20 MG: 40 INJECTION, POWDER, FOR SOLUTION INTRAMUSCULAR; INTRAVENOUS at 08:09

## 2020-04-27 RX ADMIN — ACETAMINOPHEN 650 MG: 325 TABLET ORAL at 08:09

## 2020-04-27 RX ADMIN — DIPHENHYDRAMINE HYDROCHLORIDE 25 MG: 25 TABLET ORAL at 08:09

## 2020-04-27 RX ADMIN — Medication 500 UNITS: at 10:35

## 2020-04-27 RX ADMIN — SODIUM CHLORIDE 250 ML: 9 INJECTION, SOLUTION INTRAVENOUS at 08:11

## 2020-04-27 ASSESSMENT — PAIN SCALES - GENERAL: PAINLEVEL_OUTOF10: 0

## 2020-04-28 LAB
ABO/RH: NORMAL
ANTIBODY SCREEN: NEGATIVE
COMPONENT: NORMAL
CROSSMATCH RESULT: NORMAL
STATUS: NORMAL
TRANSFUSION STATUS: NORMAL
UNIT DIVISION: 0
UNIT NUMBER: NORMAL

## 2020-05-05 PROBLEM — Z09 HOSPITAL DISCHARGE FOLLOW-UP: Status: RESOLVED | Noted: 2020-04-05 | Resolved: 2020-05-05

## 2020-05-07 ENCOUNTER — HOSPITAL ENCOUNTER (OUTPATIENT)
Dept: INFUSION THERAPY | Age: 65
Discharge: HOME | End: 2020-05-07
Payer: COMMERCIAL

## 2020-05-07 LAB
BASOPHILS ABSOLUTE: 0 K/CU MM
BASOPHILS RELATIVE PERCENT: 0.3 % (ref 0–1)
DIFFERENTIAL TYPE: ABNORMAL
EOSINOPHILS ABSOLUTE: 0.1 K/CU MM
EOSINOPHILS RELATIVE PERCENT: 2 % (ref 0–3)
HCT VFR BLD CALC: 27.2 % (ref 37–47)
HEMOGLOBIN: 9 GM/DL (ref 12.5–16)
LYMPHOCYTES ABSOLUTE: 0.3 K/CU MM
LYMPHOCYTES RELATIVE PERCENT: 9.6 % (ref 24–44)
MCH RBC QN AUTO: 32.4 PG (ref 27–31)
MCHC RBC AUTO-ENTMCNC: 33.1 % (ref 32–36)
MCV RBC AUTO: 97.8 FL (ref 78–100)
MONOCYTES ABSOLUTE: 0.7 K/CU MM
MONOCYTES RELATIVE PERCENT: 22.5 % (ref 0–4)
PDW BLD-RTO: 16.1 % (ref 11.7–14.9)
PLATELET # BLD: 229 K/CU MM (ref 140–440)
PMV BLD AUTO: 9.3 FL (ref 7.5–11.1)
RBC # BLD: 2.78 M/CU MM (ref 4.2–5.4)
SEGMENTED NEUTROPHILS ABSOLUTE COUNT: 2 K/CU MM
SEGMENTED NEUTROPHILS RELATIVE PERCENT: 65.6 % (ref 36–66)
WBC # BLD: 3 K/CU MM (ref 4–10.5)

## 2020-05-07 PROCEDURE — 36415 COLL VENOUS BLD VENIPUNCTURE: CPT

## 2020-05-07 PROCEDURE — 85025 COMPLETE CBC W/AUTO DIFF WBC: CPT

## 2020-05-07 PROCEDURE — 99211 OFF/OP EST MAY X REQ PHY/QHP: CPT

## 2020-05-12 ENCOUNTER — TELEPHONE (OUTPATIENT)
Dept: CARDIOLOGY CLINIC | Age: 65
End: 2020-05-12

## 2020-05-12 ENCOUNTER — TELEMEDICINE (OUTPATIENT)
Dept: CARDIOLOGY CLINIC | Age: 65
End: 2020-05-12
Payer: COMMERCIAL

## 2020-05-12 VITALS
DIASTOLIC BLOOD PRESSURE: 77 MMHG | WEIGHT: 121.8 LBS | SYSTOLIC BLOOD PRESSURE: 121 MMHG | HEIGHT: 63 IN | BODY MASS INDEX: 21.58 KG/M2 | HEART RATE: 99 BPM

## 2020-05-12 PROBLEM — R00.0 TACHYCARDIA: Status: ACTIVE | Noted: 2020-05-12

## 2020-05-12 PROCEDURE — 99214 OFFICE O/P EST MOD 30 MIN: CPT | Performed by: INTERNAL MEDICINE

## 2020-05-12 RX ORDER — SODIUM BICARBONATE 325 MG/1
550 TABLET ORAL 4 TIMES DAILY
COMMUNITY
End: 2020-11-09 | Stop reason: SDUPTHER

## 2020-05-12 NOTE — PROGRESS NOTES
nightly      dicyclomine (BENTYL) 20 MG tablet Take 20 mg by mouth every 6 hours as needed      Docusate Sodium 100 MG TABS Take 100 mg by mouth as needed      polyethylene glycol (GLYCOLAX) packet Take 17 g by mouth daily as needed for Constipation      ondansetron (ZOFRAN-ODT) 8 MG TBDP disintegrating tablet       calcium carbonate 600 MG TABS tablet Take 1 tablet by mouth daily       No current facility-administered medications for this visit. Allergies:   Abilify [aripiprazole];  Iodine; Penicillins; and Codeine    Patient History:  Past Medical History:   Diagnosis Date    B[facility]cell [facility] ([facility]     Bipolar 1 disorder ([facility]     \"on Depakote for this\"     Past Surgical History:   Procedure Laterality Date    COLONOSCOPY  2015    normal colon    COLONOSCOPY N/A 2020    COLONOSCOPY DIAGNOSTIC performed by Dewey Mayberry MD at [address] [place] [address] N/A 2020    PORT INSERTION performed by Derrell Pathak MD at [address] [place] [address] Left 2020    PORT REMOVAL LEFT performed by Derrell Pathak MD at [address] [place] [address] Right 3/10/2020    RIGHT PORT INSERTION performed by [facility]ak [facility] at [facility]  as a kid    TUBAL LIGATION  20+ yrs ago    UPPER GASTROINTESTINAL ENDOSCOPY N/A 2020    EGD DIAGNOSTIC ONLY performed by Dewey [facility]berry MD at [facility] ENDOSCOPY     Family History   Problem Relation Age of Onset    Breast Cancer Mother     High Blood Pressure Mother     Cancer Father         \"cancer in the bowel\"     Social History     Tobacco Use    Smoking status: Former Smoker     Packs/day: 1.00     Years: 10.00     Pack years: 10.00     Types: Cigarettes     Last attempt to quit: 1988     Years since quittin.3    Smokeless tobacco: Never Used   Substance Use Topics    Alcohol use: No     Comment: 1-2 CUPS OF HOT TEA        Review of Systems:   · Constitutional: No Fever or Weight Loss   · Eyes: No Decreased Vision  · ENT: No Headaches, Date    CHOL 234 06/19/2017    TRIG 130 06/19/2017    HDL 66 06/19/2017    LDLCALC 142 06/19/2017     Lab Results   Component Value Date    ALT 10 04/24/2020    AST 17 04/24/2020     No results for input(s): WBC, HGB, HCT, MCV, PLT in the last 72 hours. TSH: No results found for: TSH  Lab Results   Component Value Date    AST 17 04/24/2020    ALT 10 04/24/2020    BILITOT 0.2 04/24/2020    ALKPHOS 60 04/24/2020     No results found for: PROBNP  No results found for: LABA1C  Lab Results   Component Value Date    WBC 3.0 (L) 05/07/2020    HGB 9.0 (L) 05/07/2020    HCT 27.2 (L) 05/07/2020     05/07/2020     Echo 1/16/2020   Summary   Left ventricular systolic function is low normal.   Ejection fraction is visually estimated at 50%. Paradoxical motion of the interventricular septum; possible conduction   delay. Indeterminate diastolic function; E/A flow reversal is noted. Mitral valve prolapse is present. Moderate to severe mitral regurgitation is present. Moderate tricuspid regurgitation is present. RVSP is 25 mmHg. No evidence of any pericardial effusion. Mobile interatrial septum. Incidental finding: cyst vs mass near/attached to the right kidney. All labs, medications and tests reviewed by myself including data and history from outside source , patient and available family . Assessment & Plan:      1. B-cell lymphoma of intrapelvic lymph nodes, unspecified B-cell lymphoma type ([address]    2. Idiopathic hypotension    3. Mitral valve disease    4. Pancytopenia ([address]    5. Tachycardia         Idiopathic hypotension  Improved on midodrine 5 mg 3 times daily. Her TSH was normal  she does not appear to be that sick she tells me that her blood pressure chronically runs low she may need stress dose steroids.    We will continue to monitor for now    Tachycardia  Need to rule out afib , Need ekg , she can do it at cancer center or we can do it in our office , start metoprolol 25 mg , if bp does not allow it , we can go up on midodrine or switch to ivabradine       Mitral valve disease  She does seems to have moderate mitral regurgitation with mitral valve prolapse we will plan a STEVEN when she is more physically improved at this stage we will continue to monitor her mitral valve she does not appear to be in heart failure or seem to be affected by MR. Dyslipidemia :  All available lab work was reviewed. Necessary orders were placed , instructions given by myself       Counseled extensively and medication compliance urged. We discussed that for the  prevention of ASCVD our  goal is aggressive risk modification. Patient is encouraged to exercise even a brisk walk for 30 minutes  at least 3 to 4 times a week   Various goals were discussed and questions answered. Continue current medications. Appropriate prescriptions are addressed and refills ordered. Questions answered and patient verbalizes understanding. Call for any problems, questions, or concerns. Continue all other medications of all above medical condition listed as is. Return in about 6 months (around 11/12/2020). Please note this report has been partially produced using speech recognition software and may contain errors related to that system including errors in grammar, punctuation, and spelling, as well as words and phrases that may be inappropriate. If there are any questions or concerns please feel free to contact the dictating provider for clarification. An  electronic signature was used to authenticate this note. --Drew Nuno MD on 5/12/2020 at 2:42 PM    [id number]}    I confirm that this visit was completed in a telehealth setting ,using synchronous audiovisual technology for real time patient interaction . The patient identity with name and date of birth was confirmed .  This evaluation of patient was done by telehealth [facility] the [facility] , which precluded assurance of safe in person visit at

## 2020-05-15 ENCOUNTER — HOSPITAL ENCOUNTER (OUTPATIENT)
Dept: INFUSION THERAPY | Age: 65
Discharge: HOME | End: 2020-05-15
Payer: COMMERCIAL

## 2020-05-15 LAB
ALBUMIN SERPL-MCNC: 3.9 GM/DL (ref 3.4–5)
ALP BLD-CCNC: 62 IU/L (ref 40–129)
ALT SERPL-CCNC: 13 U/L (ref 10–40)
ANION GAP SERPL CALCULATED.3IONS-SCNC: 17 MMOL/L (ref 4–16)
AST SERPL-CCNC: 17 IU/L (ref 15–37)
BASOPHILS ABSOLUTE: 0 K/CU MM
BASOPHILS RELATIVE PERCENT: 0.2 % (ref 0–1)
BILIRUB SERPL-MCNC: 0.3 MG/DL (ref 0–1)
BUN BLDV-MCNC: 14 MG/DL (ref 6–23)
CALCIUM SERPL-MCNC: 9.5 MG/DL (ref 8.3–10.6)
CHLORIDE BLD-SCNC: 103 MMOL/L (ref 99–110)
CO2: 18 MMOL/L (ref 21–32)
CREAT SERPL-MCNC: 0.9 MG/DL (ref 0.6–1.1)
DIFFERENTIAL TYPE: ABNORMAL
EOSINOPHILS ABSOLUTE: 0 K/CU MM
EOSINOPHILS RELATIVE PERCENT: 0.2 % (ref 0–3)
GFR AFRICAN AMERICAN: >60 ML/MIN/1.73M2
GFR NON-AFRICAN AMERICAN: >60 ML/MIN/1.73M2
GLUCOSE BLD-MCNC: 161 MG/DL (ref 70–99)
HCT VFR BLD CALC: 28.3 % (ref 37–47)
HEMOGLOBIN: 9.3 GM/DL (ref 12.5–16)
LYMPHOCYTES ABSOLUTE: 0.7 K/CU MM
LYMPHOCYTES RELATIVE PERCENT: 6.9 % (ref 24–44)
MCH RBC QN AUTO: 31.6 PG (ref 27–31)
MCHC RBC AUTO-ENTMCNC: 32.9 % (ref 32–36)
MCV RBC AUTO: 96.3 FL (ref 78–100)
MONOCYTES ABSOLUTE: 1.4 K/CU MM
MONOCYTES RELATIVE PERCENT: 12.7 % (ref 0–4)
PDW BLD-RTO: 16.1 % (ref 11.7–14.9)
PLATELET # BLD: 331 K/CU MM (ref 140–440)
PMV BLD AUTO: 8.8 FL (ref 7.5–11.1)
POTASSIUM SERPL-SCNC: 4.2 MMOL/L (ref 3.5–5.1)
RBC # BLD: 2.94 M/CU MM (ref 4.2–5.4)
SEGMENTED NEUTROPHILS ABSOLUTE COUNT: 8.5 K/CU MM
SEGMENTED NEUTROPHILS RELATIVE PERCENT: 80 % (ref 36–66)
SODIUM BLD-SCNC: 138 MMOL/L (ref 135–145)
TOTAL PROTEIN: 5.9 GM/DL (ref 6.4–8.2)
WBC # BLD: 10.6 K/CU MM (ref 4–10.5)

## 2020-05-15 PROCEDURE — 2580000003 HC RX 258: Performed by: INTERNAL MEDICINE

## 2020-05-15 PROCEDURE — 85025 COMPLETE CBC W/AUTO DIFF WBC: CPT

## 2020-05-15 PROCEDURE — 6360000002 HC RX W HCPCS: Performed by: INTERNAL MEDICINE

## 2020-05-15 PROCEDURE — 96367 TX/PROPH/DG ADDL SEQ IV INF: CPT

## 2020-05-15 PROCEDURE — 6370000000 HC RX 637 (ALT 250 FOR IP)

## 2020-05-15 PROCEDURE — 6360000002 HC RX W HCPCS

## 2020-05-15 PROCEDURE — 96415 CHEMO IV INFUSION ADDL HR: CPT

## 2020-05-15 PROCEDURE — 36415 COLL VENOUS BLD VENIPUNCTURE: CPT

## 2020-05-15 PROCEDURE — 96413 CHEMO IV INFUSION 1 HR: CPT

## 2020-05-15 PROCEDURE — 80053 COMPREHEN METABOLIC PANEL: CPT

## 2020-05-15 PROCEDURE — 96411 CHEMO IV PUSH ADDL DRUG: CPT

## 2020-05-15 PROCEDURE — 96375 TX/PRO/DX INJ NEW DRUG ADDON: CPT

## 2020-05-15 PROCEDURE — 96417 CHEMO IV INFUS EACH ADDL SEQ: CPT

## 2020-05-15 RX ORDER — DIPHENHYDRAMINE HYDROCHLORIDE 50 MG/ML
25 INJECTION INTRAMUSCULAR; INTRAVENOUS ONCE
Status: DISCONTINUED | OUTPATIENT
Start: 2020-05-15 | End: 2020-05-16 | Stop reason: HOSPADM

## 2020-05-15 RX ORDER — HEPARIN SODIUM (PORCINE) LOCK FLUSH IV SOLN 100 UNIT/ML 100 UNIT/ML
SOLUTION INTRAVENOUS
Status: DISPENSED
Start: 2020-05-15 | End: 2020-05-15

## 2020-05-15 RX ORDER — DOXORUBICIN HYDROCHLORIDE 2 MG/ML
60 INJECTION, SOLUTION INTRAVENOUS ONCE
Status: DISCONTINUED | OUTPATIENT
Start: 2020-05-15 | End: 2020-05-16 | Stop reason: HOSPADM

## 2020-05-15 RX ORDER — PALONOSETRON 0.05 MG/ML
INJECTION, SOLUTION INTRAVENOUS
Status: DISPENSED
Start: 2020-05-15 | End: 2020-05-15

## 2020-05-15 RX ORDER — PALONOSETRON 0.05 MG/ML
0.25 INJECTION, SOLUTION INTRAVENOUS ONCE
Status: DISCONTINUED | OUTPATIENT
Start: 2020-05-15 | End: 2020-05-16 | Stop reason: HOSPADM

## 2020-05-15 RX ORDER — DIPHENHYDRAMINE HYDROCHLORIDE 50 MG/ML
INJECTION INTRAMUSCULAR; INTRAVENOUS
Status: DISPENSED
Start: 2020-05-15 | End: 2020-05-15

## 2020-05-15 RX ORDER — ACETAMINOPHEN 325 MG/1
TABLET ORAL
Status: DISPENSED
Start: 2020-05-15 | End: 2020-05-15

## 2020-05-15 RX ORDER — ACETAMINOPHEN 325 MG/1
650 TABLET ORAL ONCE
Status: DISCONTINUED | OUTPATIENT
Start: 2020-05-15 | End: 2020-05-16 | Stop reason: HOSPADM

## 2020-05-18 ENCOUNTER — HOSPITAL ENCOUNTER (OUTPATIENT)
Dept: INFUSION THERAPY | Age: 65
Discharge: HOME | End: 2020-05-18
Payer: COMMERCIAL

## 2020-05-18 PROCEDURE — 96372 THER/PROPH/DIAG INJ SC/IM: CPT

## 2020-05-20 ENCOUNTER — NURSE ONLY (OUTPATIENT)
Dept: CARDIOLOGY CLINIC | Age: 65
End: 2020-05-20
Payer: COMMERCIAL

## 2020-05-20 VITALS
HEIGHT: 63 IN | BODY MASS INDEX: 23.04 KG/M2 | HEART RATE: 98 BPM | DIASTOLIC BLOOD PRESSURE: 76 MMHG | WEIGHT: 130 LBS | SYSTOLIC BLOOD PRESSURE: 124 MMHG

## 2020-05-20 PROCEDURE — 93000 ELECTROCARDIOGRAM COMPLETE: CPT | Performed by: INTERNAL MEDICINE

## 2020-06-02 ENCOUNTER — HOSPITAL ENCOUNTER (OUTPATIENT)
Dept: INFUSION THERAPY | Age: 65
End: 2020-06-02
Payer: COMMERCIAL

## 2020-06-02 ENCOUNTER — HOSPITAL ENCOUNTER (OUTPATIENT)
Dept: INFUSION THERAPY | Age: 65
Discharge: HOME | End: 2020-06-02
Payer: COMMERCIAL

## 2020-06-02 LAB
ALBUMIN SERPL-MCNC: 3.8 GM/DL (ref 3.4–5)
ALP BLD-CCNC: 74 IU/L (ref 40–129)
ALT SERPL-CCNC: 12 U/L (ref 10–40)
ANION GAP SERPL CALCULATED.3IONS-SCNC: 13 MMOL/L (ref 4–16)
AST SERPL-CCNC: 18 IU/L (ref 15–37)
BASOPHILS ABSOLUTE: 0 K/CU MM
BASOPHILS RELATIVE PERCENT: 0.4 % (ref 0–1)
BILIRUB SERPL-MCNC: 0.2 MG/DL (ref 0–1)
BUN BLDV-MCNC: 11 MG/DL (ref 6–23)
CALCIUM SERPL-MCNC: 9.1 MG/DL (ref 8.3–10.6)
CHLORIDE BLD-SCNC: 106 MMOL/L (ref 99–110)
CO2: 23 MMOL/L (ref 21–32)
CREAT SERPL-MCNC: 0.8 MG/DL (ref 0.6–1.1)
DIFFERENTIAL TYPE: ABNORMAL
EOSINOPHILS ABSOLUTE: 0 K/CU MM
EOSINOPHILS RELATIVE PERCENT: 0.5 % (ref 0–3)
GFR AFRICAN AMERICAN: >60 ML/MIN/1.73M2
GFR NON-AFRICAN AMERICAN: >60 ML/MIN/1.73M2
GLUCOSE BLD-MCNC: 75 MG/DL (ref 70–99)
HCT VFR BLD CALC: 23.7 % (ref 37–47)
HEMOGLOBIN: 7.2 GM/DL (ref 12.5–16)
LACTATE DEHYDROGENASE: 316 IU/L (ref 120–246)
LYMPHOCYTES ABSOLUTE: 0.5 K/CU MM
LYMPHOCYTES RELATIVE PERCENT: 6.7 % (ref 24–44)
MCH RBC QN AUTO: 30.4 PG (ref 27–31)
MCHC RBC AUTO-ENTMCNC: 30.4 % (ref 32–36)
MCV RBC AUTO: 100 FL (ref 78–100)
MONOCYTES ABSOLUTE: 0.8 K/CU MM
MONOCYTES RELATIVE PERCENT: 10.4 % (ref 0–4)
PDW BLD-RTO: 17 % (ref 11.7–14.9)
PLATELET # BLD: 354 K/CU MM (ref 140–440)
PMV BLD AUTO: 9.2 FL (ref 7.5–11.1)
POTASSIUM SERPL-SCNC: 4.3 MMOL/L (ref 3.5–5.1)
RBC # BLD: 2.37 M/CU MM (ref 4.2–5.4)
SEGMENTED NEUTROPHILS ABSOLUTE COUNT: 6.1 K/CU MM
SEGMENTED NEUTROPHILS RELATIVE PERCENT: 82 % (ref 36–66)
SODIUM BLD-SCNC: 142 MMOL/L (ref 135–145)
TOTAL PROTEIN: 5.9 GM/DL (ref 6.4–8.2)
WBC # BLD: 7.5 K/CU MM (ref 4–10.5)

## 2020-06-02 PROCEDURE — 85025 COMPLETE CBC W/AUTO DIFF WBC: CPT

## 2020-06-02 PROCEDURE — 83615 LACTATE (LD) (LDH) ENZYME: CPT

## 2020-06-02 PROCEDURE — 80053 COMPREHEN METABOLIC PANEL: CPT

## 2020-06-02 PROCEDURE — 36415 COLL VENOUS BLD VENIPUNCTURE: CPT

## 2020-06-02 PROCEDURE — 99211 OFF/OP EST MAY X REQ PHY/QHP: CPT

## 2020-06-04 RX ORDER — DIPHENHYDRAMINE HYDROCHLORIDE 50 MG/ML
25 INJECTION INTRAMUSCULAR; INTRAVENOUS ONCE
Status: CANCELLED | OUTPATIENT
Start: 2020-06-05

## 2020-06-05 ENCOUNTER — HOSPITAL ENCOUNTER (OUTPATIENT)
Dept: INFUSION THERAPY | Age: 65
Discharge: HOME | End: 2020-06-05
Payer: COMMERCIAL

## 2020-06-05 LAB
ALBUMIN SERPL-MCNC: 3.9 GM/DL (ref 3.4–5)
ALP BLD-CCNC: 78 IU/L (ref 40–128)
ALT SERPL-CCNC: 19 U/L (ref 10–40)
ANION GAP SERPL CALCULATED.3IONS-SCNC: 16 MMOL/L (ref 4–16)
AST SERPL-CCNC: 29 IU/L (ref 15–37)
BASOPHILS ABSOLUTE: 0 K/CU MM
BASOPHILS RELATIVE PERCENT: 0.6 % (ref 0–1)
BILIRUB SERPL-MCNC: 0.3 MG/DL (ref 0–1)
BUN BLDV-MCNC: 11 MG/DL (ref 6–23)
CALCIUM SERPL-MCNC: 9.3 MG/DL (ref 8.3–10.6)
CHLORIDE BLD-SCNC: 106 MMOL/L (ref 99–110)
CO2: 21 MMOL/L (ref 21–32)
CREAT SERPL-MCNC: 0.9 MG/DL (ref 0.6–1.1)
DIFFERENTIAL TYPE: ABNORMAL
EOSINOPHILS ABSOLUTE: 0 K/CU MM
EOSINOPHILS RELATIVE PERCENT: 0.6 % (ref 0–3)
GFR AFRICAN AMERICAN: >60 ML/MIN/1.73M2
GFR NON-AFRICAN AMERICAN: >60 ML/MIN/1.73M2
GLUCOSE BLD-MCNC: 113 MG/DL (ref 70–99)
HCT VFR BLD CALC: 24.1 % (ref 37–47)
HEMOGLOBIN: 7.5 GM/DL (ref 12.5–16)
LYMPHOCYTES ABSOLUTE: 0.5 K/CU MM
LYMPHOCYTES RELATIVE PERCENT: 7.2 % (ref 24–44)
MCH RBC QN AUTO: 30.5 PG (ref 27–31)
MCHC RBC AUTO-ENTMCNC: 31.1 % (ref 32–36)
MCV RBC AUTO: 98 FL (ref 78–100)
MONOCYTES ABSOLUTE: 0.4 K/CU MM
MONOCYTES RELATIVE PERCENT: 5.5 % (ref 0–4)
PDW BLD-RTO: 17.5 % (ref 11.7–14.9)
PLATELET # BLD: 342 K/CU MM (ref 140–440)
PMV BLD AUTO: 8.7 FL (ref 7.5–11.1)
POTASSIUM SERPL-SCNC: 4.2 MMOL/L (ref 3.5–5.1)
RBC # BLD: 2.46 M/CU MM (ref 4.2–5.4)
SEGMENTED NEUTROPHILS ABSOLUTE COUNT: 6.2 K/CU MM
SEGMENTED NEUTROPHILS RELATIVE PERCENT: 86.1 % (ref 36–66)
SODIUM BLD-SCNC: 143 MMOL/L (ref 135–145)
TOTAL PROTEIN: 6 GM/DL (ref 6.4–8.2)
WBC # BLD: 7.2 K/CU MM (ref 4–10.5)

## 2020-06-05 PROCEDURE — 96415 CHEMO IV INFUSION ADDL HR: CPT

## 2020-06-05 PROCEDURE — 80053 COMPREHEN METABOLIC PANEL: CPT

## 2020-06-05 PROCEDURE — 96413 CHEMO IV INFUSION 1 HR: CPT

## 2020-06-05 PROCEDURE — 36415 COLL VENOUS BLD VENIPUNCTURE: CPT

## 2020-06-05 PROCEDURE — 96367 TX/PROPH/DG ADDL SEQ IV INF: CPT

## 2020-06-05 PROCEDURE — 96417 CHEMO IV INFUS EACH ADDL SEQ: CPT

## 2020-06-05 PROCEDURE — 96411 CHEMO IV PUSH ADDL DRUG: CPT

## 2020-06-05 PROCEDURE — 85025 COMPLETE CBC W/AUTO DIFF WBC: CPT

## 2020-06-05 PROCEDURE — 36591 DRAW BLOOD OFF VENOUS DEVICE: CPT

## 2020-06-05 PROCEDURE — 2580000003 HC RX 258: Performed by: INTERNAL MEDICINE

## 2020-06-05 PROCEDURE — 6360000002 HC RX W HCPCS: Performed by: INTERNAL MEDICINE

## 2020-06-05 PROCEDURE — 6370000000 HC RX 637 (ALT 250 FOR IP)

## 2020-06-05 PROCEDURE — 6360000002 HC RX W HCPCS

## 2020-06-05 PROCEDURE — 96375 TX/PRO/DX INJ NEW DRUG ADDON: CPT

## 2020-06-05 RX ORDER — HEPARIN SODIUM (PORCINE) LOCK FLUSH IV SOLN 100 UNIT/ML 100 UNIT/ML
SOLUTION INTRAVENOUS
Status: DISPENSED
Start: 2020-06-05 | End: 2020-06-05

## 2020-06-05 RX ORDER — DOXORUBICIN HYDROCHLORIDE 2 MG/ML
70 INJECTION, SOLUTION INTRAVENOUS ONCE
Status: DISCONTINUED | OUTPATIENT
Start: 2020-06-05 | End: 2020-06-06 | Stop reason: HOSPADM

## 2020-06-05 RX ORDER — ACETAMINOPHEN 325 MG/1
TABLET ORAL
Status: DISPENSED
Start: 2020-06-05 | End: 2020-06-05

## 2020-06-05 RX ORDER — PALONOSETRON 0.05 MG/ML
INJECTION, SOLUTION INTRAVENOUS
Status: DISPENSED
Start: 2020-06-05 | End: 2020-06-05

## 2020-06-05 RX ORDER — PALONOSETRON 0.05 MG/ML
0.25 INJECTION, SOLUTION INTRAVENOUS ONCE
Status: DISCONTINUED | OUTPATIENT
Start: 2020-06-05 | End: 2020-06-06 | Stop reason: HOSPADM

## 2020-06-05 RX ORDER — ACETAMINOPHEN 325 MG/1
650 TABLET ORAL ONCE
Status: DISCONTINUED | OUTPATIENT
Start: 2020-06-05 | End: 2020-06-06 | Stop reason: HOSPADM

## 2020-06-08 ENCOUNTER — HOSPITAL ENCOUNTER (OUTPATIENT)
Dept: INFUSION THERAPY | Age: 65
Discharge: HOME | End: 2020-06-08
Payer: COMMERCIAL

## 2020-06-08 PROCEDURE — 96372 THER/PROPH/DIAG INJ SC/IM: CPT

## 2020-06-08 PROCEDURE — 6360000002 HC RX W HCPCS: Performed by: INTERNAL MEDICINE

## 2020-06-17 ENCOUNTER — HOSPITAL ENCOUNTER (OUTPATIENT)
Dept: GENERAL RADIOLOGY | Age: 65
Discharge: HOME | End: 2020-06-17
Payer: COMMERCIAL

## 2020-06-17 ENCOUNTER — HOSPITAL ENCOUNTER (OUTPATIENT)
Age: 65
Discharge: HOME | End: 2020-06-17
Payer: COMMERCIAL

## 2020-06-17 PROCEDURE — 74018 RADEX ABDOMEN 1 VIEW: CPT

## 2020-06-18 ENCOUNTER — HOSPITAL ENCOUNTER (OUTPATIENT)
Dept: PET IMAGING | Age: 65
Discharge: HOME | End: 2020-06-18
Payer: COMMERCIAL

## 2020-06-18 PROCEDURE — A9552 F18 FDG: HCPCS | Performed by: INTERNAL MEDICINE

## 2020-06-18 PROCEDURE — 3430000000 HC RX DIAGNOSTIC RADIOPHARMACEUTICAL: Performed by: INTERNAL MEDICINE

## 2020-06-18 PROCEDURE — 78815 PET IMAGE W/CT SKULL-THIGH: CPT

## 2020-06-18 PROCEDURE — 2580000003 HC RX 258: Performed by: INTERNAL MEDICINE

## 2020-06-18 RX ORDER — FLUDEOXYGLUCOSE F 18 200 MCI/ML
13.55 INJECTION, SOLUTION INTRAVENOUS
Status: COMPLETED | OUTPATIENT
Start: 2020-06-18 | End: 2020-06-18

## 2020-06-18 RX ORDER — SODIUM CHLORIDE 0.9 % (FLUSH) 0.9 %
10 SYRINGE (ML) INJECTION PRN
Status: COMPLETED | OUTPATIENT
Start: 2020-06-18 | End: 2020-06-18

## 2020-06-18 RX ADMIN — FLUDEOXYGLUCOSE F 18 13.55 MILLICURIE: 200 INJECTION, SOLUTION INTRAVENOUS at 09:07

## 2020-06-18 RX ADMIN — SODIUM CHLORIDE, PRESERVATIVE FREE 10 ML: 5 INJECTION INTRAVENOUS at 09:07

## 2020-06-23 ENCOUNTER — HOSPITAL ENCOUNTER (OUTPATIENT)
Dept: INFUSION THERAPY | Age: 65
Discharge: HOME | End: 2020-06-23
Payer: COMMERCIAL

## 2020-06-23 ENCOUNTER — HOSPITAL ENCOUNTER (OUTPATIENT)
Dept: INFUSION THERAPY | Age: 65
End: 2020-06-23
Payer: COMMERCIAL

## 2020-06-23 LAB
ALBUMIN SERPL-MCNC: 3.9 GM/DL (ref 3.4–5)
ALP BLD-CCNC: 78 IU/L (ref 40–128)
ALT SERPL-CCNC: 7 U/L (ref 10–40)
ANION GAP SERPL CALCULATED.3IONS-SCNC: 15 MMOL/L (ref 4–16)
AST SERPL-CCNC: 22 IU/L (ref 15–37)
BASOPHILS ABSOLUTE: 0.1 K/CU MM
BASOPHILS RELATIVE PERCENT: 0.6 % (ref 0–1)
BILIRUB SERPL-MCNC: 0.2 MG/DL (ref 0–1)
BUN BLDV-MCNC: 11 MG/DL (ref 6–23)
CALCIUM SERPL-MCNC: 10.1 MG/DL (ref 8.3–10.6)
CHLORIDE BLD-SCNC: 102 MMOL/L (ref 99–110)
CO2: 22 MMOL/L (ref 21–32)
CREAT SERPL-MCNC: 0.8 MG/DL (ref 0.6–1.1)
DIFFERENTIAL TYPE: ABNORMAL
EOSINOPHILS ABSOLUTE: 0 K/CU MM
EOSINOPHILS RELATIVE PERCENT: 0.1 % (ref 0–3)
GFR AFRICAN AMERICAN: >60 ML/MIN/1.73M2
GFR NON-AFRICAN AMERICAN: >60 ML/MIN/1.73M2
GLUCOSE BLD-MCNC: 87 MG/DL (ref 70–99)
HCT VFR BLD CALC: 23.3 % (ref 37–47)
HEMOGLOBIN: 7.3 GM/DL (ref 12.5–16)
LACTATE DEHYDROGENASE: 349 IU/L (ref 120–246)
LYMPHOCYTES ABSOLUTE: 0.4 K/CU MM
LYMPHOCYTES RELATIVE PERCENT: 4.1 % (ref 24–44)
MCH RBC QN AUTO: 30.4 PG (ref 27–31)
MCHC RBC AUTO-ENTMCNC: 31.3 % (ref 32–36)
MCV RBC AUTO: 97.1 FL (ref 78–100)
MONOCYTES ABSOLUTE: 0.7 K/CU MM
MONOCYTES RELATIVE PERCENT: 6.4 % (ref 0–4)
PDW BLD-RTO: 17.7 % (ref 11.7–14.9)
PLATELET # BLD: 505 K/CU MM (ref 140–440)
PMV BLD AUTO: 9 FL (ref 7.5–11.1)
POTASSIUM SERPL-SCNC: 4.1 MMOL/L (ref 3.5–5.1)
RBC # BLD: 2.4 M/CU MM (ref 4.2–5.4)
SEGMENTED NEUTROPHILS ABSOLUTE COUNT: 9.4 K/CU MM
SEGMENTED NEUTROPHILS RELATIVE PERCENT: 88.8 % (ref 36–66)
SODIUM BLD-SCNC: 139 MMOL/L (ref 135–145)
TOTAL PROTEIN: 6.5 GM/DL (ref 6.4–8.2)
WBC # BLD: 10.6 K/CU MM (ref 4–10.5)

## 2020-06-23 PROCEDURE — 36415 COLL VENOUS BLD VENIPUNCTURE: CPT

## 2020-06-23 PROCEDURE — 99211 OFF/OP EST MAY X REQ PHY/QHP: CPT

## 2020-06-23 PROCEDURE — 85025 COMPLETE CBC W/AUTO DIFF WBC: CPT

## 2020-06-23 PROCEDURE — G0463 HOSPITAL OUTPT CLINIC VISIT: HCPCS

## 2020-06-23 PROCEDURE — 83615 LACTATE (LD) (LDH) ENZYME: CPT

## 2020-06-23 PROCEDURE — 80053 COMPREHEN METABOLIC PANEL: CPT

## 2020-07-06 ENCOUNTER — HOSPITAL ENCOUNTER (OUTPATIENT)
Dept: RADIATION ONCOLOGY | Age: 65
Discharge: HOME | End: 2020-07-06
Payer: MEDICARE

## 2020-07-06 VITALS
DIASTOLIC BLOOD PRESSURE: 63 MMHG | BODY MASS INDEX: 21.44 KG/M2 | HEIGHT: 63 IN | OXYGEN SATURATION: 100 % | RESPIRATION RATE: 16 BRPM | WEIGHT: 121 LBS | TEMPERATURE: 99.3 F | HEART RATE: 86 BPM | SYSTOLIC BLOOD PRESSURE: 112 MMHG

## 2020-07-06 PROCEDURE — 99203 OFFICE O/P NEW LOW 30 MIN: CPT | Performed by: RADIOLOGY

## 2020-07-06 RX ORDER — DIPHENOXYLATE HYDROCHLORIDE AND ATROPINE SULFATE 2.5; .025 MG/1; MG/1
1 TABLET ORAL 4 TIMES DAILY PRN
COMMUNITY
Start: 2020-06-23 | End: 2020-08-27

## 2020-07-06 RX ORDER — LOPERAMIDE HYDROCHLORIDE 2 MG/1
1 CAPSULE ORAL PRN
COMMUNITY
End: 2020-11-17

## 2020-07-06 ASSESSMENT — PAIN SCALES - GENERAL: PAINLEVEL_OUTOF10: 0

## 2020-07-06 NOTE — PLAN OF CARE
Radiation education and handouts given. Side effects and management reviewed with pt. All questions answered and pt voices understanding . Nursing Care Plan for Pelvic Radiation    Pain related to cancer diagnosis and treatment. Interventions   Pain assessment. Monitor pharmacological pain medication. Teach relaxation and repositioning techniques. Expected Outcome   Maintain patient's acceptable pain level or <5 on pain scale. Knowledge deficit related to diagnosis and treatment plan. Interventions   Assess patient's ability to comprehend diagnosis and treatment plan. Provide educational materials and teaching regarding plan of care. Provide emotional support and continued education. Refer to psychosocial coordinator if further assistance needed. Expected Outcome   Patient voices understanding of diagnosis and treatment plan. Altered skin integrity related to treatment. Interventions   Evaluation of skin integrity at therapy site. Advise patient on appropriate skin care. Instruct patient on recommended lotions/ointments/creams/dressing changes to use on therapy site. Expected Outcome   Minimize adverse skin reaction/infection at treatment site. Altered genitourinary function. Interventions   Evaluate for treatment side effects. Evaluate treatment modalities for effectiveness. Monitor I & O. Expected Outcome   Patient with minimal urinary complications as evidenced by adequate urinary output. Gastrointestinal disturbances due to treatment process. Interventions   Evaluate for treatment side effects. Evaluate treatment modalities for effectiveness. Initiate and educate on appropriate bowel program if needed. Expected Outcome   Patient with minimal bowel complications and controlled management of side effects. To re-evaluate weekly and one month post radiation treatments.

## 2020-07-06 NOTE — CONSULTS
Radiation Oncology Consultation  Encounter Date: 2020 2:33 PM    Ms. Ele Santos is a 59 y.o. female  : 1955  MRN: [id number]  Acct Number: [de-identified]  Requesting Provider:  LORENA Mazariegos: Lauryn Moe    PHYSICIANS:   Primary Care: Sun Kulkarni MD   Medical Oncologist: LORENA Mazariegos Dr.      DIAGNOSIS: Diffuse large B-cell lymphoma      Cancer Staging  B-cell lymphoma ([facility] [address]  Staging form: Hodgkin And Non-Hodgkin Lymphoma, AJCC 8th Edition  - Clinical: Stage II bulky (Diffuse large B-cell lymphoma) - Signed by Lauryn Moe MD on 2020         TREATMENT COURSE:     B-cell lymphoma ([facility] [address]    2020 Initial Diagnosis     B-cell lymphoma ([facility] [address]      2020 - 2020 Chemotherapy     R-CHOP x 6           HPI:   Today I had the privilege of seeing Ele Santos in consultation. As you recall, Ele Santos is a 59 y.o. female who was recently found to have diffuse large B-cell lymphoma. She was previously in a fairly good state of health with several prior benign lymph node biopsies. She reports that around 2019, she began experiencing nausea, vomiting, diarrhea. She also lost approximately 30 pounds for which she presented for medical evaluation. A CT the abdomen and pelvis was obtained on 19 showed a 9.5 cm soft tissue mass in the right posterior pelvis abutting the uterus with involvement of the lateral margin of the rectosigmoid junction concerning for malignancy. A 10.5 cm central mesenteric soft tissue mass was also noted along with additional uterine masses and a possible 6 mm pleural-based nodule in the posterior left lower lobe. Rectal mass biopsy from 19 showed diffuse large B-cell lymphoma, PCL 6 positive. PET and labs as below. She denies any associated fevers, or chills. She does report having had some drenching night sweats approximately 2 months ago lasting for approximately 1 month.   She denies noticing any new lumps or bumps anywhere throughout her body and denies the new onset of bony pain. She was initiated on R CHOP in January with her sixth cycle been delivered in early June. Repeat PET/CT as below showing a good clinical response. She presents today for consideration for treatment options. Overall, she reports she tolerated chemotherapy quite well. She states she has regained the 30 pounds that she has lost.  Her nausea and vomiting have resolved. The diarrhea is controlled with Imodium as needed.       Past Medical History:   Diagnosis Date    B-cell lymphoma ([facility] [address]     Bipolar 1 disorder ([facility] [address]     \"on Depakote for this\"        Past Surgical History:   Procedure Laterality Date    COLONOSCOPY  9/4/2015    normal colon    COLONOSCOPY N/A 2/28/2020    COLONOSCOPY DIAGNOSTIC performed by Charli Wade MD at [address] [place] [address] N/A 1/7/2020    PORT INSERTION performed by Raquel Rodrigues MD at [address] Left 2/20/2020    PORT REMOVAL LEFT performed by Raquel Rodrigues MD at [address] Right 3/10/2020    RIGHT PORT INSERTION performed [facility] Raquel [facility]ues [facility]  as a kid    TUBAL LIGATION  20+ yrs ago    UPPER GASTROINTESTINAL ENDOSCOPY N/A 2/27/2020    EGD DIAGNOSTIC ONLY performed by Charli Wade MD at [facility] ENDOSCOPY       Family History   Problem Relation Age of Onset    Breast Cancer Mother     High Blood Pressure Mother     Cancer Father         \"cancer in the bowel\"       Social History     Socioeconomic History    Marital status:      Spouse name: Not on file    Number of children: Not on file    Years of education: Not on file    Highest education level: Not on file   Occupational History    Not on file   Social Needs    Financial resource strain: Not on file    Food insecurity     Worry: Not on file     Inability: Not on file    Transportation needs     Medical: Not on file     Non-medical: Not on file   Tobacco Use    Smoking status: Former Smoker     Packs/day: 1.00     Years: 10.00     Pack years: 10.00     Types: Cigarettes     Last attempt to quit: 1988     Years since quittin.5    Smokeless tobacco: Never Used   Substance and Sexual Activity    Alcohol use: No     Comment: 1-2 CUPS OF HOT TEA    Drug use: No    Sexual activity: Not on file   Lifestyle    Physical activity     Days per week: Not on file     Minutes per session: Not on file    Stress: Not on file   Relationships    Social connections     Talks on phone: Not on file     Gets together: Not on file     Attends Caodaism service: Not on file     Active member of club or organization: Not on file     Attends meetings of clubs or organizations: Not on file     Relationship status: Not on file    Intimate partner violence     Fear of current or ex partner: Not on file     Emotionally abused: Not on file     Physically abused: Not on file     Forced sexual activity: Not on file   Other Topics Concern    Not on file   Social History Narrative    Not on file           ALLERGIES:   Allergies   Allergen Reactions    Abilify [Aripiprazole]     Iodine     Penicillins     Codeine Nausea And Vomiting        Current Outpatient Medications   Medication Sig Dispense Refill    sodium bicarbonate 325 MG tablet Take 550 mg by mouth 4 times daily      metoprolol tartrate (LOPRESSOR) 25 MG tablet Take 1 tablet by mouth 2 times daily 60 tablet 0    divalproex (DEPAKOTE ER) 500 MG extended release tablet Take 2 tablets by mouth nightly 30 tablet 0   [address] Does not apply route 1 each 0    famotidine (PEPCID) 20 MG tablet Take 20 mg by mouth nightly      dicyclomine (BENTYL) 20 MG tablet Take 20 mg by mouth every 6 hours as needed      midodrine (PROAMATINE) 5 MG tablet Take 1 tablet by mouth 3 times daily 90 tablet 3    megestrol (MEGACE ES) 625 MG/5ML suspension Take 5 mLs by mouth daily 150 mL 1    ondansetron (ZOFRAN-ODT) 8 MG TBDP disintegrating tablet       allopurinol (ZYLOPRIM) 100 MG tablet Take 100 mg by mouth daily      MULTIPLE VITAMIN PO Take by mouth daily      calcium carbonate 600 MG TABS tablet Take 1 tablet by mouth daily      diphenoxylate-atropine (LOMOTIL) 2.5-0.025 MG per tablet Take 1 tablet by mouth 4 times daily as needed.  loperamide (IMODIUM) 2 MG capsule Take 1 tablet by mouth as needed       No current facility-administered medications for this encounter.       Outpatient Medications Marked as Taking for the 7/6/20 encounter [facility] Encounter) with Deven King MD   Medication Sig Dispense Refill    sodium bicarbonate 325 MG tablet Take 550 mg by mouth 4 times daily      metoprolol tartrate (LOPRESSOR) 25 MG tablet Take 1 tablet by mouth 2 times daily 60 tablet 0    divalproex (DEPAKOTE ER) 500 MG extended release tablet Take 2 tablets by mouth nightly 30 tablet 0   [address] by Does not apply route 1 each 0    famotidine (PEPCID) 20 MG tablet Take 20 mg by mouth nightly      dicyclomine (BENTYL) 20 MG tablet Take 20 mg by mouth every 6 hours as needed      midodrine (PROAMATINE) 5 MG tablet Take 1 tablet by mouth 3 times daily 90 tablet 3    megestrol (MEGACE ES) 625 MG/5ML suspension Take 5 mLs by mouth daily 150 mL 1    ondansetron (ZOFRAN-ODT) 8 MG TBDP disintegrating tablet       allopurinol (ZYLOPRIM) 100 MG tablet Take 100 mg by mouth daily      MULTIPLE VITAMIN PO Take by mouth daily      calcium carbonate 600 MG TABS tablet Take 1 tablet by mouth daily            LABORATORY STUDIES:   CBC:   Lab Results   Component Value Date    WBC 10.6 06/23/2020    RBC 2.40 06/23/2020    HGB 7.3 06/23/2020    HCT 23.3 06/23/2020    MCV 97.1 06/23/2020    MCH 30.4 06/23/2020    MCHC 31.3 06/23/2020    RDW 17.7 06/23/2020     06/23/2020    MPV 9.0 06/23/2020     CMP:  Lab Results   Component Value Date     06/23/2020    K 4.1 06/23/2020     06/23/2020    CO2 22 06/23/2020    BUN 11 06/23/2020    CREATININE 0.8 06/23/2020    GFRAA >60 06/23/2020    LABGLOM >60 06/23/2020    GLUCOSE 87 06/23/2020    PROT 6.5 06/23/2020    LABALBU 3.9 06/23/2020    CALCIUM 10.1 06/23/2020    BILITOT 0.2 06/23/2020    ALKPHOS 78 06/23/2020    AST 22 06/23/2020    ALT 7 06/23/2020     Onc labs:   Lab Results   Component Value Date     06/23/2020       PATHOLOGY:   Rectal mass biopsy 12/20/19:  Diagnosis:    RECTAL MASS BIOPSY ([id number]) : -     HIGH-GRADE/LARGE B-CELL LYMPHOMA.  SEE COMMENTS. -     PENDING FISH ANALYSIS FOR FURTHER SUBCLASSIFICATION. Comments: Thank you for sharing this interesting case. Lorenza Jean Baptiste essentially  concur with the referring pathologist's opinion. Nerissa Rose  concurrent flow cytometric analysis ([id number]) revealed  no flow immunophenotypic evidence of a lymphoproliferative  disorder based on limited antibody panel.  Morphological and  immunohistochemical findings show a high-grade/large B-cell  lymphoma with increased cMYC positivity by  immunohistochemical stains.  FISH analysis for cMYC, bcl-2  and bcl-6 gene rearrangements will be performed.  The report  will be finalized after completion of FISH analysis. RADIOLOGIC STUDIES:     Xr Abdomen (kub) (single Ap View)    Result Date: 6/17/2020  EXAMINATION: ONE SUPINE XRAY VIEW(S) OF THE ABDOMEN 6/17/2020 12:02 pm COMPARISON: None. HISTORY: ORDERING SYSTEM PROVIDED HISTORY: Non-Hodgkin's lymphoma, unspecified body region, unspecified non-Hodgkin lymphoma type [facility]) TECHNOLOGIST PROVIDED HISTORY: Reason for Exam: non hodgkins lymphoma Acuity: Unknown Type of Exam: Unknown FINDINGS: Bowel-gas pattern normal.  No suspicious calcifications or abnormal mass effect.   No suspicious bony abnormality demonstrated     Negative       WHOLE BODY PET/CT       1/27/2020       TECHNIQUE:   Following IV injection of 13 mCi of F 18 -FDG, PET  tumor imaging was   acquired from the base of the skull to the mid thighs.  Computed tomography   was used for purposes of attenuation correction and anatomic localization. Fusion imaging was utilized for interpretation.       Uptake time 63 mins.  Glucose level 81 mg/dl.       COMPARISON:   None       HISTORY:   ORDERING SYSTEM PROVIDED HISTORY: Diffuse large B-cell lymphoma of extranodal   site [facility])   TECHNOLOGIST PROVIDED HISTORY:   Reason for Exam: Restage NHL, dx 2019.  Pt states last chemo was 2 wks ago   and that she has not had any rad tx.       FINDINGS:   HEAD/NECK: No metabolically active cervical lymphadenopathy.       CHEST: No metabolically active pulmonary nodules.  No metabolically active   axillary, hilar, or mediastinal lymphadenopathy.  Linear FDG activity along   the MediPort catheter tip.       ABDOMEN/PELVIS: 8 x 4.7 cm mass in the mid abdomen with increased FDG   activity, SUV max of 17.7.  The mass appears to encase a loop of small bowel,   probably duodenum/proximal jejunum.       6 x 4.3 cm mass in the right pelvis with increased FDG activity, SUV max of   18.8.  The mass is contiguous with the rectum wall where there is rectal wall   thickening.       3.8 x 3.1 cm mass in the left pelvis, difficult to separate from adjacent   structures, SUV max of 16.7.       BONES/SOFT TISSUE: No abnormal FDG activity localizes to the bones.  No   aggressive osseous lesion.       INCIDENTAL CT FINDINGS: Left-sided MediPort with the tip at the left   brachiocephalic vein just superior to the SVC.  Small free fluid in the   pelvis. Impression   1.  Intense metabolic activity associated with the abdominal and pelvic   masses described above consistent with active lymphoma.  Prior imaging has   been requested and an addendum will be provided to described any interval   changes.       2.  Linear FDG activity along the distal tip of the MediPort, which is in the   left brachiocephalic vein just superior to the SVC.  This can be seen with a   fibrin sheath.  Correlate with current functionality of the MediPort. Results for orders placed during the hospital encounter of 02/01/19   CHRISTY DIGITAL DIAGNOSTIC W OR WO CAD BILATERAL    Narrative EXAMINATION:  BILATERAL DIGITAL DIAGNOSTIC MAMMOGRAM; NONVASCULAR ULTRASOUND OF THE RIGHT  EXTREMITY/RIGHT AXILLA, 2/1/2019 9:06 am    TECHNIQUE:  Diagnostic mammography of the bilateral breasts was performed. Computer  aided detection was utilized in the interpretation of this exam.    Views: Standard views of both breasts were obtained as well as a true lateral  view of the right breast.    Ultrasound imaging of the right axilla was performed. COMPARISON:  Mammography dated 05/24/2017 and ultrasound dated 09/16/2016, 05/24/2017 and  12/29/2017    HISTORY:  ORDERING SYSTEM PROVIDED HISTORY: Abnormal mammogram    Ongoing evaluation, right axillary lymphadenopathy. This was previously  biopsied March of 2015 and shown to represent benign disease/reactive  hyperplasia    FINDINGS:  Diagnostic digital bilateral mammogram with CAD:    Scattered fibroglandular tissue is seen in both breasts. There is a clip  from benign biopsy in the middle third left breast just medial of midline. No suspicious mass or clustered microcalcification is seen. The lymph nodes  described on previous ultrasound are not appreciated on the mammographic  images. Ultrasound of the right axilla: In the right axilla there is redemonstration of several enlarged lymph nodes. There are 4 measured lymph nodes identified, the largest measures about 2.7  cm in greatest dimension. These have a similar appearance to multiple  previous exams. Impression Persistent enlarged right axillary lymph nodes. One of the lymph nodes was  biopsied and shown to represent reactive hyperplasia. If there is further  clinical concern repeat biopsy could be pursued for definitive diagnosis. If  further imaging is clinically warranted PET-CT is recommended. medication injections. Impression Significantly decreased size and uptake of the masses within the lower  abdomen/pelvis, consistent with treatment response. CT Chest w/o contrast:   Results for orders placed during the hospital encounter of 02/18/20   CT CHEST WO CONTRAST    Narrative EXAMINATION:  CT OF THE CHEST WITHOUT CONTRAST; CT OF THE ABDOMEN AND PELVIS WITHOUT  CONTRAST    2/25/2020 11:34 am; 2/25/2020 11:33 am    TECHNIQUE:  CT of the chest was performed without the administration of intravenous  contrast. Multiplanar reformatted images are provided for review. Dose  modulation, iterative reconstruction, and/or weight based adjustment of the  mA/kV was utilized to reduce the radiation dose to as low as reasonably  achievable.; CT of the abdomen and pelvis was performed without the  administration of intravenous contrast. Multiplanar reformatted images are  provided for review. Dose modulation, iterative reconstruction, and/or weight  based adjustment of the mA/kV was utilized to reduce the radiation dose to as  low as reasonably achievable. COMPARISON:  Abdomen and pelvis CT 02/19/2020, chest radiograph 02/18/2020, PET/CT  01/27/2020    HISTORY:  ORDERING SYSTEM PROVIDED HISTORY: sepsis  TECHNOLOGIST PROVIDED HISTORY:  Reason for exam:->sepsis;    ORDERING SYSTEM PROVIDED HISTORY: abdominal pain  TECHNOLOGIST PROVIDED HISTORY:  Reason for exam:->abdominal pain  Additional Contrast?->Radiologist Recommendation    FINDINGS:  Lack of intravenous contrast administration, partially limiting evaluation of  the soft tissues and vasculature. CHEST    MEDIASTINUM:  Normal heart size. Relative hyperdensity of the left  ventricular myocardium with respect to blood, suggesting underlying anemia. Trace pericardial effusion. Mild left-sided coronary atherosclerotic  calcifications. Minimal systemic atherosclerotic calcifications. No  mediastinal nor obvious hilar lymphadenopathy.   Slightly urinary incontinence. Integumentary: patient denies skin rashes, pruritis, or arm edema  Endocrine:  Patient denies any diabetic or thyroid problems  Neurologic: Patient denies headaches, focal weakness, or disorientation    PHYSICAL EXAMINATION:   VITAL SIGNS: /63   Pulse 86   Temp 99.3 °F (37.4 °C) (Oral)   Resp 16   Ht 5' 3\" (1.6 m)   Wt 121 lb (54.9 kg)   LMP 2000   SpO2 100%   BMI 21.43 kg/m²   ECOG PERFORMANCE STATUS: 0  PAIN RATIN    GENERAL: Pleasant well-developed adult in no acute distress. Alert and oriented ×3  SKIN: Warm and dry, without jaundice, ecchymoses, or petechiae. HEENT: Normocephalic, atraumatic. PERRLA, Sclerae anicteric  NECK:  No JVD; Supple. No cervical, supraclavicular, or infraclavicular lymphadenopathy is palpable. THORAX: No palpable axillary adenopathy;  LUNGS: Bilaterally clear to auscultation. No rales, rhonci, or wheezing are present. Good inspiratory effort, no accessory muscle use. CARDIAC: Regular rate and rhythm, without murmurs, clicks, or gallops   ABDOMEN: Normoactive bowels sounds are present. Soft. Non-tender and non-distended. No hepatosplenomegaly or masses are present. EXTREMITIES: No cyanosis, clubbing or edema is present. FROM  NEUROLOGIC: Gait unremarkable. The patient is alert and oriented. CN II-XII are grossly intact. Sensation to light touch is intact and symmetric in the fingers and feet. Muscle strength is 5/5 in both the upper and lower extremities. IMPRESSION/PLAN:  Qi Childers is a 59 y.o. female who was recently found to have a stage IIB diffuse large B-cell lymphoma. I have reviewed the patient's radiographic images. The treatment options were discussed in detail with the patient. I do recommend consolidative involved site radiation therapy. The potential acute side effects and chronic complications of radiation therapy to the abdomen/pelvis were discussed in detail with the patient.   The patient voiced understanding, and the patient's questions were answered. The patient has decided to proceed with radiation therapy. I will schedule a simulation to occur at the next available time and will plan on initiating radiation therapy shortly thereafter. Additional physician time required for IMRT planning for a detailed contour and DVH evaluation to minimize acute and chronic toxicity of the kidney, bowel, spinal cord, bladder, and rectum. Thank-you for allowing me to participate in the care of this very pleasant patient.           Electronically signed by Electronically signed by Jonathan Olivo MD on 7/6/2020 at 2:33 PM

## 2020-07-07 ENCOUNTER — HOSPITAL ENCOUNTER (OUTPATIENT)
Dept: RADIATION ONCOLOGY | Age: 65
Discharge: HOME | End: 2020-07-07
Attending: RADIOLOGY
Payer: MEDICARE

## 2020-07-07 PROCEDURE — 77470 SPECIAL RADIATION TREATMENT: CPT | Performed by: RADIOLOGY

## 2020-07-07 PROCEDURE — 77334 RADIATION TREATMENT AID(S): CPT | Performed by: RADIOLOGY

## 2020-07-07 PROCEDURE — 77332 RADIATION TREATMENT AID(S): CPT | Performed by: RADIOLOGY

## 2020-07-20 ENCOUNTER — APPOINTMENT (OUTPATIENT)
Dept: RADIATION ONCOLOGY | Age: 65
End: 2020-07-20
Attending: RADIOLOGY
Payer: MEDICARE

## 2020-07-21 ENCOUNTER — APPOINTMENT (OUTPATIENT)
Dept: RADIATION ONCOLOGY | Age: 65
End: 2020-07-21
Attending: RADIOLOGY
Payer: MEDICARE

## 2020-07-21 PROCEDURE — 77301 RADIOTHERAPY DOSE PLAN IMRT: CPT | Performed by: RADIOLOGY

## 2020-07-21 PROCEDURE — 77338 DESIGN MLC DEVICE FOR IMRT: CPT | Performed by: RADIOLOGY

## 2020-07-21 PROCEDURE — 77300 RADIATION THERAPY DOSE PLAN: CPT | Performed by: RADIOLOGY

## 2020-07-21 PROCEDURE — 99999 PR OFFICE/OUTPT VISIT,PROCEDURE ONLY: CPT | Performed by: RADIOLOGY

## 2020-07-22 ENCOUNTER — APPOINTMENT (OUTPATIENT)
Dept: RADIATION ONCOLOGY | Age: 65
End: 2020-07-22
Attending: RADIOLOGY
Payer: MEDICARE

## 2020-07-23 ENCOUNTER — HOSPITAL ENCOUNTER (OUTPATIENT)
Dept: RADIATION ONCOLOGY | Age: 65
End: 2020-07-23
Attending: RADIOLOGY
Payer: MEDICARE

## 2020-07-24 ENCOUNTER — APPOINTMENT (OUTPATIENT)
Dept: RADIATION ONCOLOGY | Age: 65
End: 2020-07-24
Attending: RADIOLOGY
Payer: MEDICARE

## 2020-07-27 ENCOUNTER — HOSPITAL ENCOUNTER (OUTPATIENT)
Dept: RADIATION ONCOLOGY | Age: 65
Discharge: HOME | End: 2020-07-27
Payer: MEDICARE

## 2020-07-27 ENCOUNTER — HOSPITAL ENCOUNTER (OUTPATIENT)
Dept: RADIATION ONCOLOGY | Age: 65
Discharge: HOME | End: 2020-07-27
Attending: RADIOLOGY
Payer: MEDICARE

## 2020-07-27 VITALS — BODY MASS INDEX: 20.58 KG/M2 | WEIGHT: 116.2 LBS

## 2020-07-27 PROCEDURE — 77300 RADIATION THERAPY DOSE PLAN: CPT | Performed by: RADIOLOGY

## 2020-07-27 PROCEDURE — 77014 PR CT GUIDANCE PLACEMENT RAD THERAPY FIELDS: CPT | Performed by: RADIOLOGY

## 2020-07-27 PROCEDURE — 77386 HC NTSTY MODUL RAD TX DLVR CPLX: CPT | Performed by: RADIOLOGY

## 2020-07-27 ASSESSMENT — PAIN SCALES - GENERAL: PAINLEVEL_OUTOF10: 0

## 2020-07-28 ENCOUNTER — APPOINTMENT (OUTPATIENT)
Dept: RADIATION ONCOLOGY | Age: 65
End: 2020-07-28
Attending: RADIOLOGY
Payer: MEDICARE

## 2020-07-29 ENCOUNTER — HOSPITAL ENCOUNTER (OUTPATIENT)
Dept: RADIATION ONCOLOGY | Age: 65
Discharge: HOME | End: 2020-07-29
Attending: RADIOLOGY
Payer: MEDICARE

## 2020-07-29 PROCEDURE — 77386 HC NTSTY MODUL RAD TX DLVR CPLX: CPT | Performed by: RADIOLOGY

## 2020-07-29 PROCEDURE — 77014 PR CT GUIDANCE PLACEMENT RAD THERAPY FIELDS: CPT | Performed by: RADIOLOGY

## 2020-07-30 ENCOUNTER — HOSPITAL ENCOUNTER (OUTPATIENT)
Dept: RADIATION ONCOLOGY | Age: 65
Discharge: HOME | End: 2020-07-30
Attending: RADIOLOGY
Payer: MEDICARE

## 2020-07-30 PROCEDURE — 77014 PR CT GUIDANCE PLACEMENT RAD THERAPY FIELDS: CPT | Performed by: RADIOLOGY

## 2020-07-30 PROCEDURE — 77386 HC NTSTY MODUL RAD TX DLVR CPLX: CPT | Performed by: RADIOLOGY

## 2020-07-31 ENCOUNTER — HOSPITAL ENCOUNTER (OUTPATIENT)
Dept: RADIATION ONCOLOGY | Age: 65
Discharge: HOME | End: 2020-07-31
Attending: RADIOLOGY
Payer: MEDICARE

## 2020-07-31 PROCEDURE — 77014 PR CT GUIDANCE PLACEMENT RAD THERAPY FIELDS: CPT | Performed by: RADIOLOGY

## 2020-07-31 PROCEDURE — 77386 HC NTSTY MODUL RAD TX DLVR CPLX: CPT | Performed by: RADIOLOGY

## 2020-08-03 ENCOUNTER — HOSPITAL ENCOUNTER (OUTPATIENT)
Dept: RADIATION ONCOLOGY | Age: 65
Discharge: HOME | End: 2020-08-03
Attending: RADIOLOGY
Payer: MEDICARE

## 2020-08-03 PROCEDURE — 77427 RADIATION TX MANAGEMENT X5: CPT | Performed by: RADIOLOGY

## 2020-08-03 PROCEDURE — 77336 RADIATION PHYSICS CONSULT: CPT | Performed by: RADIOLOGY

## 2020-08-03 PROCEDURE — 77386 HC NTSTY MODUL RAD TX DLVR CPLX: CPT | Performed by: RADIOLOGY

## 2020-08-03 PROCEDURE — 77014 PR CT GUIDANCE PLACEMENT RAD THERAPY FIELDS: CPT | Performed by: RADIOLOGY

## 2020-08-04 ENCOUNTER — HOSPITAL ENCOUNTER (OUTPATIENT)
Dept: RADIATION ONCOLOGY | Age: 65
Discharge: HOME | End: 2020-08-04
Attending: RADIOLOGY
Payer: MEDICARE

## 2020-08-04 PROCEDURE — 77386 HC NTSTY MODUL RAD TX DLVR CPLX: CPT | Performed by: RADIOLOGY

## 2020-08-04 PROCEDURE — 77014 PR CT GUIDANCE PLACEMENT RAD THERAPY FIELDS: CPT | Performed by: RADIOLOGY

## 2020-08-05 ENCOUNTER — HOSPITAL ENCOUNTER (OUTPATIENT)
Dept: RADIATION ONCOLOGY | Age: 65
Discharge: HOME | End: 2020-08-05
Attending: RADIOLOGY
Payer: MEDICARE

## 2020-08-05 PROCEDURE — 77386 HC NTSTY MODUL RAD TX DLVR CPLX: CPT | Performed by: RADIOLOGY

## 2020-08-05 PROCEDURE — 77014 PR CT GUIDANCE PLACEMENT RAD THERAPY FIELDS: CPT | Performed by: RADIOLOGY

## 2020-08-06 ENCOUNTER — HOSPITAL ENCOUNTER (OUTPATIENT)
Dept: RADIATION ONCOLOGY | Age: 65
Discharge: HOME | End: 2020-08-06
Attending: RADIOLOGY
Payer: MEDICARE

## 2020-08-06 PROCEDURE — 77386 HC NTSTY MODUL RAD TX DLVR CPLX: CPT | Performed by: RADIOLOGY

## 2020-08-06 PROCEDURE — 77014 PR CT GUIDANCE PLACEMENT RAD THERAPY FIELDS: CPT | Performed by: RADIOLOGY

## 2020-08-07 ENCOUNTER — HOSPITAL ENCOUNTER (OUTPATIENT)
Dept: RADIATION ONCOLOGY | Age: 65
Discharge: HOME | End: 2020-08-07
Attending: RADIOLOGY
Payer: MEDICARE

## 2020-08-07 PROCEDURE — 77014 PR CT GUIDANCE PLACEMENT RAD THERAPY FIELDS: CPT | Performed by: RADIOLOGY

## 2020-08-07 PROCEDURE — 77386 HC NTSTY MODUL RAD TX DLVR CPLX: CPT | Performed by: RADIOLOGY

## 2020-08-10 ENCOUNTER — HOSPITAL ENCOUNTER (OUTPATIENT)
Dept: RADIATION ONCOLOGY | Age: 65
Discharge: HOME | End: 2020-08-10
Payer: MEDICARE

## 2020-08-10 ENCOUNTER — HOSPITAL ENCOUNTER (OUTPATIENT)
Dept: RADIATION ONCOLOGY | Age: 65
Discharge: HOME | End: 2020-08-10
Attending: RADIOLOGY
Payer: MEDICARE

## 2020-08-10 VITALS — BODY MASS INDEX: 20.34 KG/M2 | WEIGHT: 114.8 LBS

## 2020-08-10 PROCEDURE — 77427 RADIATION TX MANAGEMENT X5: CPT | Performed by: RADIOLOGY

## 2020-08-10 PROCEDURE — 77014 PR CT GUIDANCE PLACEMENT RAD THERAPY FIELDS: CPT | Performed by: RADIOLOGY

## 2020-08-10 PROCEDURE — 77386 HC NTSTY MODUL RAD TX DLVR CPLX: CPT | Performed by: RADIOLOGY

## 2020-08-10 PROCEDURE — 77336 RADIATION PHYSICS CONSULT: CPT | Performed by: RADIOLOGY

## 2020-08-10 ASSESSMENT — PAIN SCALES - GENERAL: PAINLEVEL_OUTOF10: 0

## 2020-08-10 NOTE — PLAN OF CARE
Care plan reviewed. _ Pt reports loose stools, taking 3-4 Imodium     daily which is effective at this time.

## 2020-08-11 ENCOUNTER — HOSPITAL ENCOUNTER (OUTPATIENT)
Dept: RADIATION ONCOLOGY | Age: 65
Discharge: HOME | End: 2020-08-11
Attending: RADIOLOGY
Payer: MEDICARE

## 2020-08-11 PROCEDURE — 77014 PR CT GUIDANCE PLACEMENT RAD THERAPY FIELDS: CPT | Performed by: RADIOLOGY

## 2020-08-11 PROCEDURE — 77386 HC NTSTY MODUL RAD TX DLVR CPLX: CPT | Performed by: RADIOLOGY

## 2020-08-12 ENCOUNTER — HOSPITAL ENCOUNTER (OUTPATIENT)
Dept: RADIATION ONCOLOGY | Age: 65
Discharge: HOME | End: 2020-08-12
Attending: RADIOLOGY
Payer: MEDICARE

## 2020-08-12 PROCEDURE — 77386 HC NTSTY MODUL RAD TX DLVR CPLX: CPT | Performed by: RADIOLOGY

## 2020-08-12 PROCEDURE — 77014 PR CT GUIDANCE PLACEMENT RAD THERAPY FIELDS: CPT | Performed by: RADIOLOGY

## 2020-08-13 ENCOUNTER — APPOINTMENT (OUTPATIENT)
Dept: RADIATION ONCOLOGY | Age: 65
End: 2020-08-13
Attending: RADIOLOGY
Payer: MEDICARE

## 2020-08-13 ENCOUNTER — HOSPITAL ENCOUNTER (OUTPATIENT)
Dept: RADIATION ONCOLOGY | Age: 65
Discharge: HOME | End: 2020-08-13
Attending: RADIOLOGY
Payer: MEDICARE

## 2020-08-13 PROCEDURE — 77014 PR CT GUIDANCE PLACEMENT RAD THERAPY FIELDS: CPT | Performed by: RADIOLOGY

## 2020-08-13 PROCEDURE — 77386 HC NTSTY MODUL RAD TX DLVR CPLX: CPT | Performed by: RADIOLOGY

## 2020-08-14 ENCOUNTER — APPOINTMENT (OUTPATIENT)
Dept: RADIATION ONCOLOGY | Age: 65
End: 2020-08-14
Attending: RADIOLOGY
Payer: MEDICARE

## 2020-08-14 ENCOUNTER — HOSPITAL ENCOUNTER (OUTPATIENT)
Dept: RADIATION ONCOLOGY | Age: 65
Discharge: HOME | End: 2020-08-14
Attending: RADIOLOGY
Payer: MEDICARE

## 2020-08-14 PROCEDURE — 77386 HC NTSTY MODUL RAD TX DLVR CPLX: CPT | Performed by: RADIOLOGY

## 2020-08-14 PROCEDURE — 77014 PR CT GUIDANCE PLACEMENT RAD THERAPY FIELDS: CPT | Performed by: RADIOLOGY

## 2020-08-17 ENCOUNTER — HOSPITAL ENCOUNTER (OUTPATIENT)
Dept: RADIATION ONCOLOGY | Age: 65
Discharge: HOME | End: 2020-08-17
Payer: MEDICARE

## 2020-08-17 ENCOUNTER — HOSPITAL ENCOUNTER (OUTPATIENT)
Dept: RADIATION ONCOLOGY | Age: 65
Discharge: HOME | End: 2020-08-17
Attending: RADIOLOGY
Payer: MEDICARE

## 2020-08-17 ENCOUNTER — APPOINTMENT (OUTPATIENT)
Dept: RADIATION ONCOLOGY | Age: 65
End: 2020-08-17
Attending: RADIOLOGY
Payer: MEDICARE

## 2020-08-17 VITALS — BODY MASS INDEX: 20.94 KG/M2 | WEIGHT: 118.2 LBS

## 2020-08-17 PROCEDURE — 77427 RADIATION TX MANAGEMENT X5: CPT | Performed by: RADIOLOGY

## 2020-08-17 PROCEDURE — 77386 HC NTSTY MODUL RAD TX DLVR CPLX: CPT | Performed by: RADIOLOGY

## 2020-08-17 PROCEDURE — 77336 RADIATION PHYSICS CONSULT: CPT | Performed by: RADIOLOGY

## 2020-08-17 PROCEDURE — 77014 PR CT GUIDANCE PLACEMENT RAD THERAPY FIELDS: CPT | Performed by: RADIOLOGY

## 2020-08-17 ASSESSMENT — PAIN SCALES - GENERAL: PAINLEVEL_OUTOF10: 0

## 2020-08-18 ENCOUNTER — APPOINTMENT (OUTPATIENT)
Dept: RADIATION ONCOLOGY | Age: 65
End: 2020-08-18
Attending: RADIOLOGY
Payer: MEDICARE

## 2020-08-18 ENCOUNTER — HOSPITAL ENCOUNTER (OUTPATIENT)
Dept: RADIATION ONCOLOGY | Age: 65
Discharge: HOME | End: 2020-08-18
Attending: RADIOLOGY
Payer: MEDICARE

## 2020-08-18 PROCEDURE — 77014 PR CT GUIDANCE PLACEMENT RAD THERAPY FIELDS: CPT | Performed by: RADIOLOGY

## 2020-08-18 PROCEDURE — 77386 HC NTSTY MODUL RAD TX DLVR CPLX: CPT | Performed by: RADIOLOGY

## 2020-08-19 ENCOUNTER — HOSPITAL ENCOUNTER (OUTPATIENT)
Dept: RADIATION ONCOLOGY | Age: 65
Discharge: HOME | End: 2020-08-19
Attending: RADIOLOGY
Payer: MEDICARE

## 2020-08-19 ENCOUNTER — APPOINTMENT (OUTPATIENT)
Dept: RADIATION ONCOLOGY | Age: 65
End: 2020-08-19
Attending: RADIOLOGY
Payer: MEDICARE

## 2020-08-19 PROCEDURE — 77014 PR CT GUIDANCE PLACEMENT RAD THERAPY FIELDS: CPT | Performed by: RADIOLOGY

## 2020-08-19 PROCEDURE — 77386 HC NTSTY MODUL RAD TX DLVR CPLX: CPT | Performed by: RADIOLOGY

## 2020-08-20 ENCOUNTER — APPOINTMENT (OUTPATIENT)
Dept: RADIATION ONCOLOGY | Age: 65
End: 2020-08-20
Attending: RADIOLOGY
Payer: MEDICARE

## 2020-08-20 ENCOUNTER — HOSPITAL ENCOUNTER (OUTPATIENT)
Dept: RADIATION ONCOLOGY | Age: 65
Discharge: HOME | End: 2020-08-20
Attending: RADIOLOGY
Payer: MEDICARE

## 2020-08-20 ENCOUNTER — HOSPITAL ENCOUNTER (OUTPATIENT)
Dept: RADIATION ONCOLOGY | Age: 65
Discharge: HOME | End: 2020-08-20
Payer: MEDICARE

## 2020-08-20 VITALS — BODY MASS INDEX: 20.27 KG/M2 | WEIGHT: 114.4 LBS

## 2020-08-20 PROCEDURE — 77014 PR CT GUIDANCE PLACEMENT RAD THERAPY FIELDS: CPT | Performed by: RADIOLOGY

## 2020-08-20 PROCEDURE — 77336 RADIATION PHYSICS CONSULT: CPT | Performed by: RADIOLOGY

## 2020-08-20 PROCEDURE — 77386 HC NTSTY MODUL RAD TX DLVR CPLX: CPT | Performed by: RADIOLOGY

## 2020-08-20 PROCEDURE — 77427 RADIATION TX MANAGEMENT X5: CPT | Performed by: RADIOLOGY

## 2020-08-20 ASSESSMENT — PAIN SCALES - GENERAL: PAINLEVEL_OUTOF10: 0

## 2020-08-21 ENCOUNTER — APPOINTMENT (OUTPATIENT)
Dept: RADIATION ONCOLOGY | Age: 65
End: 2020-08-21
Attending: RADIOLOGY
Payer: MEDICARE

## 2020-08-27 ENCOUNTER — HOSPITAL ENCOUNTER (OUTPATIENT)
Dept: INFUSION THERAPY | Age: 65
End: 2020-08-27
Payer: MEDICARE

## 2020-08-27 ENCOUNTER — OFFICE VISIT (OUTPATIENT)
Dept: ONCOLOGY | Age: 65
End: 2020-08-27
Payer: MEDICARE

## 2020-08-27 ENCOUNTER — HOSPITAL ENCOUNTER (OUTPATIENT)
Dept: INFUSION THERAPY | Age: 65
Discharge: HOME | End: 2020-08-27
Payer: MEDICARE

## 2020-08-27 VITALS
HEART RATE: 97 BPM | RESPIRATION RATE: 16 BRPM | OXYGEN SATURATION: 100 % | DIASTOLIC BLOOD PRESSURE: 67 MMHG | TEMPERATURE: 97.6 F | WEIGHT: 116 LBS | BODY MASS INDEX: 20.55 KG/M2 | SYSTOLIC BLOOD PRESSURE: 109 MMHG | HEIGHT: 63 IN

## 2020-08-27 DIAGNOSIS — C85.16 B-CELL LYMPHOMA OF INTRAPELVIC LYMPH NODES, UNSPECIFIED B-CELL LYMPHOMA TYPE (HCC): Primary | ICD-10-CM

## 2020-08-27 LAB
ALBUMIN SERPL-MCNC: 4.1 GM/DL (ref 3.4–5)
ALP BLD-CCNC: 51 IU/L (ref 40–129)
ALT SERPL-CCNC: 12 U/L (ref 10–40)
ANION GAP SERPL CALCULATED.3IONS-SCNC: 11 MMOL/L (ref 4–16)
AST SERPL-CCNC: 17 IU/L (ref 15–37)
BASOPHILS ABSOLUTE: 0 K/CU MM
BASOPHILS RELATIVE PERCENT: 0 % (ref 0–1)
BILIRUB SERPL-MCNC: 0.4 MG/DL (ref 0–1)
BUN BLDV-MCNC: 11 MG/DL (ref 6–23)
CALCIUM SERPL-MCNC: 9.8 MG/DL (ref 8.3–10.6)
CHLORIDE BLD-SCNC: 107 MMOL/L (ref 99–110)
CO2: 26 MMOL/L (ref 21–32)
CREAT SERPL-MCNC: 0.7 MG/DL (ref 0.6–1.1)
DIFFERENTIAL TYPE: ABNORMAL
EOSINOPHILS ABSOLUTE: 0.1 K/CU MM
EOSINOPHILS RELATIVE PERCENT: 4.7 % (ref 0–3)
GFR AFRICAN AMERICAN: >60 ML/MIN/1.73M2
GFR NON-AFRICAN AMERICAN: >60 ML/MIN/1.73M2
GLUCOSE BLD-MCNC: 83 MG/DL (ref 70–99)
HCT VFR BLD CALC: 29.5 % (ref 37–47)
HEMOGLOBIN: 9.7 GM/DL (ref 12.5–16)
LACTATE DEHYDROGENASE: 206 IU/L (ref 120–246)
LYMPHOCYTES ABSOLUTE: 0.4 K/CU MM
LYMPHOCYTES RELATIVE PERCENT: 16.5 % (ref 24–44)
MCH RBC QN AUTO: 30.3 PG (ref 27–31)
MCHC RBC AUTO-ENTMCNC: 32.9 % (ref 32–36)
MCV RBC AUTO: 92.2 FL (ref 78–100)
MONOCYTES ABSOLUTE: 0.4 K/CU MM
MONOCYTES RELATIVE PERCENT: 16.5 % (ref 0–4)
PDW BLD-RTO: 14.2 % (ref 11.7–14.9)
PLATELET # BLD: 200 K/CU MM (ref 140–440)
PMV BLD AUTO: 9.2 FL (ref 7.5–11.1)
POTASSIUM SERPL-SCNC: 4.2 MMOL/L (ref 3.5–5.1)
RBC # BLD: 3.2 M/CU MM (ref 4.2–5.4)
SEGMENTED NEUTROPHILS ABSOLUTE COUNT: 1.5 K/CU MM
SEGMENTED NEUTROPHILS RELATIVE PERCENT: 62.3 % (ref 36–66)
SODIUM BLD-SCNC: 144 MMOL/L (ref 135–145)
TOTAL PROTEIN: 5.6 GM/DL (ref 6.4–8.2)
WBC # BLD: 2.4 K/CU MM (ref 4–10.5)

## 2020-08-27 PROCEDURE — 2580000003 HC RX 258: Performed by: INTERNAL MEDICINE

## 2020-08-27 PROCEDURE — 80053 COMPREHEN METABOLIC PANEL: CPT

## 2020-08-27 PROCEDURE — 36591 DRAW BLOOD OFF VENOUS DEVICE: CPT

## 2020-08-27 PROCEDURE — 6360000002 HC RX W HCPCS: Performed by: INTERNAL MEDICINE

## 2020-08-27 PROCEDURE — G8400 PT W/DXA NO RESULTS DOC: HCPCS | Performed by: NURSE PRACTITIONER

## 2020-08-27 PROCEDURE — 1090F PRES/ABSN URINE INCON ASSESS: CPT | Performed by: NURSE PRACTITIONER

## 2020-08-27 PROCEDURE — 83615 LACTATE (LD) (LDH) ENZYME: CPT

## 2020-08-27 PROCEDURE — 85025 COMPLETE CBC W/AUTO DIFF WBC: CPT

## 2020-08-27 PROCEDURE — 99214 OFFICE O/P EST MOD 30 MIN: CPT | Performed by: NURSE PRACTITIONER

## 2020-08-27 PROCEDURE — 3017F COLORECTAL CA SCREEN DOC REV: CPT | Performed by: NURSE PRACTITIONER

## 2020-08-27 PROCEDURE — G8427 DOCREV CUR MEDS BY ELIG CLIN: HCPCS | Performed by: NURSE PRACTITIONER

## 2020-08-27 PROCEDURE — 1123F ACP DISCUSS/DSCN MKR DOCD: CPT | Performed by: NURSE PRACTITIONER

## 2020-08-27 PROCEDURE — 4040F PNEUMOC VAC/ADMIN/RCVD: CPT | Performed by: NURSE PRACTITIONER

## 2020-08-27 PROCEDURE — 99211 OFF/OP EST MAY X REQ PHY/QHP: CPT

## 2020-08-27 PROCEDURE — G8420 CALC BMI NORM PARAMETERS: HCPCS | Performed by: NURSE PRACTITIONER

## 2020-08-27 PROCEDURE — 1036F TOBACCO NON-USER: CPT | Performed by: NURSE PRACTITIONER

## 2020-08-27 RX ORDER — HEPARIN SODIUM (PORCINE) LOCK FLUSH IV SOLN 100 UNIT/ML 100 UNIT/ML
500 SOLUTION INTRAVENOUS PRN
Status: CANCELLED | OUTPATIENT
Start: 2020-08-27

## 2020-08-27 RX ORDER — CHLORAL HYDRATE 500 MG
1000 CAPSULE ORAL DAILY
COMMUNITY

## 2020-08-27 RX ORDER — HEPARIN SODIUM (PORCINE) LOCK FLUSH IV SOLN 100 UNIT/ML 100 UNIT/ML
500 SOLUTION INTRAVENOUS PRN
Status: DISCONTINUED | OUTPATIENT
Start: 2020-08-27 | End: 2020-08-28 | Stop reason: HOSPADM

## 2020-08-27 RX ORDER — SODIUM CHLORIDE 0.9 % (FLUSH) 0.9 %
20 SYRINGE (ML) INJECTION PRN
Status: CANCELLED | OUTPATIENT
Start: 2020-08-27

## 2020-08-27 RX ORDER — SODIUM CHLORIDE 0.9 % (FLUSH) 0.9 %
10 SYRINGE (ML) INJECTION PRN
Status: CANCELLED | OUTPATIENT
Start: 2020-08-27

## 2020-08-27 RX ORDER — SODIUM CHLORIDE 0.9 % (FLUSH) 0.9 %
10 SYRINGE (ML) INJECTION PRN
Status: DISCONTINUED | OUTPATIENT
Start: 2020-08-27 | End: 2020-08-28 | Stop reason: HOSPADM

## 2020-08-27 RX ORDER — MULTIVIT-MIN/IRON/FOLIC ACID/K 18-600-40
1 CAPSULE ORAL DAILY
COMMUNITY

## 2020-08-27 RX ORDER — SODIUM CHLORIDE 0.9 % (FLUSH) 0.9 %
20 SYRINGE (ML) INJECTION PRN
Status: DISCONTINUED | OUTPATIENT
Start: 2020-08-27 | End: 2020-08-28 | Stop reason: HOSPADM

## 2020-08-27 RX ADMIN — SODIUM CHLORIDE, PRESERVATIVE FREE 20 ML: 5 INJECTION INTRAVENOUS at 12:23

## 2020-08-27 RX ADMIN — Medication 500 UNITS: at 12:23

## 2020-08-27 RX ADMIN — SODIUM CHLORIDE, PRESERVATIVE FREE 10 ML: 5 INJECTION INTRAVENOUS at 12:23

## 2020-08-27 ASSESSMENT — PATIENT HEALTH QUESTIONNAIRE - PHQ9
SUM OF ALL RESPONSES TO PHQ QUESTIONS 1-9: 0
SUM OF ALL RESPONSES TO PHQ QUESTIONS 1-9: 0
SUM OF ALL RESPONSES TO PHQ9 QUESTIONS 1 & 2: 0
2. FEELING DOWN, DEPRESSED OR HOPELESS: 0
1. LITTLE INTEREST OR PLEASURE IN DOING THINGS: 0

## 2020-08-27 NOTE — PROGRESS NOTES
MA Rooming Questions  Patient: Shereen Pang  MRN: [id number]    Date: 8/27/2020        1. Do you have any new issues? yes - knots on stomach from Lovenox inj.         2. Do you need any refills on medications?    no    3. Have you had any imaging done since your last visit?   no    4. Have you been hospitalized or seen in the emergency room since your last visit here?   no    5. Did the patient have a depression screening completed today?  Yes    PHQ-9 Total Score: 0 (8/27/2020 11:31 AM)       PHQ-9 Given to (if applicable):               PHQ-9 Score (if applicable):                     [] Positive     []  Negative              Does question #9 need addressed (if applicable)                     [] Yes    []  No               Jesenia Baeza MA

## 2020-08-28 NOTE — PROGRESS NOTES
Patient Name: Jena Delaney  Patient : 1955  Patient MRN: [id number]     Primary Oncologist: Kristi Monae MD  Referring Provider: Marta Gonzalez MD       Date of Service: 2020      Chief Complaint:   Chief Complaint   Patient presents with    Follow-up    Other     knots on stomach from inj. Problem List: Patient Active Problem List:     Family history of malignant neoplasm of gastrointestinal tract     B-cell lymphoma ([address]     Idiopathic hypotension     Mitral valve disease     Pancytopenia (HCC)     Severe malnutrition (HCC)     Acute deep vein thrombosis (DVT) of left upper extremity (HCC)     Hematoma     Pain syndrome, chronic     Tachycardia        HPI: Beryl Cleveland is a 59-year-old [place] [address] female patient who was referred for discussion of the right axillary lymphadenopathy, status post needle biopsy in 2016, which did not show any pathological abnormalities. I talked to Dr. Harvey Gomez, who favored benign lymph node. Right axillary lymph node biopsy in 2015 revealed benign lymph node hyperplasia. Mammogram in 2016 revealed low suspicion for malignancy, with several enlarged right axillary lymph nodes, 3.1 by 0.8 by 2.3 cm, 0.9 by 0.8 by 1 cm, 1.8 by 1.4 by 1.5, and 1.4 by 1.5 cm. None demonstrated significant hypervascularity. I offered her an excisional lymph node biopsy versus surveillance with followup ultrasound. She opted to go with the second one. Blood test in 2015 revealed normal CBC with normal calcium and alk phos, white cell count 5.8, hemoglobin 11.9, platelet was 226. Blood test on 2016 revealed normal CBC, with white cell count 6.5, hemoglobin 12.5, platelet 439. SED rate was 17, rheumatoid factor less than 10. SUZE was still pending. Ultrasound of the right breast and axilla revealed no significant interval change in the right axillary adenopathy, which still has remained most likely reactive in etiology.   This was not seen on any mammogram dating back beyond 2015. She opted to go to see the surgeon; therefore, I referred her to see Dr. Wan Ahn. She was last seen in our office 9/28/2016. Ms. Isabelle Rodriguez was seen by Dr. Wan Ahn March 3, 2019 for evaluation of enlarging, tender mass in right axilla. She had excisional lymph node biopsy on March 12, 2019. Pathology revealed granulomatous lymphadenitis. Mammogram 2/1/2019 was benign. She had EGD and colonoscopy on 12/10/2019. EGD was unremarkable. Colonoscopy revealed in infiltrative, ulcerated and necrotic, non bleeding 50 mm mass of malignant appearance was found in the rectum at a distance between 12 cm and 18 cm from the anus. The mass caused a partial obstruction. Pathology from the mass revealed ulcerated large bowel mucosa with dense lymphoid infiltrate and abundant necrosis. - Negative for carcinoma. The lymphoid infiltrate and necrosis are suspicious for lymphoma. Results of flow cytometry revealed no flow immunophenotypic evidence of a lymphoproliferative disorder based on limited antibody panel. CT abdomen/pelvis on December 23, 2019 showed a heterogeneous solid 9.5 x 6.1 cm soft tissue mass within the right posterior pelivs which abuts the posterior margin of the uterus and involves the right lagteral margin of the rectosigmoid junction, indeterminate. This mass is concerning for malignancy, with uterine origin being favored, such as uterine sarcoma with suspected invasion of the rectosigmoid junction. In addition, a 5.8 x 10.5 x 1.1 cm central mesenteric soft tissue mass is seen, suspected metastatic disease. Additional uterine masses are seen which may relate to fibroids or extension of uterine sarcoma. A possible 6 mm pleural based nodule within the posterior left lower lobe, indeterminate in the setting of  suspected malignacy. She was seen by Dr. Regine Lorenz on December 26, 2019 for review of colonscopy/egd results.  She was referred to urology due to early hydronephrosis. I discussed with pathologist to review the pathology report from rectal biopsy. She lost 30 pounds in the last 6 months. Mammogram was in 2018. She just recovered from shingle. She was prescribed Bentyl by Dr. Ba Arteaga. In January 2020 official pathology report showed diffuse large B-cell lymphoma, no double hit or triple hit. BCL 6 was positive. We discussed about potential treatment with R-CHOP. I scheduled for PET CT scan, CBC, CMP, acute viral hepatitis serology, HIV, echo, and Mediport placement. She may need consolidation radiation therapy after completion of the chemo. We discussed about the potential risks and benefit of the chemotherapy. Clinically she could have stage IIb or IIIb. She has first chemo on January 17, 2020. ECHO in January 2020 showed LVEF at 50%. Clinically she looks better. Hep B/C were negative. PET CT scan on 1/27/2020 showed:  1. Intense metabolic activity associated with the abdominal and pelvic  masses described above consistent with active lymphoma. Prior imaging has  been requested and an addendum will be provided to described any interval  changes. 2.  Linear FDG activity along the distal tip of the MediPort, which is in the  left brachiocephalic vein just superior to the SVC. This can be seen with a  fibrin sheath. Correlate with current functionality of the MediPort. ECHO in January 2020: Left ventricular systolic function is low normal. ? Ejection fraction is visually estimated at 50%. ?Paradoxical motion of the interventricular septum; possible conduction ?delay. ?Indeterminate diastolic function; E/A flow reversal is noted. ?Mitral valve prolapse is present. ?Moderate to severe mitral regurgitation is present. ?Moderate tricuspid regurgitation is present. RVSP is 25 mmHg. ? No evidence of any pericardial effusion. ?Mobile interatrial septum. ? Incidental finding: cyst vs mass near/attached to the right kidney.     In March 2020 she was hospitalized due to anemia related GI related to colonic mass/lymphoma. She has mulitple blood transfusion. She fell at home prior to hospitalization and developed hematoma to left breast.  Mediport was placed. She had EGD and colonoscopy in March 2020. After GIB is controlled she started on LMWH for DVT related to fall and mediport. She was found to have pseudomonas bacteremia and completed course of IV antibiotic on March 10, 2020. CT chest, abdomen and pelvis in March 2020 after 2 cycled of chemo showed response to therapy. She was discharged to rehab. She completed lovenox injection after 6 cycle of chemo due on June 5, 2020. On June 17, 2020 she came in with complaint of sudden onset of pain to the left flank. She was requesting for pain medication. She denied any dysuria or hematuria. She has tenderness to the left flank. KUB negative. She completed chemotherapy on June 5, 2020. I will put her on empiric Levaquin 250 for 7 days. Presented for scheduled follow up with her  on 6/23/2020. PET CT scan revealed: IMPRESSION:  Significantly decreased size and uptake of the masses within the lower abdomen/pelvis, consistent with treatment response. Notable: ABDOMEN/PELVIS: No abnormal uptake within the solid organs. Significantly  decreased size of the left paramidline lower abdominal mass which measures  approximately 3.1 x 1.6 cm and demonstrates maximum SUV of 2.2. Significantly improved bilateral pelvic uptake with a residual focus of  intense uptake in the right hemipelvis measuring approximately 2.8 x 2.8 cm  and demonstrating maximum SUV of 8.0. She has pain in her left side, left lower back and left lower abdomen. She has an appointment with Dr. Pilo Frazier tomorrow. She is using norco occasionally. She is worried about becoming additcted. She has diarrhea and has been taking Imodium over-the-counter. We will add lomotil. She denies any fever or chills.   No headaches or dizzy spell. She denied any nausea or vomiting. She denied melena or hematuria. Past Medical History  Tubal ligation, tonsillectomy, colonoscopy in 2015 by Dr. Isadora Grimes, and reportedly there was no polyp. She had a Pap smear in 2015 by Dr. Praneeth Nieves. She has bipolar disorder. Pap smear was in 2015, mammogram in March 2016. Shingles    Allergies  Seasonal allergies     Medications  Ondansetron Oral Disintegrating Tablet 8 mg tablet,disintegrating 1 tablet,disintegrating orally every 8 hours as needed for nausea and vomiting. Miscellaneous Drug Anti-diarrheal  Depakote (Divalproex Oral 24 hr Tab) 500 mg tablet extended release 24 hr  Dicyclomine Oral 20 mg tablet  Esomeprazole (Magnesium) Oral Delayed Release Capsule 20 mg capsule,delayed release(DR/EC)  Ibuprofen Oral 800 mg tablet  Valtrex (Valacyclovir Oral) 1 gram tablet    Social History  Smoking Status Former smoker  She smoked 1 pack/day for 10 years and quit 30 years ago. She denies any alcohol or illicit drug use. Family History  Mother had breast cancer in her [de-identified], father had metastatic brain cancer. Previous Therapies:  R-CHOP     Current Therapy:  [No treatment plan]      Interval History:  Presented for scheduled follow up. She has completed radiation therapy 8/26/2020. She reports ongoing diarrhea for which she is using imodium and lomotil. She reports she feels this is getting better. She denies fever, night sweats, no lumps or bumps. Energy and appetite are intact. She has no nausea, vomiting. Review of Systems: \"Per interval history; otherwise 10 point ROS is negative. \"     Vital Signs:  /67 (Site: Left Upper Arm, Position: Sitting, Cuff Size: Medium Adult)   Pulse 97   Temp 97.6 °F (36.4 °C) (Tympanic)   Resp 16   Ht 5' 3\" (1.6 m)   Wt 116 lb (52.6 kg)   LMP 01/11/2000   SpO2 100%   BMI 20.55 kg/m²     Physical Exam:    CONSTITUTIONAL: awake, alert, cooperative, no apparent distress   EYES: pupils equal, round and reactive to light, sclera clear and conjunctiva normal  ENT: Normocephalic, without obvious abnormality, atraumatic  NECK: supple, symmetrical, no jugular venous distension and no carotid bruits   HEMATOLOGIC/LYMPHATIC: no cervical, supraclavicular or axillary lymphadenopathy   LUNGS: no increased work of breathing and clear to auscultation   CARDIOVASCULAR: regular rate and rhythm, normal S1 and S2, no murmur noted  ABDOMEN: normal bowel sounds x 4, soft, non-distended, non-tender, no masses palpated, no hepatosplenomegaly   MUSCULOSKELETAL: full range of motion noted, tone is normal  NEUROLOGIC: awake, alert, oriented to name, place and time. Motor skills grossly intact. SKIN: Normal skin color, texture, turgor and no jaundice.  appears intact   EXTREMITIES: no LE edema     Labs:    Hematology:  Lab Results   Component Value Date    WBC 2.4 (L) 08/27/2020    RBC 3.20 (L) 08/27/2020    HGB 9.7 (L) 08/27/2020    HCT 29.5 (L) 08/27/2020    MCV 92.2 08/27/2020    MCH 30.3 08/27/2020    MCHC 32.9 08/27/2020    RDW 14.2 08/27/2020     08/27/2020    MPV 9.2 08/27/2020    BANDSPCT 2 (L) 03/08/2020    SEGSPCT 62.3 08/27/2020    EOSRELPCT 4.7 (H) 08/27/2020    BASOPCT 0.0 08/27/2020    LYMPHOPCT 16.5 (L) 08/27/2020    MONOPCT 16.5 (H) 08/27/2020    BANDABS 0.19 03/08/2020    SEGSABS 1.5 08/27/2020    EOSABS 0.1 08/27/2020    BASOSABS 0.0 08/27/2020    LYMPHSABS 0.4 08/27/2020    MONOSABS 0.4 08/27/2020    DIFFTYPE AUTOMATED DIFFERENTIAL 08/27/2020    ANISOCYTOSIS 1+ 03/08/2020    POLYCHROM 1+ 03/08/2020     Lab Results   Component Value Date    ESR 25 01/16/2020       Chemistry:  Lab Results   Component Value Date     08/27/2020    K 4.2 08/27/2020     08/27/2020    CO2 26 08/27/2020    BUN 11 08/27/2020    CREATININE 0.7 08/27/2020    GLUCOSE 83 08/27/2020    CALCIUM 9.8 08/27/2020    PROT 5.6 (L) 08/27/2020    LABALBU 4.1 08/27/2020    BILITOT 0.4 08/27/2020    ALKPHOS 51 08/27/2020    AST 17 08/27/2020    ALT 12 08/27/2020    LABGLOM >60 08/27/2020    GFRAA >60 08/27/2020    PHOS 2.7 03/05/2020    MG 2.0 02/25/2020    POCGLU 132 (H) 02/25/2020     Lab Results   Component Value Date     08/27/2020     No components found for: LD  Lab Results   Component Value Date    TSHHS 2.350 04/14/2020    T4FREE 1.28 04/14/2020       Immunology:  Lab Results   Component Value Date    PROT 5.6 (L) 08/27/2020     No results found for: Laurel Stagger, KLFLCR  No results found for: B2M    Coagulation Panel:  Lab Results   Component Value Date    PROTIME 16.5 (H) 02/28/2020    INR 1.36 02/28/2020    APTT 36.9 02/28/2020       Anemia Panel:  Lab Results   Component Value Date    OIBKYZVF11 1180 (H) 04/14/2020    FOLATE >20.0 (H) 04/14/2020       Tumor Markers:  No results found for: , CEA, , LABCA2, PSA    Imaging:      Pathology:      Observations:  Performance Status: 0  Normal activity, fully active, able to carry on all pre-disease performance without restriction. Depression Status:   Cognitive Status: Oriented to person, time, and place     Assessment & Plan:  1. She was diagnosed with diffuse large B-cell lymphoma, no double hit. PET report is pending. She has first R-CHOP in January 7, 2020. She is on allopurinol. ECHO showed LVEF at 50%. She had Mediport placement. She has second cycle  of R-CHOP in February 2020. 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 CT chest, abdomen and pelvis showed response. She completed course of antibiotic for pseudomonas bacteremia. She resumed chemotherapy on April 3, 2020. 18 2020. She completed chemotherapy on June 5, 2020. She is scheduled for PET CT scan on 6/18/2020 showed response, still with intense uptake in the right hemipelvis measuring approximately 2.8 x 2.8 cm  and demonstrating maximum SUV of 8.0. We will refer to radiation oncology for evaluation. Completed radiation on 8/26/2020. We will plan for PET CT in about 10 weeks.  Will discuss with RT.    2. She had LUE DVT and I recommend to continue with LMWH. I recommend to keep LUE and lower extremities elevated. LUE swelling has improved significantly. Lovenox discontinued 8/2020.    3. Hydronephrosis:  She was referred to see urologist.     4. She had shingle to lower abdomen. She was on valacyclovir. 5. She had GIB related to colon ulcer/lymphoma s/p blood transfusion. Seen by Dr Candi Krishnan. Iron studies from April/2020 were reviewed. 6. We discussed about COVID precaution. 7 Pain- uses norco 5/325 prn; no assessment or radiographic explanation for left sided pain. She will call for worsening symptoms. 8. Diarrhea- ongoing. Using lomotil and imodium. Reports symptom is improving. Encouraged adequate hydration. Return to clinic in 5 weeks or sooner. All of their questions have been answered for today. Recent imaging and labs were reviewed and discussed with the patient.       Electronically signed by DIONI Giraldo CNP on 8/27/20 at 10:02 PM EDT

## 2020-08-31 ENCOUNTER — TELEPHONE (OUTPATIENT)
Dept: ONCOLOGY | Age: 65
End: 2020-08-31

## 2020-08-31 NOTE — TELEPHONE ENCOUNTER
Patient indicated Jailyn Vilchis NP advise patient to stop the lovenox injections at last office visit. However the patient wants to know if she should start taking a baby Asprin.   Please advise

## 2020-08-31 NOTE — TELEPHONE ENCOUNTER
Returned call to patient. Informed that she does not have to take a baby ASA daily. This nurse had discussed with NP. Patient verbalized understanding.

## 2020-09-08 NOTE — PROGRESS NOTES
Radiation Oncology  Treatment Completion Summary  Encounter Date: 2020 10:44 AM    Ms. Patti Brink is a 72 y.o. female  : 1955  MRN: [id number]  Acct Number: [de-identified]      FOLLOW UP PHYSICIANS:   Primary Care: Jeanette Beckham MD   Medical Oncologist: LORENA Topete, [address] [facility]     Dr. Laurel Denton        DIAGNOSIS:  Cancer Staging  B-cell lymphoma [facility])  Staging form: Hodgkin And Non-Hodgkin Lymphoma, AJCC 8th Edition  - Clinical: Stage II bulky (Diffuse large B-cell lymphoma) - Signed by Deven King MD on 2020         TREATMENT COURSE:   Oncology History   B-cell lymphoma ([facility] [address]   2020 Initial Diagnosis    B-cell lymphoma ([facility] [address]     2020 - 2020 Chemotherapy    R-CHOP x 6     2020 - 2020 Radiation    Residual diffuse large B cell lymphoma pelvic mass: 36Gy IMRT             HISTORY:  Patti Brink is a 72 y.o. female with the above referenced diagnosis. Complete details on history of present illness please see my initial consultation note. She has recently completed a course of consolidative involves site radiation therapy and what follows is a description of the treatments she received:    ANATOMIC SITE: Residual diffuse large B cell lymphoma pelvic mass  BEAM ORIENTATION: IMRT  ENERGY: 10MV photons  DOSE PER FRACTION: 200 cGy  TOTAL DOSE: 3600 cGy    ELAPSED DAYS: 24    TREATMENT TOLERANCE:   Overall, the patient tolerated her radiation therapy quite well. Her energy level was fairly well-maintained throughout the course of her treatments. She did develop diarrhea, controlled with 4-5 Imodium per day. She did not experience any significant nausea or vomiting. FOLLOW-UP PLANS:   She is to return to see me in 1 month with a pelvic CT prior or to return sooner as needed.       Electronically signed by Electronically signed by Deven King MD on 2020 at 10:44 AM

## 2020-09-23 ENCOUNTER — HOSPITAL ENCOUNTER (OUTPATIENT)
Dept: RADIATION ONCOLOGY | Age: 65
Discharge: HOME | End: 2020-09-23
Attending: RADIOLOGY
Payer: MEDICARE

## 2020-09-23 VITALS
TEMPERATURE: 97.2 F | WEIGHT: 127.2 LBS | OXYGEN SATURATION: 98 % | HEART RATE: 85 BPM | BODY MASS INDEX: 22.53 KG/M2 | SYSTOLIC BLOOD PRESSURE: 110 MMHG | RESPIRATION RATE: 16 BRPM | DIASTOLIC BLOOD PRESSURE: 62 MMHG

## 2020-09-23 ASSESSMENT — PAIN SCALES - GENERAL: PAINLEVEL_OUTOF10: 0

## 2020-09-23 NOTE — PROGRESS NOTES
Kasia Music  9/23/2020    Patient is seen today for follow up. Vitals:    09/23/20 1030   BP: 110/62   Pulse: 85   Resp: 16   Temp: 97.2 °F (36.2 °C)   SpO2: 98%        Wt Readings from Last 3 Encounters:   09/23/20 127 lb 3.2 oz (57.7 kg)   08/27/20 116 lb (52.6 kg)   08/20/20 114 lb 6.4 oz (51.9 kg)       Pain Assessment  Pain Assessment: 0-10  Pain Level: 0  Patient's Stated Pain Goal: No pain  Multiple Pain Sites: No  Denies Need for Intervention       Allergies   Allergen Reactions    Abilify [Aripiprazole]     Iodine     Penicillins     Codeine Nausea And Vomiting        Current Outpatient Medications   Medication Sig Dispense Refill    Probiotic Product (PROBIOTIC DAILY PO) Take 1 tablet by mouth daily      Omega-3 Fatty Acids (FISH OIL) 1000 MG CAPS Take 1,000 mg by mouth daily      Cholecalciferol (VITAMIN D) 50 MCG (2000 UT) CAPS capsule Take 1 capsule by mouth daily      loperamide (IMODIUM) 2 MG capsule Take 1 tablet by mouth as needed      sodium bicarbonate 325 MG tablet Take 550 mg by mouth 4 times daily      metoprolol tartrate (LOPRESSOR) 25 MG tablet Take 1 tablet by mouth 2 times daily 60 tablet 0    divalproex (DEPAKOTE ER) 500 MG extended release tablet Take 2 tablets by mouth nightly 30 tablet 0    allopurinol (ZYLOPRIM) 100 MG tablet Take 100 mg by mouth daily      MULTIPLE VITAMIN PO Take by mouth daily      calcium carbonate 600 MG TABS tablet Take 1 tablet by mouth daily      midodrine (PROAMATINE) 5 MG tablet Take 1 tablet by mouth 3 times daily (Patient not taking: Reported on 9/23/2020) 90 tablet 3     No current facility-administered medications for this encounter. Additional Comments: Pt here for one month post radiation follow up. Pt states diarrhea still present but much improved, still taking Imodium PRN. Energy improving, denies any new concerns or complaints. Spouse at bedside.      Electronically signed by Kelsie Mojica RN on 9/23/2020 at 10:36 AM

## 2020-09-23 NOTE — PROGRESS NOTES
[address] [address], [address] [facility]  Phone: [phone]  Fax: [phone]    RADIATION ONCOLOGY FOLLOW UP REPORT    PATIENT NAME:  Stefany Juarez              : 1955  MEDICAL RECORD NO: [id number]    [facility] NO: [id number]        PROVIDER: Nahomy Sorensen MD      DATE OF SERVICE: 2020     FOLLOW UP PHYSICIANS:  Matilda Wells M.D.  [address] [place] [address], [address] [facility]      DIAGNOSIS: Cancer Staging  B-cell lymphoma [facility])  Staging form: Hodgkin And Non-Hodgkin Lymphoma, AJCC 8th Edition  - Clinical: Stage II bulky (Diffuse large B-cell lymphoma) - Signed by Nahomy Sorensen MD on 2020         TREATMENT COURSE:   Oncology History   B-cell lymphoma ([facility] [address]   2020 Initial Diagnosis    B-cell lymphoma ([facility] [address]     2020 - 2020 Chemotherapy    R-CHOP x 6     2020 - 2020 Radiation    Residual diffuse large B cell lymphoma pelvic mass: 36Gy IMRT             HPI:   Stefany Juarez is a 72 y.o. female who has a history as above who returns for her first posttreatment visit. Currently, the patient reports she's been doing well. Her energy level is approximately 80% back to normal.  She denies any nausea or vomiting. She continues to have diarrhea, however it has continued to improve. She is currently taking 2 Imodium and 2 Lomotil daily. RADIOLOGIC STUDIES:    PET/CT:   Results for orders placed during the hospital encounter of 20   PET CT SKULL BASE TO MID THIGH    Narrative EXAMINATION:  WHOLE BODY PET/CT    2020    TECHNIQUE:  Following IV injection of 13.6 mCi of F18-FDG, PET imaging was acquired from  the base of the skull to the mid thighs. Computed tomography was used for  purposes of attenuation correction and anatomic localization. Fusion imaging  was utilized for interpretation. Uptake time 75 mins. Glucose level 108  mg/dl.     COMPARISON:  CT on 2020, PET-CT on 01/27/2020    HISTORY:  Non-Hodgkin's lymphoma, follow-up status post chemotherapy. FINDINGS:  HEAD/NECK: No suspicious FDG uptake. CHEST: No abnormal uptake within the lungs or soft tissues. No  hypermetabolic lymph nodes. ABDOMEN/PELVIS: No abnormal uptake within the solid organs. Significantly  decreased size of the left paramidline lower abdominal mass which measures  approximately 3.1 x 1.6 cm and demonstrates maximum SUV of 2.2. Significantly improved bilateral pelvic uptake with a residual focus of  intense uptake in the right hemipelvis measuring approximately 2.8 x 2.8 cm  and demonstrating maximum SUV of 8.0.    BONES/SOFT TISSUE: Diffusely increased osseous uptake may be due to marrow  stimulating agent. No suspicious osseous lesions. INCIDENTAL CT FINDINGS: Right chest MediPort. Systemic and coronary  atherosclerotic disease. Borderline cardiomegaly with trace pericardial  effusion. Soft tissue nodules and foci of air within the anterior abdominal  subcutaneous tissues likely due to medication injections. Impression Significantly decreased size and uptake of the masses within the lower  abdomen/pelvis, consistent with treatment response.          LABORATORY STUDIES:   CBC:   Lab Results   Component Value Date    WBC 2.4 08/27/2020    RBC 3.20 08/27/2020    HGB 9.7 08/27/2020    HCT 29.5 08/27/2020    MCV 92.2 08/27/2020    MCH 30.3 08/27/2020    MCHC 32.9 08/27/2020    RDW 14.2 08/27/2020     08/27/2020    MPV 9.2 08/27/2020     CMP:  Lab Results   Component Value Date     08/27/2020    K 4.2 08/27/2020     08/27/2020    CO2 26 08/27/2020    BUN 11 08/27/2020    CREATININE 0.7 08/27/2020    GFRAA >60 08/27/2020    LABGLOM >60 08/27/2020    GLUCOSE 83 08/27/2020    PROT 5.6 08/27/2020    LABALBU 4.1 08/27/2020    CALCIUM 9.8 08/27/2020    BILITOT 0.4 08/27/2020    ALKPHOS 51 08/27/2020    AST 17 08/27/2020    ALT 12 08/27/2020     Onc labs:   Lab Results Component Value Date     08/27/2020       MEDICATIONS:   Current Outpatient Medications   Medication Sig Dispense Refill    enoxaparin (LOVENOX) 60 MG/0.6ML injection       Omega-3 Fatty Acids (FISH OIL) 1000 MG CAPS Take 1,000 mg by mouth daily      Cholecalciferol (VITAMIN D) 50 MCG (2000 UT) CAPS capsule Take 1 capsule by mouth daily      loperamide (IMODIUM) 2 MG capsule Take 1 tablet by mouth as needed      sodium bicarbonate 325 MG tablet Take 550 mg by mouth 4 times daily      metoprolol tartrate (LOPRESSOR) 25 MG tablet Take 1 tablet by mouth 2 times daily 60 tablet 0    divalproex (DEPAKOTE ER) 500 MG extended release tablet Take 2 tablets by mouth nightly 30 tablet 0    dicyclomine (BENTYL) 20 MG tablet Take 20 mg by mouth every 6 hours as needed      midodrine (PROAMATINE) 5 MG tablet Take 1 tablet by mouth 3 times daily 90 tablet 3    allopurinol (ZYLOPRIM) 100 MG tablet Take 100 mg by mouth daily      MULTIPLE VITAMIN PO Take by mouth daily      calcium carbonate 600 MG TABS tablet Take 1 tablet by mouth daily       No current facility-administered medications for this encounter. EXAMINATION:   Vitals:    09/23/20 1030   BP: 110/62   Pulse: 85   Resp: 16   Temp: 97.2 °F (36.2 °C)   SpO2: 98%     The patient is in no acute distress. Abdomen: Soft, nontender and nondistended. No hepatosplenomegaly or masses are appreciated. Extremities: No cyanosis, clubbing, is present. Mild ankle edema. ASSESSMENT AND PLAN:     Marti Pimentel is a 72 y.o. female who has a history of a stage II diffuse large B-cell lymphoma who is now approximately 1 month after completion of her pelvic RT who is doing well at this time, recovering from the acute side effects of radiation. She is to undergo a PET CT at the end of November, returning to see me shortly thereafter or to return sooner as needed. She is to follow-up with Dr. Geraldine Alexis as scheduled.       Electronically signed by Electronically signed by Ade Yang MD on 9/23/2020 at 10:31 AM

## 2020-09-29 ENCOUNTER — TELEPHONE (OUTPATIENT)
Dept: RADIATION ONCOLOGY | Age: 65
End: 2020-09-29

## 2020-09-29 NOTE — TELEPHONE ENCOUNTER
Pt called office to inquire about PET/CT appointment being too soon. Reports  wanted test ordered for November. This RN confirmed with 's note. PET/CT rescheduled for November 19,2020 at 9:00 with 8:30 arrival at [facility]. Pt states already has instructions/prep. Follow up with  to review results rescheduled for 11- at 9:40. Pt aware of all appointments and voices understanding.

## 2020-09-29 NOTE — PROGRESS NOTES
Patient Name: Lina Silver  Patient : 1955  Patient MRN: [id number]     Primary Oncologist: Snow Velasquez MD  Referring Provider: Eleuterio Martinez MD       Date of Service: 2020      Chief Complaint:   No chief complaint on file. She came in for follow-up visit. Problem List: Patient Active Problem List:     Family history of malignant neoplasm of gastrointestinal tract     B-cell lymphoma (HCC)     Idiopathic hypotension     Mitral valve disease     Pancytopenia (HCC)     Severe malnutrition (HCC)     Acute deep vein thrombosis (DVT) of left upper extremity (HCC)     Hematoma     Pain syndrome, chronic     Tachycardia        HPI:   Mrs. Oneil Song is a pleasant 27-year-old [place] [address] female patient who was referred for discussion of the right axillary lymphadenopathy, status post needle biopsy in 2016, which did not show any pathological abnormalities. I talked to Dr. Lindsey Cook, who favored benign lymph node. Right axillary lymph node biopsy in 2015 revealed benign lymph node hyperplasia. Mammogram in 2016 revealed low suspicion for malignancy, with several enlarged right axillary lymph nodes, 3.1 by 0.8 by 2.3 cm, 0.9 by 0.8 by 1 cm, 1.8 by 1.4 by 1.5, and 1.4 by 1.5 cm. None demonstrated significant hypervascularity. I offered her an excisional lymph node biopsy versus surveillance with followup ultrasound. She opted to go with the second one. Blood test in 2015 revealed normal CBC with normal calcium and alk phos, white cell count 5.8, hemoglobin 11.9, platelet was 115. Blood test on 2016 revealed normal CBC, with white cell count 6.5, hemoglobin 12.5, platelet 440. SED rate was 17, rheumatoid factor less than 10. SUZE was still pending. Ultrasound of the right breast and axilla revealed no significant interval change in the right axillary adenopathy, which still has remained most likely reactive in etiology.   This was not seen on any mammogram dating back beyond 2015. She opted to go to see the surgeon; therefore, I referred her to see Dr. Janessa Koo. She was last seen in our office 9/28/2016. Ms. Blu Guillen was seen by Dr. Janessa Koo March 3, 2019 for evaluation of enlarging, tender mass in right axilla. She had excisional lymph node biopsy on March 12, 2019. Pathology revealed granulomatous lymphadenitis. Mammogram 2/1/2019 was benign. She had EGD and colonoscopy on 12/10/2019. EGD was unremarkable. Colonoscopy revealed in infiltrative, ulcerated and necrotic, non bleeding 50 mm mass of malignant appearance was found in the rectum at a distance between 12 cm and 18 cm from the anus. The mass caused a partial obstruction. Pathology from the mass revealed ulcerated large bowel mucosa with dense lymphoid infiltrate and abundant necrosis. - Negative for carcinoma. The lymphoid infiltrate and necrosis are suspicious for lymphoma. Results of flow cytometry revealed no flow immunophenotypic evidence of a lymphoproliferative disorder based on limited antibody panel. CT abdomen/pelvis on December 23, 2019 showed a heterogeneous solid 9.5 x 6.1 cm soft tissue mass within the right posterior pelivs which abuts the posterior margin of the uterus and involves the right lagteral margin of the rectosigmoid junction, indeterminate. This mass is concerning for malignancy, with uterine origin being favored, such as uterine sarcoma with suspected invasion of the rectosigmoid junction. In addition, a 5.8 x 10.5 x 1.1 cm central mesenteric soft tissue mass is seen, suspected metastatic disease. Additional uterine masses are seen which may relate to fibroids or extension of uterine sarcoma. A possible 6 mm pleural based nodule within the posterior left lower lobe, indeterminate in the setting of  suspected malignacy. She was seen by Dr. Katie Dunn on December 26, 2019 for review of colonscopy/egd results. She was referred to urology due to early hydronephrosis.    I discussed with pathologist to review the pathology report from rectal biopsy. She lost 30 pounds in the last 6 months. Mammogram was in 2018. She just recovered from shingle. She was prescribed Bentyl by Dr. Ramya Haley. In January 2020 official pathology report showed diffuse large B-cell lymphoma, no double hit or triple hit. BCL 6 was positive. We discussed about potential treatment with R-CHOP. I scheduled for PET CT scan, CBC, CMP, acute viral hepatitis serology, HIV, echo, and Mediport placement. She may need consolidation radiation therapy after completion of the chemo. We discussed about the potential risks and benefit of the chemotherapy. Clinically she could have stage IIb or IIIb. She has first chemo on January 17, 2020. ECHO in January 2020 showed LVEF at 50%. Clinically she looks better. Hep B/C were negative. PET CT scan on 1/27/2020 showed:  1. Intense metabolic activity associated with the abdominal and pelvic  masses described above consistent with active lymphoma. Prior imaging has been requested and an addendum will be provided to described any interval changes. 2.  Linear FDG activity along the distal tip of the MediPort, which is in the  left brachiocephalic vein just superior to the SVC. This can be seen with a  fibrin sheath. Correlate with current functionality of the MediPort. ECHO in January 2020: Left ventricular systolic function is low normal. ? Ejection fraction is visually estimated at 50%. Paradoxical motion of the interventricular septum; possible conduction ?delay. ?Indeterminate diastolic function; E/A flow reversal is noted. ?Mitral valve prolapse is present. ?Moderate to severe mitral regurgitation is present. ?Moderate tricuspid regurgitation is present. RVSP is 25 mmHg. ? No evidence of any pericardial effusion. ?Mobile interatrial septum. ? Incidental finding: cyst vs mass near/attached to the right kidney.     In March 2020 she was hospitalized due to anemia related GI related to colonic mass/lymphoma. She has mulitple blood transfusion. She fell at home prior to hospitalization and developed hematoma to left breast.  Mediport was placed. She had EGD and colonoscopy in March 2020. After GIB is controlled she started on LMWH for DVT related to fall and mediport. She was found to have pseudomonas bacteremia and completed course of IV antibiotic on March 10, 2020. CT chest, abdomen and pelvis in March 2020 after 2 cycled of chemo showed response to therapy. She was discharged to rehab. She completed lovenox injection after 6 cycle of chemo due on June 5, 2020. She completed chemotherapy on June 5, 2020. PET CT in June 2020 scan revealed:   Significantly decreased size and uptake of the masses within the lower abdomen/pelvis, consistent with treatment response. Notable: ABDOMEN/PELVIS: No abnormal uptake within the solid organs. Significantly decreased size of the left paramidline lower abdominal mass which measures approximately 3.1 x 1.6 cm and demonstrates maximum SUV of 2.2. Significantly improved bilateral pelvic uptake with a residual focus of intense uptake in the right hemipelvis measuring approximately 2.8 x 2.8 cm and demonstrating maximum SUV of 8.0. On October 6, 2020 she came in for follow-up visit. She had pelvic rest and therapy from July 27, 2020 through August 20, 2020. She received 36 Gy. She is scheduled for PET scan on November 19, 2020. Diarrhea has improved. She takes 2 Imodium and Lomotil as needed. She gained weight since last office visit. She stopped taking Megace. Lower extremity swelling has improved. She is agreeable to have Mediport flush and blood test on November 18, 2020. She will return to the office on November 20, 2020. She denies any fever or chills. No headaches or dizzy spell. She denied any nausea or vomiting. She denied melena or hematuria.     Past Medical History  Tubal ligation, tonsillectomy, colonoscopy in 2015 by Dr. Grace Campbell, and reportedly there was no polyp. She had a Pap smear in 2015 by Dr. Beryle Covey. She has bipolar disorder. Pap smear was in 2015, mammogram in March 2016. Shingles    Allergies  Seasonal allergies     Medications  Reviewed as per chart. Social History  Smoking Status Former smoker  She smoked 1 pack/day for 10 years and quit 30 years ago. She denies any alcohol or illicit drug use. Family History  Mother had breast cancer in her [de-identified], father had metastatic brain cancer. Previous Therapies:  R-CHOP     Current Therapy:  [No treatment plan]      Interval History:  Presented for scheduled follow up. She has completed radiation therapy 8/26/2020. She reports ongoing diarrhea for which she is using imodium and lomotil. She reports she feels this is getting better. She denies fever, night sweats, no lumps or bumps. Energy and appetite are intact. She has no nausea, vomiting. Review of Systems: \"Per interval history; otherwise 10 point ROS is negative. \"     Vital Signs:  [facility]/2000     Physical Exam:    CONSTITUTIONAL: awake, alert, cooperative, no apparent distress   EYES: pupils equal, round and reactive to light, sclera clear and conjunctiva normal  ENT: Normocephalic, without obvious abnormality, atraumatic  NECK: supple, symmetrical, no jugular venous distension and no carotid bruits   HEMATOLOGIC/LYMPHATIC: no cervical, supraclavicular or axillary lymphadenopathy   LUNGS: no increased work of breathing and clear to auscultation   CARDIOVASCULAR: regular rate and rhythm, normal S1 and S2, no murmur noted  ABDOMEN: normal bowel sounds x 4, soft, non-distended, non-tender, no masses palpated, no hepatosplenomegaly   MUSCULOSKELETAL: full range of motion noted, tone is normal  NEUROLOGIC: awake, alert, oriented to name, place and time. Motor skills grossly intact. SKIN: Normal skin color, texture, turgor and no jaundice.  appears intact   EXTREMITIES: no LE edema or cyanosis. Labs:    Hematology:  Lab Results   Component Value Date    WBC 2.4 (L) 08/27/2020    RBC 3.20 (L) 08/27/2020    HGB 9.7 (L) 08/27/2020    HCT 29.5 (L) 08/27/2020    MCV 92.2 08/27/2020    MCH 30.3 08/27/2020    MCHC 32.9 08/27/2020    RDW 14.2 08/27/2020     08/27/2020    MPV 9.2 08/27/2020    BANDSPCT 2 (L) 03/08/2020    SEGSPCT 62.3 08/27/2020    EOSRELPCT 4.7 (H) 08/27/2020    BASOPCT 0.0 08/27/2020    LYMPHOPCT 16.5 (L) 08/27/2020    MONOPCT 16.5 (H) 08/27/2020    BANDABS 0.19 03/08/2020    SEGSABS 1.5 08/27/2020    EOSABS 0.1 08/27/2020    BASOSABS 0.0 08/27/2020    LYMPHSABS 0.4 08/27/2020    MONOSABS 0.4 08/27/2020    DIFFTYPE AUTOMATED DIFFERENTIAL 08/27/2020    ANISOCYTOSIS 1+ 03/08/2020    POLYCHROM 1+ 03/08/2020     Lab Results   Component Value Date    ESR 25 01/16/2020     Chemistry:  Lab Results   Component Value Date     08/27/2020    K 4.2 08/27/2020     08/27/2020    CO2 26 08/27/2020    BUN 11 08/27/2020    CREATININE 0.7 08/27/2020    GLUCOSE 83 08/27/2020    CALCIUM 9.8 08/27/2020    PROT 5.6 (L) 08/27/2020    LABALBU 4.1 08/27/2020    BILITOT 0.4 08/27/2020    ALKPHOS 51 08/27/2020    AST 17 08/27/2020    ALT 12 08/27/2020    LABGLOM >60 08/27/2020    GFRAA >60 08/27/2020    PHOS 2.7 03/05/2020    MG 2.0 02/25/2020    POCGLU 132 (H) 02/25/2020     Lab Results   Component Value Date     08/27/2020     Lab Results   Component Value Date    TSHHS 2.350 04/14/2020    T4FREE 1.28 04/14/2020     Immunology:  Lab Results   Component Value Date    PROT 5.6 (L) 08/27/2020     Coagulation Panel:  Lab Results   Component Value Date    PROTIME 16.5 (H) 02/28/2020    INR 1.36 02/28/2020    APTT 36.9 02/28/2020     Anemia Panel:  Lab Results   Component Value Date    QADMJORC60 1180 (H) 04/14/2020    FOLATE >20.0 (H) 04/14/2020        Assessment & Plan:  1. She was diagnosed with diffuse large B-cell lymphoma, no double hit. PET report is pending.  She has first R-CHOP in January 7, 2020. She is on allopurinol. ECHO showed LVEF at 50%. She had Mediport placement. She has second cycle  of R-CHOP in February 2020. 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 CT chest, abdomen and pelvis showed response. She completed course of antibiotic for pseudomonas bacteremia. She resumed chemotherapy on April 3, 2020. 18 2020. She completed chemotherapy on June 5, 2020. She is scheduled for PET CT scan on 6/18/2020 showed response, still with intense uptake in the right hemipelvis measuring approximately 2.8 x 2.8 cm  and demonstrating maximum SUV of 8.0. She completed pelvic radiation on 8/26/2020. She is scheduled for PET CT scan on November 19, 2020.    2. She had LUE DVT and I recommend to continue with LMWH. I recommend to keep LUE and lower extremities elevated. LUE swelling has improved significantly. Lovenox discontinued 8/2020.    3. Hydronephrosis:  She was referred to see urologist.     4. She had shingle to lower abdomen. She was on valacyclovir. 5. She had GIB related to colon ulcer/lymphoma s/p blood transfusion. Seen by Dr Renee Larson. Iron studies from April/2020 were reviewed. 6. We discussed about COVID precaution. 7.  We will have blood tests and Mediport flush on November 18, 2020.      8. Diarrhea- ongoing. Using lomotil and imodium. Reports symptom is improving. Encouraged adequate hydration. She may have vaccinations starting second or third weeks of December 2020. Return to clinic on November 20, 2020. All of their questions have been answered for today. Recent imaging and labs were reviewed and discussed with the patient.

## 2020-10-06 ENCOUNTER — HOSPITAL ENCOUNTER (OUTPATIENT)
Dept: INFUSION THERAPY | Age: 65
Discharge: HOME | End: 2020-10-06
Payer: MEDICARE

## 2020-10-06 ENCOUNTER — OFFICE VISIT (OUTPATIENT)
Dept: ONCOLOGY | Age: 65
End: 2020-10-06
Payer: MEDICARE

## 2020-10-06 VITALS
SYSTOLIC BLOOD PRESSURE: 109 MMHG | DIASTOLIC BLOOD PRESSURE: 76 MMHG | HEART RATE: 85 BPM | BODY MASS INDEX: 22.01 KG/M2 | OXYGEN SATURATION: 97 % | TEMPERATURE: 96.9 F | WEIGHT: 124.2 LBS | HEIGHT: 63 IN

## 2020-10-06 PROCEDURE — 99211 OFF/OP EST MAY X REQ PHY/QHP: CPT

## 2020-10-06 PROCEDURE — 1090F PRES/ABSN URINE INCON ASSESS: CPT | Performed by: INTERNAL MEDICINE

## 2020-10-06 PROCEDURE — 1123F ACP DISCUSS/DSCN MKR DOCD: CPT | Performed by: INTERNAL MEDICINE

## 2020-10-06 PROCEDURE — 3017F COLORECTAL CA SCREEN DOC REV: CPT | Performed by: INTERNAL MEDICINE

## 2020-10-06 PROCEDURE — 99214 OFFICE O/P EST MOD 30 MIN: CPT | Performed by: INTERNAL MEDICINE

## 2020-10-06 PROCEDURE — G8400 PT W/DXA NO RESULTS DOC: HCPCS | Performed by: INTERNAL MEDICINE

## 2020-10-06 PROCEDURE — 4040F PNEUMOC VAC/ADMIN/RCVD: CPT | Performed by: INTERNAL MEDICINE

## 2020-10-06 PROCEDURE — G8427 DOCREV CUR MEDS BY ELIG CLIN: HCPCS | Performed by: INTERNAL MEDICINE

## 2020-10-06 PROCEDURE — G8484 FLU IMMUNIZE NO ADMIN: HCPCS | Performed by: INTERNAL MEDICINE

## 2020-10-06 PROCEDURE — G8420 CALC BMI NORM PARAMETERS: HCPCS | Performed by: INTERNAL MEDICINE

## 2020-10-06 PROCEDURE — 1036F TOBACCO NON-USER: CPT | Performed by: INTERNAL MEDICINE

## 2020-10-06 RX ORDER — DIPHENOXYLATE HYDROCHLORIDE AND ATROPINE SULFATE 2.5; .025 MG/1; MG/1
TABLET ORAL
COMMUNITY
Start: 2020-09-01 | End: 2020-11-17

## 2020-10-06 ASSESSMENT — PATIENT HEALTH QUESTIONNAIRE - PHQ9
SUM OF ALL RESPONSES TO PHQ QUESTIONS 1-9: 0
1. LITTLE INTEREST OR PLEASURE IN DOING THINGS: 0
2. FEELING DOWN, DEPRESSED OR HOPELESS: 0
SUM OF ALL RESPONSES TO PHQ QUESTIONS 1-9: 0
SUM OF ALL RESPONSES TO PHQ9 QUESTIONS 1 & 2: 0

## 2020-10-06 NOTE — PROGRESS NOTES
MA Rooming Questions  Patient: Hamlet Hernandez  MRN: [id number]    Date: 10/6/2020        1. Do you have any new issues? yes - appt for Mammogram         2. Do you need any refills on medications?    no    3. Have you had any imaging done since your last visit?   no    4. Have you been hospitalized or seen in the emergency room since your last visit here?   no    5. Did the patient have a depression screening completed today?  Yes    PHQ-9 Total Score: 0 (10/6/2020 10:30 AM)       PHQ-9 Given to (if applicable):               PHQ-9 Score (if applicable):                     [] Positive     []  Negative              Does question #9 need addressed (if applicable)                     [] Yes    []  No               Jered Valle MA

## 2020-11-09 RX ORDER — SODIUM BICARBONATE 325 MG/1
650 TABLET ORAL 4 TIMES DAILY
Qty: 30 TABLET | Refills: 1 | Status: SHIPPED | OUTPATIENT
Start: 2020-11-09 | End: 2021-02-22

## 2020-11-09 RX ORDER — ALLOPURINOL 100 MG/1
100 TABLET ORAL DAILY
Qty: 30 TABLET | Refills: 0 | Status: SHIPPED | OUTPATIENT
Start: 2020-11-09 | End: 2020-12-04

## 2020-11-13 ENCOUNTER — HOSPITAL ENCOUNTER (OUTPATIENT)
Dept: INFUSION THERAPY | Age: 65
Discharge: HOME | End: 2020-11-13
Payer: MEDICARE

## 2020-11-13 DIAGNOSIS — C85.16 B-CELL LYMPHOMA OF INTRAPELVIC LYMPH NODES, UNSPECIFIED B-CELL LYMPHOMA TYPE (HCC): ICD-10-CM

## 2020-11-13 LAB
ALBUMIN SERPL-MCNC: 4.1 GM/DL (ref 3.4–5)
ALP BLD-CCNC: 73 IU/L (ref 40–129)
ALT SERPL-CCNC: 9 U/L (ref 10–40)
ANION GAP SERPL CALCULATED.3IONS-SCNC: 12 MMOL/L (ref 4–16)
AST SERPL-CCNC: 22 IU/L (ref 15–37)
BASOPHILS ABSOLUTE: 0 K/CU MM
BASOPHILS RELATIVE PERCENT: 0 % (ref 0–1)
BILIRUB SERPL-MCNC: 0.5 MG/DL (ref 0–1)
BUN BLDV-MCNC: 11 MG/DL (ref 6–23)
CALCIUM SERPL-MCNC: 9.2 MG/DL (ref 8.3–10.6)
CHLORIDE BLD-SCNC: 99 MMOL/L (ref 99–110)
CO2: 26 MMOL/L (ref 21–32)
CREAT SERPL-MCNC: 0.8 MG/DL (ref 0.6–1.1)
DIFFERENTIAL TYPE: ABNORMAL
EOSINOPHILS ABSOLUTE: 0.1 K/CU MM
EOSINOPHILS RELATIVE PERCENT: 8 % (ref 0–3)
FERRITIN: 911 NG/ML (ref 15–150)
FOLATE: >20 NG/ML (ref 3.1–17.5)
GFR AFRICAN AMERICAN: >60 ML/MIN/1.73M2
GFR NON-AFRICAN AMERICAN: >60 ML/MIN/1.73M2
GLUCOSE BLD-MCNC: 78 MG/DL (ref 70–99)
HCT VFR BLD CALC: 30.5 % (ref 37–47)
HEMOGLOBIN: 10.2 GM/DL (ref 12.5–16)
IRON: 42 UG/DL (ref 37–145)
LACTATE DEHYDROGENASE: 251 IU/L (ref 120–246)
LYMPHOCYTES ABSOLUTE: 0.6 K/CU MM
LYMPHOCYTES RELATIVE PERCENT: 35 % (ref 24–44)
MCH RBC QN AUTO: 31.9 PG (ref 27–31)
MCHC RBC AUTO-ENTMCNC: 33.4 % (ref 32–36)
MCV RBC AUTO: 95.3 FL (ref 78–100)
MONOCYTES ABSOLUTE: 0.6 K/CU MM
MONOCYTES RELATIVE PERCENT: 38.7 % (ref 0–4)
PCT TRANSFERRIN: 19 % (ref 10–44)
PDW BLD-RTO: 13.3 % (ref 11.7–14.9)
PLATELET # BLD: 234 K/CU MM (ref 140–440)
PMV BLD AUTO: 8.9 FL (ref 7.5–11.1)
POTASSIUM SERPL-SCNC: 4 MMOL/L (ref 3.5–5.1)
RBC # BLD: 3.2 M/CU MM (ref 4.2–5.4)
SEGMENTED NEUTROPHILS ABSOLUTE COUNT: 0.3 K/CU MM
SEGMENTED NEUTROPHILS RELATIVE PERCENT: 18.3 % (ref 36–66)
SODIUM BLD-SCNC: 137 MMOL/L (ref 135–145)
TOTAL IRON BINDING CAPACITY: 219 UG/DL (ref 250–450)
TOTAL PROTEIN: 5.6 GM/DL (ref 6.4–8.2)
UNSATURATED IRON BINDING CAPACITY: 177 UG/DL (ref 110–370)
VITAMIN B-12: 1154 PG/ML (ref 211–911)
WBC # BLD: 1.6 K/CU MM (ref 4–10.5)

## 2020-11-13 PROCEDURE — 83540 ASSAY OF IRON: CPT

## 2020-11-13 PROCEDURE — 36415 COLL VENOUS BLD VENIPUNCTURE: CPT

## 2020-11-13 PROCEDURE — 82728 ASSAY OF FERRITIN: CPT

## 2020-11-13 PROCEDURE — 80053 COMPREHEN METABOLIC PANEL: CPT

## 2020-11-13 PROCEDURE — 83550 IRON BINDING TEST: CPT

## 2020-11-13 PROCEDURE — 82607 VITAMIN B-12: CPT

## 2020-11-13 PROCEDURE — 82746 ASSAY OF FOLIC ACID SERUM: CPT

## 2020-11-13 PROCEDURE — 83615 LACTATE (LD) (LDH) ENZYME: CPT

## 2020-11-13 PROCEDURE — 85025 COMPLETE CBC W/AUTO DIFF WBC: CPT

## 2020-11-13 NOTE — PROGRESS NOTES
Reviewed CBC with patient. Dr Betzaida Bello asked that patient was called to inform that WBC 1.63, ANC 0.30. Reviewed neutropenic precautions with patient including hand washing, social distancing, avoiding crowds and any one will illness. Informed patient to call if she has any sign of infection including fever, increased cough or congestion, or increased urinary frequency or burning. Patient has on call phone number. Patient verbalized understanding of plan of care.

## 2020-11-17 ENCOUNTER — OFFICE VISIT (OUTPATIENT)
Dept: CARDIOLOGY CLINIC | Age: 65
End: 2020-11-17
Payer: MEDICARE

## 2020-11-17 VITALS
HEART RATE: 79 BPM | BODY MASS INDEX: 22.79 KG/M2 | HEIGHT: 63 IN | SYSTOLIC BLOOD PRESSURE: 118 MMHG | WEIGHT: 128.6 LBS | DIASTOLIC BLOOD PRESSURE: 70 MMHG

## 2020-11-17 PROCEDURE — 99213 OFFICE O/P EST LOW 20 MIN: CPT | Performed by: NURSE PRACTITIONER

## 2020-11-17 PROCEDURE — 3017F COLORECTAL CA SCREEN DOC REV: CPT | Performed by: NURSE PRACTITIONER

## 2020-11-17 PROCEDURE — G8484 FLU IMMUNIZE NO ADMIN: HCPCS | Performed by: NURSE PRACTITIONER

## 2020-11-17 PROCEDURE — G8420 CALC BMI NORM PARAMETERS: HCPCS | Performed by: NURSE PRACTITIONER

## 2020-11-17 PROCEDURE — 4040F PNEUMOC VAC/ADMIN/RCVD: CPT | Performed by: NURSE PRACTITIONER

## 2020-11-17 PROCEDURE — 1036F TOBACCO NON-USER: CPT | Performed by: NURSE PRACTITIONER

## 2020-11-17 PROCEDURE — G8427 DOCREV CUR MEDS BY ELIG CLIN: HCPCS | Performed by: NURSE PRACTITIONER

## 2020-11-17 PROCEDURE — 1123F ACP DISCUSS/DSCN MKR DOCD: CPT | Performed by: NURSE PRACTITIONER

## 2020-11-17 PROCEDURE — 1090F PRES/ABSN URINE INCON ASSESS: CPT | Performed by: NURSE PRACTITIONER

## 2020-11-17 PROCEDURE — G8400 PT W/DXA NO RESULTS DOC: HCPCS | Performed by: NURSE PRACTITIONER

## 2020-11-17 RX ORDER — LOPERAMIDE HYDROCHLORIDE 2 MG/1
2 TABLET ORAL 4 TIMES DAILY PRN
COMMUNITY
End: 2020-12-24 | Stop reason: SDUPTHER

## 2020-11-17 ASSESSMENT — ENCOUNTER SYMPTOMS
ABDOMINAL PAIN: 0
VOMITING: 0
DIARRHEA: 0
SHORTNESS OF BREATH: 0
COLOR CHANGE: 0
COUGH: 0
PHOTOPHOBIA: 0
NAUSEA: 0
SINUS PAIN: 0
BLOOD IN STOOL: 0
CONSTIPATION: 0

## 2020-11-17 NOTE — ASSESSMENT & PLAN NOTE
· Patient is not experiencing symptoms currently  · Low salt diet advised  Last Echo 1/2020 Summary   Left ventricular systolic function is low normal.   Ejection fraction is visually estimated at 50%. Paradoxical motion of the interventricular septum; possible conduction delay. Indeterminate diastolic function; E/A flow reversal is noted. Mitral valve prolapse is present. Moderate to severe mitral regurgitation is present. Moderate tricuspid regurgitation is present. RVSP is 25 mmHg. No evidence of any pericardial effusion. Mobile interatrial septum. Incidental finding: cyst vs mass near/attached to the right kidney.

## 2020-11-17 NOTE — PROGRESS NOTES
[facility] ([facility], [place] PHYSICAL [facility]  Carmelo Salazar  Phone: [phone]    Fax [phone]                  Rajiv Baum MD, Eleuterio Medley MD, Adali Vega MD, MD Kassandra Galeas MD Overton Bouillon, MD Adama Castle, MD Moni Ernandez, DIONI Gusman, DIONI Fields, DIONI Granado, APRN    CARDIOLOGY  NOTE      11/17/2020    RE: Edil Khan  (1955)                               TO:  Dr. Elvie Schirmer, MD  The primary cardiologist is Dr. Morgan Coleman    CC:   1. Mitral valve disease    2. Tachycardia      Patient denies all of the following:  Chest Pain  Palpitations  Shortness of Breath  Edema  Dizziness  Syncope      HPI: Thank you for involving me in taking care of your patient Edil Khan, who is a  72y.o. year old female with a history as listed above. Patient is  active and does not exercise regularly. Patient is  compliant with her medications. Patient denies any cardiac complaints or needs. She has recently finished her chemo treatments for her lymphoma and is scheduled for a pet scan next month. She states that her swelling has gone away and her weight has been stable recently. She denies feeling any palpitations or that her heart rate has been elevating. She states that her lymphoma and treatment has really wiped her out and she has poor memory from February to about the last month. Patient states that she feels like her bradycardia has started to be getting better r ecently. Her  helps take care of her at home and he is not able to be with her today.     Vitals:    11/17/20 0945   BP: 118/70   Pulse: 79       Current Outpatient Medications   Medication Sig Dispense Refill    loperamide (IMODIUM A-D) 2 MG tablet Take 2 mg by mouth 4 times daily as needed for Diarrhea      allopurinol (ZYLOPRIM) 100 MG tablet Take 1 tablet by mouth daily 30 tablet 0    sodium 10. 00     Types: Cigarettes     Last attempt to quit: 1988     Years since quittin.8    Smokeless tobacco: Never Used   Substance Use Topics    Alcohol use: No     Comment: 1-2 CUPS OF HOT TEA        Review of Systems   Constitutional: Negative for fatigue and fever. HENT: Negative for ear pain and sinus pain. Eyes: Negative for photophobia and visual disturbance. Respiratory: Negative for cough and shortness of breath. Cardiovascular: Negative for chest pain, palpitations and leg swelling. Gastrointestinal: Negative for abdominal pain, blood in stool, constipation, diarrhea, nausea and vomiting. Endocrine: Negative for polyphagia and polyuria. Genitourinary: Negative for decreased urine volume and difficulty urinating. Musculoskeletal: Negative for arthralgias and gait problem. Skin: Negative for color change and wound. Neurological: Negative for dizziness, syncope, weakness, light-headedness and headaches. Psychiatric/Behavioral: Negative for agitation. The patient is not hyperactive. Objective:      Physical Exam:  /70   Pulse 79   Ht 5' 3\" (1.6 m)   Wt 128 lb 9.6 oz (58.3 kg)   LMP 2000   BMI 22.78 kg/m²   Wt Readings from Last 3 Encounters:   20 128 lb 9.6 oz (58.3 kg)   10/06/20 124 lb 3.2 oz (56.3 kg)   20 127 lb 3.2 oz (57.7 kg)     Body mass index is 22.78 kg/m². Physical Exam  Vitals signs reviewed. Constitutional:       General: She is not in acute distress. Appearance: Normal appearance. She is not ill-appearing. HENT:      Head: Normocephalic and atraumatic. Eyes:      Conjunctiva/sclera: Conjunctivae normal.      Pupils: Pupils are equal, round, and reactive to light. Neck:      Musculoskeletal: Neck supple. No muscular tenderness. Vascular: No carotid bruit. Cardiovascular:      Rate and Rhythm: Normal rate and regular rhythm. Pulses: Normal pulses. Heart sounds: Murmur present.    Pulmonary: Effort: Pulmonary effort is normal. No respiratory distress. Breath sounds: Normal breath sounds. Musculoskeletal:         General: No swelling or deformity. Skin:     General: Skin is warm and dry. Capillary Refill: Capillary refill takes less than 2 seconds. Neurological:      Mental Status: She is alert and oriented to person, place, and time. Psychiatric:         Mood and Affect: Mood normal.         Behavior: Behavior normal.         Thought Content: Thought content normal.         Cognition and Memory: Memory is impaired. Judgment: Judgment normal.         DATA:  No results found for: CKTOTAL, CKMB, CKMBINDEX, TROPONINI  BNP:  No results found for: BNP  PT/INR:  No results found for: PTINR  No results found for: LABA1C  Lab Results   Component Value Date    CHOL 234 06/19/2017    TRIG 130 06/19/2017    HDL 66 06/19/2017    LDLCALC 142 06/19/2017     Lab Results   Component Value Date    ALT 9 (L) 11/13/2020    AST 22 11/13/2020     TSH:  No results found for: TSH      Assessment/ Plan:     Mitral valve disease  · Patient is not experiencing symptoms currently  · Low salt diet advised  Last Echo 1/2020 Summary   Left ventricular systolic function is low normal.   Ejection fraction is visually estimated at 50%. Paradoxical motion of the interventricular septum; possible conduction delay. Indeterminate diastolic function; E/A flow reversal is noted. Mitral valve prolapse is present. Moderate to severe mitral regurgitation is present. Moderate tricuspid regurgitation is present. RVSP is 25 mmHg. No evidence of any pericardial effusion. Mobile interatrial septum. Incidental finding: cyst vs mass near/attached to the right kidney. Tachycardia  HR well controlled. Patient thought metoprolol was daily and as needed. Re educated about Metoprolol dosage and she states understanding. Patient seen, interviewed and examined. Testing was reviewed.     Patient is encouraged to exercise even a brisk walk for 30 minutes at least 3 to 4 times a week. Lifestyle and risk factor modificatons discussed. Various goals are discussed and questions answered. Continue current medications. Appropriate prescriptions are addressed. Questions answered and patient verbalizes understanding. Call for any problems, questions, or concerns. Pt is to follow up in 3 months for Cardiac management    I have spent 20 minutes of face to face time with the patient with more than 50% spent counseling and coordinating care for tachycardia, mitral valve prolapse, mitral valve regurgitation, and follow-up plan of care.     Electronically signed by DIONI Dover CNP on 11/17/2020 at 10:20 AM

## 2020-11-18 NOTE — PROGRESS NOTES
etiology. This was not seen on any mammogram dating back beyond 2015. She opted to go to see the surgeon; therefore, I referred her to see Dr. Nory Ross. She was last seen in our office 9/28/2016. Ms. Oneil Song was seen by Dr. Nory Ross March 3, 2019 for evaluation of enlarging, tender mass in right axilla. She had excisional lymph node biopsy on March 12, 2019. Pathology revealed granulomatous lymphadenitis. Mammogram 2/1/2019 was benign. She had EGD and colonoscopy on 12/10/2019. EGD was unremarkable. Colonoscopy revealed in infiltrative, ulcerated and necrotic, non bleeding 50 mm mass of malignant appearance was found in the rectum at a distance between 12 cm and 18 cm from the anus. The mass caused a partial obstruction. Pathology from the mass revealed ulcerated large bowel mucosa with dense lymphoid infiltrate and abundant necrosis. - Negative for carcinoma. The lymphoid infiltrate and necrosis are suspicious for lymphoma. Results of flow cytometry revealed no flow immunophenotypic evidence of a lymphoproliferative disorder based on limited antibody panel. CT abdomen/pelvis on December 23, 2019 showed a heterogeneous solid 9.5 x 6.1 cm soft tissue mass within the right posterior pelivs which abuts the posterior margin of the uterus and involves the right lagteral margin of the rectosigmoid junction, indeterminate. This mass is concerning for malignancy, with uterine origin being favored, such as uterine sarcoma with suspected invasion of the rectosigmoid junction. In addition, a 5.8 x 10.5 x 1.1 cm central mesenteric soft tissue mass is seen, suspected metastatic disease. Additional uterine masses are seen which may relate to fibroids or extension of uterine sarcoma. A possible 6 mm pleural based nodule within the posterior left lower lobe, indeterminate in the setting of  suspected malignacy. She was seen by Dr. Marielos Wayne on December 26, 2019 for review of colonscopy/egd results.  She was referred to urology due to early hydronephrosis. I discussed with pathologist to review the pathology report from rectal biopsy. She lost 30 pounds in the last 6 months. Mammogram was in 2018. She just recovered from shingle. She was prescribed Bentyl by Dr. Maria C Ramey. In January 2020 official pathology report showed diffuse large B-cell lymphoma, no double hit or triple hit. BCL 6 was positive. We discussed about potential treatment with R-CHOP. I scheduled for PET CT scan, CBC, CMP, acute viral hepatitis serology, HIV, echo, and Mediport placement. She may need consolidation radiation therapy after completion of the chemo. We discussed about the potential risks and benefit of the chemotherapy. Clinically she could have stage IIb or IIIb. She has first chemo on January 17, 2020. ECHO in January 2020 showed LVEF at 50%. Clinically she looks better. Hep B/C were negative. PET CT scan on 1/27/2020 showed:  1. Intense metabolic activity associated with the abdominal and pelvic  masses described above consistent with active lymphoma. Prior imaging has been requested and an addendum will be provided to described any interval changes. 2.  Linear FDG activity along the distal tip of the MediPort, which is in the  left brachiocephalic vein just superior to the SVC. This can be seen with a  fibrin sheath. Correlate with current functionality of the MediPort. ECHO in January 2020: Left ventricular systolic function is low normal. ? Ejection fraction is visually estimated at 50%. Paradoxical motion of the interventricular septum; possible conduction ?delay. ?Indeterminate diastolic function; E/A flow reversal is noted. ?Mitral valve prolapse is present. ?Moderate to severe mitral regurgitation is present. ?Moderate tricuspid regurgitation is present. RVSP is 25 mmHg. ? No evidence of any pericardial effusion. ?Mobile interatrial septum. ? Incidental finding: cyst vs mass near/attached to the right kidney.     In March 2020 she was hospitalized due to anemia related GI related to colonic mass/lymphoma. She has mulitple blood transfusion. She fell at home prior to hospitalization and developed hematoma to left breast.  Mediport was placed. She had EGD and colonoscopy in March 2020. After GIB is controlled she started on LMWH for DVT related to fall and mediport. She was found to have pseudomonas bacteremia and completed course of IV antibiotic on March 10, 2020. CT chest, abdomen and pelvis in March 2020 after 2 cycled of chemo showed response to therapy. She was discharged to rehab. She completed lovenox injection after 6 cycle of chemo due on June 5, 2020. She completed chemotherapy on June 5, 2020. PET CT in June 2020 scan revealed:   Significantly decreased size and uptake of the masses within the lower abdomen/pelvis, consistent with treatment response. Notable: ABDOMEN/PELVIS: No abnormal uptake within the solid organs. Significantly decreased size of the left paramidline lower abdominal mass which measures approximately 3.1 x 1.6 cm and demonstrates maximum SUV of 2.2. Significantly improved bilateral pelvic uptake with a residual focus of intense uptake in the right hemipelvis measuring approximately 2.8 x 2.8 cm and demonstrating maximum SUV of 8.0. She had pelvic radiation therapy from July 27, 2020 through August 20, 2020. She received 36 Gy. On November 21, 2020 she came in for follow-up visit. She has completed radiation therapy 8/26/2020. We reviewed her blood test and imaging study. Clinically she is in remission. We discussed about active surveillance. She continues to have loose stool. I will refer her to GI. Imaging studies optional since the PET scan is completely negative in November 2020. PET 11/19/2020:   Impression    Continued response to treatment.  Abnormal uptake in the deep right    hemipelvis has resolved.  The lower abdominal mass appears slightly smaller, although difficult to accurately measure without intravenous contrast, and    demonstrates similar mild uptake.  No new disease. Anemic work-up work-up on November 13, 2020 was grossly unremarkable. I will have her repeat CBC prior to next office visit in 4 weeks. She denies any fever or chills. No headaches or dizzy spell. She denied any nausea or vomiting. She denied melena or hematuria. Past Medical History  Tubal ligation, tonsillectomy, colonoscopy in 2015 by Dr. Brittany Garcia, and reportedly there was no polyp. She had a Pap smear in 2015 by Dr. Jay Dawkins. She has bipolar disorder. Pap smear was in 2015, mammogram in March 2016. Shingles    Reviewed as per chart. Social History  Smoking Status Former smoker  She smoked 1 pack/day for 10 years and quit 30 years ago. She denies any alcohol or illicit drug use. Family History  Mother had breast cancer in her [de-identified], father had metastatic brain cancer. Previous Therapies:  R-CHOP     Interval History:  Presented for scheduled follow up. She reports ongoing diarrhea for which she is using imodium and lomotil. She reports she feels this is getting better. She denies fever, night sweats, no lumps or bumps. Energy and appetite are intact. She has no nausea, vomiting. Review of Systems: \"Per interval history; otherwise 10 point ROS is negative. \"     Vital Signs:  /62 (Site: Left Upper Arm, Position: Sitting, Cuff Size: Medium Adult)   Pulse 81   Temp 97.4 °F (36.3 °C) (Infrared)   Resp 16   Ht 5' 3\" (1.6 m)   Wt 125 lb 12.8 oz (57.1 kg)   LMP 01/11/2000   SpO2 97%   BMI 22.28 kg/m²     Physical Exam:    CONSTITUTIONAL: awake, alert, cooperative, no apparent distress   EYES: pupils equal, round and reactive to light, sclera clear and conjunctiva normal  ENT: Normocephalic, without obvious abnormality, atraumatic  NECK: supple, symmetrical, no jugular venous distension and no carotid bruits   HEMATOLOGIC/LYMPHATIC: no cervical, supraclavicular or axillary lymphadenopathy   LUNGS: no increased work of breathing and clear to auscultation   CARDIOVASCULAR: regular rate and rhythm, normal S1 and S2, no murmur noted  ABDOMEN: normal bowel sounds x 4, soft, non-distended, non-tender, no masses palpated, no hepatosplenomegaly. No rebound or guarding. MUSCULOSKELETAL: full range of motion noted, tone is normal  NEUROLOGIC: awake, alert, oriented to name, place and time. Motor skills grossly intact. SKIN: Normal skin color, texture, turgor and no jaundice. appears intact   EXTREMITIES: no LE edema or cyanosis.     Labs:    Hematology:  Lab Results   Component Value Date    WBC 1.6 (LL) 11/13/2020    RBC 3.20 (L) 11/13/2020    HGB 10.2 (L) 11/13/2020    HCT 30.5 (L) 11/13/2020    MCV 95.3 11/13/2020    MCH 31.9 (H) 11/13/2020    MCHC 33.4 11/13/2020    RDW 13.3 11/13/2020     11/13/2020    MPV 8.9 11/13/2020    BANDSPCT 2 (L) 03/08/2020    SEGSPCT 18.3 (L) 11/13/2020    EOSRELPCT 8.0 (H) 11/13/2020    BASOPCT 0.0 11/13/2020    LYMPHOPCT 35.0 11/13/2020    MONOPCT 38.7 (H) 11/13/2020    BANDABS 0.19 03/08/2020    SEGSABS 0.3 11/13/2020    EOSABS 0.1 11/13/2020    BASOSABS 0.0 11/13/2020    LYMPHSABS 0.6 11/13/2020    MONOSABS 0.6 11/13/2020    DIFFTYPE AUTOMATED DIFFERENTIAL 11/13/2020    ANISOCYTOSIS 1+ 03/08/2020    POLYCHROM 1+ 03/08/2020     Lab Results   Component Value Date    ESR 25 01/16/2020     Chemistry:  Lab Results   Component Value Date     11/13/2020    K 4.0 11/13/2020    CL 99 11/13/2020    CO2 26 11/13/2020    BUN 11 11/13/2020    CREATININE 0.8 11/13/2020    GLUCOSE 78 11/13/2020    CALCIUM 9.2 11/13/2020    PROT 5.6 (L) 11/13/2020    LABALBU 4.1 11/13/2020    BILITOT 0.5 11/13/2020    ALKPHOS 73 11/13/2020    AST 22 11/13/2020    ALT 9 (L) 11/13/2020    LABGLOM >60 11/13/2020    GFRAA >60 11/13/2020    PHOS 2.7 03/05/2020    MG 2.0 02/25/2020    POCGLU 132 (H) 02/25/2020     Lab Results   Component Value Date

## 2020-11-19 ENCOUNTER — HOSPITAL ENCOUNTER (OUTPATIENT)
Dept: PET IMAGING | Age: 65
Discharge: HOME | End: 2020-11-19
Payer: MEDICARE

## 2020-11-19 PROCEDURE — 78815 PET IMAGE W/CT SKULL-THIGH: CPT

## 2020-11-19 PROCEDURE — 2580000003 HC RX 258: Performed by: RADIOLOGY

## 2020-11-19 PROCEDURE — A9552 F18 FDG: HCPCS | Performed by: RADIOLOGY

## 2020-11-19 PROCEDURE — 3430000000 HC RX DIAGNOSTIC RADIOPHARMACEUTICAL: Performed by: RADIOLOGY

## 2020-11-19 RX ORDER — SODIUM CHLORIDE 0.9 % (FLUSH) 0.9 %
10 SYRINGE (ML) INJECTION PRN
Status: DISCONTINUED | OUTPATIENT
Start: 2020-11-19 | End: 2020-11-20 | Stop reason: HOSPADM

## 2020-11-19 RX ORDER — FLUDEOXYGLUCOSE F 18 200 MCI/ML
15.31 INJECTION, SOLUTION INTRAVENOUS
Status: COMPLETED | OUTPATIENT
Start: 2020-11-19 | End: 2020-11-19

## 2020-11-19 RX ADMIN — FLUDEOXYGLUCOSE F 18 15.31 MILLICURIE: 200 INJECTION, SOLUTION INTRAVENOUS at 08:55

## 2020-11-19 RX ADMIN — SODIUM CHLORIDE, PRESERVATIVE FREE 10 ML: 5 INJECTION INTRAVENOUS at 08:55

## 2020-11-20 ENCOUNTER — OFFICE VISIT (OUTPATIENT)
Dept: ONCOLOGY | Age: 65
End: 2020-11-20
Payer: MEDICARE

## 2020-11-20 ENCOUNTER — HOSPITAL ENCOUNTER (OUTPATIENT)
Dept: INFUSION THERAPY | Age: 65
Discharge: HOME | End: 2020-11-20
Payer: MEDICARE

## 2020-11-20 VITALS
BODY MASS INDEX: 22.29 KG/M2 | HEART RATE: 81 BPM | WEIGHT: 125.8 LBS | OXYGEN SATURATION: 97 % | DIASTOLIC BLOOD PRESSURE: 62 MMHG | SYSTOLIC BLOOD PRESSURE: 107 MMHG | RESPIRATION RATE: 16 BRPM | TEMPERATURE: 97.4 F | HEIGHT: 63 IN

## 2020-11-20 DIAGNOSIS — C85.16 B-CELL LYMPHOMA OF INTRAPELVIC LYMPH NODES, UNSPECIFIED B-CELL LYMPHOMA TYPE (HCC): Primary | ICD-10-CM

## 2020-11-20 PROCEDURE — 99214 OFFICE O/P EST MOD 30 MIN: CPT | Performed by: INTERNAL MEDICINE

## 2020-11-20 PROCEDURE — 96523 IRRIG DRUG DELIVERY DEVICE: CPT

## 2020-11-20 PROCEDURE — G8427 DOCREV CUR MEDS BY ELIG CLIN: HCPCS | Performed by: INTERNAL MEDICINE

## 2020-11-20 PROCEDURE — 1090F PRES/ABSN URINE INCON ASSESS: CPT | Performed by: INTERNAL MEDICINE

## 2020-11-20 PROCEDURE — G8400 PT W/DXA NO RESULTS DOC: HCPCS | Performed by: INTERNAL MEDICINE

## 2020-11-20 PROCEDURE — 4040F PNEUMOC VAC/ADMIN/RCVD: CPT | Performed by: INTERNAL MEDICINE

## 2020-11-20 PROCEDURE — 6360000002 HC RX W HCPCS: Performed by: INTERNAL MEDICINE

## 2020-11-20 PROCEDURE — G8484 FLU IMMUNIZE NO ADMIN: HCPCS | Performed by: INTERNAL MEDICINE

## 2020-11-20 PROCEDURE — 1036F TOBACCO NON-USER: CPT | Performed by: INTERNAL MEDICINE

## 2020-11-20 PROCEDURE — 2580000003 HC RX 258: Performed by: INTERNAL MEDICINE

## 2020-11-20 PROCEDURE — 3017F COLORECTAL CA SCREEN DOC REV: CPT | Performed by: INTERNAL MEDICINE

## 2020-11-20 PROCEDURE — 1123F ACP DISCUSS/DSCN MKR DOCD: CPT | Performed by: INTERNAL MEDICINE

## 2020-11-20 PROCEDURE — G8420 CALC BMI NORM PARAMETERS: HCPCS | Performed by: INTERNAL MEDICINE

## 2020-11-20 RX ORDER — HEPARIN SODIUM (PORCINE) LOCK FLUSH IV SOLN 100 UNIT/ML 100 UNIT/ML
500 SOLUTION INTRAVENOUS PRN
Status: CANCELLED | OUTPATIENT
Start: 2020-11-20

## 2020-11-20 RX ORDER — SODIUM CHLORIDE 0.9 % (FLUSH) 0.9 %
10 SYRINGE (ML) INJECTION PRN
Status: CANCELLED | OUTPATIENT
Start: 2020-11-20

## 2020-11-20 RX ORDER — HEPARIN SODIUM (PORCINE) LOCK FLUSH IV SOLN 100 UNIT/ML 100 UNIT/ML
500 SOLUTION INTRAVENOUS PRN
Status: DISCONTINUED | OUTPATIENT
Start: 2020-11-20 | End: 2020-11-21 | Stop reason: HOSPADM

## 2020-11-20 RX ORDER — SODIUM CHLORIDE 0.9 % (FLUSH) 0.9 %
20 SYRINGE (ML) INJECTION PRN
Status: CANCELLED | OUTPATIENT
Start: 2020-11-20

## 2020-11-20 RX ORDER — SODIUM CHLORIDE 0.9 % (FLUSH) 0.9 %
10 SYRINGE (ML) INJECTION PRN
Status: DISCONTINUED | OUTPATIENT
Start: 2020-11-20 | End: 2020-11-21 | Stop reason: HOSPADM

## 2020-11-20 RX ADMIN — Medication 500 UNITS: at 13:42

## 2020-11-20 RX ADMIN — SODIUM CHLORIDE, PRESERVATIVE FREE 10 ML: 5 INJECTION INTRAVENOUS at 13:41

## 2020-11-20 ASSESSMENT — PATIENT HEALTH QUESTIONNAIRE - PHQ9
SUM OF ALL RESPONSES TO PHQ QUESTIONS 1-9: 0
2. FEELING DOWN, DEPRESSED OR HOPELESS: 0
SUM OF ALL RESPONSES TO PHQ9 QUESTIONS 1 & 2: 0
SUM OF ALL RESPONSES TO PHQ QUESTIONS 1-9: 0
1. LITTLE INTEREST OR PLEASURE IN DOING THINGS: 0
SUM OF ALL RESPONSES TO PHQ QUESTIONS 1-9: 0

## 2020-11-20 NOTE — PROGRESS NOTES
[facility] Rooming Questions  Patient: Isaías Akers  MRN: [id number]    Date: 11/20/2020        1. Do you have any new issues?   no         2. Do you need any refills on medications?    no    3. Have you had any imaging done since your last visit? yes - PET    4. Have you been hospitalized or seen in the emergency room since your last visit here?   no    5. Did the patient have a depression screening completed today?  No    PHQ-9 Total Score: 0 (11/20/2020  1:14 PM)       PHQ-9 Given to (if applicable):               PHQ-9 Score (if applicable):                     [] Positive     []  Negative              Does question #9 need addressed (if applicable)                     [] Yes    []  No               Yonas Castellon MA

## 2020-12-04 RX ORDER — ALLOPURINOL 100 MG/1
TABLET ORAL
Qty: 30 TABLET | Refills: 0 | Status: SHIPPED | OUTPATIENT
Start: 2020-12-04 | End: 2021-02-22

## 2020-12-07 ENCOUNTER — HOSPITAL ENCOUNTER (OUTPATIENT)
Dept: WOMENS IMAGING | Age: 65
Discharge: HOME | End: 2020-12-07
Payer: MEDICARE

## 2020-12-07 PROCEDURE — 77067 SCR MAMMO BI INCL CAD: CPT

## 2020-12-21 NOTE — PROGRESS NOTES
Patient Name: Annie Paulino  Patient : 1955  Patient MRN: [id number]     Primary Oncologist: Dimitri Vargas MD  Referring Provider: Taisha Kong MD       Date of Service: 2020      Chief Complaint:   Chief Complaint   Patient presents with    Follow-up     She came in for follow-up visit. Problem List: Patient Active Problem List:     Family history of malignant neoplasm of gastrointestinal tract     B-cell lymphoma (HCC)     Idiopathic hypotension     Mitral valve disease     Pancytopenia (HCC)     Severe malnutrition (HCC)     Acute deep vein thrombosis (DVT) of left upper extremity (HCC)     Hematoma     Pain syndrome, chronic     Tachycardia        HPI:   Madi Pickett is a pleasant 72[place] [address] [place] who was referred for right axillary lymphadenopathy, status post needle biopsy in 2016, which did not show any pathological abnormalities. I talked to Dr. Rangel Miramontes, who favored benign lymph node. Right axillary lymph node biopsy in 2015 revealed benign lymph node hyperplasia. Mammogram in 2016 revealed low suspicion for malignancy, with several enlarged right axillary lymph nodes, 3.1 by 0.8 by 2.3 cm, 0.9 by 0.8 by 1 cm, 1.8 by 1.4 by 1.5, and 1.4 by 1.5 cm. None demonstrated significant hypervascularity. I offered her an excisional lymph node biopsy versus surveillance with followup ultrasound. She opted to go with the second one. Blood test in 2015 revealed normal CBC with normal calcium and alk phos, white cell count 5.8, hemoglobin 11.9, platelet was 282. Blood test on 2016 revealed normal CBC, with white cell count 6.5, hemoglobin 12.5, platelet 462. SED rate was 17, rheumatoid factor less than 10. SUZE was still pending. Ultrasound of the right breast and axilla revealed no significant interval change in the right axillary adenopathy, which still has remained most likely reactive in etiology.   This was not seen on any mammogram dating back beyond 2015. She opted to go to see the surgeon; therefore, I referred her to see Dr. Yvette Marie. She was last seen in our office 9/28/2016. Ms. Francesca Busch was seen by Dr. Yvette Marie March 3, 2019 for evaluation of enlarging, tender mass in right axilla. She had excisional lymph node biopsy on March 12, 2019. Pathology revealed granulomatous lymphadenitis. Mammogram 2/1/2019 was benign. She had EGD and colonoscopy on 12/10/2019. EGD was unremarkable. Colonoscopy revealed in infiltrative, ulcerated and necrotic, non bleeding 50 mm mass of malignant appearance was found in the rectum at a distance between 12 cm and 18 cm from the anus. The mass caused a partial obstruction. Pathology from the mass revealed ulcerated large bowel mucosa with dense lymphoid infiltrate and abundant necrosis. - Negative for carcinoma. The lymphoid infiltrate and necrosis are suspicious for lymphoma. Results of flow cytometry revealed no flow immunophenotypic evidence of a lymphoproliferative disorder based on limited antibody panel. CT abdomen/pelvis on December 23, 2019 showed a heterogeneous solid 9.5 x 6.1 cm soft tissue mass within the right posterior pelivs which abuts the posterior margin of the uterus and involves the right lagteral margin of the rectosigmoid junction, indeterminate. This mass is concerning for malignancy, with uterine origin being favored, such as uterine sarcoma with suspected invasion of the rectosigmoid junction. In addition, a 5.8 x 10.5 x 1.1 cm central mesenteric soft tissue mass is seen, suspected metastatic disease. Additional uterine masses are seen which may relate to fibroids or extension of uterine sarcoma. A possible 6 mm pleural based nodule within the posterior left lower lobe, indeterminate in the setting of  suspected malignacy. She was seen by Dr. Sharda White on December 26, 2019 for review of colonscopy/egd results.  She was referred to urology due to early hospitalized due to anemia related GI related to colonic mass/lymphoma. She has mulitple blood transfusion. She fell at home prior to hospitalization and developed hematoma to left breast.  Mediport was placed. She had EGD and colonoscopy in March 2020. After GIB is controlled she started on LMWH for DVT related to fall and mediport. She was found to have pseudomonas bacteremia and completed course of IV antibiotic on March 10, 2020. CT chest, abdomen and pelvis in March 2020 after 2 cycled of chemo showed response to therapy. She was discharged to rehab. She completed lovenox injection after 6 cycle of chemo due on June 5, 2020. She completed chemotherapy on June 5, 2020. PET CT in June 2020 scan revealed:   Significantly decreased size and uptake of the masses within the lower abdomen/pelvis, consistent with treatment response. Notable: ABDOMEN/PELVIS: No abnormal uptake within the solid organs. Significantly decreased size of the left paramidline lower abdominal mass which measures approximately 3.1 x 1.6 cm and demonstrates maximum SUV of 2.2. Significantly improved bilateral pelvic uptake with a residual focus of intense uptake in the right hemipelvis measuring approximately 2.8 x 2.8 cm and demonstrating maximum SUV of 8.0. She had pelvic radiation therapy from July 27, 2020 through August 20, 2020. She received 36 Gy. She has completed radiation therapy 8/26/2020. She continues to have loose stool. I will refer her to GI. Imaging studies optional since the PET scan is completely negative in November 2020. PET 11/19/2020: Impression    Continued response to treatment.  Abnormal uptake in the deep right    hemipelvis has resolved.  The lower abdominal mass appears slightly smaller,    although difficult to accurately measure without intravenous contrast, and    demonstrates similar mild uptake.  No new disease.       Anemic work-up work-up on November 13, 2020 was grossly unremarkable. December 24, 2020 she came in for follow-up visit. Bone density on December 21, 2020 showed osteoporosis at lumbar spine. I recommend to discuss with family doctor about oral versus IV bisphosphonate versus Reclast.  I recommend to continue with vitamin D and calcium. I remind her about Mediport flush every 3 months. She was seen by GI and reportedly colonoscopy in November 2020 was okay. She takes Imodium once or twice a day. Diarrhea has improved. Stool specimen was obtained today by GI. I will check CBC today. If WBC continues to drop I will consider bone marrow study. She denied any frequent infection. She denies any fever or chills. No headaches or dizzy spell. She denied any nausea or vomiting. She denied melena or hematuria. Past Medical History  Tubal ligation, tonsillectomy, colonoscopy in 2015 by Dr. Radha Lopez, and reportedly there was no polyp. She had a Pap smear in 2015 by Dr. Cy Whaley. She has bipolar disorder. Pap smear was in 2015, mammogram in March 2016. Shingles    Social History  Smoking Status Former smoker  She smoked 1 pack/day for 10 years and quit 30 years ago. She denies any alcohol or illicit drug use. Family History  Mother had breast cancer in her [de-identified], father had metastatic brain cancer. Previous Therapies:  R-CHOP     Interval History:  Presented for scheduled follow up. She reports ongoing diarrhea for which she is using imodium and lomotil. She reports she feels this is getting better. She denies fever, night sweats, no lumps or bumps. Energy and appetite are intact. She has no nausea, vomiting. Review of Systems: \"Per interval history; otherwise 10 point ROS is negative. \"     Vital Signs:  BP (!) 105/59 (Site: Right Upper Arm, Position: Sitting, Cuff Size: Medium Adult)   Pulse 92   Temp 97.2 °F (36.2 °C) (Infrared)   Ht 5' 3\" (1.6 m)   Wt 122 lb (55.3 kg)   LMP 01/11/2000   SpO2 100%   BMI 21.61 kg/m²     Physical Exam:    CONSTITUTIONAL: awake, alert, cooperative, no apparent distress   EYES: pupils equal, round and reactive to light, sclera clear and conjunctiva normal  ENT: Normocephalic, without obvious abnormality, atraumatic  NECK: supple, symmetrical, no jugular venous distension and no carotid bruits   HEMATOLOGIC/LYMPHATIC: no cervical, supraclavicular or axillary lymphadenopathy   LUNGS: no increased work of breathing and clear to auscultation   CARDIOVASCULAR: regular rate and rhythm, normal S1 and S2, no murmur noted  ABDOMEN: normal bowel sounds x 4, soft, non-distended, non-tender, no masses palpated, no hepatosplenomegaly. No rebound or guarding. MUSCULOSKELETAL: full range of motion noted, tone is normal  NEUROLOGIC: awake, alert, oriented to name, place and time. Motor skills grossly intact. SKIN: Normal skin color, texture, turgor and no jaundice. appears intact   EXTREMITIES: no LE edema or cyanosis.     Labs:    Hematology:  Lab Results   Component Value Date    WBC 1.6 (LL) 11/13/2020    RBC 3.20 (L) 11/13/2020    HGB 10.2 (L) 11/13/2020    HCT 30.5 (L) 11/13/2020    MCV 95.3 11/13/2020    MCH 31.9 (H) 11/13/2020    MCHC 33.4 11/13/2020    RDW 13.3 11/13/2020     11/13/2020    MPV 8.9 11/13/2020    BANDSPCT 2 (L) 03/08/2020    SEGSPCT 18.3 (L) 11/13/2020    EOSRELPCT 8.0 (H) 11/13/2020    BASOPCT 0.0 11/13/2020    LYMPHOPCT 35.0 11/13/2020    MONOPCT 38.7 (H) 11/13/2020    BANDABS 0.19 03/08/2020    SEGSABS 0.3 11/13/2020    EOSABS 0.1 11/13/2020    BASOSABS 0.0 11/13/2020    LYMPHSABS 0.6 11/13/2020    MONOSABS 0.6 11/13/2020    DIFFTYPE AUTOMATED DIFFERENTIAL 11/13/2020    ANISOCYTOSIS 1+ 03/08/2020    POLYCHROM 1+ 03/08/2020     Lab Results   Component Value Date    ESR 25 01/16/2020     Chemistry:  Lab Results   Component Value Date     11/13/2020    K 4.0 11/13/2020    CL 99 11/13/2020    CO2 26 11/13/2020    BUN 11 11/13/2020    CREATININE 0.8 11/13/2020    GLUCOSE 78 11/13/2020

## 2020-12-24 ENCOUNTER — OFFICE VISIT (OUTPATIENT)
Dept: ONCOLOGY | Age: 65
End: 2020-12-24
Payer: MEDICARE

## 2020-12-24 ENCOUNTER — HOSPITAL ENCOUNTER (OUTPATIENT)
Dept: INFUSION THERAPY | Age: 65
Discharge: HOME | End: 2020-12-24
Payer: MEDICARE

## 2020-12-24 VITALS
HEART RATE: 92 BPM | SYSTOLIC BLOOD PRESSURE: 105 MMHG | HEIGHT: 63 IN | OXYGEN SATURATION: 100 % | BODY MASS INDEX: 21.62 KG/M2 | WEIGHT: 122 LBS | TEMPERATURE: 97.2 F | DIASTOLIC BLOOD PRESSURE: 59 MMHG

## 2020-12-24 DIAGNOSIS — D64.9 ANEMIA, UNSPECIFIED TYPE: ICD-10-CM

## 2020-12-24 LAB
ALBUMIN SERPL-MCNC: 4.2 GM/DL (ref 3.4–5)
ALP BLD-CCNC: 74 IU/L (ref 40–129)
ALT SERPL-CCNC: 9 U/L (ref 10–40)
ANION GAP SERPL CALCULATED.3IONS-SCNC: 9 MMOL/L (ref 4–16)
AST SERPL-CCNC: 19 IU/L (ref 15–37)
BASOPHILS ABSOLUTE: 0 K/CU MM
BASOPHILS RELATIVE PERCENT: 0.5 % (ref 0–1)
BILIRUB SERPL-MCNC: 0.5 MG/DL (ref 0–1)
BUN BLDV-MCNC: 9 MG/DL (ref 6–23)
CALCIUM SERPL-MCNC: 9.5 MG/DL (ref 8.3–10.6)
CHLORIDE BLD-SCNC: 104 MMOL/L (ref 99–110)
CO2: 26 MMOL/L (ref 21–32)
CREAT SERPL-MCNC: 1 MG/DL (ref 0.6–1.1)
DIFFERENTIAL TYPE: ABNORMAL
EOSINOPHILS ABSOLUTE: 0.2 K/CU MM
EOSINOPHILS RELATIVE PERCENT: 5.7 % (ref 0–3)
GFR AFRICAN AMERICAN: >60 ML/MIN/1.73M2
GFR NON-AFRICAN AMERICAN: 56 ML/MIN/1.73M2
GLUCOSE BLD-MCNC: 84 MG/DL (ref 70–99)
HCT VFR BLD CALC: 33.1 % (ref 37–47)
HEMOGLOBIN: 10.9 GM/DL (ref 12.5–16)
LACTATE DEHYDROGENASE: 229 IU/L (ref 120–246)
LYMPHOCYTES ABSOLUTE: 0.8 K/CU MM
LYMPHOCYTES RELATIVE PERCENT: 21.6 % (ref 24–44)
MCH RBC QN AUTO: 31.1 PG (ref 27–31)
MCHC RBC AUTO-ENTMCNC: 32.9 % (ref 32–36)
MCV RBC AUTO: 94.3 FL (ref 78–100)
MONOCYTES ABSOLUTE: 0.4 K/CU MM
MONOCYTES RELATIVE PERCENT: 11.2 % (ref 0–4)
PDW BLD-RTO: 13.2 % (ref 11.7–14.9)
PLATELET # BLD: 201 K/CU MM (ref 140–440)
PMV BLD AUTO: 9.7 FL (ref 7.5–11.1)
POTASSIUM SERPL-SCNC: 4 MMOL/L (ref 3.5–5.1)
RBC # BLD: 3.51 M/CU MM (ref 4.2–5.4)
SEGMENTED NEUTROPHILS ABSOLUTE COUNT: 2.4 K/CU MM
SEGMENTED NEUTROPHILS RELATIVE PERCENT: 61 % (ref 36–66)
SODIUM BLD-SCNC: 139 MMOL/L (ref 135–145)
TOTAL PROTEIN: 6.1 GM/DL (ref 6.4–8.2)
WBC # BLD: 3.9 K/CU MM (ref 4–10.5)

## 2020-12-24 PROCEDURE — 83615 LACTATE (LD) (LDH) ENZYME: CPT

## 2020-12-24 PROCEDURE — 36415 COLL VENOUS BLD VENIPUNCTURE: CPT

## 2020-12-24 PROCEDURE — 4040F PNEUMOC VAC/ADMIN/RCVD: CPT | Performed by: INTERNAL MEDICINE

## 2020-12-24 PROCEDURE — 99211 OFF/OP EST MAY X REQ PHY/QHP: CPT

## 2020-12-24 PROCEDURE — 1090F PRES/ABSN URINE INCON ASSESS: CPT | Performed by: INTERNAL MEDICINE

## 2020-12-24 PROCEDURE — 80053 COMPREHEN METABOLIC PANEL: CPT

## 2020-12-24 PROCEDURE — 99214 OFFICE O/P EST MOD 30 MIN: CPT | Performed by: INTERNAL MEDICINE

## 2020-12-24 PROCEDURE — 1036F TOBACCO NON-USER: CPT | Performed by: INTERNAL MEDICINE

## 2020-12-24 PROCEDURE — 1123F ACP DISCUSS/DSCN MKR DOCD: CPT | Performed by: INTERNAL MEDICINE

## 2020-12-24 PROCEDURE — G8420 CALC BMI NORM PARAMETERS: HCPCS | Performed by: INTERNAL MEDICINE

## 2020-12-24 PROCEDURE — G8484 FLU IMMUNIZE NO ADMIN: HCPCS | Performed by: INTERNAL MEDICINE

## 2020-12-24 PROCEDURE — 85025 COMPLETE CBC W/AUTO DIFF WBC: CPT

## 2020-12-24 PROCEDURE — G8428 CUR MEDS NOT DOCUMENT: HCPCS | Performed by: INTERNAL MEDICINE

## 2020-12-24 PROCEDURE — G8400 PT W/DXA NO RESULTS DOC: HCPCS | Performed by: INTERNAL MEDICINE

## 2020-12-24 PROCEDURE — 3017F COLORECTAL CA SCREEN DOC REV: CPT | Performed by: INTERNAL MEDICINE

## 2020-12-24 RX ORDER — LOPERAMIDE HYDROCHLORIDE 2 MG/1
2 TABLET ORAL 4 TIMES DAILY PRN
Qty: 60 TABLET | Refills: 1 | Status: SHIPPED | OUTPATIENT
Start: 2020-12-24

## 2020-12-24 ASSESSMENT — PATIENT HEALTH QUESTIONNAIRE - PHQ9
SUM OF ALL RESPONSES TO PHQ QUESTIONS 1-9: 0
1. LITTLE INTEREST OR PLEASURE IN DOING THINGS: 0
SUM OF ALL RESPONSES TO PHQ QUESTIONS 1-9: 0
SUM OF ALL RESPONSES TO PHQ9 QUESTIONS 1 & 2: 0
SUM OF ALL RESPONSES TO PHQ QUESTIONS 1-9: 0
2. FEELING DOWN, DEPRESSED OR HOPELESS: 0

## 2021-02-17 ENCOUNTER — TELEPHONE (OUTPATIENT)
Dept: ONCOLOGY | Age: 66
End: 2021-02-17

## 2021-02-17 ENCOUNTER — OFFICE VISIT (OUTPATIENT)
Dept: ONCOLOGY | Age: 66
End: 2021-02-17
Payer: MEDICARE

## 2021-02-17 ENCOUNTER — HOSPITAL ENCOUNTER (OUTPATIENT)
Dept: INFUSION THERAPY | Age: 66
Discharge: HOME | End: 2021-02-17
Payer: MEDICARE

## 2021-02-17 VITALS
TEMPERATURE: 97.3 F | OXYGEN SATURATION: 97 % | SYSTOLIC BLOOD PRESSURE: 123 MMHG | HEIGHT: 63 IN | WEIGHT: 120 LBS | BODY MASS INDEX: 21.26 KG/M2 | HEART RATE: 86 BPM | DIASTOLIC BLOOD PRESSURE: 64 MMHG

## 2021-02-17 DIAGNOSIS — C85.16 B-CELL LYMPHOMA OF INTRAPELVIC LYMPH NODES, UNSPECIFIED B-CELL LYMPHOMA TYPE (HCC): Primary | ICD-10-CM

## 2021-02-17 DIAGNOSIS — R19.7 DIARRHEA, UNSPECIFIED TYPE: ICD-10-CM

## 2021-02-17 LAB
ALBUMIN SERPL-MCNC: 3.8 GM/DL (ref 3.4–5)
ALP BLD-CCNC: 75 IU/L (ref 40–129)
ALT SERPL-CCNC: 17 U/L (ref 10–40)
ANION GAP SERPL CALCULATED.3IONS-SCNC: 8 MMOL/L (ref 4–16)
AST SERPL-CCNC: 19 IU/L (ref 15–37)
BASOPHILS ABSOLUTE: 0 K/CU MM
BASOPHILS RELATIVE PERCENT: 0 % (ref 0–1)
BILIRUB SERPL-MCNC: 0.3 MG/DL (ref 0–1)
BUN BLDV-MCNC: 11 MG/DL (ref 6–23)
CALCIUM SERPL-MCNC: 9.1 MG/DL (ref 8.3–10.6)
CHLORIDE BLD-SCNC: 104 MMOL/L (ref 99–110)
CO2: 25 MMOL/L (ref 21–32)
CREAT SERPL-MCNC: 0.8 MG/DL (ref 0.6–1.1)
DIFFERENTIAL TYPE: ABNORMAL
EOSINOPHILS ABSOLUTE: 0.1 K/CU MM
EOSINOPHILS RELATIVE PERCENT: 3.1 % (ref 0–3)
GFR AFRICAN AMERICAN: >60 ML/MIN/1.73M2
GFR NON-AFRICAN AMERICAN: >60 ML/MIN/1.73M2
GLUCOSE BLD-MCNC: 86 MG/DL (ref 70–99)
HCT VFR BLD CALC: 29.9 % (ref 37–47)
HEMOGLOBIN: 9.9 GM/DL (ref 12.5–16)
LACTATE DEHYDROGENASE: 206 IU/L (ref 120–246)
LYMPHOCYTES ABSOLUTE: 0.5 K/CU MM
LYMPHOCYTES RELATIVE PERCENT: 32.7 % (ref 24–44)
MCH RBC QN AUTO: 30.7 PG (ref 27–31)
MCHC RBC AUTO-ENTMCNC: 33.1 % (ref 32–36)
MCV RBC AUTO: 92.9 FL (ref 78–100)
MONOCYTES ABSOLUTE: 0.6 K/CU MM
MONOCYTES RELATIVE PERCENT: 39.5 % (ref 0–4)
PDW BLD-RTO: 12.7 % (ref 11.7–14.9)
PLATELET # BLD: 218 K/CU MM (ref 140–440)
PMV BLD AUTO: 8.7 FL (ref 7.5–11.1)
POTASSIUM SERPL-SCNC: 4.1 MMOL/L (ref 3.5–5.1)
RBC # BLD: 3.22 M/CU MM (ref 4.2–5.4)
SEGMENTED NEUTROPHILS ABSOLUTE COUNT: 0.4 K/CU MM
SEGMENTED NEUTROPHILS RELATIVE PERCENT: 24.7 % (ref 36–66)
SODIUM BLD-SCNC: 137 MMOL/L (ref 135–145)
TOTAL PROTEIN: 5.9 GM/DL (ref 6.4–8.2)
WBC # BLD: 1.6 K/CU MM (ref 4–10.5)

## 2021-02-17 PROCEDURE — 83615 LACTATE (LD) (LDH) ENZYME: CPT

## 2021-02-17 PROCEDURE — G8427 DOCREV CUR MEDS BY ELIG CLIN: HCPCS | Performed by: NURSE PRACTITIONER

## 2021-02-17 PROCEDURE — G8420 CALC BMI NORM PARAMETERS: HCPCS | Performed by: NURSE PRACTITIONER

## 2021-02-17 PROCEDURE — 2580000003 HC RX 258: Performed by: INTERNAL MEDICINE

## 2021-02-17 PROCEDURE — 1123F ACP DISCUSS/DSCN MKR DOCD: CPT | Performed by: NURSE PRACTITIONER

## 2021-02-17 PROCEDURE — 3017F COLORECTAL CA SCREEN DOC REV: CPT | Performed by: NURSE PRACTITIONER

## 2021-02-17 PROCEDURE — 36591 DRAW BLOOD OFF VENOUS DEVICE: CPT

## 2021-02-17 PROCEDURE — 80053 COMPREHEN METABOLIC PANEL: CPT

## 2021-02-17 PROCEDURE — G8484 FLU IMMUNIZE NO ADMIN: HCPCS | Performed by: NURSE PRACTITIONER

## 2021-02-17 PROCEDURE — 1090F PRES/ABSN URINE INCON ASSESS: CPT | Performed by: NURSE PRACTITIONER

## 2021-02-17 PROCEDURE — 1036F TOBACCO NON-USER: CPT | Performed by: NURSE PRACTITIONER

## 2021-02-17 PROCEDURE — 85025 COMPLETE CBC W/AUTO DIFF WBC: CPT

## 2021-02-17 PROCEDURE — G8400 PT W/DXA NO RESULTS DOC: HCPCS | Performed by: NURSE PRACTITIONER

## 2021-02-17 PROCEDURE — 99213 OFFICE O/P EST LOW 20 MIN: CPT | Performed by: NURSE PRACTITIONER

## 2021-02-17 PROCEDURE — 99211 OFF/OP EST MAY X REQ PHY/QHP: CPT

## 2021-02-17 PROCEDURE — 6360000002 HC RX W HCPCS: Performed by: INTERNAL MEDICINE

## 2021-02-17 PROCEDURE — 4040F PNEUMOC VAC/ADMIN/RCVD: CPT | Performed by: NURSE PRACTITIONER

## 2021-02-17 RX ORDER — SODIUM CHLORIDE 0.9 % (FLUSH) 0.9 %
20 SYRINGE (ML) INJECTION PRN
Status: CANCELLED | OUTPATIENT
Start: 2021-02-17

## 2021-02-17 RX ORDER — SODIUM CHLORIDE 0.9 % (FLUSH) 0.9 %
10 SYRINGE (ML) INJECTION PRN
Status: CANCELLED | OUTPATIENT
Start: 2021-02-17

## 2021-02-17 RX ORDER — HEPARIN SODIUM (PORCINE) LOCK FLUSH IV SOLN 100 UNIT/ML 100 UNIT/ML
500 SOLUTION INTRAVENOUS PRN
Status: DISCONTINUED | OUTPATIENT
Start: 2021-02-17 | End: 2021-02-18 | Stop reason: HOSPADM

## 2021-02-17 RX ORDER — HEPARIN SODIUM (PORCINE) LOCK FLUSH IV SOLN 100 UNIT/ML 100 UNIT/ML
500 SOLUTION INTRAVENOUS PRN
Status: CANCELLED | OUTPATIENT
Start: 2021-02-17

## 2021-02-17 RX ORDER — SODIUM CHLORIDE 0.9 % (FLUSH) 0.9 %
20 SYRINGE (ML) INJECTION PRN
Status: DISCONTINUED | OUTPATIENT
Start: 2021-02-17 | End: 2021-02-18 | Stop reason: HOSPADM

## 2021-02-17 RX ADMIN — Medication 500 UNITS: at 11:41

## 2021-02-17 RX ADMIN — SODIUM CHLORIDE, PRESERVATIVE FREE 20 ML: 5 INJECTION INTRAVENOUS at 11:41

## 2021-02-17 ASSESSMENT — PATIENT HEALTH QUESTIONNAIRE - PHQ9
SUM OF ALL RESPONSES TO PHQ QUESTIONS 1-9: 0
2. FEELING DOWN, DEPRESSED OR HOPELESS: 0
SUM OF ALL RESPONSES TO PHQ QUESTIONS 1-9: 0
1. LITTLE INTEREST OR PLEASURE IN DOING THINGS: 0
SUM OF ALL RESPONSES TO PHQ9 QUESTIONS 1 & 2: 0

## 2021-02-17 NOTE — PROGRESS NOTES
MA Rooming Questions  Patient: Hudson Mcdonald  MRN: [id number]    Date: 2/17/2021        1. Do you have any new issues? Yes- patient has been experiencing diareeah since diagnosis with cancer          2. Do you need any refills on medications?    no    3. Have you had any imaging done since your last visit?   no    4. Have you been hospitalized or seen in the emergency room since your last visit here?   no    5. Did the patient have a depression screening completed today?  Yes    PHQ-9 Total Score: 0 (2/17/2021 11:04 AM)       PHQ-9 Given to (if applicable):               PHQ-9 Score (if applicable):                     [] Positive     []  Negative              Does question #9 need addressed (if applicable)                     [] Yes    []  No               Jovan Nowak MA

## 2021-02-17 NOTE — PROGRESS NOTES
seen on any mammogram dating back beyond 2015. She opted to go to see the surgeon; therefore, I referred her to see Dr. Gail Figueroa. She was last seen in our office 9/28/2016. Ms. Jessy Lozoya was seen by Dr. Gail Figueroa March 3, 2019 for evaluation of enlarging, tender mass in right axilla. She had excisional lymph node biopsy on March 12, 2019. Pathology revealed granulomatous lymphadenitis. Mammogram 2/1/2019 was benign. She had EGD and colonoscopy on 12/10/2019. EGD was unremarkable. Colonoscopy revealed in infiltrative, ulcerated and necrotic, non bleeding 50 mm mass of malignant appearance was found in the rectum at a distance between 12 cm and 18 cm from the anus. The mass caused a partial obstruction. Pathology from the mass revealed ulcerated large bowel mucosa with dense lymphoid infiltrate and abundant necrosis. - Negative for carcinoma. The lymphoid infiltrate and necrosis are suspicious for lymphoma. Results of flow cytometry revealed no flow immunophenotypic evidence of a lymphoproliferative disorder based on limited antibody panel. CT abdomen/pelvis on December 23, 2019 showed a heterogeneous solid 9.5 x 6.1 cm soft tissue mass within the right posterior pelivs which abuts the posterior margin of the uterus and involves the right lagteral margin of the rectosigmoid junction, indeterminate. This mass is concerning for malignancy, with uterine origin being favored, such as uterine sarcoma with suspected invasion of the rectosigmoid junction. In addition, a 5.8 x 10.5 x 1.1 cm central mesenteric soft tissue mass is seen, suspected metastatic disease. Additional uterine masses are seen which may relate to fibroids or extension of uterine sarcoma. A possible 6 mm pleural based nodule within the posterior left lower lobe, indeterminate in the setting of  suspected malignacy. She was seen by Dr. Danii Bates on December 26, 2019 for review of colonscopy/egd results.  She was referred to urology due to early hydronephrosis. I discussed with pathologist to review the pathology report from rectal biopsy. She lost 30 pounds in the last 6 months. Mammogram was in 2018. She just recovered from shingle. She was prescribed Bentyl by Dr. Emily Jacobs. In January 2020 official pathology report showed diffuse large B-cell lymphoma, no double hit or triple hit. BCL 6 was positive. We discussed about potential treatment with R-CHOP. I scheduled for PET CT scan, CBC, CMP, acute viral hepatitis serology, HIV, echo, and Mediport placement. She may need consolidation radiation therapy after completion of the chemo. We discussed about the potential risks and benefit of the chemotherapy. Clinically she could have stage IIb or IIIb. She has first chemo on January 17, 2020. ECHO in January 2020 showed LVEF at 50%. Clinically she looks better. Hep B/C were negative. PET CT scan on 1/27/2020 showed:  1. Intense metabolic activity associated with the abdominal and pelvic  masses described above consistent with active lymphoma. Prior imaging has been requested and an addendum will be provided to described any interval changes. 2.  Linear FDG activity along the distal tip of the MediPort, which is in the  left brachiocephalic vein just superior to the SVC. This can be seen with a  fibrin sheath. Correlate with current functionality of the MediPort. ECHO in January 2020: Left ventricular systolic function is low normal. ? Ejection fraction is visually estimated at 50%. Paradoxical motion of the interventricular septum; possible conduction ?delay. ?Indeterminate diastolic function; E/A flow reversal is noted. ?Mitral valve prolapse is present. ?Moderate to severe mitral regurgitation is present. ?Moderate tricuspid regurgitation is present. RVSP is 25 mmHg. ? No evidence of any pericardial effusion. ?Mobile interatrial septum. ? Incidental finding: cyst vs mass near/attached to the right kidney.     In March 2020 she was hospitalized due to anemia related GI related to colonic mass/lymphoma. She has mulitple blood transfusion. She fell at home prior to hospitalization and developed hematoma to left breast.  Mediport was placed. She had EGD and colonoscopy in March 2020. After GIB is controlled she started on LMWH for DVT related to fall and mediport. She was found to have pseudomonas bacteremia and completed course of IV antibiotic on March 10, 2020. CT chest, abdomen and pelvis in March 2020 after 2 cycled of chemo showed response to therapy. She was discharged to rehab. She completed lovenox injection after 6 cycle of chemo due on June 5, 2020. She completed chemotherapy on June 5, 2020. PET CT in June 2020 scan revealed:   Significantly decreased size and uptake of the masses within the lower abdomen/pelvis, consistent with treatment response. Notable: ABDOMEN/PELVIS: No abnormal uptake within the solid organs. Significantly decreased size of the left paramidline lower abdominal mass which measures approximately 3.1 x 1.6 cm and demonstrates maximum SUV of 2.2. Significantly improved bilateral pelvic uptake with a residual focus of intense uptake in the right hemipelvis measuring approximately 2.8 x 2.8 cm and demonstrating maximum SUV of 8.0. She had pelvic radiation therapy from July 27, 2020 through August 20, 2020. She received 36 Gy. She has completed radiation therapy 8/26/2020. She continues to have loose stool. I will refer her to GI. Imaging studies optional since the PET scan is completely negative in November 2020. PET 11/19/2020: Impression    Continued response to treatment.  Abnormal uptake in the deep right    hemipelvis has resolved.  The lower abdominal mass appears slightly smaller,    although difficult to accurately measure without intravenous contrast, and    demonstrates similar mild uptake.  No new disease.       Anemic work-up work-up on November 13, 2020 was grossly unremarkable. Bone density on December 21, 2020 showed osteoporosis at lumbar spine. I recommend to discuss with family doctor about oral versus IV bisphosphonate versus Reclast.I recommend to continue with vitamin D and calcium. Presented on February 17, 2021 for scheduled follow up. She has been seen by GI for ongoing diarrhea. She was given antibiotic and asked to take metamucil. She reports she had worsening of diarrhea and stopped this regimen. She has follow up in March. I asked her to call and inform practitioner. She does continue to take imodium bid. Weight is down slightly. She declined nutrition consult. We did discuss nutritional supplementation. She has had leukocytosis, but no frequent infection. We will will monitor CBC, CMP and LDH today. She denies new lumps or bumps, no fever, chills, night sweats, no headache, dizziness, no nausea, vomiting, no hematuria or melena. Past Medical History  Tubal ligation, tonsillectomy, colonoscopy in 2015 by Dr. Radhika Hinton, and reportedly there was no polyp. She had a Pap smear in 2015 by Dr. Orantes. She has bipolar disorder. Pap smear was in 2015, mammogram in March 2016. [place]    Social History  Smoking Status Former smoker  She smoked 1 pack/day for 10 years and quit 30 years ago. She denies any alcohol or illicit drug use. Family History  Mother had breast cancer in her [de-identified], father had metastatic brain cancer. Previous Therapies:  R-CHOP     Review of Systems: \"Per interval history; otherwise 10 point ROS is negative. \"     Vital Signs:  /64 (Site: Right Upper Arm, Position: Sitting, Cuff Size: Medium Adult)   Pulse 86   Temp 97.3 °F (36.3 °C) (Infrared)   Ht 5' 3\" (1.6 m)   Wt 120 lb (54.4 kg)   LMP 01/11/2000   SpO2 97%   BMI 21.26 kg/m²     Physical Exam:    CONSTITUTIONAL: awake, alert, cooperative, no apparent distress   EYES: pupils equal, round and reactive to light, sclera clear and conjunctiva normal  ENT: Normocephalic, without obvious abnormality, atraumatic  NECK: supple, symmetrical, no jugular venous distension and no carotid bruits   HEMATOLOGIC/LYMPHATIC: no cervical, supraclavicular or axillary lymphadenopathy   LUNGS: no increased work of breathing and clear to auscultation   CARDIOVASCULAR: regular rate and rhythm, normal S1 and S2, no murmur noted  ABDOMEN: normal bowel sounds x 4, soft, non-distended, non-tender, no masses palpated, no hepatosplenomegaly. No rebound or guarding. MUSCULOSKELETAL: full range of motion noted, tone is normal  NEUROLOGIC: awake, alert, oriented to name, place and time. Motor skills grossly intact. SKIN: Normal skin color, texture, turgor and no jaundice. appears intact   EXTREMITIES: no LE edema or cyanosis.     Labs:    Hematology:  Lab Results   Component Value Date    WBC 1.6 (LL) 02/17/2021    RBC 3.22 (L) 02/17/2021    HGB 9.9 (L) 02/17/2021    HCT 29.9 (L) 02/17/2021    MCV 92.9 02/17/2021    MCH 30.7 02/17/2021    MCHC 33.1 02/17/2021    RDW 12.7 02/17/2021     02/17/2021    MPV 8.7 02/17/2021    BANDSPCT 2 (L) 03/08/2020    SEGSPCT 24.7 (L) 02/17/2021    EOSRELPCT 3.1 (H) 02/17/2021    BASOPCT 0.0 02/17/2021    LYMPHOPCT 32.7 02/17/2021    MONOPCT 39.5 (H) 02/17/2021    BANDABS 0.19 03/08/2020    SEGSABS 0.4 02/17/2021    EOSABS 0.1 02/17/2021    BASOSABS 0.0 02/17/2021    LYMPHSABS 0.5 02/17/2021    MONOSABS 0.6 02/17/2021    DIFFTYPE AUTOMATED DIFFERENTIAL 02/17/2021    ANISOCYTOSIS 1+ 03/08/2020    POLYCHROM 1+ 03/08/2020     Lab Results   Component Value Date    ESR 25 01/16/2020     Chemistry:  Lab Results   Component Value Date     12/24/2020    K 4.0 12/24/2020     12/24/2020    CO2 26 12/24/2020    BUN 9 12/24/2020    CREATININE 1.0 12/24/2020    GLUCOSE 84 12/24/2020    CALCIUM 9.5 12/24/2020    PROT 6.1 (L) 12/24/2020    LABALBU 4.2 12/24/2020    BILITOT 0.5 12/24/2020    ALKPHOS 74 12/24/2020    AST 19 12/24/2020    ALT 9 (L) 12/24/2020 LABGLOM 56 (L) 12/24/2020    GFRAA >60 12/24/2020    PHOS 2.7 03/05/2020    MG 2.0 02/25/2020    POCGLU 132 (H) 02/25/2020     Lab Results   Component Value Date     12/24/2020     Lab Results   Component Value Date    TSHHS 2.350 04/14/2020    T4FREE 1.28 04/14/2020     Immunology:  Lab Results   Component Value Date    PROT 6.1 (L) 12/24/2020     Coagulation Panel:  Lab Results   Component Value Date    PROTIME 16.5 (H) 02/28/2020    INR 1.36 02/28/2020    APTT 36.9 02/28/2020     Anemia Panel:  Lab Results   Component Value Date    AIPNZGTK19 2319 (H) 11/13/2020    FOLATE >20.0 (H) 11/13/2020        Assessment & Plan:    1. She was diagnosed with diffuse large B-cell lymphoma, no double hit. She has first R-CHOP in January 7, 2020. [facility] ECHO showed LVEF at 50%. She had Mediport placement. She has second cycle R-CHOP in February 2020. 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 CT chest, abdomen and pelvis showed response. She completed course of antibiotic for pseudomonas bacteremia. She resumed chemotherapy on April 3, 2020. 18 2020. She completed chemotherapy on June 5, 2020. PET CT scan on 6/18/2020 showed response, still with intense uptake in the right hemipelvis measuring approximately 2.8 x 2.8 cm and demonstrating maximum SUV of 8.0. She completed pelvic radiation on 8/26/2020. PET scan in November 2020 showed remission. She is agreeable to continue with active surveillance. Imaging studies optional.  No new symptoms suggestive of recurrence. Clinically she is in remission. 2. She had LUE DVT and I recommend to continue with LMWH. I recommend to keep LUE and lower extremities elevated. LUE swelling has improved significantly. Lovenox discontinued 8/2020.    3. Hydronephrosis:  She was referred to see urologist.     4. She had shingle to lower abdomen. She was on valacyclovir. Resolved. 5. She had GIB related to colon ulcer/lymphoma s/p blood transfusion.  Iron studies from April/2020 were reviewed. Reportedly colonoscopy November 2020 was fine. Stool tests was ordered by GI on December 24, 2020. Diarrhea improved slightly. She take imodium bid. Will follow up with GI March 2021.     6. She has leukopenia. If this continues to get worse I may consider a bone marrow study. We will obtain CBC today. Weight loss: Declines nutrition contult    Port flush 2/17/2021; next due May 2021    Return to clinic in 8 weeks or sooner. All of her questions have been answered for today. Recent imaging and labs were reviewed and discussed with the patient.

## 2021-02-17 NOTE — TELEPHONE ENCOUNTER
Spoke with pt about lab results. Explained neutropenic precautions and reinforced the importance of them. Will phone pt tomorrow to check on status and discuss when CBC will be rechecked. NP notified of results via text.

## 2021-02-18 ENCOUNTER — TELEPHONE (OUTPATIENT)
Dept: ONCOLOGY | Age: 66
End: 2021-02-18

## 2021-02-22 ENCOUNTER — OFFICE VISIT (OUTPATIENT)
Dept: CARDIOLOGY CLINIC | Age: 66
End: 2021-02-22
Payer: MEDICARE

## 2021-02-22 VITALS
DIASTOLIC BLOOD PRESSURE: 66 MMHG | HEART RATE: 68 BPM | BODY MASS INDEX: 21.3 KG/M2 | SYSTOLIC BLOOD PRESSURE: 100 MMHG | WEIGHT: 120.2 LBS | HEIGHT: 63 IN

## 2021-02-22 DIAGNOSIS — I05.9 MITRAL VALVE DISEASE: ICD-10-CM

## 2021-02-22 DIAGNOSIS — R00.0 TACHYCARDIA: ICD-10-CM

## 2021-02-22 DIAGNOSIS — I95.0 IDIOPATHIC HYPOTENSION: ICD-10-CM

## 2021-02-22 DIAGNOSIS — D61.818 PANCYTOPENIA (HCC): ICD-10-CM

## 2021-02-22 DIAGNOSIS — C85.16 B-CELL LYMPHOMA OF INTRAPELVIC LYMPH NODES, UNSPECIFIED B-CELL LYMPHOMA TYPE (HCC): ICD-10-CM

## 2021-02-22 PROCEDURE — 1090F PRES/ABSN URINE INCON ASSESS: CPT | Performed by: INTERNAL MEDICINE

## 2021-02-22 PROCEDURE — G8400 PT W/DXA NO RESULTS DOC: HCPCS | Performed by: INTERNAL MEDICINE

## 2021-02-22 PROCEDURE — G8484 FLU IMMUNIZE NO ADMIN: HCPCS | Performed by: INTERNAL MEDICINE

## 2021-02-22 PROCEDURE — 99214 OFFICE O/P EST MOD 30 MIN: CPT | Performed by: INTERNAL MEDICINE

## 2021-02-22 PROCEDURE — G8427 DOCREV CUR MEDS BY ELIG CLIN: HCPCS | Performed by: INTERNAL MEDICINE

## 2021-02-22 PROCEDURE — 4040F PNEUMOC VAC/ADMIN/RCVD: CPT | Performed by: INTERNAL MEDICINE

## 2021-02-22 PROCEDURE — 1123F ACP DISCUSS/DSCN MKR DOCD: CPT | Performed by: INTERNAL MEDICINE

## 2021-02-22 PROCEDURE — 1036F TOBACCO NON-USER: CPT | Performed by: INTERNAL MEDICINE

## 2021-02-22 PROCEDURE — 3017F COLORECTAL CA SCREEN DOC REV: CPT | Performed by: INTERNAL MEDICINE

## 2021-02-22 PROCEDURE — 93000 ELECTROCARDIOGRAM COMPLETE: CPT | Performed by: INTERNAL MEDICINE

## 2021-02-22 PROCEDURE — G8420 CALC BMI NORM PARAMETERS: HCPCS | Performed by: INTERNAL MEDICINE

## 2021-02-22 RX ORDER — METRONIDAZOLE 500 MG/1
500 TABLET ORAL 3 TIMES DAILY
COMMUNITY
End: 2021-08-16

## 2021-02-22 NOTE — LETTER
[address]ie [address]   1955  [id number]    Have you had any Chest Pain that is not new? - No       ? DO EKG IF: Patient has a Heart Rate above 100 or below 40     CAD (Coronary Artery Disease) patient should have one on file every 6 months        Have you had any Shortness of Breath - No     Have you had any dizziness - Yes, ongoing, occasional, unchanged  If Yes DO ORTHOSTATIC BP - when do you feel dizzy walking   How long does it last .2  minutes     ? Sitting wait 5 minutes do supine (laying down) wait 5 minutes then do standing - log each in \"vitals\" area in Epic  ? Be sure to ask what symptoms they are having if they get dizzy while completing ortho stats such as room spinning, nausea, etc.    Have you had any palpitations that are not new? - No     Is the patient on any of the following medications - NONE  If Yes DO EKG - Needs done every 6 months    Do you have any edema - swelling in NONE    If Yes - CHECK TO SEE IF THE EDEMA IS PITTING      Do you have a surgery or procedure scheduled in the near future - No  ? If Yes- DO EKG  ?   ? Ask patient if they want to sign up for [employer] if they are not already signed up    ? Check to see if we have an E-MAIL on file for the patient    ? Check medication list thoroughly!!! AND RECONCILE OUTSIDE MEDICATIONS  If dose has changed change the entire order not just the MG  BE SURE TO ASK PATIENT IF THEY NEED MEDICATION REFILLS    ?  At check out add to every patient's \"wrap up\" the following dot phrase AFTERHOURSEDUCATION and ensure we explain this to our patients

## 2021-02-22 NOTE — PROGRESS NOTES
CARDIOLOGY  NOTE        Chief Complaint: Hypotention    HPI:   Elias Muse is a 72y.o. year old who has Past medical history as noted below. She finished chemo and radiation in July 2020, She says blood pressure is better . She is not on midodrine any more  She is on metoprolol due to tachycardia though she denies any palpitations her echo shows severe MR. No ankle swelling   Elias Muse has history of bipolar disorder and lymphoma. She has had several sessions of chemotherapy . Initially she was symptomatic with blood pressure in the 80s causing dizziness and lightheadedness but it is improved after midodrine. She is not on midodrine since chemo is done    echo  In Jan 2020 showed possible mitral valve prolapse although there is moderate mitral regurgitation  Denies any signs of congestive heart failure like ankle swelling shortness of breath her main complaints of feeling tired and fatigued low energy      Current Outpatient Medications   Medication Sig Dispense Refill    metroNIDAZOLE (FLAGYL) 500 MG tablet Take 500 mg by mouth 3 times daily      Probiotic Product (ACIDOPHILUS PROBIOTIC BLEND PO) Take 1 tablet by mouth daily      loperamide (IMODIUM A-D) 2 MG tablet Take 1 tablet by mouth 4 times daily as needed for Diarrhea 60 tablet 1    metoprolol tartrate (LOPRESSOR) 25 MG tablet Take 1 tablet by mouth 2 times daily 180 tablet 3    Omega-3 Fatty Acids (FISH OIL) 1000 MG CAPS Take 1,000 mg by mouth daily      Cholecalciferol (VITAMIN D) 50 MCG (2000 UT) CAPS capsule Take 1 capsule by mouth daily      divalproex (DEPAKOTE ER) 500 MG extended release tablet Take 2 tablets by mouth nightly 30 tablet 0    MULTIPLE VITAMIN PO Take by mouth daily      calcium carbonate 600 MG TABS tablet Take 1 tablet by mouth daily       No current facility-administered medications for this visit.         Allergies:   Abilify [aripiprazole], Iodine, Penicillins, and Codeine    Patient History:  Past Medical History:   Diagnosis Date    [facility]-cell [facility] [address]     Bipolar 1 disorder ([facility] [address]     \"on Depakote for this\"    History of external beam radiation therapy     Pelvis 3,600 cGy [facility]     Past Surgical History:   Procedure Laterality Date    COLONOSCOPY  2015    normal colon    COLONOSCOPY N/A 2020    COLONOSCOPY DIAGNOSTIC performed by Vashti Barrientos MD at [address] [place] [address] N/A 2020    PORT INSERTION performed by Pedro Shankar MD at [address] Left 2020    PORT REMOVAL LEFT performed by Pedro Shankar MD at [address] [facility] Right 3/10/2020    RIGHT PORT INSERTION performed by Pedro Shankar MD at [address] [place] [address]  as a kid    TUBAL LIGATION  20+ yrs ago    UPPER GASTROINTESTINAL ENDOSCOPY N/A 2020    EGD DIAGNOSTIC ONLY performed by Vashti Barrientos MD at [facility] ENDOSCOPY     Family History   Problem Relation Age of Onset    Breast Cancer Mother     High Blood Pressure Mother     Cancer Father         \"cancer in the bowel\"     Social History     Tobacco Use    Smoking status: Former Smoker     Packs/day: 1.00     Years: 10.00     Pack years: 10.00     Types: Cigarettes     Quit date: 1988     Years since quittin.1    Smokeless tobacco: Never Used   Substance Use Topics    Alcohol use: No     Comment: 1-2 CUPS OF HOT TEA        Review of Systems:   Constitutional: No Fever or Weight Loss   Eyes: No Decreased Vision  ENT: No Headaches, Hearing Loss or Vertigo  Cardiovascular: as per note above   Respiratory: No cough or wheezing and as per note above.    Gastrointestinal: No abdominal pain, appetite loss, blood in stools, constipation, diarrhea or heartburn  Genitourinary: No dysuria, trouble voiding, or hematuria  Musculoskeletal:  denies any new  joint aches , swelling  or pain   Integumentary: No rash or pruritis  Neurological: No TIA or stroke symptoms  Psychiatric: No anxiety or depression  Endocrine: No malaise, fatigue or temperature intolerance  Hematologic/Lymphatic: No bleeding problems, blood clots or swollen lymph nodes  Allergic/Immunologic: No nasal congestion or hives    Objective:      Physical Exam:  /66 (Site: Left Upper Arm, Position: Sitting, Cuff Size: Medium Adult)   Pulse 68   Ht 5' 3\" (1.6 m)   Wt 120 lb 3.2 oz (54.5 kg)   LMP 01/11/2000   BMI 21.29 kg/m²   Wt Readings from Last 3 Encounters:   02/22/21 120 lb 3.2 oz (54.5 kg)   02/17/21 120 lb (54.4 kg)   12/24/20 122 lb (55.3 kg)     Body mass index is 21.29 kg/m². Vitals:    02/22/21 1001   BP: 100/66   Pulse: 68        General Appearance:  No distress, conversant  Constitutional:  Well developed, Well nourished, No acute distress, Non-toxic appearance. HENT:  Normocephalic, Atraumatic, Bilateral external ears normal, Oropharynx moist, No oral exudates, Nose normal. Neck- Normal range of motion, No tenderness, Supple, No stridor,no apical-carotid delay  Eyes:  PERRL, EOMI, Conjunctiva normal, No discharge. Respiratory:  Normal breath sounds, No respiratory distress, No wheezing, No chest tenderness. ,no use of accessory muscles, NO crackles  Cardiovascular: (PMI) apex non displaced,no lifts no thrills,S1 and S2 audible, systolic murmur sounds, No signs of ankle edema, or volume overload, No evidence of JVD, No crackles  GI:  Bowel sounds normal, Soft, No tenderness, No masses, No gross visceromegaly   :  No costovertebral angle tenderness   Musculoskeletal:  No edema, no tenderness, no deformities.  Back- no tenderness  Integument:  Well hydrated, no rash   Lymphatic:  No lymphadenopathy noted   Neurologic:  Alert & oriented x 3, CN 2-12 normal, normal motor function, normal sensory function, no focal deficits noted   Psychiatric:  Speech and behavior appropriate               Medical decision making and Data review:  DATA:  Lab Results   Component Value Date    TROPONINT <0.010 02/27/2020 BNP:  No results found for: PROBNP  PT/INR:  No results found for: PTINR  No results found for: LABA1C  Lab Results   Component Value Date    CHOL 234 06/19/2017    TRIG 130 06/19/2017    HDL 66 06/19/2017    LDLCALC 142 06/19/2017     Lab Results   Component Value Date    ALT 17 02/17/2021    AST 19 02/17/2021     No results for input(s): WBC, HGB, HCT, MCV, PLT in the last 72 hours. TSH: No results found for: TSH  Lab Results   Component Value Date    AST 19 02/17/2021    ALT 17 02/17/2021    BILITOT 0.3 02/17/2021    ALKPHOS 75 02/17/2021     No results found for: PROBNP  No results found for: LABA1C  Lab Results   Component Value Date    WBC 1.6 (LL) 02/17/2021    HGB 9.9 (L) 02/17/2021    HCT 29.9 (L) 02/17/2021     02/17/2021     Echo 1/16/2020   Summary   Left ventricular systolic function is low normal.   Ejection fraction is visually estimated at 50%. Paradoxical motion of the interventricular septum; possible conduction   delay. Indeterminate diastolic function; E/A flow reversal is noted. Mitral valve prolapse is present. Moderate to severe mitral regurgitation is present. Moderate tricuspid regurgitation is present. RVSP is 25 mmHg. No evidence of any pericardial effusion. Mobile interatrial septum. Incidental finding: cyst vs mass near/attached to the right kidney. All labs, medications and tests reviewed by myself including data and history from outside source , patient and available family . Assessment & Plan:      1. B-cell lymphoma of intrapelvic lymph nodes, unspecified B-cell lymphoma type ([facility] [address]    2. Idiopathic hypotension    3. Mitral valve disease    4. Pancytopenia ([address]    5. Tachycardia         Idiopathic hypotension  Improved that she is not on chemo anymore     Tachycardia  Continue  metoprolol 25 mg , at risk for afib?  No documented afib so far       Mitral valve disease  She does seems to have moderate mitral regurgitation with mitral valve prolapse may eventually need  STEVEN  she is more physically improved at this stage we will continue to monitor her mitral valve she does not appear to be in heart failure or seem to be affected by MR. Check echo again before deciding to get STEVEN     Dyslipidemia :  All available lab work was reviewed. Necessary orders were placed , instructions given by myself       Counseled extensively and medication compliance urged. We discussed that for the  prevention of ASCVD our  goal is aggressive risk modification. Patient is encouraged to exercise even a brisk walk for 30 minutes  at least 3 to 4 times a week   Various goals were discussed and questions answered. Continue current medications. Appropriate prescriptions are addressed and refills ordered. Questions answered and patient verbalizes understanding. Call for any problems, questions, or concerns. Continue all other medications of all above medical condition listed as is. Return in about 6 months (around 8/22/2021). Please note this report has been partially produced using speech recognition software and may contain errors related to that system including errors in grammar, punctuation, and spelling, as well as words and phrases that may be inappropriate. If there are any questions or concerns please feel free to contact the dictating provider for clarification. An  electronic signature was used to authenticate this note.     --Anne Fisher MD on 5/12/2020 at 2:42 PM    [id number]}

## 2021-02-22 NOTE — PATIENT INSTRUCTIONS
**It is YOUR responsibilty to bring medication bottles and/or updated medication list to [address] [place] [address]. This will allow us to better serve you and all your healthcare needs**      Please be informed that if you contact our office outside of normal business hours the physician on call cannot help with any scheduling or rescheduling issues, procedure instruction questions or any type of medication issue. We advise you for any urgent/emergency that you go to the nearest emergency room!     PLEASE CALL OUR OFFICE DURING NORMAL BUSINESS HOURS    Monday - Friday   8 am to 5 pm    [place]: [address]: [phone]    [place]:  [phone]

## 2021-02-23 ENCOUNTER — HOSPITAL ENCOUNTER (OUTPATIENT)
Dept: INFUSION THERAPY | Age: 66
Discharge: HOME | End: 2021-02-23
Payer: MEDICARE

## 2021-02-23 DIAGNOSIS — C85.16 B-CELL LYMPHOMA OF INTRAPELVIC LYMPH NODES, UNSPECIFIED B-CELL LYMPHOMA TYPE (HCC): ICD-10-CM

## 2021-02-23 LAB
BASOPHILS ABSOLUTE: 0 K/CU MM
BASOPHILS RELATIVE PERCENT: 0.3 % (ref 0–1)
DIFFERENTIAL TYPE: ABNORMAL
EOSINOPHILS ABSOLUTE: 0.1 K/CU MM
EOSINOPHILS RELATIVE PERCENT: 2 % (ref 0–3)
HCT VFR BLD CALC: 30.5 % (ref 37–47)
HEMOGLOBIN: 10.2 GM/DL (ref 12.5–16)
LYMPHOCYTES ABSOLUTE: 0.8 K/CU MM
LYMPHOCYTES RELATIVE PERCENT: 27.6 % (ref 24–44)
MCH RBC QN AUTO: 30.9 PG (ref 27–31)
MCHC RBC AUTO-ENTMCNC: 33.4 % (ref 32–36)
MCV RBC AUTO: 92.4 FL (ref 78–100)
MONOCYTES ABSOLUTE: 0.6 K/CU MM
MONOCYTES RELATIVE PERCENT: 20.8 % (ref 0–4)
PDW BLD-RTO: 12.8 % (ref 11.7–14.9)
PLATELET # BLD: 239 K/CU MM (ref 140–440)
PMV BLD AUTO: 9.1 FL (ref 7.5–11.1)
RBC # BLD: 3.3 M/CU MM (ref 4.2–5.4)
SEGMENTED NEUTROPHILS ABSOLUTE COUNT: 1.4 K/CU MM
SEGMENTED NEUTROPHILS RELATIVE PERCENT: 49.3 % (ref 36–66)
WBC # BLD: 2.9 K/CU MM (ref 4–10.5)

## 2021-02-23 PROCEDURE — 36415 COLL VENOUS BLD VENIPUNCTURE: CPT

## 2021-02-23 PROCEDURE — 85025 COMPLETE CBC W/AUTO DIFF WBC: CPT

## 2021-02-24 ENCOUNTER — HOSPITAL ENCOUNTER (OUTPATIENT)
Dept: GENERAL RADIOLOGY | Age: 66
Discharge: HOME | End: 2021-02-24
Payer: MEDICARE

## 2021-02-24 DIAGNOSIS — C18.8 MALIGNANT NEOPLASM OF OVERLAPPING SITES OF COLON (HCC): ICD-10-CM

## 2021-02-24 DIAGNOSIS — R10.84 ABDOMINAL PAIN, GENERALIZED: ICD-10-CM

## 2021-02-24 DIAGNOSIS — R19.7 DIARRHEA OF PRESUMED INFECTIOUS ORIGIN: ICD-10-CM

## 2021-02-24 DIAGNOSIS — K52.9 INFLAMMATORY BOWEL DISEASE: ICD-10-CM

## 2021-02-24 PROCEDURE — 74250 X-RAY XM SM INT 1CNTRST STD: CPT

## 2021-03-17 ENCOUNTER — PROCEDURE VISIT (OUTPATIENT)
Dept: CARDIOLOGY CLINIC | Age: 66
End: 2021-03-17
Payer: MEDICARE

## 2021-03-17 DIAGNOSIS — D61.818 PANCYTOPENIA (HCC): ICD-10-CM

## 2021-03-17 DIAGNOSIS — C85.16 B-CELL LYMPHOMA OF INTRAPELVIC LYMPH NODES, UNSPECIFIED B-CELL LYMPHOMA TYPE (HCC): ICD-10-CM

## 2021-03-17 DIAGNOSIS — I95.0 IDIOPATHIC HYPOTENSION: ICD-10-CM

## 2021-03-17 DIAGNOSIS — R00.0 TACHYCARDIA: ICD-10-CM

## 2021-03-17 DIAGNOSIS — I05.9 MITRAL VALVE DISEASE: ICD-10-CM

## 2021-03-17 LAB
LV EF: 58 %
LVEF MODALITY: NORMAL

## 2021-03-17 PROCEDURE — 93306 TTE W/DOPPLER COMPLETE: CPT | Performed by: INTERNAL MEDICINE

## 2021-03-22 NOTE — PROGRESS NOTES
Patient Name: Juan Speaker  Patient : 1955  Patient MRN: [id number]     Primary Oncologist: Autumn Mccann MD  Referring Provider: Lolly Osman MD       Date of Service: 2021      Chief Complaint:   Chief Complaint   Patient presents with    Follow-up     She came in for follow-up visit. Problem List:      Family history of malignant neoplasm of gastrointestinal tract     B-cell lymphoma (HCC)     Idiopathic hypotension     Mitral valve disease     Pancytopenia (HCC)     Severe malnutrition (HCC)     Acute deep vein thrombosis (DVT) of left upper extremity (HCC)     Hematoma     Pain syndrome, chronic     Tachycardia     HPI:   Concha Gama is a pleasant 72year-old AA female patient who was referred for right axillary lymphadenopathy, status post needle biopsy in 2016, which did not show any pathological abnormalities. I talked to Dr. Natan Olson, who favored benign lymph node. Right axillary lymph node biopsy in 2015 revealed benign lymph node hyperplasia. Mammogram in 2016 revealed low suspicion for malignancy, with several enlarged right axillary lymph nodes, 3.1 by 0.8 by 2.3 cm, 0.9 by 0.8 by 1 cm, 1.8 by 1.4 by 1.5, and 1.4 by 1.5 cm. None demonstrated significant hypervascularity. I offered her an excisional lymph node biopsy versus surveillance with followup ultrasound. She opted to go with the second one. Blood test in 2015 revealed normal CBC with normal calcium and alk phos, white cell count 5.8, hemoglobin 11.9, platelet was 429. Blood test on 2016 revealed normal CBC, with white cell count 6.5, hemoglobin 12.5, platelet 695. SED rate was 17, rheumatoid factor less than 10. SUZE was still pending. Ultrasound of the right breast and axilla revealed no significant interval change in the right axillary adenopathy, which still has remained most likely reactive in etiology.   This was not seen on any mammogram dating back beyond 2015. She opted to go to see the surgeon; therefore, I referred her to see Dr. Malik Garcia. She was last seen in our office 9/28/2016. Ms. Marti Alvarez was seen by Dr. Malik Garcia March 3, 2019 for evaluation of enlarging, tender mass in right axilla. She had excisional lymph node biopsy on March 12, 2019. Pathology revealed granulomatous lymphadenitis. Mammogram 2/1/2019 was benign. She had EGD and colonoscopy on 12/10/2019. EGD was unremarkable. Colonoscopy revealed in infiltrative, ulcerated and necrotic, non bleeding 50 mm mass of malignant appearance was found in the rectum at a distance between 12 cm and 18 cm from the anus. The mass caused a partial obstruction. Pathology from the mass revealed ulcerated large bowel mucosa with dense lymphoid infiltrate and abundant necrosis. - Negative for carcinoma. The lymphoid infiltrate and necrosis are suspicious for lymphoma. Results of flow cytometry revealed no flow immunophenotypic evidence of a lymphoproliferative disorder based on limited antibody panel. CT abdomen/pelvis on December 23, 2019 showed a heterogeneous solid 9.5 x 6.1 cm soft tissue mass within the right posterior pelivs which abuts the posterior margin of the uterus and involves the right lagteral margin of the rectosigmoid junction, indeterminate. This mass is concerning for malignancy, with uterine origin being favored, such as uterine sarcoma with suspected invasion of the rectosigmoid junction. In addition, a 5.8 x 10.5 x 1.1 cm central mesenteric soft tissue mass is seen, suspected metastatic disease. Additional uterine masses are seen which may relate to fibroids or extension of uterine sarcoma. A possible 6 mm pleural based nodule within the posterior left lower lobe, indeterminate in the setting of  suspected malignacy. She was seen by Dr. Ton Rodgers on December 26, 2019 for review of colonoscopy/egd results. She was referred to urology due to early hydronephrosis.    I discussed with 13, 2021 she came in for follow-up visit. On 2/23/2021 WBC was 2.9, hemoglobin 10.2, platelet 711. Absolute neutrophils was 1.4. I will check labs today and flush the Mediport. I remind her to get a Mediport flush every 3 months. She has cough with vaccine already. Son had Covid infection.  was sick and hospitalized due to congestive heart failure and anemia. She lost little weight since last office visit. This could be related to the stress. I recommend she eat frequent meals. She has loose stool when eating greasy meals. She will follow up with GI. Mammogram in December 2020 was benign. If WBC continues to drop I will consider bone marrow study. She denied any frequent infection. She denies any fever or chills. Denied any headaches or dizzy spell. She denied any nausea or vomiting. She denied melena or hematuria. Past Medical History  Tubal ligation, tonsillectomy, colonoscopy in 2015 by Dr. Amanda Enriquez, and reportedly there was no polyp. She had a Pap smear in 2015 by Dr. Jayson Hoskins. She has bipolar disorder. Pap smear was in 2015, mammogram in March 2016. Paula    Social History  Smoking Status Former smoker  She smoked 1 pack/day for 10 years and quit 30 years ago. She denies any alcohol or illicit drug use. Family History  Mother had breast cancer in her [de-identified], father had metastatic brain cancer. Previous Therapies:  R-CHOP     Interval History:  Presented for scheduled follow up. She reports ongoing diarrhea for which she is using imodium and lomotil. She reports she feels this is getting better. She denies fever, night sweats, no lumps or bumps. Energy and appetite are intact. She has no nausea, vomiting. Review of Systems: \"Per interval history; otherwise 10 point ROS is negative. \"     Vital Signs:  /69 (Site: Right Upper Arm, Position: Sitting, Cuff Size: Medium Adult)   Pulse 93   Temp 96.9 °F (36.1 °C) (Temporal)   Ht 5' 3\" (1.6 m)   Wt 113 lb 9.6 oz (51.5 kg)   LMP 01/11/2000   SpO2 98%   BMI 20.12 kg/m²     Physical Exam:    CONSTITUTIONAL: awake, alert, cooperative, no apparent distress   EYES: pupils equal, round and reactive to light, sclera clear and conjunctiva normal  ENT: Normocephalic, without obvious abnormality, atraumatic  NECK: supple, symmetrical, no jugular venous distension and no carotid bruits   HEMATOLOGIC/LYMPHATIC: no cervical, supraclavicular or axillary lymphadenopathy   LUNGS: no increased work of breathing and clear to auscultation   CARDIOVASCULAR: regular rate and rhythm, normal S1 and S2, no murmur noted  ABDOMEN: normal bowel sounds x 4, soft, non-distended, non-tender, no masses palpated, no hepatosplenomegaly. No rebound or guarding. MUSCULOSKELETAL: full range of motion noted, tone is normal  NEUROLOGIC: awake, alert, oriented to name, place and time. Motor skills grossly intact. Cranial nerves II through XII are grossly intact. SKIN: Normal skin color, texture, turgor and no jaundice. appears intact   EXTREMITIES: no LE edema or cyanosis.     Labs:    Hematology:  Lab Results   Component Value Date    WBC 2.9 (L) 02/23/2021    RBC 3.30 (L) 02/23/2021    HGB 10.2 (L) 02/23/2021    HCT 30.5 (L) 02/23/2021    MCV 92.4 02/23/2021    MCH 30.9 02/23/2021    MCHC 33.4 02/23/2021    RDW 12.8 02/23/2021     02/23/2021    MPV 9.1 02/23/2021    BANDSPCT 2 (L) 03/08/2020    SEGSPCT 49.3 02/23/2021    EOSRELPCT 2.0 02/23/2021    BASOPCT 0.3 02/23/2021    LYMPHOPCT 27.6 02/23/2021    MONOPCT 20.8 (H) 02/23/2021    BANDABS 0.19 03/08/2020    SEGSABS 1.4 02/23/2021    EOSABS 0.1 02/23/2021    BASOSABS 0.0 02/23/2021    LYMPHSABS 0.8 02/23/2021    MONOSABS 0.6 02/23/2021    DIFFTYPE AUTOMATED DIFFERENTIAL 02/23/2021    ANISOCYTOSIS 1+ 03/08/2020    POLYCHROM 1+ 03/08/2020     Lab Results   Component Value Date    ESR 25 01/16/2020     Chemistry:  Lab Results   Component Value Date     02/17/2021    K 4.1 02/17/2021     02/17/2021    CO2 25 02/17/2021    BUN 11 02/17/2021    CREATININE 0.8 02/17/2021    GLUCOSE 86 02/17/2021    CALCIUM 9.1 02/17/2021    PROT 5.9 (L) 02/17/2021    LABALBU 3.8 02/17/2021    BILITOT 0.3 02/17/2021    ALKPHOS 75 02/17/2021    AST 19 02/17/2021    ALT 17 02/17/2021    LABGLOM >60 02/17/2021    GFRAA >60 02/17/2021    PHOS 2.7 03/05/2020    MG 2.0 02/25/2020    POCGLU 132 (H) 02/25/2020     Lab Results   Component Value Date     02/17/2021     Lab Results   Component Value Date    TSHHS 2.350 04/14/2020    T4FREE 1.28 04/14/2020     Immunology:  Lab Results   Component Value Date    PROT 5.9 (L) 02/17/2021     Coagulation Panel:  Lab Results   Component Value Date    PROTIME 16.5 (H) 02/28/2020    INR 1.36 02/28/2020    APTT 36.9 02/28/2020     Anemia Panel:  Lab Results   Component Value Date    CHOHFQIE79 1154 (H) 11/13/2020    FOLATE >20.0 (H) 11/13/2020        Assessment & Plan:    1. She was diagnosed with diffuse large B-cell lymphoma, no double hit. She has first R-CHOP in January 7, 2020. [facility] Payment ECHO showed LVEF at 50%. She had Mediport placement. She has second cycle R-CHOP in February 2020. 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 CT chest, abdomen and pelvis showed response. She completed course of antibiotic for pseudomonas bacteremia. She resumed chemotherapy on April 3, 2020. 18 2020. She completed chemotherapy on June 5, 2020. PET CT scan on 6/18/2020 showed response, still with intense uptake in the right hemipelvis measuring approximately 2.8 x 2.8 cm and demonstrating maximum SUV of 8.0. She completed pelvic radiation on 8/26/2020. PET scan in November 2020 showed remission. She is agreeable to continue with active surveillance. Imaging studies optional.  I believe she is in remission. She is currently asymptomatic. 2. She had LUE DVT and I recommend to continue with LMWH. I recommend to keep LUE and lower extremities elevated.  LUE swelling has improved significantly. Lovenox discontinued 8/2020.    3. Hydronephrosis:  She was referred to see urologist.     4. She had shingle to lower abdomen. She was on valacyclovir. 5. She had GIB related to colon ulcer/lymphoma s/p blood transfusion. Iron studies from April/2020 were reviewed. Reportedly colonoscopy November 2020 was fine. Stool tests was ordered by GI on December 24, 2020. She takes Imodium only as needed. Greasy meals cause her to have loose stool. She will follow up with GI.    6.  She has leukopenia. She has history of GI bleed and anemia. I will check labs today including iron study. Advised to call for the test result. If she has worsening leukopenia I will consider bone marrow study. If she continues to lose weight, I will consider imaging study. I remind her to have Mediport flush every 3 months. Return to clinic in 12 weeks or sooner. All of her questions have been answered for today. Recent imaging and labs were reviewed and discussed with the patient.

## 2021-03-24 ENCOUNTER — TELEPHONE (OUTPATIENT)
Dept: CARDIOLOGY CLINIC | Age: 66
End: 2021-03-24

## 2021-04-13 ENCOUNTER — OFFICE VISIT (OUTPATIENT)
Dept: ONCOLOGY | Age: 66
End: 2021-04-13
Payer: MEDICARE

## 2021-04-13 ENCOUNTER — HOSPITAL ENCOUNTER (OUTPATIENT)
Age: 66
Discharge: HOME | End: 2021-04-13
Payer: MEDICARE

## 2021-04-13 ENCOUNTER — HOSPITAL ENCOUNTER (OUTPATIENT)
Dept: INFUSION THERAPY | Age: 66
Discharge: HOME | End: 2021-04-13
Payer: MEDICARE

## 2021-04-13 VITALS
DIASTOLIC BLOOD PRESSURE: 69 MMHG | WEIGHT: 113.6 LBS | BODY MASS INDEX: 20.13 KG/M2 | OXYGEN SATURATION: 98 % | TEMPERATURE: 96.9 F | HEART RATE: 93 BPM | SYSTOLIC BLOOD PRESSURE: 111 MMHG | HEIGHT: 63 IN

## 2021-04-13 DIAGNOSIS — D64.9 ANEMIA, UNSPECIFIED TYPE: Primary | ICD-10-CM

## 2021-04-13 DIAGNOSIS — C85.16 B-CELL LYMPHOMA OF INTRAPELVIC LYMPH NODES, UNSPECIFIED B-CELL LYMPHOMA TYPE (HCC): ICD-10-CM

## 2021-04-13 LAB
ALBUMIN SERPL-MCNC: 3.6 GM/DL (ref 3.4–5)
ALBUMIN SERPL-MCNC: 3.7 GM/DL (ref 3.4–5)
ALP BLD-CCNC: 65 IU/L (ref 40–129)
ALP BLD-CCNC: 66 IU/L (ref 40–129)
ALT SERPL-CCNC: 8 U/L (ref 10–40)
ALT SERPL-CCNC: 9 U/L (ref 10–40)
ANION GAP SERPL CALCULATED.3IONS-SCNC: 4 MMOL/L (ref 4–16)
ANION GAP SERPL CALCULATED.3IONS-SCNC: 7 MMOL/L (ref 4–16)
AST SERPL-CCNC: 13 IU/L (ref 15–37)
AST SERPL-CCNC: 15 IU/L (ref 15–37)
BASOPHILS ABSOLUTE: 0 K/CU MM
BASOPHILS RELATIVE PERCENT: 0.3 % (ref 0–1)
BILIRUB SERPL-MCNC: 0.2 MG/DL (ref 0–1)
BILIRUB SERPL-MCNC: 0.4 MG/DL (ref 0–1)
BUN BLDV-MCNC: 17 MG/DL (ref 6–23)
BUN BLDV-MCNC: 18 MG/DL (ref 6–23)
CALCIUM SERPL-MCNC: 8.9 MG/DL (ref 8.3–10.6)
CALCIUM SERPL-MCNC: 9.2 MG/DL (ref 8.3–10.6)
CHLORIDE BLD-SCNC: 105 MMOL/L (ref 99–110)
CHLORIDE BLD-SCNC: 106 MMOL/L (ref 99–110)
CO2: 25 MMOL/L (ref 21–32)
CO2: 27 MMOL/L (ref 21–32)
CREAT SERPL-MCNC: 0.8 MG/DL (ref 0.6–1.1)
CREAT SERPL-MCNC: 0.8 MG/DL (ref 0.6–1.1)
DIFFERENTIAL TYPE: ABNORMAL
EOSINOPHILS ABSOLUTE: 0.2 K/CU MM
EOSINOPHILS RELATIVE PERCENT: 4.3 % (ref 0–3)
FERRITIN: 733 NG/ML (ref 15–150)
GFR AFRICAN AMERICAN: >60 ML/MIN/1.73M2
GFR AFRICAN AMERICAN: >60 ML/MIN/1.73M2
GFR NON-AFRICAN AMERICAN: >60 ML/MIN/1.73M2
GFR NON-AFRICAN AMERICAN: >60 ML/MIN/1.73M2
GLUCOSE BLD-MCNC: 109 MG/DL (ref 70–99)
GLUCOSE BLD-MCNC: 77 MG/DL (ref 70–99)
HCT VFR BLD CALC: 31.4 % (ref 37–47)
HCT VFR BLD CALC: 31.9 % (ref 37–47)
HEMOGLOBIN: 10.3 GM/DL (ref 12.5–16)
HEMOGLOBIN: 10.7 GM/DL (ref 12.5–16)
IRON: 110 UG/DL (ref 37–145)
LACTATE DEHYDROGENASE: 198 IU/L (ref 120–246)
LYMPHOCYTES ABSOLUTE: 0.7 K/CU MM
LYMPHOCYTES RELATIVE PERCENT: 19.9 % (ref 24–44)
MCH RBC QN AUTO: 31.1 PG (ref 27–31)
MCH RBC QN AUTO: 32.3 PG (ref 27–31)
MCHC RBC AUTO-ENTMCNC: 32.8 % (ref 32–36)
MCHC RBC AUTO-ENTMCNC: 33.5 % (ref 32–36)
MCV RBC AUTO: 92.7 FL (ref 78–100)
MCV RBC AUTO: 98.4 FL (ref 78–100)
MONOCYTES ABSOLUTE: 0.3 K/CU MM
MONOCYTES RELATIVE PERCENT: 9.4 % (ref 0–4)
PCT TRANSFERRIN: 50 % (ref 10–44)
PDW BLD-RTO: 13.1 % (ref 11.7–14.9)
PDW BLD-RTO: 13.2 % (ref 11.7–14.9)
PHOSPHORUS: 3.2 MG/DL (ref 2.5–4.9)
PLATELET # BLD: 211 K/CU MM (ref 140–440)
PLATELET # BLD: 218 K/CU MM (ref 140–440)
PMV BLD AUTO: 8.8 FL (ref 7.5–11.1)
PMV BLD AUTO: 9.5 FL (ref 7.5–11.1)
POTASSIUM SERPL-SCNC: 4.1 MMOL/L (ref 3.5–5.1)
POTASSIUM SERPL-SCNC: 4.2 MMOL/L (ref 3.5–5.1)
RBC # BLD: 3.19 M/CU MM (ref 4.2–5.4)
RBC # BLD: 3.44 M/CU MM (ref 4.2–5.4)
SEGMENTED NEUTROPHILS ABSOLUTE COUNT: 2.3 K/CU MM
SEGMENTED NEUTROPHILS RELATIVE PERCENT: 66.1 % (ref 36–66)
SODIUM BLD-SCNC: 137 MMOL/L (ref 135–145)
SODIUM BLD-SCNC: 137 MMOL/L (ref 135–145)
T4 FREE: 0.96 NG/DL (ref 0.9–1.8)
TOTAL IRON BINDING CAPACITY: 219 UG/DL (ref 250–450)
TOTAL PROTEIN: 5.3 GM/DL (ref 6.4–8.2)
TOTAL PROTEIN: 5.7 GM/DL (ref 6.4–8.2)
TSH HIGH SENSITIVITY: 0.73 UIU/ML (ref 0.27–4.2)
UNSATURATED IRON BINDING CAPACITY: 109 UG/DL (ref 110–370)
VITAMIN D 25-HYDROXY: 44.24 NG/ML
WBC # BLD: 3.5 K/CU MM (ref 4–10.5)
WBC # BLD: 3.5 K/CU MM (ref 4–10.5)

## 2021-04-13 PROCEDURE — 82306 VITAMIN D 25 HYDROXY: CPT

## 2021-04-13 PROCEDURE — 2580000003 HC RX 258: Performed by: INTERNAL MEDICINE

## 2021-04-13 PROCEDURE — G8400 PT W/DXA NO RESULTS DOC: HCPCS | Performed by: INTERNAL MEDICINE

## 2021-04-13 PROCEDURE — 84100 ASSAY OF PHOSPHORUS: CPT

## 2021-04-13 PROCEDURE — 3017F COLORECTAL CA SCREEN DOC REV: CPT | Performed by: INTERNAL MEDICINE

## 2021-04-13 PROCEDURE — 82728 ASSAY OF FERRITIN: CPT

## 2021-04-13 PROCEDURE — G8428 CUR MEDS NOT DOCUMENT: HCPCS | Performed by: INTERNAL MEDICINE

## 2021-04-13 PROCEDURE — 36591 DRAW BLOOD OFF VENOUS DEVICE: CPT

## 2021-04-13 PROCEDURE — 6360000002 HC RX W HCPCS: Performed by: INTERNAL MEDICINE

## 2021-04-13 PROCEDURE — 99213 OFFICE O/P EST LOW 20 MIN: CPT | Performed by: INTERNAL MEDICINE

## 2021-04-13 PROCEDURE — 84439 ASSAY OF FREE THYROXINE: CPT

## 2021-04-13 PROCEDURE — 1090F PRES/ABSN URINE INCON ASSESS: CPT | Performed by: INTERNAL MEDICINE

## 2021-04-13 PROCEDURE — 83615 LACTATE (LD) (LDH) ENZYME: CPT

## 2021-04-13 PROCEDURE — 85027 COMPLETE CBC AUTOMATED: CPT

## 2021-04-13 PROCEDURE — 83550 IRON BINDING TEST: CPT

## 2021-04-13 PROCEDURE — 4040F PNEUMOC VAC/ADMIN/RCVD: CPT | Performed by: INTERNAL MEDICINE

## 2021-04-13 PROCEDURE — G8420 CALC BMI NORM PARAMETERS: HCPCS | Performed by: INTERNAL MEDICINE

## 2021-04-13 PROCEDURE — 99211 OFF/OP EST MAY X REQ PHY/QHP: CPT

## 2021-04-13 PROCEDURE — 85025 COMPLETE CBC W/AUTO DIFF WBC: CPT

## 2021-04-13 PROCEDURE — 83540 ASSAY OF IRON: CPT

## 2021-04-13 PROCEDURE — 80053 COMPREHEN METABOLIC PANEL: CPT

## 2021-04-13 PROCEDURE — 36415 COLL VENOUS BLD VENIPUNCTURE: CPT

## 2021-04-13 PROCEDURE — 84443 ASSAY THYROID STIM HORMONE: CPT

## 2021-04-13 PROCEDURE — 1123F ACP DISCUSS/DSCN MKR DOCD: CPT | Performed by: INTERNAL MEDICINE

## 2021-04-13 PROCEDURE — 1036F TOBACCO NON-USER: CPT | Performed by: INTERNAL MEDICINE

## 2021-04-13 PROCEDURE — 83970 ASSAY OF PARATHORMONE: CPT

## 2021-04-13 RX ORDER — HEPARIN SODIUM (PORCINE) LOCK FLUSH IV SOLN 100 UNIT/ML 100 UNIT/ML
500 SOLUTION INTRAVENOUS PRN
Status: CANCELLED | OUTPATIENT
Start: 2021-04-13

## 2021-04-13 RX ORDER — SODIUM CHLORIDE 0.9 % (FLUSH) 0.9 %
20 SYRINGE (ML) INJECTION PRN
Status: CANCELLED | OUTPATIENT
Start: 2021-04-13

## 2021-04-13 RX ORDER — SODIUM CHLORIDE 0.9 % (FLUSH) 0.9 %
10 SYRINGE (ML) INJECTION PRN
Status: DISCONTINUED | OUTPATIENT
Start: 2021-04-13 | End: 2021-04-14 | Stop reason: HOSPADM

## 2021-04-13 RX ORDER — HEPARIN SODIUM (PORCINE) LOCK FLUSH IV SOLN 100 UNIT/ML 100 UNIT/ML
500 SOLUTION INTRAVENOUS PRN
Status: DISCONTINUED | OUTPATIENT
Start: 2021-04-13 | End: 2021-04-14 | Stop reason: HOSPADM

## 2021-04-13 RX ORDER — SODIUM CHLORIDE 0.9 % (FLUSH) 0.9 %
10 SYRINGE (ML) INJECTION PRN
Status: CANCELLED | OUTPATIENT
Start: 2021-04-13

## 2021-04-13 RX ORDER — SODIUM CHLORIDE 0.9 % (FLUSH) 0.9 %
20 SYRINGE (ML) INJECTION PRN
Status: DISCONTINUED | OUTPATIENT
Start: 2021-04-13 | End: 2021-04-14 | Stop reason: HOSPADM

## 2021-04-13 RX ADMIN — HEPARIN 500 UNITS: 100 SYRINGE at 09:47

## 2021-04-13 RX ADMIN — SODIUM CHLORIDE, PRESERVATIVE FREE 10 ML: 5 INJECTION INTRAVENOUS at 09:47

## 2021-04-13 RX ADMIN — SODIUM CHLORIDE, PRESERVATIVE FREE 20 ML: 5 INJECTION INTRAVENOUS at 09:47

## 2021-04-13 NOTE — PROGRESS NOTES
Pt. Here for port draw following office visit. Right upper chest mediport accessed without difficulty, good blood return noted. Lab work drawn as ordered.

## 2021-04-16 LAB — PARATHYROID HORMONE INTACT: 53 PG/ML (ref 15–65)

## 2021-05-05 NOTE — TELEPHONE ENCOUNTER
Patient called to see if she is still to be taking   This medication , she has lost the bottle   And has not had for over 2 weeks  Next appt 8/21

## 2021-05-25 ENCOUNTER — TELEPHONE (OUTPATIENT)
Dept: ONCOLOGY | Age: 66
End: 2021-05-25

## 2021-05-25 RX ORDER — ONDANSETRON 8 MG/1
8 TABLET, ORALLY DISINTEGRATING ORAL EVERY 8 HOURS PRN
Qty: 30 TABLET | Refills: 0 | Status: SHIPPED | OUTPATIENT
Start: 2021-05-25 | End: 2021-08-16

## 2021-06-15 NOTE — PROGRESS NOTES
Patient Name: Osmel Perkins  Patient : 1955  Patient MRN: [id number]     Primary Oncologist: Kayleen Reece MD  Referring Provider: Carrie Mir MD       Date of Service: 7/15/2021      Chief Complaint:   Chief Complaint   Patient presents with    Follow-up     She came in for follow-up visit. Problem List:      Family history of malignant neoplasm of gastrointestinal tract     B-cell lymphoma (HCC)     Idiopathic hypotension     Mitral valve disease     Pancytopenia (HCC)     Severe malnutrition (HCC)     Acute deep vein thrombosis (DVT) of left upper extremity (HCC)     Hematoma     Pain syndrome, chronic     Tachycardia     HPI:   Philomena Selby is a pleasant 72year-old AA female patient who was referred for right axillary lymphadenopathy, status post needle biopsy in 2016, which did not show any pathological abnormalities. I talked to Dr. Mariel Chao, who favored benign lymph node. Right axillary lymph node biopsy in 2015 revealed benign lymph node hyperplasia. Mammogram in 2016 revealed low suspicion for malignancy, with several enlarged right axillary lymph nodes, 3.1 by 0.8 by 2.3 cm, 0.9 by 0.8 by 1 cm, 1.8 by 1.4 by 1.5, and 1.4 by 1.5 cm. None demonstrated significant hypervascularity. I offered her an excisional lymph node biopsy versus surveillance with followup ultrasound. She opted to go with the second one. Blood test in 2015 revealed normal CBC with normal calcium and alk phos, white cell count 5.8, hemoglobin 11.9, platelet was 709. Blood test on 2016 revealed normal CBC, with white cell count 6.5, hemoglobin 12.5, platelet 537. SED rate was 17, rheumatoid factor less than 10. SUZE was still pending. Ultrasound of the right breast and axilla revealed no significant interval change in the right axillary adenopathy, which still has remained most likely reactive in etiology.   This was not seen on any mammogram dating back beyond 2015. She opted to go to see the surgeon; therefore, I referred her to see Dr. Facundo Zhu. She was last seen in our office 9/28/2016. Ms. Anni Hernandez was seen by Dr. Facundo Zhu March 3, 2019 for evaluation of enlarging, tender mass in right axilla. She had excisional lymph node biopsy on March 12, 2019. Pathology revealed granulomatous lymphadenitis. Mammogram 2/1/2019 was benign. She had EGD and colonoscopy on 12/10/2019. EGD was unremarkable. Colonoscopy revealed in infiltrative, ulcerated and necrotic, non bleeding 50 mm mass of malignant appearance was found in the rectum at a distance between 12 cm and 18 cm from the anus. The mass caused a partial obstruction. Pathology from the mass revealed ulcerated large bowel mucosa with dense lymphoid infiltrate and abundant necrosis. - Negative for carcinoma. The lymphoid infiltrate and necrosis are suspicious for lymphoma. Results of flow cytometry revealed no flow immunophenotypic evidence of a lymphoproliferative disorder based on limited antibody panel. CT abdomen/pelvis on December 23, 2019 showed a heterogeneous solid 9.5 x 6.1 cm soft tissue mass within the right posterior pelivs which abuts the posterior margin of the uterus and involves the right lagteral margin of the rectosigmoid junction, indeterminate. This mass is concerning for malignancy, with uterine origin being favored, such as uterine sarcoma with suspected invasion of the rectosigmoid junction. In addition, a 5.8 x 10.5 x 1.1 cm central mesenteric soft tissue mass is seen, suspected metastatic disease. Additional uterine masses are seen which may relate to fibroids or extension of uterine sarcoma. A possible 6 mm pleural based nodule within the posterior left lower lobe, indeterminate in the setting of  suspected malignacy. She was seen by Dr. Cong Love on December 26, 2019 for review of colonoscopy/egd results. She was referred to urology due to early hydronephrosis.    I discussed with pathologist to review the pathology report from rectal biopsy. She lost 30 pounds in the last 6 months. Mammogram was in 2018. She just recovered from shingle. She was prescribed Bentyl by Dr. Renee Larson. In January 2020 official pathology report showed diffuse large B-cell lymphoma, no double hit or triple hit. BCL 6 was positive. We discussed about potential treatment with R-CHOP. We discussed about the potential risks and benefit of the chemotherapy. Clinically she could have stage IIb or IIIb. She has first chemo on January 17, 2020. ECHO in January 2020 showed LVEF at 50%. Clinically she looks better. Hep B/C were negative. PET CT scan on 1/27/2020 showed:  1. Intense metabolic activity associated with the abdominal and pelvic  masses described above consistent with active lymphoma. Prior imaging has been requested and an addendum will be provided to described any interval changes. 2.  Linear FDG activity along the distal tip of the MediPort, which is in the  left brachiocephalic vein just superior to the SVC. This can be seen with a  fibrin sheath. Correlate with current functionality of the MediPort. ECHO in January 2020: Left ventricular systolic function is low normal. ? Ejection fraction is visually estimated at 50%. Paradoxical motion of the interventricular septum; possible conduction ?delay. ?Indeterminate diastolic function; E/A flow reversal is noted. ?Mitral valve prolapse is present. ?Moderate to severe mitral regurgitation is present. ?Moderate tricuspid regurgitation is present. RVSP is 25 mmHg. ? No evidence of any pericardial effusion. ?Mobile interatrial septum. ? Incidental finding: cyst vs mass near/attached to the right kidney. In March 2020 she was hospitalized due to anemia related GI related to colonic mass/lymphoma. She has mulitple blood transfusion. She had EGD and colonoscopy in March 2020.  After GIB is controlled she started on LMWH for DVT related to fall and mediport. CT chest, abdomen and pelvis in March 2020 after 2 cycled of chemo showed response to therapy. She was discharged to rehab. She completed lovenox injection after 6 cycle of chemo due on June 5, 2020. She completed chemotherapy on June 5, 2020. PET CT in June 2020 scan revealed:   Significantly decreased size and uptake of the masses within the lower abdomen/pelvis, consistent with treatment response. Notable: ABDOMEN/PELVIS: No abnormal uptake within the solid organs. Significantly decreased size of the left paramidline lower abdominal mass which measures approximately 3.1 x 1.6 cm and demonstrates maximum SUV of 2.2. Significantly improved bilateral pelvic uptake with a residual focus of intense uptake in the right hemipelvis measuring approximately 2.8 x 2.8 cm and demonstrating maximum SUV of 8.0. She had pelvic radiation therapy from July 27, 2020 through August 20, 2020. She received 36 Gy. She has completed radiation therapy 8/26/2020. She continues to have loose stool. Imaging studies optional since the PET scan is completely negative in November 2020. PET 11/19/2020:  Continued response to treatment.  Abnormal uptake in the deep right   hemipelvis has resolved.  The lower abdominal mass appears slightly smaller,   although difficult to accurately measure without intravenous contrast, and   demonstrates similar mild uptake.  No new disease. Anemic work-up work-up on November 13, 2020 was grossly unremarkable. Bone density on December 21, 2020 showed osteoporosis at lumbar spine. I recommend to discuss with family doctor about oral versus IV bisphosphonate versus Reclast.  I recommend to continue with vitamin D and calcium. I remind her about Mediport flush every 3 months. She was seen by GI and reportedly colonoscopy in November 2020 was okay. She takes Imodium once or twice a day. Diarrhea has improved. Stool specimen was obtained by GI.       On 2/23/2021 WBC was 2.9, hemoglobin 10.2, platelet 166. Absolute neutrophils was 1.4. She has covid vaccine already. Son had Covid infection.  was sick and hospitalized due to congestive heart failure and anemia. She lost little weight since last office visit. This could be related to the stress. I recommend she eat frequent meals. She has loose stool when eating greasy meals. She will follow up with GI. Mammogram in December 2020 was benign. If WBC continues to drop I will consider bone marrow study. On July 15, 2021 she came in for follow up visit. She has intermittent diarrhea and has been taking imodium which helps. I recommend to follow up with GI. I prescribed ensure since she wants to gain weight. On July 15, 2021 WBC was 4.4, Hg was 10, platelet 750. I recommend to call for remaining test result. Clinically she is in remission. I remind her to get a Mediport flush every 3 months. She denied any frequent infection. She denies any fever or chills. Denied any headaches or dizzy spell. She denied any nausea or vomiting. No melena or hematuria. Past Medical History  Tubal ligation, tonsillectomy, colonoscopy in 2015 by Dr. Alma Delia Perea, and reportedly there was no polyp. She had a Pap smear in 2015 by Dr. Oriana Schwarz. She has bipolar disorder. Pap smear was in 2015, mammogram in March 2016. Paula    Social History  Smoking Status Former smoker  She smoked 1 pack/day for 10 years and quit 30 years ago. She denies any alcohol or illicit drug use. Family History  Mother had breast cancer in her [de-identified], father had metastatic brain cancer. Previous Therapies:  R-CHOP     Interval History:  Presented for scheduled follow up. She reports ongoing diarrhea for which she is using imodium and lomotil. She reports she feels this is getting better. She denies fever, night sweats, no lumps or bumps. Energy and appetite are intact. She has no nausea, vomiting. Review of Systems:   \"Per interval history; otherwise 10 point ROS is negative. \"     Vital Signs:  BP 87/64 (Site: Right Upper Arm, Position: Sitting, Cuff Size: Medium Adult)   Pulse 85   Temp 97 °F (36.1 °C) (Temporal)   Ht 5' 3\" (1.6 m)   Wt 116 lb (52.6 kg)   LMP 01/11/2000   SpO2 99%   BMI 20.55 kg/m²     Physical Exam:    CONSTITUTIONAL: awake, alert, cooperative, no apparent distress   EYES: pupils equal, round and reactive to light, sclera clear and conjunctiva pallor  ENT: Normocephalic, without obvious abnormality, atraumatic  NECK: supple, symmetrical, no jugular venous distension and no carotid bruits   HEMATOLOGIC/LYMPHATIC: no cervical, supraclavicular or axillary lymphadenopathy   LUNGS: no increased work of breathing and clear to auscultation   CARDIOVASCULAR: regular rate and rhythm, normal S1 and S2, no murmur  ABDOMEN: normal bowel sound, soft, non-distended, non-tender, no masses palpated, no hepatosplenomegaly. No rebound or guarding. MUSCULOSKELETAL: full range of motion noted, tone is normal  NEUROLOGIC: awake, alert, oriented to name, place and time. Motor skills grossly intact. Cranial nerves II through XII are grossly intact. SKIN: Normal skin color, texture, turgor and no jaundice. appears intact   EXTREMITIES: no LE edema or cyanosis.     Labs:    Hematology:  Lab Results   Component Value Date    WBC 4.4 07/15/2021    RBC 3.18 (L) 07/15/2021    HGB 10.0 (L) 07/15/2021    HCT 30.1 (L) 07/15/2021    MCV 94.7 07/15/2021    MCH 31.4 (H) 07/15/2021    MCHC 33.2 07/15/2021    RDW 13.5 07/15/2021     07/15/2021    MPV 9.4 07/15/2021    BANDSPCT 2 (L) 03/08/2020    SEGSPCT 60.3 07/15/2021    EOSRELPCT 2.8 07/15/2021    BASOPCT 0.0 07/15/2021    LYMPHOPCT 25.0 07/15/2021    MONOPCT 11.9 (H) 07/15/2021    BANDABS 0.19 03/08/2020    SEGSABS 2.6 07/15/2021    EOSABS 0.1 07/15/2021    BASOSABS 0.0 07/15/2021    LYMPHSABS 1.1 07/15/2021    MONOSABS 0.5 07/15/2021    DIFFTYPE AUTOMATED DIFFERENTIAL 07/15/2021    ANISOCYTOSIS 1+ 03/08/2020    POLYCHROM 1+ 03/08/2020     Lab Results   Component Value Date    ESR 25 01/16/2020     Chemistry:  Lab Results   Component Value Date     (L) 07/15/2021    K 4.4 07/15/2021     07/15/2021    CO2 25 07/15/2021    BUN 21 07/15/2021    CREATININE 0.7 07/15/2021    GLUCOSE 79 07/15/2021    CALCIUM 9.6 07/15/2021    PROT 5.6 (L) 07/15/2021    LABALBU 4.3 07/15/2021    BILITOT 0.4 07/15/2021    ALKPHOS 68 07/15/2021    AST 16 07/15/2021    ALT 10 07/15/2021    LABGLOM >60 07/15/2021    GFRAA >60 07/15/2021    PHOS 3.2 04/13/2021    MG 2.0 02/25/2020    POCGLU 132 (H) 02/25/2020     Lab Results   Component Value Date     07/15/2021     Lab Results   Component Value Date    TSHHS 0.732 04/13/2021    T4FREE 0.96 04/13/2021     Immunology:  Lab Results   Component Value Date    PROT 5.6 (L) 07/15/2021     Coagulation Panel:  Lab Results   Component Value Date    PROTIME 16.5 (H) 02/28/2020    INR 1.36 02/28/2020    APTT 36.9 02/28/2020     Anemia Panel:  Lab Results   Component Value Date    FKJMRPXZ16 1154 (H) 11/13/2020    FOLATE >20.0 (H) 11/13/2020        Assessment & Plan:    1. She was diagnosed with diffuse large B-cell lymphoma, no double hit. She has first R-CHOP in January 7, 2020. [facility] ECHO showed LVEF at 50%. She had Mediport placement. She has second cycle R-CHOP in February 2020. 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 CT chest, abdomen and pelvis showed response. She completed course of antibiotic for pseudomonas bacteremia. She resumed chemotherapy on April 3, 2020. 18 2020. She completed chemotherapy on June 5, 2020. PET CT scan on 6/18/2020 showed response, still with intense uptake in the right hemipelvis measuring approximately 2.8 x 2.8 cm and demonstrating maximum SUV of 8.0. She completed pelvic radiation on 8/26/2020. PET scan in November 2020 showed remission. She is agreeable to continue with active surveillance.   Imaging studies optional.  I will labs today. I believe she is in remission. 2. She had LUE DVT and I recommend to continue with LMWH. I recommend to keep LUE and lower extremities elevated. LUE swelling has improved significantly. Lovenox discontinued 8/2020.    3. Hydronephrosis:  She was referred to see urologist.     4. She had shingle to lower abdomen. She was on valacyclovir. 5. She had GIB related to colon ulcer/lymphoma s/p blood transfusion. Iron studies from April/2020 were reviewed. Reportedly colonoscopy November 2020 was fine. Stool tests was ordered by GI on December 24, 2020. She takes Imodium only as needed. Greasy meals cause her to have loose stool. She will follow up with GI.    6.  She has anemia of chronic disease. She has history of GI bleed. CBC on July 15, 2021 showed normal WBC. Hg was 10. Platelet 410. If anemia is worse, I will offer bone marrow study. I gave prescription of ensure today. I recommend to eat frequent small meals. I remind her to have Mediport flush every 3 months. She has covid vaccine. Return to clinic in 12 weeks or sooner. All of her questions have been answered for today. Recent imaging and labs were reviewed and discussed with the patient.

## 2021-07-07 DIAGNOSIS — C85.16 B-CELL LYMPHOMA OF INTRAPELVIC LYMPH NODES, UNSPECIFIED B-CELL LYMPHOMA TYPE (HCC): Primary | ICD-10-CM

## 2021-07-15 ENCOUNTER — OFFICE VISIT (OUTPATIENT)
Dept: ONCOLOGY | Age: 66
End: 2021-07-15
Payer: MEDICARE

## 2021-07-15 ENCOUNTER — HOSPITAL ENCOUNTER (OUTPATIENT)
Dept: INFUSION THERAPY | Age: 66
Discharge: HOME | End: 2021-07-15
Payer: MEDICARE

## 2021-07-15 VITALS
TEMPERATURE: 97 F | HEART RATE: 85 BPM | HEIGHT: 63 IN | BODY MASS INDEX: 20.55 KG/M2 | DIASTOLIC BLOOD PRESSURE: 64 MMHG | SYSTOLIC BLOOD PRESSURE: 87 MMHG | WEIGHT: 116 LBS | OXYGEN SATURATION: 99 %

## 2021-07-15 DIAGNOSIS — C85.16 B-CELL LYMPHOMA OF INTRAPELVIC LYMPH NODES, UNSPECIFIED B-CELL LYMPHOMA TYPE (HCC): Primary | ICD-10-CM

## 2021-07-15 LAB
ALBUMIN SERPL-MCNC: 4.3 GM/DL (ref 3.4–5)
ALP BLD-CCNC: 68 IU/L (ref 40–129)
ALT SERPL-CCNC: 10 U/L (ref 10–40)
ANION GAP SERPL CALCULATED.3IONS-SCNC: 9 MMOL/L (ref 4–16)
AST SERPL-CCNC: 16 IU/L (ref 15–37)
BASOPHILS ABSOLUTE: 0 K/CU MM
BASOPHILS RELATIVE PERCENT: 0 % (ref 0–1)
BILIRUB SERPL-MCNC: 0.4 MG/DL (ref 0–1)
BUN BLDV-MCNC: 21 MG/DL (ref 6–23)
CALCIUM SERPL-MCNC: 9.6 MG/DL (ref 8.3–10.6)
CHLORIDE BLD-SCNC: 100 MMOL/L (ref 99–110)
CO2: 25 MMOL/L (ref 21–32)
CREAT SERPL-MCNC: 0.7 MG/DL (ref 0.6–1.1)
DIFFERENTIAL TYPE: ABNORMAL
EOSINOPHILS ABSOLUTE: 0.1 K/CU MM
EOSINOPHILS RELATIVE PERCENT: 2.8 % (ref 0–3)
GFR AFRICAN AMERICAN: >60 ML/MIN/1.73M2
GFR NON-AFRICAN AMERICAN: >60 ML/MIN/1.73M2
GLUCOSE BLD-MCNC: 79 MG/DL (ref 70–99)
HCT VFR BLD CALC: 30.1 % (ref 37–47)
HEMOGLOBIN: 10 GM/DL (ref 12.5–16)
LACTATE DEHYDROGENASE: 182 IU/L (ref 120–246)
LYMPHOCYTES ABSOLUTE: 1.1 K/CU MM
LYMPHOCYTES RELATIVE PERCENT: 25 % (ref 24–44)
MCH RBC QN AUTO: 31.4 PG (ref 27–31)
MCHC RBC AUTO-ENTMCNC: 33.2 % (ref 32–36)
MCV RBC AUTO: 94.7 FL (ref 78–100)
MONOCYTES ABSOLUTE: 0.5 K/CU MM
MONOCYTES RELATIVE PERCENT: 11.9 % (ref 0–4)
PDW BLD-RTO: 13.5 % (ref 11.7–14.9)
PLATELET # BLD: 214 K/CU MM (ref 140–440)
PMV BLD AUTO: 9.4 FL (ref 7.5–11.1)
POTASSIUM SERPL-SCNC: 4.4 MMOL/L (ref 3.5–5.1)
RBC # BLD: 3.18 M/CU MM (ref 4.2–5.4)
SEGMENTED NEUTROPHILS ABSOLUTE COUNT: 2.6 K/CU MM
SEGMENTED NEUTROPHILS RELATIVE PERCENT: 60.3 % (ref 36–66)
SODIUM BLD-SCNC: 134 MMOL/L (ref 135–145)
TOTAL PROTEIN: 5.6 GM/DL (ref 6.4–8.2)
WBC # BLD: 4.4 K/CU MM (ref 4–10.5)

## 2021-07-15 PROCEDURE — G8420 CALC BMI NORM PARAMETERS: HCPCS | Performed by: INTERNAL MEDICINE

## 2021-07-15 PROCEDURE — 4040F PNEUMOC VAC/ADMIN/RCVD: CPT | Performed by: INTERNAL MEDICINE

## 2021-07-15 PROCEDURE — 36415 COLL VENOUS BLD VENIPUNCTURE: CPT

## 2021-07-15 PROCEDURE — G8427 DOCREV CUR MEDS BY ELIG CLIN: HCPCS | Performed by: INTERNAL MEDICINE

## 2021-07-15 PROCEDURE — 2580000003 HC RX 258: Performed by: INTERNAL MEDICINE

## 2021-07-15 PROCEDURE — 99211 OFF/OP EST MAY X REQ PHY/QHP: CPT

## 2021-07-15 PROCEDURE — 80053 COMPREHEN METABOLIC PANEL: CPT

## 2021-07-15 PROCEDURE — 3017F COLORECTAL CA SCREEN DOC REV: CPT | Performed by: INTERNAL MEDICINE

## 2021-07-15 PROCEDURE — 1123F ACP DISCUSS/DSCN MKR DOCD: CPT | Performed by: INTERNAL MEDICINE

## 2021-07-15 PROCEDURE — 6360000002 HC RX W HCPCS: Performed by: INTERNAL MEDICINE

## 2021-07-15 PROCEDURE — G8400 PT W/DXA NO RESULTS DOC: HCPCS | Performed by: INTERNAL MEDICINE

## 2021-07-15 PROCEDURE — 85025 COMPLETE CBC W/AUTO DIFF WBC: CPT

## 2021-07-15 PROCEDURE — 99214 OFFICE O/P EST MOD 30 MIN: CPT | Performed by: INTERNAL MEDICINE

## 2021-07-15 PROCEDURE — 1036F TOBACCO NON-USER: CPT | Performed by: INTERNAL MEDICINE

## 2021-07-15 PROCEDURE — 1090F PRES/ABSN URINE INCON ASSESS: CPT | Performed by: INTERNAL MEDICINE

## 2021-07-15 PROCEDURE — 83615 LACTATE (LD) (LDH) ENZYME: CPT

## 2021-07-15 PROCEDURE — 96523 IRRIG DRUG DELIVERY DEVICE: CPT

## 2021-07-15 RX ORDER — HEPARIN SODIUM (PORCINE) LOCK FLUSH IV SOLN 100 UNIT/ML 100 UNIT/ML
500 SOLUTION INTRAVENOUS PRN
Status: DISCONTINUED | OUTPATIENT
Start: 2021-07-15 | End: 2021-07-16 | Stop reason: HOSPADM

## 2021-07-15 RX ORDER — SODIUM CHLORIDE 0.9 % (FLUSH) 0.9 %
20 SYRINGE (ML) INJECTION PRN
Status: DISCONTINUED | OUTPATIENT
Start: 2021-07-15 | End: 2021-07-16 | Stop reason: HOSPADM

## 2021-07-15 RX ORDER — LORATADINE 10 MG/1
10 TABLET ORAL PRN
COMMUNITY

## 2021-07-15 RX ORDER — LACTOSE-REDUCED FOOD
1 LIQUID (ML) ORAL 3 TIMES DAILY
Qty: 90 CAN | Refills: 1 | Status: SHIPPED | OUTPATIENT
Start: 2021-07-15

## 2021-07-15 RX ORDER — HEPARIN SODIUM (PORCINE) LOCK FLUSH IV SOLN 100 UNIT/ML 100 UNIT/ML
500 SOLUTION INTRAVENOUS PRN
Status: CANCELLED | OUTPATIENT
Start: 2021-07-15

## 2021-07-15 RX ORDER — FLUTICASONE PROPIONATE 50 MCG
SPRAY, SUSPENSION (ML) NASAL
COMMUNITY
Start: 2021-06-23 | End: 2021-08-16

## 2021-07-15 RX ORDER — SYRINGE-NEEDLE,INSULIN,0.5 ML 30 GX5/16"
SYRINGE, EMPTY DISPOSABLE MISCELLANEOUS
COMMUNITY
Start: 2021-04-30 | End: 2022-10-27

## 2021-07-15 RX ORDER — SODIUM CHLORIDE 0.9 % (FLUSH) 0.9 %
10 SYRINGE (ML) INJECTION PRN
Status: CANCELLED | OUTPATIENT
Start: 2021-07-15

## 2021-07-15 RX ORDER — SODIUM CHLORIDE 0.9 % (FLUSH) 0.9 %
20 SYRINGE (ML) INJECTION PRN
Status: CANCELLED | OUTPATIENT
Start: 2021-07-15

## 2021-07-15 RX ORDER — TERIPARATIDE 250 UG/ML
INJECTION, SOLUTION SUBCUTANEOUS
COMMUNITY
Start: 2021-06-21

## 2021-07-15 RX ADMIN — SODIUM CHLORIDE, PRESERVATIVE FREE 20 ML: 5 INJECTION INTRAVENOUS at 15:03

## 2021-07-15 RX ADMIN — HEPARIN 500 UNITS: 100 SYRINGE at 15:03

## 2021-07-15 ASSESSMENT — PATIENT HEALTH QUESTIONNAIRE - PHQ9
SUM OF ALL RESPONSES TO PHQ QUESTIONS 1-9: 0
2. FEELING DOWN, DEPRESSED OR HOPELESS: 0
1. LITTLE INTEREST OR PLEASURE IN DOING THINGS: 0
SUM OF ALL RESPONSES TO PHQ9 QUESTIONS 1 & 2: 0
SUM OF ALL RESPONSES TO PHQ QUESTIONS 1-9: 0
SUM OF ALL RESPONSES TO PHQ QUESTIONS 1-9: 0

## 2021-07-15 NOTE — PROGRESS NOTES
MA Rooming Questions  Patient: Aileen Manzo  MRN: [id number]    Date: 7/15/2021        1. Do you have any new issues? yes - patient would like a prescription for ensure to help her gain weight          2. Do you need any refills on medications?    no    3. Have you had any imaging done since your last visit?   no    4. Have you been hospitalized or seen in the emergency room since your last visit here?   no    5. Did the patient have a depression screening completed today?  Yes    PHQ-9 Total Score: 0 (7/15/2021  2:24 PM)       PHQ-9 Given to (if applicable):               PHQ-9 Score (if applicable):                     [] Positive     []  Negative              Does question #9 need addressed (if applicable)                     [] Yes    []  No               Guillermina Fortune CMA

## 2021-07-15 NOTE — PROGRESS NOTES
Here for port flush. Mediport accessed, no blood return noted. Flushes easily. Heparin josias, shavon'trent stern. Tolerated well.

## 2021-08-16 ENCOUNTER — OFFICE VISIT (OUTPATIENT)
Dept: CARDIOLOGY CLINIC | Age: 66
End: 2021-08-16
Payer: MEDICARE

## 2021-08-16 VITALS
WEIGHT: 118 LBS | BODY MASS INDEX: 20.91 KG/M2 | DIASTOLIC BLOOD PRESSURE: 80 MMHG | HEIGHT: 63 IN | HEART RATE: 94 BPM | SYSTOLIC BLOOD PRESSURE: 112 MMHG

## 2021-08-16 DIAGNOSIS — I82.722 CHRONIC DEEP VEIN THROMBOSIS (DVT) OF LEFT UPPER EXTREMITY, UNSPECIFIED VEIN (HCC): ICD-10-CM

## 2021-08-16 DIAGNOSIS — R00.0 TACHYCARDIA: ICD-10-CM

## 2021-08-16 DIAGNOSIS — I05.9 MITRAL VALVE DISEASE: ICD-10-CM

## 2021-08-16 DIAGNOSIS — I95.0 IDIOPATHIC HYPOTENSION: Primary | ICD-10-CM

## 2021-08-16 PROCEDURE — 99214 OFFICE O/P EST MOD 30 MIN: CPT | Performed by: NURSE PRACTITIONER

## 2021-08-16 PROCEDURE — 3017F COLORECTAL CA SCREEN DOC REV: CPT | Performed by: NURSE PRACTITIONER

## 2021-08-16 PROCEDURE — G8420 CALC BMI NORM PARAMETERS: HCPCS | Performed by: NURSE PRACTITIONER

## 2021-08-16 PROCEDURE — 1036F TOBACCO NON-USER: CPT | Performed by: NURSE PRACTITIONER

## 2021-08-16 PROCEDURE — 1090F PRES/ABSN URINE INCON ASSESS: CPT | Performed by: NURSE PRACTITIONER

## 2021-08-16 PROCEDURE — G8400 PT W/DXA NO RESULTS DOC: HCPCS | Performed by: NURSE PRACTITIONER

## 2021-08-16 PROCEDURE — 4040F PNEUMOC VAC/ADMIN/RCVD: CPT | Performed by: NURSE PRACTITIONER

## 2021-08-16 PROCEDURE — G8427 DOCREV CUR MEDS BY ELIG CLIN: HCPCS | Performed by: NURSE PRACTITIONER

## 2021-08-16 PROCEDURE — 1123F ACP DISCUSS/DSCN MKR DOCD: CPT | Performed by: NURSE PRACTITIONER

## 2021-08-16 ASSESSMENT — ENCOUNTER SYMPTOMS
COUGH: 0
SHORTNESS OF BREATH: 0

## 2021-08-16 NOTE — PATIENT INSTRUCTIONS
Please be informed that if you contact our office outside of normal business hours the physician on call cannot help with any scheduling or rescheduling issues, procedure instruction questions or any type of medication issue. We advise you for any urgent/emergency that you go to the nearest emergency room!     PLEASE CALL OUR OFFICE DURING NORMAL BUSINESS HOURS    Monday - Friday   8 am to 5 pm    [place] [address]: [phone]    [place]:  [phone]

## 2021-08-16 NOTE — PROGRESS NOTES
[facility] [place]  [address] Salazar  Phone: [phone]    Fax [phone]    Lita Hensley MD, Ibeth Fleming MD, Kierra Becerra MD, MD Roseline Russo MD Brenda Jasper, MD Claudie Malm, MD Chava EspinoDignity Health St[facility], APRN      [facility], APRN  [place] Couch, [facility], [facility]    CARDIOLOGY  NOTE    2021    Lynette Dorman (:  1955) is a 77 y.o. female,Established patient with Dr. Aleyda Cam, here for evaluation of the following chief complaint(s):  6 Month Follow-Up (Paitent here today for 6 month follow. Patient denies any chest pain, SOB, dizziness,palpitations,edema in feet or legs. Patient is a former smoker quit 1988. Patient doesn't drink alcohol she does drink 2 pepsi's daily.)    SUBJECTIVE/OBJECTIVE:    Анна Bower is a 77y.o. year old who has Past medical history as noted below. She is feeling well. Super busy with her family needs. 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 She is grateful her health is improved so she can take care of her family. She finished chemo and radiation in 2020, She says blood pressure is better . She is on metoprolol due to tachycardia though she denies any palpitations her echo shows severe MR. No ankle swelling   Анна Bower has history of bipolar disorder and lymphoma. She has had several sessions of chemotherapy . Initially she was symptomatic with blood pressure in the 80s causing dizziness and lightheadedness but it is improved after midodrine. She is not on midodrine since chemo is done    echo  In 2020 showed possible mitral valve prolapse although there is moderate mitral regurgitation  Denies any signs of congestive heart failure like ankle swelling shortness of breath her main complaints of feeling tired and fatigued low energy      Review of Systems   Constitutional: Negative for fatigue and fever.    Respiratory: Negative for cough and shortness of breath. Cardiovascular: Negative for chest pain, palpitations and leg swelling. Musculoskeletal: Negative for arthralgias and gait problem. Neurological: Positive for dizziness (occasional). Negative for syncope, weakness, light-headedness and headaches. Vitals:    08/16/21 0846   BP: 112/80   Pulse: 94   Weight: 118 lb (53.5 kg)   Height: 5' 3\" (1.6 m)       Wt Readings from Last 3 Encounters:   08/16/21 118 lb (53.5 kg)   07/15/21 116 lb (52.6 kg)   04/13/21 113 lb 9.6 oz (51.5 kg)       BP Readings from Last 3 Encounters:   08/16/21 112/80   07/15/21 87/64   04/13/21 111/69       Prior to Admission medications    Medication Sig Start Date End Date Taking?  Authorizing Provider   loratadine (CLARITIN) 10 MG tablet Take 10 mg by mouth daily   Yes Historical Provider, MD   teriparatide (FORTEO) 620 MCG/2.48ML SOPN injection INJECT 0.08 MLS 20 MCG TOTAL UNDER THE SKIN ONCE A DAY 6/21/21  Yes Historical Provider, MD   RA PEN NEEDLES 31G X 8 MM MISC use 1 NEEDLE WITH PEN once daily 4/30/21  Yes Historical Provider, MD   Nutritional Supplements (ENSURE PLUS) LIQD Take 1 Can by mouth 3 times daily 7/15/21  Yes Hawa Jesus MD   metoprolol tartrate (LOPRESSOR) 25 MG tablet Take 1 tablet by mouth 2 times daily 5/5/21  Yes DIONI Foster CNP   loperamide (IMODIUM A-D) 2 MG tablet Take 1 tablet by mouth 4 times daily as needed for Diarrhea 12/24/20  Yes Hawa Jesus MD   Omega-3 Fatty Acids (FISH OIL) 1000 MG CAPS Take 1,000 mg by mouth daily   Yes Historical Provider, MD   Cholecalciferol (VITAMIN D) 50 MCG (2000 UT) CAPS capsule Take 1 capsule by mouth daily   Yes Historical Provider, MD   divalproex (DEPAKOTE ER) 500 MG extended release tablet Take 2 tablets by mouth nightly 3/10/20  Yes Suzanne Lee Alex, DO   MULTIPLE VITAMIN PO Take by mouth daily   Yes Historical Provider, MD   calcium carbonate 600 MG TABS tablet Take 1 tablet by mouth daily   Yes Historical Provider, MD       Physical Exam  Vitals reviewed. Constitutional:       General: She is not in acute distress. Appearance: Normal appearance. She is not ill-appearing. HENT:      Head: Atraumatic. Neck:      Vascular: No carotid bruit. Cardiovascular:      Rate and Rhythm: Normal rate and regular rhythm. Pulses: Normal pulses. Heart sounds: Normal heart sounds. No murmur heard. Pulmonary:      Effort: Pulmonary effort is normal. No respiratory distress. Breath sounds: Normal breath sounds. Musculoskeletal:         General: No swelling or deformity. Cervical back: Neck supple. No muscular tenderness. Neurological:      Mental Status: She is alert. Health Maintenance   Topic Date Due    DTaP/Tdap/Td vaccine (1 - Tdap) Never done    Shingles Vaccine (1 of 2) Never done    DEXA (modify frequency per FRAX score)  Never done    Annual Wellness Visit (AWV)  Never done    Flu vaccine (1) 09/01/2021    Breast cancer screen  12/07/2021    Pneumococcal 65+ yrs at Risk Vaccine (2 of 2 - PCV13) 12/17/2021    Lipid screen  06/19/2022    Colon cancer screen colonoscopy  02/28/2030    COVID-19 Vaccine  Completed    Hepatitis C screen  Completed    Hepatitis A vaccine  Aged Out    Hepatitis B vaccine  Aged Out    Hib vaccine  Aged Out    Meningococcal (ACWY) vaccine  Aged Out       Lab Review   Lab Results   Component Value Date    CHOL 234 06/19/2017    TRIG 130 06/19/2017    HDL 66 06/19/2017        Echo 1/16/2020   Summary   Left ventricular systolic function is low normal.   Ejection fraction is visually estimated at 50%.   Paradoxical motion of the interventricular septum; possible conduction delay.   Indeterminate diastolic function; E/A flow reversal is noted.   Mitral valve prolapse is present.   Moderate to severe mitral regurgitation is present.   Moderate tricuspid regurgitation is present.  RVSP is 25 mmHg.   No evidence of any pericardial effusion.  Oneil Chirinos interatrial septum.   Incidental finding: cyst vs mass near/attached to the right kidney.:    Echo 3/2021   Summary   Left ventricular function is normal, EF is estimated at 55-60%. Grade I diastolic dysfunction. Mild left ventricular hypertrophy. Bi atrial enlargement noted. The mitral valve appears to slightly prolapse. Moderate to severe mitral and tricuspid regurgitation is present. Aneurysmal interatrial septum. RVSP is 34 mmHg. No evidence of pericardial effusion. ASSESSMENT/PLAN:    Idiopathic hypotension  Improved that she is not on chemo anymore. She states that she has been able to stay better hydrated      Tachycardia  Continue metoprolol 25 mg BID, at risk for afib? No documented afib so far. HR is on high end of normal .discussedf stopping Metoprolol, but given risk of atrial fibrillation and MVP and current HR will continue metoprolol for now. She states understanding and is agreeable.      Mitral valve disease  She does seem to have mitral regurgitation with mitral valve prolapse may eventually need  STEVEN  she is more physically improved at this stage we will continue to monitor her mitral valve she does not appear to be in heart failure or seem to be affected by MR. Improved on recent echo. contiue to monitor and recheck echo as needed.       Dyslipidemia :  All available lab work was reviewed 12/2020 in care everywhere. Lipid panel is at goal. Necessary orders were placed , instructions given by myself       Counseled extensively and medication compliance urged. We discussed that for the  prevention of ASCVD our  goal is aggressive risk modification. Patient is encouraged to exercise even a brisk walk for 30 minutes  at least 3 to 4 times a week      Return in about 6 months (around 2/16/2022) for with Dr. Rasheeda Granados, or reji if needed. An electronic signature was used to authenticate this note.     Electronically signed by DIONI Barclay CNP on 8/16/2021 at 9:07 AM

## 2021-10-07 ENCOUNTER — HOSPITAL ENCOUNTER (OUTPATIENT)
Dept: INFUSION THERAPY | Age: 66
Discharge: HOME | End: 2021-10-07
Payer: MEDICARE

## 2021-10-07 DIAGNOSIS — C85.16 B-CELL LYMPHOMA OF INTRAPELVIC LYMPH NODES, UNSPECIFIED B-CELL LYMPHOMA TYPE (HCC): ICD-10-CM

## 2021-10-07 LAB
ALBUMIN SERPL-MCNC: 4.4 GM/DL (ref 3.4–5)
ALP BLD-CCNC: 73 IU/L (ref 40–129)
ALT SERPL-CCNC: 10 U/L (ref 10–40)
ANION GAP SERPL CALCULATED.3IONS-SCNC: 11 MMOL/L (ref 4–16)
AST SERPL-CCNC: 19 IU/L (ref 15–37)
BASOPHILS ABSOLUTE: 0 K/CU MM
BASOPHILS RELATIVE PERCENT: 0 % (ref 0–1)
BILIRUB SERPL-MCNC: 0.4 MG/DL (ref 0–1)
BUN BLDV-MCNC: 12 MG/DL (ref 6–23)
CALCIUM SERPL-MCNC: 9.4 MG/DL (ref 8.3–10.6)
CHLORIDE BLD-SCNC: 108 MMOL/L (ref 99–110)
CO2: 25 MMOL/L (ref 21–32)
CREAT SERPL-MCNC: 0.6 MG/DL (ref 0.6–1.1)
DIFFERENTIAL TYPE: ABNORMAL
EOSINOPHILS ABSOLUTE: 0.2 K/CU MM
EOSINOPHILS RELATIVE PERCENT: 7 % (ref 0–3)
GFR AFRICAN AMERICAN: >60 ML/MIN/1.73M2
GFR NON-AFRICAN AMERICAN: >60 ML/MIN/1.73M2
GLUCOSE BLD-MCNC: 90 MG/DL (ref 70–99)
HCT VFR BLD CALC: 32.2 % (ref 37–47)
HEMOGLOBIN: 10.8 GM/DL (ref 12.5–16)
LACTATE DEHYDROGENASE: 192 IU/L (ref 120–246)
LYMPHOCYTES ABSOLUTE: 0.7 K/CU MM
LYMPHOCYTES RELATIVE PERCENT: 21.5 % (ref 24–44)
MCH RBC QN AUTO: 31.4 PG (ref 27–31)
MCHC RBC AUTO-ENTMCNC: 33.5 % (ref 32–36)
MCV RBC AUTO: 93.6 FL (ref 78–100)
MONOCYTES ABSOLUTE: 0.4 K/CU MM
MONOCYTES RELATIVE PERCENT: 10.2 % (ref 0–4)
PDW BLD-RTO: 12.9 % (ref 11.7–14.9)
PLATELET # BLD: 187 K/CU MM (ref 140–440)
PMV BLD AUTO: 9 FL (ref 7.5–11.1)
POTASSIUM SERPL-SCNC: 4.2 MMOL/L (ref 3.5–5.1)
RBC # BLD: 3.44 M/CU MM (ref 4.2–5.4)
SEGMENTED NEUTROPHILS ABSOLUTE COUNT: 2.1 K/CU MM
SEGMENTED NEUTROPHILS RELATIVE PERCENT: 61.3 % (ref 36–66)
SODIUM BLD-SCNC: 144 MMOL/L (ref 135–145)
TOTAL PROTEIN: 6.2 GM/DL (ref 6.4–8.2)
WBC # BLD: 3.4 K/CU MM (ref 4–10.5)

## 2021-10-07 PROCEDURE — 83615 LACTATE (LD) (LDH) ENZYME: CPT

## 2021-10-07 PROCEDURE — 85025 COMPLETE CBC W/AUTO DIFF WBC: CPT

## 2021-10-07 PROCEDURE — 36415 COLL VENOUS BLD VENIPUNCTURE: CPT

## 2021-10-07 PROCEDURE — 80053 COMPREHEN METABOLIC PANEL: CPT

## 2021-10-24 NOTE — PROGRESS NOTES
Patient Name: Sharlie Lesches  Patient : 1955  Patient MRN: [id number]     Primary Oncologist: Nj Hdez MD  Referring Provider: Elijah Fermin MD       Date of Service: 10/24/2021      Chief Complaint:   No chief complaint on file. She came in for follow-up visit. Problem List:      Family history of malignant neoplasm of gastrointestinal tract     B-cell lymphoma (HCC)     Idiopathic hypotension     Mitral valve disease     Pancytopenia (HCC)     Severe malnutrition (HCC)     Acute deep vein thrombosis (DVT) of left upper extremity (HCC)     Hematoma     Pain syndrome, chronic     Tachycardia     HPI:   Hong Cerrato is a pleasant 72year-old AA female patient who was referred for right axillary lymphadenopathy, status post needle biopsy in 2016, which did not show any pathological abnormalities. I talked to Dr. Aayush Velasco, who favored benign lymph node. Right axillary lymph node biopsy in 2015 revealed benign lymph node hyperplasia. Mammogram in 2016 revealed low suspicion for malignancy, with several enlarged right axillary lymph nodes, 3.1 by 0.8 by 2.3 cm, 0.9 by 0.8 by 1 cm, 1.8 by 1.4 by 1.5, and 1.4 by 1.5 cm. None demonstrated significant hypervascularity. I offered her an excisional lymph node biopsy versus surveillance with followup ultrasound. She opted to go with the second one. Blood test in 2015 revealed normal CBC with normal calcium and alk phos, white cell count 5.8, hemoglobin 11.9, platelet was 395. Blood test on 2016 revealed normal CBC, with white cell count 6.5, hemoglobin 12.5, platelet 057. SED rate was 17, rheumatoid factor less than 10. SUZE was still pending. Ultrasound of the right breast and axilla revealed no significant interval change in the right axillary adenopathy, which still has remained most likely reactive in etiology. This was not seen on any mammogram dating back beyond .   She opted to go to see the surgeon; therefore, I referred her to see Dr. Ave Chauhan. She was last seen in our office 9/28/2016. Ms. Fabien Ramirez was seen by Dr. Ave Chauhan March 3, 2019 for evaluation of enlarging, tender mass in right axilla. She had excisional lymph node biopsy on March 12, 2019. Pathology revealed granulomatous lymphadenitis. Mammogram 2/1/2019 was benign. She had EGD and colonoscopy on 12/10/2019. EGD was unremarkable. Colonoscopy revealed in infiltrative, ulcerated and necrotic, non bleeding 50 mm mass of malignant appearance was found in the rectum at a distance between 12 cm and 18 cm from the anus. The mass caused a partial obstruction. Pathology from the mass revealed ulcerated large bowel mucosa with dense lymphoid infiltrate and abundant necrosis. - Negative for carcinoma. The lymphoid infiltrate and necrosis are suspicious for lymphoma. Results of flow cytometry revealed no flow immunophenotypic evidence of a lymphoproliferative disorder based on limited antibody panel. CT abdomen/pelvis on December 23, 2019 showed a heterogeneous solid 9.5 x 6.1 cm soft tissue mass within the right posterior pelivs which abuts the posterior margin of the uterus and involves the right lagteral margin of the rectosigmoid junction, indeterminate. This mass is concerning for malignancy, with uterine origin being favored, such as uterine sarcoma with suspected invasion of the rectosigmoid junction. In addition, a 5.8 x 10.5 x 1.1 cm central mesenteric soft tissue mass is seen, suspected metastatic disease. Additional uterine masses are seen which may relate to fibroids or extension of uterine sarcoma. A possible 6 mm pleural based nodule within the posterior left lower lobe, indeterminate in the setting of  suspected malignacy. She was seen by Dr. Iris Lundberg on December 26, 2019 for review of colonoscopy/egd results. She was referred to urology due to early hydronephrosis.    I discussed with pathologist to review the pathology report from rectal biopsy. She lost 30 pounds in the last 6 months. Mammogram was in 2018. She just recovered from shingle. She was prescribed Bentyl by Dr. Pieter Tang. In January 2020 official pathology report showed diffuse large B-cell lymphoma, no double hit or triple hit. BCL 6 was positive. We discussed about potential treatment with R-CHOP. We discussed about the potential risks and benefit of the chemotherapy. Clinically she could have stage IIb or IIIb. She has first chemo on January 17, 2020. ECHO in January 2020 showed LVEF at 50%. Clinically she looks better. Hep B/C were negative. PET CT scan on 1/27/2020 showed:  1. Intense metabolic activity associated with the abdominal and pelvic  masses described above consistent with active lymphoma. Prior imaging has been requested and an addendum will be provided to described any interval changes. 2.  Linear FDG activity along the distal tip of the MediPort, which is in the  left brachiocephalic vein just superior to the SVC. This can be seen with a  fibrin sheath. Correlate with current functionality of the MediPort. ECHO in January 2020: Left ventricular systolic function is low normal. ? Ejection fraction is visually estimated at 50%. Paradoxical motion of the interventricular septum; possible conduction ?delay. ?Indeterminate diastolic function; E/A flow reversal is noted. ?Mitral valve prolapse is present. ?Moderate to severe mitral regurgitation is present. ?Moderate tricuspid regurgitation is present. RVSP is 25 mmHg. ? No evidence of any pericardial effusion. ?Mobile interatrial septum. ? Incidental finding: cyst vs mass near/attached to the right kidney. In March 2020 she was hospitalized due to anemia related GI related to colonic mass/lymphoma. She has mulitple blood transfusion. She had EGD and colonoscopy in March 2020. After GIB is controlled she started on LMWH for DVT related to fall and mediport.   CT chest, abdomen and pelvis in March 2020 after 2 cycled of chemo showed response to therapy. She was discharged to rehab. She completed lovenox injection after 6 cycle of chemo due on June 5, 2020. She completed chemotherapy on June 5, 2020. PET CT in June 2020 scan revealed:   Significantly decreased size and uptake of the masses within the lower abdomen/pelvis, consistent with treatment response. Notable: ABDOMEN/PELVIS: No abnormal uptake within the solid organs. Significantly decreased size of the left paramidline lower abdominal mass which measures approximately 3.1 x 1.6 cm and demonstrates maximum SUV of 2.2. Significantly improved bilateral pelvic uptake with a residual focus of intense uptake in the right hemipelvis measuring approximately 2.8 x 2.8 cm and demonstrating maximum SUV of 8.0. She had pelvic radiation therapy from July 27, 2020 through August 20, 2020. She received 36 Gy. She has completed radiation therapy 8/26/2020. She continues to have loose stool. Imaging studies optional since the PET scan is completely negative in November 2020. PET 11/19/2020:  Continued response to treatment.  Abnormal uptake in the deep right   hemipelvis has resolved.  The lower abdominal mass appears slightly smaller,   although difficult to accurately measure without intravenous contrast, and   demonstrates similar mild uptake.  No new disease. Anemic work-up work-up on November 13, 2020 was grossly unremarkable. Bone density on December 21, 2020 showed osteoporosis at lumbar spine. I recommend to discuss with family doctor about oral versus IV bisphosphonate versus Reclast.  I recommend to continue with vitamin D and calcium. I remind her about Mediport flush every 3 months. She was seen by GI and reportedly colonoscopy in November 2020 was okay. She takes Imodium once or twice a day. Diarrhea has improved. Stool specimen was obtained by GI.       On 2/23/2021 WBC was 2.9, hemoglobin 10.2, platelet 562. Absolute neutrophils was 1.4. She has covid vaccine already. Son had Covid infection.  was sick and hospitalized due to congestive heart failure and anemia. She lost little weight since last office visit. This could be related to the stress. I recommend she eat frequent meals. She has loose stool when eating greasy meals. She will follow up with GI. Mammogram in December 2020 was benign. If WBC continues to drop I will consider bone marrow study. Presented for scheduled follow up on October 26, 2021. She denies fever, chills, night sweats, no new lumps or bumps. She denies recurrent infection. She continues to have daily diarrhea with use of imodium x 2. She reports this is associated with greasy foods and has been cleared by GI with no intervention. Her  is having multiple health issues and she is caring for her grandchild whose mother has been in a coma for three months. She declines need for social work consult or assistance. Denies dizziness, visual changes, or headache. Denies mucositis, dysphagia, no cough, chest pain, hemoptysis, shortness of breath, no nausea, vomiting, diarrhea, no constipation, no melena or hematochezia, no dysuria, hematuria, no lower extremity edema or calf pain. Denies acute pain. No worsening depression. Past Medical History  Tubal ligation, tonsillectomy, colonoscopy in 2015 by Dr. Lashaun Angeles, and reportedly there was no polyp. She had a Pap smear in 2015 by Dr. Tena Hinson. She has bipolar disorder. Pap smear was in 2015, mammogram in March 2016. Paula    Social History  Smoking Status Former smoker  She smoked 1 pack/day for 10 years and quit 30 years ago. She denies any alcohol or illicit drug use. Family History  Mother had breast cancer in her [de-identified], father had metastatic brain cancer. Previous Therapies:  R-CHOP     Review of Systems: \"Per interval history; otherwise 10 point ROS is negative. \"     Vital Signs:  LMP 01/11/2000     Physical Exam:    CONSTITUTIONAL: awake, alert, cooperative, no apparent distress   EYES:  sclera clear and conjunctiva pallor  ENT: Normocephalic, without obvious abnormality, atraumatic  NECK: supple, symmetrical  HEMATOLOGIC/LYMPHATIC: no cervical, supraclavicular or axillary lymphadenopathy   LUNGS: no increased work of breathing and clear to auscultation   CARDIOVASCULAR: regular rate and rhythm, normal S1 and S2, no murmur  ABDOMEN: normal bowel sound, soft, non-distended, non-tender, no masses palpated, no hepatosplenomegaly. No rebound or guarding. MUSCULOSKELETAL: full range of motion noted, tone is normal  NEUROLOGIC: awake, alert, oriented to name, place and time. Motor skills grossly intact. Cranial nerves II through XII are grossly intact. SKIN: Normal skin color, texture, turgor and no jaundice. appears intact   EXTREMITIES: no LE edema or cyanosis.     Labs:    Hematology:  Lab Results   Component Value Date    WBC 3.4 (L) 10/07/2021    RBC 3.44 (L) 10/07/2021    HGB 10.8 (L) 10/07/2021    HCT 32.2 (L) 10/07/2021    MCV 93.6 10/07/2021    MCH 31.4 (H) 10/07/2021    MCHC 33.5 10/07/2021    RDW 12.9 10/07/2021     10/07/2021    MPV 9.0 10/07/2021    BANDSPCT 2 (L) 03/08/2020    SEGSPCT 61.3 10/07/2021    EOSRELPCT 7.0 (H) 10/07/2021    BASOPCT 0.0 10/07/2021    LYMPHOPCT 21.5 (L) 10/07/2021    MONOPCT 10.2 (H) 10/07/2021    BANDABS 0.19 03/08/2020    SEGSABS 2.1 10/07/2021    EOSABS 0.2 10/07/2021    BASOSABS 0.0 10/07/2021    LYMPHSABS 0.7 10/07/2021    MONOSABS 0.4 10/07/2021    DIFFTYPE AUTOMATED DIFFERENTIAL 10/07/2021    ANISOCYTOSIS 1+ 03/08/2020    POLYCHROM 1+ 03/08/2020     Lab Results   Component Value Date    ESR 25 01/16/2020     Chemistry:  Lab Results   Component Value Date     10/07/2021    K 4.2 10/07/2021     10/07/2021    CO2 25 10/07/2021    BUN 12 10/07/2021    CREATININE 0.6 10/07/2021    GLUCOSE 90 10/07/2021    CALCIUM 9.4 10/07/2021 PROT 6.2 (L) 10/07/2021    LABALBU 4.4 10/07/2021    BILITOT 0.4 10/07/2021    ALKPHOS 73 10/07/2021    AST 19 10/07/2021    ALT 10 10/07/2021    LABGLOM >60 10/07/2021    GFRAA >60 10/07/2021    PHOS 3.2 04/13/2021    MG 2.0 02/25/2020    POCGLU 132 (H) 02/25/2020     Lab Results   Component Value Date     10/07/2021     Lab Results   Component Value Date    TSHHS 0.732 04/13/2021    T4FREE 0.96 04/13/2021     Immunology:  Lab Results   Component Value Date    PROT 6.2 (L) 10/07/2021     Coagulation Panel:  Lab Results   Component Value Date    PROTIME 16.5 (H) 02/28/2020    INR 1.36 02/28/2020    APTT 36.9 02/28/2020     Anemia Panel:  Lab Results   Component Value Date    QYBPYRWV01 1154 (H) 11/13/2020    FOLATE >20.0 (H) 11/13/2020        Assessment & Plan:    1. She was diagnosed with diffuse large B-cell lymphoma, no double hit. She has first [facility] in January 7, 2020[facility] LVEF at 50%. She had Mediport placement. She has second cycle R-CHOP in February 2020. 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 CT chest, abdomen and pelvis showed response. She completed course of antibiotic for pseudomonas bacteremia. She resumed chemotherapy on April 3, 2020. 18 2020. She completed chemotherapy on June 5, 2020. PET CT scan on 6/18/2020 showed response, still with intense uptake in the right hemipelvis measuring approximately 2.8 x 2.8 cm and demonstrating maximum SUV of 8.0. She completed pelvic radiation on 8/26/2020. PET scan in November 2020 showed remission. She is agreeable to continue with active surveillance. Imaging studies optional. No symptoms suggestive of recurrence. We will obtain labs today. I believe she is in remission. 2. She had LUE DVT and I recommend to continue with LMWH. I recommend to keep LUE and lower extremities elevated. LUE swelling has improved significantly.  Lovenox discontinued 8/2020.    3. Hydronephrosis:  She was referred to see urologist.     4. She had shingle to lower abdomen. She was on valacyclovir. 5. She had GIB related to colon ulcer/lymphoma s/p blood transfusion. Iron studies from April/2020 were reviewed. Reportedly colonoscopy November 2020 was fine. Stool tests was ordered by GI on December 24, 2020. She takes Imodium only as needed. Greasy meals cause her to have loose stool. She will follow up with GI. GI cleared without intervention, recommended metamucil which she declines. 6.  She has anemia of chronic disease. She has history of GI bleed. CBC on July 15, 2021 showed normal WBC. Hg was 10. Platelet 081. If anemia is worse, I will offer bone marrow study. Labs stable for now. I gave prescription of ensure today. I recommend to eat frequent small meals. I remind her to have Mediport flush every 3 months. Last flush 10/26/21. She has covid vaccine. Return to clinic in 12 weeks or sooner. All of her questions have been answered for today. Recent imaging and labs were reviewed and discussed with the patient.

## 2021-10-26 ENCOUNTER — HOSPITAL ENCOUNTER (OUTPATIENT)
Dept: INFUSION THERAPY | Age: 66
Discharge: HOME | End: 2021-10-26
Payer: MEDICARE

## 2021-10-26 ENCOUNTER — OFFICE VISIT (OUTPATIENT)
Dept: ONCOLOGY | Age: 66
End: 2021-10-26
Payer: MEDICARE

## 2021-10-26 VITALS
OXYGEN SATURATION: 99 % | HEIGHT: 63 IN | DIASTOLIC BLOOD PRESSURE: 74 MMHG | WEIGHT: 117.6 LBS | HEART RATE: 94 BPM | SYSTOLIC BLOOD PRESSURE: 103 MMHG | BODY MASS INDEX: 20.84 KG/M2 | RESPIRATION RATE: 16 BRPM | TEMPERATURE: 97 F

## 2021-10-26 DIAGNOSIS — C85.16 B-CELL LYMPHOMA OF INTRAPELVIC LYMPH NODES, UNSPECIFIED B-CELL LYMPHOMA TYPE (HCC): Primary | ICD-10-CM

## 2021-10-26 DIAGNOSIS — D64.9 ANEMIA, UNSPECIFIED TYPE: ICD-10-CM

## 2021-10-26 DIAGNOSIS — R19.7 DIARRHEA, UNSPECIFIED TYPE: ICD-10-CM

## 2021-10-26 PROCEDURE — 4040F PNEUMOC VAC/ADMIN/RCVD: CPT | Performed by: NURSE PRACTITIONER

## 2021-10-26 PROCEDURE — 99211 OFF/OP EST MAY X REQ PHY/QHP: CPT

## 2021-10-26 PROCEDURE — 3017F COLORECTAL CA SCREEN DOC REV: CPT | Performed by: NURSE PRACTITIONER

## 2021-10-26 PROCEDURE — 96523 IRRIG DRUG DELIVERY DEVICE: CPT

## 2021-10-26 PROCEDURE — 1123F ACP DISCUSS/DSCN MKR DOCD: CPT | Performed by: NURSE PRACTITIONER

## 2021-10-26 PROCEDURE — 6360000002 HC RX W HCPCS: Performed by: INTERNAL MEDICINE

## 2021-10-26 PROCEDURE — G8427 DOCREV CUR MEDS BY ELIG CLIN: HCPCS | Performed by: NURSE PRACTITIONER

## 2021-10-26 PROCEDURE — 2580000003 HC RX 258: Performed by: INTERNAL MEDICINE

## 2021-10-26 PROCEDURE — 1090F PRES/ABSN URINE INCON ASSESS: CPT | Performed by: NURSE PRACTITIONER

## 2021-10-26 PROCEDURE — 1036F TOBACCO NON-USER: CPT | Performed by: NURSE PRACTITIONER

## 2021-10-26 PROCEDURE — G8484 FLU IMMUNIZE NO ADMIN: HCPCS | Performed by: NURSE PRACTITIONER

## 2021-10-26 PROCEDURE — G8420 CALC BMI NORM PARAMETERS: HCPCS | Performed by: NURSE PRACTITIONER

## 2021-10-26 PROCEDURE — G8400 PT W/DXA NO RESULTS DOC: HCPCS | Performed by: NURSE PRACTITIONER

## 2021-10-26 PROCEDURE — 99214 OFFICE O/P EST MOD 30 MIN: CPT | Performed by: NURSE PRACTITIONER

## 2021-10-26 RX ORDER — SODIUM CHLORIDE 0.9 % (FLUSH) 0.9 %
10 SYRINGE (ML) INJECTION PRN
Status: DISCONTINUED | OUTPATIENT
Start: 2021-10-26 | End: 2021-10-27 | Stop reason: HOSPADM

## 2021-10-26 RX ORDER — HEPARIN SODIUM (PORCINE) LOCK FLUSH IV SOLN 100 UNIT/ML 100 UNIT/ML
500 SOLUTION INTRAVENOUS PRN
Status: DISCONTINUED | OUTPATIENT
Start: 2021-10-26 | End: 2021-10-27 | Stop reason: HOSPADM

## 2021-10-26 RX ADMIN — SODIUM CHLORIDE, PRESERVATIVE FREE 10 ML: 5 INJECTION INTRAVENOUS at 10:33

## 2021-10-26 RX ADMIN — HEPARIN 500 UNITS: 100 SYRINGE at 10:33

## 2021-10-26 ASSESSMENT — PATIENT HEALTH QUESTIONNAIRE - PHQ9
SUM OF ALL RESPONSES TO PHQ9 QUESTIONS 1 & 2: 0
SUM OF ALL RESPONSES TO PHQ QUESTIONS 1-9: 0
2. FEELING DOWN, DEPRESSED OR HOPELESS: 0
1. LITTLE INTEREST OR PLEASURE IN DOING THINGS: 0

## 2021-10-26 NOTE — PROGRESS NOTES
Pt brought to suite by WAYNE Beckford for port flush. Port accessed without difficulty, pt tolerated well. Port hep locked and de-accessed. No additional needs voiced. Pt d/c in stable condition.

## 2021-10-26 NOTE — PROGRESS NOTES
MA Rooming Questions  Patient: Nikki Avila  MRN: [id number]    Date: 10/26/2021        1. Do you have any new issues?   no         2. Do you need any refills on medications?    no    3. Have you had any imaging done since your last visit?   no    4. Have you been hospitalized or seen in the emergency room since your last visit here?   no    5. Did the patient have a depression screening completed today?  Yes    PHQ-9 Total Score: 0 (10/26/2021 10:09 AM)       PHQ-9 Given to (if applicable):               PHQ-9 Score (if applicable):                     [] Positive     []  Negative              Does question #9 need addressed (if applicable)                     [] Yes    []  No               Ivette Silva CMA

## 2021-11-06 NOTE — PROGRESS NOTES
Pt arrived on unit. Oriented to room, call light, and chair/bed controls with understanding voiced. Pt is aware of and agreeable to POC.
Tolerated transfusion well. Reviewed discharge instruction, voiced understanding. Copies of AVS given. Pt discharged home. Pt to exit via steady gait.     Orders Placed This Encounter   Medications    0.9 % sodium chloride infusion 250 mL    acetaminophen (TYLENOL) tablet 650 mg    diphenhydrAMINE (BENADRYL) tablet 25 mg    furosemide (LASIX) injection 20 mg    sodium chloride (PF) 0.9 % injection 10 mL    sodium chloride (PF) 0.9 % injection 20 mL    heparin flush 100 UNIT/ML injection 500 Units    methylPREDNISolone sodium (SOLU-MEDROL) injection 20 mg
PAIN SCALE 8 OF 10.

## 2021-12-27 NOTE — PROGRESS NOTES
Patient Name: Marly Mcmahan  Patient : 1955  Patient MRN: [id number]     Primary Oncologist: Judson Castillo MD  Referring Provider: Jose Mujica MD       Date of Service: 2022      Chief Complaint:   Chief Complaint   Patient presents with    Follow-up     She came in for follow-up visit. Problem List:      Family history of malignant neoplasm of gastrointestinal tract     B-cell lymphoma (HCC)     Idiopathic hypotension     Mitral valve disease     Pancytopenia (HCC)     Severe malnutrition (HCC)     Deep vein thrombosis (DVT) of left upper extremity      Hematoma     Pain syndrome, chronic     Tachycardia     HPI:   Blayne Boone is a pleasant 77year-old AA female patient who was referred for right axillary lymphadenopathy, status post needle biopsy in 2016, which did not show any pathological abnormalities. I talked to Dr. Elsie Colbert, who favored benign lymph node. Right axillary lymph node biopsy in 2015 revealed benign lymph node hyperplasia. Mammogram in 2016 revealed low suspicion for malignancy, with several enlarged right axillary lymph nodes, 3.1 by 0.8 by 2.3 cm, 0.9 by 0.8 by 1 cm, 1.8 by 1.4 by 1.5, and 1.4 by 1.5 cm. None demonstrated significant hypervascularity. I offered her an excisional lymph node biopsy versus surveillance with followup ultrasound. She opted to go with the second one. Blood test in 2015 revealed normal CBC with normal calcium and alk phos, white cell count 5.8, hemoglobin 11.9, platelet was 567. Blood test on 2016 revealed normal CBC, with white cell count 6.5, hemoglobin 12.5, platelet 159. SED rate was 17, rheumatoid factor less than 10. SUZE was still pending. Ultrasound of the right breast and axilla revealed no significant interval change in the right axillary adenopathy, which still has remained most likely reactive in etiology.   This was not seen on any mammogram dating back beyond to review the pathology report from rectal biopsy. She lost 30 pounds in the last 6 months. Mammogram was in 2018. She just recovered from shingle. She was prescribed Bentyl by Dr. Erasmo Scruggs. In January 2020 official pathology report showed diffuse large B-cell lymphoma, no double hit or triple hit. BCL 6 was positive. We discussed about potential treatment with R-CHOP. We discussed about the potential risks and benefit of the chemotherapy. Clinically she could have stage IIb or IIIb. She had first chemo on January 17, 2020. ECHO in January 2020 showed LVEF at 50%. Hep B/C were negative. PET CT scan on 1/27/2020 showed:  1. Intense metabolic activity associated with the abdominal and pelvic  masses described above consistent with active lymphoma. Prior imaging has been requested and an addendum will be provided to described any interval changes. 2.  Linear FDG activity along the distal tip of the MediPort, which is in the  left brachiocephalic vein just superior to the SVC. This can be seen with a  fibrin sheath. Correlate with current functionality of the MediPort. ECHO in January 2020: Left ventricular systolic function is low normal. ? Ejection fraction is visually estimated at 50%. Paradoxical motion of the interventricular septum; possible conduction ?delay. ?Indeterminate diastolic function; E/A flow reversal is noted. ?Mitral valve prolapse is present. ?Moderate to severe mitral regurgitation is present. ?Moderate tricuspid regurgitation is present. RVSP is 25 mmHg. ? No evidence of any pericardial effusion. ?Mobile interatrial septum. ? Incidental finding: cyst vs mass near/attached to the right kidney. In March 2020 she was hospitalized due to anemia related GI related to colonic mass/lymphoma. She has mulitple blood transfusion. She had EGD and colonoscopy in March 2020. After GIB is controlled she started on LMWH for DVT related to fall and mediport.   CT chest, abdomen and pelvis in March 2020 after 2 cycled of chemo showed response to therapy. She completed lovenox injection after 6 cycle of chemo due on June 5, 2020. She completed chemotherapy on June 5, 2020. PET CT in June 2020 scan revealed:   Significantly decreased size and uptake of the masses within the lower abdomen/pelvis, consistent with treatment response. Notable: ABDOMEN/PELVIS: No abnormal uptake within the solid organs. Significantly decreased size of the left paramidline lower abdominal mass which measures approximately 3.1 x 1.6 cm and demonstrates maximum SUV of 2.2. Significantly improved bilateral pelvic uptake with a residual focus of intense uptake in the right hemipelvis measuring approximately 2.8 x 2.8 cm and demonstrating maximum SUV of 8.0. She had pelvic radiation therapy from July 27, 2020 through August 20, 2020. She received 36 Gy. She completed radiation therapy 8/26/2020. Imaging studies optional since the PET scan is completely negative in November 2020. PET 11/19/2020: continued response to treatment.  Abnormal uptake in the deep right hemipelvis has resolved.  The lower abdominal mass appears slightly smaller, although difficult to accurately measure without intravenous contrast, and demonstrates similar mild uptake.  No new disease. Anemic work-up on November 13, 2020 was grossly unremarkable. Bone density on December 21, 2020 showed osteoporosis at lumbar spine. I recommend to discuss with family doctor about oral versus IV bisphosphonate versus Reclast.  I recommend to continue with vitamin D and calcium. I remind her about Mediport flush every 3 months. She was seen by GI and reportedly colonoscopy in November 2020 was okay. She takes Imodium once or twice a day. Diarrhea has improved. Stool specimen was obtained by GI. On 2/23/2021 WBC was 2.9, hemoglobin 10.2, platelet 806. Absolute neutrophils was 1.4. She has covid vaccine. Son had Covid infection.    was lymphadenopathy   LUNGS: no increased work of breathing and clear to auscultation   CARDIOVASCULAR: regular rate and rhythm, normal S1 and S2, no murmur  ABDOMEN: normal bowel sound, soft, non-distended, non-tender, no masses palpated, no hepatosplenomegaly. No rebound or guarding. MUSCULOSKELETAL: full range of motion noted, tone is normal  NEUROLOGIC: Motor skills grossly intact. CN II through XII are grossly intact. SKIN: Normal skin color, texture, turgor and no jaundice. appears intact   EXTREMITIES: no LE edema or cyanosis.     Labs:  Hematology:  Lab Results   Component Value Date    WBC 4.9 01/19/2022    RBC 3.79 (L) 01/19/2022    HGB 11.7 (L) 01/19/2022    HCT 35.4 (L) 01/19/2022    MCV 93.4 01/19/2022    MCH 30.9 01/19/2022    MCHC 33.1 01/19/2022    RDW 13.1 01/19/2022     01/19/2022    MPV 9.2 01/19/2022    BANDSPCT 2 (L) 03/08/2020    SEGSPCT 66.1 (H) 01/19/2022    EOSRELPCT 3.7 (H) 01/19/2022    BASOPCT 0.0 01/19/2022    LYMPHOPCT 20.3 (L) 01/19/2022    MONOPCT 9.9 (H) 01/19/2022    BANDABS 0.19 03/08/2020    SEGSABS 3.3 01/19/2022    EOSABS 0.2 01/19/2022    BASOSABS 0.0 01/19/2022    LYMPHSABS 1.0 01/19/2022    MONOSABS 0.5 01/19/2022    DIFFTYPE AUTOMATED DIFFERENTIAL 01/19/2022    ANISOCYTOSIS 1+ 03/08/2020    POLYCHROM 1+ 03/08/2020     Lab Results   Component Value Date    ESR 25 01/16/2020     Chemistry:  Lab Results   Component Value Date     01/19/2022    K 4.1 01/19/2022     01/19/2022    CO2 26 01/19/2022    BUN 14 01/19/2022    CREATININE 1.0 01/19/2022    GLUCOSE 92 01/19/2022    CALCIUM 9.6 01/19/2022    PROT 6.3 (L) 01/19/2022    LABALBU 4.1 01/19/2022    BILITOT 0.4 01/19/2022    ALKPHOS 82 01/19/2022    AST 17 01/19/2022    ALT 9 (L) 01/19/2022    LABGLOM 55 (L) 01/19/2022    GFRAA >60 01/19/2022    PHOS 3.2 04/13/2021    MG 2.0 02/25/2020    POCGLU 132 (H) 02/25/2020     Lab Results   Component Value Date     01/19/2022     Lab Results   Component Value Date    TSHHS 0.732 04/13/2021    T4FREE 0.96 04/13/2021     Immunology:  Lab Results   Component Value Date    PROT 6.3 (L) 01/19/2022     Coagulation Panel:  Lab Results   Component Value Date    PROTIME 16.5 (H) 02/28/2020    INR 1.36 02/28/2020    APTT 36.9 02/28/2020     Anemia Panel:  Lab Results   Component Value Date    UEYNUUNI70 1154 (H) 11/13/2020    FOLATE >20.0 (H) 11/13/2020 11/13/2020 Ferritin: 911 (H) Iron: 42 TIBC: 219 (L) Transferrin %: 19 Folate: >20.0 (H)  Vitamin B-12: 1154 (H)    4/13/2021 Ferritin: 733 (H) Iron: 110 TIBC: 219 (L) Transferrin %: 50 (H)    Assessment & Plan:  1. She was diagnosed with diffuse large B-cell lymphoma, no double hit. She has first R-CHOP in January 7, 2020. Rometta Fav ECHO showed LVEF at 50%. She had Mediport placement. She has second cycle R-CHOP in February 2020. 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 CT chest, abdomen and pelvis showed response. She completed course of antibiotic for pseudomonas bacteremia. She resumed chemotherapy on April 3, 2020. 18 2020. She completed chemotherapy on June 5, 2020. PET CT scan on 6/18/2020 showed response, still with intense uptake in the right hemipelvis measuring approximately 2.8 x 2.8 cm and demonstrating maximum SUV of 8.0. She completed pelvic radiation on 8/26/2020. PET scan in November 2020 showed remission. Imaging studies are optional.  She denied any symptoms to suggest recurrent lymphoma. Recent labs were reviewed. I will continue to monitor her closely. 2. She had LUE DVT and I recommend to continue with LMWH. I recommend to keep LUE and lower extremities elevated. LUE swelling has improved significantly. Lovenox discontinued 8/2020.    3. She had GIB related to colon ulcer/lymphoma s/p blood transfusion. Iron studies from April/2020 were reviewed. Reportedly colonoscopy November 2020 was fine. Stool tests was ordered by GI on December 24, 2020. She takes Imodium only as needed.   Greasy meals cause her to have loose stool. She will follow up with GI. GI cleared without intervention, recommended metamucil which she declines. 4.  She has anemia of chronic disease. She has history of GI bleed. CBC in January 2022 showed WBC 4.9, hemoglobin 11.7, platelet 883. If anemia is worse, I will offer bone marrow study. I remind her to have Mediport flush every 3 months. She has covid vaccine. Return to clinic in 6 months or sooner. All of her questions have been answered for today. Recent imaging and labs were reviewed and discussed with the patient.

## 2022-01-19 ENCOUNTER — HOSPITAL ENCOUNTER (OUTPATIENT)
Dept: INFUSION THERAPY | Age: 67
Discharge: HOME | End: 2022-01-19
Payer: MEDICARE

## 2022-01-19 DIAGNOSIS — C85.16 B-CELL LYMPHOMA OF INTRAPELVIC LYMPH NODES, UNSPECIFIED B-CELL LYMPHOMA TYPE (HCC): ICD-10-CM

## 2022-01-19 LAB
ALBUMIN SERPL-MCNC: 4.1 GM/DL (ref 3.4–5)
ALP BLD-CCNC: 82 IU/L (ref 40–129)
ALT SERPL-CCNC: 9 U/L (ref 10–40)
ANION GAP SERPL CALCULATED.3IONS-SCNC: 13 MMOL/L (ref 4–16)
AST SERPL-CCNC: 17 IU/L (ref 15–37)
BASOPHILS ABSOLUTE: 0 K/CU MM
BASOPHILS RELATIVE PERCENT: 0 % (ref 0–1)
BILIRUB SERPL-MCNC: 0.4 MG/DL (ref 0–1)
BUN BLDV-MCNC: 14 MG/DL (ref 6–23)
CALCIUM SERPL-MCNC: 9.6 MG/DL (ref 8.3–10.6)
CHLORIDE BLD-SCNC: 100 MMOL/L (ref 99–110)
CO2: 26 MMOL/L (ref 21–32)
CREAT SERPL-MCNC: 1 MG/DL (ref 0.6–1.1)
DIFFERENTIAL TYPE: ABNORMAL
EOSINOPHILS ABSOLUTE: 0.2 K/CU MM
EOSINOPHILS RELATIVE PERCENT: 3.7 % (ref 0–3)
GFR AFRICAN AMERICAN: >60 ML/MIN/1.73M2
GFR NON-AFRICAN AMERICAN: 55 ML/MIN/1.73M2
GLUCOSE BLD-MCNC: 92 MG/DL (ref 70–99)
HCT VFR BLD CALC: 35.4 % (ref 37–47)
HEMOGLOBIN: 11.7 GM/DL (ref 12.5–16)
LACTATE DEHYDROGENASE: 238 IU/L (ref 120–246)
LYMPHOCYTES ABSOLUTE: 1 K/CU MM
LYMPHOCYTES RELATIVE PERCENT: 20.3 % (ref 24–44)
MCH RBC QN AUTO: 30.9 PG (ref 27–31)
MCHC RBC AUTO-ENTMCNC: 33.1 % (ref 32–36)
MCV RBC AUTO: 93.4 FL (ref 78–100)
MONOCYTES ABSOLUTE: 0.5 K/CU MM
MONOCYTES RELATIVE PERCENT: 9.9 % (ref 0–4)
PDW BLD-RTO: 13.1 % (ref 11.7–14.9)
PLATELET # BLD: 256 K/CU MM (ref 140–440)
PMV BLD AUTO: 9.2 FL (ref 7.5–11.1)
POTASSIUM SERPL-SCNC: 4.1 MMOL/L (ref 3.5–5.1)
RBC # BLD: 3.79 M/CU MM (ref 4.2–5.4)
SEGMENTED NEUTROPHILS ABSOLUTE COUNT: 3.3 K/CU MM
SEGMENTED NEUTROPHILS RELATIVE PERCENT: 66.1 % (ref 36–66)
SODIUM BLD-SCNC: 139 MMOL/L (ref 135–145)
TOTAL PROTEIN: 6.3 GM/DL (ref 6.4–8.2)
WBC # BLD: 4.9 K/CU MM (ref 4–10.5)

## 2022-01-19 PROCEDURE — 83615 LACTATE (LD) (LDH) ENZYME: CPT

## 2022-01-19 PROCEDURE — 85025 COMPLETE CBC W/AUTO DIFF WBC: CPT

## 2022-01-19 PROCEDURE — 80053 COMPREHEN METABOLIC PANEL: CPT

## 2022-01-19 PROCEDURE — 36415 COLL VENOUS BLD VENIPUNCTURE: CPT

## 2022-01-25 ENCOUNTER — HOSPITAL ENCOUNTER (OUTPATIENT)
Dept: INFUSION THERAPY | Age: 67
Discharge: HOME | End: 2022-01-25
Payer: MEDICARE

## 2022-01-25 ENCOUNTER — OFFICE VISIT (OUTPATIENT)
Dept: ONCOLOGY | Age: 67
End: 2022-01-25
Payer: MEDICARE

## 2022-01-25 VITALS
RESPIRATION RATE: 16 BRPM | BODY MASS INDEX: 20.48 KG/M2 | HEIGHT: 63 IN | TEMPERATURE: 96.9 F | SYSTOLIC BLOOD PRESSURE: 112 MMHG | HEART RATE: 79 BPM | DIASTOLIC BLOOD PRESSURE: 59 MMHG | OXYGEN SATURATION: 95 % | WEIGHT: 115.6 LBS

## 2022-01-25 DIAGNOSIS — C85.16 B-CELL LYMPHOMA OF INTRAPELVIC LYMPH NODES, UNSPECIFIED B-CELL LYMPHOMA TYPE (HCC): Primary | ICD-10-CM

## 2022-01-25 DIAGNOSIS — D64.9 ANEMIA, UNSPECIFIED TYPE: ICD-10-CM

## 2022-01-25 DIAGNOSIS — R53.83 OTHER FATIGUE: ICD-10-CM

## 2022-01-25 PROCEDURE — 2580000003 HC RX 258: Performed by: INTERNAL MEDICINE

## 2022-01-25 PROCEDURE — G8420 CALC BMI NORM PARAMETERS: HCPCS | Performed by: INTERNAL MEDICINE

## 2022-01-25 PROCEDURE — G8484 FLU IMMUNIZE NO ADMIN: HCPCS | Performed by: INTERNAL MEDICINE

## 2022-01-25 PROCEDURE — 96523 IRRIG DRUG DELIVERY DEVICE: CPT

## 2022-01-25 PROCEDURE — 1036F TOBACCO NON-USER: CPT | Performed by: INTERNAL MEDICINE

## 2022-01-25 PROCEDURE — G8400 PT W/DXA NO RESULTS DOC: HCPCS | Performed by: INTERNAL MEDICINE

## 2022-01-25 PROCEDURE — 4040F PNEUMOC VAC/ADMIN/RCVD: CPT | Performed by: INTERNAL MEDICINE

## 2022-01-25 PROCEDURE — 3017F COLORECTAL CA SCREEN DOC REV: CPT | Performed by: INTERNAL MEDICINE

## 2022-01-25 PROCEDURE — G8427 DOCREV CUR MEDS BY ELIG CLIN: HCPCS | Performed by: INTERNAL MEDICINE

## 2022-01-25 PROCEDURE — 1123F ACP DISCUSS/DSCN MKR DOCD: CPT | Performed by: INTERNAL MEDICINE

## 2022-01-25 PROCEDURE — 1090F PRES/ABSN URINE INCON ASSESS: CPT | Performed by: INTERNAL MEDICINE

## 2022-01-25 PROCEDURE — 6360000002 HC RX W HCPCS: Performed by: INTERNAL MEDICINE

## 2022-01-25 PROCEDURE — 99213 OFFICE O/P EST LOW 20 MIN: CPT | Performed by: INTERNAL MEDICINE

## 2022-01-25 RX ORDER — HEPARIN SODIUM (PORCINE) LOCK FLUSH IV SOLN 100 UNIT/ML 100 UNIT/ML
500 SOLUTION INTRAVENOUS PRN
Status: CANCELLED | OUTPATIENT
Start: 2022-01-25

## 2022-01-25 RX ORDER — SODIUM CHLORIDE 0.9 % (FLUSH) 0.9 %
10 SYRINGE (ML) INJECTION PRN
Status: CANCELLED | OUTPATIENT
Start: 2022-01-25

## 2022-01-25 RX ORDER — SODIUM CHLORIDE 0.9 % (FLUSH) 0.9 %
20 SYRINGE (ML) INJECTION PRN
Status: DISCONTINUED | OUTPATIENT
Start: 2022-01-25 | End: 2022-01-26 | Stop reason: HOSPADM

## 2022-01-25 RX ORDER — HEPARIN SODIUM (PORCINE) LOCK FLUSH IV SOLN 100 UNIT/ML 100 UNIT/ML
500 SOLUTION INTRAVENOUS PRN
Status: DISCONTINUED | OUTPATIENT
Start: 2022-01-25 | End: 2022-01-26 | Stop reason: HOSPADM

## 2022-01-25 RX ORDER — SODIUM CHLORIDE 0.9 % (FLUSH) 0.9 %
20 SYRINGE (ML) INJECTION PRN
Status: CANCELLED | OUTPATIENT
Start: 2022-01-25

## 2022-01-25 RX ADMIN — HEPARIN 500 UNITS: 100 SYRINGE at 10:51

## 2022-01-25 RX ADMIN — SODIUM CHLORIDE, PRESERVATIVE FREE 20 ML: 5 INJECTION INTRAVENOUS at 10:51

## 2022-01-25 NOTE — PROGRESS NOTES
MA Rooming Questions  Patient: Erna Montgomery  MRN: [id number]    Date: 1/25/2022        1. Do you have any new issues? yes - still has intermittent diarrhea          2. Do you need any refills on medications? yes - Forteo inj.    3. Have you had any imaging done since your last visit?   no    4. Have you been hospitalized or seen in the emergency room since your last visit here?   no    5. Did the patient have a depression screening completed today?  No    No data recorded     PHQ-9 Given to (if applicable):               PHQ-9 Score (if applicable):                     [] Positive     []  Negative              Does question #9 need addressed (if applicable)                     [] Yes    []  No               Bridgett Aldana CMA

## 2022-01-25 NOTE — PROGRESS NOTES
Pt reported today for port flush. Has a Mediport on Right  side chest and was accessed with a 20 G  .75 in  Gripper Plus Non-Coring safety needle. Was accessed on 1st attempt, blood return was noted. Port was flushed with 20 cc's (0.9% Sodium Chloride Injection USP) and locked with 500 units of heparin. Pt tolerated procedure well.

## 2022-02-08 ENCOUNTER — HOSPITAL ENCOUNTER (OUTPATIENT)
Dept: WOMENS IMAGING | Age: 67
Discharge: HOME | End: 2022-02-08
Payer: MEDICARE

## 2022-02-08 DIAGNOSIS — Z13.820 ENCOUNTER FOR SCREENING FOR OSTEOPOROSIS: ICD-10-CM

## 2022-02-08 PROCEDURE — 77063 BREAST TOMOSYNTHESIS BI: CPT

## 2022-02-21 ENCOUNTER — OFFICE VISIT (OUTPATIENT)
Dept: CARDIOLOGY CLINIC | Age: 67
End: 2022-02-21
Payer: MEDICARE

## 2022-02-21 VITALS
HEART RATE: 88 BPM | WEIGHT: 117.4 LBS | DIASTOLIC BLOOD PRESSURE: 82 MMHG | SYSTOLIC BLOOD PRESSURE: 117 MMHG | BODY MASS INDEX: 20.8 KG/M2 | HEIGHT: 63 IN

## 2022-02-21 DIAGNOSIS — D61.818 PANCYTOPENIA (HCC): ICD-10-CM

## 2022-02-21 DIAGNOSIS — I95.0 IDIOPATHIC HYPOTENSION: ICD-10-CM

## 2022-02-21 DIAGNOSIS — R00.0 TACHYCARDIA: ICD-10-CM

## 2022-02-21 DIAGNOSIS — I05.9 MITRAL VALVE DISEASE: Primary | ICD-10-CM

## 2022-02-21 DIAGNOSIS — C85.16 B-CELL LYMPHOMA OF INTRAPELVIC LYMPH NODES, UNSPECIFIED B-CELL LYMPHOMA TYPE (HCC): ICD-10-CM

## 2022-02-21 PROCEDURE — 1036F TOBACCO NON-USER: CPT | Performed by: INTERNAL MEDICINE

## 2022-02-21 PROCEDURE — 1090F PRES/ABSN URINE INCON ASSESS: CPT | Performed by: INTERNAL MEDICINE

## 2022-02-21 PROCEDURE — 1123F ACP DISCUSS/DSCN MKR DOCD: CPT | Performed by: INTERNAL MEDICINE

## 2022-02-21 PROCEDURE — G8484 FLU IMMUNIZE NO ADMIN: HCPCS | Performed by: INTERNAL MEDICINE

## 2022-02-21 PROCEDURE — G8427 DOCREV CUR MEDS BY ELIG CLIN: HCPCS | Performed by: INTERNAL MEDICINE

## 2022-02-21 PROCEDURE — 3017F COLORECTAL CA SCREEN DOC REV: CPT | Performed by: INTERNAL MEDICINE

## 2022-02-21 PROCEDURE — G8400 PT W/DXA NO RESULTS DOC: HCPCS | Performed by: INTERNAL MEDICINE

## 2022-02-21 PROCEDURE — 99214 OFFICE O/P EST MOD 30 MIN: CPT | Performed by: INTERNAL MEDICINE

## 2022-02-21 PROCEDURE — G8420 CALC BMI NORM PARAMETERS: HCPCS | Performed by: INTERNAL MEDICINE

## 2022-02-21 PROCEDURE — 4040F PNEUMOC VAC/ADMIN/RCVD: CPT | Performed by: INTERNAL MEDICINE

## 2022-02-21 NOTE — PROGRESS NOTES
CARDIOLOGY  NOTE        Chief Complaint: Hypotention    HPI:   Elisa Sandoval is a 77y.o. year old who has Past medical history as noted below. She finished chemo and radiation in July 2020, She says blood pressure is better . She is not on midodrine any more  She is on metoprolol due to tachycardia though she denies any palpitations her echo shows severe MR. No ankle swelling , she has no SOB , she says diarrhea is better on imodium , she had B cell lymphoma of bowel   Elisa Sandoval has history of bipolar disorder and lymphoma. Initially she was symptomatic with blood pressure in the 80s causing dizziness and lightheadedness but it is improved after midodrine.  She is not on midodrine since chemo is done    echo  In March 2021 showed  mitral valve prolapse although there is moderate to severe  mitral regurgitation  Denies any signs of congestive heart failure like ankle swelling shortness of breath her main complaints of feeling tired and fatigued low energy  She is rasing her 6 yr old great grand daughter       Current Outpatient Medications   Medication Sig Dispense Refill    metoprolol tartrate (LOPRESSOR) 25 MG tablet Take 1 tablet by mouth 2 times daily 180 tablet 3    loratadine (CLARITIN) 10 MG tablet Take 10 mg by mouth as needed       teriparatide (FORTEO) 620 MCG/2.48ML SOPN injection INJECT 0.08 MLS 20 MCG TOTAL UNDER THE SKIN ONCE A DAY      RA PEN NEEDLES 31G X 8 MM MISC use 1 NEEDLE WITH PEN once daily      loperamide (IMODIUM A-D) 2 MG tablet Take 1 tablet by mouth 4 times daily as needed for Diarrhea 60 tablet 1    Cholecalciferol (VITAMIN D) 50 MCG (2000 UT) CAPS capsule Take 1 capsule by mouth daily      divalproex (DEPAKOTE ER) 500 MG extended release tablet Take 2 tablets by mouth nightly 30 tablet 0    MULTIPLE VITAMIN PO Take by mouth daily      calcium carbonate 600 MG TABS tablet Take 1 tablet by mouth daily      Nutritional Supplements (ENSURE PLUS) LIQD Take 1 Can by mouth 3 times daily (Patient not taking: Reported on 2022) 90 Can 1    Omega-3 Fatty Acids (FISH OIL) 1000 MG CAPS Take 1,000 mg by mouth daily (Patient not taking: Reported on 2022)       No current facility-administered medications for this visit.        Allergies:   Abilify [aripiprazole], Iodine, Penicillins, and Codeine    Patient History:  Past Medical History:   Diagnosis Date    [facility] [facility]     Bipolar 1 disorder ([facility] [address] [facility]     \"on Depakote for this\"    History of external beam radiation therapy     Pelvis 3,600 cGy per  at [address] Hx of echocardiogram 2021    Left ventricular function is normal, EF is estimated at 55-60%,mitral valve appears to slightly prolapse,     Past Surgical History:   Procedure Laterality Date    BREAST BIOPSY Left     COLONOSCOPY  2015    normal colon    COLONOSCOPY N/A 2020    COLONOSCOPY DIAGNOSTIC performed by Katie Arshad MD at [address] [place] [address] N/A 2020    PORT INSERTION performed by Salvatore Shepherd MD at [address] [place] [address] Left 2020    PORT REMOVAL LEFT performed by Salvatore Shepherd MD at [address] [place] [address] Right 3/10/2020    RIGHT PORT INSERTION performed by Salvatore Shepherd MD at [address] [place] [address]  as a kid    TUBAL LIGATION  20+ yrs ago    UPPER GASTROINTESTINAL ENDOSCOPY N/A 2020    EGD DIAGNOSTIC ONLY performed by Katie Arshad MD at [facility] ENDOSCOPY     Family History   Problem Relation Age of Onset    Breast Cancer Mother     High Blood Pressure Mother     Cancer Father         \"cancer in the bowel\"    Ovarian Cancer Neg Hx      Social History     Tobacco Use    Smoking status: Former Smoker     Packs/day: 1.00     Years: 10.00     Pack years: 10.00     Types: Cigarettes     Quit date: 1988     Years since quittin.1    Smokeless tobacco: Never Used   Substance Use Topics    Alcohol use: No     Comment: 1-2 CUPS OF HOT TEA        Review of Systems:   · Constitutional: No Fever or Weight Loss   · Eyes: No Decreased Vision  · ENT: No Headaches, Hearing Loss or Vertigo  · Cardiovascular: as per note above   · Respiratory: No cough or wheezing and as per note above. · Gastrointestinal: No abdominal pain, appetite loss, blood in stools, constipation, diarrhea or heartburn  · Genitourinary: No dysuria, trouble voiding, or hematuria  · Musculoskeletal:  denies any new  joint aches , swelling  or pain   · Integumentary: No rash or pruritis  · Neurological: No TIA or stroke symptoms  · Psychiatric: No anxiety or depression  · Endocrine: No malaise, fatigue or temperature intolerance  · Hematologic/Lymphatic: No bleeding problems, blood clots or swollen lymph nodes  · Allergic/Immunologic: No nasal congestion or hives    Objective:      Physical Exam:  /82   Pulse 88   Ht 5' 3\" (1.6 m)   Wt 117 lb 6.4 oz (53.3 kg)   LMP 01/11/2000   BMI 20.80 kg/m²   Wt Readings from Last 3 Encounters:   02/21/22 117 lb 6.4 oz (53.3 kg)   01/25/22 115 lb 9.6 oz (52.4 kg)   10/26/21 117 lb 9.6 oz (53.3 kg)     Body mass index is 20.8 kg/m². Vitals:    02/21/22 0939   BP: 117/82   Pulse: 88        General Appearance:  No distress, conversant  Constitutional:  Well developed, Well nourished, No acute distress, Non-toxic appearance. HENT:  Normocephalic, Atraumatic, Bilateral external ears normal, Oropharynx moist, No oral exudates, Nose normal. Neck- Normal range of motion, No tenderness, Supple, No stridor,no apical-carotid delay  Eyes:  PERRL, EOMI, Conjunctiva normal, No discharge. Respiratory:  Normal breath sounds, No respiratory distress, No wheezing, No chest tenderness. ,no use of accessory muscles, NO crackles  Cardiovascular: (PMI) apex non displaced,no lifts no thrills,S1 and S2 audible, systolic murmur sounds, No signs of ankle edema, or volume overload, No evidence of JVD, No crackles  GI:  Bowel sounds normal, Soft, No tenderness, No masses, No gross visceromegaly   :  No costovertebral angle tenderness   Musculoskeletal:  No edema, no tenderness, no deformities. Back- no tenderness  Integument:  Well hydrated, no rash   Lymphatic:  No lymphadenopathy noted   Neurologic:  Alert & oriented x 3, CN 2-12 normal, normal motor function, normal sensory function, no focal deficits noted   Psychiatric:  Speech and behavior appropriate               Medical decision making and Data review:  DATA:  Lab Results   Component Value Date    TROPONINT <0.010 02/27/2020     BNP:  No results found for: PROBNP  PT/INR:  No results found for: PTINR  No results found for: LABA1C  Lab Results   Component Value Date    CHOL 234 06/19/2017    TRIG 130 06/19/2017    HDL 66 06/19/2017    LDLCALC 142 06/19/2017     Lab Results   Component Value Date    ALT 9 (L) 01/19/2022    AST 17 01/19/2022     No results for input(s): WBC, HGB, HCT, MCV, PLT in the last 72 hours. TSH: No results found for: TSH  Lab Results   Component Value Date    AST 17 01/19/2022    ALT 9 (L) 01/19/2022    BILITOT 0.4 01/19/2022    ALKPHOS 82 01/19/2022     No results found for: PROBNP  No results found for: LABA1C  Lab Results   Component Value Date    WBC 4.9 01/19/2022    HGB 11.7 (L) 01/19/2022    HCT 35.4 (L) 01/19/2022     01/19/2022     Echo 1/16/2020   Summary   Left ventricular systolic function is low normal.   Ejection fraction is visually estimated at 50%. Paradoxical motion of the interventricular septum; possible conduction   delay. Indeterminate diastolic function; E/A flow reversal is noted. Mitral valve prolapse is present. Moderate to severe mitral regurgitation is present. Moderate tricuspid regurgitation is present. RVSP is 25 mmHg. No evidence of any pericardial effusion. Mobile interatrial septum. Incidental finding: cyst vs mass near/attached to the right kidney. Echo 3/17/21  Summary   Left ventricular function is normal, EF is estimated at 55-60%. Grade I diastolic dysfunction. Mild left ventricular hypertrophy. Bi atrial enlargement noted. The mitral valve appears to slightly prolapse. Moderate to severe mitral and tricuspid regurgitation is present. Aneurysmal interatrial septum. RVSP is 34 mmHg. No evidence of pericardial effusion. All labs, medications and tests reviewed by myself including data and history from outside source , patient and available family . Assessment & Plan:      1. Mitral valve disease    2. Pancytopenia ([address]    3. Tachycardia    4. Idiopathic hypotension    5. B-cell lymphoma of intrapelvic lymph nodes, unspecified B-cell lymphoma type (HCC)         Idiopathic hypotension  Improved that she is not on chemo anymore     B cell lymphoma  Followed by Dr Jillian Del Cid she is in remission     Tachycardia  Continue  metoprolol 25 mg , at risk for afib? No documented afib so far       Mitral valve disease  She does seems to have moderate to severe  mitral regurgitation with mitral valve prolapse may eventually need  STEVEN  she is more physically improved at this stage we will continue to monitor her mitral valve she does not appear to be in heart failure or seem to be affected by MR. She did have radiation for lymphoma but was mostly for abdomen  Check echo again before deciding to get STEVEN     Dyslipidemia :  All available lab work was reviewed. Necessary orders were placed , instructions given by myself       Counseled extensively and medication compliance urged. We discussed that for the  prevention of ASCVD our  goal is aggressive risk modification. Patient is encouraged to exercise even a brisk walk for 30 minutes  at least 3 to 4 times a week   Various goals were discussed and questions answered. Continue current medications. Appropriate prescriptions are addressed and refills ordered. Questions answered and patient verbalizes understanding. Call for any problems, questions, or concerns.     Continue all other medications of all above medical condition listed as is. Return in about 6 months (around 8/21/2022). Please note this report has been partially produced using speech recognition software and may contain errors related to that system including errors in grammar, punctuation, and spelling, as well as words and phrases that may be inappropriate. If there are any questions or concerns please feel free to contact the dictating provider for clarification. An  electronic signature was used to authenticate this note.     --oClin Perry MD on 5/12/2020 at 2:42 PM    [id number]}

## 2022-02-21 NOTE — LETTER
[address]ie [address]   1955  [id number]    Have you had any Chest Pain that is not new? - No      Have you had any Shortness of Breath - No    Have you had any dizziness - No    Have you had any palpitations that are not new?  - No    Do you have any edema - swelling in No      When did you have your last labs drawn     Do you have a surgery or procedure scheduled in the near future - No     [id number] Score for Atrial Fibrillation Stroke Risk   Risk   Factors  Component Value   C CHF No 0   H HTN No 0   A2 Age >= 76 No,  (68 y.o.) 0   D DM No 0   S2 Prior Stroke/TIA No 0   V Vascular Disease No 0   A Age 74-69 Yes,  (68 y.o.) 1   Sc Sex female 1    [id number]  Score  2   Score last updated 2/21/22 9:42 AM EST

## 2022-02-28 ENCOUNTER — TELEPHONE (OUTPATIENT)
Dept: CARDIOLOGY CLINIC | Age: 67
End: 2022-02-28

## 2022-03-11 ENCOUNTER — TELEPHONE (OUTPATIENT)
Dept: CARDIOLOGY CLINIC | Age: 67
End: 2022-03-11

## 2022-03-11 ENCOUNTER — PROCEDURE VISIT (OUTPATIENT)
Dept: CARDIOLOGY CLINIC | Age: 67
End: 2022-03-11
Payer: MEDICARE

## 2022-03-11 DIAGNOSIS — I05.9 MITRAL VALVE DISEASE: ICD-10-CM

## 2022-03-11 DIAGNOSIS — D61.818 PANCYTOPENIA (HCC): ICD-10-CM

## 2022-03-11 DIAGNOSIS — R00.0 TACHYCARDIA: ICD-10-CM

## 2022-03-11 DIAGNOSIS — C85.16 B-CELL LYMPHOMA OF INTRAPELVIC LYMPH NODES, UNSPECIFIED B-CELL LYMPHOMA TYPE (HCC): ICD-10-CM

## 2022-03-11 LAB
LV EF: 43 %
LVEF MODALITY: NORMAL

## 2022-03-11 PROCEDURE — 93306 TTE W/DOPPLER COMPLETE: CPT | Performed by: INTERNAL MEDICINE

## 2022-03-11 NOTE — TELEPHONE ENCOUNTER
Summary   Left ventricular function and size is abnormal, EF is estimated at 40-45%. Apical septal wall is hypokinetic   Grade I diastolic dysfunction. No regional wall motion abnormalities were detected. Mildly dilated left atrium. Aneurysmal interatrial septum. Moderate mitral,and pulmonic regurgitation is present. Moderate to Severe tricuspid regurgitation   RVSP is 32 mmHg. No evidence of pericardial effusion. Compared to previous study in 3/2021, no significant changes noted. Called and notified patient that there are No significant changes compared to previous study in 03/2021. Patient verbally understood.

## 2022-04-25 ENCOUNTER — HOSPITAL ENCOUNTER (OUTPATIENT)
Dept: INFUSION THERAPY | Age: 67
Discharge: HOME | End: 2022-04-25
Payer: MEDICARE

## 2022-04-25 DIAGNOSIS — C85.16 B-CELL LYMPHOMA OF INTRAPELVIC LYMPH NODES, UNSPECIFIED B-CELL LYMPHOMA TYPE (HCC): Primary | ICD-10-CM

## 2022-04-25 PROCEDURE — 96523 IRRIG DRUG DELIVERY DEVICE: CPT

## 2022-04-25 PROCEDURE — 6360000002 HC RX W HCPCS: Performed by: INTERNAL MEDICINE

## 2022-04-25 PROCEDURE — 2580000003 HC RX 258: Performed by: INTERNAL MEDICINE

## 2022-04-25 RX ORDER — HEPARIN SODIUM (PORCINE) LOCK FLUSH IV SOLN 100 UNIT/ML 100 UNIT/ML
500 SOLUTION INTRAVENOUS PRN
Status: DISCONTINUED | OUTPATIENT
Start: 2022-04-25 | End: 2022-04-26 | Stop reason: HOSPADM

## 2022-04-25 RX ORDER — SODIUM CHLORIDE 0.9 % (FLUSH) 0.9 %
10 SYRINGE (ML) INJECTION PRN
Status: CANCELLED | OUTPATIENT
Start: 2022-04-25

## 2022-04-25 RX ORDER — SODIUM CHLORIDE 0.9 % (FLUSH) 0.9 %
10 SYRINGE (ML) INJECTION PRN
Status: DISCONTINUED | OUTPATIENT
Start: 2022-04-25 | End: 2022-04-26 | Stop reason: HOSPADM

## 2022-04-25 RX ORDER — SODIUM CHLORIDE 0.9 % (FLUSH) 0.9 %
20 SYRINGE (ML) INJECTION PRN
Status: CANCELLED | OUTPATIENT
Start: 2022-04-25

## 2022-04-25 RX ORDER — HEPARIN SODIUM (PORCINE) LOCK FLUSH IV SOLN 100 UNIT/ML 100 UNIT/ML
500 SOLUTION INTRAVENOUS PRN
Status: CANCELLED | OUTPATIENT
Start: 2022-04-25

## 2022-04-25 RX ADMIN — SODIUM CHLORIDE, PRESERVATIVE FREE 10 ML: 5 INJECTION INTRAVENOUS at 14:51

## 2022-04-25 RX ADMIN — HEPARIN 500 UNITS: 100 SYRINGE at 14:51

## 2022-04-25 NOTE — PROGRESS NOTES
Patient arrived to treatment suite for mediport flush. Right chest mediport accessed and flushed with normal saline, but no blood return available. Mediport heparinized and de-accessed, band-aid applied. Patient tolerated well. Left treatment suite ambulatory. Discharge instructions provided.

## 2022-05-09 ENCOUNTER — HOSPITAL ENCOUNTER (OUTPATIENT)
Dept: INFUSION THERAPY | Age: 67
Discharge: HOME | End: 2022-05-09
Payer: MEDICARE

## 2022-05-09 ENCOUNTER — TELEPHONE (OUTPATIENT)
Dept: ONCOLOGY | Age: 67
End: 2022-05-09

## 2022-05-09 VITALS
HEIGHT: 63 IN | WEIGHT: 109.6 LBS | TEMPERATURE: 96.7 F | HEART RATE: 101 BPM | OXYGEN SATURATION: 100 % | BODY MASS INDEX: 19.42 KG/M2 | RESPIRATION RATE: 18 BRPM | DIASTOLIC BLOOD PRESSURE: 64 MMHG | SYSTOLIC BLOOD PRESSURE: 107 MMHG

## 2022-05-09 DIAGNOSIS — C83.30 DIFFUSE LARGE B-CELL LYMPHOMA, UNSPECIFIED BODY REGION (HCC): Primary | ICD-10-CM

## 2022-05-09 DIAGNOSIS — C85.16 B-CELL LYMPHOMA OF INTRAPELVIC LYMPH NODES, UNSPECIFIED B-CELL LYMPHOMA TYPE (HCC): Primary | ICD-10-CM

## 2022-05-09 LAB
ALBUMIN SERPL-MCNC: 3.5 GM/DL (ref 3.4–5)
ALP BLD-CCNC: 95 IU/L (ref 40–129)
ALT SERPL-CCNC: 14 U/L (ref 10–40)
ANION GAP SERPL CALCULATED.3IONS-SCNC: 17 MMOL/L (ref 4–16)
AST SERPL-CCNC: 15 IU/L (ref 15–37)
BASOPHILS ABSOLUTE: 0 K/CU MM
BASOPHILS RELATIVE PERCENT: 0.1 % (ref 0–1)
BILIRUB SERPL-MCNC: 0.6 MG/DL (ref 0–1)
BUN BLDV-MCNC: 17 MG/DL (ref 6–23)
CALCIUM SERPL-MCNC: 9.1 MG/DL (ref 8.3–10.6)
CHLORIDE BLD-SCNC: 99 MMOL/L (ref 99–110)
CO2: 21 MMOL/L (ref 21–32)
CREAT SERPL-MCNC: 1 MG/DL (ref 0.6–1.1)
DIFFERENTIAL TYPE: ABNORMAL
EOSINOPHILS ABSOLUTE: 0 K/CU MM
EOSINOPHILS RELATIVE PERCENT: 0.3 % (ref 0–3)
GFR AFRICAN AMERICAN: >60 ML/MIN/1.73M2
GFR NON-AFRICAN AMERICAN: 55 ML/MIN/1.73M2
GLUCOSE BLD-MCNC: 91 MG/DL (ref 70–99)
HCT VFR BLD CALC: 27.3 % (ref 37–47)
HEMOGLOBIN: 9.2 GM/DL (ref 12.5–16)
LYMPHOCYTES ABSOLUTE: 0.9 K/CU MM
LYMPHOCYTES RELATIVE PERCENT: 7.5 % (ref 24–44)
MCH RBC QN AUTO: 31.1 PG (ref 27–31)
MCHC RBC AUTO-ENTMCNC: 33.7 % (ref 32–36)
MCV RBC AUTO: 92.2 FL (ref 78–100)
MONOCYTES ABSOLUTE: 0.8 K/CU MM
MONOCYTES RELATIVE PERCENT: 7.2 % (ref 0–4)
PDW BLD-RTO: 13.4 % (ref 11.7–14.9)
PLATELET # BLD: 346 K/CU MM (ref 140–440)
PMV BLD AUTO: 9.5 FL (ref 7.5–11.1)
POTASSIUM SERPL-SCNC: 3.7 MMOL/L (ref 3.5–5.1)
RBC # BLD: 2.96 M/CU MM (ref 4.2–5.4)
SEGMENTED NEUTROPHILS ABSOLUTE COUNT: 9.8 K/CU MM
SEGMENTED NEUTROPHILS RELATIVE PERCENT: 84.9 % (ref 36–66)
SODIUM BLD-SCNC: 137 MMOL/L (ref 135–145)
TOTAL PROTEIN: 6 GM/DL (ref 6.4–8.2)
WBC # BLD: 11.5 K/CU MM (ref 4–10.5)

## 2022-05-09 PROCEDURE — 96375 TX/PRO/DX INJ NEW DRUG ADDON: CPT

## 2022-05-09 PROCEDURE — 6360000002 HC RX W HCPCS

## 2022-05-09 PROCEDURE — 85025 COMPLETE CBC W/AUTO DIFF WBC: CPT

## 2022-05-09 PROCEDURE — 36415 COLL VENOUS BLD VENIPUNCTURE: CPT

## 2022-05-09 PROCEDURE — 80053 COMPREHEN METABOLIC PANEL: CPT

## 2022-05-09 PROCEDURE — 96374 THER/PROPH/DIAG INJ IV PUSH: CPT

## 2022-05-09 PROCEDURE — 2580000003 HC RX 258: Performed by: INTERNAL MEDICINE

## 2022-05-09 PROCEDURE — 2500000003 HC RX 250 WO HCPCS: Performed by: INTERNAL MEDICINE

## 2022-05-09 PROCEDURE — 6360000002 HC RX W HCPCS: Performed by: INTERNAL MEDICINE

## 2022-05-09 PROCEDURE — 96360 HYDRATION IV INFUSION INIT: CPT

## 2022-05-09 RX ORDER — DEXAMETHASONE SODIUM PHOSPHATE 4 MG/ML
8 INJECTION, SOLUTION INTRA-ARTICULAR; INTRALESIONAL; INTRAMUSCULAR; INTRAVENOUS; SOFT TISSUE ONCE
Status: COMPLETED | OUTPATIENT
Start: 2022-05-09 | End: 2022-05-09

## 2022-05-09 RX ORDER — SODIUM CHLORIDE 9 MG/ML
5-250 INJECTION, SOLUTION INTRAVENOUS PRN
OUTPATIENT
Start: 2022-05-09

## 2022-05-09 RX ORDER — ONDANSETRON 2 MG/ML
8 INJECTION INTRAMUSCULAR; INTRAVENOUS ONCE
Status: COMPLETED | OUTPATIENT
Start: 2022-05-09 | End: 2022-05-09

## 2022-05-09 RX ORDER — SODIUM CHLORIDE 9 MG/ML
5-250 INJECTION, SOLUTION INTRAVENOUS PRN
Status: CANCELLED | OUTPATIENT
Start: 2022-05-09

## 2022-05-09 RX ORDER — HEPARIN SODIUM (PORCINE) LOCK FLUSH IV SOLN 100 UNIT/ML 100 UNIT/ML
500 SOLUTION INTRAVENOUS PRN
Status: DISCONTINUED | OUTPATIENT
Start: 2022-05-09 | End: 2022-05-10 | Stop reason: HOSPADM

## 2022-05-09 RX ORDER — 0.9 % SODIUM CHLORIDE 0.9 %
1000 INTRAVENOUS SOLUTION INTRAVENOUS ONCE
Status: CANCELLED | OUTPATIENT
Start: 2022-05-09 | End: 2022-05-09

## 2022-05-09 RX ORDER — ONDANSETRON 2 MG/ML
8 INJECTION INTRAMUSCULAR; INTRAVENOUS ONCE
Status: CANCELLED
Start: 2022-05-09 | End: 2022-05-09

## 2022-05-09 RX ORDER — FAMOTIDINE 10 MG/ML
20 INJECTION, SOLUTION INTRAVENOUS ONCE
Status: COMPLETED | OUTPATIENT
Start: 2022-05-09 | End: 2022-05-09

## 2022-05-09 RX ORDER — FAMOTIDINE 10 MG/ML
20 INJECTION, SOLUTION INTRAVENOUS ONCE
Status: CANCELLED
Start: 2022-05-09 | End: 2022-05-09

## 2022-05-09 RX ORDER — SODIUM CHLORIDE 0.9 % (FLUSH) 0.9 %
5-40 SYRINGE (ML) INJECTION PRN
Status: DISCONTINUED | OUTPATIENT
Start: 2022-05-09 | End: 2022-05-10 | Stop reason: HOSPADM

## 2022-05-09 RX ORDER — HEPARIN SODIUM (PORCINE) LOCK FLUSH IV SOLN 100 UNIT/ML 100 UNIT/ML
SOLUTION INTRAVENOUS
Status: COMPLETED
Start: 2022-05-09 | End: 2022-05-09

## 2022-05-09 RX ORDER — DEXAMETHASONE SODIUM PHOSPHATE 10 MG/ML
8 INJECTION, SOLUTION INTRAMUSCULAR; INTRAVENOUS ONCE
Status: CANCELLED
Start: 2022-05-09 | End: 2022-05-09

## 2022-05-09 RX ORDER — SODIUM CHLORIDE 0.9 % (FLUSH) 0.9 %
5-40 SYRINGE (ML) INJECTION PRN
OUTPATIENT
Start: 2022-05-09

## 2022-05-09 RX ORDER — HEPARIN SODIUM (PORCINE) LOCK FLUSH IV SOLN 100 UNIT/ML 100 UNIT/ML
500 SOLUTION INTRAVENOUS PRN
OUTPATIENT
Start: 2022-05-09

## 2022-05-09 RX ORDER — 0.9 % SODIUM CHLORIDE 0.9 %
1000 INTRAVENOUS SOLUTION INTRAVENOUS ONCE
Status: COMPLETED | OUTPATIENT
Start: 2022-05-09 | End: 2022-05-09

## 2022-05-09 RX ADMIN — FAMOTIDINE 20 MG: 10 INJECTION, SOLUTION INTRAVENOUS at 14:49

## 2022-05-09 RX ADMIN — SODIUM CHLORIDE 1000 ML: 9 INJECTION, SOLUTION INTRAVENOUS at 14:26

## 2022-05-09 RX ADMIN — HEPARIN SODIUM (PORCINE) LOCK FLUSH IV SOLN 100 UNIT/ML 500 UNITS: 100 SOLUTION at 15:37

## 2022-05-09 RX ADMIN — DEXAMETHASONE SODIUM PHOSPHATE 8 MG: 4 INJECTION, SOLUTION INTRAMUSCULAR; INTRAVENOUS at 14:49

## 2022-05-09 RX ADMIN — ONDANSETRON 8 MG: 2 INJECTION INTRAMUSCULAR; INTRAVENOUS at 14:49

## 2022-05-09 RX ADMIN — HEPARIN 500 UNITS: 100 SYRINGE at 15:37

## 2022-05-09 NOTE — TELEPHONE ENCOUNTER
Patient called c/o soaking night sweat and is exhausted, was wondering about coming in for labs and possibly IV fluids, please call

## 2022-05-09 NOTE — TELEPHONE ENCOUNTER
Discussed symptoms with Dr. Nancy Mahmood. New order for CT abd/pelvis. Patient will need to continue to f/u with GI. Patient placed on schedule for IVF + labs today 05/09/2022 @ 1400. Called patient at [phone] to notify. Voices understanding. Patient already established with GI specialist and states she had a colonoscopy about 3 months ago. Patient aware that once CT scan scheduled, she will be notified of appointment time. No further needs or concerns addressed at this time.

## 2022-05-10 ENCOUNTER — TELEPHONE (OUTPATIENT)
Dept: ONCOLOGY | Age: 67
End: 2022-05-10

## 2022-05-10 DIAGNOSIS — C83.30 DIFFUSE LARGE B-CELL LYMPHOMA, UNSPECIFIED BODY REGION (HCC): Primary | ICD-10-CM

## 2022-05-10 NOTE — TELEPHONE ENCOUNTER
Patient called given CT abdomen/pelvis time and prep to be done at [facility] on 5/12 arrival at 9:30 AM.

## 2022-05-10 NOTE — TELEPHONE ENCOUNTER
Patient called today, says she forgot to address the soaking night sweats and she made an appt w/her PCP today @ 1500 to address w/him if she didn't need to bother us w/this sx, please call

## 2022-05-10 NOTE — TELEPHONE ENCOUNTER
Called patient at [phone] to address intermittent night sweats and drenching. Physician aware and requesting CT chest, abdomen/pelvis and follow up appointment. Patient supposed to see PCP today 05/10/2022 @ 1500. OV with Dr. Mattie Joseph scheduled for next Wednesday 05/18/2022 @ 0900. CT abd/pelvis already scheduled for 05/12/2022 @ 1000 at [facility]. Will add chest to CT scan. Called patient back at [phone] to notify. Voices understanding.

## 2022-05-12 ENCOUNTER — HOSPITAL ENCOUNTER (OUTPATIENT)
Dept: CT IMAGING | Age: 67
Discharge: HOME | DRG: 195 | End: 2022-05-12
Payer: MEDICARE

## 2022-05-12 DIAGNOSIS — C83.30 DIFFUSE LARGE B-CELL LYMPHOMA, UNSPECIFIED BODY REGION (HCC): ICD-10-CM

## 2022-05-12 PROCEDURE — 71250 CT THORAX DX C-: CPT

## 2022-05-12 PROCEDURE — 74176 CT ABD & PELVIS W/O CONTRAST: CPT

## 2022-05-13 ENCOUNTER — HOSPITAL ENCOUNTER (INPATIENT)
Age: 67
LOS: 5 days | Discharge: HOME | DRG: 195 | End: 2022-05-18
Attending: STUDENT IN AN ORGANIZED HEALTH CARE EDUCATION/TRAINING PROGRAM | Admitting: INTERNAL MEDICINE
Payer: MEDICARE

## 2022-05-13 ENCOUNTER — CLINICAL DOCUMENTATION (OUTPATIENT)
Dept: ONCOLOGY | Age: 67
End: 2022-05-13

## 2022-05-13 ENCOUNTER — APPOINTMENT (OUTPATIENT)
Dept: GENERAL RADIOLOGY | Age: 67
DRG: 195 | End: 2022-05-13
Payer: MEDICARE

## 2022-05-13 DIAGNOSIS — Z85.72 HX OF LYMPHOMA: ICD-10-CM

## 2022-05-13 DIAGNOSIS — D64.9 ANEMIA, UNSPECIFIED TYPE: ICD-10-CM

## 2022-05-13 DIAGNOSIS — E87.6 HYPOKALEMIA: ICD-10-CM

## 2022-05-13 DIAGNOSIS — J18.9 SEPSIS DUE TO PNEUMONIA (HCC): Primary | ICD-10-CM

## 2022-05-13 DIAGNOSIS — A41.9 SEPSIS DUE TO PNEUMONIA (HCC): Primary | ICD-10-CM

## 2022-05-13 PROBLEM — J15.9 COMMUNITY ACQUIRED BACTERIAL PNEUMONIA: Status: ACTIVE | Noted: 2022-05-13

## 2022-05-13 LAB
ALBUMIN SERPL-MCNC: 3.1 GM/DL (ref 3.4–5)
ALP BLD-CCNC: 104 IU/L (ref 40–129)
ALT SERPL-CCNC: 15 U/L (ref 10–40)
ANION GAP SERPL CALCULATED.3IONS-SCNC: 16 MMOL/L (ref 4–16)
ANISOCYTOSIS: ABNORMAL
AST SERPL-CCNC: 19 IU/L (ref 15–37)
BACTERIA: NEGATIVE /HPF
BANDED NEUTROPHILS ABSOLUTE COUNT: 2.28 K/CU MM
BANDED NEUTROPHILS RELATIVE PERCENT: 12 % (ref 5–11)
BILIRUB SERPL-MCNC: 0.7 MG/DL (ref 0–1)
BILIRUBIN URINE: NEGATIVE MG/DL
BLOOD, URINE: NEGATIVE
BUN BLDV-MCNC: 15 MG/DL (ref 6–23)
CALCIUM SERPL-MCNC: 9.4 MG/DL (ref 8.3–10.6)
CHLORIDE BLD-SCNC: 102 MMOL/L (ref 99–110)
CLARITY: CLEAR
CO2: 19 MMOL/L (ref 21–32)
COLOR: YELLOW
CREAT SERPL-MCNC: 1 MG/DL (ref 0.6–1.1)
DIFFERENTIAL TYPE: ABNORMAL
GFR AFRICAN AMERICAN: >60 ML/MIN/1.73M2
GFR NON-AFRICAN AMERICAN: 55 ML/MIN/1.73M2
GLUCOSE BLD-MCNC: 97 MG/DL (ref 70–99)
GLUCOSE, URINE: NEGATIVE MG/DL
HCT VFR BLD CALC: 23.9 % (ref 37–47)
HEMOGLOBIN: 7.8 GM/DL (ref 12.5–16)
KETONES, URINE: 15 MG/DL
LACTATE: 0.7 MMOL/L (ref 0.4–2)
LEUKOCYTE ESTERASE, URINE: NEGATIVE
LIPASE: 15 IU/L (ref 13–60)
LYMPHOCYTES ABSOLUTE: 1.5 K/CU MM
LYMPHOCYTES RELATIVE PERCENT: 8 % (ref 24–44)
MCH RBC QN AUTO: 31.2 PG (ref 27–31)
MCHC RBC AUTO-ENTMCNC: 32.6 % (ref 32–36)
MCV RBC AUTO: 95.6 FL (ref 78–100)
MONOCYTES ABSOLUTE: 1 K/CU MM
MONOCYTES RELATIVE PERCENT: 5 % (ref 0–4)
MUCUS: ABNORMAL HPF
NITRITE URINE, QUANTITATIVE: NEGATIVE
PDW BLD-RTO: 14 % (ref 11.7–14.9)
PH, URINE: 6 (ref 5–8)
PLATELET # BLD: 397 K/CU MM (ref 140–440)
PLT MORPHOLOGY: ABNORMAL
PMV BLD AUTO: 9.8 FL (ref 7.5–11.1)
POLYCHROMASIA: ABNORMAL
POTASSIUM SERPL-SCNC: 3.3 MMOL/L (ref 3.5–5.1)
PRO-BNP: 386.9 PG/ML
PROTEIN UA: NEGATIVE MG/DL
RBC # BLD: 2.5 M/CU MM (ref 4.2–5.4)
RBC URINE: ABNORMAL /HPF (ref 0–6)
SEGMENTED NEUTROPHILS ABSOLUTE COUNT: 14.2 K/CU MM
SEGMENTED NEUTROPHILS RELATIVE PERCENT: 75 % (ref 36–66)
SODIUM BLD-SCNC: 137 MMOL/L (ref 135–145)
SPECIFIC GRAVITY UA: 1.01 (ref 1–1.03)
SQUAMOUS EPITHELIAL: <1 /HPF
TOTAL PROTEIN: 6.6 GM/DL (ref 6.4–8.2)
TRICHOMONAS: ABNORMAL /HPF
TROPONIN T: <0.01 NG/ML
UROBILINOGEN, URINE: 1 MG/DL (ref 0.2–1)
WBC # BLD: 19 K/CU MM (ref 4–10.5)
WBC UA: ABNORMAL /HPF (ref 0–5)

## 2022-05-13 PROCEDURE — 86850 RBC ANTIBODY SCREEN: CPT

## 2022-05-13 PROCEDURE — 71045 X-RAY EXAM CHEST 1 VIEW: CPT

## 2022-05-13 PROCEDURE — 86900 BLOOD TYPING SEROLOGIC ABO: CPT

## 2022-05-13 PROCEDURE — 96367 TX/PROPH/DG ADDL SEQ IV INF: CPT

## 2022-05-13 PROCEDURE — 86922 COMPATIBILITY TEST ANTIGLOB: CPT

## 2022-05-13 PROCEDURE — 87150 DNA/RNA AMPLIFIED PROBE: CPT

## 2022-05-13 PROCEDURE — 99285 EMERGENCY DEPT VISIT HI MDM: CPT

## 2022-05-13 PROCEDURE — 6370000000 HC RX 637 (ALT 250 FOR IP): Performed by: INTERNAL MEDICINE

## 2022-05-13 PROCEDURE — 85007 BL SMEAR W/DIFF WBC COUNT: CPT

## 2022-05-13 PROCEDURE — 96365 THER/PROPH/DIAG IV INF INIT: CPT

## 2022-05-13 PROCEDURE — 83605 ASSAY OF LACTIC ACID: CPT

## 2022-05-13 PROCEDURE — 93005 ELECTROCARDIOGRAM TRACING: CPT | Performed by: STUDENT IN AN ORGANIZED HEALTH CARE EDUCATION/TRAINING PROGRAM

## 2022-05-13 PROCEDURE — 83690 ASSAY OF LIPASE: CPT

## 2022-05-13 PROCEDURE — 1200000000 HC SEMI PRIVATE

## 2022-05-13 PROCEDURE — 87186 SC STD MICRODIL/AGAR DIL: CPT

## 2022-05-13 PROCEDURE — 80053 COMPREHEN METABOLIC PANEL: CPT

## 2022-05-13 PROCEDURE — 81001 URINALYSIS AUTO W/SCOPE: CPT

## 2022-05-13 PROCEDURE — 85027 COMPLETE CBC AUTOMATED: CPT

## 2022-05-13 PROCEDURE — 87040 BLOOD CULTURE FOR BACTERIA: CPT

## 2022-05-13 PROCEDURE — 2580000003 HC RX 258: Performed by: INTERNAL MEDICINE

## 2022-05-13 PROCEDURE — 83880 ASSAY OF NATRIURETIC PEPTIDE: CPT

## 2022-05-13 PROCEDURE — 6360000002 HC RX W HCPCS: Performed by: INTERNAL MEDICINE

## 2022-05-13 PROCEDURE — 84484 ASSAY OF TROPONIN QUANT: CPT

## 2022-05-13 PROCEDURE — 2580000003 HC RX 258: Performed by: STUDENT IN AN ORGANIZED HEALTH CARE EDUCATION/TRAINING PROGRAM

## 2022-05-13 PROCEDURE — 6360000002 HC RX W HCPCS: Performed by: STUDENT IN AN ORGANIZED HEALTH CARE EDUCATION/TRAINING PROGRAM

## 2022-05-13 PROCEDURE — 86901 BLOOD TYPING SEROLOGIC RH(D): CPT

## 2022-05-13 RX ORDER — SODIUM CHLORIDE 9 MG/ML
500 INJECTION, SOLUTION INTRAVENOUS PRN
Status: DISCONTINUED | OUTPATIENT
Start: 2022-05-13 | End: 2022-05-18 | Stop reason: HOSPADM

## 2022-05-13 RX ORDER — MAGNESIUM SULFATE IN WATER 40 MG/ML
2000 INJECTION, SOLUTION INTRAVENOUS PRN
Status: DISCONTINUED | OUTPATIENT
Start: 2022-05-13 | End: 2022-05-18 | Stop reason: HOSPADM

## 2022-05-13 RX ORDER — LOPERAMIDE HYDROCHLORIDE 2 MG/1
2 CAPSULE ORAL 4 TIMES DAILY PRN
Status: DISCONTINUED | OUTPATIENT
Start: 2022-05-13 | End: 2022-05-18 | Stop reason: HOSPADM

## 2022-05-13 RX ORDER — CALCIUM CARBONATE 500(1250)
500 TABLET ORAL DAILY
Status: DISCONTINUED | OUTPATIENT
Start: 2022-05-14 | End: 2022-05-18 | Stop reason: HOSPADM

## 2022-05-13 RX ORDER — ONDANSETRON 4 MG/1
4 TABLET, ORALLY DISINTEGRATING ORAL EVERY 8 HOURS PRN
Status: DISCONTINUED | OUTPATIENT
Start: 2022-05-13 | End: 2022-05-18 | Stop reason: HOSPADM

## 2022-05-13 RX ORDER — ACETAMINOPHEN 325 MG/1
650 TABLET ORAL EVERY 6 HOURS PRN
Status: DISCONTINUED | OUTPATIENT
Start: 2022-05-13 | End: 2022-05-18 | Stop reason: HOSPADM

## 2022-05-13 RX ORDER — ENOXAPARIN SODIUM 100 MG/ML
30 INJECTION SUBCUTANEOUS NIGHTLY
Status: DISCONTINUED | OUTPATIENT
Start: 2022-05-13 | End: 2022-05-16

## 2022-05-13 RX ORDER — DIVALPROEX SODIUM 500 MG/1
1000 TABLET, EXTENDED RELEASE ORAL NIGHTLY
Status: DISCONTINUED | OUTPATIENT
Start: 2022-05-13 | End: 2022-05-18 | Stop reason: HOSPADM

## 2022-05-13 RX ORDER — ONDANSETRON 2 MG/ML
4 INJECTION INTRAMUSCULAR; INTRAVENOUS EVERY 6 HOURS PRN
Status: DISCONTINUED | OUTPATIENT
Start: 2022-05-13 | End: 2022-05-18 | Stop reason: HOSPADM

## 2022-05-13 RX ORDER — SODIUM CHLORIDE 0.9 % (FLUSH) 0.9 %
5-40 SYRINGE (ML) INJECTION EVERY 12 HOURS SCHEDULED
Status: DISCONTINUED | OUTPATIENT
Start: 2022-05-13 | End: 2022-05-18 | Stop reason: HOSPADM

## 2022-05-13 RX ORDER — 0.9 % SODIUM CHLORIDE 0.9 %
1000 INTRAVENOUS SOLUTION INTRAVENOUS ONCE
Status: COMPLETED | OUTPATIENT
Start: 2022-05-13 | End: 2022-05-13

## 2022-05-13 RX ORDER — POTASSIUM CHLORIDE 7.45 MG/ML
10 INJECTION INTRAVENOUS PRN
Status: DISCONTINUED | OUTPATIENT
Start: 2022-05-13 | End: 2022-05-18 | Stop reason: HOSPADM

## 2022-05-13 RX ORDER — ACETAMINOPHEN 650 MG/1
650 SUPPOSITORY RECTAL EVERY 6 HOURS PRN
Status: DISCONTINUED | OUTPATIENT
Start: 2022-05-13 | End: 2022-05-18 | Stop reason: HOSPADM

## 2022-05-13 RX ORDER — OMEGA-3-ACID ETHYL ESTERS 1 G/1
1000 CAPSULE, LIQUID FILLED ORAL DAILY
Status: DISCONTINUED | OUTPATIENT
Start: 2022-05-14 | End: 2022-05-18 | Stop reason: HOSPADM

## 2022-05-13 RX ORDER — ENOXAPARIN SODIUM 100 MG/ML
40 INJECTION SUBCUTANEOUS DAILY
Status: DISCONTINUED | OUTPATIENT
Start: 2022-05-13 | End: 2022-05-13 | Stop reason: DRUGHIGH

## 2022-05-13 RX ORDER — CETIRIZINE HYDROCHLORIDE 5 MG/1
5 TABLET ORAL DAILY
Status: DISCONTINUED | OUTPATIENT
Start: 2022-05-14 | End: 2022-05-18 | Stop reason: HOSPADM

## 2022-05-13 RX ORDER — SODIUM CHLORIDE 0.9 % (FLUSH) 0.9 %
5-40 SYRINGE (ML) INJECTION PRN
Status: DISCONTINUED | OUTPATIENT
Start: 2022-05-13 | End: 2022-05-18 | Stop reason: HOSPADM

## 2022-05-13 RX ORDER — POTASSIUM CHLORIDE 20 MEQ/1
40 TABLET, EXTENDED RELEASE ORAL PRN
Status: DISCONTINUED | OUTPATIENT
Start: 2022-05-13 | End: 2022-05-18 | Stop reason: HOSPADM

## 2022-05-13 RX ORDER — POLYETHYLENE GLYCOL 3350 17 G/17G
17 POWDER, FOR SOLUTION ORAL DAILY PRN
Status: DISCONTINUED | OUTPATIENT
Start: 2022-05-13 | End: 2022-05-18 | Stop reason: HOSPADM

## 2022-05-13 RX ORDER — VITAMIN B COMPLEX
2000 TABLET ORAL DAILY
Status: DISCONTINUED | OUTPATIENT
Start: 2022-05-14 | End: 2022-05-18 | Stop reason: HOSPADM

## 2022-05-13 RX ORDER — TERIPARATIDE 250 UG/ML
20 INJECTION, SOLUTION SUBCUTANEOUS DAILY
Status: DISCONTINUED | OUTPATIENT
Start: 2022-05-14 | End: 2022-05-18 | Stop reason: HOSPADM

## 2022-05-13 RX ADMIN — CEFEPIME 2000 MG: 2 INJECTION, POWDER, FOR SOLUTION INTRAVENOUS at 16:39

## 2022-05-13 RX ADMIN — SODIUM CHLORIDE 1000 ML: 9 INJECTION, SOLUTION INTRAVENOUS at 16:02

## 2022-05-13 RX ADMIN — METOPROLOL TARTRATE 25 MG: 25 TABLET, FILM COATED ORAL at 23:02

## 2022-05-13 RX ADMIN — ENOXAPARIN SODIUM 30 MG: 100 INJECTION SUBCUTANEOUS at 22:41

## 2022-05-13 RX ADMIN — SODIUM CHLORIDE, PRESERVATIVE FREE 10 ML: 5 INJECTION INTRAVENOUS at 22:42

## 2022-05-13 RX ADMIN — DIVALPROEX SODIUM 1000 MG: 500 TABLET, FILM COATED, EXTENDED RELEASE ORAL at 22:42

## 2022-05-13 RX ADMIN — VANCOMYCIN HYDROCHLORIDE 1000 MG: 1 INJECTION, POWDER, LYOPHILIZED, FOR SOLUTION INTRAVENOUS at 16:52

## 2022-05-13 ASSESSMENT — PAIN DESCRIPTION - LOCATION: LOCATION: BACK

## 2022-05-13 ASSESSMENT — PAIN SCALES - GENERAL: PAINLEVEL_OUTOF10: 4

## 2022-05-13 NOTE — PROGRESS NOTES
LOVENOX PROPHYLAXIS EVALUATION    Wt Readings from Last 3 Encounters:   05/13/22 109 lb (49.4 kg)   05/09/22 109 lb 9.6 oz (49.7 kg)   02/21/22 117 lb 6.4 oz (53.3 kg)       Estimated Creatinine Clearance: 43 mL/min (based on SCr of 1 mg/dL).   Recent Labs     05/13/22  1435   BUN 15   CREATININE 1.0      HGB 7.8*   HCT 23.9*       Weight Range (kg): 50.9 kg and below    CRCL = 30 or greater     50.9 kg   and below     .9  kg   101-150.9 kg   151-174.9  kg   175 kg  or greater     30mg subq  daily     40mg subq daily    (or 30mg subq BID orthopedic)     30mg subq  BID   40mg subq  BID   60mg subq BID       Per P/T protocol for appropriate subq anticoagulation by weight and CRCL change to:  Enoxparin 30mg subq daily    TAMMI Carranza [facility] [place] [facility] [place]  7:53 PM  05/13/22

## 2022-05-13 NOTE — H&P
History and Physical      Name:  Jacy Mosher /Age/Sex: 1955  (77 y.o. female)   MRN & CSN:  [id number] & [id number] Admission Date/Time: 2022  3:25 PM   Location:  ED38/ED-38 PCP: Shannan Campos MD       Hospital Day: 1    Assessment and Plan:   Jacy Mosher is a 77 y.o.  female  who presents with Community acquired bacterial pneumonia    Right upper lobe consolidation, likely secondary to pneumonia but cannot rule out mass lesion. IV antibiotics vancomycin and cefepime. Patient may need to have a repeat CAT scan to ensure resolution of consolidation. Leukocytosis, likely reactionary to above  Sepsis as evidenced by leukocytosis, tachycardia, hypotension and anemia, in the presence of an infectious process. IV normal saline at 125 cc an hour. Anemia, hemoglobin is 7.8, will continue to follow H&H and transfuse hemoglobin drops less than 7. Hemoglobin in January was 11.9 and had dropped down to 9.2 couple of days ago and is now down to 7.8. We will check stool Hemoccults. History of B-cell lymphoma status post external  beam radiation and chemotherapy, with patient complaining of increased night sweats. Hypotension, patient was on midodrine but this was stopped. Systolic blood pressure is running in the 90s. Severe mitral regurgitation  Diet No diet orders on file   DVT Prophylaxis [] Lovenox, []  Heparin, [] SCDs, [] Ambulation   GI Prophylaxis [] PPI,  [] H2 Blocker,  [] Carafate,  [] Diet/Tube Feeds   Code Status Prior   Disposition Patient requires continued admission due to need for intravenous antibiotics and further evaluation   MDM [] Low, [] Moderate,[]  High       History of Present Illness:     Chief Complaint: Community acquired bacterial pneumonia  Jacy Mosher is a 77 y.o.  female  who presents with progressive worsening shortness of breath. Patient has a history of lymphoma and was treated with beam radiation as well as chemotherapy.   She has had this symptom of shortness of breath started with cough which is sometimes productive. She is also had chills but no fever. She thinks she has lost a lot of weight but does not know exactly how much. She is eating poorly. She presented to her family [de-identified] office who ordered a CT scan which showed right upper lobe consolidation which is new. Patient was called and asked to come to the emergency room. Ten point ROS reviewed negative, unless as noted above    Objective:   No intake or output data in the 24 hours ending 05/13/22 1759   Vitals:   Vitals:    05/13/22 1732   BP: 99/60   Pulse: 100   Resp: 21   Temp:    SpO2: 100%     Physical Exam:   GEN Awake female, sitting upright in bed in no apparent distress. Appears given age. EYES Pupils are equally round. No scleral erythema, discharge, or conjunctivitis. HENT Mucous membranes are moist. Oral pharynx without exudates, no evidence of thrush. NECK Supple, no apparent thyromegaly or masses. RESP Clear to auscultation, no wheezes, rales or rhonchi. Symmetric chest movement while on room air. CARDIO/VASC S1/S2 auscultated. Regular rate without appreciable murmurs, rubs, or gallops. No JVD or carotid bruits. Peripheral pulses equal bilaterally and palpable. No peripheral edema. GI Abdomen is soft without significant tenderness, masses, or guarding. Bowel sounds are normoactive. Rectal exam deferred.  No costovertebral angle tenderness. Normal appearing external genitalia. Palafox catheter is not present. HEME/LYMPH No palpable cervical lymphadenopathy and no hepatosplenomegaly. No petechiae or ecchymoses. MSK No gross joint deformities. SKIN Normal coloration, warm, dry. NEURO Cranial nerves appear grossly intact, normal speech, no lateralizing weakness. PSYCH Awake, alert, oriented x 4. Affect appropriate.     Past Medical History:      Past Medical History:   Diagnosis Date    [facility] ([facility]     Bipolar 1 disorder ([facility]     \"on Depakote for this\"    History of external beam radiation therapy     [facility] 3,[facility] c[facility] [place]    Hx of echocardiogram 2021    Left ventricular function is normal, EF is estimated at 55-60%,mitral valve appears to slightly prolapse,     PSHX:  has a past surgical history that includes Tubal ligation (20+ yrs ago); Tonsillectomy (as a kid); Colonoscopy (2015); [address] Surgery (N/A, 2020); Port Surgery (Left, 2020); Upper gastrointestinal endoscopy (N/A, 2020); Colonoscopy (N/A, 2020); Port Surgery (Right, 3/10/2020); and Breast biopsy (Left). Allergies: Allergies   Allergen Reactions    Abilify [Aripiprazole]     Iodine     Penicillins     Codeine Nausea And Vomiting       FAM HX: family history includes Breast Cancer in her mother; Cancer in her father; High Blood Pressure in her mother. Soc HX:   Social History     Socioeconomic History    Marital status:      Spouse name: Not on file    Number of children: Not on file    Years of education: Not on file    Highest education level: Not on file   Occupational History    Not on file   Tobacco Use    Smoking status: Former Smoker     Packs/day: 1.00     Years: 10.00     Pack years: 10.00     Types: Cigarettes     Quit date: 1988     Years since quittin.3    Smokeless tobacco: Never Used   Vaping Use    Vaping Use: Never used   Substance and Sexual Activity    Alcohol use: No     Comment: 1-2 CUPS OF HOT TEA    Drug use: No    Sexual activity: Not on file   Other Topics Concern    Not on file   Social History Narrative    Not on file     Social Determinants of Health     Financial Resource Strain:     Difficulty of Paying Living Expenses: Not on file   Food Insecurity:     Worried About Running Out of Food in the Last Year: Not on file    Judie of Food in the Last Year: Not on file   Transportation Needs:     Lack of Transportation (Medical):  Not on file    Lack of Transportation (Non-Medical):  Not on file   Physical Activity:     Days of Exercise per Week: Not on file    Minutes of Exercise per Session: Not on file   Stress:     Feeling of Stress : Not on file   Social Connections:     Frequency of Communication with Friends and Family: Not on file    Frequency of Social Gatherings with Friends and Family: Not on file    Attends Sikh Services: Not on file    Active Member of [address] [place] [address] or Organizations: Not on file    Attends Club or Organization Meetings: Not on file    Marital Status: Not on file   Intimate Partner Violence:     Fear of Current or Ex-Partner: Not on file    Emotionally Abused: Not on file    Physically Abused: Not on file    Sexually Abused: Not on file   Housing Stability:     Unable to Pay for Housing in the Last Year: Not on file    Number of [place] in the Last Year: Not on file    Unstable Housing in the Last Year: Not on file       Medications:   Medications:    sodium chloride  1,000 mL IntraVENous Once      Infusions:   PRN Meds:        Electronically signed by Bill Kline MD on 5/13/2022 at 5:59 PM

## 2022-05-13 NOTE — ACP (ADVANCE CARE PLANNING)
Patient does not have any ACP documents/Medical Power of . LSW notes hospital will follow [place]'s Next of Kin hierarchy in the following descending order for priority:    Guardian  Spouse  [de-identified] of adult Children  Parents  [de-identified] of adult Siblings  Nearest Relative not described above    Per [place]'s Next of Kin hierarchy:  Patients spouse will be Primary Healthcare Decision Maker. Patients' son is secondary healthcare decision maker.

## 2022-05-13 NOTE — ED NOTES
This RN accessed patient's power with power access needle. CT aware.      Ana Luisa Mccray RN  05/13/22 4123

## 2022-05-13 NOTE — ED PROVIDER NOTES
Emergency Department Encounter    Patient: Jesica Barcenas  MRN: [id number]  : 1955  Date of Evaluation: 2022  ED Provider:  Christine Rodrigues MD    Triage Chief Complaint:   Shortness of Breath (sent by PCP about abnormal CT results on )    [place]:  Jesica Barcenas is a 77 y.o. female with history seen below including B-cell lymphoma presenting with cough, mild sputum production, increased shortness of breath. Patient states over the past 2 to 3 days she has had increasing shortness of breath, night sweats and cough with white sputum production. Patient initially went to her PCP who told patient likely allergies and then followed up with hematology oncology who ordered a CT chest abdomen pelvis which revealed a masslike focus of consolidation of the right upper lobe measuring 6.7 cm with broad costal pleural base. Differential diagnosis includes pulmonary parenchymal involvement by lymphoma, primary lung cancer or pneumonia. There is also cholelithiasis with mild compression deformity of L4 of uncertain chronicity new since prior exam in . Patient states she has no chest pain. Denies abdominal pain, nausea vomiting, change in bowel habits, change in urination, rashes or lesions. Denies any blood or melena in her stools. Denies nausea vomiting. Denies recent falls or trauma. Denies headache, blurred vision, focal neurodeficits, motor or sensory changes.     ROS - see HPI, below listed is current ROS at time of my eval:  At least 14 systems reviewed, negative other HPI    Past Medical History:   Diagnosis Date    [facility] ([facility]     Bipolar 1 disorder ([address]     \"on Depakote for this\"    History of external beam radiation therapy     Pelvis 3,600 cGy [facility]  [facility] [place]    Hx of echocardiogram 2021    Left ventricular function is normal, EF is estimated at 55-60%,mitral valve appears to slightly prolapse,     Past Surgical History:   Procedure Laterality Date    BREAST BIOPSY Left     COLONOSCOPY  2015    normal colon    COLONOSCOPY N/A 2020    COLONOSCOPY DIAGNOSTIC performed by Lizy Torres MD at [address] [place] [address] N/A 2020    PORT INSERTION performed by Isael Omer MD at [address] [place] [address] Left 2020    PORT REMOVAL LEFT performed by Isael Omer MD at [address] [place] [address] Right 3/10/2020    RIGHT PORT INSERTION performed by Isael Omer MD at [address] [place] [address]  as a kid    TUBAL LIGATION  20+ yrs ago    UPPER GASTROINTESTINAL ENDOSCOPY N/A 2020    EGD DIAGNOSTIC ONLY performed by Lizy Torres MD at [facility] ENDOSCOPY     Family History   Problem Relation Age of Onset    Breast Cancer Mother     High Blood Pressure Mother     Cancer Father         \"cancer in the bowel\"    Ovarian Cancer Neg Hx      Social History     Socioeconomic History    Marital status:      Spouse name: Not on file    Number of children: Not on file    Years of education: Not on file    Highest education level: Not on file   Occupational History    Not on file   Tobacco Use    Smoking status: Former Smoker     Packs/day: 1.00     Years: 10.00     Pack years: 10.00     Types: Cigarettes     Quit date: 1988     Years since quittin.3    Smokeless tobacco: Never Used   Vaping Use    Vaping Use: Never used   Substance and Sexual Activity    Alcohol use: No     Comment: 1-2 CUPS OF HOT TEA    Drug use: No    Sexual activity: Not on file   Other Topics Concern    Not on file   Social History Narrative    Not on file     Social Determinants of Health     Financial Resource Strain:     Difficulty of Paying Living Expenses: Not on file   Food Insecurity:     Worried About Running Out of Food in the Last Year: Not on file    Judie of Food in the Last Year: Not on file   Transportation Needs:     Lack of Transportation (Medical): Not on file    Lack of Transportation (Non-Medical):  Not on file   Physical Activity:     Days of Exercise per Week: Not on file    Minutes of Exercise per Session: Not on file   Stress:     Feeling of Stress : Not on file   Social Connections:     Frequency of Communication with Friends and Family: Not on file    Frequency of Social Gatherings with Friends and Family: Not on file    Attends Mandaeism Services: Not on file    Active Member of [address] [place] [address] or Organizations: Not on file    Attends Club or Organization Meetings: Not on file    Marital Status: Not on file   Intimate Partner Violence:     Fear of Current or Ex-Partner: Not on file    Emotionally Abused: Not on file    Physically Abused: Not on file    Sexually Abused: Not on file   Housing Stability:     Unable to Pay for Housing in the Last Year: Not on file    Number of [place] in the Last Year: Not on file    Unstable Housing in the Last Year: Not on file     No current facility-administered medications for this encounter.      Current Outpatient Medications   Medication Sig Dispense Refill    metoprolol tartrate (LOPRESSOR) 25 MG tablet Take 1 tablet by mouth 2 times daily 180 tablet 3    loratadine (CLARITIN) 10 MG tablet Take 10 mg by mouth as needed       teriparatide (FORTEO) 620 MCG/2.48ML SOPN injection INJECT 0.08 MLS 20 MCG TOTAL UNDER THE SKIN ONCE A DAY      RA PEN NEEDLES 31G X 8 MM MISC use 1 NEEDLE WITH PEN once daily      Nutritional Supplements (ENSURE PLUS) LIQD Take 1 Can by mouth 3 times daily (Patient not taking: Reported on 2/21/2022) 90 Can 1    loperamide (IMODIUM A-D) 2 MG tablet Take 1 tablet by mouth 4 times daily as needed for Diarrhea 60 tablet 1    Omega-3 Fatty Acids (FISH OIL) 1000 MG CAPS Take 1,000 mg by mouth daily (Patient not taking: Reported on 2/21/2022)      Cholecalciferol (VITAMIN D) 50 MCG (2000 UT) CAPS capsule Take 1 capsule by mouth daily      divalproex (DEPAKOTE ER) 500 MG extended release tablet Take 2 tablets by mouth nightly 30 tablet 0    MULTIPLE VITAMIN PO Take by mouth daily      calcium carbonate 600 MG TABS tablet Take 1 tablet by mouth daily       Allergies   Allergen Reactions    Abilify [Aripiprazole]     Iodine     Penicillins     Codeine Nausea And Vomiting       Nursing Notes Reviewed    Physical Exam:  Triage VS:    ED Triage Vitals [05/13/22 1424]   Enc Vitals Group      BP (!) 98/58      Pulse 105      Resp 21      Temp 98.5 °F (36.9 °C)      Temp Source Oral      SpO2 99 %      Weight       Height       Head Circumference       Peak Flow       Pain Score       Pain Loc       Pain Edu? Excl. in [address]? My pulse ox interpretation is  normal    General appearance:  No acute distress. Skin:  Warm. Dry. Eye:  Extraocular movements intact. Ears, nose, mouth and throat:  Oral mucosa moist   Neck:  Trachea midline. Extremity:  No swelling. Normal ROM     Heart:  Regular rate and rhythm, normal S1 & S2, no extra heart sounds. Perfusion:  intact  Respiratory: Crackles in the right upper lobes. Heddy  Respirations nonlabored. Abdominal:  Normal bowel sounds. Soft. Nontender. Non distended. Back:  No CVA tenderness to palpation     Neurological:  Alert and oriented times 3. No focal neuro deficits. Psychiatric:  Appropriate    I have reviewed and interpreted all of the currently available lab results from this visit (if applicable):  No results found for this visit on 05/13/22. Radiographs (if obtained):  Radiologist's Report Reviewed:  No results found. EKG (if obtained): (All EKG's are interpreted by myself in the absence of a cardiologist)  Sinus tachycardia, ventricular rate 104, CO interval 130, QRS duration 72, QTc 415, no significant ST elevation or depression    MDM:    59-year-old female presenting with history of B-cell lymphoma with cough, sputum production, night sweats and chills at home. Patient had a CT chest abdomen pelvis obtained yesterday and results can be seen above.   Patient is afebrile on presentation satting well on room air. Patient mildly tachycardic at 105. Blood pressure is soft at 98/58. With a repeat of 86/73. IV was established and patient started on fluids in the ED with improvement of blood pressure. Blood cultures obtained. Lactate obtained and within normal limits. Patient started on broad-spectrum antibiotics given history. Patient has a leukocytosis of 19,000. Hemoglobin is 7.8. Patient denies any current signs of bleeding. CMP reveals mild hypokalemia 3.3. EKG shows sinus tachycardia but otherwise nonacute and Trope was within normal limits. Lipase not elevated. UA not consistent with infection. CT chest as well as abdomen pelvis obtained yesterday and do not believe repeat is necessary at this time. Hospitalist was called and patient admitted their service for further evaluation and treatment. Clinical Impression:  1. Sepsis due to pneumonia ([address]    2. Hx of lymphoma    3. Hypokalemia    4. Anemia, unspecified type      Total critical care time provided today was 35 minutes. This excludes seperately billable procedures and family discussion time. Critical care time provided for obtaining history, conducting a physical exam, performing and monitoring interventions, ordering, collecting and interpreting tests, and establishing medical decision-making. There was a potential for life/limb threatening pathology requiring close evaluation and intervention with concern for patient decompensation. Comment: Please note this report has been produced using speech recognition software and may contain errors related to that system including errors in grammar, punctuation, and spelling, as well as words and phrases that may be inappropriate. Efforts were made to edit the dictations.         Sabine Faitma MD  05/15/22 0891

## 2022-05-13 NOTE — PROGRESS NOTES
Called patient at [phone] to advise to go to hospital for PNA vs. Lung CA vs. Lymphoma based on CT results per physician request. Patient refused, so physician advised to put on PO levaquin and follow up with pulmonologist ASAP. Patient called office back shortly after conversation and left  stating her son is wanting her to go to ER now. [facility] to call back and explain to go to ER as instructed by Dr. Williams Morales d/t CT results.

## 2022-05-13 NOTE — ED TRIAGE NOTES
PT stated Dr. Gui Boone sent her to ED due to her CT results she had done on 5/12. PT states he is concerned it is either Pneumonia or lung cancer and requested that she come to the ED to determine which it was.

## 2022-05-14 LAB
ANION GAP SERPL CALCULATED.3IONS-SCNC: 10 MMOL/L (ref 4–16)
BUN BLDV-MCNC: 10 MG/DL (ref 6–23)
CALCIUM SERPL-MCNC: 8 MG/DL (ref 8.3–10.6)
CHLORIDE BLD-SCNC: 107 MMOL/L (ref 99–110)
CO2: 21 MMOL/L (ref 21–32)
CREAT SERPL-MCNC: 0.8 MG/DL (ref 0.6–1.1)
DIFFERENTIAL TYPE: ABNORMAL
GFR AFRICAN AMERICAN: >60 ML/MIN/1.73M2
GFR NON-AFRICAN AMERICAN: >60 ML/MIN/1.73M2
GLUCOSE BLD-MCNC: 96 MG/DL (ref 70–99)
HCT VFR BLD CALC: 19.7 % (ref 37–47)
HCT VFR BLD CALC: 26.7 % (ref 37–47)
HEMOGLOBIN: 6.6 GM/DL (ref 12.5–16)
HEMOGLOBIN: 9.1 GM/DL (ref 12.5–16)
HYPOCHROMIA: ABNORMAL
LYMPHOCYTES ABSOLUTE: 1.5 K/CU MM
LYMPHOCYTES RELATIVE PERCENT: 11 % (ref 24–44)
MACROCYTES: ABNORMAL
MAGNESIUM: 1.8 MG/DL (ref 1.8–2.4)
MCH RBC QN AUTO: 30.7 PG (ref 27–31)
MCHC RBC AUTO-ENTMCNC: 33.5 % (ref 32–36)
MCV RBC AUTO: 91.6 FL (ref 78–100)
MONOCYTES ABSOLUTE: 0.1 K/CU MM
MONOCYTES RELATIVE PERCENT: 1 % (ref 0–4)
PDW BLD-RTO: 13.8 % (ref 11.7–14.9)
PLATELET # BLD: 307 K/CU MM (ref 140–440)
PMV BLD AUTO: 9.5 FL (ref 7.5–11.1)
POTASSIUM SERPL-SCNC: 3.5 MMOL/L (ref 3.5–5.1)
RBC # BLD: 2.15 M/CU MM (ref 4.2–5.4)
SEGMENTED NEUTROPHILS ABSOLUTE COUNT: 12.3 K/CU MM
SEGMENTED NEUTROPHILS RELATIVE PERCENT: 88 % (ref 36–66)
SODIUM BLD-SCNC: 138 MMOL/L (ref 135–145)
WBC # BLD: 13.9 K/CU MM (ref 4–10.5)

## 2022-05-14 PROCEDURE — 82270 OCCULT BLOOD FECES: CPT

## 2022-05-14 PROCEDURE — 1200000000 HC SEMI PRIVATE

## 2022-05-14 PROCEDURE — 86900 BLOOD TYPING SEROLOGIC ABO: CPT

## 2022-05-14 PROCEDURE — 86901 BLOOD TYPING SEROLOGIC RH(D): CPT

## 2022-05-14 PROCEDURE — 85007 BL SMEAR W/DIFF WBC COUNT: CPT

## 2022-05-14 PROCEDURE — 6360000002 HC RX W HCPCS: Performed by: INTERNAL MEDICINE

## 2022-05-14 PROCEDURE — 2580000003 HC RX 258: Performed by: INTERNAL MEDICINE

## 2022-05-14 PROCEDURE — 85027 COMPLETE CBC AUTOMATED: CPT

## 2022-05-14 PROCEDURE — 83735 ASSAY OF MAGNESIUM: CPT

## 2022-05-14 PROCEDURE — 36430 TRANSFUSION BLD/BLD COMPNT: CPT

## 2022-05-14 PROCEDURE — 36415 COLL VENOUS BLD VENIPUNCTURE: CPT

## 2022-05-14 PROCEDURE — 86850 RBC ANTIBODY SCREEN: CPT

## 2022-05-14 PROCEDURE — 6370000000 HC RX 637 (ALT 250 FOR IP): Performed by: INTERNAL MEDICINE

## 2022-05-14 PROCEDURE — 85014 HEMATOCRIT: CPT

## 2022-05-14 PROCEDURE — 94761 N-INVAS EAR/PLS OXIMETRY MLT: CPT

## 2022-05-14 PROCEDURE — C9113 INJ PANTOPRAZOLE SODIUM, VIA: HCPCS | Performed by: INTERNAL MEDICINE

## 2022-05-14 PROCEDURE — 85018 HEMOGLOBIN: CPT

## 2022-05-14 PROCEDURE — P9016 RBC LEUKOCYTES REDUCED: HCPCS

## 2022-05-14 PROCEDURE — 80048 BASIC METABOLIC PNL TOTAL CA: CPT

## 2022-05-14 RX ORDER — PANTOPRAZOLE SODIUM 40 MG/10ML
40 INJECTION, POWDER, LYOPHILIZED, FOR SOLUTION INTRAVENOUS 2 TIMES DAILY
Status: DISCONTINUED | OUTPATIENT
Start: 2022-05-14 | End: 2022-05-18 | Stop reason: HOSPADM

## 2022-05-14 RX ORDER — SODIUM CHLORIDE 9 MG/ML
INJECTION, SOLUTION INTRAVENOUS PRN
Status: DISCONTINUED | OUTPATIENT
Start: 2022-05-14 | End: 2022-05-18 | Stop reason: HOSPADM

## 2022-05-14 RX ADMIN — OMEGA-3-ACID ETHYL ESTERS 1000 MG: 1 CAPSULE, LIQUID FILLED ORAL at 08:39

## 2022-05-14 RX ADMIN — CEFEPIME HYDROCHLORIDE 2000 MG: 2 INJECTION, POWDER, FOR SOLUTION INTRAVENOUS at 20:55

## 2022-05-14 RX ADMIN — SODIUM CHLORIDE 500 ML: 9 INJECTION, SOLUTION INTRAVENOUS at 03:41

## 2022-05-14 RX ADMIN — CETIRIZINE HYDROCHLORIDE 5 MG: 5 TABLET ORAL at 08:40

## 2022-05-14 RX ADMIN — SODIUM CHLORIDE, PRESERVATIVE FREE 10 ML: 5 INJECTION INTRAVENOUS at 08:41

## 2022-05-14 RX ADMIN — VANCOMYCIN HYDROCHLORIDE 1000 MG: 1 INJECTION, POWDER, LYOPHILIZED, FOR SOLUTION INTRAVENOUS at 16:56

## 2022-05-14 RX ADMIN — PANTOPRAZOLE SODIUM 40 MG: 40 INJECTION, POWDER, LYOPHILIZED, FOR SOLUTION INTRAVENOUS at 13:36

## 2022-05-14 RX ADMIN — ENOXAPARIN SODIUM 30 MG: 100 INJECTION SUBCUTANEOUS at 20:57

## 2022-05-14 RX ADMIN — CALCIUM 500 MG: 500 TABLET ORAL at 08:39

## 2022-05-14 RX ADMIN — Medication 2000 UNITS: at 08:40

## 2022-05-14 RX ADMIN — ONDANSETRON 4 MG: 2 INJECTION INTRAMUSCULAR; INTRAVENOUS at 16:35

## 2022-05-14 RX ADMIN — CEFEPIME HYDROCHLORIDE 2000 MG: 2 INJECTION, POWDER, FOR SOLUTION INTRAVENOUS at 03:42

## 2022-05-14 RX ADMIN — SODIUM CHLORIDE, PRESERVATIVE FREE 10 ML: 5 INJECTION INTRAVENOUS at 20:57

## 2022-05-14 RX ADMIN — ONDANSETRON 4 MG: 4 TABLET, ORALLY DISINTEGRATING ORAL at 08:39

## 2022-05-14 RX ADMIN — PANTOPRAZOLE SODIUM 40 MG: 40 INJECTION, POWDER, LYOPHILIZED, FOR SOLUTION INTRAVENOUS at 20:57

## 2022-05-14 RX ADMIN — DIVALPROEX SODIUM 1000 MG: 500 TABLET, FILM COATED, EXTENDED RELEASE ORAL at 20:57

## 2022-05-14 ASSESSMENT — PAIN SCALES - GENERAL: PAINLEVEL_OUTOF10: 0

## 2022-05-14 NOTE — PROGRESS NOTES
[address] [facility]  consulted by Dr. Sofy Hagan for monitoring and adjustment. Indication for treatment: pneumonia  Goal trough: [] 10-15 mcg/mL or [x] 15-20 mcg/ml  AUC/CABRERA: [] <500 or [x] 400-600    Pertinent Laboratory Values:   Temp Readings from Last 3 Encounters:   05/13/22 99.1 °F (37.3 °C) (Oral)   05/09/22 96.7 °F (35.9 °C) (Temporal)   01/25/22 96.9 °F (36.1 °C) (Temporal)     Recent Labs     05/13/22  1435 05/13/22  1616   WBC 19.0*  --    LACTATE  --  0.7     Recent Labs     05/13/22  1435   BUN 15   CREATININE 1.0     Estimated Creatinine Clearance: 43 mL/min (based on SCr of 1 mg/dL). No intake or output data in the 24 hours ending 05/13/22 2012    Pertinent Cultures:  Date    Source    Results  No cultures since 2/25/2020    Vancomycin level:   TROUGH:  No results for input(s): VANCOTROUGH in the last 72 hours. RANDOM:  No results for input(s): VANCORANDOM in the last 72 hours. Assessment:  SCr, BUN, and urine output: 1.0, 15  Day(s) of therapy: Day 1  Vancomycin concentration:     Plan:  Patient received 1gm in ED, start 750mg Q24H on 5/14. Pharmacy will continue to monitor patient and adjust therapy as indicated    Katie 3 5/16 @ 9769    Thank you for the consult.   [address] [place] [address]  5/13/2022 8:12 PM

## 2022-05-14 NOTE — PROGRESS NOTES
Comprehensive Nutrition Assessment    Type and Reason for Visit:  Initial,Positive Nutrition Screen    Nutrition Recommendations/Plan:   1. Continue Regular Diet  2. Begin high estela oral nutrition supplements bid, between meals  3. Nutrition focused physical assessment for malnutrition over stay as able     Malnutrition Assessment:  Malnutrition Status:  Insufficient data (05/14/22 1013)    Context:  Chronic Illness       Nutrition Assessment:    Pt admitted with community acquired bacterial pneumonia. H/O lymphoma treated with radiation/chemotherapy and severe malnutrition. Reports she has lost a lot of weight but does not know exactly how much as well as poor po intake PTA. CT scan showed right upper lobe consolidation which is new, possible malignancy noted. Pt has been feeding self here on a Regular Diet. Some mild wt loss noted over the past yr. Will order oral supplement to optimize po intake and continue to follow as high nutrition risk. Nutrition Related Findings:    H/H 6.6/19.7 Wound Type: None       Current Nutrition Intake & Therapies:    Average Meal Intake: Unable to assess  Average Supplements Intake: None Ordered  ADULT DIET; Regular    Anthropometric Measures:  Height: 5' 3\" (160 cm)  Ideal Body Weight (IBW): 115 lbs (52 kg)    Admission Body Weight: 109 lb 3 oz (49.5 kg)  Current Body Weight: 109 lb 3.2 oz (49.5 kg), 95 % IBW.  Weight Source: Bed Scale  Current BMI (kg/m2): 19.3  Usual Body Weight: 113 lb 10 oz (51.5 kg) (4/13/21)  % Weight Change (Calculated): -3.9                    BMI Categories: Underweight (BMI less than 22) age over 72    Estimated Daily Nutrient Needs:  Energy Requirements Based On: Kcal/kg  Weight Used for Energy Requirements: Current  Energy (kcal/day): 9381-2692 (30-35 kcal/kg)  Weight Used for Protein Requirements: Ideal  Protein (g/day): 62-78 (1.2-1.5 g/kg IBW)  Method Used for Fluid Requirements: 1 ml/kcal  Fluid (ml/day): 0744-5264    Nutrition Diagnosis: · Unintended weight loss related to inadequate protein-energy intake as evidenced by poor intake prior to admission,BMI,weight loss    Nutrition Interventions:   Food and/or Nutrient Delivery: Continue Current Diet,Start Oral Nutrition Supplement  Nutrition Education/Counseling: No recommendation at this time  Coordination of Nutrition Care: Continue to monitor while inpatient       Goals:     Goals: Meet at least 75% of estimated needs,within 2 days       Nutrition Monitoring and Evaluation:   Behavioral-Environmental Outcomes: None Identified  Food/Nutrient Intake Outcomes: Food and Nutrient Intake,Supplement Intake  Physical Signs/Symptoms Outcomes: Biochemical Data,GI Status,Meal Time Behavior,Nutrition Focused Physical Findings,Weight    Discharge Planning:    Continue Oral Nutrition Supplement     Myles Prieto, [address], [place]  Contact: [phone]

## 2022-05-14 NOTE — PROGRESS NOTES
V2.0  [facility] [place] [facility] Progress Note      Name:  Tova Bee /Age/Sex: 1955  (77 y.o. female)   MRN & CSN:  [id number] & [id number] Encounter Date/Time: 2022 11:16 AM EDT    Location:  [address] [place], [place] [address]- PCP: Mireille Montana MD       Hospital Day: 2    Assessment and Plan:   Tova Bee is a 77 y.o. female with pmh of B-cell lymphoma (status post chemoradiation about 2 years ago) who presents with increasing shortness of breath, intermittent dry cough, chills and poor appetite for 2 weeks. 1.  Right upper lobe consolidation: CT chest and abdomen/pelvis revealed right upper lobe consolidation which may be pneumonia, lymphoma or primary lung cancer. Treating as  pneumonia-currently on vancomycin and cefepime. Follow-up blood culture result. 2.  Acute on chronic normocytic anemia: No overt bleeding. Send anemia work-up including FOBT, reticulocyte and erythropoietin. Receiving 1 unit of packed red blood cells. Monitor H&H-keep hemoglobin above 7. Hematology consult. 3.  B-cell lymphoma: Chemoradiation therapy about 2 years ago. Patient has been having night sweats. CT chest showed right upper lobe consolidation. Consult hematology. 4.  Hypotension: Due to anemia. Hold metoprolol. Blood transfusion. Chronic problems include moderate to severe TR, possible osteoporosis and bipolar disorder. DVT prophylaxis: Lovenox for now  Disposition: She lives with granddaughter. Normally, she ambulate freely. Diet ADULT DIET;  Regular  ADULT ORAL NUTRITION SUPPLEMENT; Lunch; Frozen Oral Supplement  ADULT ORAL NUTRITION SUPPLEMENT; Dinner; Standard High Calorie/High Protein Oral Supplement   DVT Prophylaxis [x] Lovenox, []  Heparin, [] SCDs, [] Ambulation,  [] Eliquis, [] Xarelto  [] Coumadin   Code Status Full Code   Disposition From: Home  Expected Disposition: Home  Estimated Date of Discharge: 2 to 3 days  Patient requires continued admission due to pneumonia and severe anemia. Surrogate Decision Maker/ POA      Subjective:     Chief Complaint: Shortness of Breath (sent by PCP about abnormal CT results on 5/12)       Mat Pollock is a 77 y.o. female who presents with shortness of breath, dry cough, chills and poor appetite. Shortness of breath on mild exertion has improved. No more cough. No fever. Review of Systems:    Review of Systems    Nothing significant. Objective:   No intake or output data in the 24 hours ending 05/14/22 1116     Vitals:   Vitals:    05/14/22 1030   BP: (!) 82/58   Pulse: 89   Resp: 16   Temp: 97.9 °F (36.6 °C)   SpO2: 98%       Physical Exam:   General: No apparent respiratory distress. Generally weak. Eyes: EOMI. Anicteric. ENT: neck supple  Cardiovascular: Regular rate. No murmur or S3. Respiratory: Clear to auscultation  Gastrointestinal: Soft, non tender, no palpable mass. Genitourinary: no suprapubic tenderness  Musculoskeletal: No edema  Skin: warm, dry  Neuro: Alert. Oriented x3. No gross focal weakness. Psych: Mood appropriate.      Medications:   Medications:    calcium elemental  500 mg Oral Daily    divalproex  1,000 mg Oral Nightly    omega-3 acid ethyl esters  1,000 mg Oral Daily    Vitamin D  2,000 Units Oral Daily    cetirizine  5 mg Oral Daily    metoprolol tartrate  25 mg Oral BID    teriparatide  20 mcg SubCUTAneous Daily    sodium chloride flush  5-40 mL IntraVENous 2 times per day    cefepime  2,000 mg IntraVENous Q12H    enoxaparin  30 mg SubCUTAneous Nightly    vancomycin  15 mg/kg IntraVENous Q24H      Infusions:    sodium chloride      sodium chloride 500 mL (05/14/22 0341)     PRN Meds: sodium chloride, , PRN  loperamide, 2 mg, 4x Daily PRN  sodium chloride flush, 5-40 mL, PRN  sodium chloride, 500 mL, PRN  ondansetron, 4 mg, Q8H PRN   Or  ondansetron, 4 mg, Q6H PRN  polyethylene glycol, 17 g, Daily PRN  acetaminophen, 650 mg, Q6H PRN   Or  acetaminophen, 650 mg, Q6H PRN  potassium chloride, 40 mEq, PRN   Or  potassium alternative oral replacement, 40 mEq, PRN   Or  potassium chloride, 10 mEq, PRN  magnesium sulfate, 2,000 mg, PRN        Labs      Recent Results (from the past 24 hour(s))   CBC with Auto Differential    Collection Time: 05/13/22  2:35 PM   Result Value Ref Range    WBC 19.0 (H) 4.0 - 10.5 K/CU MM    RBC 2.50 (L) 4.2 - 5.4 M/CU MM    Hemoglobin 7.8 (L) 12.5 - 16.0 GM/DL    Hematocrit 23.9 (L) 37 - 47 %    MCV 95.6 78 - 100 FL    MCH 31.2 (H) 27 - 31 PG    MCHC 32.6 32.0 - 36.0 %    RDW 14.0 11.7 - 14.9 %    Platelets 698 886 - 138 K/CU MM    MPV 9.8 7.5 - 11.1 FL    Bands Relative 12 (H) 5 - 11 %    Segs Relative 75.0 (H) 36 - 66 %    Lymphocytes % 8.0 (L) 24 - 44 %    Monocytes % 5.0 (H) 0 - 4 %    Bands Absolute 2.28 K/CU MM    Segs Absolute 14.2 K/CU MM    Lymphocytes Absolute 1.5 K/CU MM    Monocytes Absolute 1.0 K/CU MM    Differential Type MANUAL DIFFERENTIAL     Anisocytosis 1+     Polychromasia 1+     PLT Morphology SEVERAL LARGE PLATELETS PRESENT    Comprehensive Metabolic Panel    Collection Time: 05/13/22  2:35 PM   Result Value Ref Range    Sodium 137 135 - 145 MMOL/L    Potassium 3.3 (L) 3.5 - 5.1 MMOL/L    Chloride 102 99 - 110 mMol/L    CO2 19 (L) 21 - 32 MMOL/L    BUN 15 6 - 23 MG/DL    CREATININE 1.0 0.6 - 1.1 MG/DL    Glucose 97 70 - 99 MG/DL    Calcium 9.4 8.3 - 10.6 MG/DL    Albumin 3.1 (L) 3.4 - 5.0 GM/DL    Total Protein 6.6 6.4 - 8.2 GM/DL    Total Bilirubin 0.7 0.0 - 1.0 MG/DL    ALT 15 10 - 40 U/L    AST 19 15 - 37 IU/L    Alkaline Phosphatase 104 40 - 129 IU/L    GFR Non- 55 (L) >60 mL/min/1.73m2    GFR African American >60 >60 mL/min/1.73m2    Anion Gap 16 4 - 16   Troponin    Collection Time: 05/13/22  2:35 PM   Result Value Ref Range    Troponin T <0.010 <0.01 NG/ML   Lipase    Collection Time: 05/13/22  2:35 PM   Result Value Ref Range    Lipase 15 13 - 60 IU/L   EKG 12 Lead    Collection Time: 05/13/22  2:45 PM Result Value Ref Range    Ventricular Rate 104 BPM    Atrial Rate 104 BPM    P-R Interval 130 ms    QRS Duration 72 ms    Q-T Interval 316 ms    QTc Calculation (Bazett) 415 ms    P Axis 54 degrees    R Axis 55 degrees    T Axis 59 degrees    Diagnosis       Sinus tachycardia  Possible Left atrial enlargement  Borderline ECG  When compared with ECG of 27-FEB-2020 17:09,  TX interval has increased  Nonspecific T wave abnormality no longer evident in Inferior leads  Nonspecific T wave abnormality no longer evident in Lateral leads     Brain Natriuretic Peptide    Collection Time: 05/13/22  3:55 PM   Result Value Ref Range    Pro-.9 (H) <300 PG/ML   Lactic Acid    Collection Time: 05/13/22  4:16 PM   Result Value Ref Range    Lactate 0.7 0.4 - 2.0 mMOL/L   TYPE AND SCREEN    Collection Time: 05/13/22  5:40 PM   Result Value Ref Range    ABO/Rh O POSITIVE     Antibody Screen NEGATIVE     Unit Number [id number]     Component LEUKO-POOR RED CELLS     Unit Divison 00     Status ISSUED     Transfusion Status OK TO TRANSFUSE     Crossmatch Result COMPATIBLE    Urinalysis    Collection Time: 05/13/22  7:38 PM   Result Value Ref Range    Color, UA YELLOW YELLOW    Clarity, UA CLEAR CLEAR    Glucose, Urine NEGATIVE NEGATIVE MG/DL    Bilirubin Urine NEGATIVE NEGATIVE MG/DL    Ketones, Urine 15 (A) NEGATIVE MG/DL    Specific Gravity, UA 1.010 1.001 - 1.035    Blood, Urine NEGATIVE NEGATIVE    pH, Urine 6.0 5.0 - 8.0    Protein, UA NEGATIVE NEGATIVE MG/DL    Urobilinogen, Urine 1.0 0.2 - 1.0 MG/DL    Nitrite Urine, Quantitative NEGATIVE NEGATIVE    Leukocyte Esterase, Urine NEGATIVE NEGATIVE    RBC, UA NONE SEEN 0 - 6 /HPF    WBC, UA NONE SEEN 0 - 5 /HPF    Bacteria, UA NEGATIVE NEGATIVE /HPF    Squam Epithel, UA <1 /HPF    Mucus, UA RARE (A) NEGATIVE HPF    Trichomonas, UA NONE SEEN NONE SEEN /HPF   CBC with Auto Differential    Collection Time: 05/14/22  5:04 AM   Result Value Ref Range    WBC 13.9 (H) 4.0 - 10.5 K/CU MM    RBC 2.15 (L) 4.2 - 5.4 M/CU MM    Hemoglobin 6.6 (LL) 12.5 - 16.0 GM/DL    Hematocrit 19.7 (L) 37 - 47 %    MCV 91.6 78 - 100 FL    MCH 30.7 27 - 31 PG    MCHC 33.5 32.0 - 36.0 %    RDW 13.8 11.7 - 14.9 %    Platelets 127 129 - 567 K/CU MM    MPV 9.5 7.5 - 11.1 FL    Segs Relative 88.0 (H) 36 - 66 %    Lymphocytes % 11.0 (L) 24 - 44 %    Monocytes % 1.0 0 - 4 %    Segs Absolute 12.3 K/CU MM    Lymphocytes Absolute 1.5 K/CU MM    Monocytes Absolute 0.1 K/CU MM    Differential Type MANUAL DIFFERENTIAL     Macrocytes 1+     Hypochromia 2+    Basic Metabolic Panel w/ Reflex to MG    Collection Time: 05/14/22  5:04 AM   Result Value Ref Range    Sodium 138 135 - 145 MMOL/L    Potassium 3.5 3.5 - 5.1 MMOL/L    Chloride 107 99 - 110 mMol/L    CO2 21 21 - 32 MMOL/L    Anion Gap 10 4 - 16    BUN 10 6 - 23 MG/DL    CREATININE 0.8 0.6 - 1.1 MG/DL    Glucose 96 70 - 99 MG/DL    Calcium 8.0 (L) 8.3 - 10.6 MG/DL    GFR Non-African American >60 >60 mL/min/1.73m2    GFR African American >60 >60 mL/min/1.73m2   Magnesium    Collection Time: 05/14/22  5:04 AM   Result Value Ref Range    Magnesium 1.8 1.8 - 2.4 mg/dl        Imaging/Diagnostics Last 24 Hours   XR CHEST PORTABLE    Result Date: 5/13/2022  EXAMINATION: ONE XRAY VIEW OF THE CHEST 5/13/2022 4:18 pm COMPARISON: 02/18/2020 HISTORY: ORDERING SYSTEM PROVIDED HISTORY: sob TECHNOLOGIST PROVIDED HISTORY: Reason for exam:->sob Reason for Exam: sob Additional signs and symptoms: NA Relevant Medical/Surgical History: b-cell lymphoma FINDINGS: Overlying right chest port and right axillary clips. Cardiomediastinal silhouette appears within normal limits. Focal consolidation present in the right upper lobe. Costophrenic angles are sharp. No evidence of pneumothorax. No acute osseous abnormalities. Focal consolidation present in the right upper lobe.        Electronically signed by Deion Baldwin MD on 5/14/2022 at 11:16 AM

## 2022-05-14 NOTE — PROGRESS NOTES
4 Eyes Skin Assessment     NAME:  Noel Severe  YOB: 1955  MEDICAL RECORD NUMBER:  [id number]    The patient is being assess for  Admission    I agree that 2 RN's have performed a thorough Head to Toe Skin Assessment on the patient. ALL assessment sites listed below have been assessed. Areas assessed by both nurses:    Head, Face, Ears, Shoulders, Back, Chest, Arms, Elbows, Hands, Sacrum. Buttock, Coccyx, Ischium and Legs. Feet and Heels        Does the Patient have a Wound?  No noted wound(s)       Dariel Prevention initiated:  No   Wound Care Orders initiated:  NA    Pressure Injury (Stage 3,4, Unstageable, DTI, NWPT, and Complex wounds) if present place consult order under [de-identified] NA    New and Established Ostomies if present place consult order under : NA      Nurse 1 eSignature: Electronically signed by Emily Mcgovern LPN on 8/40/69 at 0:47 AM EDT    **SHARE this note so that the co-signing nurse is able to place an eSignature**    Nurse 2 eSignature: Electronically signed by Neal Sheriff RN on 5/14/22 at 5:11 AM EDT

## 2022-05-14 NOTE — PROGRESS NOTES
[address] [facility]  consulted by Dr. David Spicer for monitoring and adjustment. Indication for treatment: pneumonia  Goal trough: [] 10-15 mcg/mL or [x] 15-20 mcg/ml  AUC/CABRERA: [] <500 or [x] 400-600    Pertinent Laboratory Values:   Temp Readings from Last 3 Encounters:   05/14/22 97.9 °F (36.6 °C) (Oral)   05/09/22 96.7 °F (35.9 °C) (Temporal)   01/25/22 96.9 °F (36.1 °C) (Temporal)     Recent Labs     05/13/22  1435 05/13/22  1616 05/14/22  0504   WBC 19.0*  --  13.9*   LACTATE  --  0.7  --      Recent Labs     05/13/22  1435 05/14/22  0504   BUN 15 10   CREATININE 1.0 0.8     Estimated Creatinine Clearance: 54 mL/min (based on SCr of 0.8 mg/dL). Intake/Output Summary (Last 24 hours) at 5/14/2022 1433  Last data filed at 5/14/2022 1331  Gross per 24 hour   Intake 184 ml   Output    Net 184 ml       Pertinent Cultures:  Date    Source    Results  5/13                             Blood                                     Collected     Vancomycin level:   TROUGH:  No results for input(s): VANCOTROUGH in the last 72 hours. RANDOM:  No results for input(s): VANCORANDOM in the last 72 hours. Assessment:  · SCr, BUN, and urine output: SCr stable, wnL  · Day(s) of therapy:  1 of 7  · Vancomycin concentration: Pending    Plan:  · Per [facility]; Vancomycin 750mg IV every 24 hours, AUC < 400  · Changed dose to Vancomycin 1000mg IV every 24 hours  · Predicted AUC = 410 and Trough = 11.2  · Random level due in 2 days   · Pharmacy will continue to monitor patient and adjust therapy as indicated    Katie 3 5/16 @ 0600    Thank you for the consult.   Tamra Salazar, [address] [place] [address]  5/14/2022 2:33 PM

## 2022-05-15 LAB
ABO/RH: NORMAL
ANION GAP SERPL CALCULATED.3IONS-SCNC: 11 MMOL/L (ref 4–16)
ANTIBODY SCREEN: NEGATIVE
BUN BLDV-MCNC: 9 MG/DL (ref 6–23)
CALCIUM SERPL-MCNC: 8.5 MG/DL (ref 8.3–10.6)
CHLORIDE BLD-SCNC: 110 MMOL/L (ref 99–110)
CO2: 20 MMOL/L (ref 21–32)
COMPONENT: NORMAL
CREAT SERPL-MCNC: 0.8 MG/DL (ref 0.6–1.1)
CROSSMATCH RESULT: NORMAL
FERRITIN: 3107 NG/ML (ref 15–150)
FOLATE: 14.8 NG/ML (ref 3.1–17.5)
GFR AFRICAN AMERICAN: >60 ML/MIN/1.73M2
GFR NON-AFRICAN AMERICAN: >60 ML/MIN/1.73M2
GLUCOSE BLD-MCNC: 85 MG/DL (ref 70–99)
HCT VFR BLD CALC: 27 % (ref 37–47)
HCT VFR BLD CALC: 27.4 % (ref 37–47)
HCT VFR BLD CALC: 29 % (ref 37–47)
HEMOGLOBIN: 9 GM/DL (ref 12.5–16)
HEMOGLOBIN: 9.1 GM/DL (ref 12.5–16)
HEMOGLOBIN: 9.7 GM/DL (ref 12.5–16)
IRON: 36 UG/DL (ref 37–145)
MCH RBC QN AUTO: 30.9 PG (ref 27–31)
MCHC RBC AUTO-ENTMCNC: 33.4 % (ref 32–36)
MCV RBC AUTO: 92.4 FL (ref 78–100)
PCT TRANSFERRIN: 29 % (ref 10–44)
PDW BLD-RTO: 13.9 % (ref 11.7–14.9)
PLATELET # BLD: 373 K/CU MM (ref 140–440)
PMV BLD AUTO: 9.8 FL (ref 7.5–11.1)
POTASSIUM SERPL-SCNC: 4 MMOL/L (ref 3.5–5.1)
RBC # BLD: 3.14 M/CU MM (ref 4.2–5.4)
RETICULOCYTE COUNT PCT: 1.4 % (ref 0.2–2.2)
SODIUM BLD-SCNC: 141 MMOL/L (ref 135–145)
STATUS: NORMAL
TOTAL IRON BINDING CAPACITY: 125 UG/DL (ref 250–450)
TRANSFUSION STATUS: NORMAL
UNIT DIVISION: 0
UNIT NUMBER: NORMAL
UNSATURATED IRON BINDING CAPACITY: 89 UG/DL (ref 110–370)
VITAMIN B-12: >2000 PG/ML (ref 211–911)
WBC # BLD: 11.6 K/CU MM (ref 4–10.5)

## 2022-05-15 PROCEDURE — 85018 HEMOGLOBIN: CPT

## 2022-05-15 PROCEDURE — 6360000002 HC RX W HCPCS: Performed by: INTERNAL MEDICINE

## 2022-05-15 PROCEDURE — 99223 1ST HOSP IP/OBS HIGH 75: CPT | Performed by: INTERNAL MEDICINE

## 2022-05-15 PROCEDURE — 85045 AUTOMATED RETICULOCYTE COUNT: CPT

## 2022-05-15 PROCEDURE — 82607 VITAMIN B-12: CPT

## 2022-05-15 PROCEDURE — 85014 HEMATOCRIT: CPT

## 2022-05-15 PROCEDURE — 2580000003 HC RX 258: Performed by: INTERNAL MEDICINE

## 2022-05-15 PROCEDURE — 94761 N-INVAS EAR/PLS OXIMETRY MLT: CPT

## 2022-05-15 PROCEDURE — C9113 INJ PANTOPRAZOLE SODIUM, VIA: HCPCS | Performed by: INTERNAL MEDICINE

## 2022-05-15 PROCEDURE — 6370000000 HC RX 637 (ALT 250 FOR IP): Performed by: INTERNAL MEDICINE

## 2022-05-15 PROCEDURE — 36591 DRAW BLOOD OFF VENOUS DEVICE: CPT

## 2022-05-15 PROCEDURE — 82728 ASSAY OF FERRITIN: CPT

## 2022-05-15 PROCEDURE — 80048 BASIC METABOLIC PNL TOTAL CA: CPT

## 2022-05-15 PROCEDURE — 82668 ASSAY OF ERYTHROPOIETIN: CPT

## 2022-05-15 PROCEDURE — 85027 COMPLETE CBC AUTOMATED: CPT

## 2022-05-15 PROCEDURE — 36415 COLL VENOUS BLD VENIPUNCTURE: CPT

## 2022-05-15 PROCEDURE — 83540 ASSAY OF IRON: CPT

## 2022-05-15 PROCEDURE — 1200000000 HC SEMI PRIVATE

## 2022-05-15 PROCEDURE — 83550 IRON BINDING TEST: CPT

## 2022-05-15 PROCEDURE — 82746 ASSAY OF FOLIC ACID SERUM: CPT

## 2022-05-15 RX ADMIN — DIVALPROEX SODIUM 1000 MG: 500 TABLET, FILM COATED, EXTENDED RELEASE ORAL at 20:41

## 2022-05-15 RX ADMIN — CALCIUM 500 MG: 500 TABLET ORAL at 09:23

## 2022-05-15 RX ADMIN — ENOXAPARIN SODIUM 30 MG: 100 INJECTION SUBCUTANEOUS at 20:40

## 2022-05-15 RX ADMIN — SODIUM CHLORIDE, PRESERVATIVE FREE 10 ML: 5 INJECTION INTRAVENOUS at 20:41

## 2022-05-15 RX ADMIN — CEFEPIME HYDROCHLORIDE 2000 MG: 2 INJECTION, POWDER, FOR SOLUTION INTRAVENOUS at 15:37

## 2022-05-15 RX ADMIN — CEFEPIME HYDROCHLORIDE 2000 MG: 2 INJECTION, POWDER, FOR SOLUTION INTRAVENOUS at 04:06

## 2022-05-15 RX ADMIN — PANTOPRAZOLE SODIUM 40 MG: 40 INJECTION, POWDER, LYOPHILIZED, FOR SOLUTION INTRAVENOUS at 09:50

## 2022-05-15 RX ADMIN — CETIRIZINE HYDROCHLORIDE 5 MG: 5 TABLET ORAL at 09:23

## 2022-05-15 RX ADMIN — Medication 2000 UNITS: at 09:23

## 2022-05-15 RX ADMIN — PANTOPRAZOLE SODIUM 40 MG: 40 INJECTION, POWDER, LYOPHILIZED, FOR SOLUTION INTRAVENOUS at 20:41

## 2022-05-15 RX ADMIN — SODIUM CHLORIDE, PRESERVATIVE FREE 10 ML: 5 INJECTION INTRAVENOUS at 09:23

## 2022-05-15 RX ADMIN — ACETAMINOPHEN 650 MG: 325 TABLET ORAL at 20:41

## 2022-05-15 RX ADMIN — OMEGA-3-ACID ETHYL ESTERS 1000 MG: 1 CAPSULE, LIQUID FILLED ORAL at 09:23

## 2022-05-15 ASSESSMENT — PAIN SCALES - GENERAL
PAINLEVEL_OUTOF10: 0
PAINLEVEL_OUTOF10: 1
PAINLEVEL_OUTOF10: 3
PAINLEVEL_OUTOF10: 3
PAINLEVEL_OUTOF10: 5

## 2022-05-15 ASSESSMENT — PAIN DESCRIPTION - ONSET
ONSET: ON-GOING
ONSET: ON-GOING

## 2022-05-15 ASSESSMENT — PAIN DESCRIPTION - FREQUENCY
FREQUENCY: CONTINUOUS
FREQUENCY: INTERMITTENT

## 2022-05-15 ASSESSMENT — PAIN DESCRIPTION - DESCRIPTORS
DESCRIPTORS: ACHING
DESCRIPTORS: ACHING

## 2022-05-15 ASSESSMENT — PAIN DESCRIPTION - PAIN TYPE
TYPE: ACUTE PAIN
TYPE: ACUTE PAIN

## 2022-05-15 ASSESSMENT — PAIN DESCRIPTION - LOCATION
LOCATION: BACK
LOCATION: BACK

## 2022-05-15 ASSESSMENT — PAIN - FUNCTIONAL ASSESSMENT
PAIN_FUNCTIONAL_ASSESSMENT: PREVENTS OR INTERFERES SOME ACTIVE ACTIVITIES AND ADLS
PAIN_FUNCTIONAL_ASSESSMENT: PREVENTS OR INTERFERES SOME ACTIVE ACTIVITIES AND ADLS

## 2022-05-15 ASSESSMENT — PAIN DESCRIPTION - ORIENTATION
ORIENTATION: MID
ORIENTATION: MID

## 2022-05-15 NOTE — CONSULTS
Subjective:   CHIEF COMPLAINT / HPI:  77year old female with dx of lymphoma. She was treated with chemo and RT in 2020 with remisssion. She is now admitted with severe cough for past two weeks. She denies any sputum production. She has started feeling sob and denies any wheezing. She denies any fever or hemoptysis but feels weak.       Past Medical History:  Past Medical History:   Diagnosis Date    [facility]     Bipolar 1 disorder ([facility]     \"on Depakote for this\"    History of external beam radiation therapy 2020    Pelvis 3,600 cGy per  at [facility] Hx of echocardiogram 03/17/2021    Left ventricular function is normal, EF is estimated at 55-60%,mitral valve appears to slightly prolapse,       Past Surgical History:        Procedure Laterality Date    BREAST BIOPSY Left     COLONOSCOPY  9/4/2015    normal colon    COLONOSCOPY N/A 2/28/2020    COLONOSCOPY DIAGNOSTIC performed by Rodger Concepcion MD at [facility] N/A 1/7/2020    PORT INSERTION performed by Beckie Gama MD at [address] [place] [address] Left 2/20/2020    PORT REMOVAL LEFT performed by Beckie Gama MD at [address] [place] [address] Right 3/10/2020    RIGHT PORT INSERTION performed by Beckie Gama MD at [address] [place] [address]  as a kid    TUBAL LIGATION  20+ yrs ago   [facility] N/A 2/27/2020    EGD DIAGNOSTIC ONLY performed by Rodger Concepcion MD at [facility] ENDOSCOPY       Current Medications:    Current Facility-Administered Medications: 0.9 % sodium chloride infusion, , IntraVENous, PRN  pantoprazole (PROTONIX) injection 40 mg, 40 mg, IntraVENous, BID  vancomycin (VANCOCIN) 1,000 mg in dextrose 5 % 250 mL IVPB ([id number]), 1,000 mg, IntraVENous, Q24H  calcium elemental (OSCAL) tablet 500 mg, 500 mg, Oral, Daily  divalproex (DEPAKOTE ER) extended release tablet 1,000 mg, 1,000 mg, Oral, Nightly  omega-3 acid ethyl esters (LOVAZA) capsule 1,000 mg, 1,000 mg, Oral, Daily  Vitamin D (CHOLECALCIFEROL) tablet 2,000 Units, 2,000 Units, Oral, Daily  loperamide (IMODIUM) capsule 2 mg, 2 mg, Oral, 4x Daily PRN  cetirizine (ZYRTEC) tablet 5 mg, 5 mg, Oral, Daily  [Held by provider] metoprolol tartrate (LOPRESSOR) tablet 25 mg, 25 mg, Oral, BID  teriparatide (FORTEO) injection 20 mcg, 20 mcg, SubCUTAneous, Daily  sodium chloride flush 0.9 % injection 5-40 mL, 5-40 mL, IntraVENous, 2 times per day  sodium chloride flush 0.9 % injection 5-40 mL, 5-40 mL, IntraVENous, PRN  0.9 % sodium chloride infusion, 500 mL, IntraVENous, PRN  ondansetron (ZOFRAN-ODT) disintegrating tablet 4 mg, 4 mg, Oral, Q8H PRN **OR** ondansetron (ZOFRAN) injection 4 mg, 4 mg, IntraVENous, Q6H PRN  polyethylene glycol (GLYCOLAX) packet 17 g, 17 g, Oral, Daily PRN  acetaminophen (TYLENOL) tablet 650 mg, 650 mg, Oral, Q6H PRN **OR** acetaminophen (TYLENOL) suppository 650 mg, 650 mg, Rectal, Q6H PRN  potassium chloride (KLOR-CON M) extended release tablet 40 mEq, 40 mEq, Oral, PRN **OR** potassium bicarb-citric acid (EFFER-K) effervescent tablet 40 mEq, 40 mEq, Oral, PRN **OR** potassium chloride 10 mEq/100 mL IVPB (Peripheral Line), 10 mEq, IntraVENous, PRN  magnesium sulfate 2000 mg in 50 mL IVPB premix, 2,000 mg, IntraVENous, PRN  cefepime (MAXIPIME) 2000 mg IVPB minibag, 2,000 mg, IntraVENous, Q12H  enoxaparin Sodium (LOVENOX) injection 30 mg, 30 mg, SubCUTAneous, Nightly    Allergies   Allergen Reactions    Abilify [Aripiprazole]     Iodine     Penicillins     Codeine Nausea And Vomiting       Social History:    Social History     Socioeconomic History    Marital status:      Spouse name: Not on file    Number of children: Not on file    Years of education: Not on file    Highest education level: Not on file   Occupational History    Not on file   Tobacco Use    Smoking status: Former Smoker     Packs/day: 1.00     Years: 10.00     Pack years: 10.00     Types: Cigarettes     Quit date: 1/11/1988     Years since quittin.3    Smokeless tobacco: Never Used   Vaping Use    Vaping Use: Never used   Substance and Sexual Activity    Alcohol use: No     Comment: 1-2 CUPS OF HOT TEA    Drug use: No    Sexual activity: Not on file   Other Topics Concern    Not on file   Social History Narrative    Not on file     Social Determinants of Health     Financial Resource Strain:     Difficulty of Paying Living Expenses: Not on file   Food Insecurity:     Worried About Running Out of Food in the Last Year: Not on file    Judie of Food in the Last Year: Not on file   Transportation Needs:     Lack of Transportation (Medical): Not on file    Lack of Transportation (Non-Medical):  Not on file   Physical Activity:     Days of Exercise per Week: Not on file    Minutes of Exercise per Session: Not on file   Stress:     Feeling of Stress : Not on file   Social Connections:     Frequency of Communication with Friends and Family: Not on file    Frequency of Social Gatherings with Friends and Family: Not on file    Attends Yarsanism Services: Not on file    Active Member of [address] [place] [address] or Organizations: Not on file    Attends Club or Organization Meetings: Not on file    Marital Status: Not on file   Intimate Partner Violence:     Fear of Current or Ex-Partner: Not on file    Emotionally Abused: Not on file    Physically Abused: Not on file    Sexually Abused: Not on file   Housing Stability:     Unable to Pay for Housing in the Last Year: Not on file    Number of [place] in the Last Year: Not on file    Unstable Housing in the Last Year: Not on file       Family History:   Family History   Problem Relation Age of Onset    Breast Cancer Mother     High Blood Pressure Mother     Cancer Father         \"cancer in the bowel\"    Ovarian Cancer Neg Hx        Immunization:  Immunization History   Administered Date(s) Administered    COVID-19, [place] Vaccine, Vaccine Unspecified 2021, 2021, 2021    alert, cooperative, no apparent distress, and appears stated age  NECK:  Supple, symmetrical, trachea midline, no adenopathy, thyroid symmetric, not enlarged and no tenderness  CHEST: Chest expansion equal and symmetrical, no intercostal retraction. LUNGS:  no increased work of breathing, has expiratory wheezes both lungs, no crackles. CARDIOVASCULAR: S1 and S2, no edema and no JVD  ABDOMEN:  normal bowel sounds, non-distended and no masses palpated, and no tenderness to palpation. No hepatospleenomegaly  LYMPHADENOPATHY:  no axillary or supraclavicular adenopathy. No cervical adnenopathy  PSYCHIATRIC: Oriented to person place and time. No obvious depression or anxiety. MUSCULOSKELETAL: No obvious misalignment or effusion of the joints. No clubbing, cyanosis of the digits. RIGHT AND LEFT LOWER EXTREMITIES: No edema, no inflammation, no tenderness. SKIN:  normal skin color, texture, turgor and no redness, warmth, or swelling. No palpable nodules    DATA:       EXAMINATION:   CT OF THE CHEST WITHOUT CONTRAST; CT OF THE ABDOMEN AND PELVIS WITHOUT   CONTRAST 5/12/2022 9:42 am       TECHNIQUE:   CT of the chest was performed without the administration of intravenous   contrast. Multiplanar reformatted images are provided for review. Automated   exposure control, iterative reconstruction, and/or weight based adjustment of   the mA/kV was utilized to reduce the radiation dose to as low as reasonably   achievable.; CT of the abdomen and pelvis was performed without the   administration of intravenous contrast. Multiplanar reformatted images are   provided for review.  Automated exposure control, iterative reconstruction,   and/or weight based adjustment of the mA/kV was utilized to reduce the   radiation dose to as low as reasonably achievable.       COMPARISON:   11/19/2020       HISTORY:   ORDERING SYSTEM PROVIDED HISTORY: Diffuse large B-cell lymphoma, unspecified   body region [facility])   TECHNOLOGIST PROVIDED HISTORY: Reason for Exam: Diffuse large B-cell lymphoma   Additional signs and symptoms: Patient states cancer check;  SOB, nausea,   diarrhea, and fatigue   Relevant Medical/Surgical History: Patient states cancer check;  SOB, nausea,   diarrhea, and fatigue; ORDERING SYSTEM PROVIDED HISTORY: Diffuse large B-cell   lymphoma, unspecified body region [facility])   TECHNOLOGIST PROVIDED HISTORY:   Additional Contrast?->None   Reason for exam:->r/o recurrence   Reason for Exam: Diffuse large B-cell lymphoma   Additional signs and symptoms: Patient states cancer check;  SOB, nausea,   diarrhea, and fatigue   Relevant Medical/Surgical History: Patient states cancer check;  SOB, nausea,   diarrhea, and fatigue       FINDINGS:       Chest:       Mediastinum: Moderate coronary artery calcification.  Heart size is normal.   No pericardial effusion.  No lymphadenopathy.       Lungs/pleura: Masslike focus of consolidation in the right upper lobe   measuring 6.7 x 5.5 cm on series 4, image 49, new from prior, with broad   costal pleural base.  No pleural effusion or pneumothorax.       Soft Tissues/Bones: No suspicious bony lesion.           Abdomen/Pelvis:       Organs: Liver is normal in contour and attenuation.  Cholelithiasis.  No   biliary ductal diltation.  Pancreas is unremarkable.  Adrenals are   unremarkable.  Spleen is normal in size.  Atrophy of right kidney.  No renal   calculi or hydronephrosis.  Vasculature is unremarkable.       GI/Bowel: Bowel is non-dilated without wall thickening.  Appendix is not seen   though there are no secondary signs of appendicitis.       Pelvis: Fibroid uterus       Peritoneum/Retroperitoneum:No free fluid, free air, organized fluid   collection or lymphadenopathy.       Bones: No suspicious bony lesion.  Mild compression deformity of L4 of   uncertain chronicity, new since 11/19/2020.           Impression   1.  Masslike focus of consolidation in the right upper lobe measuring 6.7 cm   with broad costal pleural base.  Differential diagnosis includes pulmonary   parenchymal involvement by lymphoma, primary lung cancer, or pneumonia. Recommend short interval follow-up chest CT versus percutaneous tissue   sampling. 2. Cholelithiasis. 3. Mild compression deformity of L4 of uncertain chronicity, new since   11/19/2020.             Order History    Open Order Details     PACS Images                            Assessment:     1. RUL pneumonia vs lymphoma  2. Non hodgkin lymphoma,s/p chemo and RT          Plan:     1. D/w pt  2. o2 adm as needed  3. Bd rx as needed  4. Continue antibx  5. The ct chest finding can be because of pneumonia vs lymphoma. Will request IR to do ct guided bx  6.  Thanks will followe

## 2022-05-15 NOTE — CONSULTS
Hematology/Oncology  Consult Note      Reason for Consult:  H/O NHL  Requesting Physician:  hospitalist    CHIEF COMPLAINT:  Drenching night sweat, fatigue and cough    History Obtained From:  patient, electronic medical record    HISTORY OF PRESENT ILLNESS:      The patient is a 77 y.o. female with significant past medical history of NHL s/p chemo and RT in 2020 who presents with above. She had progressive SOB ass with night sweat and cough, and went to PCP, who initially told her that she has allergies. Eventually she called my office, hence I ordered CT chest, abdomen pelvis. After reviewing her CT scanning, I instructed her to go to the emergency room prescribed antibiotic. I initially she refused to go to the emergency room, but decided eventually to come to the hospital.  She stated she has whitish phlegm. She has discomfort to the posterior right chest.  CT CAP 5/12/2022:  1. Masslike focus of consolidation in the right upper lobe measuring 6.7 cm with broad costal pleural base.  Differential diagnosis includes pulmonary parenchymal involvement by lymphoma, primary lung cancer, or pneumonia. Recommend short interval follow-up chest CT versus percutaneous tissue sampling. 2. Cholelithiasis. 3. Mild compression deformity of L4 of uncertain chronicity, new since  11/19/2020. On admission she was found to have leukocytosis, anemia, tachycardia and hypotension. She was placed on antibiotic. Stool occult was ordered. This morning she states that her blood pressure has been improving. She still feels tired. I will have pulmonology evaluation.     Past Medical History:        Diagnosis Date    B-cell lymphoma ([address]     Bipolar 1 disorder ([address]     \"on Depakote for this\"    History of external beam radiation therapy 2020    Pelvis 3,600 cGy per  at [address] Hx of echocardiogram 03/17/2021    Left ventricular function is normal, EF is estimated at 55-60%,mitral valve appears to slightly (GLYCOLAX) packet 17 g, 17 g, Oral, Daily PRN  acetaminophen (TYLENOL) tablet 650 mg, 650 mg, Oral, Q6H PRN **OR** acetaminophen (TYLENOL) suppository 650 mg, 650 mg, Rectal, Q6H PRN  potassium chloride (KLOR-CON M) extended release tablet 40 mEq, 40 mEq, Oral, PRN **OR** potassium bicarb-citric acid (EFFER-K) effervescent tablet 40 mEq, 40 mEq, Oral, PRN **OR** potassium chloride 10 mEq/100 mL IVPB (Peripheral Line), 10 mEq, IntraVENous, PRN  magnesium sulfate 2000 mg in 50 mL IVPB premix, 2,000 mg, IntraVENous, PRN  cefepime (MAXIPIME) 2000 mg IVPB minibag, 2,000 mg, IntraVENous, Q12H  enoxaparin Sodium (LOVENOX) injection 30 mg, 30 mg, SubCUTAneous, Nightly    Allergies:  Abilify [aripiprazole], Iodine, Penicillins, and Codeine    Social History:    TOBACCO:   reports that she quit smoking about 34 years ago. Her smoking use included cigarettes. She has a 10.00 pack-year smoking history. She has never used smokeless tobacco.  ETOH:   reports no history of alcohol use. DRUGS:   reports no history of drug use. Family History:       Problem Relation Age of Onset    Breast Cancer Mother     High Blood Pressure Mother     Cancer Father         \"cancer in the bowel\"    Ovarian Cancer Neg Hx      REVIEW OF SYSTEMS:    No NVD. No fever or chills. No acute pain. The remainder of ROS is unremarkable. PHYSICAL EXAM:      Vitals:    BP 97/67   Pulse 91   Temp 98.7 °F (37.1 °C) (Oral)   Resp 16   Ht 5' 3\" (1.6 m)   Wt 109 lb 3.2 oz (49.5 kg)   LMP 01/11/2000   SpO2 95%   BMI 19.34 kg/m²     CONSTITUTIONAL:  awake, alert, cooperative and no apparent distress  EYES:  extra-ocular muscles intact and sclera clear.  + pallor  NECK:  supple, symmetrical, trachea midline and no lymphadenopathy  LUNGS:  clear to auscultation, no crackles or wheezing  CARDIOVASCULAR:  normal S1 and S2 and no murmur noted  ABDOMEN:  normal bowel sounds, soft, non-distended and non-tender  MUSCULOSKELETAL:  there is no redness, warmth, or swelling of the joints  full range of motion noted  NEUROLOGIC:  cranial nerves II-XII are grossly intact  SKIN:  no rashes and no jaundice  DATA:    Labs:  General Labs:    CBC with Differential:    Lab Results   Component Value Date    WBC 11.6 05/15/2022    RBC 3.14 05/15/2022    HGB 9.7 05/15/2022    HCT 29.0 05/15/2022     05/15/2022    MCV 92.4 05/15/2022    MCH 30.9 05/15/2022    MCHC 33.4 05/15/2022    RDW 13.9 05/15/2022    NRBC 2 02/21/2020    SEGSPCT 88.0 05/14/2022    BANDSPCT 12 05/13/2022    LYMPHOPCT 11.0 05/14/2022    MONOPCT 1.0 05/14/2022    MYELOPCT 2 02/21/2020    BASOPCT 0.1 05/09/2022    MONOSABS 0.1 05/14/2022    LYMPHSABS 1.5 05/14/2022    EOSABS 0.0 05/09/2022    BASOSABS 0.0 05/09/2022    DIFFTYPE MANUAL DIFFERENTIAL 05/14/2022     CMP:    Lab Results   Component Value Date     05/15/2022    K 4.0 05/15/2022     05/15/2022    CO2 20 05/15/2022    BUN 9 05/15/2022    CREATININE 0.8 05/15/2022    GFRAA >60 05/15/2022    LABGLOM >60 05/15/2022    GLUCOSE 85 05/15/2022    PROT 6.6 05/13/2022    LABALBU 3.1 05/13/2022    CALCIUM 8.5 05/15/2022    BILITOT 0.7 05/13/2022    ALKPHOS 104 05/13/2022    AST 19 05/13/2022    ALT 15 05/13/2022     5/15/2022  Ferritin: 3,107 (H) Iron: 36 (L) TIBC: 125 (L) Transferrin %: 29  FOLATE: 14.8  Vitamin B-12: >2000 (H)  Retic Ct Pct: 1.4    LDH in January 2022 was wnl. IMPRESSION/RECOMMENDATIONS:    1. 1. She was diagnosed with diffuse large B-cell lymphoma, no double hit. She completed chemotherapy on June 5, 2020. PET CT scan on 6/18/2020 showed response, still with intense uptake in the right hemipelvis measuring approximately 2.8 x 2.8 cm and demonstrating maximum SUV of 8.0. She completed pelvic radiation on 8/26/2020. PET scan in November 2020 showed remission. Will check LDH an AM.. 2. She has Right lung consolidation. On IV antibiotic.   Will have pulmonologist evaluation for potential bronchoscopy vs IR bx.    3. Anemia is likely related to chronic dx. To continue present care. Thanks.

## 2022-05-15 NOTE — PROGRESS NOTES
V2.0  [facility] Hospitalist Progress Note      Name:  Raffi Aranda /Age/Sex: 1955  (77 y.o. female)   MRN & CSN:  [id number] & [id number] Encounter Date/Time: 5/15/2022 11:16 AM EDT    Location:  [id number]-[id number] PCP: Stephanie Juarez MD       Hospital Day: 3    Assessment and Plan:   Raffi Aranda is a 77 y.o. female with pmh of B-cell lymphoma (status post chemoradiation about 2 years ago) who presents with increasing shortness of breath, intermittent dry cough, chills and poor appetite for 2 weeks. 1.  Right upper lobe consolidation: CT chest and abdomen/pelvis revealed right upper lobe consolidation which may be pneumonia, lymphoma or primary lung cancer. Blood culture-strep pneumonia. Treating as  pneumonia-currently on vancomycin and cefepime-we will discontinue vancomycin. IR for lung biopsy. 2.  Acute on chronic normocytic anemia: No overt bleeding. Send anemia is suggestive of chronic disease. Received 1 unit of PRBC on . Monitor H&H-keep hemoglobin above 7. Hematology consult appreciated. 3.  B-cell lymphoma: Chemoradiation therapy about 2 years ago. Patient has been having night sweats. CT chest showed right upper lobe consolidation. Oncology consult appreciated. 4.  Hypotension: Due to anemia. Hold metoprolol. Blood transfusion. Chronic problems include moderate to severe TR, possible osteoporosis and bipolar disorder. DVT prophylaxis: Lovenox for now  Disposition: She lives with granddaughter. Normally, she ambulate freely. Diet ADULT DIET;  Regular  ADULT ORAL NUTRITION SUPPLEMENT; Lunch; Frozen Oral Supplement  ADULT ORAL NUTRITION SUPPLEMENT; Dinner; Standard High Calorie/High Protein Oral Supplement   DVT Prophylaxis [x] Lovenox, []  Heparin, [] SCDs, [] Ambulation,  [] Eliquis, [] Xarelto  [] Coumadin   Code Status Full Code   Disposition From: Home  Expected Disposition: Home  Estimated Date of Discharge: 2 to 3 days  Patient requires continued admission due to pneumonia and severe anemia. Surrogate Decision Maker/ POA      Subjective:     Chief Complaint: Shortness of Breath (sent by PCP about abnormal CT results on 5/12)       Guilherme Lamb is a 77 y.o. female who presents with shortness of breath, dry cough, chills and poor appetite. Patient feels stronger. No fever or chills. Review of Systems:    Review of Systems    Nothing significant. Objective: Intake/Output Summary (Last 24 hours) at 5/15/2022 1349  Last data filed at 5/15/2022 0923  Gross per 24 hour   Intake 5 ml   Output    Net 5 ml        Vitals:   Vitals:    05/15/22 0915   BP: 92/64   Pulse: 98   Resp: 16   Temp: 98.3 °F (36.8 °C)   SpO2: 92%       Physical Exam:   General: No apparent respiratory distress. Generally weak. Eyes: EOMI. Anicteric. ENT: neck supple  Cardiovascular: Regular rate. No murmur or S3. Respiratory: Clear to auscultation  Gastrointestinal: Soft, non tender, no palpable mass. Genitourinary: no suprapubic tenderness  Musculoskeletal: No edema  Skin: warm, dry  Neuro: Alert. Oriented x3. No gross focal weakness. Psych: Mood appropriate.      Medications:   Medications:    pantoprazole  40 mg IntraVENous BID    vancomycin  1,000 mg IntraVENous Q24H    calcium elemental  500 mg Oral Daily    divalproex  1,000 mg Oral Nightly    omega-3 acid ethyl esters  1,000 mg Oral Daily    Vitamin D  2,000 Units Oral Daily    cetirizine  5 mg Oral Daily    [Held by provider] metoprolol tartrate  25 mg Oral BID    teriparatide  20 mcg SubCUTAneous Daily    sodium chloride flush  5-40 mL IntraVENous 2 times per day    cefepime  2,000 mg IntraVENous Q12H    enoxaparin  30 mg SubCUTAneous Nightly      Infusions:    sodium chloride      sodium chloride 500 mL (05/14/22 0341)     PRN Meds: sodium chloride, , PRN  loperamide, 2 mg, 4x Daily PRN  sodium chloride flush, 5-40 mL, PRN  sodium chloride, 500 mL, PRN  ondansetron, 4 mg, Q8H PRN   Or  ondansetron, 4 mg, Q6H PRN  polyethylene glycol, 17 g, Daily PRN  acetaminophen, 650 mg, Q6H PRN   Or  acetaminophen, 650 mg, Q6H PRN  potassium chloride, 40 mEq, PRN   Or  potassium alternative oral replacement, 40 mEq, PRN   Or  potassium chloride, 10 mEq, PRN  magnesium sulfate, 2,000 mg, PRN        Labs      Recent Results (from the past 24 hour(s))   Hemoglobin and Hematocrit    Collection Time: 05/14/22  4:48 PM   Result Value Ref Range    Hemoglobin 9.1 (L) 12.5 - 16.0 GM/DL    Hematocrit 26.7 (L) 37 - 47 %   CBC    Collection Time: 05/15/22 12:03 AM   Result Value Ref Range    WBC 11.6 (H) 4.0 - 10.5 K/CU MM    RBC 3.14 (L) 4.2 - 5.4 M/CU MM    Hemoglobin 9.7 (L) 12.5 - 16.0 GM/DL    Hematocrit 29.0 (L) 37 - 47 %    MCV 92.4 78 - 100 FL    MCH 30.9 27 - 31 PG    MCHC 33.4 32.0 - 36.0 %    RDW 13.9 11.7 - 14.9 %    Platelets 050 223 - 258 K/CU MM    MPV 9.8 7.5 - 11.1 FL   Basic Metabolic Panel    Collection Time: 05/15/22 12:03 AM   Result Value Ref Range    Sodium 141 135 - 145 MMOL/L    Potassium 4.0 3.5 - 5.1 MMOL/L    Chloride 110 99 - 110 mMol/L    CO2 20 (L) 21 - 32 MMOL/L    Anion Gap 11 4 - 16    BUN 9 6 - 23 MG/DL    CREATININE 0.8 0.6 - 1.1 MG/DL    Glucose 85 70 - 99 MG/DL    Calcium 8.5 8.3 - 10.6 MG/DL    GFR Non-African American >60 >60 mL/min/1.73m2    GFR African American >60 >60 mL/min/1.73m2   Reticulocytes    Collection Time: 05/15/22 12:03 AM   Result Value Ref Range    Retic Ct Pct 1.4 0.2 - 2.20 %   Ferritin    Collection Time: 05/15/22 12:03 AM   Result Value Ref Range    Ferritin 3,107 (H) 15 - 150 NG/ML   Iron and TIBC    Collection Time: 05/15/22 12:03 AM   Result Value Ref Range    Iron 36 (L) 37 - 145 ug/dL    UIBC 89 (L) 110 - 370 ug/dL    TIBC 125 (L) 250 - 450 ug/dL    Transferrin % 29 10 - 44 %   Vitamin B12 & Folate    Collection Time: 05/15/22 12:03 AM   Result Value Ref Range    Vitamin B-12 >2000 (H) 211 - 911 pg/ml    Folate 14.8 3.1 - 17.5 NG/ML Imaging/Diagnostics Last 24 Hours   XR CHEST PORTABLE    Result Date: 5/13/2022  EXAMINATION: ONE XRAY VIEW OF THE CHEST 5/13/2022 4:18 pm COMPARISON: 02/18/2020 HISTORY: ORDERING SYSTEM PROVIDED HISTORY: sob TECHNOLOGIST PROVIDED HISTORY: Reason for exam:->sob Reason for Exam: sob Additional signs and symptoms: NA Relevant Medical/Surgical History: b-cell lymphoma FINDINGS: Overlying right chest port and right axillary clips. Cardiomediastinal silhouette appears within normal limits. Focal consolidation present in the right upper lobe. Costophrenic angles are sharp. No evidence of pneumothorax. No acute osseous abnormalities. Focal consolidation present in the right upper lobe.        Electronically signed by Carolina Taylor MD on 5/15/2022 at 1:49 PM

## 2022-05-16 LAB
ANION GAP SERPL CALCULATED.3IONS-SCNC: 11 MMOL/L (ref 4–16)
APTT: 41 SECONDS (ref 25.1–37.1)
BUN BLDV-MCNC: 9 MG/DL (ref 6–23)
CALCIUM SERPL-MCNC: 8.7 MG/DL (ref 8.3–10.6)
CHLORIDE BLD-SCNC: 109 MMOL/L (ref 99–110)
CO2: 20 MMOL/L (ref 21–32)
CREAT SERPL-MCNC: 0.7 MG/DL (ref 0.6–1.1)
EKG ATRIAL RATE: 104 BPM
EKG DIAGNOSIS: NORMAL
EKG P AXIS: 54 DEGREES
EKG P-R INTERVAL: 130 MS
EKG Q-T INTERVAL: 316 MS
EKG QRS DURATION: 72 MS
EKG QTC CALCULATION (BAZETT): 415 MS
EKG R AXIS: 55 DEGREES
EKG T AXIS: 59 DEGREES
EKG VENTRICULAR RATE: 104 BPM
GFR AFRICAN AMERICAN: >60 ML/MIN/1.73M2
GFR NON-AFRICAN AMERICAN: >60 ML/MIN/1.73M2
GLUCOSE BLD-MCNC: 88 MG/DL (ref 70–99)
HCT VFR BLD CALC: 27.8 % (ref 37–47)
HEMOCCULT SP1 STL QL: NEGATIVE
HEMOGLOBIN: 9.2 GM/DL (ref 12.5–16)
INR BLD: 1.02 INDEX
LACTATE DEHYDROGENASE: 168 IU/L (ref 120–246)
MCH RBC QN AUTO: 31.1 PG (ref 27–31)
MCHC RBC AUTO-ENTMCNC: 33.1 % (ref 32–36)
MCV RBC AUTO: 93.9 FL (ref 78–100)
PDW BLD-RTO: 14.2 % (ref 11.7–14.9)
PLATELET # BLD: 341 K/CU MM (ref 140–440)
PMV BLD AUTO: 9.4 FL (ref 7.5–11.1)
POTASSIUM SERPL-SCNC: 3.6 MMOL/L (ref 3.5–5.1)
PROTHROMBIN TIME: 13.1 SECONDS (ref 11.7–14.5)
RBC # BLD: 2.96 M/CU MM (ref 4.2–5.4)
SODIUM BLD-SCNC: 140 MMOL/L (ref 135–145)
WBC # BLD: 7.4 K/CU MM (ref 4–10.5)

## 2022-05-16 PROCEDURE — 85610 PROTHROMBIN TIME: CPT

## 2022-05-16 PROCEDURE — 85018 HEMOGLOBIN: CPT

## 2022-05-16 PROCEDURE — 83615 LACTATE (LD) (LDH) ENZYME: CPT

## 2022-05-16 PROCEDURE — 85027 COMPLETE CBC AUTOMATED: CPT

## 2022-05-16 PROCEDURE — 1200000000 HC SEMI PRIVATE

## 2022-05-16 PROCEDURE — 94761 N-INVAS EAR/PLS OXIMETRY MLT: CPT

## 2022-05-16 PROCEDURE — 85014 HEMATOCRIT: CPT

## 2022-05-16 PROCEDURE — 36592 COLLECT BLOOD FROM PICC: CPT

## 2022-05-16 PROCEDURE — C9113 INJ PANTOPRAZOLE SODIUM, VIA: HCPCS | Performed by: INTERNAL MEDICINE

## 2022-05-16 PROCEDURE — 6360000002 HC RX W HCPCS: Performed by: INTERNAL MEDICINE

## 2022-05-16 PROCEDURE — 80048 BASIC METABOLIC PNL TOTAL CA: CPT

## 2022-05-16 PROCEDURE — 99232 SBSQ HOSP IP/OBS MODERATE 35: CPT | Performed by: INTERNAL MEDICINE

## 2022-05-16 PROCEDURE — 6370000000 HC RX 637 (ALT 250 FOR IP): Performed by: INTERNAL MEDICINE

## 2022-05-16 PROCEDURE — 93010 ELECTROCARDIOGRAM REPORT: CPT | Performed by: INTERNAL MEDICINE

## 2022-05-16 PROCEDURE — 85730 THROMBOPLASTIN TIME PARTIAL: CPT

## 2022-05-16 PROCEDURE — 2580000003 HC RX 258: Performed by: INTERNAL MEDICINE

## 2022-05-16 RX ORDER — ENOXAPARIN SODIUM 100 MG/ML
40 INJECTION SUBCUTANEOUS NIGHTLY
Status: DISCONTINUED | OUTPATIENT
Start: 2022-05-16 | End: 2022-05-18 | Stop reason: HOSPADM

## 2022-05-16 RX ADMIN — OMEGA-3-ACID ETHYL ESTERS 1000 MG: 1 CAPSULE, LIQUID FILLED ORAL at 08:48

## 2022-05-16 RX ADMIN — CALCIUM 500 MG: 500 TABLET ORAL at 08:48

## 2022-05-16 RX ADMIN — PANTOPRAZOLE SODIUM 40 MG: 40 INJECTION, POWDER, LYOPHILIZED, FOR SOLUTION INTRAVENOUS at 09:32

## 2022-05-16 RX ADMIN — Medication 2000 UNITS: at 08:48

## 2022-05-16 RX ADMIN — ENOXAPARIN SODIUM 40 MG: 100 INJECTION SUBCUTANEOUS at 21:27

## 2022-05-16 RX ADMIN — CETIRIZINE HYDROCHLORIDE 5 MG: 5 TABLET ORAL at 08:47

## 2022-05-16 RX ADMIN — CEFEPIME HYDROCHLORIDE 2000 MG: 2 INJECTION, POWDER, FOR SOLUTION INTRAVENOUS at 16:03

## 2022-05-16 RX ADMIN — SODIUM CHLORIDE, PRESERVATIVE FREE 10 ML: 5 INJECTION INTRAVENOUS at 21:28

## 2022-05-16 RX ADMIN — DIVALPROEX SODIUM 1000 MG: 500 TABLET, FILM COATED, EXTENDED RELEASE ORAL at 21:27

## 2022-05-16 RX ADMIN — CEFEPIME HYDROCHLORIDE 2000 MG: 2 INJECTION, POWDER, FOR SOLUTION INTRAVENOUS at 02:52

## 2022-05-16 RX ADMIN — PANTOPRAZOLE SODIUM 40 MG: 40 INJECTION, POWDER, LYOPHILIZED, FOR SOLUTION INTRAVENOUS at 21:59

## 2022-05-16 RX ADMIN — SODIUM CHLORIDE, PRESERVATIVE FREE 10 ML: 5 INJECTION INTRAVENOUS at 09:32

## 2022-05-16 NOTE — PROGRESS NOTES
pulmonary      SUBJECTIVE: seen early am and doing well     OBJECTIVE    VITALS:  BP (!) 92/58   Pulse 80   Temp 98.1 °F (36.7 °C) (Oral)   Resp 20   Ht 5' 3\" (1.6 m)   Wt 121 lb (54.9 kg)   LMP 01/11/2000   SpO2 98%   BMI 21.43 kg/m²   HEAD AND FACE EXAM:  No throat injection, no active exudate,no thrush  NECK EXAM;No JVD, no masses, symmetrical  CHEST EXAM; Expansion equal and symmetrical, no masses  LUNG EXAM; Good breath sounds bilaterally. There are expiratory wheezes both lungs, there are crackles at both lung bases  CARDIOVASCULAR EXAM: Positive S1 and S2, no S3 or S4, no clicks ,no murmurs  RIGHT AND LEFT LOWER EXTRIMITY EXAM: No edema, no swelling, no inflamation  CNS EXAM: Alert and oriented X3          LABS   Lab Results   Component Value Date    WBC 7.4 05/16/2022    HGB 9.2 (L) 05/16/2022    HCT 27.8 (L) 05/16/2022    MCV 93.9 05/16/2022     05/16/2022     Lab Results   Component Value Date    CREATININE 0.7 05/16/2022    BUN 9 05/16/2022     05/16/2022    K 3.6 05/16/2022     05/16/2022    CO2 20 (L) 05/16/2022     Lab Results   Component Value Date    INR 1.02 05/16/2022    PROTIME 13.1 05/16/2022          Lab Results   Component Value Date    PHOS 3.2 04/13/2021    PHOS 2.7 03/05/2020    PHOS 2.6 03/04/2020      No results for input(s): PH, PO2ART, LNS0NPT, HCO3, BEART, O2SAT in the last 72 hours.       Wt Readings from Last 3 Encounters:   05/16/22 121 lb (54.9 kg)   05/09/22 109 lb 9.6 oz (49.7 kg)   02/21/22 117 lb 6.4 oz (53.3 kg)               ASSESMENT  ru pneumonia vs lymphoma        PLAN  1. antibx  2. Ct guided bx requested    5/16/2022  Keyshawn Hernandez MD, M.D.

## 2022-05-16 NOTE — CONSULTS
LOVENOX PROPHYLAXIS EVALUATION DOSE ADJUSTMENT    Wt Readings from Last 3 Encounters:   05/16/22 121 lb (54.9 kg)   05/09/22 109 lb 9.6 oz (49.7 kg)   02/21/22 117 lb 6.4 oz (53.3 kg)       Estimated Creatinine Clearance: 65 mL/min (based on SCr of 0.7 mg/dL). Recent Labs     05/14/22  0504 05/14/22  1648 05/15/22  0003 05/15/22  1720 05/16/22  0245 05/16/22  0938   BUN 10  --  9  --  9  --    CREATININE 0.8  --  0.8  --  0.7  --      --  373  --  341  --    HGB 6.6*   < > 9.7*   < > 9.2*  --    HCT 19.7*   < > 29.0*   < > 27.8*  --    INR  --   --   --   --   --  1.02    < > = values in this interval not displayed. Weight Range (kg): .9 kg    CRCL = 30 or greater     50.9 kg   and below     .9  kg   101-150.9 kg   151-174.9  kg   175 kg  or greater     30mg subq  daily     40mg subq daily    (or 30mg subq BID orthopedic)     30mg subq  BID   40mg subq  BID   60mg subq BID       Per P/T protocol for appropriate subq anticoagulation by weight and CRCL change to:  Enoxparin 40mg subq daily    Gloria Rasmussen.  Ronaldo Hollingsworth, [address] [place] [address]  11:21 AM  05/16/22

## 2022-05-16 NOTE — CARE COORDINATION
Pt admitted from home, lives with granddghtr. Pt remains ind with ADLs (per IDR pt ambulating in room freely). Pt has pcp/ active insurance. Attempted to call pt to initiate discharge planning- there was no answer. CM to follow up.

## 2022-05-16 NOTE — PROGRESS NOTES
[place]2.[place]  [facility] [place]      Name:  Abraham Messer /Age/Sex: 1955  (77 y.o. female)   MRN & CSN:  [id number] & [id number] Encounter Date/Time: 2022 11:16 AM EDT    Location:  7642/2168-Y PCP: Sandra Jorge MD       Hospital Day: 4    Assessment and Plan:   Abraham Messer is a 77 y.o. female with pmh of B-cell lymphoma (status post chemoradiation about 2 years ago) who presents with increasing shortness of breath, intermittent dry cough, chills and poor appetite for 2 weeks. 1.  Right upper lobe consolidation: CT chest and abdomen/pelvis revealed right upper lobe consolidation which may be pneumonia, lymphoma or primary lung cancer. Blood culture-strep pneumonia. Treating as  pneumonia-currently on cefepime (status post vancomycin). IR for lung biopsy possibly today. 2.  Acute on chronic normocytic anemia: No overt bleeding. Send anemia is suggestive of chronic disease. Received 1 unit of PRBC on . Monitor H&H-keep hemoglobin above 7. Hematology consult appreciated. 3.  B-cell lymphoma: Chemoradiation therapy about 2 years ago. Patient has been having night sweats. CT chest showed right upper lobe consolidation. Oncology consult appreciated. 4.  Hypotension: Due to anemia. Hold metoprolol. Blood transfusion. Chronic problems include moderate to severe TR, possible osteoporosis and bipolar disorder. DVT prophylaxis: Lovenox for now  Disposition: She lives with granddaughter. Normally, she ambulate freely. Diet ADULT DIET;  Regular  ADULT ORAL NUTRITION SUPPLEMENT; Lunch; Frozen Oral Supplement  ADULT ORAL NUTRITION SUPPLEMENT; Dinner; Standard High Calorie/High Protein Oral Supplement   DVT Prophylaxis [x] Lovenox, []  Heparin, [] SCDs, [] Ambulation,  [] Eliquis, [] Xarelto  [] Coumadin   Code Status Full Code   Disposition From: Home  Expected Disposition: Home  Estimated Date of Discharge: 2 to 3 days  Patient requires continued admission due to pneumonia and severe anemia. Surrogate Decision Maker/ POA      Subjective:     Chief Complaint: Shortness of Breath (sent by PCP about abnormal CT results on 5/12)       Joan Rosario is a 77 y.o. female who presents with shortness of breath, dry cough, chills and poor appetite. Daughter at bedside. Patient is feeling stronger. Review of Systems:    Review of Systems    Nothing significant. Objective: Intake/Output Summary (Last 24 hours) at 5/16/2022 1356  Last data filed at 5/16/2022 0353  Gross per 24 hour   Intake 50 ml   Output    Net 50 ml        Vitals:   Vitals:    05/16/22 0845   BP: (!) 92/58   Pulse: 80   Resp: 20   Temp: 98.1 °F (36.7 °C)   SpO2: 98%       Physical Exam:   General: No apparent respiratory distress. Generally weak. Eyes: EOMI. Anicteric. ENT: neck supple  Cardiovascular: Regular rate. No murmur or S3. Respiratory: Clear to auscultation  Gastrointestinal: Soft, non tender, no palpable mass. Genitourinary: no suprapubic tenderness  Musculoskeletal: No edema  Skin: warm, dry  Neuro: Alert. Oriented x3. No gross focal weakness. Psych: Mood appropriate.      Medications:   Medications:    enoxaparin  40 mg SubCUTAneous Nightly    pantoprazole  40 mg IntraVENous BID    calcium elemental  500 mg Oral Daily    divalproex  1,000 mg Oral Nightly    omega-3 acid ethyl esters  1,000 mg Oral Daily    Vitamin D  2,000 Units Oral Daily    cetirizine  5 mg Oral Daily    [Held by provider] metoprolol tartrate  25 mg Oral BID    teriparatide  20 mcg SubCUTAneous Daily    sodium chloride flush  5-40 mL IntraVENous 2 times per day    cefepime  2,000 mg IntraVENous Q12H      Infusions:    sodium chloride      sodium chloride 500 mL (05/14/22 0341)     PRN Meds: sodium chloride, , PRN  loperamide, 2 mg, 4x Daily PRN  sodium chloride flush, 5-40 mL, PRN  sodium chloride, 500 mL, PRN  ondansetron, 4 mg, Q8H PRN   Or  ondansetron, 4 mg, Q6H PRN  polyethylene glycol, 17 g, Daily PRN  acetaminophen, 650 mg, Q6H PRN   Or  acetaminophen, 650 mg, Q6H PRN  potassium chloride, 40 mEq, PRN   Or  potassium alternative oral replacement, 40 mEq, PRN   Or  potassium chloride, 10 mEq, PRN  magnesium sulfate, 2,000 mg, PRN        Labs      Recent Results (from the past 24 hour(s))   Hemoglobin and Hematocrit    Collection Time: 05/15/22  5:20 PM   Result Value Ref Range    Hemoglobin 9.0 (L) 12.5 - 16.0 GM/DL    Hematocrit 27.0 (L) 37 - 47 %   Hemoglobin and Hematocrit    Collection Time: 05/15/22 10:57 PM   Result Value Ref Range    Hemoglobin 9.1 (L) 12.5 - 16.0 GM/DL    Hematocrit 27.4 (L) 37 - 47 %   Basic Metabolic Panel    Collection Time: 05/16/22  2:45 AM   Result Value Ref Range    Sodium 140 135 - 145 MMOL/L    Potassium 3.6 3.5 - 5.1 MMOL/L    Chloride 109 99 - 110 mMol/L    CO2 20 (L) 21 - 32 MMOL/L    Anion Gap 11 4 - 16    BUN 9 6 - 23 MG/DL    CREATININE 0.7 0.6 - 1.1 MG/DL    Glucose 88 70 - 99 MG/DL    Calcium 8.7 8.3 - 10.6 MG/DL    GFR Non-African American >60 >60 mL/min/1.73m2    GFR African American >60 >60 mL/min/1.73m2   CBC    Collection Time: 05/16/22  2:45 AM   Result Value Ref Range    WBC 7.4 4.0 - 10.5 K/CU MM    RBC 2.96 (L) 4.2 - 5.4 M/CU MM    Hemoglobin 9.2 (L) 12.5 - 16.0 GM/DL    Hematocrit 27.8 (L) 37 - 47 %    MCV 93.9 78 - 100 FL    MCH 31.1 (H) 27 - 31 PG    MCHC 33.1 32.0 - 36.0 %    RDW 14.2 11.7 - 14.9 %    Platelets 094 237 - 965 K/CU MM    MPV 9.4 7.5 - 11.1 FL   Lactate Dehydrogenase    Collection Time: 05/16/22  2:45 AM   Result Value Ref Range     120 - 246 IU/L   Protime/INR & PTT    Collection Time: 05/16/22  9:38 AM   Result Value Ref Range    Protime 13.1 11.7 - 14.5 SECONDS    INR 1.02 INDEX    aPTT 41.0 (H) 25.1 - 37.1 SECONDS        Imaging/Diagnostics Last 24 Hours   XR CHEST PORTABLE    Result Date: 5/13/2022  EXAMINATION: ONE XRAY VIEW OF THE CHEST 5/13/2022 4:18 pm COMPARISON: 02/18/2020 HISTORY: ORDERING SYSTEM PROVIDED HISTORY: sob TECHNOLOGIST PROVIDED HISTORY: Reason for exam:->sob Reason for Exam: sob Additional signs and symptoms: NA Relevant Medical/Surgical History: b-cell lymphoma FINDINGS: Overlying right chest port and right axillary clips. Cardiomediastinal silhouette appears within normal limits. Focal consolidation present in the right upper lobe. Costophrenic angles are sharp. No evidence of pneumothorax. No acute osseous abnormalities. Focal consolidation present in the right upper lobe.        Electronically signed by Brett Hinton MD on 5/16/2022 at 1:56 PM

## 2022-05-16 NOTE — PROGRESS NOTES
Hematology/Oncology  Progress Note        HISTORY OF PRESENT ILLNESS:      The patient is a 77 y.o. female with significant past medical history of NHL s/p chemo and RT in 2020 who presents with above. She had progressive SOB ass with night sweat and cough, and went to PCP, who initially told her that she has allergies. Eventually she called my office, hence I ordered CT chest, abdomen pelvis. After reviewing her CT scanning, I instructed her to go to the emergency room prescribed antibiotic. I initially she refused to go to the emergency room, but decided eventually to come to the hospital.  She stated she has whitish phlegm. She has discomfort to the posterior right chest.  CT CAP 5/12/2022:  1. Masslike focus of consolidation in the right upper lobe measuring 6.7 cm with broad costal pleural base.  Differential diagnosis includes pulmonary parenchymal involvement by lymphoma, primary lung cancer, or pneumonia. Recommend short interval follow-up chest CT versus percutaneous tissue sampling. 2. Cholelithiasis. 3. Mild compression deformity of L4 of uncertain chronicity, new since  11/19/2020. On admission she was found to have leukocytosis, anemia, tachycardia and hypotension. She was placed on antibiotic. Stool occult was ordered. This morning she states that her blood pressure has been improving. She still feels tired. I will have pulmonology evaluation. On 5/16/2022 she felt better. Night sweat has improved. Chest pain resolved. I appreciated pulmonologist.  CXR 5/13/2022 : Focal consolidation present in the right upper lobe. PHYSICAL EXAM:      Vitals:    /67   Pulse 93   Temp 97.9 °F (36.6 °C) (Oral)   Resp 20   Ht 5' 3\" (1.6 m)   Wt 121 lb (54.9 kg)   LMP 01/11/2000   SpO2 99%   BMI 21.43 kg/m²     CONSTITUTIONAL:  awake, alert, cooperative and no apparent distress  EYES:  extra-ocular muscles intact and sclera clear.  + pallor  NECK:  supple, symmetrical, trachea midline and no lymphadenopathy  LUNGS:  clear to auscultation, no crackles or wheezing  CARDIOVASCULAR:  normal S1 and S2 and no murmur noted  ABDOMEN:  normal bowel sounds, soft, non-distended and non-tender  MUSCULOSKELETAL:  there is no redness, warmth, or swelling of the joints. Full range of motion noted  NEUROLOGIC: motor skills grossly intact. cranial nerves II-XII are grossly intact  SKIN:  no rashes and no jaundice    DATA:      CBC with Differential:    Lab Results   Component Value Date    WBC 7.4 05/16/2022    RBC 2.96 05/16/2022    HGB 9.2 05/16/2022    HCT 27.8 05/16/2022     05/16/2022    MCV 93.9 05/16/2022    MCH 31.1 05/16/2022    MCHC 33.1 05/16/2022    RDW 14.2 05/16/2022    NRBC 2 02/21/2020    SEGSPCT 88.0 05/14/2022    BANDSPCT 12 05/13/2022    LYMPHOPCT 11.0 05/14/2022    MONOPCT 1.0 05/14/2022    MYELOPCT 2 02/21/2020    BASOPCT 0.1 05/09/2022    MONOSABS 0.1 05/14/2022    LYMPHSABS 1.5 05/14/2022    EOSABS 0.0 05/09/2022    BASOSABS 0.0 05/09/2022    DIFFTYPE MANUAL DIFFERENTIAL 05/14/2022     CMP:    Lab Results   Component Value Date     05/16/2022    K 3.6 05/16/2022     05/16/2022    CO2 20 05/16/2022    BUN 9 05/16/2022    CREATININE 0.7 05/16/2022    GFRAA >60 05/16/2022    LABGLOM >60 05/16/2022    GLUCOSE 88 05/16/2022    PROT 6.6 05/13/2022    LABALBU 3.1 05/13/2022    CALCIUM 8.7 05/16/2022    BILITOT 0.7 05/13/2022    ALKPHOS 104 05/13/2022    AST 19 05/13/2022    ALT 15 05/13/2022     5/15/2022  Ferritin: 3,107 (H) Iron: 36 (L) TIBC: 125 (L) Transferrin %: 29  FOLATE: 14.8  Vitamin B-12: >2000 (H)  Retic Ct Pct: 1.4    LDH in January 2022 was wnl. IMPRESSION/RECOMMENDATIONS:    1. She was diagnosed with diffuse large B-cell lymphoma, no double hit. She completed chemotherapy on June 5, 2020. PET CT scan on 6/18/2020 showed response, still with intense uptake in the right hemipelvis measuring approximately 2.8 x 2.8 cm and demonstrating maximum SUV of 8.0.   She completed pelvic radiation on 8/26/2020. PET scan in November 2020 showed remission. LDH in May was wnl.    2. She has Right lung consolidation. On IV antibiotic. Pulmonologist is on board. Clinically improving. 3. Anemia is likely related to chronic dx. To continue present care. We will follow up. Thanks.

## 2022-05-17 ENCOUNTER — APPOINTMENT (OUTPATIENT)
Dept: CT IMAGING | Age: 67
DRG: 195 | End: 2022-05-17
Payer: MEDICARE

## 2022-05-17 LAB
ANION GAP SERPL CALCULATED.3IONS-SCNC: 11 MMOL/L (ref 4–16)
BUN BLDV-MCNC: 13 MG/DL (ref 6–23)
CALCIUM SERPL-MCNC: 8.7 MG/DL (ref 8.3–10.6)
CHLORIDE BLD-SCNC: 112 MMOL/L (ref 99–110)
CO2: 19 MMOL/L (ref 21–32)
CREAT SERPL-MCNC: 0.6 MG/DL (ref 0.6–1.1)
ERYTHROPOIETIN: 46 MU/ML (ref 4–27)
GFR AFRICAN AMERICAN: >60 ML/MIN/1.73M2
GFR NON-AFRICAN AMERICAN: >60 ML/MIN/1.73M2
GLUCOSE BLD-MCNC: 82 MG/DL (ref 70–99)
HCT VFR BLD CALC: 27.4 % (ref 37–47)
HEMOGLOBIN: 8.8 GM/DL (ref 12.5–16)
MCH RBC QN AUTO: 30.8 PG (ref 27–31)
MCHC RBC AUTO-ENTMCNC: 32.1 % (ref 32–36)
MCV RBC AUTO: 95.8 FL (ref 78–100)
PDW BLD-RTO: 14.2 % (ref 11.7–14.9)
PLATELET # BLD: 357 K/CU MM (ref 140–440)
PMV BLD AUTO: 9.5 FL (ref 7.5–11.1)
POTASSIUM SERPL-SCNC: 4 MMOL/L (ref 3.5–5.1)
RBC # BLD: 2.86 M/CU MM (ref 4.2–5.4)
SODIUM BLD-SCNC: 142 MMOL/L (ref 135–145)
WBC # BLD: 7 K/CU MM (ref 4–10.5)

## 2022-05-17 PROCEDURE — 6360000002 HC RX W HCPCS: Performed by: INTERNAL MEDICINE

## 2022-05-17 PROCEDURE — 85014 HEMATOCRIT: CPT

## 2022-05-17 PROCEDURE — 1200000000 HC SEMI PRIVATE

## 2022-05-17 PROCEDURE — 85027 COMPLETE CBC AUTOMATED: CPT

## 2022-05-17 PROCEDURE — C9113 INJ PANTOPRAZOLE SODIUM, VIA: HCPCS | Performed by: INTERNAL MEDICINE

## 2022-05-17 PROCEDURE — 2580000003 HC RX 258: Performed by: INTERNAL MEDICINE

## 2022-05-17 PROCEDURE — 80048 BASIC METABOLIC PNL TOTAL CA: CPT

## 2022-05-17 PROCEDURE — 6370000000 HC RX 637 (ALT 250 FOR IP): Performed by: INTERNAL MEDICINE

## 2022-05-17 PROCEDURE — 85018 HEMOGLOBIN: CPT

## 2022-05-17 PROCEDURE — 99232 SBSQ HOSP IP/OBS MODERATE 35: CPT | Performed by: NURSE PRACTITIONER

## 2022-05-17 PROCEDURE — 2709999900 CT LIMITED FOLLOW UP

## 2022-05-17 PROCEDURE — 94761 N-INVAS EAR/PLS OXIMETRY MLT: CPT

## 2022-05-17 RX ADMIN — PANTOPRAZOLE SODIUM 40 MG: 40 INJECTION, POWDER, LYOPHILIZED, FOR SOLUTION INTRAVENOUS at 20:28

## 2022-05-17 RX ADMIN — ONDANSETRON 4 MG: 2 INJECTION INTRAMUSCULAR; INTRAVENOUS at 20:27

## 2022-05-17 RX ADMIN — ENOXAPARIN SODIUM 40 MG: 100 INJECTION SUBCUTANEOUS at 20:26

## 2022-05-17 RX ADMIN — SODIUM CHLORIDE, PRESERVATIVE FREE 10 ML: 5 INJECTION INTRAVENOUS at 09:08

## 2022-05-17 RX ADMIN — OMEGA-3-ACID ETHYL ESTERS 1000 MG: 1 CAPSULE, LIQUID FILLED ORAL at 08:32

## 2022-05-17 RX ADMIN — SODIUM CHLORIDE, PRESERVATIVE FREE 10 ML: 5 INJECTION INTRAVENOUS at 20:27

## 2022-05-17 RX ADMIN — PANTOPRAZOLE SODIUM 40 MG: 40 INJECTION, POWDER, LYOPHILIZED, FOR SOLUTION INTRAVENOUS at 09:08

## 2022-05-17 RX ADMIN — Medication 2000 UNITS: at 08:32

## 2022-05-17 RX ADMIN — SODIUM CHLORIDE 500 ML: 9 INJECTION, SOLUTION INTRAVENOUS at 03:59

## 2022-05-17 RX ADMIN — CALCIUM 500 MG: 500 TABLET ORAL at 08:32

## 2022-05-17 RX ADMIN — ACETAMINOPHEN 650 MG: 325 TABLET ORAL at 05:25

## 2022-05-17 RX ADMIN — DIVALPROEX SODIUM 1000 MG: 500 TABLET, FILM COATED, EXTENDED RELEASE ORAL at 20:26

## 2022-05-17 RX ADMIN — CETIRIZINE HYDROCHLORIDE 5 MG: 5 TABLET ORAL at 08:32

## 2022-05-17 RX ADMIN — CEFEPIME HYDROCHLORIDE 2000 MG: 2 INJECTION, POWDER, FOR SOLUTION INTRAVENOUS at 04:00

## 2022-05-17 RX ADMIN — CEFEPIME HYDROCHLORIDE 2000 MG: 2 INJECTION, POWDER, FOR SOLUTION INTRAVENOUS at 15:18

## 2022-05-17 ASSESSMENT — PAIN SCALES - GENERAL
PAINLEVEL_OUTOF10: 6
PAINLEVEL_OUTOF10: 2
PAINLEVEL_OUTOF10: 0
PAINLEVEL_OUTOF10: 0

## 2022-05-17 ASSESSMENT — PAIN DESCRIPTION - LOCATION: LOCATION: HEAD

## 2022-05-17 ASSESSMENT — PAIN SCALES - WONG BAKER
WONGBAKER_NUMERICALRESPONSE: 0
WONGBAKER_NUMERICALRESPONSE: 2
WONGBAKER_NUMERICALRESPONSE: 0

## 2022-05-17 NOTE — PROGRESS NOTES
Received call from CT stating patients Pneumonia is improving and that they are not doing Biopsy, states they have a call in to Lori Sheth to inform, I also NAYAN Mcdonald.

## 2022-05-17 NOTE — PROGRESS NOTES
V2.0  [facility] [place] Hospitalist Progress Note      Name:  Nelida Vieira /Age/Sex: 1955  (77 y.o. female)   MRN & CSN:  [id number] & [id number] Encounter Date/Time: 2022 11:16 AM EDT    Location:  6919/6448-Y PCP: Rickie Velasco MD       Hospital Day: 5    Assessment and Plan:   Nelida Vieira is a 77 y.o. female with pmh of B-cell lymphoma (status post chemoradiation about 2 years ago) who presents with increasing shortness of breath, intermittent dry cough, chills and poor appetite for 2 weeks. 1.  Right upper lobe strep pneumonia: CT chest and abdomen/pelvis revealed right upper lobe consolidation which may be pneumonia, lymphoma or primary lung cancer. Blood culture-strep pneumonia, sensitivity is pending. Treating as  pneumonia-currently on cefepime (status post vancomycin). Lung biopsy canceled because of significant improvement in the size of consolidation. 2.  Acute on chronic normocytic anemia: No overt bleeding. Send anemia is suggestive of chronic disease. Received 1 unit of PRBC on . Monitor H&H-keep hemoglobin above 7. Hematology consult appreciated. 3.  B-cell lymphoma: Chemoradiation therapy about 2 years ago. Patient has been having night sweats. CT chest showed right upper lobe consolidation. Oncology consult appreciated. 4.  Hypotension: Due to anemia. Hold metoprolol. Blood transfusion. Chronic problems include moderate to severe TR, possible osteoporosis and bipolar disorder. DVT prophylaxis: Lovenox for now  Disposition: She lives with granddaughter. Normally, she ambulate freely. Possible discharge tomorrow if hemoglobin is stable and blood culture sensitivity result is available. Diet ADULT DIET;  Regular  ADULT ORAL NUTRITION SUPPLEMENT; Lunch; Frozen Oral Supplement  ADULT ORAL NUTRITION SUPPLEMENT; Dinner; Standard High Calorie/High Protein Oral Supplement   DVT Prophylaxis [x] Lovenox, []  Heparin, [] SCDs, [] Ambulation, [] Eliquis, [] Xarelto  [] Coumadin   Code Status Full Code   Disposition From: Home  Expected Disposition: Home  Estimated Date of Discharge: Tomorrow  Patient requires continued admission due to pneumonia and severe anemia. Surrogate Decision Maker/ POA      Subjective:     Chief Complaint: Shortness of Breath (sent by PCP about abnormal CT results on 5/12)       Joan Rosario is a 77 y.o. female who presents with shortness of breath, dry cough, chills and poor appetite. Discussed with her nurse. Lung biopsy is canceled because the size of the consolidation has reduced with IV antibiotics only. No fever or chills. She is feeling well. Review of Systems:    Review of Systems    Nothing significant. Objective:     No intake or output data in the 24 hours ending 05/17/22 1424     Vitals:   Vitals:    05/17/22 0817   BP: 104/69   Pulse: 84   Resp: 16   Temp: 97.9 °F (36.6 °C)   SpO2: 99%       Physical Exam:   General: No apparent respiratory distress. Eyes: EOMI. Anicteric. ENT: neck supple  Cardiovascular: Regular rate. No murmur or S3. Respiratory: Clear to auscultation  Gastrointestinal: Soft, non tender, no palpable mass. Genitourinary: no suprapubic tenderness  Musculoskeletal: No edema  Skin: warm, dry  Neuro: Alert. Oriented x3. No gross focal weakness. Psych: Mood appropriate.      Medications:   Medications:    enoxaparin  40 mg SubCUTAneous Nightly    pantoprazole  40 mg IntraVENous BID    calcium elemental  500 mg Oral Daily    divalproex  1,000 mg Oral Nightly    omega-3 acid ethyl esters  1,000 mg Oral Daily    Vitamin D  2,000 Units Oral Daily    cetirizine  5 mg Oral Daily    [Held by provider] metoprolol tartrate  25 mg Oral BID    teriparatide  20 mcg SubCUTAneous Daily    sodium chloride flush  5-40 mL IntraVENous 2 times per day    cefepime  2,000 mg IntraVENous Q12H      Infusions:    sodium chloride      sodium chloride 500 mL (05/17/22 0359)     PRN Meds: sodium chloride, , PRN  loperamide, 2 mg, 4x Daily PRN  sodium chloride flush, 5-40 mL, PRN  sodium chloride, 500 mL, PRN  ondansetron, 4 mg, Q8H PRN   Or  ondansetron, 4 mg, Q6H PRN  polyethylene glycol, 17 g, Daily PRN  acetaminophen, 650 mg, Q6H PRN   Or  acetaminophen, 650 mg, Q6H PRN  potassium chloride, 40 mEq, PRN   Or  potassium alternative oral replacement, 40 mEq, PRN   Or  potassium chloride, 10 mEq, PRN  magnesium sulfate, 2,000 mg, PRN        Labs      Recent Results (from the past 24 hour(s))   Basic Metabolic Panel    Collection Time: 05/17/22  6:35 AM   Result Value Ref Range    Sodium 142 135 - 145 MMOL/L    Potassium 4.0 3.5 - 5.1 MMOL/L    Chloride 112 (H) 99 - 110 mMol/L    CO2 19 (L) 21 - 32 MMOL/L    Anion Gap 11 4 - 16    BUN 13 6 - 23 MG/DL    CREATININE 0.6 0.6 - 1.1 MG/DL    Glucose 82 70 - 99 MG/DL    Calcium 8.7 8.3 - 10.6 MG/DL    GFR Non-African American >60 >60 mL/min/1.73m2    GFR African American >60 >60 mL/min/1.73m2   CBC    Collection Time: 05/17/22  6:35 AM   Result Value Ref Range    WBC 7.0 4.0 - 10.5 K/CU MM    RBC 2.86 (L) 4.2 - 5.4 M/CU MM    Hemoglobin 8.8 (L) 12.5 - 16.0 GM/DL    Hematocrit 27.4 (L) 37 - 47 %    MCV 95.8 78 - 100 FL    MCH 30.8 27 - 31 PG    MCHC 32.1 32.0 - 36.0 %    RDW 14.2 11.7 - 14.9 %    Platelets 000 653 - 305 K/CU MM    MPV 9.5 7.5 - 11.1 FL        Imaging/Diagnostics Last 24 Hours   XR CHEST PORTABLE    Result Date: 5/13/2022  EXAMINATION: ONE XRAY VIEW OF THE CHEST 5/13/2022 4:18 pm COMPARISON: 02/18/2020 HISTORY: ORDERING SYSTEM PROVIDED HISTORY: sob TECHNOLOGIST PROVIDED HISTORY: Reason for exam:->sob Reason for Exam: sob Additional signs and symptoms: NA Relevant Medical/Surgical History: b-cell lymphoma FINDINGS: Overlying right chest port and right axillary clips. Cardiomediastinal silhouette appears within normal limits. Focal consolidation present in the right upper lobe. Costophrenic angles are sharp.   No evidence of pneumothorax. No acute osseous abnormalities. Focal consolidation present in the right upper lobe.        Electronically signed by Clemente Palomo MD on 5/17/2022 at 2:24 PM

## 2022-05-17 NOTE — PLAN OF CARE
Problem: Safety - Adult  Goal: Free from fall injury  5/16/2022 2052 by Noah Hunter LPN  Outcome: Progressing  5/16/2022 0930 by Jaret Shaikh LPN  Outcome: Progressing     Problem: Discharge Planning  Goal: Discharge to home or other facility with appropriate resources  5/16/2022 2052 by Noah Hunter LPN  Outcome: Progressing  5/16/2022 0930 by Jaret Shaikh LPN  Outcome: Progressing     Problem: Nutrition Deficit:  Goal: Optimize nutritional status  5/16/2022 2052 by Noah Hunter LPN  Outcome: Progressing  5/16/2022 0930 by Jaret Shaikh LPN  Outcome: Progressing     Problem: Pain  Goal: Verbalizes/displays adequate comfort level or baseline comfort level  5/16/2022 2052 by Noah Hunter LPN  Outcome: Progressing  5/16/2022 0930 by Jaret Shaikh LPN  Outcome: Progressing

## 2022-05-17 NOTE — PROGRESS NOTES
Hematology/Oncology  Progress Note        HISTORY OF PRESENT ILLNESS:      The patient is a 77 y.o. female with significant past medical history of NHL s/p chemo and RT in 2020 who presents with above. She had progressive SOB ass with night sweat and cough, and went to PCP, who initially told her that she has allergies. Eventually she called my office, hence I ordered CT chest, abdomen pelvis. After reviewing her CT scanning, I instructed her to go to the emergency room prescribed antibiotic. I initially she refused to go to the emergency room, but decided eventually to come to the hospital.  She stated she has whitish phlegm. She has discomfort to the posterior right chest.  CT CAP 5/12/2022:  1. Masslike focus of consolidation in the right upper lobe measuring 6.7 cm with broad costal pleural base.  Differential diagnosis includes pulmonary parenchymal involvement by lymphoma, primary lung cancer, or pneumonia. Recommend short interval follow-up chest CT versus percutaneous tissue sampling. 2. Cholelithiasis. 3. Mild compression deformity of L4 of uncertain chronicity, new since  11/19/2020. On admission she was found to have leukocytosis, anemia, tachycardia and hypotension. She was placed on antibiotic. Stool occult was ordered. This morning she states that her blood pressure has been improving. She still feels tired. I will have pulmonology evaluation. On 5/16/2022 she felt better. Night sweat has improved. Chest pain resolved. I appreciated pulmonologist.  CXR 5/13/2022 : Focal consolidation present in the right upper lobe. 5/17/22  Seen and examined. Feeling much better today. Has been up walking. Agreeable to have biopsy today.        PHYSICAL EXAM:      Vitals:    /69   Pulse 84   Temp 97.9 °F (36.6 °C) (Oral)   Resp 16   Ht 5' 3\" (1.6 m)   Wt 121 lb (54.9 kg)   LMP 01/11/2000   SpO2 99%   BMI 21.43 kg/m²     CONSTITUTIONAL:  awake, alert, cooperative and no apparent intense uptake in the right hemipelvis measuring approximately 2.8 x 2.8 cm and demonstrating maximum SUV of 8.0. She completed pelvic radiation on 8/26/2020. PET scan in November 2020 showed remission. LDH in May was wnl.    2. She has Right lung consolidation. On IV antibiotic. Pulmonologist is on board. Clinically improving. Plan for biopsy today. We will follow results. 3. Anemia is likely related to chronic dx. To continue present care. We will follow up. Thanks.

## 2022-05-17 NOTE — CARE COORDINATION
CM met with pt to initiate discharge planning. Role of CM explained. Pt lives at home with her 6 yr old granddaughter who she has custody of. Her son and DIL live around the corner and are frequently at her home. Pt drives and has a working vehicle. Pts  passed away two months ago. 's contact information removed from the chart. She has his DME (BSC and walker) if she were to ever need it. She is independent at home. Plan to DC home. No needs at this time.

## 2022-05-17 NOTE — PROGRESS NOTES
pulmonary      SUBJECTIVE: she is feeling much better     OBJECTIVE    VITALS:  /71   Pulse 89   Temp 97.9 °F (36.6 °C) (Oral)   Resp 18   Ht 5' 3\" (1.6 m)   Wt 121 lb (54.9 kg)   LMP 01/11/2000   SpO2 99%   BMI 21.43 kg/m²   HEAD AND FACE EXAM:  No throat injection, no active exudate,no thrush  NECK EXAM;No JVD, no masses, symmetrical  CHEST EXAM; Expansion equal and symmetrical, no masses  LUNG EXAM; Good breath sounds bilaterally. There are expiratory wheezes both lungs, there are crackles at both lung bases  CARDIOVASCULAR EXAM: Positive S1 and S2, no S3 or S4, no clicks ,no murmurs  RIGHT AND LEFT LOWER EXTRIMITY EXAM: No edema, no swelling, no inflamation  CNS EXAM: Alert and oriented X3          LABS   Lab Results   Component Value Date    WBC 7.4 05/16/2022    HGB 9.2 (L) 05/16/2022    HCT 27.8 (L) 05/16/2022    MCV 93.9 05/16/2022     05/16/2022     Lab Results   Component Value Date    CREATININE 0.7 05/16/2022    BUN 9 05/16/2022     05/16/2022    K 3.6 05/16/2022     05/16/2022    CO2 20 (L) 05/16/2022     Lab Results   Component Value Date    INR 1.02 05/16/2022    PROTIME 13.1 05/16/2022          Lab Results   Component Value Date    PHOS 3.2 04/13/2021    PHOS 2.7 03/05/2020    PHOS 2.6 03/04/2020      No results for input(s): PH, PO2ART, RTD5OCT, HCO3, BEART, O2SAT in the last 72 hours. Wt Readings from Last 3 Encounters:   05/16/22 121 lb (54.9 kg)   05/09/22 109 lb 9.6 oz (49.7 kg)   02/21/22 117 lb 6.4 oz (53.3 kg)               ASSESMENT  rul pneumonia        PLAN  1. cpm  2.  Ct guided bx today    5/17/2022  Adams Whitehead MD, M.D.

## 2022-05-17 NOTE — PLAN OF CARE
Problem: Safety - Adult  Goal: Free from fall injury  5/17/2022 0954 by Hemant Curiel LPN  Outcome: Progressing  5/16/2022 2052 by Da Grove LPN  Outcome: Progressing     Problem: Discharge Planning  Goal: Discharge to home or other facility with appropriate resources  5/17/2022 0954 by Hemant Curiel LPN  Outcome: Progressing  5/16/2022 2052 by Da Grove LPN  Outcome: Progressing     Problem: Nutrition Deficit:  Goal: Optimize nutritional status  5/17/2022 0954 by Hemant Curiel LPN  Outcome: Progressing  5/16/2022 2052 by Da Grove LPN  Outcome: Progressing     Problem: Pain  Goal: Verbalizes/displays adequate comfort level or baseline comfort level  5/17/2022 0954 by Hemant Curiel LPN  Outcome: Progressing  5/16/2022 2052 by Da Grove LPN  Outcome: Progressing

## 2022-05-17 NOTE — PROGRESS NOTES
CT scan shows improvement from previous scan. Dr. Georgie Rice spreaking with Dr. Rosemary Marie for plan of care. Both in agreement that consolidation in right lung has improved and there is currently no need to complete right lung biopsy. Dr. Jacques Coil in to speak with patient and explain current plan of care. Patient verbalizes understanding. Patient to return to in patient bed. Johana RN 4N given phone report about plan of care.

## 2022-05-18 VITALS
DIASTOLIC BLOOD PRESSURE: 84 MMHG | HEIGHT: 63 IN | BODY MASS INDEX: 21.44 KG/M2 | OXYGEN SATURATION: 100 % | SYSTOLIC BLOOD PRESSURE: 97 MMHG | HEART RATE: 98 BPM | RESPIRATION RATE: 18 BRPM | TEMPERATURE: 97.7 F | WEIGHT: 121 LBS

## 2022-05-18 LAB
ANION GAP SERPL CALCULATED.3IONS-SCNC: 8 MMOL/L (ref 4–16)
BUN BLDV-MCNC: 13 MG/DL (ref 6–23)
CALCIUM SERPL-MCNC: 8.9 MG/DL (ref 8.3–10.6)
CHLORIDE BLD-SCNC: 113 MMOL/L (ref 99–110)
CO2: 22 MMOL/L (ref 21–32)
CREAT SERPL-MCNC: 0.6 MG/DL (ref 0.6–1.1)
CULTURE: ABNORMAL
GFR AFRICAN AMERICAN: >60 ML/MIN/1.73M2
GFR NON-AFRICAN AMERICAN: >60 ML/MIN/1.73M2
GLUCOSE BLD-MCNC: 86 MG/DL (ref 70–99)
HCT VFR BLD CALC: 27.3 % (ref 37–47)
HCT VFR BLD CALC: 27.6 % (ref 37–47)
HEMOGLOBIN: 8.6 GM/DL (ref 12.5–16)
HEMOGLOBIN: 8.8 GM/DL (ref 12.5–16)
Lab: ABNORMAL
Lab: ABNORMAL
MCH RBC QN AUTO: 30.9 PG (ref 27–31)
MCHC RBC AUTO-ENTMCNC: 31.9 % (ref 32–36)
MCV RBC AUTO: 96.8 FL (ref 78–100)
PDW BLD-RTO: 14.5 % (ref 11.7–14.9)
PLATELET # BLD: 387 K/CU MM (ref 140–440)
PMV BLD AUTO: 9.3 FL (ref 7.5–11.1)
POTASSIUM SERPL-SCNC: 4.3 MMOL/L (ref 3.5–5.1)
RBC # BLD: 2.85 M/CU MM (ref 4.2–5.4)
SODIUM BLD-SCNC: 143 MMOL/L (ref 135–145)
SPECIMEN: ABNORMAL
SPECIMEN: ABNORMAL
WBC # BLD: 8.2 K/CU MM (ref 4–10.5)

## 2022-05-18 PROCEDURE — C9113 INJ PANTOPRAZOLE SODIUM, VIA: HCPCS | Performed by: INTERNAL MEDICINE

## 2022-05-18 PROCEDURE — 85014 HEMATOCRIT: CPT

## 2022-05-18 PROCEDURE — 85027 COMPLETE CBC AUTOMATED: CPT

## 2022-05-18 PROCEDURE — 6370000000 HC RX 637 (ALT 250 FOR IP): Performed by: INTERNAL MEDICINE

## 2022-05-18 PROCEDURE — 2580000003 HC RX 258: Performed by: INTERNAL MEDICINE

## 2022-05-18 PROCEDURE — 6360000002 HC RX W HCPCS: Performed by: INTERNAL MEDICINE

## 2022-05-18 PROCEDURE — 80048 BASIC METABOLIC PNL TOTAL CA: CPT

## 2022-05-18 PROCEDURE — 85018 HEMOGLOBIN: CPT

## 2022-05-18 PROCEDURE — 99232 SBSQ HOSP IP/OBS MODERATE 35: CPT | Performed by: INTERNAL MEDICINE

## 2022-05-18 RX ORDER — HEPARIN SODIUM (PORCINE) LOCK FLUSH IV SOLN 100 UNIT/ML 100 UNIT/ML
100 SOLUTION INTRAVENOUS PRN
Status: DISCONTINUED | OUTPATIENT
Start: 2022-05-18 | End: 2022-05-18 | Stop reason: HOSPADM

## 2022-05-18 RX ORDER — LEVOFLOXACIN 750 MG/1
750 TABLET ORAL DAILY
Qty: 10 TABLET | Refills: 0 | Status: SHIPPED | OUTPATIENT
Start: 2022-05-18 | End: 2022-05-28

## 2022-05-18 RX ORDER — BENZONATATE 100 MG/1
100-200 CAPSULE ORAL 3 TIMES DAILY PRN
Qty: 42 CAPSULE | Refills: 0 | Status: SHIPPED | OUTPATIENT
Start: 2022-05-18 | End: 2022-05-25

## 2022-05-18 RX ADMIN — CEFEPIME HYDROCHLORIDE 2000 MG: 2 INJECTION, POWDER, FOR SOLUTION INTRAVENOUS at 02:42

## 2022-05-18 RX ADMIN — PANTOPRAZOLE SODIUM 40 MG: 40 INJECTION, POWDER, LYOPHILIZED, FOR SOLUTION INTRAVENOUS at 10:22

## 2022-05-18 RX ADMIN — SODIUM CHLORIDE, PRESERVATIVE FREE 10 ML: 5 INJECTION INTRAVENOUS at 09:00

## 2022-05-18 RX ADMIN — ONDANSETRON 4 MG: 2 INJECTION INTRAMUSCULAR; INTRAVENOUS at 02:31

## 2022-05-18 RX ADMIN — CETIRIZINE HYDROCHLORIDE 5 MG: 5 TABLET ORAL at 09:49

## 2022-05-18 RX ADMIN — CALCIUM 500 MG: 500 TABLET ORAL at 09:49

## 2022-05-18 RX ADMIN — OMEGA-3-ACID ETHYL ESTERS 1000 MG: 1 CAPSULE, LIQUID FILLED ORAL at 09:49

## 2022-05-18 RX ADMIN — Medication 100 UNITS: at 14:57

## 2022-05-18 RX ADMIN — SODIUM CHLORIDE, PRESERVATIVE FREE 10 ML: 5 INJECTION INTRAVENOUS at 14:57

## 2022-05-18 RX ADMIN — Medication 2000 UNITS: at 09:48

## 2022-05-18 NOTE — PROGRESS NOTES
Physician Progress Note      Ladarius Haynes  CSN #:                  [id number]  :                       1955  ADMIT DATE:       2022 3:25 PM  [address] [place] DATE:        2022 3:41 PM  RESPONDING  PROVIDER #:        Vianey RODRIGUEZ          QUERY TEXT:    Patient admitted with Pneumonia. Documentation reflects Sepsis in  ED and H&P   notes. If possible, please document in the progress notes and discharge   summary if Sepsis was: The medical record reflects the following:  Risk Factors: Streptococcal Pneumonia , B cell lymphoma, Immune compromised  Clinical Indicators: tachycardic at 105. ,  Blood pressure is soft at 98/58. With a repeat of 86/73, WBC 19 POA with 12 Bands -13.9 , Chills, Blood   cultures show Streptococcus Pneumoniae  Treatment:IV Fluids bolus and then  IV normal saline at 125 cc an hour, blood   cultures, vancomycin and cefepime, Hem/Onc consult , Pulmonology consult, one   unit PRBC, CXR    Thank you María Elena Young RN, CDS ([phone])  Options provided:  -- Sepsis POA confirmed after study  -- Sepsis POA  treated and resolved  -- Sepsis POA ruled out after study  -- Other - I will add my own diagnosis  -- Disagree - Not applicable / Not valid  -- Disagree - Clinically unable to determine / Unknown  -- Refer to Clinical Documentation Reviewer    PROVIDER RESPONSE TEXT:    Sepsis POA ruled out after study.     Query created by: Merry Hernandez on 2022 8:35 AM      Electronically signed by:  Rlyie Santa 2022 4:24 PM

## 2022-05-18 NOTE — FLOWSHEET NOTE
Right chest mediport deaccessed using heparin flush per protocol. Dressing applied. Patient tolerated well. Patient awaiting son for transportation home.

## 2022-05-18 NOTE — PROGRESS NOTES
Hematology/Oncology  Progress Note        HISTORY OF PRESENT ILLNESS:      The patient is a 77 y.o. female with significant past medical history of NHL s/p chemo and RT in 2020 who presents with above. She had progressive SOB ass with night sweat and cough, and went to PCP, who initially told her that she has allergies. Eventually she called my office, hence I ordered CT chest, abdomen pelvis. After reviewing her CT scanning, I instructed her to go to the emergency room prescribed antibiotic. I initially she refused to go to the emergency room, but decided eventually to come to the hospital.  She stated she has whitish phlegm. She has discomfort to the posterior right chest.  CT CAP 5/12/2022:  1. Masslike focus of consolidation in the right upper lobe measuring 6.7 cm with broad costal pleural base.  Differential diagnosis includes pulmonary parenchymal involvement by lymphoma, primary lung cancer, or pneumonia. Recommend short interval follow-up chest CT versus percutaneous tissue sampling. 2. Cholelithiasis. 3. Mild compression deformity of L4 of uncertain chronicity, new since  11/19/2020. On admission she was found to have leukocytosis, anemia, tachycardia and hypotension. She was placed on antibiotic. Stool occult was ordered. This morning she states that her blood pressure has been improving. She still feels tired. I will have pulmonology evaluation. On 5/16/2022 she felt better. Night sweat has improved. Chest pain resolved. I appreciated pulmonologist.  CXR 5/13/2022 : Focal consolidation present in the right upper lobe. 5/17/22  Seen and examined. Feeling much better today. Has been up walking. Agreeable to have biopsy today. She continues to feel better. Appreciated pulmonologist and IR evaluation. BX was cancelled since right lung consolidation has improved. Has dry cough. No fever or chills. No SOB or chest pain. On antibiotic.     PHYSICAL EXAM:      Vitals:    BP 95/64 Pulse 87   Temp 98.5 °F (36.9 °C) (Oral)   Resp 16   Ht 5' 3\" (1.6 m)   Wt 121 lb (54.9 kg)   LMP 01/11/2000   SpO2 96%   BMI 21.43 kg/m²     CONSTITUTIONAL:  awake, alert, cooperative and no apparent distress  EYES:  extra-ocular muscles intact and sclera clear. + pallor  NECK:  supple, symmetrical, trachea midline and no lymphadenopathy  LUNGS:  clear to auscultation, no crackles or wheezing  CARDIOVASCULAR:  normal S1 and S2 and no murmur noted  ABDOMEN:  normal bowel sounds, soft, non-distended and non-tender  MUSCULOSKELETAL:  there is no redness, warmth, or swelling of the joints. Full range of motion noted  NEUROLOGIC: motor skills grossly intact.  CN II-XII grossly intact  SKIN:  no rashes and no jaundice    DATA:      CBC with Differential:    Lab Results   Component Value Date    WBC 8.2 05/18/2022    RBC 2.85 05/18/2022    HGB 8.8 05/18/2022    HCT 27.6 05/18/2022     05/18/2022    MCV 96.8 05/18/2022    MCH 30.9 05/18/2022    MCHC 31.9 05/18/2022    RDW 14.5 05/18/2022    NRBC 2 02/21/2020    SEGSPCT 88.0 05/14/2022    BANDSPCT 12 05/13/2022    LYMPHOPCT 11.0 05/14/2022    MONOPCT 1.0 05/14/2022    MYELOPCT 2 02/21/2020    BASOPCT 0.1 05/09/2022    MONOSABS 0.1 05/14/2022    LYMPHSABS 1.5 05/14/2022    EOSABS 0.0 05/09/2022    BASOSABS 0.0 05/09/2022    DIFFTYPE MANUAL DIFFERENTIAL 05/14/2022     CMP:    Lab Results   Component Value Date     05/18/2022    K 4.3 05/18/2022     05/18/2022    CO2 22 05/18/2022    BUN 13 05/18/2022    CREATININE 0.6 05/18/2022    GFRAA >60 05/18/2022    LABGLOM >60 05/18/2022    GLUCOSE 86 05/18/2022    PROT 6.6 05/13/2022    LABALBU 3.1 05/13/2022    CALCIUM 8.9 05/18/2022    BILITOT 0.7 05/13/2022    ALKPHOS 104 05/13/2022    AST 19 05/13/2022    ALT 15 05/13/2022     5/15/2022  Ferritin: 3,107 (H) Iron: 36 (L) TIBC: 125 (L) Transferrin %: 29  FOLATE: 14.8  Vitamin B-12: >2000 (H)  Retic Ct Pct: 1.4    LDH in January 2022 was wnl.    IMPRESSION/RECOMMENDATIONS:    1. She was diagnosed with diffuse large B-cell lymphoma, no double hit. She completed chemotherapy on June 5, 2020. PET CT scan on 6/18/2020 showed response, still with intense uptake in the right hemipelvis measuring approximately 2.8 x 2.8 cm and demonstrating maximum SUV of 8.0. She completed pelvic radiation on 8/26/2020. PET scan in November 2020 showed remission. LDH in May was wnl.    2. She has Right lung consolidation. Likely PNA. On IV antibiotic. Pulmonologist is on board. Clinically improving. BX was cancelled. 3. Anemia is likely related to chronic dx. To continue present care. We will follow up. If she is discharged, please schedule follow up at cancer ctr. Thanks.

## 2022-05-18 NOTE — PLAN OF CARE
Problem: Safety - Adult  Goal: Free from fall injury  5/17/2022 2228 by Raynald Closs, RN  Outcome: Progressing  5/17/2022 0954 by Jaxon Che LPN  Outcome: Progressing     Problem: Discharge Planning  Goal: Discharge to home or other facility with appropriate resources  5/17/2022 2228 by Raynald Closs, RN  Outcome: Progressing  5/17/2022 0954 by Jaxon Che LPN  Outcome: Progressing     Problem: Nutrition Deficit:  Goal: Optimize nutritional status  5/17/2022 2228 by Raynald Closs, RN  Outcome: Progressing  5/17/2022 0954 by Jaxon Che LPN  Outcome: Progressing     Problem: Pain  Goal: Verbalizes/displays adequate comfort level or baseline comfort level  5/17/2022 2228 by Raynald Closs, RN  Outcome: Progressing  5/17/2022 0954 by Jaxon Che LPN  Outcome: Progressing

## 2022-05-18 NOTE — DISCHARGE SUMMARY
V2.0  Discharge Summary    Name:  Hamlet Hernandez /Age/Sex: 1955 (56 y.o. female)   Admit Date: 2022  Discharge Date: 22    MRN & CSN:  [id number] & [id number] Encounter Date and Time 22 3:06 PM EDT    Attending:  Roxanna Lama MD Discharging Provider: Roxanna Lama MD       Hospital Course:     Brief HPI: Hamlet Hernandez is a 77 y.o. female who presented with increasing shortness of breath, intermittent dry cough, chills and poor appetite for 2 weeks. Brief Problem Based Course:  Hamlet Hernandez is a 77 y.o.  female  who presents with progressive worsening shortness of breath. Patient has a history of lymphoma and was treated with beam radiation as well as chemotherapy. She has had this symptom of shortness of breath started with cough which is sometimes productive. She is also had chills but no fever. She thinks she has lost a lot of weight but does not know exactly how much. She is eating poorly. She presented to her family [de-identified] office who ordered a CT scan which showed right upper lobe consolidation which is new. Patient was called and asked to come to the emergency room. Admitted as:  Hamlet Hernandez is a 77 y.o. female with pmh of B-cell lymphoma (status post chemoradiation about 2 years ago) who presents with increasing shortness of breath, intermittent dry cough, chills and poor appetite for 2 weeks.     1. Right upper lobe strep pneumonia: CT chest and abdomen/pelvis revealed right upper lobe consolidation which may be pneumonia, lymphoma or primary lung cancer. Blood culture-strep pneumonia, sensitive to moxifloxacin and cefotaxime. Treating as  pneumonia-received IV vancomycin and cefepime. Lung biopsy which was initially planned was canceled because of significant improvement in the size of consolidation. Discharged on Levaquin to complete 14 days of antibiotics. Pulmonology consulted-recommending chest x-ray or CT scan of the chest in 6 weeks. She will follow-up in pulmonology clinic.     2. Acute on chronic normocytic anemia: No overt bleeding. Anemia work-up was suggestive of chronic disease. Received 1 unit of PRBC on 5/14. Monitored H&H-remained stable thereafter. Hematology consulted.     3.  B-cell lymphoma: Chemoradiation therapy about 2 years ago. Patient has been having night sweats. CT chest showed right upper lobe consolidation. Oncology consulted. PET scan in 11/20 showed remission. LDH in 5/22-within normal.     4. Hypotension: Due to anemia. However, BP remained borderline normal after blood transfusion. Metoprolol not resumed for now. She will follow-up with primary care physician for       Chronic problems include moderate to severe TR, possible osteoporosis and bipolar disorder. The patient expressed appropriate understanding of, and agreement with the discharge recommendations, medications, and plan. Consults this admission:  IP CONSULT TO HOSPITALIST  IP CONSULT TO ONCOLOGY  IP CONSULT TO PULMONOLOGY  IP CONSULT TO INTERVENTIONAL RADIOLOGY    Discharge Diagnosis:   Community acquired bacterial pneumonia    Right upper lobe strep pneumonia  Acute on chronic normocytic anemia  B-cell lymphoma  Hypotension    Discharge Instruction:   Follow up appointments: PCP, oncology and pulmonology.   Primary care physician: Loi Ambrosio MD within 2 weeks  Diet: regular diet   Activity: activity as tolerated  Disposition: Discharged to:   [x]Home, []C, []SNF, []Acute Rehab, []Hospice   Condition on discharge: Stable  Labs and Tests to be Followed up as an outpatient by PCP or Specialist: None    Discharge Medications:        Medication List      START taking these medications    benzonatate 100 MG capsule  Commonly known as: TESSALON  Take 1-2 capsules by mouth 3 times daily as needed for Cough     levoFLOXacin 750 MG tablet  Commonly known as: Levaquin  Take 1 tablet by mouth daily for 10 days CONTINUE taking these medications    calcium carbonate 600 MG Tabs tablet     divalproex 500 MG extended release tablet  Commonly known as: DEPAKOTE ER  Take 2 tablets by mouth nightly     Ensure Plus Liqd  Take 1 Can by mouth 3 times daily     fish oil 1000 MG Caps     loperamide 2 MG tablet  Commonly known as: IMODIUM A-D  Take 1 tablet by mouth 4 times daily as needed for Diarrhea     loratadine 10 MG tablet  Commonly known as: CLARITIN     MULTIPLE VITAMIN PO     RA Pen Needles 31G X 8 MM Misc  Generic drug: Insulin Pen Needle     teriparatide 620 MCG/2.48ML Sopn injection  Commonly known as: FORTEO     vitamin D 50 MCG (2000 UT) Caps capsule        STOP taking these medications    metoprolol tartrate 25 MG tablet  Commonly known as: LOPRESSOR           Where to Get Your Medications      These medications were sent to [address] [place] [address], [address]    Phone: [phone]   · benzonatate 100 MG capsule  · levoFLOXacin 750 MG tablet        Objective Findings at Discharge:   BP 97/84   Pulse 98   Temp 97.7 °F (36.5 °C) (Oral)   Resp 18   Ht 5' 3\" (1.6 m)   Wt 121 lb (54.9 kg)   LMP 01/11/2000   SpO2 100%   BMI 21.43 kg/m²     Patient is feeling great. She has been up and about. She has no new complaint. Physical Exam:   General: No apparent respiratory distress. Eyes: EOMI. Anicteric. ENT: neck supple  Cardiovascular: Regular rate. No murmur or S3. Respiratory: Clear to auscultation  Gastrointestinal: Soft, non tender, no palpable mass. Genitourinary: no suprapubic tenderness  Musculoskeletal: No edema  Skin: warm, dry  Neuro: Alert. Oriented x3. No gross focal weakness. Psych: Mood appropriate.          Labs and Imaging   CT ABDOMEN PELVIS WO CONTRAST Additional Contrast? None    Result Date: 5/13/2022  EXAMINATION: CT OF THE CHEST WITHOUT CONTRAST; CT OF THE ABDOMEN AND PELVIS WITHOUT CONTRAST 5/12/2022 9:42 am TECHNIQUE: CT of the chest was performed without the administration of intravenous contrast. Multiplanar reformatted images are provided for review. Automated exposure control, iterative reconstruction, and/or weight based adjustment of the mA/kV was utilized to reduce the radiation dose to as low as reasonably achievable.; CT of the abdomen and pelvis was performed without the administration of intravenous contrast. Multiplanar reformatted images are provided for review. Automated exposure control, iterative reconstruction, and/or weight based adjustment of the mA/kV was utilized to reduce the radiation dose to as low as reasonably achievable. COMPARISON: 11/19/2020 HISTORY: ORDERING SYSTEM PROVIDED HISTORY: Diffuse large B-cell lymphoma, unspecified body region [facility]) TECHNOLOGIST PROVIDED HISTORY: Reason for Exam: Diffuse large B-cell lymphoma Additional signs and symptoms: Patient states cancer check;  SOB, nausea, diarrhea, and fatigue Relevant Medical/Surgical History: Patient states cancer check;  SOB, nausea, diarrhea, and fatigue; ORDERING SYSTEM PROVIDED HISTORY: Diffuse large B-cell lymphoma, unspecified body region [facility]) TECHNOLOGIST PROVIDED HISTORY: Additional Contrast?->None Reason for exam:->r/o recurrence Reason for Exam: Diffuse large B-cell lymphoma Additional signs and symptoms: Patient states cancer check;  SOB, nausea, diarrhea, and fatigue Relevant Medical/Surgical History: Patient states cancer check;  SOB, nausea, diarrhea, and fatigue FINDINGS: Chest: Mediastinum: Moderate coronary artery calcification. Heart size is normal. No pericardial effusion. No lymphadenopathy. Lungs/pleura: Masslike focus of consolidation in the right upper lobe measuring 6.7 x 5.5 cm on series 4, image 49, new from prior, with broad costal pleural base. No pleural effusion or pneumothorax. Soft Tissues/Bones: No suspicious bony lesion.  Abdomen/Pelvis: Organs: Liver is normal in contour and attenuation. Cholelithiasis. No biliary ductal diltation. Pancreas is unremarkable. Adrenals are unremarkable. Spleen is normal in size. Atrophy of right kidney. No renal calculi or hydronephrosis. Vasculature is unremarkable. GI/Bowel: Bowel is non-dilated without wall thickening. Appendix is not seen though there are no secondary signs of appendicitis. Pelvis: Fibroid uterus Peritoneum/Retroperitoneum:No free fluid, free air, organized fluid collection or lymphadenopathy. Bones: No suspicious bony lesion. Mild compression deformity of L4 of uncertain chronicity, new since 11/19/2020.     1. Masslike focus of consolidation in the right upper lobe measuring 6.7 cm with broad costal pleural base. Differential diagnosis includes pulmonary parenchymal involvement by lymphoma, primary lung cancer, or pneumonia. Recommend short interval follow-up chest CT versus percutaneous tissue sampling. 2. Cholelithiasis. 3. Mild compression deformity of L4 of uncertain chronicity, new since 11/19/2020. CT CHEST WO CONTRAST    Result Date: 5/13/2022  EXAMINATION: CT OF THE CHEST WITHOUT CONTRAST; CT OF THE ABDOMEN AND PELVIS WITHOUT CONTRAST 5/12/2022 9:42 am TECHNIQUE: CT of the chest was performed without the administration of intravenous contrast. Multiplanar reformatted images are provided for review. Automated exposure control, iterative reconstruction, and/or weight based adjustment of the mA/kV was utilized to reduce the radiation dose to as low as reasonably achievable.; CT of the abdomen and pelvis was performed without the administration of intravenous contrast. Multiplanar reformatted images are provided for review. Automated exposure control, iterative reconstruction, and/or weight based adjustment of the mA/kV was utilized to reduce the radiation dose to as low as reasonably achievable.  COMPARISON: 11/19/2020 HISTORY: ORDERING SYSTEM PROVIDED HISTORY: Diffuse large B-cell lymphoma, unspecified body region [facility]) TECHNOLOGIST PROVIDED HISTORY: Reason for Exam: Diffuse large B-cell lymphoma Additional signs and symptoms: Patient states cancer check;  SOB, nausea, diarrhea, and fatigue Relevant Medical/Surgical History: Patient states cancer check;  SOB, nausea, diarrhea, and fatigue; ORDERING SYSTEM PROVIDED HISTORY: Diffuse large B-cell lymphoma, unspecified body region [facility]) TECHNOLOGIST PROVIDED HISTORY: Additional Contrast?->None Reason for exam:->r/o recurrence Reason for Exam: Diffuse large B-cell lymphoma Additional signs and symptoms: Patient states cancer check;  SOB, nausea, diarrhea, and fatigue Relevant Medical/Surgical History: Patient states cancer check;  SOB, nausea, diarrhea, and fatigue FINDINGS: Chest: Mediastinum: Moderate coronary artery calcification. Heart size is normal. No pericardial effusion. No lymphadenopathy. Lungs/pleura: Masslike focus of consolidation in the right upper lobe measuring 6.7 x 5.5 cm on series 4, image 49, new from prior, with broad costal pleural base. No pleural effusion or pneumothorax. Soft Tissues/Bones: No suspicious bony lesion. Abdomen/Pelvis: Organs: Liver is normal in contour and attenuation. Cholelithiasis. No biliary ductal diltation. Pancreas is unremarkable. Adrenals are unremarkable. Spleen is normal in size. Atrophy of right kidney. No renal calculi or hydronephrosis. Vasculature is unremarkable. GI/Bowel: Bowel is non-dilated without wall thickening. Appendix is not seen though there are no secondary signs of appendicitis. Pelvis: Fibroid uterus Peritoneum/Retroperitoneum:No free fluid, free air, organized fluid collection or lymphadenopathy. Bones: No suspicious bony lesion. Mild compression deformity of L4 of uncertain chronicity, new since 11/19/2020.     1. Masslike focus of consolidation in the right upper lobe measuring 6.7 cm with broad costal pleural base.   Differential diagnosis includes pulmonary 05/13/2022    CLARITYU CLEAR 05/13/2022    SPECGRAV 1.010 05/13/2022    LEUKOCYTESUR NEGATIVE 05/13/2022    UROBILINOGEN 1.0 05/13/2022    BILIRUBINUR NEGATIVE 05/13/2022    BLOODU NEGATIVE 05/13/2022    KETUA 15 05/13/2022       Time Spent Discharging patient 37 minutes    Electronically signed by Génesis Mendenhall MD on 5/18/2022 at 3:06 PM

## 2022-05-19 NOTE — PROGRESS NOTES
Outpatient Pharmacy Progress Note for Meds-to-Beds    Total number of Prescriptions Filled: 2  The following medications were dispensed to the patient during the discharge process:   Benzonatate 100mg   Levaquin 750 mg       Additional Documentation:   Patient picked-up the medication(s) in the OP Pharmacy      Thank you for letting us serve your patients.   [address]    [address], [address] [facility]    Phone: [phone]    Fax: [phone]

## 2022-05-26 NOTE — PROGRESS NOTES
Patient Name: Kelly Saini  Patient : 1955  Patient MRN: [id number]     Primary Oncologist: Taylor Garcia MD  Referring Provider: Waqas Dia MD     Date of Service: 2022      Chief Complaint:   Chief Complaint   Patient presents with    Follow-up     She came in for follow-up visit. Problem List:      Family history of malignant neoplasm of gastrointestinal tract     B-cell lymphoma (HCC)     Idiopathic hypotension     Mitral valve disease     Pancytopenia (HCC)     Severe malnutrition (HCC)     Deep vein thrombosis (DVT) of left upper extremity      Hematoma     Pain syndrome, chronic     Tachycardia     HPI:   Malachi Lee is a pleasant 77year-old AA female patient who was referred for right axillary lymphadenopathy, status post needle biopsy in 2016, which did not show any pathological abnormalities. I talked to Dr. Angela Modi, who favored benign lymph node. Right axillary lymph node biopsy in 2015 revealed benign lymph node hyperplasia. Mammogram in 2016 revealed low suspicion for malignancy, with several enlarged right axillary lymph nodes, 3.1 by 0.8 by 2.3 cm, 0.9 by 0.8 by 1 cm, 1.8 by 1.4 by 1.5, and 1.4 by 1.5 cm. None demonstrated significant hypervascularity. I offered her an excisional lymph node biopsy versus surveillance with followup ultrasound. She opted to go with the second one. Blood test in 2015 revealed normal CBC with normal calcium and alk phos, white cell count 5.8, hemoglobin 11.9, platelet was 775. Blood test on 2016 revealed normal CBC, with white cell count 6.5, hemoglobin 12.5, platelet 680. SED rate was 17, rheumatoid factor less than 10. SUZE was still pending. Ultrasound of the right breast and axilla revealed no significant interval change in the right axillary adenopathy, which still has remained most likely reactive in etiology.   This was not seen on any mammogram dating back beyond 2015.  She opted to go to see the surgeon; therefore, I referred her to see Dr. Dimitri Maher. She was last seen in our office 9/28/2016. Ms. Marilyn Celis was seen by Dr. Dimitri Maher March 3, 2019 for evaluation of enlarging, tender mass in right axilla. She had excisional lymph node biopsy on March 12, 2019. Pathology revealed granulomatous lymphadenitis. Mammogram 2/1/2019 was benign. She had EGD and colonoscopy on 12/10/2019. EGD was unremarkable. Colonoscopy revealed in infiltrative, ulcerated and necrotic, non bleeding 50 mm mass of malignant appearance was found in the rectum at a distance between 12 cm and 18 cm from the anus. The mass caused a partial obstruction. Pathology from the mass revealed ulcerated large bowel mucosa with dense lymphoid infiltrate and abundant necrosis. - Negative for carcinoma. The lymphoid infiltrate and necrosis are suspicious for lymphoma. Results of flow cytometry revealed no flow immunophenotypic evidence of a lymphoproliferative disorder based on limited antibody panel. CT abdomen/pelvis on December 23, 2019 showed a heterogeneous solid 9.5 x 6.1 cm soft tissue mass within the right posterior pelivs which abuts the posterior margin of the uterus and involves the right lagteral margin of the rectosigmoid junction, indeterminate. This mass is concerning for malignancy, with uterine origin being favored, such as uterine sarcoma with suspected invasion of the rectosigmoid junction. In addition, a 5.8 x 10.5 x 1.1 cm central mesenteric soft tissue mass is seen, suspected metastatic disease. Additional uterine masses are seen which may relate to fibroids or extension of uterine sarcoma. A possible 6 mm pleural based nodule within the posterior left lower lobe, indeterminate in the setting of  suspected malignacy. She was seen by Dr. Scarlet Solorzano on December 26, 2019 for review of colonoscopy/egd results. She was referred to urology due to early hydronephrosis.    I discussed with pathologist March 2020 after 2 cycled of chemo showed response to therapy. She completed lovenox injection after 6 cycle of chemo due on June 5, 2020. She completed chemotherapy on June 5, 2020. PET CT in June 2020 scan revealed:   Significantly decreased size and uptake of the masses within the lower abdomen/pelvis, consistent with treatment response. Notable: ABDOMEN/PELVIS: No abnormal uptake within the solid organs. Significantly decreased size of the left paramidline lower abdominal mass which measures approximately 3.1 x 1.6 cm and demonstrates maximum SUV of 2.2. Significantly improved bilateral pelvic uptake with a residual focus of intense uptake in the right hemipelvis measuring approximately 2.8 x 2.8 cm and demonstrating maximum SUV of 8.0. She had pelvic radiation therapy from July 27, 2020 through August 20, 2020. She received 36 Gy. She completed radiation therapy 8/26/2020. Imaging studies optional since the PET scan is completely negative in November 2020. PET 11/19/2020: continued response to treatment.  Abnormal uptake in the deep right hemipelvis has resolved.  The lower abdominal mass appears slightly smaller, although difficult to accurately measure without intravenous contrast, and demonstrates similar mild uptake.  No new disease. Anemic work-up on November 13, 2020 was grossly unremarkable. Bone density on December 21, 2020 showed osteoporosis at lumbar spine. I recommend to discuss with family doctor about oral versus IV bisphosphonate versus Reclast.  I recommend to continue with vitamin D and calcium. I remind her about Mediport flush every 3 months. She was seen by GI and reportedly colonoscopy in November 2020 was okay. She takes Imodium once or twice a day. Diarrhea has improved. Stool specimen was obtained by GI. On 2/23/2021 WBC was 2.9, hemoglobin 10.2, platelet 035. Absolute neutrophils was 1.4. She has covid vaccine. Son had Covid infection.    was sick and hospitalized due to congestive heart failure and anemia. She has loose stool when eating greasy meals. She will follow up with GI. Mammogram in December 2020 was benign. CBC and CMP in January 2022 was reviewed. WBC was 4.9, hemoglobin 11.7, platelet 547. LDH was 238. She is in remission. We will continue with surveillance. Her weight has been stable. Remind her but Mediport flush every 3 months. On June 8, 2022 she came in for follow-up visit after discharge from the hospital.  She was hospitalized in May 2022 due to the pneumonia. 5/15/2022 Ferritin: 3,107 (H) Iron: 36 (L) TIBC: 125 (L)  Transferrin %: 29 FOLATE: 14.8 Vitamin B-12: >2000 (H)  . CT CAP 5/12/2022:  1. Masslike focus of consolidation in the right upper lobe measuring 6.7 cm with broad costal pleural base. Differential diagnosis includes pulmonary parenchymal involvement by lymphoma, primary lung cancer, or pneumonia. Recommend short interval follow-up chest CT versus percutaneous tissue sampling. 2. Cholelithiasis. 3. Mild compression deformity of L4 of uncertain chronicity, new since 11/19/2020.    5/17/2022 CT chest:  Decreased size and conspicuity of previously reported masslike area pulmonary consolidation right upper lobe compared to 05/12/2022.  Biopsy deferred at this time. On today visit she stated that she felt like herself again. Denied any night sweats or weight loss. Diarrhea has resolved. She will follow-up with pulmonologist at [facility]. I discussed with her about follow-up CT chest.  She will discuss with the pulmonologist at [facility]. I recommend to keep age-appropriate cancer screening up-to-date. I will check CBC today. Likely anemia is related to chronic disease. However if anemia continues to get worse I may consider a bone marrow study. No acute pain. Denied any nausea, vomiting. No fever or chills. No chest pain, shortness of breath or palpitation.   No headache or dizzy spell. No specific bone pain. No melena or hematochezia. Denied any dysuria or hematuria. Past Medical History  Tubal ligation, tonsillectomy, colonoscopy in 2015 by Dr. Edith Winter, and reportedly there was no polyp. She had a Pap smear in 2015 by Dr. Tigre Mcdonald. She has bipolar disorder. Pap smear was in 2015, mammogram in March 2016. Shingles    Social History  Smoking Status Former smoker  She smoked 1 pack/day for 10 years and quit 30 years ago. She denies any alcohol or illicit drug use. Family History  Mother had breast cancer in her [de-identified], father had metastatic brain cancer. Previous Therapies:  R-CHOP     Review of Systems: \"Per interval history; otherwise 10 point ROS is negative. \"     Vital Signs:  /72 (Site: Right Upper Arm, Position: Sitting, Cuff Size: Medium Adult)   Pulse 97   Temp 97.6 °F (36.4 °C) (Temporal)   Resp 18   Ht 5' 3\" (1.6 m)   Wt 116 lb (52.6 kg)   LMP 01/11/2000   BMI 20.55 kg/m²     Physical Exam:    CONSTITUTIONAL: awake, alert, cooperative, no apparent distress   EYES:  sclera clear, round pupil and + pallor  ENT: Normocephalic, without obvious abnormality, atraumatic  NECK: supple, symmetrical. No JVD  HEMATOLOGIC/LYMPHATIC: no cervical, supraclavicular or axillary lymphadenopathy   LUNGS: no increased work of breathing and clear to auscultation   CARDIOVASCULAR: regular rate and rhythm, normal S1 and S2, no murmur  ABDOMEN: normal bowel sound, soft, non-distended, non-tender, no palpable masses. MUSCULOSKELETAL: full range of motion noted, tone is normal  NEUROLOGIC: Motor skills grossly intact. CN II - XII grossly intact. SKIN: Normal skin color, texture, turgor and no jaundice. appears intact   EXTREMITIES: no LE edema or cyanosis.     Labs:  Hematology:  Lab Results   Component Value Date    WBC 4.4 06/08/2022    RBC 3.25 (L) 06/08/2022    HGB 10.0 (L) 06/08/2022    HCT 31.2 (L) 06/08/2022    MCV 96.0 06/08/2022    MCH 30.8 06/08/2022    MCHC 32.1 06/08/2022    RDW 14.5 06/08/2022     06/08/2022    MPV 9.1 06/08/2022    BANDSPCT 12 (H) 05/13/2022    SEGSPCT 59.0 06/08/2022    EOSRELPCT 8.9 (H) 06/08/2022    BASOPCT 1.1 (H) 06/08/2022    LYMPHOPCT 21.8 (L) 06/08/2022    MONOPCT 9.2 (H) 06/08/2022    BANDABS 2.28 05/13/2022    SEGSABS 2.6 06/08/2022    EOSABS 0.4 06/08/2022    BASOSABS 0.1 06/08/2022    LYMPHSABS 1.0 06/08/2022    MONOSABS 0.4 06/08/2022    DIFFTYPE AUTOMATED DIFFERENTIAL 06/08/2022    ANISOCYTOSIS 1+ 05/13/2022    POLYCHROM 1+ 05/13/2022    PLTM SEVERAL LARGE PLATELETS PRESENT 28/34/4667     Lab Results   Component Value Date    ESR 25 01/16/2020     Chemistry:  Lab Results   Component Value Date     05/18/2022    K 4.3 05/18/2022     (H) 05/18/2022    CO2 22 05/18/2022    BUN 13 05/18/2022    CREATININE 0.6 05/18/2022    GLUCOSE 86 05/18/2022    CALCIUM 8.9 05/18/2022    PROT 6.6 05/13/2022    LABALBU 3.1 (L) 05/13/2022    BILITOT 0.7 05/13/2022    ALKPHOS 104 05/13/2022    AST 19 05/13/2022    ALT 15 05/13/2022    LABGLOM >60 05/18/2022    GFRAA >60 05/18/2022    PHOS 3.2 04/13/2021    MG 1.8 05/14/2022    POCGLU 132 (H) 02/25/2020     Lab Results   Component Value Date     05/16/2022     Lab Results   Component Value Date    TSHHS 0.732 04/13/2021    T4FREE 0.96 04/13/2021     Immunology:  Lab Results   Component Value Date    PROT 6.6 05/13/2022     Coagulation Panel:  Lab Results   Component Value Date    PROTIME 13.1 05/16/2022    INR 1.02 05/16/2022    APTT 41.0 (H) 05/16/2022     Anemia Panel:  Lab Results   Component Value Date    IRYDQOLH47 >2000 (H) 05/15/2022    FOLATE 14.8 05/15/2022     11/13/2020 Ferritin: 911 (H) Iron: 42 TIBC: 219 (L) Transferrin %: 19 Folate: >20.0 (H)  Vitamin B-12: 1154 (H)    4/13/2021 Ferritin: 733 (H) Iron: 110 TIBC: 219 (L) Transferrin %: 50 (H)    Assessment & Plan:  1. She was diagnosed with diffuse large B-cell lymphoma, no double hit.  She has first R-CHOP in January 7, 2020. [facility] ECHO showed LVEF at 50%. She had Mediport placement. She has second cycle R-CHOP in February 2020. 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 CT chest, abdomen and pelvis showed response. She completed course of antibiotic for pseudomonas bacteremia. She resumed chemotherapy on April 3, 2020. 18 2020. She completed chemotherapy on June 5, 2020. PET CT scan on 6/18/2020 showed response, still with intense uptake in the right hemipelvis measuring approximately 2.8 x 2.8 cm and demonstrating maximum SUV of 8.0. She completed pelvic radiation on 8/26/2020. PET scan in November 2020 showed remission. Imaging studies are optional.  She denied any symptoms to suggest recurrent lymphoma. Recent labs were reviewed. I will continue to monitor her closely. 2. She had LUE DVT and I recommend to continue with LMWH. I recommend to keep LUE and lower extremities elevated. LUE swelling has improved significantly. Lovenox discontinued 8/2020.    3. She had GIB related to colon ulcer/lymphoma s/p blood transfusion. Iron studies from April/2020 were reviewed. Reportedly colonoscopy November 2020 was fine. Stool tests was ordered by GI on December 24, 2020. She takes Imodium only as needed. Greasy meals cause her to have loose stool. She will follow up with GI. GI cleared without intervention, recommended metamucil which she declines. 4.  She has anemia of chronic disease. She has history of GI bleed. CBC in January 2022 showed WBC 4.9, hemoglobin 11.7, platelet 881. I will check labs today and advise to call for result. If anemia is worse, I will offer bone marrow study. 5. She had pneumonia in May 2022 and was in the hospital.  On today's visit she felt much better. She will follow-up with pulmonologist at [facility] I recommend short-term follow-up CT chest.  She will discuss with the pulmonologist at [facility]. I remind her to have Mediport flush every 3 months.      She has covid vaccine. Return to clinic in 1 month or sooner. All of her questions have been answered for today. Recent imaging and labs were reviewed and discussed with the patient.

## 2022-06-08 ENCOUNTER — OFFICE VISIT (OUTPATIENT)
Dept: ONCOLOGY | Age: 67
End: 2022-06-08
Payer: MEDICARE

## 2022-06-08 ENCOUNTER — HOSPITAL ENCOUNTER (OUTPATIENT)
Dept: INFUSION THERAPY | Age: 67
Discharge: HOME | End: 2022-06-08
Payer: MEDICARE

## 2022-06-08 VITALS
SYSTOLIC BLOOD PRESSURE: 109 MMHG | HEART RATE: 97 BPM | WEIGHT: 116 LBS | RESPIRATION RATE: 18 BRPM | HEIGHT: 63 IN | BODY MASS INDEX: 20.55 KG/M2 | DIASTOLIC BLOOD PRESSURE: 72 MMHG | TEMPERATURE: 97.6 F

## 2022-06-08 DIAGNOSIS — D64.9 ANEMIA, UNSPECIFIED TYPE: ICD-10-CM

## 2022-06-08 DIAGNOSIS — R53.83 OTHER FATIGUE: Primary | ICD-10-CM

## 2022-06-08 DIAGNOSIS — R53.83 OTHER FATIGUE: ICD-10-CM

## 2022-06-08 DIAGNOSIS — C85.16 B-CELL LYMPHOMA OF INTRAPELVIC LYMPH NODES, UNSPECIFIED B-CELL LYMPHOMA TYPE (HCC): ICD-10-CM

## 2022-06-08 LAB
BASOPHILS ABSOLUTE: 0.1 K/CU MM
BASOPHILS RELATIVE PERCENT: 1.1 % (ref 0–1)
DIFFERENTIAL TYPE: ABNORMAL
EOSINOPHILS ABSOLUTE: 0.4 K/CU MM
EOSINOPHILS RELATIVE PERCENT: 8.9 % (ref 0–3)
FERRITIN: 1087 NG/ML (ref 15–150)
HCT VFR BLD CALC: 31.2 % (ref 37–47)
HEMOGLOBIN: 10 GM/DL (ref 12.5–16)
IRON: 85 UG/DL (ref 37–145)
LACTATE DEHYDROGENASE: 189 IU/L (ref 120–246)
LYMPHOCYTES ABSOLUTE: 1 K/CU MM
LYMPHOCYTES RELATIVE PERCENT: 21.8 % (ref 24–44)
MCH RBC QN AUTO: 30.8 PG (ref 27–31)
MCHC RBC AUTO-ENTMCNC: 32.1 % (ref 32–36)
MCV RBC AUTO: 96 FL (ref 78–100)
MONOCYTES ABSOLUTE: 0.4 K/CU MM
MONOCYTES RELATIVE PERCENT: 9.2 % (ref 0–4)
PCT TRANSFERRIN: 33 % (ref 10–44)
PDW BLD-RTO: 14.5 % (ref 11.7–14.9)
PLATELET # BLD: 247 K/CU MM (ref 140–440)
PMV BLD AUTO: 9.1 FL (ref 7.5–11.1)
RBC # BLD: 3.25 M/CU MM (ref 4.2–5.4)
SEGMENTED NEUTROPHILS ABSOLUTE COUNT: 2.6 K/CU MM
SEGMENTED NEUTROPHILS RELATIVE PERCENT: 59 % (ref 36–66)
TOTAL IRON BINDING CAPACITY: 257 UG/DL (ref 250–450)
TSH HIGH SENSITIVITY: 3.21 UIU/ML (ref 0.27–4.2)
UNSATURATED IRON BINDING CAPACITY: 172 UG/DL (ref 110–370)
WBC # BLD: 4.4 K/CU MM (ref 4–10.5)

## 2022-06-08 PROCEDURE — 99214 OFFICE O/P EST MOD 30 MIN: CPT | Performed by: INTERNAL MEDICINE

## 2022-06-08 PROCEDURE — 3017F COLORECTAL CA SCREEN DOC REV: CPT | Performed by: INTERNAL MEDICINE

## 2022-06-08 PROCEDURE — 83540 ASSAY OF IRON: CPT

## 2022-06-08 PROCEDURE — 36415 COLL VENOUS BLD VENIPUNCTURE: CPT

## 2022-06-08 PROCEDURE — G8400 PT W/DXA NO RESULTS DOC: HCPCS | Performed by: INTERNAL MEDICINE

## 2022-06-08 PROCEDURE — 84480 ASSAY TRIIODOTHYRONINE (T3): CPT

## 2022-06-08 PROCEDURE — 85025 COMPLETE CBC W/AUTO DIFF WBC: CPT

## 2022-06-08 PROCEDURE — 83615 LACTATE (LD) (LDH) ENZYME: CPT

## 2022-06-08 PROCEDURE — 1090F PRES/ABSN URINE INCON ASSESS: CPT | Performed by: INTERNAL MEDICINE

## 2022-06-08 PROCEDURE — 1111F DSCHRG MED/CURRENT MED MERGE: CPT | Performed by: INTERNAL MEDICINE

## 2022-06-08 PROCEDURE — 1123F ACP DISCUSS/DSCN MKR DOCD: CPT | Performed by: INTERNAL MEDICINE

## 2022-06-08 PROCEDURE — G8427 DOCREV CUR MEDS BY ELIG CLIN: HCPCS | Performed by: INTERNAL MEDICINE

## 2022-06-08 PROCEDURE — 83550 IRON BINDING TEST: CPT

## 2022-06-08 PROCEDURE — 84443 ASSAY THYROID STIM HORMONE: CPT

## 2022-06-08 PROCEDURE — G8420 CALC BMI NORM PARAMETERS: HCPCS | Performed by: INTERNAL MEDICINE

## 2022-06-08 PROCEDURE — 82728 ASSAY OF FERRITIN: CPT

## 2022-06-08 PROCEDURE — 1036F TOBACCO NON-USER: CPT | Performed by: INTERNAL MEDICINE

## 2022-06-08 ASSESSMENT — PATIENT HEALTH QUESTIONNAIRE - PHQ9
SUM OF ALL RESPONSES TO PHQ QUESTIONS 1-9: 0
SUM OF ALL RESPONSES TO PHQ9 QUESTIONS 1 & 2: 0
SUM OF ALL RESPONSES TO PHQ QUESTIONS 1-9: 0
2. FEELING DOWN, DEPRESSED OR HOPELESS: 0
1. LITTLE INTEREST OR PLEASURE IN DOING THINGS: 0
SUM OF ALL RESPONSES TO PHQ QUESTIONS 1-9: 0
SUM OF ALL RESPONSES TO PHQ QUESTIONS 1-9: 0

## 2022-06-08 NOTE — PROGRESS NOTES
MA Rooming Questions  Patient: Noel Severe  MRN: [id number]    Date: 6/8/2022        1. Do you have any new issues?   no         2. Do you need any refills on medications?    no    3. Have you had any imaging done since your last visit? yes -     4. Have you been hospitalized or seen in the emergency room since your last visit here?   yes -     5. Did the patient have a depression screening completed today?  Yes    PHQ-9 Total Score: 0 (6/8/2022  9:20 AM)       PHQ-9 Given to (if applicable):               PHQ-9 Score (if applicable):                     [] Positive     []  Negative              Does question #9 need addressed (if applicable)                     [] Yes    []  No               Laura Vee CMA

## 2022-06-10 LAB — T3 TOTAL: 113 NG/DL (ref 80–200)

## 2022-06-27 NOTE — PROGRESS NOTES
Patient Name: Antelmo Delgado  Patient : 1955  Patient MRN: [id number]     Primary Oncologist: Costa Diaz MD  Referring Provider: Han Little MD     Date of Service: 2022      Chief Complaint:   Chief Complaint   Patient presents with    Follow-up     She came in for follow-up visit. Problem List:      Family history of malignant neoplasm of gastrointestinal tract     B-cell lymphoma (HCC)     Idiopathic hypotension     Mitral valve disease     Pancytopenia (HCC)     Severe malnutrition (HCC)     Deep vein thrombosis (DVT) of left upper extremity      Pain syndrome, chronic     HPI:   Efraín Durán is a pleasant 79year-old [address] female patient who was referred for right axillary lymphadenopathy, status post needle biopsy in 2016, which did not show any pathological abnormalities. I talked to Dr. Doroteo Zazueta, who favored benign lymph node. Right axillary lymph node biopsy in 2015 revealed benign lymph node hyperplasia. Mammogram in 2016 revealed low suspicion for malignancy, with several enlarged right axillary lymph nodes, 3.1 by 0.8 by 2.3 cm, 0.9 by 0.8 by 1 cm, 1.8 by 1.4 by 1.5, and 1.4 by 1.5 cm. None demonstrated significant hypervascularity. I offered her an excisional lymph node biopsy versus surveillance with followup ultrasound. She opted to go with the second one. Blood test in 2015 revealed normal CBC with normal calcium and alk phos, white cell count 5.8, hemoglobin 11.9, platelet was 228. Blood test on 2016 revealed normal CBC, with white cell count 6.5, hemoglobin 12.5, platelet 093. SED rate was 17, rheumatoid factor less than 10. SUZE was still pending. Ultrasound of the right breast and axilla revealed no significant interval change in the right axillary adenopathy, which still has remained most likely reactive in etiology. This was not seen on any mammogram dating back beyond .   She opted to go to see the surgeon; therefore, I referred her to see Dr. Kodak Cowan. She was last seen in our office 9/28/2016. Ms. Alie Garcia was seen by Dr. Kodak Cowan March 3, 2019 for evaluation of enlarging, tender mass in right axilla. She had excisional lymph node biopsy on March 12, 2019. Pathology revealed granulomatous lymphadenitis. Mammogram 2/1/2019 was benign. She had EGD and colonoscopy on 12/10/2019. EGD was unremarkable. Colonoscopy revealed in infiltrative, ulcerated and necrotic, non bleeding 50 mm mass of malignant appearance was found in the rectum at a distance between 12 cm and 18 cm from the anus. The mass caused a partial obstruction. Pathology from the mass revealed ulcerated large bowel mucosa with dense lymphoid infiltrate and abundant necrosis. - Negative for carcinoma. The lymphoid infiltrate and necrosis are suspicious for lymphoma. Results of flow cytometry revealed no flow immunophenotypic evidence of a lymphoproliferative disorder based on limited antibody panel. CT abdomen/pelvis on December 23, 2019 showed a heterogeneous solid 9.5 x 6.1 cm soft tissue mass within the right posterior pelivs which abuts the posterior margin of the uterus and involves the right lagteral margin of the rectosigmoid junction, indeterminate. This mass is concerning for malignancy, with uterine origin being favored, such as uterine sarcoma with suspected invasion of the rectosigmoid junction. In addition, a 5.8 x 10.5 x 1.1 cm central mesenteric soft tissue mass is seen, suspected metastatic disease. Additional uterine masses are seen which may relate to fibroids or extension of uterine sarcoma. A possible 6 mm pleural based nodule within the posterior left lower lobe, indeterminate in the setting of  suspected malignacy. She was seen by Dr. Elijah Lopez on December 26, 2019 for review of colonoscopy/egd results. She was referred to urology due to early hydronephrosis.    I discussed with pathologist to review the pathology report from rectal biopsy. She lost 30 pounds in the last 6 months. Mammogram was in 2018. She just recovered from shingle. She was prescribed Bentyl by Dr. Sejal Phillips. In January 2020 official pathology report showed diffuse large B-cell lymphoma, no double hit or triple hit. BCL 6 was positive. We discussed about potential treatment with R-CHOP. We discussed about the potential risks and benefit of the chemotherapy. Clinically she could have stage IIb or IIIb. She had first chemo on January 17, 2020. ECHO in January 2020 showed LVEF at 50%. Hep B/C were negative. PET CT scan on 1/27/2020 showed:  1. Intense metabolic activity associated with the abdominal and pelvic masses described above consistent with active lymphoma. Prior imaging has been requested and an addendum will be provided to described any interval changes. 2.  Linear FDG activity along the distal tip of the MediPort, which is in the left brachiocephalic vein just superior to the SVC. This can be seen with a fibrin sheath. Correlate with current functionality of the MediPort. ECHO in January 2020: Left ventricular systolic function is low normal. Ejection fraction is visually estimated at 50%. Paradoxical motion of the interventricular septum; possible conduction ?delay. ?Indeterminate diastolic function; E/A flow reversal is noted. ?Mitral valve prolapse is present. ?Moderate to severe mitral regurgitation is present. ?Moderate tricuspid regurgitation is present. RVSP is 25 mmHg. ? No evidence of any pericardial effusion. ?Mobile interatrial septum. ? Incidental finding: cyst vs mass near/attached to the right kidney. In March 2020 she was hospitalized due to anemia related GI related to colonic mass/lymphoma. She has mulitple blood transfusion. She had EGD and colonoscopy in March 2020. After GIB is controlled she started on LMWH for DVT related to fall and mediport.   CT chest, abdomen and pelvis in March 2020 after 2 cycled of chemo showed response to therapy. She completed lovenox injection after 6 cycle of chemo due on June 5, 2020. She completed chemotherapy on June 5, 2020. PET CT in June 2020 scan revealed:   Significantly decreased size and uptake of the masses within the lower abdomen/pelvis, consistent with treatment response. Notable: ABDOMEN/PELVIS: No abnormal uptake within the solid organs. Significantly decreased size of the left paramidline lower abdominal mass which measures approximately 3.1 x 1.6 cm and demonstrates maximum SUV of 2.2. Significantly improved bilateral pelvic uptake with a residual focus of intense uptake in the right hemipelvis measuring approximately 2.8 x 2.8 cm and demonstrating maximum SUV of 8.0. She had pelvic radiation therapy from July 27, 2020 through August 20, 2020. She received 36 Gy. She completed radiation therapy 8/26/2020. Imaging studies optional since the PET scan is completely negative in November 2020. PET 11/19/2020: continued response to treatment. Abnormal uptake in the deep right hemipelvis has resolved. The lower abdominal mass appears slightly smaller, although difficult to accurately measure without intravenous contrast, and demonstrates similar mild uptake. No new disease. Anemic work-up on November 13, 2020 was grossly unremarkable. Bone density on December 21, 2020 showed osteoporosis at lumbar spine. I recommend to discuss with family doctor about oral versus IV bisphosphonate versus Reclast.  I recommend to continue with vitamin D and calcium. I remind her about Mediport flush every 3 months. She was seen by GI and reportedly colonoscopy in November 2020 was okay. She takes Imodium once or twice a day. Diarrhea has improved. Stool specimen was obtained by GI. On 2/23/2021 WBC was 2.9, hemoglobin 10.2, platelet 583. Absolute neutrophils was 1.4. She has covid vaccine. Son had Covid infection.    was sick and hospitalized due to congestive heart failure and anemia. She has loose stool when eating greasy meals. She will follow up with GI. Mammogram in December 2020 was benign. CBC and CMP in January 2022 was reviewed. WBC was 4.9, hemoglobin 11.7, platelet 210. LDH was 238. She is in remission. We will continue with surveillance. Her weight has been stable. Remind her but Mediport flush every 3 months. On June 8, 2022 she came in for follow-up visit after discharge from the hospital.  She was hospitalized in May 2022 due to the pneumonia. 5/15/2022 Ferritin: 3,107 (H) Iron: 36 (L) TIBC: 125 (L)  Transferrin %: 29 FOLATE: 14.8 Vitamin B-12: >2000 (H)  . CT CAP 5/12/2022:  1. Masslike focus of consolidation in the right upper lobe measuring 6.7 cm with broad costal pleural base. Differential diagnosis includes pulmonary parenchymal involvement by lymphoma, primary lung cancer, or pneumonia. Recommend short interval follow-up chest CT versus percutaneous tissue sampling. 2. Cholelithiasis. 3. Mild compression deformity of L4 of uncertain chronicity, new since 11/19/2020.    5/17/2022 CT chest:  Decreased size and conspicuity of previously reported masslike area pulmonary consolidation right upper lobe compared to 05/12/2022. Biopsy deferred at this time. On July 25, 2022 she came in for follow up visit. LDH in June 2022 was 189. In July 2022 CMP grossly stable. Folate 7.9. Hg was 11.9. B-12 876.5. CT chest in July 2022:  1. Reticular and groundglass opacities in the right upper lobe with architectural distortion, consistent with scarring and possibly chronic atelectatic changes. Comparison with any prior outside imaging, if available, would be helpful to determine the chronicity of this finding. 2.  No other pulmonary lesions or masses. 3.  No mediastinal or hilar adenopathy. 4.  Ancillary findings discussed above. She will follow up with pulmonologist at [facility]. She continues to feel better.    I recommend to keep age-appropriate cancer screening up-to-date. She c/o leg cramp and asked me about SE of forteo. I recommend to discuss with PCP who prescribed forteo. SE of forteo includes leg cramp. No acute pain. Denied any nausea, vomiting. No fever or chills. No chest pain, shortness of breath or palpitation. No headache or dizzy spell. No specific bone pain. No melena or hematochezia. Denied any dysuria or hematuria. Past Medical History  Tubal ligation, tonsillectomy, colonoscopy in 2015 by Dr. Maribel Omalley, and reportedly there was no polyp. She had a Pap smear in 2015 by Dr. Ivette Tripp. She has bipolar disorder. Pap smear was in 2015, mammogram in March 2016. Shingles    Social History  Smoking Status Former smoker  She smoked 1 pack/day for 10 years and quit 30 years ago. She denies any alcohol or illicit drug use. Family History  Mother had breast cancer in her [de-identified], father had metastatic brain cancer. Previous Therapies:  R-CHOP     Review of Systems: \"Per interval history; otherwise 10 point ROS is negative. \"     Vital Signs:  /63 (Site: Left Upper Arm, Position: Sitting, Cuff Size: Medium Adult)   Pulse 90   Temp 97.6 °F (36.4 °C) (Infrared)   Ht 5' 3\" (1.6 m)   Wt 118 lb 9.6 oz (53.8 kg)   LMP 01/11/2000   SpO2 98%   BMI 21.01 kg/m²     Physical Exam:    CONSTITUTIONAL: awake, alert, cooperative, no apparent distress   EYES:  sclera clear, pupils are equal and round. Normal conjunctiva  ENT: Normocephalic, without obvious abnormality, atraumatic  NECK: supple, symmetrical. No JVD  HEMATOLOGIC/LYMPHATIC: no cervical, supraclavicular or axillary lymphadenopathy   LUNGS: no increased work of breathing and clear to auscultation   CARDIOVASCULAR: regular rate and rhythm, normal S1 and S2, no murmur  ABDOMEN: normal bowel sound, soft, non-distended, non-tender, no palpable masses.    MUSCULOSKELETAL: full range of motion noted, tone is normal  NEUROLOGIC: Motor skills grossly intact. CN II - XII grossly intact. SKIN: Normal skin color, texture, turgor and no jaundice. appears intact   EXTREMITIES: no LE edema or cyanosis.     Labs:  Hematology:  Lab Results   Component Value Date    WBC 7.8 07/18/2022    RBC 3.80 (L) 07/18/2022    HGB 11.9 (L) 07/18/2022    HCT 35.7 (L) 07/18/2022    MCV 93.9 07/18/2022    MCH 31.3 (H) 07/18/2022    MCHC 33.3 07/18/2022    RDW 13.6 07/18/2022     07/18/2022    MPV 9.6 07/18/2022    BANDSPCT 12 (H) 05/13/2022    SEGSPCT 78.8 (H) 07/18/2022    EOSRELPCT 0.0 07/18/2022    BASOPCT 0.0 07/18/2022    LYMPHOPCT 14.9 (L) 07/18/2022    MONOPCT 6.3 (H) 07/18/2022    BANDABS 2.28 05/13/2022    SEGSABS 6.2 07/18/2022    EOSABS 0.0 07/18/2022    BASOSABS 0.0 07/18/2022    LYMPHSABS 1.2 07/18/2022    MONOSABS 0.5 07/18/2022    DIFFTYPE AUTOMATED DIFFERENTIAL 07/18/2022    ANISOCYTOSIS 1+ 05/13/2022    POLYCHROM 1+ 05/13/2022    PLTM SEVERAL LARGE PLATELETS PRESENT 94/04/4111     Lab Results   Component Value Date    ESR 25 01/16/2020     Chemistry:  Lab Results   Component Value Date     07/18/2022    K 3.8 07/18/2022     07/18/2022    CO2 22 07/18/2022    BUN 20 07/18/2022    CREATININE 0.8 07/18/2022    GLUCOSE 121 (H) 07/18/2022    CALCIUM 8.9 07/18/2022    PROT 5.9 (L) 07/18/2022    LABALBU 4.0 07/18/2022    BILITOT 0.3 07/18/2022    ALKPHOS 72 07/18/2022    AST 15 07/18/2022    ALT 16 07/18/2022    LABGLOM >60 07/18/2022    GFRAA >60 07/18/2022    PHOS 3.2 04/13/2021    MG 1.8 05/14/2022    POCGLU 132 (H) 02/25/2020     Lab Results   Component Value Date     06/08/2022     Lab Results   Component Value Date    TSHHS 1.430 07/18/2022    T4FREE 0.96 04/13/2021     Immunology:  Lab Results   Component Value Date    PROT 5.9 (L) 07/18/2022     Coagulation Panel:  Lab Results   Component Value Date    PROTIME 13.1 05/16/2022    INR 1.02 05/16/2022    APTT 41.0 (H) 05/16/2022     Anemia Panel:  Lab Results   Component Value Date PJUOSFOD84 876.5 07/18/2022    FOLATE 17.9 (H) 07/18/2022 11/13/2020 Ferritin: 911 (H) Iron: 42 TIBC: 219 (L) Transferrin %: 19 Folate: >20.0 (H)  Vitamin B-12: 1154 (H)    4/13/2021 Ferritin: 733 (H) Iron: 110 TIBC: 219 (L) Transferrin %: 50 (H)    Assessment & Plan:  1. She was diagnosed with diffuse large B-cell lymphoma, no double hit. She has first R-CHOP in January 7, 2020. Faustina Raddle ECHO showed LVEF at 50%. She had Mediport placement. She has second cycle R-CHOP in February 2020. 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 CT chest, abdomen and pelvis showed response. She completed course of antibiotic for pseudomonas bacteremia. She resumed chemotherapy on April 3, 2020. 18 2020. She completed chemotherapy on June 5, 2020. PET CT scan on 6/18/2020 showed response, still with intense uptake in the right hemipelvis measuring approximately 2.8 x 2.8 cm and demonstrating maximum SUV of 8.0. She completed pelvic radiation on 8/26/2020. PET scan in November 2020 showed remission. Imaging studies are optional.  She denied any symptoms to suggest recurrent lymphoma. I will continue to monitor her closely. 2. She had LUE DVT and I recommend to continue with LMWH. I recommend to keep LUE and lower extremities elevated. LUE swelling has improved significantly. Lovenox discontinued 8/2020.    3. She had GIB related to colon ulcer/lymphoma s/p blood transfusion. Iron studies from April/2020 were reviewed. Reportedly colonoscopy November 2020 was fine. Stool tests was ordered by GI on December 24, 2020. She takes Imodium only as needed. Greasy meals cause her to have loose stool. She will follow up with GI. GI cleared without intervention, recommended metamucil which she declines. 4.  She has anemia of chronic disease. She has history of GI bleed. CBC in January 2022 showed WBC 4.9, hemoglobin 11.7, platelet 989. Anemia has improved. B-12 in July 2022 was 876.5.  Folate 17.9.    5. She had pneumonia in May 2022 and was in the hospital.  On today's visit she felt herself again. She will follow-up with pulmonologist at [facility]. CT chest in July 2022 was reviewed with her. She will discuss with the pulmonologist at [facility]. I remind her to have Mediport flush every 3 months. I recommend to keep cancer screening up to date. She has covid vaccine. Return to clinic in 3 month or sooner. All of her questions have been answered for today. Recent imaging and labs were reviewed and discussed with the patient.

## 2022-07-18 ENCOUNTER — HOSPITAL ENCOUNTER (OUTPATIENT)
Dept: INFUSION THERAPY | Age: 67
Discharge: HOME | End: 2022-07-18
Payer: MEDICARE

## 2022-07-18 DIAGNOSIS — D64.9 ANEMIA, UNSPECIFIED TYPE: ICD-10-CM

## 2022-07-18 DIAGNOSIS — C85.16 B-CELL LYMPHOMA OF INTRAPELVIC LYMPH NODES, UNSPECIFIED B-CELL LYMPHOMA TYPE (HCC): Primary | ICD-10-CM

## 2022-07-18 DIAGNOSIS — R53.83 OTHER FATIGUE: ICD-10-CM

## 2022-07-18 LAB
ALBUMIN SERPL-MCNC: 4 GM/DL (ref 3.4–5)
ALP BLD-CCNC: 72 IU/L (ref 40–128)
ALT SERPL-CCNC: 16 U/L (ref 10–40)
ANION GAP SERPL CALCULATED.3IONS-SCNC: 13 MMOL/L (ref 4–16)
AST SERPL-CCNC: 15 IU/L (ref 15–37)
BASOPHILS ABSOLUTE: 0 K/CU MM
BASOPHILS RELATIVE PERCENT: 0 % (ref 0–1)
BILIRUB SERPL-MCNC: 0.3 MG/DL (ref 0–1)
BUN BLDV-MCNC: 20 MG/DL (ref 6–23)
CALCIUM SERPL-MCNC: 8.9 MG/DL (ref 8.3–10.6)
CHLORIDE BLD-SCNC: 106 MMOL/L (ref 99–110)
CO2: 22 MMOL/L (ref 21–32)
CREAT SERPL-MCNC: 0.8 MG/DL (ref 0.6–1.1)
DIFFERENTIAL TYPE: ABNORMAL
EOSINOPHILS ABSOLUTE: 0 K/CU MM
EOSINOPHILS RELATIVE PERCENT: 0 % (ref 0–3)
FOLATE: 17.9 NG/ML (ref 3.1–17.5)
GFR AFRICAN AMERICAN: >60 ML/MIN/1.73M2
GFR NON-AFRICAN AMERICAN: >60 ML/MIN/1.73M2
GLUCOSE BLD-MCNC: 121 MG/DL (ref 70–99)
HCT VFR BLD CALC: 35.7 % (ref 37–47)
HEMOGLOBIN: 11.9 GM/DL (ref 12.5–16)
LYMPHOCYTES ABSOLUTE: 1.2 K/CU MM
LYMPHOCYTES RELATIVE PERCENT: 14.9 % (ref 24–44)
MCH RBC QN AUTO: 31.3 PG (ref 27–31)
MCHC RBC AUTO-ENTMCNC: 33.3 % (ref 32–36)
MCV RBC AUTO: 93.9 FL (ref 78–100)
MONOCYTES ABSOLUTE: 0.5 K/CU MM
MONOCYTES RELATIVE PERCENT: 6.3 % (ref 0–4)
PDW BLD-RTO: 13.6 % (ref 11.7–14.9)
PLATELET # BLD: 204 K/CU MM (ref 140–440)
PMV BLD AUTO: 9.6 FL (ref 7.5–11.1)
POTASSIUM SERPL-SCNC: 3.8 MMOL/L (ref 3.5–5.1)
RBC # BLD: 3.8 M/CU MM (ref 4.2–5.4)
SEGMENTED NEUTROPHILS ABSOLUTE COUNT: 6.2 K/CU MM
SEGMENTED NEUTROPHILS RELATIVE PERCENT: 78.8 % (ref 36–66)
SODIUM BLD-SCNC: 141 MMOL/L (ref 135–145)
TOTAL PROTEIN: 5.9 GM/DL (ref 6.4–8.2)
TSH HIGH SENSITIVITY: 1.43 UIU/ML (ref 0.27–4.2)
VITAMIN B-12: 876.5 PG/ML (ref 211–911)
WBC # BLD: 7.8 K/CU MM (ref 4–10.5)

## 2022-07-18 PROCEDURE — 6360000002 HC RX W HCPCS: Performed by: INTERNAL MEDICINE

## 2022-07-18 PROCEDURE — 85025 COMPLETE CBC W/AUTO DIFF WBC: CPT

## 2022-07-18 PROCEDURE — 84436 ASSAY OF TOTAL THYROXINE: CPT

## 2022-07-18 PROCEDURE — 82607 VITAMIN B-12: CPT

## 2022-07-18 PROCEDURE — 84443 ASSAY THYROID STIM HORMONE: CPT

## 2022-07-18 PROCEDURE — 2580000003 HC RX 258: Performed by: INTERNAL MEDICINE

## 2022-07-18 PROCEDURE — 82746 ASSAY OF FOLIC ACID SERUM: CPT

## 2022-07-18 PROCEDURE — 80053 COMPREHEN METABOLIC PANEL: CPT

## 2022-07-18 RX ORDER — HEPARIN SODIUM (PORCINE) LOCK FLUSH IV SOLN 100 UNIT/ML 100 UNIT/ML
500 SOLUTION INTRAVENOUS PRN
Status: CANCELLED | OUTPATIENT
Start: 2022-07-18

## 2022-07-18 RX ORDER — SODIUM CHLORIDE 0.9 % (FLUSH) 0.9 %
10 SYRINGE (ML) INJECTION PRN
Status: CANCELLED | OUTPATIENT
Start: 2022-07-18

## 2022-07-18 RX ORDER — SODIUM CHLORIDE 0.9 % (FLUSH) 0.9 %
20 SYRINGE (ML) INJECTION PRN
Status: CANCELLED | OUTPATIENT
Start: 2022-07-18

## 2022-07-18 RX ORDER — HEPARIN SODIUM (PORCINE) LOCK FLUSH IV SOLN 100 UNIT/ML 100 UNIT/ML
500 SOLUTION INTRAVENOUS PRN
Status: DISCONTINUED | OUTPATIENT
Start: 2022-07-18 | End: 2022-07-19 | Stop reason: HOSPADM

## 2022-07-18 RX ORDER — SODIUM CHLORIDE 0.9 % (FLUSH) 0.9 %
20 SYRINGE (ML) INJECTION PRN
Status: DISCONTINUED | OUTPATIENT
Start: 2022-07-18 | End: 2022-07-19 | Stop reason: HOSPADM

## 2022-07-18 RX ADMIN — SODIUM CHLORIDE, PRESERVATIVE FREE 20 ML: 5 INJECTION INTRAVENOUS at 10:48

## 2022-07-18 RX ADMIN — HEPARIN 500 UNITS: 100 SYRINGE at 10:48

## 2022-07-18 NOTE — PROGRESS NOTES
Pt reported today for port draw and flush. Has a Mediport on Right  side chest and was accessed with a 20 G  1 in  Gripper Plus Non-Coring safety needle. Was accessed on 1st attempt, blood return was noted, labs drawn and sent to lab for processing. Port was flushed with 20 cc's (0.9% Sodium Chloride Injection USP) and locked with 500 units of heparin. Pt tolerated procedure well.

## 2022-07-19 LAB — T4 TOTAL: 7.85 UG/DL (ref 4.5–11.7)

## 2022-07-25 ENCOUNTER — OFFICE VISIT (OUTPATIENT)
Dept: ONCOLOGY | Age: 67
End: 2022-07-25
Payer: MEDICARE

## 2022-07-25 ENCOUNTER — APPOINTMENT (OUTPATIENT)
Dept: INFUSION THERAPY | Age: 67
End: 2022-07-25
Payer: MEDICARE

## 2022-07-25 ENCOUNTER — HOSPITAL ENCOUNTER (OUTPATIENT)
Dept: INFUSION THERAPY | Age: 67
End: 2022-07-25

## 2022-07-25 VITALS
DIASTOLIC BLOOD PRESSURE: 63 MMHG | BODY MASS INDEX: 21.02 KG/M2 | HEIGHT: 63 IN | SYSTOLIC BLOOD PRESSURE: 113 MMHG | OXYGEN SATURATION: 98 % | HEART RATE: 90 BPM | WEIGHT: 118.6 LBS | TEMPERATURE: 97.6 F

## 2022-07-25 DIAGNOSIS — C83.30 DIFFUSE LARGE B-CELL LYMPHOMA, UNSPECIFIED BODY REGION (HCC): Primary | ICD-10-CM

## 2022-07-25 PROCEDURE — 1123F ACP DISCUSS/DSCN MKR DOCD: CPT | Performed by: INTERNAL MEDICINE

## 2022-07-25 PROCEDURE — 99214 OFFICE O/P EST MOD 30 MIN: CPT | Performed by: INTERNAL MEDICINE

## 2022-07-25 NOTE — PROGRESS NOTES
MA Rooming Questions  Patient: Nikunj Weiss  MRN: [id number]    Date: 7/25/2022        1. Do you have any new issues? yes - legs cramping at night - it it caused from taking calcium, Vit D and the Forteo injections? 2. Do you need any refills on medications?    no    3. Have you had any imaging done since your last visit?   no    4. Have you been hospitalized or seen in the emergency room since your last visit here?   no    5. Did the patient have a depression screening completed today?  No    No data recorded     PHQ-9 Given to (if applicable):               PHQ-9 Score (if applicable):                     [] Positive     []  Negative              Does question #9 need addressed (if applicable)                     [] Yes    []  No               Víctor Duran CMA

## 2022-10-02 NOTE — PROGRESS NOTES
Patient Name: Clinton Naranjo  Patient : 1955  Patient MRN: [id number]     Primary Oncologist: Venice Estrada MD  Referring Provider: Naveen Del Toro MD     Date of Service: 10/27/2022      Chief Complaint:   Chief Complaint   Patient presents with    Follow-up    Results     LABS 10/24       She came in for follow-up visit. Problem List:      Family history of malignant neoplasm of gastrointestinal tract     B-cell lymphoma (HCC)     Idiopathic hypotension     Mitral valve disease     Pancytopenia (HCC)     Severe malnutrition (HCC)     Deep vein thrombosis (DVT) of left upper extremity      Pain syndrome, chronic     HPI:   Melisa Joseph is a pleasant 79year-old [address] female patient who was referred for right axillary lymphadenopathy, status post needle biopsy in 2016, which did not show any pathological abnormalities. I talked to Dr. Courtney Roy, who favored benign lymph node. Right axillary lymph node biopsy in 2015 revealed benign lymph node hyperplasia. Mammogram in 2016 revealed low suspicion for malignancy, with several enlarged right axillary lymph nodes, 3.1 by 0.8 by 2.3 cm, 0.9 by 0.8 by 1 cm, 1.8 by 1.4 by 1.5, and 1.4 by 1.5 cm. None demonstrated significant hypervascularity. I offered her an excisional lymph node biopsy versus surveillance with followup ultrasound. She opted to go with the second one. Blood test in 2015 revealed normal CBC with normal calcium and alk phos, white cell count 5.8, hemoglobin 11.9, platelet was 442. Blood test on 2016 revealed normal CBC, with white cell count 6.5, hemoglobin 12.5, platelet 013. SED rate was 17, rheumatoid factor less than 10. SUZE was still pending. Ultrasound of the right breast and axilla revealed no significant interval change in the right axillary adenopathy, which still has remained most likely reactive in etiology.   This was not seen on any mammogram dating back beyond 2015.  She opted to go to see the surgeon; therefore, I referred her to see Dr. Brittney Muñiz. She was last seen in our office 9/28/2016. Ms. Reyna Marmolejo was seen by Dr. Brittney Muñiz March 3, 2019 for evaluation of enlarging, tender mass in right axilla. She had excisional lymph node biopsy on March 12, 2019. Pathology revealed granulomatous lymphadenitis. Mammogram 2/1/2019 was benign. She had EGD and colonoscopy on 12/10/2019. EGD was unremarkable. Colonoscopy revealed in infiltrative, ulcerated and necrotic, non bleeding 50 mm mass of malignant appearance was found in the rectum at a distance between 12 cm and 18 cm from the anus. The mass caused a partial obstruction. Pathology from the mass revealed ulcerated large bowel mucosa with dense lymphoid infiltrate and abundant necrosis. - Negative for carcinoma. The lymphoid infiltrate and necrosis are suspicious for lymphoma. Results of flow cytometry revealed no flow immunophenotypic evidence of a lymphoproliferative disorder based on limited antibody panel. CT abdomen/pelvis on December 23, 2019 showed a heterogeneous solid 9.5 x 6.1 cm soft tissue mass within the right posterior pelivs which abuts the posterior margin of the uterus and involves the right lagteral margin of the rectosigmoid junction, indeterminate. This mass is concerning for malignancy, with uterine origin being favored, such as uterine sarcoma with suspected invasion of the rectosigmoid junction. In addition, a 5.8 x 10.5 x 1.1 cm central mesenteric soft tissue mass is seen, suspected metastatic disease. Additional uterine masses are seen which may relate to fibroids or extension of uterine sarcoma. A possible 6 mm pleural based nodule within the posterior left lower lobe, indeterminate in the setting of  suspected malignacy. She was seen by Dr. Boyd Prior on December 26, 2019 for review of colonoscopy/egd results. She was referred to urology due to early hydronephrosis.    I discussed with pathologist to review the pathology report from rectal biopsy. She lost 30 pounds in the last 6 months. Mammogram was in 2018. She just recovered from shingle. She was prescribed Bentyl by Dr. Mady Greer. In January 2020 official pathology report showed diffuse large B-cell lymphoma, no double hit or triple hit. BCL 6 was positive. We discussed about potential treatment with R-CHOP. We discussed about the potential risks and benefit of the chemotherapy. Clinically she could have stage IIb or IIIb. She had first chemo on January 17, 2020. ECHO in January 2020 showed LVEF at 50%. Hep B/C were negative. PET CT scan on 1/27/2020 showed:  1. Intense metabolic activity associated with the abdominal and pelvic masses described above consistent with active lymphoma. Prior imaging has been requested and an addendum will be provided to described any interval changes. 2.  Linear FDG activity along the distal tip of the MediPort, which is in the left brachiocephalic vein just superior to the SVC. This can be seen with a fibrin sheath. Correlate with current functionality of the MediPort. ECHO in January 2020: Left ventricular systolic function is low normal. Ejection fraction is visually estimated at 50%. Paradoxical motion of the interventricular septum; possible conduction ?delay. ?Indeterminate diastolic function; E/A flow reversal is noted. ?Mitral valve prolapse is present. ?Moderate to severe mitral regurgitation is present. ?Moderate tricuspid regurgitation is present. RVSP is 25 mmHg. ? No evidence of any pericardial effusion. ?Mobile interatrial septum. ? Incidental finding: cyst vs mass near/attached to the right kidney. In March 2020 she was hospitalized due to anemia related GI related to colonic mass/lymphoma. She has mulitple blood transfusion. She had EGD and colonoscopy in March 2020. After GIB is controlled she started on LMWH for DVT related to fall and mediport.   CT chest, abdomen and pelvis in March 2020 after 2 cycled of chemo showed response to therapy. She completed lovenox injection after 6 cycle of chemo due on June 5, 2020. She completed chemotherapy on June 5, 2020. PET CT in June 2020 scan revealed:   Significantly decreased size and uptake of the masses within the lower abdomen/pelvis, consistent with treatment response. Notable: ABDOMEN/PELVIS: No abnormal uptake within the solid organs. Significantly decreased size of the left paramidline lower abdominal mass which measures approximately 3.1 x 1.6 cm and demonstrates maximum SUV of 2.2. Significantly improved bilateral pelvic uptake with a residual focus of intense uptake in the right hemipelvis measuring approximately 2.8 x 2.8 cm and demonstrating maximum SUV of 8.0. She had pelvic radiation therapy from July 27, 2020 through August 20, 2020. She received 36 Gy. She completed radiation therapy 8/26/2020. Imaging studies optional since the PET scan is completely negative in November 2020. PET 11/19/2020: continued response to treatment. Abnormal uptake in the deep right hemipelvis has resolved. The lower abdominal mass appears slightly smaller, although difficult to accurately measure without intravenous contrast, and demonstrates similar mild uptake. No new disease. Anemic work-up on November 13, 2020 was grossly unremarkable. Bone density on December 21, 2020 showed osteoporosis at lumbar spine. I recommend to discuss with family doctor about oral versus IV bisphosphonate versus Reclast.  I recommend to continue with vitamin D and calcium. I remind her about Mediport flush every 3 months. She was seen by GI and reportedly colonoscopy in November 2020 was okay. She takes Imodium once or twice a day. Diarrhea has improved. Stool specimen was obtained by GI. On 2/23/2021 WBC was 2.9, hemoglobin 10.2, platelet 629. Absolute neutrophils was 1.4. She has covid vaccine. Son had Covid infection.    was sick and hospitalized due to congestive heart failure and anemia. She has loose stool when eating greasy meals. She will follow up with GI. Mammogram in December 2020 was benign. CBC and CMP in January 2022 was reviewed. WBC was 4.9, hemoglobin 11.7, platelet 852. LDH was 238. She was hospitalized in May 2022 due to the pneumonia. 5/15/2022 Ferritin: 3,107 (H) Iron: 36 (L) TIBC: 125 (L)  Transferrin %: 29 FOLATE: 14.8 Vitamin B-12: >2000 (H) . CT CAP 5/12/2022:  1. Masslike focus of consolidation in the right upper lobe measuring 6.7 cm with broad costal pleural base. Differential diagnosis includes pulmonary parenchymal involvement by lymphoma, primary lung cancer, or pneumonia. Recommend short interval follow-up chest CT versus percutaneous tissue sampling. 2. Cholelithiasis. 3. Mild compression deformity of L4 of uncertain chronicity, new since 11/19/2020.    5/17/2022 CT chest:  Decreased size and conspicuity of previously reported masslike area pulmonary consolidation right upper lobe compared to 05/12/2022. Biopsy deferred at this time. LDH in June 2022 was 189. In July 2022 CMP grossly stable. Folate 7.9. Hg was 11.9. B-12 876.5. CT chest in July 2022:  1. Reticular and groundglass opacities in the right upper lobe with architectural distortion, consistent with scarring and possibly chronic atelectatic changes. Comparison with any prior outside imaging, if available, would be helpful to determine the chronicity of this finding. 2.  No other pulmonary lesions or masses. 3.  No mediastinal or hilar adenopathy. 4.  Ancillary findings discussed above. She will follow up with pulmonologist at [facility]. She continues to feel better. I recommend to keep age-appropriate cancer screening up-to-date. She c/o leg cramp and asked me about SE of forteo. I recommend to discuss with PCP who prescribed forteo. SE of forteo includes leg cramp.     10/27/2022 she came in for follow up visit. 10/2022 CMP grossly wnl. , Hg 10.9, MCV 96.2. She feels great and gains few lbs. She will watch for bleeding signs. She is agreeable to continue with surveillance. Mammogram is due in 2/2023. We may consider bone marrow study if she remains anemic despite Iron correction. No acute pain. Denied any nausea, vomiting. No fever or chills. No chest pain, shortness of breath or palpitation. No headache or dizzy spell. No specific bone pain. No melena or hematochezia. Denied any dysuria or hematuria. Past Medical History  Tubal ligation, tonsillectomy, colonoscopy in 2015 by Dr. Vishnu Ramírez, and reportedly there was no polyp. She had a Pap smear in 2015 by Dr. Carmita Kee. She has bipolar disorder. Pap smear was in 2015, mammogram in March 2016. Shingles    Social History  Smoking Status Former smoker  She smoked 1 pack/day for 10 years and quit 30 years ago. She denies any alcohol or illicit drug use. Family History  Mother had breast cancer in her [de-identified], father had metastatic brain cancer. Previous Therapies:  R-CHOP     Review of Systems: \"Per interval history; otherwise 10 point ROS is negative. \"     Vital Signs:  /64 (Site: Left Upper Arm, Position: Sitting, Cuff Size: Medium Adult)   Pulse 89   Temp 97.4 °F (36.3 °C) (Temporal)   Ht 5' 3\" (1.6 m)   Wt 122 lb 12.8 oz (55.7 kg)   LMP 01/11/2000   SpO2 100%   BMI 21.75 kg/m²     Physical Exam:    CONSTITUTIONAL: awake, alert, cooperative, no apparent distress   EYES:  sclera clear, pupils are equal and round. + pallor  ENT: Normocephalic, without obvious abnormality, atraumatic  NECK: supple, symmetrical. No JVD  HEMATOLOGIC/LYMPHATIC: no cervical, supraclavicular or axillary lymphadenopathy   LUNGS: clear to auscultation b/l  CARDIOVASCULAR: regular rate and rhythm, normal S1 and S2, no murmur  ABDOMEN: normal bowel sound, soft, non-distended, non-tender, no palpable masses.    MUSCULOSKELETAL: full range of motion noted, tone Panel:  Lab Results   Component Value Date    [id number] 876.5 07/18/2022    FOLATE 17.9 (H) 07/18/2022 11/13/2020 Ferritin: 911 (H) Iron: 42 TIBC: 219 (L) Transferrin %: 19 Folate: >20.0 (H)  Vitamin B-12: 1154 (H)    4/13/2021 Ferritin: 733 (H) Iron: 110 TIBC: 219 (L) Transferrin %: 50 (H)    Assessment & Plan:  1. She was diagnosed with diffuse large B-cell lymphoma, no double hit. She has first R-CHOP in January 7, 2020. [facility] ECHO showed LVEF at 50%. She had Mediport placement. She has second cycle R-CHOP in February 2020. 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 CT chest, abdomen and pelvis showed response. She completed course of antibiotic for pseudomonas bacteremia. She resumed chemotherapy on April 3, 2020. 18 2020. She completed chemotherapy on June 5, 2020. PET CT scan on 6/18/2020 showed response, still with intense uptake in the right hemipelvis measuring approximately 2.8 x 2.8 cm and demonstrating maximum SUV of 8.0. She completed pelvic radiation on 8/26/2020. PET scan in November 2020 showed remission. Imaging studies optional.  She denied any symptoms to suggest recurrent lymphoma. She will continue with surveillance. 2. She had LUE DVT and I recommend to continue with LMWH. I recommend to keep LUE and lower extremities elevated. LUE swelling has improved significantly. Lovenox discontinued 8/2020.    3. She had GIB related to colon ulcer/lymphoma s/p blood transfusion. Iron studies from April/2020 were reviewed. Reportedly colonoscopy November 2020 was fine. Stool tests was ordered by GI on December 24, 2020. She takes Imodium only as needed. Greasy meals cause her to have loose stool. She will follow up with GI. GI cleared without intervention, recommended metamucil which she declines. 4.  She has anemia of chronic disease. She has history of GI bleed. CBC in January 2022 showed WBC 4.9, hemoglobin 11.7, platelet 326. Anemia has improved.  B-12 in July 2022 was 876.5. Folate 17.9. Recent labs reviewed. I will recheck labs prior to next OV. 5. She had pneumonia in May 2022 and was in the hospital.  On today's visit she felt herself again. She will follow-up with pulmonologist at [facility]. CT chest in July 2022 was reviewed with her. She will discuss with the pulmonologist at [facility]. I remind her to have Mediport flush every 3 months. I recommend to keep cancer screening up to date. Mammogram in 2/2023. She has covid vaccine. Return to clinic in 3 month or sooner. All of her questions have been answered for today. Recent imaging and labs were reviewed and discussed with the patient.

## 2022-10-24 ENCOUNTER — HOSPITAL ENCOUNTER (OUTPATIENT)
Dept: INFUSION THERAPY | Age: 67
Discharge: HOME | End: 2022-10-24
Payer: MEDICARE

## 2022-10-24 DIAGNOSIS — C83.30 DIFFUSE LARGE B-CELL LYMPHOMA, UNSPECIFIED BODY REGION (HCC): ICD-10-CM

## 2022-10-24 DIAGNOSIS — C85.16 B-CELL LYMPHOMA OF INTRAPELVIC LYMPH NODES, UNSPECIFIED B-CELL LYMPHOMA TYPE (HCC): Primary | ICD-10-CM

## 2022-10-24 LAB
ALBUMIN SERPL-MCNC: 4.3 GM/DL (ref 3.4–5)
ALP BLD-CCNC: 66 IU/L (ref 40–129)
ALT SERPL-CCNC: 9 U/L (ref 10–40)
ANION GAP SERPL CALCULATED.3IONS-SCNC: 9 MMOL/L (ref 4–16)
AST SERPL-CCNC: 17 IU/L (ref 15–37)
BASOPHILS ABSOLUTE: 0 K/CU MM
BASOPHILS RELATIVE PERCENT: 0.2 % (ref 0–1)
BILIRUB SERPL-MCNC: 0.3 MG/DL (ref 0–1)
BUN BLDV-MCNC: 18 MG/DL (ref 6–23)
CALCIUM SERPL-MCNC: 9.8 MG/DL (ref 8.3–10.6)
CHLORIDE BLD-SCNC: 103 MMOL/L (ref 99–110)
CO2: 26 MMOL/L (ref 21–32)
CREAT SERPL-MCNC: 0.7 MG/DL (ref 0.6–1.1)
DIFFERENTIAL TYPE: ABNORMAL
EOSINOPHILS ABSOLUTE: 0.2 K/CU MM
EOSINOPHILS RELATIVE PERCENT: 4.7 % (ref 0–3)
GFR SERPL CREATININE-BSD FRML MDRD: >60 ML/MIN/1.73M2
GLUCOSE BLD-MCNC: 94 MG/DL (ref 70–99)
HCT VFR BLD CALC: 32.8 % (ref 37–47)
HEMOGLOBIN: 10.9 GM/DL (ref 12.5–16)
LACTATE DEHYDROGENASE: 186 IU/L (ref 120–246)
LYMPHOCYTES ABSOLUTE: 1.3 K/CU MM
LYMPHOCYTES RELATIVE PERCENT: 27 % (ref 24–44)
MCH RBC QN AUTO: 32 PG (ref 27–31)
MCHC RBC AUTO-ENTMCNC: 33.2 % (ref 32–36)
MCV RBC AUTO: 96.2 FL (ref 78–100)
MONOCYTES ABSOLUTE: 0.6 K/CU MM
MONOCYTES RELATIVE PERCENT: 11.5 % (ref 0–4)
PDW BLD-RTO: 13.1 % (ref 11.7–14.9)
PLATELET # BLD: 210 K/CU MM (ref 140–440)
PMV BLD AUTO: 9.6 FL (ref 7.5–11.1)
POTASSIUM SERPL-SCNC: 4.2 MMOL/L (ref 3.5–5.1)
RBC # BLD: 3.41 M/CU MM (ref 4.2–5.4)
SEGMENTED NEUTROPHILS ABSOLUTE COUNT: 2.8 K/CU MM
SEGMENTED NEUTROPHILS RELATIVE PERCENT: 56.6 % (ref 36–66)
SODIUM BLD-SCNC: 138 MMOL/L (ref 135–145)
TOTAL PROTEIN: 5.9 GM/DL (ref 6.4–8.2)
WBC # BLD: 4.9 K/CU MM (ref 4–10.5)

## 2022-10-24 PROCEDURE — 83615 LACTATE (LD) (LDH) ENZYME: CPT

## 2022-10-24 PROCEDURE — 85025 COMPLETE CBC W/AUTO DIFF WBC: CPT

## 2022-10-24 PROCEDURE — 80053 COMPREHEN METABOLIC PANEL: CPT

## 2022-10-24 PROCEDURE — 36591 DRAW BLOOD OFF VENOUS DEVICE: CPT

## 2022-10-24 PROCEDURE — 2580000003 HC RX 258: Performed by: INTERNAL MEDICINE

## 2022-10-24 PROCEDURE — 6360000002 HC RX W HCPCS: Performed by: INTERNAL MEDICINE

## 2022-10-24 RX ORDER — SODIUM CHLORIDE 0.9 % (FLUSH) 0.9 %
20 SYRINGE (ML) INJECTION PRN
OUTPATIENT
Start: 2022-10-24

## 2022-10-24 RX ORDER — SODIUM CHLORIDE 0.9 % (FLUSH) 0.9 %
10 SYRINGE (ML) INJECTION PRN
OUTPATIENT
Start: 2022-10-24

## 2022-10-24 RX ORDER — WATER 1000 ML/1000ML
2.2 INJECTION, SOLUTION INTRAVENOUS ONCE
OUTPATIENT
Start: 2022-10-24 | End: 2022-10-24

## 2022-10-24 RX ORDER — HEPARIN SODIUM (PORCINE) LOCK FLUSH IV SOLN 100 UNIT/ML 100 UNIT/ML
500 SOLUTION INTRAVENOUS PRN
OUTPATIENT
Start: 2022-10-24

## 2022-10-24 RX ORDER — SODIUM CHLORIDE 0.9 % (FLUSH) 0.9 %
20 SYRINGE (ML) INJECTION PRN
Status: DISCONTINUED | OUTPATIENT
Start: 2022-10-24 | End: 2022-10-25 | Stop reason: HOSPADM

## 2022-10-24 RX ORDER — HEPARIN SODIUM (PORCINE) LOCK FLUSH IV SOLN 100 UNIT/ML 100 UNIT/ML
500 SOLUTION INTRAVENOUS PRN
Status: DISCONTINUED | OUTPATIENT
Start: 2022-10-24 | End: 2022-10-25 | Stop reason: HOSPADM

## 2022-10-24 RX ADMIN — SODIUM CHLORIDE, PRESERVATIVE FREE 20 ML: 5 INJECTION INTRAVENOUS at 15:11

## 2022-10-24 RX ADMIN — HEPARIN 500 UNITS: 100 SYRINGE at 15:11

## 2022-10-24 NOTE — PROGRESS NOTES
Pt reported today for port draw and flush. Has a Mediport on Right  side chest and was accessed with a 20 G  1 in  Gripper Plus Non-Coring safety needle. Was accessed on 1st attempt, blood return was noted, labs drawn and sent to lab for processing. Port was flushed with 20 cc's (0.9% Sodium Chloride Injection USP) and locked with 500 units of heparin.  Pt tolerated procedure well

## 2022-10-27 ENCOUNTER — OFFICE VISIT (OUTPATIENT)
Dept: ONCOLOGY | Age: 67
End: 2022-10-27
Payer: MEDICARE

## 2022-10-27 ENCOUNTER — HOSPITAL ENCOUNTER (OUTPATIENT)
Dept: INFUSION THERAPY | Age: 67
Discharge: HOME | End: 2022-10-27
Payer: MEDICARE

## 2022-10-27 VITALS
OXYGEN SATURATION: 100 % | HEIGHT: 63 IN | BODY MASS INDEX: 21.76 KG/M2 | TEMPERATURE: 97.4 F | HEART RATE: 89 BPM | DIASTOLIC BLOOD PRESSURE: 64 MMHG | SYSTOLIC BLOOD PRESSURE: 114 MMHG | WEIGHT: 122.8 LBS

## 2022-10-27 DIAGNOSIS — D64.9 ANEMIA, UNSPECIFIED TYPE: ICD-10-CM

## 2022-10-27 DIAGNOSIS — C83.30 DIFFUSE LARGE B-CELL LYMPHOMA, UNSPECIFIED BODY REGION (HCC): Primary | ICD-10-CM

## 2022-10-27 PROCEDURE — 1090F PRES/ABSN URINE INCON ASSESS: CPT | Performed by: INTERNAL MEDICINE

## 2022-10-27 PROCEDURE — 99213 OFFICE O/P EST LOW 20 MIN: CPT | Performed by: INTERNAL MEDICINE

## 2022-10-27 PROCEDURE — G8427 DOCREV CUR MEDS BY ELIG CLIN: HCPCS | Performed by: INTERNAL MEDICINE

## 2022-10-27 PROCEDURE — G8400 PT W/DXA NO RESULTS DOC: HCPCS | Performed by: INTERNAL MEDICINE

## 2022-10-27 PROCEDURE — G8484 FLU IMMUNIZE NO ADMIN: HCPCS | Performed by: INTERNAL MEDICINE

## 2022-10-27 PROCEDURE — 99211 OFF/OP EST MAY X REQ PHY/QHP: CPT

## 2022-10-27 PROCEDURE — 1123F ACP DISCUSS/DSCN MKR DOCD: CPT | Performed by: INTERNAL MEDICINE

## 2022-10-27 PROCEDURE — 1036F TOBACCO NON-USER: CPT | Performed by: INTERNAL MEDICINE

## 2022-10-27 PROCEDURE — 3017F COLORECTAL CA SCREEN DOC REV: CPT | Performed by: INTERNAL MEDICINE

## 2022-10-27 PROCEDURE — G8420 CALC BMI NORM PARAMETERS: HCPCS | Performed by: INTERNAL MEDICINE

## 2022-10-27 RX ORDER — METOPROLOL SUCCINATE 25 MG/1
TABLET, EXTENDED RELEASE ORAL
COMMUNITY
Start: 2022-10-11

## 2022-10-27 RX ORDER — BECLOMETHASONE DIPROPIONATE 80 UG/1
AEROSOL, METERED NASAL
COMMUNITY
Start: 2022-10-13

## 2022-10-27 RX ORDER — ONDANSETRON HYDROCHLORIDE 8 MG/1
TABLET, FILM COATED ORAL
COMMUNITY
Start: 2022-09-12 | End: 2022-11-02 | Stop reason: ALTCHOICE

## 2022-10-27 RX ORDER — PEN NEEDLE, DIABETIC 31 GX3/16"
NEEDLE, DISPOSABLE MISCELLANEOUS
COMMUNITY
Start: 2022-07-29

## 2022-10-27 ASSESSMENT — PATIENT HEALTH QUESTIONNAIRE - PHQ9
SUM OF ALL RESPONSES TO PHQ QUESTIONS 1-9: 0
SUM OF ALL RESPONSES TO PHQ QUESTIONS 1-9: 0
2. FEELING DOWN, DEPRESSED OR HOPELESS: 0
SUM OF ALL RESPONSES TO PHQ QUESTIONS 1-9: 0
SUM OF ALL RESPONSES TO PHQ9 QUESTIONS 1 & 2: 0
SUM OF ALL RESPONSES TO PHQ QUESTIONS 1-9: 0
1. LITTLE INTEREST OR PLEASURE IN DOING THINGS: 0

## 2022-10-27 NOTE — PROGRESS NOTES
MA Rooming Questions  Patient: [facility]  MRN: [id number]    Date: 10/27/2022        1. Do you have any new issues?   no         2. Do you need any refills on medications?    no    3. Have you had any imaging done since your last visit? yes - LABS 10/24    4. Have you been hospitalized or seen in the emergency room since your last visit here?   no    5. Did the patient have a depression screening completed today?  Yes    PHQ-9 Total Score: 0 (10/27/2022  9:04 AM)       PHQ-9 Given to (if applicable):               PHQ-9 Score (if applicable):                     [] Positive     []  Negative              Does question #9 need addressed (if applicable)                     [] Yes    []  No               [place] CMA

## 2022-11-02 ENCOUNTER — APPOINTMENT (OUTPATIENT)
Dept: GENERAL RADIOLOGY | Age: 67
End: 2022-11-02
Payer: MEDICARE

## 2022-11-02 ENCOUNTER — HOSPITAL ENCOUNTER (EMERGENCY)
Age: 67
Discharge: HOME | End: 2022-11-02
Attending: EMERGENCY MEDICINE
Payer: MEDICARE

## 2022-11-02 VITALS
OXYGEN SATURATION: 99 % | RESPIRATION RATE: 18 BRPM | DIASTOLIC BLOOD PRESSURE: 90 MMHG | SYSTOLIC BLOOD PRESSURE: 119 MMHG | TEMPERATURE: 98.6 F | HEIGHT: 63 IN | HEART RATE: 102 BPM | WEIGHT: 124 LBS | BODY MASS INDEX: 21.97 KG/M2

## 2022-11-02 DIAGNOSIS — N17.9 AKI (ACUTE KIDNEY INJURY) (HCC): ICD-10-CM

## 2022-11-02 DIAGNOSIS — J10.1 INFLUENZA A: Primary | ICD-10-CM

## 2022-11-02 LAB
ADENOVIRUS DETECTION BY PCR: NOT DETECTED
ALBUMIN SERPL-MCNC: 4 GM/DL (ref 3.4–5)
ALP BLD-CCNC: 49 IU/L (ref 40–129)
ALT SERPL-CCNC: 12 U/L (ref 10–40)
ANION GAP SERPL CALCULATED.3IONS-SCNC: 15 MMOL/L (ref 4–16)
AST SERPL-CCNC: 24 IU/L (ref 15–37)
BACTERIA: NEGATIVE /HPF
BASOPHILS ABSOLUTE: 0 K/CU MM
BASOPHILS RELATIVE PERCENT: 0.2 % (ref 0–1)
BILIRUB SERPL-MCNC: 0.5 MG/DL (ref 0–1)
BILIRUBIN URINE: NEGATIVE MG/DL
BLOOD, URINE: ABNORMAL
BORDETELLA PARAPERTUSSIS BY PCR: NOT DETECTED
BORDETELLA PERTUSSIS PCR: NOT DETECTED
BUN BLDV-MCNC: 23 MG/DL (ref 6–23)
CALCIUM SERPL-MCNC: 9.3 MG/DL (ref 8.3–10.6)
CHLAMYDOPHILA PNEUMONIA PCR: NOT DETECTED
CHLORIDE BLD-SCNC: 99 MMOL/L (ref 99–110)
CLARITY: CLEAR
CO2: 23 MMOL/L (ref 21–32)
COLOR: YELLOW
CORONAVIRUS 229E PCR: NOT DETECTED
CORONAVIRUS HKU1 PCR: NOT DETECTED
CORONAVIRUS NL63 PCR: NOT DETECTED
CORONAVIRUS OC43 PCR: NOT DETECTED
CREAT SERPL-MCNC: 1.3 MG/DL (ref 0.6–1.1)
DIFFERENTIAL TYPE: ABNORMAL
EOSINOPHILS ABSOLUTE: 0 K/CU MM
EOSINOPHILS RELATIVE PERCENT: 0.2 % (ref 0–3)
GFR SERPL CREATININE-BSD FRML MDRD: 45 ML/MIN/1.73M2
GLUCOSE BLD-MCNC: 92 MG/DL (ref 70–99)
GLUCOSE, URINE: NEGATIVE MG/DL
HCT VFR BLD CALC: 33.7 % (ref 37–47)
HEMOGLOBIN: 11.3 GM/DL (ref 12.5–16)
HUMAN METAPNEUMOVIRUS PCR: NOT DETECTED
IMMATURE NEUTROPHIL %: 0.3 % (ref 0–0.43)
INFLUENZA A BY PCR: ABNORMAL
INFLUENZA A H1 (2009) PCR: NOT DETECTED
INFLUENZA A H1 PANDEMIC PCR: NOT DETECTED
INFLUENZA A H3 PCR: ABNORMAL
INFLUENZA B BY PCR: NOT DETECTED
KETONES, URINE: 15 MG/DL
LACTIC ACID, SEPSIS: 1.1 MMOL/L (ref 0.5–1.9)
LEUKOCYTE ESTERASE, URINE: NEGATIVE
LYMPHOCYTES ABSOLUTE: 1.3 K/CU MM
LYMPHOCYTES RELATIVE PERCENT: 19.5 % (ref 24–44)
MCH RBC QN AUTO: 32 PG (ref 27–31)
MCHC RBC AUTO-ENTMCNC: 33.5 % (ref 32–36)
MCV RBC AUTO: 95.5 FL (ref 78–100)
MONOCYTES ABSOLUTE: 0.9 K/CU MM
MONOCYTES RELATIVE PERCENT: 13.3 % (ref 0–4)
MYCOPLASMA PNEUMONIAE PCR: NOT DETECTED
NITRITE URINE, QUANTITATIVE: NEGATIVE
NUCLEATED RBC %: 0 %
PARAINFLUENZA 1 PCR: NOT DETECTED
PARAINFLUENZA 2 PCR: NOT DETECTED
PARAINFLUENZA 3 PCR: NOT DETECTED
PARAINFLUENZA 4 PCR: NOT DETECTED
PDW BLD-RTO: 12.7 % (ref 11.7–14.9)
PH, URINE: 5.5 (ref 5–8)
PLATELET # BLD: 138 K/CU MM (ref 140–440)
PMV BLD AUTO: 10.6 FL (ref 7.5–11.1)
POTASSIUM SERPL-SCNC: 4.2 MMOL/L (ref 3.5–5.1)
PROTEIN UA: NEGATIVE MG/DL
RBC # BLD: 3.53 M/CU MM (ref 4.2–5.4)
RBC URINE: NORMAL /HPF (ref 0–6)
RHINOVIRUS ENTEROVIRUS PCR: NOT DETECTED
RSV PCR: NOT DETECTED
SARS-COV-2: NOT DETECTED
SEGMENTED NEUTROPHILS ABSOLUTE COUNT: 4.3 K/CU MM
SEGMENTED NEUTROPHILS RELATIVE PERCENT: 66.5 % (ref 36–66)
SODIUM BLD-SCNC: 137 MMOL/L (ref 135–145)
SPECIFIC GRAVITY UA: 1.01 (ref 1–1.03)
SQUAMOUS EPITHELIAL: 1 /HPF
TOTAL IMMATURE NEUTOROPHIL: 0.02 K/CU MM
TOTAL NUCLEATED RBC: 0 K/CU MM
TOTAL PROTEIN: 6.5 GM/DL (ref 6.4–8.2)
TRICHOMONAS: NORMAL /HPF
UROBILINOGEN, URINE: 0.2 MG/DL (ref 0.2–1)
WBC # BLD: 6.4 K/CU MM (ref 4–10.5)
WBC UA: 1 /HPF (ref 0–5)

## 2022-11-02 PROCEDURE — 2580000003 HC RX 258: Performed by: EMERGENCY MEDICINE

## 2022-11-02 PROCEDURE — 80053 COMPREHEN METABOLIC PANEL: CPT

## 2022-11-02 PROCEDURE — 6370000000 HC RX 637 (ALT 250 FOR IP): Performed by: EMERGENCY MEDICINE

## 2022-11-02 PROCEDURE — 81001 URINALYSIS AUTO W/SCOPE: CPT

## 2022-11-02 PROCEDURE — 93005 ELECTROCARDIOGRAM TRACING: CPT | Performed by: EMERGENCY MEDICINE

## 2022-11-02 PROCEDURE — 71045 X-RAY EXAM CHEST 1 VIEW: CPT

## 2022-11-02 PROCEDURE — 96360 HYDRATION IV INFUSION INIT: CPT

## 2022-11-02 PROCEDURE — 96361 HYDRATE IV INFUSION ADD-ON: CPT

## 2022-11-02 PROCEDURE — 87040 BLOOD CULTURE FOR BACTERIA: CPT

## 2022-11-02 PROCEDURE — 99285 EMERGENCY DEPT VISIT HI MDM: CPT

## 2022-11-02 PROCEDURE — 85025 COMPLETE CBC W/AUTO DIFF WBC: CPT

## 2022-11-02 PROCEDURE — 83605 ASSAY OF LACTIC ACID: CPT

## 2022-11-02 PROCEDURE — 87086 URINE CULTURE/COLONY COUNT: CPT

## 2022-11-02 PROCEDURE — 0202U NFCT DS 22 TRGT SARS-COV-2: CPT

## 2022-11-02 RX ORDER — OSELTAMIVIR PHOSPHATE 75 MG/1
75 CAPSULE ORAL ONCE
Status: COMPLETED | OUTPATIENT
Start: 2022-11-02 | End: 2022-11-02

## 2022-11-02 RX ORDER — OSELTAMIVIR PHOSPHATE 75 MG/1
75 CAPSULE ORAL 2 TIMES DAILY
Qty: 10 CAPSULE | Refills: 0 | Status: SHIPPED | OUTPATIENT
Start: 2022-11-02 | End: 2022-11-07

## 2022-11-02 RX ORDER — ONDANSETRON 4 MG/1
4 TABLET, FILM COATED ORAL EVERY 8 HOURS PRN
Qty: 10 TABLET | Refills: 0 | Status: SHIPPED | OUTPATIENT
Start: 2022-11-02

## 2022-11-02 RX ORDER — 0.9 % SODIUM CHLORIDE 0.9 %
500 INTRAVENOUS SOLUTION INTRAVENOUS ONCE
Status: COMPLETED | OUTPATIENT
Start: 2022-11-02 | End: 2022-11-02

## 2022-11-02 RX ORDER — ONDANSETRON 4 MG/1
4 TABLET, ORALLY DISINTEGRATING ORAL ONCE
Status: COMPLETED | OUTPATIENT
Start: 2022-11-02 | End: 2022-11-02

## 2022-11-02 RX ORDER — SODIUM CHLORIDE 9 MG/ML
INJECTION, SOLUTION INTRAVENOUS CONTINUOUS
Status: DISCONTINUED | OUTPATIENT
Start: 2022-11-02 | End: 2022-11-02

## 2022-11-02 RX ADMIN — OSELTAMIVIR PHOSPHATE 75 MG: 75 CAPSULE ORAL at 21:26

## 2022-11-02 RX ADMIN — SODIUM CHLORIDE 500 ML: 9 INJECTION, SOLUTION INTRAVENOUS at 18:13

## 2022-11-02 RX ADMIN — ONDANSETRON 4 MG: 4 TABLET, ORALLY DISINTEGRATING ORAL at 21:45

## 2022-11-02 ASSESSMENT — PAIN DESCRIPTION - LOCATION: LOCATION: BACK

## 2022-11-02 ASSESSMENT — PAIN - FUNCTIONAL ASSESSMENT: PAIN_FUNCTIONAL_ASSESSMENT: 0-10

## 2022-11-02 ASSESSMENT — PAIN SCALES - GENERAL: PAINLEVEL_OUTOF10: 5

## 2022-11-02 ASSESSMENT — PAIN DESCRIPTION - DESCRIPTORS: DESCRIPTORS: ACHING;BURNING

## 2022-11-02 NOTE — ED PROVIDER NOTES
Emergency Department Encounter  Location: [address] [place] [address] EMERGENCY DEPARTMENT    Patient: Tabatha Cooper  MRN: [id number]  : 1955  Date of evaluation: 2022  ED Provider: Nathan Burnette DO    Chief Complaint:    No chief complaint on file. [place]:  Tabatha Cooper is a 79 y.o. female that presents to the emergency department with 2-3 days of cough, HA, fatigue. Reports she has had some chest discomfort with cough but denies shortness of breath. The cough has been productive of sputum but no hemoptysis. She denies any fevers, vomiting, or urinary symptoms. She does note that she has a history of chronic diarrhea which has been ongoing. Has taken multiple OTC meds without relief. Has known ill contact with strep throat. States her systolic BP is typically around . She also notes that her heart rate is typically fast.  She is supposed to be taking a beta-blocker for this but forgot to take it this morning. ROS:  At least 10 systems reviewed and are acutely negative unless otherwise noted in the HPI.     Past Medical History:   Diagnosis Date    B-cell lymphoma ([facility]     Bipolar 1 disorder ([facility]     \"on Depakote for this\"    History of external beam radiation therapy     Pelvis 3,600 cGy per  at [facility]    Hx of echocardiogram 2021    Left ventricular function is normal, EF is estimated at 55-60%,mitral valve appears to slightly prolapse,     Past Surgical History:   Procedure Laterality Date    BREAST BIOPSY Left     COLONOSCOPY  2015    normal colon    COLONOSCOPY N/A 2020    COLONOSCOPY DIAGNOSTIC performed by Court Rios MD at [facility] 90 N/A 2020    PORT INSERTION performed by Dawn Millard MD at [facility] [employer] Left 2020    PORT REMOVAL LEFT performed by Dawn Millard MD at [employer] Right 3/10/2020    RIGHT PORT INSERTION performed by Dawn Millard MD at [facility] 145 TONSILLECTOMY  as a kid    TUBAL LIGATION  20+ yrs ago    UPPER GASTROINTESTINAL ENDOSCOPY N/A 2020    EGD DIAGNOSTIC ONLY performed by Ute Glynn MD at [facility] ENDOSCOPY     Family History   Problem Relation Age of Onset    Breast Cancer Mother     High Blood Pressure Mother     Cancer Father         \"cancer in the bowel\"    Ovarian Cancer Neg Hx      Social History     Socioeconomic History    Marital status:      Spouse name: Not on file    Number of children: Not on file    Years of education: Not on file    Highest education level: Not on file   Occupational History    Not on file   Tobacco Use    Smoking status: Former     Packs/day: 1.00     Years: 10.00     Pack years: 10.00     Types: Cigarettes     Quit date: 1988     Years since quittin.8    Smokeless tobacco: Never   Vaping Use    Vaping Use: Never used   Substance and Sexual Activity    Alcohol use: No     Comment: 1-2 CUPS OF HOT TEA    Drug use: No    Sexual activity: Not on file   Other Topics Concern    Not on file   Social History Narrative    Not on file     Social Determinants of Health     Financial Resource Strain: Not on file   Food Insecurity: Not on file   Transportation Needs: Not on file   Physical Activity: Not on file   Stress: Not on file   Social Connections: Not on file   Intimate Partner Violence: Not on file   Housing Stability: Not on file     No current facility-administered medications for this encounter.      Current Outpatient Medications   Medication Sig Dispense Refill    oseltamivir (TAMIFLU) 75 MG capsule Take 1 capsule by mouth 2 times daily for 5 days 10 capsule 0    ondansetron (ZOFRAN) 4 MG tablet Take 1 tablet by mouth every 8 hours as needed for Nausea 10 tablet 0    QNASL 80 MCG/ACT AERS nasal spray instill 1 spray into each nostril once daily      metoprolol succinate (TOPROL XL) 25 MG extended release tablet take 1 tablet by mouth once daily      TECHLITE PEN NEEDLES 31G X 5 MM MISC use 1 NEEDLE WITH PEN once daily      loratadine (CLARITIN) 10 MG tablet Take 10 mg by mouth as needed       teriparatide (FORTEO) 620 MCG/2.48ML SOPN injection INJECT 0.08 MLS 20 MCG TOTAL UNDER THE SKIN ONCE A DAY      Nutritional Supplements (ENSURE PLUS) LIQD Take 1 Can by mouth 3 times daily 90 Can 1    loperamide (IMODIUM A-D) 2 MG tablet Take 1 tablet by mouth 4 times daily as needed for Diarrhea 60 tablet 1    Omega-3 Fatty Acids (FISH OIL) 1000 MG CAPS Take 1,000 mg by mouth daily       Cholecalciferol (VITAMIN D) 50 MCG (2000 UT) CAPS capsule Take 1 capsule by mouth daily      divalproex (DEPAKOTE ER) 500 MG extended release tablet Take 2 tablets by mouth nightly 30 tablet 0    MULTIPLE VITAMIN PO Take by mouth daily      calcium carbonate 600 MG TABS tablet Take 1 tablet by mouth daily       Allergies   Allergen Reactions    Abilify [Aripiprazole]     Iodine     Penicillins     Codeine Nausea And Vomiting       Nursing Notes Reviewed    Physical Exam:  ED Triage Vitals [11/02/22 1703]   Enc Vitals Group      BP 89/74      Heart Rate (!) 114      Resp 17      Temp 98.6 °F (37 °C)      Temp Source Oral      SpO2 98 %      Weight       Height       Head Circumference       Peak Flow       Pain Score       Pain Loc       Pain Edu? Excl. in [address]? GENERAL APPEARANCE: Awake and alert. Cooperative. No acute distress. Pleasant well-appearing elderly female. HEAD: Normocephalic. Atraumatic. EYES: EOM's grossly intact. Sclera anicteric. ENT: Tolerates saliva. No trismus. NECK: Supple. Trachea midline. CARDIO: Mildly tachcardic but regular. Radial pulse 2+. LUNGS: Respirations unlabored. Occasional rhonchi in left lower lung field. ABDOMEN: Soft. Non-distended. Non-tender. EXTREMITIES: No acute deformities. No LE tenderness/edema/asymmetry. SKIN: Warm and dry. NEUROLOGICAL: No gross facial drooping. Moves all 4 extremities spontaneously. PSYCHIATRIC: Normal mood.      Labs:  Results for orders placed or performed during the hospital encounter of 11/02/22   Respiratory Panel, Molecular, with COVID-19 (Restricted: peds pts or suitable admitted adults)    Specimen: Nasopharyngeal   Result Value Ref Range    Adenovirus Detection by PCR NOT DETECTED NOT DETECTED    Coronavirus 229E PCR NOT DETECTED NOT DETECTED    Coronavirus HKU1 PCR NOT DETECTED NOT DETECTED    Coronavirus NL63 PCR NOT DETECTED NOT DETECTED    Coronavirus OC43 PCR NOT DETECTED NOT DETECTED    SARS-CoV-2 NOT DETECTED NOT DETECTED    Human Metapneumovirus PCR NOT DETECTED NOT DETECTED    Rhinovirus Enterovirus PCR NOT DETECTED NOT DETECTED    Influenza A by PCR DETECTED BY PCR (A) NOT DETECTED    Influenza A H1 Pandemic PCR NOT DETECTED NOT DETECTED    Influenza A H1 (2009) PCR NOT DETECTED NOT DETECTED    Influenza A H3 PCR DETECTED BY PCR (A) NOT DETECTED    Influenza B by PCR NOT DETECTED NOT DETECTED    Parainfluenza 1 PCR NOT DETECTED NOT DETECTED    Parainfluenza 2 PCR NOT DETECTED NOT DETECTED    Parainfluenza 3 PCR NOT DETECTED NOT DETECTED    Parainfluenza 4 PCR NOT DETECTED NOT DETECTED    RSV PCR NOT DETECTED NOT DETECTED    Bordetella parapertussis by PCR NOT DETECTED NOT DETECTED    B Pertussis by PCR NOT DETECTED NOT DETECTED    Chlamydophila Pneumonia PCR NOT DETECTED NOT DETECTED    Mycoplasma pneumo by PCR NOT DETECTED NOT DETECTED   CBC with Auto Differential   Result Value Ref Range    WBC 6.4 4.0 - 10.5 K/CU MM    RBC 3.53 (L) 4.2 - 5.4 M/CU MM    Hemoglobin 11.3 (L) 12.5 - 16.0 GM/DL    Hematocrit 33.7 (L) 37 - 47 %    MCV 95.5 78 - 100 FL    MCH 32.0 (H) 27 - 31 PG    MCHC 33.5 32.0 - 36.0 %    RDW 12.7 11.7 - 14.9 %    Platelets 100 (L) 426 - 440 K/CU MM    MPV 10.6 7.5 - 11.1 FL    Differential Type AUTOMATED DIFFERENTIAL     Segs Relative 66.5 (H) 36 - 66 %    Lymphocytes % 19.5 (L) 24 - 44 %    Monocytes % 13.3 (H) 0 - 4 %    Eosinophils % 0.2 0 - 3 %    Basophils % 0.2 0 - 1 %    Segs Absolute 4.3 K/CU MM Lymphocytes Absolute 1.3 K/CU MM    Monocytes Absolute 0.9 K/CU MM    Eosinophils Absolute 0.0 K/CU MM    Basophils Absolute 0.0 K/CU MM    Nucleated RBC % 0.0 %    Total Nucleated RBC 0.0 K/CU MM    Total Immature Neutrophil 0.02 K/CU MM    Immature Neutrophil % 0.3 0 - 0.43 %   Comprehensive Metabolic Panel w/ Reflex to MG   Result Value Ref Range    Sodium 137 135 - 145 MMOL/L    Potassium 4.2 3.5 - 5.1 MMOL/L    Chloride 99 99 - 110 mMol/L    CO2 23 21 - 32 MMOL/L    BUN 23 6 - 23 MG/DL    Creatinine 1.3 (H) 0.6 - 1.1 MG/DL    Est, Glom Filt Rate 45 (L) >60 mL/min/1.73m2    Glucose 92 70 - 99 MG/DL    Calcium 9.3 8.3 - 10.6 MG/DL    Albumin 4.0 3.4 - 5.0 GM/DL    Total Protein 6.5 6.4 - 8.2 GM/DL    Total Bilirubin 0.5 0.0 - 1.0 MG/DL    ALT 12 10 - 40 U/L    AST 24 15 - 37 IU/L    Alkaline Phosphatase 49 40 - 129 IU/L    Anion Gap 15 4 - 16   Lactate, Sepsis   Result Value Ref Range    Lactic Acid, Sepsis 1.1 0.5 - 1.9 mMOL/L   Urinalysis   Result Value Ref Range    Color, UA YELLOW YELLOW    Clarity, UA CLEAR CLEAR    Glucose, Urine NEGATIVE NEGATIVE MG/DL    Bilirubin Urine NEGATIVE NEGATIVE MG/DL    Ketones, Urine 15 (A) NEGATIVE MG/DL    Specific Gravity, UA 1.015 1.001 - 1.035    Blood, Urine TRACE (A) NEGATIVE    pH, Urine 5.5 5.0 - 8.0    Protein, UA NEGATIVE NEGATIVE MG/DL    Urobilinogen, Urine 0.2 0.2 - 1.0 MG/DL    Nitrite Urine, Quantitative NEGATIVE NEGATIVE    Leukocyte Esterase, Urine NEGATIVE NEGATIVE   Microscopic Urinalysis   Result Value Ref Range    RBC, UA NONE SEEN 0 - 6 /HPF    WBC, UA 1 0 - 5 /HPF    Bacteria, UA NEGATIVE NEGATIVE /HPF    Squam Epithel, UA 1 /HPF    Trichomonas, UA NONE SEEN NONE SEEN /HPF   EKG 12 Lead   Result Value Ref Range    Ventricular Rate 115 BPM    Atrial Rate 115 BPM    P-R Interval 112 ms    QRS Duration 64 ms    Q-T Interval 296 ms    QTc Calculation (Bazett) 409 ms    P Axis 72 degrees    R Axis 44 degrees    T Axis 32 degrees    Diagnosis       Sinus tachycardia  Possible Left atrial enlargement  Borderline ECG  When compared with ECG of 13-MAY-2022 14:45,  Non-specific change in ST segment in Inferior leads         EKG (if obtained): (All EKG's are interpreted by myself in the absence of a cardiologist)  Sinus tachycardia 115. Normal axis with good R wave progression. No ST elevation or depression. No ectopy. No acute change from prior tracing. Radiographs (if obtained):  [] The following radiograph was interpreted by myself in the absence of a radiologist:  [x] Radiologist's Report reviewed at time of ED visit:  XR CHEST PORTABLE    Result Date: 11/2/2022  EXAMINATION: ONE XRAY VIEW OF THE CHEST 11/2/2022 5:47 pm COMPARISON: 05/13/2022 HISTORY: ORDERING SYSTEM PROVIDED HISTORY: dyspnea/cough TECHNOLOGIST PROVIDED HISTORY: Reason for exam:->dyspnea/cough Reason for Exam: dyspnea/cough Additional signs and symptoms: none Relevant Medical/Surgical History: none FINDINGS: Again seen overlying right chest port and axillary clips. Cardiomediastinal silhouette appears within normal limits. Interval resolution of previously seen right upper lobe consolidation. No focal consolidation or overt pulmonary edema. Costophrenic angles are sharp. No evidence of pneumothorax. No acute osseous abnormalities. No radiographic evidence of an acute cardiopulmonary process. ED Course and MDM:  Has been given IV fluids. Her tachycardia improved although not resolved. Blood pressure also improved. Labs are reviewed. No significant leukocytosis. Patient does have some mild renal dysfunction without significant electrolyte derangement. Urinalysis without convincing evidence of infection. Chest x-ray clear with no focal infiltrate or effusion. Patient is found to be positive for influenza, which correlates with her symptoms. Tamiflu initiated in the ED.     Given abnormal vital signs as well as presence of JESSE, did advise that the patient be admitted for further hydration and recheck of her labs in the morning. However, she states that she wants to go home. She understands the results of her evaluation tonight and my concerns. Patient is given a prescription for Zofran as well as Tamiflu. Patient is given instructions regarding symptomatic care at home as well as strict return precautions. To call PCP for follow up in 2-3 days. Patient verbalizes understanding of all instructions and is comfortable with the plan of care. Final Impression:  1. Influenza A    2.  JESSE (acute kidney injury) [facility])      DISPOSITION Decision To Discharge 11/02/2022 09:40:23 PM      Patient referred to:  Francisco Phillip MD  [address] [facility]  [phone]    Schedule an appointment as soon as possible for a visit in 2 days      [facility] Emergency Department  [phone]    If symptoms worsen  Discharge medications:  Discharge Medication List as of 11/2/2022  9:42 PM        START taking these medications    Details   oseltamivir (TAMIFLU) 75 MG capsule Take 1 capsule by mouth 2 times daily for 5 days, Disp-10 capsule, R-0Normal      ondansetron (ZOFRAN) 4 MG tablet Take 1 tablet by mouth every 8 hours as needed for Nausea, Disp-10 tablet, R-0Normal           (Please note that portions of this note may have been completed with a voice recognition program. Efforts were made to edit the dictations but occasionally words are mis-transcribed.)    DO Doyle Veras DO  11/02/22 1774

## 2022-11-03 LAB
EKG ATRIAL RATE: 115 BPM
EKG DIAGNOSIS: NORMAL
EKG P AXIS: 72 DEGREES
EKG P-R INTERVAL: 112 MS
EKG Q-T INTERVAL: 296 MS
EKG QRS DURATION: 64 MS
EKG QTC CALCULATION (BAZETT): 409 MS
EKG R AXIS: 44 DEGREES
EKG T AXIS: 32 DEGREES
EKG VENTRICULAR RATE: 115 BPM

## 2022-11-03 PROCEDURE — 93010 ELECTROCARDIOGRAM REPORT: CPT | Performed by: INTERNAL MEDICINE

## 2022-11-04 LAB
CULTURE: NORMAL
Lab: NORMAL
SPECIMEN: NORMAL

## 2022-11-07 LAB
CULTURE: NORMAL
CULTURE: NORMAL
Lab: NORMAL
Lab: NORMAL
SPECIMEN: NORMAL
SPECIMEN: NORMAL

## 2022-11-21 ENCOUNTER — OFFICE VISIT (OUTPATIENT)
Dept: CARDIOLOGY CLINIC | Age: 67
End: 2022-11-21
Payer: MEDICARE

## 2022-11-21 VITALS
BODY MASS INDEX: 21.55 KG/M2 | HEART RATE: 92 BPM | DIASTOLIC BLOOD PRESSURE: 66 MMHG | SYSTOLIC BLOOD PRESSURE: 104 MMHG | HEIGHT: 63 IN | WEIGHT: 121.6 LBS

## 2022-11-21 DIAGNOSIS — I05.9 MITRAL VALVE DISEASE: ICD-10-CM

## 2022-11-21 DIAGNOSIS — R00.0 TACHYCARDIA: ICD-10-CM

## 2022-11-21 DIAGNOSIS — I82.722 CHRONIC DEEP VEIN THROMBOSIS (DVT) OF LEFT UPPER EXTREMITY, UNSPECIFIED VEIN (HCC): ICD-10-CM

## 2022-11-21 DIAGNOSIS — I95.0 IDIOPATHIC HYPOTENSION: Primary | ICD-10-CM

## 2022-11-21 DIAGNOSIS — C85.16 B-CELL LYMPHOMA OF INTRAPELVIC LYMPH NODES, UNSPECIFIED B-CELL LYMPHOMA TYPE (HCC): ICD-10-CM

## 2022-11-21 DIAGNOSIS — I97.89 OTHER POSTPROCEDURAL COMPLICATIONS AND DISORDERS OF THE CIRCULATORY SYSTEM, NOT ELSEWHERE CLASSIFIED: ICD-10-CM

## 2022-11-21 PROCEDURE — G8484 FLU IMMUNIZE NO ADMIN: HCPCS | Performed by: NURSE PRACTITIONER

## 2022-11-21 PROCEDURE — 3017F COLORECTAL CA SCREEN DOC REV: CPT | Performed by: NURSE PRACTITIONER

## 2022-11-21 PROCEDURE — G8427 DOCREV CUR MEDS BY ELIG CLIN: HCPCS | Performed by: NURSE PRACTITIONER

## 2022-11-21 PROCEDURE — G8400 PT W/DXA NO RESULTS DOC: HCPCS | Performed by: NURSE PRACTITIONER

## 2022-11-21 PROCEDURE — 99214 OFFICE O/P EST MOD 30 MIN: CPT | Performed by: NURSE PRACTITIONER

## 2022-11-21 PROCEDURE — 1090F PRES/ABSN URINE INCON ASSESS: CPT | Performed by: NURSE PRACTITIONER

## 2022-11-21 PROCEDURE — 1123F ACP DISCUSS/DSCN MKR DOCD: CPT | Performed by: NURSE PRACTITIONER

## 2022-11-21 PROCEDURE — 1036F TOBACCO NON-USER: CPT | Performed by: NURSE PRACTITIONER

## 2022-11-21 PROCEDURE — G8420 CALC BMI NORM PARAMETERS: HCPCS | Performed by: NURSE PRACTITIONER

## 2022-11-21 ASSESSMENT — ENCOUNTER SYMPTOMS
SHORTNESS OF BREATH: 0
COUGH: 0

## 2022-11-21 NOTE — PROGRESS NOTES
[facility] ([facility] PHYSICAL REHABILITATION [place]  [address]  Phone: [phone]    Fax [phone]    Juanito Kelly MD, Isa Moyer MD, Inidra Fortune MD, Colen Samuel, MD Randie Curt, MD Hardin Elam, MD Loralie Cushing, MD Artemus Gitelman, APRMANJU Culp, APRMANJU Deleon John, [facility], [facility]    CARDIOLOGY  NOTE    2022    Shanice Maloney (:  1955) is a 79 y.o. female,Established patient with Dr. Tita Anthony, here for evaluation of the following chief complaint(s):  6 Month Follow-Up (NO COMPLAINTS/)    SUBJECTIVE/OBJECTIVE:    RIGOBERTO Gray has Past medical history as noted below. She is feeling well. Super busy with her family needs. 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 She is grateful her health is improved so she can take care of her family. She finished chemo and radiation in 2020, She says blood pressure is better. She is on metoprolol due to tachycardia though she denies any palpitations her echo shows severe MR. No ankle swelling   Maria Luisa Gray has history of bipolar disorder and lymphoma. She has had several sessions of chemotherapy . Initially she was symptomatic with blood pressure in the 80s causing dizziness and lightheadedness but it is improved after midodrine. She is not on midodrine since chemo is done    echo  In 2020 showed possible mitral valve prolapse although there is moderate mitral regurgitation  Denies any signs of congestive heart failure like ankle swelling shortness of breath her main complaints of feeling tired and fatigued low energy          Review of Systems   Constitutional:  Negative for fatigue and fever. Respiratory:  Negative for cough and shortness of breath. Cardiovascular:  Negative for chest pain, palpitations and leg swelling. Musculoskeletal:  Negative for arthralgias and gait problem.    Neurological:  Positive for dizziness (occasional). Negative for syncope, weakness, light-headedness and headaches. Vitals:    11/21/22 1029   BP: 104/66   Site: Left Upper Arm   Position: Sitting   Cuff Size: Medium Adult   Pulse: 92   Weight: 121 lb 9.6 oz (55.2 kg)   Height: 5' 3\" (1.6 m)       Wt Readings from Last 3 Encounters:   11/21/22 121 lb 9.6 oz (55.2 kg)   11/02/22 124 lb (56.2 kg)   10/27/22 122 lb 12.8 oz (55.7 kg)       BP Readings from Last 3 Encounters:   11/21/22 104/66   11/02/22 (!) 119/90   10/27/22 114/64       Prior to Admission medications    Medication Sig Start Date End Date Taking?  Authorizing Provider   QNASL 80 MCG/ACT AERS nasal spray  10/13/22  Yes Calista Pro MD   metoprolol succinate (TOPROL XL) 25 MG extended release tablet take 1 tablet by mouth once daily 10/11/22  Yes Calista Pro MD   TECHLITE PEN NEEDLES 31G X 5 MM MISC use 1 NEEDLE WITH PEN once daily 7/29/22  Yes Calista Pro MD   loratadine (CLARITIN) 10 MG tablet Take 10 mg by mouth as needed   Yes Historical Provider, MD   teriparatide (FORTEO) 620 MCG/2.48ML SOPN injection INJECT 0.08 MLS 20 MCG TOTAL UNDER THE SKIN ONCE A DAY 6/21/21  Yes Historical Provider, MD   loperamide (IMODIUM A-D) 2 MG tablet Take 1 tablet by mouth 4 times daily as needed for Diarrhea 12/24/20  Yes Priscila Price MD   Cholecalciferol (VITAMIN D) 50 MCG (2000 UT) CAPS capsule Take 1 capsule by mouth daily   Yes Historical Provider, MD   divalproex (DEPAKOTE ER) 500 MG extended release tablet Take 2 tablets by mouth nightly 3/10/20  Yes Suzanne Humaira Card, DO   MULTIPLE VITAMIN PO Take by mouth daily   Yes Historical Provider, MD   calcium carbonate 600 MG TABS tablet Take 1 tablet by mouth daily   Yes Historical Provider, MD   ondansetron (ZOFRAN) 4 MG tablet Take 1 tablet by mouth every 8 hours as needed for Nausea  Patient not taking: Reported on 11/21/2022 11/2/22   Mitchel Mooney, DO   Nutritional Supplements (ENSURE PLUS) LIQD Take 1 Can by mouth 3 times daily  Patient not taking: Reported on 11/21/2022 7/15/21   Honora Saint, MD   Omega-3 Fatty Acids (FISH OIL) 1000 MG CAPS Take 1,000 mg by mouth daily   Patient not taking: Reported on 11/21/2022    Historical Provider, MD       Physical Exam  Vitals reviewed. Constitutional:       General: She is not in acute distress. Appearance: Normal appearance. She is not ill-appearing. HENT:      Head: Atraumatic. Neck:      Vascular: No carotid bruit. Cardiovascular:      Rate and Rhythm: Normal rate and regular rhythm. Pulses: Normal pulses. Heart sounds: Murmur heard. Pulmonary:      Effort: Pulmonary effort is normal. No respiratory distress. Breath sounds: Normal breath sounds. Musculoskeletal:         General: No swelling or deformity. Cervical back: Neck supple. No muscular tenderness. Neurological:      Mental Status: She is alert. Health Maintenance   Topic Date Due    Shingles vaccine (1 of 2) Never done    DTaP/Tdap/Td vaccine (1 - Tdap) 12/17/2002    DEXA (modify frequency per FRAX score)  Never done    Annual Wellness Visit (AWV)  Never done    Lipids  06/19/2022    COVID-19 Vaccine (5 - Booster) 06/27/2022    Breast cancer screen  02/08/2023    Depression Screen  10/27/2023    Colorectal Cancer Screen  02/28/2030    Flu vaccine  Completed    Pneumococcal 65+ years Vaccine  Completed    Hepatitis C screen  Completed    Hepatitis A vaccine  Aged Out    Hib vaccine  Aged Out    Meningococcal (ACWY) vaccine  Aged Out       Lab Review   Lab Results   Component Value Date/Time    CHOL 234 06/19/2017 12:00 AM    TRIG 130 06/19/2017 12:00 AM    HDL 66 06/19/2017 12:00 AM        Echo 1/16/2020   Summary   Left ventricular systolic function is low normal.   Ejection fraction is visually estimated at 50%. Paradoxical motion of the interventricular septum; possible conduction delay. Indeterminate diastolic function; E/A flow reversal is noted.    Mitral valve prolapse is present. Moderate to severe mitral regurgitation is present. Moderate tricuspid regurgitation is present. RVSP is 25 mmHg. No evidence of any pericardial effusion. Mobile interatrial septum. Incidental finding: cyst vs mass near/attached to the right kidney.:    Echo 3/2021   Summary   Left ventricular function is normal, EF is estimated at 55-60%. Grade I diastolic dysfunction. Mild left ventricular hypertrophy. Bi atrial enlargement noted. The mitral valve appears to slightly prolapse. Moderate to severe mitral and tricuspid regurgitation is present. Aneurysmal interatrial septum. RVSP is 34 mmHg. No evidence of pericardial effusion. ASSESSMENT/PLAN:  Idiopathic hypotension  Improved now that she is not on chemo anymore. She states that she has been able to stay better hydrated      Tachycardia  Continue metoprolol 25 mg BID, at risk for afib? No documented afib so far. She had not been taking her metoprolol faithfully until after going to ED for flu and she had Tachycardia. SHe states understanding of her risk now and is taking her medications  as prescribed. Given risk of atrial fibrillation and MVP. will continue metoprolol for now. She states understanding and is agreeable. Mitral valve disease  She does seem to have mitral regurgitation with mitral valve prolapse may eventually need STEVEN  she is more physically improved at this stage we will continue to monitor her mitral valve she does not appear to be in heart failure or seem to be affected by MR. Improved on recent echo. contiue to monitor and recheck echo in the spring. Dyslipidemia  All available lab work was reviewed 12/2020 in care everywhere. Lipid panel is at goal. Necessary orders were placed , instructions given by myself       Counseled extensively and medication compliance urged. We discussed that for the  prevention of ASCVD our  goal is aggressive risk modification. Patient is encouraged to exercise even a brisk walk for 30 minutes  at least 3 to 4 times a week      Return in about 6 months (around 5/21/2023). An electronic signature was used to authenticate this note.     Electronically signed by Verena Bumpers, APRN - CNP on 11/21/2022 at 10:37 AM

## 2023-01-05 NOTE — PROGRESS NOTES
Patient Name: Trenton Eugene  Patient : 1955  Patient MRN: [id number]     Primary Oncologist: Phi Alston MD  Referring Provider: Shi Aguirre MD     Date of Service: 2023      Chief Complaint:   Chief Complaint   Patient presents with    Follow-up       She came in for follow-up visit. Problem List:      Family history of malignant neoplasm of gastrointestinal tract     B-cell lymphoma (HCC)     Idiopathic hypotension     Mitral valve disease     Pancytopenia (HCC)     Severe malnutrition (HCC)     Deep vein thrombosis (DVT) of left upper extremity      Pain syndrome, chronic     HPI:   Courtney Hawk is a [place] 79[place] [address] [place] who was referred for right axillary lymphadenopathy, status post needle biopsy in 2016, which did not show any pathological abnormalities. I talked to Dr. Barbie Ames, who favored benign lymph node. Right axillary lymph node biopsy in 2015 revealed benign lymph node hyperplasia. Mammogram in 2016 revealed low suspicion for malignancy, with several enlarged right axillary lymph nodes, 3.1 by 0.8 by 2.3 cm, 0.9 by 0.8 by 1 cm, 1.8 by 1.4 by 1.5, and 1.4 by 1.5 cm. None demonstrated significant hypervascularity. I offered her an excisional lymph node biopsy versus surveillance with followup ultrasound. She opted to go with the second one. Blood test in 2015 revealed normal CBC with normal calcium and alk phos, white cell count 5.8, hemoglobin 11.9, platelet was 883. Blood test on 2016 revealed normal CBC, with white cell count 6.5, hemoglobin 12.5, platelet 692. SED rate was 17, rheumatoid factor less than 10. SUZE was still pending. Ultrasound of the right breast and axilla revealed no significant interval change in the right axillary adenopathy, which still has remained most likely reactive in etiology. This was not seen on any mammogram dating back beyond .   She opted to go to see the surgeon; therefore, I referred her to see Dr. Toyin oKo. She was last seen in our office 9/28/2016. Ms. Cody Feliz was seen by Dr. Toyin Koo March 3, 2019 for evaluation of enlarging, tender mass in right axilla. She had excisional lymph node biopsy on March 12, 2019. Pathology revealed granulomatous lymphadenitis. Mammogram 2/1/2019 was benign. She had EGD and colonoscopy on 12/10/2019. EGD was unremarkable. Colonoscopy revealed in infiltrative, ulcerated and necrotic, non bleeding 50 mm mass of malignant appearance was found in the rectum at a distance between 12 cm and 18 cm from the anus. The mass caused a partial obstruction. Pathology from the mass revealed ulcerated large bowel mucosa with dense lymphoid infiltrate and abundant necrosis. - Negative for carcinoma. The lymphoid infiltrate and necrosis are suspicious for lymphoma. Results of flow cytometry revealed no flow immunophenotypic evidence of a lymphoproliferative disorder based on limited antibody panel. December 23, 2019 CT abdomen/pelvis showed a heterogeneous solid 9.5 x 6.1 cm soft tissue mass within the right posterior pelivs which abuts the posterior margin of the uterus and involves the right lateral margin of the rectosigmoid junction, indeterminate. This mass is concerning for malignancy, with uterine origin being favored, such as uterine sarcoma with suspected invasion of the rectosigmoid junction. In addition, a 5.8 x 10.5 x 1.1 cm central mesenteric soft tissue mass is seen, suspected metastatic disease. Additional uterine masses are seen which may relate to fibroids or extension of uterine sarcoma. A possible 6 mm pleural based nodule within the posterior left lower lobe, indeterminate in the setting of  suspected malignacy. She was seen by Dr. Carlin Khalil on December 26, 2019 for review of colonoscopy/egd results. She was referred to urology due to early hydronephrosis.    I discussed with pathologist to review the pathology report from rectal biopsy. She lost 30 pounds in the last 6 months. Mammogram was in 2018. January 2020 official pathology report showed diffuse large B-cell lymphoma, no double hit or triple hit. BCL 6 was positive. We discussed about potential treatment with R-CHOP. We discussed about the potential risks and benefit of the chemotherapy. Clinically she could have stage IIb. She had first chemo on January 17, 2020. ECHO in January 2020 showed LVEF at 50%. Hep B/C were negative. 1/27/2020 PET CT scan showed:  1. Intense metabolic activity associated with the abdominal and pelvic masses described above consistent with active lymphoma. Prior imaging has been requested and an addendum will be provided to described any interval changes. 2.  Linear FDG activity along the distal tip of the MediPort, which is in the left brachiocephalic vein just superior to the SVC. This can be seen with a fibrin sheath. Correlate with current functionality of the MediPort. 1/2020 ECHO: Left ventricular systolic function is low normal. Ejection fraction is visually estimated at 50%. Paradoxical motion of the interventricular septum; possible conduction ?delay. ?Indeterminate diastolic function; E/A flow reversal is noted. ?Mitral valve prolapse is present. ?Moderate to severe mitral regurgitation is present. ?Moderate tricuspid regurgitation is present. RVSP is 25 mmHg. ? No evidence of any pericardial effusion. Mobile interatrial septum. ? Incidental finding: cyst vs mass near/attached to the right kidney. In March 2020 she was hospitalized due to anemia related GI related to colonic mass/lymphoma. She has mulitple blood transfusion. She had EGD and colonoscopy in March 2020. After GIB is controlled she started on LMWH for DVT related to fall and mediport. CT chest, abdomen and pelvis in March 2020 after 2 cycled of chemo showed response to therapy.     She completed lovenox injection after 6 cycle of chemo due on June 5, 2020.   She completed chemotherapy on June 5, 2020. PET CT in June 2020 scan revealed:   Significantly decreased size and uptake of the masses within the lower abdomen/pelvis, consistent with treatment response. Notable: ABDOMEN/PELVIS: No abnormal uptake within the solid organs. Significantly decreased size of the left paramidline lower abdominal mass which measures approximately 3.1 x 1.6 cm and demonstrates maximum SUV of 2.2. Significantly improved bilateral pelvic uptake with a residual focus of intense uptake in the right hemipelvis measuring approximately 2.8 x 2.8 cm and demonstrating maximum SUV of 8.0. She had pelvic radiation therapy from July 27, 2020 through August 20, 2020. She received 36 Gy. She completed radiation therapy 8/26/2020. Imaging studies optional since the PET scan is completely negative in November 2020.    11/19/2020 PET : continued response to treatment. Abnormal uptake in the deep right hemipelvis has resolved. The lower abdominal mass appears slightly smaller, although difficult to accurately measure without intravenous contrast, and demonstrates similar mild uptake. No new disease. Anemic work-up on November 13, 2020 was grossly unremarkable. Bone density on December 21, 2020 showed osteoporosis at lumbar spine. I recommend to discuss with family doctor about oral versus IV bisphosphonate versus Reclast.  I recommend to continue with vitamin D and calcium. I remind her about Mediport flush every 3 months. She was seen by GI and reportedly colonoscopy in November 2020 was okay. She takes Imodium once or twice a day. Diarrhea has improved. Stool specimen was obtained by GI.      2/23/2021 WBC was 2.9, hemoglobin 10.2, platelet 430. Absolute neutrophils was 1.4. She has covid vaccine. Son had Covid infection.  was sick and hospitalized due to congestive heart failure and anemia. She has loose stool when eating greasy meals.   She will follow up with GI.  Mammogram in December 2020 was benign. CBC and CMP in January 2022 was reviewed. WBC was 4.9, hemoglobin 11.7, platelet 298. LDH was 238. She was hospitalized in May 2022 due to the pneumonia. 5/15/2022 Ferritin: 3,107 (H) Iron: 36 (L) TIBC: 125 (L)  Transferrin %: 29 FOLATE: 14.8 Vitamin B-12: >2000 (H) . CT CAP 5/12/2022:  1. Masslike focus of consolidation in the right upper lobe measuring 6.7 cm with broad costal pleural base. Differential diagnosis includes pulmonary parenchymal involvement by lymphoma, primary lung cancer, or pneumonia. Recommend short interval follow-up chest CT versus percutaneous tissue sampling. 2. Cholelithiasis. 3. Mild compression deformity of L4 of uncertain chronicity, new since 11/19/2020.    5/17/2022 CT chest:  Decreased size and conspicuity of previously reported masslike area pulmonary consolidation right upper lobe compared to 05/12/2022. Biopsy deferred at this time. LDH in June 2022 was 189. July 2022 CMP grossly stable. Folate 7.9. Hg was 11.9. B-12 876.5.  7/2022 CT chest:  1. Reticular and groundglass opacities in the right upper lobe with architectural distortion, consistent with scarring and possibly chronic atelectatic changes. Comparison with any prior outside imaging, if available, would be helpful to determine the chronicity of this finding. 2.  No other pulmonary lesions or masses. 3.  No mediastinal or hilar adenopathy. 4.  Ancillary findings discussed above. She will follow up with pulmonologist at [facility]. She continues to feel better. I recommend to keep age-appropriate cancer screening up-to-date. She c/o leg cramp and asked me about SE of forteo. I recommend to discuss with PCP who prescribed forteo. SE of forteo includes leg cramp. 10/2022 CMP grossly wnl. , Hg 10.9, MCV 96.2.    2/2/2023 she came in for follow up visit. 1/27/2023 , CMP grossly stable. Hg 11.4, MCV 96.2.  Ferritin 988, Iron 85, TIBC 249, sat 34%. She feels great and has stable weight. She will watch for bleeding signs. She is agreeable to continue with surveillance. Mammogram is due in 2/2023. She will call GI for follow up colonoscopy. No acute pain. Denied any nausea, vomiting. No fever or chills. No chest pain, shortness of breath or palpitation. No headache or dizzy spell. No specific bone pain. No melena or hematochezia. Denied any dysuria or hematuria. Past Medical History  Tubal ligation, tonsillectomy, colonoscopy in 2015 by Dr. Vicki Gómez, and reportedly there was no polyp. She had a Pap smear in 2015 by Dr. Kodak Kay. She has bipolar disorder. Pap smear was in 2015, mammogram in March 2016. Shingles    Social History  Smoking Status Former smoker  She smoked 1 pack/day for 10 years and quit 30 years ago. She denies any alcohol or illicit drug use. Family History  Mother had breast cancer in her [de-identified], father had metastatic brain cancer. Previous Therapies:  R-CHOP     Review of Systems: \"Per interval history; otherwise 10 point ROS is negative. \"     Vital Signs:  /63 (Site: Right Upper Arm, Position: Sitting, Cuff Size: Medium Adult)   Pulse 94   Temp (!) 96.7 °F (35.9 °C) (Infrared)   Ht 5' 3\" (1.6 m)   Wt 117 lb 6.4 oz (53.3 kg)   LMP 01/11/2000   BMI 20.80 kg/m²     Physical Exam:    CONSTITUTIONAL: awake, alert, cooperative, no apparent distress   EYES:  sclera clear, pupils are equal and round. + pallor  ENT: Normocephalic, without obvious abnormality, atraumatic  NECK: supple, symmetrical. No JVD  HEMATOLOGIC/LYMPHATIC: no cervical, supraclavicular or axillary lymphadenopathy   LUNGS: clear to auscultation and percussion b/l  CARDIOVASCULAR: regular rate and rhythm, normal S1 and S2, no murmur  ABDOMEN: normal bowel sound, soft, non-distended, non-tender, no palpable masses. No rebound or guarding.   MUSCULOSKELETAL: full range of motion noted, tone is normal  NEUROLOGIC: Motor skills grossly intact. CN II - XII grossly intact. SKIN: Normal skin color, texture, turgor and no jaundice. EXTREMITIES: no LE edema or cyanosis. Homans negative.     Labs:  Hematology:  Lab Results   Component Value Date    WBC 6.3 01/27/2023    RBC 3.67 (L) 01/27/2023    HGB 11.4 (L) 01/27/2023    HCT 35.3 (L) 01/27/2023    MCV 96.2 01/27/2023    MCH 31.1 (H) 01/27/2023    MCHC 32.3 01/27/2023    RDW 12.5 01/27/2023     01/27/2023    MPV 9.8 01/27/2023    BANDSPCT 12 (H) 05/13/2022    SEGSPCT 64.2 01/27/2023    EOSRELPCT 2.7 01/27/2023    BASOPCT 0.2 01/27/2023    LYMPHOPCT 23.8 (L) 01/27/2023    MONOPCT 9.1 (H) 01/27/2023    BANDABS 2.28 05/13/2022    SEGSABS 4.0 01/27/2023    EOSABS 0.2 01/27/2023    BASOSABS 0.0 01/27/2023    LYMPHSABS 1.5 01/27/2023    MONOSABS 0.6 01/27/2023    DIFFTYPE AUTOMATED DIFFERENTIAL 01/27/2023    ANISOCYTOSIS 1+ 05/13/2022    POLYCHROM 1+ 05/13/2022    PLTM SEVERAL LARGE PLATELETS PRESENT 40/09/9541     Lab Results   Component Value Date    ESR 25 01/16/2020     Chemistry:  Lab Results   Component Value Date     01/27/2023    K 4.3 01/27/2023     01/27/2023    CO2 26 01/27/2023    BUN 21 01/27/2023    CREATININE 0.9 01/27/2023    GLUCOSE 84 01/27/2023    CALCIUM 9.5 01/27/2023    PROT 5.9 (L) 01/27/2023    LABALBU 4.3 01/27/2023    BILITOT 0.3 01/27/2023    ALKPHOS 69 01/27/2023    AST 21 01/27/2023    ALT 13 01/27/2023    LABGLOM >60 01/27/2023    GFRAA >60 07/18/2022    PHOS 3.2 04/13/2021    MG 1.8 05/14/2022    POCGLU 132 (H) 02/25/2020     Lab Results   Component Value Date     01/27/2023     Lab Results   Component Value Date    TSHHS 1.430 07/18/2022    T4FREE 0.96 04/13/2021     Immunology:  Lab Results   Component Value Date    PROT 5.9 (L) 01/27/2023     Coagulation Panel:  Lab Results   Component Value Date    PROTIME 13.1 05/16/2022    INR 1.02 05/16/2022    APTT 41.0 (H) 05/16/2022     Anemia Panel:  Lab Results   Component Value Date    AYKFYPVE79 876.5 07/18/2022    FOLATE 17.9 (H) 07/18/2022 11/13/2020 Ferritin: 911 (H) Iron: 42 TIBC: 219 (L) Transferrin %: 19 Folate: >20.0 (H)  Vitamin B-12: 1154 (H)    4/13/2021 Ferritin: 733 (H) Iron: 110 TIBC: 219 (L) Transferrin %: 50 (H)    Assessment & Plan:  1. She was diagnosed with diffuse large B-cell lymphoma, no double hit. She has first R-CHOP in January 7, 2020. Xin Coy ECHO showed LVEF at 50%. She had Mediport placement. She has second cycle R-CHOP in February 2020. 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 CT chest, abdomen and pelvis showed response. She completed course of antibiotic for pseudomonas bacteremia. She resumed chemotherapy on April 3, 2020. 18 2020. She completed chemotherapy on June 5, 2020. PET CT scan on 6/18/2020 showed response, still with intense uptake in the right hemipelvis measuring approximately 2.8 x 2.8 cm and demonstrating maximum SUV of 8.0. She completed pelvic radiation on 8/26/2020. PET scan in November 2020 showed remission. Imaging studies optional.    She feels great and has stable weight. She is agreeable to continue with surveillance. She denied any symptoms of recurrent lymphoma. She will continue with surveillance. 2. She had LUE DVT and I recommend to continue with LMWH. I recommend to keep LUE and lower extremities elevated. LUE swelling has improved significantly. Lovenox discontinued 8/2020.    3. She had GIB related to colon ulcer/lymphoma s/p blood transfusion. Iron studies from April/2020 were reviewed. Reportedly colonoscopy November 2020 was fine. Stool tests was ordered by GI on December 24, 2020. Bowel is better. 4.  She has anemia of chronic disease. She has history of GI bleed. CBC in January 2022 showed WBC 4.9, hemoglobin 11.7, platelet 462. B-12 in July 2022 was 876.5. Folate 17.9.  1/27/2023 , CMP grossly stable. Hg 11.4, MCV 96.2. Ferritin 988, Iron 85, TIBC 249, sat 34%. I will recheck labs prior to next OV.     5. Mammogram is due in 2/2023. She will call GI for follow up colonoscopy. I remind her to have Mediport flush every 3 months. I recommend to keep cancer screening up to date. Mammogram in 2/2023. She has covid vaccine. Return to clinic in 3 month or sooner. All of her questions have been answered for today. Recent imaging and labs were reviewed and discussed with the patient.

## 2023-01-27 ENCOUNTER — HOSPITAL ENCOUNTER (OUTPATIENT)
Dept: INFUSION THERAPY | Age: 68
Discharge: HOME | End: 2023-01-27
Payer: MEDICARE

## 2023-01-27 DIAGNOSIS — D64.9 ANEMIA, UNSPECIFIED TYPE: ICD-10-CM

## 2023-01-27 DIAGNOSIS — C83.30 DIFFUSE LARGE B-CELL LYMPHOMA, UNSPECIFIED BODY REGION (HCC): Primary | ICD-10-CM

## 2023-01-27 DIAGNOSIS — C85.16 B-CELL LYMPHOMA OF INTRAPELVIC LYMPH NODES, UNSPECIFIED B-CELL LYMPHOMA TYPE (HCC): ICD-10-CM

## 2023-01-27 LAB
ALBUMIN SERPL-MCNC: 4.3 GM/DL (ref 3.4–5)
ALP BLD-CCNC: 69 IU/L (ref 40–128)
ALT SERPL-CCNC: 13 U/L (ref 10–40)
ANION GAP SERPL CALCULATED.3IONS-SCNC: 10 MMOL/L (ref 4–16)
AST SERPL-CCNC: 21 IU/L (ref 15–37)
BASOPHILS ABSOLUTE: 0 K/CU MM
BASOPHILS RELATIVE PERCENT: 0.2 % (ref 0–1)
BILIRUB SERPL-MCNC: 0.3 MG/DL (ref 0–1)
BUN BLDV-MCNC: 21 MG/DL (ref 6–23)
CALCIUM SERPL-MCNC: 9.5 MG/DL (ref 8.3–10.6)
CHLORIDE BLD-SCNC: 104 MMOL/L (ref 99–110)
CO2: 26 MMOL/L (ref 21–32)
CREAT SERPL-MCNC: 0.9 MG/DL (ref 0.6–1.1)
DIFFERENTIAL TYPE: ABNORMAL
EOSINOPHILS ABSOLUTE: 0.2 K/CU MM
EOSINOPHILS RELATIVE PERCENT: 2.7 % (ref 0–3)
FERRITIN: 988 NG/ML (ref 15–150)
GFR SERPL CREATININE-BSD FRML MDRD: >60 ML/MIN/1.73M2
GLUCOSE BLD-MCNC: 84 MG/DL (ref 70–99)
HCT VFR BLD CALC: 35.3 % (ref 37–47)
HEMOGLOBIN: 11.4 GM/DL (ref 12.5–16)
IRON: 85 UG/DL (ref 37–145)
LACTATE DEHYDROGENASE: 230 IU/L (ref 120–246)
LYMPHOCYTES ABSOLUTE: 1.5 K/CU MM
LYMPHOCYTES RELATIVE PERCENT: 23.8 % (ref 24–44)
MCH RBC QN AUTO: 31.1 PG (ref 27–31)
MCHC RBC AUTO-ENTMCNC: 32.3 % (ref 32–36)
MCV RBC AUTO: 96.2 FL (ref 78–100)
MONOCYTES ABSOLUTE: 0.6 K/CU MM
MONOCYTES RELATIVE PERCENT: 9.1 % (ref 0–4)
PCT TRANSFERRIN: 34 % (ref 10–44)
PDW BLD-RTO: 12.5 % (ref 11.7–14.9)
PLATELET # BLD: 214 K/CU MM (ref 140–440)
PMV BLD AUTO: 9.8 FL (ref 7.5–11.1)
POTASSIUM SERPL-SCNC: 4.3 MMOL/L (ref 3.5–5.1)
RBC # BLD: 3.67 M/CU MM (ref 4.2–5.4)
SEGMENTED NEUTROPHILS ABSOLUTE COUNT: 4 K/CU MM
SEGMENTED NEUTROPHILS RELATIVE PERCENT: 64.2 % (ref 36–66)
SODIUM BLD-SCNC: 140 MMOL/L (ref 135–145)
TOTAL IRON BINDING CAPACITY: 249 UG/DL (ref 250–450)
TOTAL PROTEIN: 5.9 GM/DL (ref 6.4–8.2)
UNSATURATED IRON BINDING CAPACITY: 164 UG/DL (ref 110–370)
WBC # BLD: 6.3 K/CU MM (ref 4–10.5)

## 2023-01-27 PROCEDURE — 2580000003 HC RX 258: Performed by: INTERNAL MEDICINE

## 2023-01-27 PROCEDURE — 80053 COMPREHEN METABOLIC PANEL: CPT

## 2023-01-27 PROCEDURE — 85025 COMPLETE CBC W/AUTO DIFF WBC: CPT

## 2023-01-27 PROCEDURE — 82728 ASSAY OF FERRITIN: CPT

## 2023-01-27 PROCEDURE — 83550 IRON BINDING TEST: CPT

## 2023-01-27 PROCEDURE — 96523 IRRIG DRUG DELIVERY DEVICE: CPT

## 2023-01-27 PROCEDURE — 83615 LACTATE (LD) (LDH) ENZYME: CPT

## 2023-01-27 PROCEDURE — 6360000002 HC RX W HCPCS: Performed by: INTERNAL MEDICINE

## 2023-01-27 PROCEDURE — 36415 COLL VENOUS BLD VENIPUNCTURE: CPT

## 2023-01-27 PROCEDURE — 83540 ASSAY OF IRON: CPT

## 2023-01-27 RX ORDER — SODIUM CHLORIDE 0.9 % (FLUSH) 0.9 %
10 SYRINGE (ML) INJECTION PRN
Status: DISCONTINUED | OUTPATIENT
Start: 2023-01-27 | End: 2023-01-28 | Stop reason: HOSPADM

## 2023-01-27 RX ORDER — HEPARIN SODIUM (PORCINE) LOCK FLUSH IV SOLN 100 UNIT/ML 100 UNIT/ML
500 SOLUTION INTRAVENOUS PRN
Status: DISCONTINUED | OUTPATIENT
Start: 2023-01-27 | End: 2023-01-28 | Stop reason: HOSPADM

## 2023-01-27 RX ADMIN — HEPARIN 500 UNITS: 100 SYRINGE at 09:04

## 2023-01-27 RX ADMIN — SODIUM CHLORIDE, PRESERVATIVE FREE 10 ML: 5 INJECTION INTRAVENOUS at 09:04

## 2023-01-27 NOTE — PROGRESS NOTES
Patient arrived to treatment suite for mediport draw. Right chest mediport accessed and flushed with normal saline, but not enough blood would return for labs. Mediport heparinized and de-accessed, band-aid applied. Patient tolerated well. Left treatment suite ambulatory. Sent to lab for have blood drawn peripherally.

## 2023-02-02 ENCOUNTER — OFFICE VISIT (OUTPATIENT)
Dept: ONCOLOGY | Age: 68
End: 2023-02-02
Payer: MEDICARE

## 2023-02-02 ENCOUNTER — HOSPITAL ENCOUNTER (OUTPATIENT)
Dept: INFUSION THERAPY | Age: 68
Discharge: HOME | End: 2023-02-02
Payer: MEDICARE

## 2023-02-02 VITALS
SYSTOLIC BLOOD PRESSURE: 118 MMHG | WEIGHT: 117.4 LBS | TEMPERATURE: 96.7 F | HEART RATE: 94 BPM | DIASTOLIC BLOOD PRESSURE: 63 MMHG | HEIGHT: 63 IN | BODY MASS INDEX: 20.8 KG/M2

## 2023-02-02 DIAGNOSIS — D64.9 ANEMIA, UNSPECIFIED TYPE: Primary | ICD-10-CM

## 2023-02-02 DIAGNOSIS — C83.30 DIFFUSE LARGE B-CELL LYMPHOMA, UNSPECIFIED BODY REGION (HCC): ICD-10-CM

## 2023-02-02 PROCEDURE — G8420 CALC BMI NORM PARAMETERS: HCPCS | Performed by: INTERNAL MEDICINE

## 2023-02-02 PROCEDURE — 1123F ACP DISCUSS/DSCN MKR DOCD: CPT | Performed by: INTERNAL MEDICINE

## 2023-02-02 PROCEDURE — 1036F TOBACCO NON-USER: CPT | Performed by: INTERNAL MEDICINE

## 2023-02-02 PROCEDURE — G8427 DOCREV CUR MEDS BY ELIG CLIN: HCPCS | Performed by: INTERNAL MEDICINE

## 2023-02-02 PROCEDURE — 99211 OFF/OP EST MAY X REQ PHY/QHP: CPT

## 2023-02-02 PROCEDURE — G8484 FLU IMMUNIZE NO ADMIN: HCPCS | Performed by: INTERNAL MEDICINE

## 2023-02-02 PROCEDURE — 1090F PRES/ABSN URINE INCON ASSESS: CPT | Performed by: INTERNAL MEDICINE

## 2023-02-02 PROCEDURE — 99213 OFFICE O/P EST LOW 20 MIN: CPT | Performed by: INTERNAL MEDICINE

## 2023-02-02 PROCEDURE — G8400 PT W/DXA NO RESULTS DOC: HCPCS | Performed by: INTERNAL MEDICINE

## 2023-02-02 PROCEDURE — 3017F COLORECTAL CA SCREEN DOC REV: CPT | Performed by: INTERNAL MEDICINE

## 2023-02-02 NOTE — PROGRESS NOTES
MA Rooming Questions  Patient: Ricky Benson  MRN: [id number]    Date: 2/2/2023        1. Do you have any new issues?   no         2. Do you need any refills on medications?    no    3. Have you had any imaging done since your last visit?   no    4. Have you been hospitalized or seen in the emergency room since your last visit here?   no    5. Did the patient have a depression screening completed today?  No    No data recorded     PHQ-9 Given to (if applicable):               PHQ-9 Score (if applicable):                     [] Positive     []  Negative              Does question #9 need addressed (if applicable)                     [] Yes    []  No               Radha French CMA

## 2023-02-14 ENCOUNTER — OFFICE VISIT (OUTPATIENT)
Dept: FAMILY MEDICINE CLINIC | Age: 68
End: 2023-02-14
Payer: MEDICARE

## 2023-02-14 VITALS
DIASTOLIC BLOOD PRESSURE: 80 MMHG | WEIGHT: 117 LBS | BODY MASS INDEX: 21.53 KG/M2 | SYSTOLIC BLOOD PRESSURE: 122 MMHG | HEART RATE: 87 BPM | HEIGHT: 62 IN

## 2023-02-14 DIAGNOSIS — H61.23 IMPACTED CERUMEN OF BOTH EARS: ICD-10-CM

## 2023-02-14 DIAGNOSIS — M81.0 POSTMENOPAUSAL OSTEOPOROSIS: ICD-10-CM

## 2023-02-14 DIAGNOSIS — Z85.72 HISTORY OF B-CELL LYMPHOMA: Primary | ICD-10-CM

## 2023-02-14 DIAGNOSIS — M17.11 PRIMARY OSTEOARTHRITIS OF RIGHT KNEE: ICD-10-CM

## 2023-02-14 DIAGNOSIS — Z78.0 POST-MENOPAUSAL: ICD-10-CM

## 2023-02-14 DIAGNOSIS — Z12.31 ENCOUNTER FOR SCREENING MAMMOGRAM FOR MALIGNANT NEOPLASM OF BREAST: ICD-10-CM

## 2023-02-14 DIAGNOSIS — Z87.891 FORMER SMOKER: ICD-10-CM

## 2023-02-14 PROCEDURE — 1090F PRES/ABSN URINE INCON ASSESS: CPT | Performed by: FAMILY MEDICINE

## 2023-02-14 PROCEDURE — 99204 OFFICE O/P NEW MOD 45 MIN: CPT | Performed by: FAMILY MEDICINE

## 2023-02-14 PROCEDURE — 1123F ACP DISCUSS/DSCN MKR DOCD: CPT | Performed by: FAMILY MEDICINE

## 2023-02-14 PROCEDURE — G8427 DOCREV CUR MEDS BY ELIG CLIN: HCPCS | Performed by: FAMILY MEDICINE

## 2023-02-14 PROCEDURE — 3017F COLORECTAL CA SCREEN DOC REV: CPT | Performed by: FAMILY MEDICINE

## 2023-02-14 PROCEDURE — G8420 CALC BMI NORM PARAMETERS: HCPCS | Performed by: FAMILY MEDICINE

## 2023-02-14 PROCEDURE — G8400 PT W/DXA NO RESULTS DOC: HCPCS | Performed by: FAMILY MEDICINE

## 2023-02-14 PROCEDURE — 1036F TOBACCO NON-USER: CPT | Performed by: FAMILY MEDICINE

## 2023-02-14 PROCEDURE — G8484 FLU IMMUNIZE NO ADMIN: HCPCS | Performed by: FAMILY MEDICINE

## 2023-02-14 RX ORDER — DIVALPROEX SODIUM 500 MG/1
1000 TABLET, EXTENDED RELEASE ORAL NIGHTLY
Qty: 60 TABLET | Refills: 2 | Status: SHIPPED | OUTPATIENT
Start: 2023-02-14

## 2023-02-14 RX ORDER — ONDANSETRON HYDROCHLORIDE 8 MG/1
8 TABLET, FILM COATED ORAL EVERY 8 HOURS PRN
COMMUNITY

## 2023-02-14 RX ORDER — METOPROLOL SUCCINATE 25 MG/1
25 TABLET, EXTENDED RELEASE ORAL DAILY
Qty: 30 TABLET | Refills: 2 | Status: SHIPPED | OUTPATIENT
Start: 2023-02-14

## 2023-02-14 RX ORDER — LORATADINE 10 MG/1
10 TABLET ORAL DAILY
Qty: 30 TABLET | Refills: 2 | Status: SHIPPED | OUTPATIENT
Start: 2023-02-14

## 2023-02-14 RX ORDER — IBUPROFEN 800 MG/1
800 TABLET ORAL EVERY 6 HOURS PRN
COMMUNITY

## 2023-02-14 ASSESSMENT — PATIENT HEALTH QUESTIONNAIRE - PHQ9
SUM OF ALL RESPONSES TO PHQ QUESTIONS 1-9: 0
SUM OF ALL RESPONSES TO PHQ9 QUESTIONS 1 & 2: 0
1. LITTLE INTEREST OR PLEASURE IN DOING THINGS: 0
SUM OF ALL RESPONSES TO PHQ QUESTIONS 1-9: 0
2. FEELING DOWN, DEPRESSED OR HOPELESS: 0

## 2023-02-14 ASSESSMENT — ENCOUNTER SYMPTOMS: DIARRHEA: 1

## 2023-02-14 NOTE — PROGRESS NOTES
Patient ID: Richie Del Toro 1955    . Chief Complaint   Patient presents with    New Patient    Osteoporosis    Tachycardia    Cancer     hx    Diarrhea     Occasionally       Mental Health Problem     Biipolar         Tachycardia  This is a chronic problem. The current episode started more than 1 month ago. Treatments tried: Toprol. The treatment provided moderate relief. Other    Diarrhea   This is a recurrent problem. The current episode started more than 1 month ago. Episode frequency: loose stool every few days. She has tried anti-motility drug for the symptoms. Mental Health Problem  Primary symptoms comment: Bipolar: stable on depakote and would like to continue taking. History of lymphoma: In remission. Follows up with oncologist as needed    Osteoporosis.   Takes Forteo                Patient Active Problem List   Diagnosis    Family history of malignant neoplasm of gastrointestinal tract    B-cell lymphoma (HCC)    Idiopathic hypotension    Mitral valve disease    Pancytopenia (HCC)    Hematoma    Pain syndrome, chronic    Tachycardia    Community acquired bacterial pneumonia    Former smoker    History of B-cell lymphoma    Chronic systolic (congestive) heart failure       Past Surgical History:   Procedure Laterality Date    BREAST BIOPSY Left     COLONOSCOPY  9/4/2015    normal colon    COLONOSCOPY N/A 2/28/2020    COLONOSCOPY DIAGNOSTIC performed by Alvaro Watkins MD at [place] [address] 1/7/2020    PORT INSERTION performed by Shin Guerrero MD at [address] [place] [address] Left 2/20/2020    PORT REMOVAL LEFT performed by Shin Guerrero MD at [address] [place] [address] Right 3/10/2020    RIGHT PORT INSERTION performed by Shin Guerrero MD at [address] [place] [address]  as a kid    TUBAL LIGATION  20+ yrs ago    UPPER GASTROINTESTINAL ENDOSCOPY N/A 2/27/2020    EGD DIAGNOSTIC ONLY performed by Alvaro Watkins MD at [facility] ENDOSCOPY       Family History   Problem Relation Age of Onset Breast Cancer Mother     High Blood Pressure Mother     Heart Attack Mother     Colon Cancer Father         \"cancer in the bowel\"    Heart Attack Sister     Drug Abuse Sister     Drug Abuse Sister     Breast Cancer Paternal Aunt     Ovarian Cancer Neg Hx        Current Outpatient Medications on File Prior to Visit   Medication Sig Dispense Refill    ibuprofen (ADVIL;MOTRIN) 800 MG tablet Take 800 mg by mouth every 6 hours as needed for Pain      ondansetron (ZOFRAN) 8 MG tablet Take 8 mg by mouth every 8 hours as needed for Nausea or Vomiting      QNASL 80 MCG/ACT AERS nasal spray       TECHLITE PEN NEEDLES 31G X 5 MM MISC use 1 NEEDLE WITH PEN once daily      loperamide (IMODIUM A-D) 2 MG tablet Take 1 tablet by mouth 4 times daily as needed for Diarrhea 60 tablet 1    MULTIPLE VITAMIN PO Take by mouth daily       No current facility-administered medications on file prior to visit. Objective:         Physical Exam  Vitals and nursing note reviewed. Constitutional:       General: She is not in acute distress. Appearance: Normal appearance. She is well-developed. HENT:      Head: Normocephalic and atraumatic. Right Ear: There is impacted cerumen. Left Ear: There is impacted cerumen. Nose: Nose normal. No nasal deformity, laceration, mucosal edema or rhinorrhea. Mouth/Throat:      Lips: Pink. Mouth: Mucous membranes are moist. No oral lesions. Tongue: No lesions. Tongue does not deviate from midline. Pharynx: Oropharynx is clear. No oropharyngeal exudate or posterior oropharyngeal erythema. Tonsils: No tonsillar exudate or tonsillar abscesses. Eyes:      General: Lids are normal.      Conjunctiva/sclera: Conjunctivae normal.      Pupils: Pupils are equal, round, and reactive to light. Neck:      Thyroid: No thyroid mass or thyromegaly. Trachea: No tracheal deviation. Cardiovascular:      Rate and Rhythm: Normal rate and regular rhythm. Heart sounds: Normal heart sounds, S1 normal and S2 normal.     No friction rub. No gallop. Pulmonary:      Effort: Pulmonary effort is normal. No respiratory distress. Breath sounds: Normal breath sounds. No wheezing or rales. Abdominal:      General: There is no distension. Palpations: Abdomen is soft. There is no mass. Tenderness: There is no abdominal tenderness. Musculoskeletal:      Cervical back: Neck supple. Right lower leg: No edema. Left lower leg: No edema. Lymphadenopathy:      Cervical:      Right cervical: No superficial, deep or posterior cervical adenopathy. Left cervical: No superficial, deep or posterior cervical adenopathy. Skin:     General: Skin is warm and dry. Neurological:      Mental Status: She is alert and oriented to person, place, and time. Psychiatric:         Attention and Perception: She is attentive. Speech: Speech normal.         Behavior: Behavior normal.         Thought Content: Thought content normal.         Judgment: Judgment normal.     Vitals:    02/14/23 0924   BP: 122/80   Site: Left Upper Arm   Position: Sitting   Cuff Size: Medium Adult   Pulse: 87   Weight: 117 lb (53.1 kg)   Height: 5' 2\" (1.575 m)     Body mass index is 21.4 kg/m². Wt Readings from Last 3 Encounters:   02/14/23 117 lb (53.1 kg)   02/02/23 117 lb 6.4 oz (53.3 kg)   11/21/22 121 lb 9.6 oz (55.2 kg)     BP Readings from Last 3 Encounters:   02/14/23 122/80   02/02/23 118/63   11/21/22 104/66          No results found for this visit on 02/14/23.   The ASCVD Risk score (Shaan DK, et al., 2019) failed to calculate for the following reasons:    Cannot find a previous HDL lab    Cannot find a previous total cholesterol lab  Lab Review   Hospital Outpatient Visit on 01/27/2023   Component Date Value    Iron 01/27/2023 85     UIBC 01/27/2023 164     TIBC 01/27/2023 249 (A)     Transferrin % 01/27/2023 34     Ferritin 01/27/2023 988 (A)     LD 01/27/2023 230 Sodium 01/27/2023 140     Potassium 01/27/2023 4.3     Chloride 01/27/2023 104     CO2 01/27/2023 26     BUN 01/27/2023 21     Creatinine 01/27/2023 0.9     Est, Glom Filt Rate 01/27/2023 >60     Glucose 01/27/2023 84     Calcium 01/27/2023 9.5     Albumin 01/27/2023 4.3     Total Protein 01/27/2023 5.9 (A)     Total Bilirubin 01/27/2023 0.3     ALT 01/27/2023 13     AST 01/27/2023 21     Alkaline Phosphatase 01/27/2023 69     Anion Gap 01/27/2023 10     WBC 01/27/2023 6.3     RBC 01/27/2023 3.67 (A)     Hemoglobin 01/27/2023 11.4 (A)     Hematocrit 01/27/2023 35.3 (A)     MCV 01/27/2023 96.2     MCH 01/27/2023 31.1 (A)     MCHC 01/27/2023 32.3     RDW 01/27/2023 12.5     Platelets 89/98/9521 214     MPV 01/27/2023 9.8     Differential Type 01/27/2023 AUTOMATED DIFFERENTIAL     Segs Relative 01/27/2023 64.2     Lymphocytes % 01/27/2023 23.8 (A)     Monocytes % 01/27/2023 9.1 (A)     Eosinophils % 01/27/2023 2.7     Basophils % 01/27/2023 0.2     Segs Absolute 01/27/2023 4.0     Lymphocytes Absolute 01/27/2023 1.5     Monocytes Absolute 01/27/2023 0.6     Eosinophils Absolute 01/27/2023 0.2     Basophils Absolute 01/27/2023 0.0            Assessment:       Diagnosis Orders   1. History of B-cell lymphoma        2. Primary osteoarthritis of right knee        3. Former smoker        4. Encounter for screening mammogram for malignant neoplasm of breast  CHRISTY DIGITAL SCREEN W CAD BILATERAL PER PROTOCOL      5. Post-menopausal  DEXA BONE DENSITY AXIAL SKELETON    teriparatide (FORTEO) 620 MCG/2.48ML SOPN injection      6. Impacted cerumen of both ears        7. Postmenopausal osteoporosis  teriparatide (FORTEO) 620 MCG/2.48ML SOPN injection              Plan:      Mineral oil soaks, hydrogen peroxide and water soaks, or salt water soaks to ear. Avoid Q tips.     See orders    Time to see pt and review chart and document: 45 minutes      Return in about 3 months (around 5/14/2023) for (AWV LPN, schedule on phone), HTN, allergies,.

## 2023-02-15 PROBLEM — I50.22 CHRONIC SYSTOLIC (CONGESTIVE) HEART FAILURE (HCC): Status: ACTIVE | Noted: 2023-02-15

## 2023-02-15 PROBLEM — E43 SEVERE MALNUTRITION (HCC): Chronic | Status: RESOLVED | Noted: 2020-02-19 | Resolved: 2023-02-15

## 2023-02-15 RX ORDER — TERIPARATIDE 250 UG/ML
20 INJECTION, SOLUTION SUBCUTANEOUS DAILY
Qty: 3 ML | Refills: 12 | Status: SHIPPED | OUTPATIENT
Start: 2023-02-15 | End: 2023-03-17

## 2023-02-21 ENCOUNTER — HOSPITAL ENCOUNTER (OUTPATIENT)
Dept: WOMENS IMAGING | Age: 68
Discharge: HOME | End: 2023-02-21
Payer: MEDICARE

## 2023-02-21 DIAGNOSIS — Z12.31 ENCOUNTER FOR SCREENING MAMMOGRAM FOR MALIGNANT NEOPLASM OF BREAST: ICD-10-CM

## 2023-02-21 DIAGNOSIS — Z78.0 POST-MENOPAUSAL: ICD-10-CM

## 2023-02-21 PROCEDURE — 77067 SCR MAMMO BI INCL CAD: CPT

## 2023-02-21 PROCEDURE — 77080 DXA BONE DENSITY AXIAL: CPT

## 2023-02-24 DIAGNOSIS — M85.89 OSTEOPENIA OF MULTIPLE SITES: Primary | ICD-10-CM

## 2023-02-24 PROBLEM — J15.9 COMMUNITY ACQUIRED BACTERIAL PNEUMONIA: Status: RESOLVED | Noted: 2022-05-13 | Resolved: 2023-02-24

## 2023-02-24 RX ORDER — CAL/D3/MAG11/ZINC/COP/MANG/BOR 600 MG-800
1 TABLET ORAL 2 TIMES DAILY
Qty: 180 TABLET | Refills: 3 | Status: SHIPPED | OUTPATIENT
Start: 2023-02-24

## 2023-02-28 ENCOUNTER — HOSPITAL ENCOUNTER (OUTPATIENT)
Dept: CT IMAGING | Age: 68
Discharge: HOME | End: 2023-02-28
Payer: MEDICARE

## 2023-02-28 DIAGNOSIS — R91.1 PULMONARY NODULE: ICD-10-CM

## 2023-02-28 PROCEDURE — 71250 CT THORAX DX C-: CPT

## 2023-03-21 ENCOUNTER — PROCEDURE VISIT (OUTPATIENT)
Dept: CARDIOLOGY CLINIC | Age: 68
End: 2023-03-21
Payer: MEDICARE

## 2023-03-21 DIAGNOSIS — I97.89 OTHER POSTPROCEDURAL COMPLICATIONS AND DISORDERS OF THE CIRCULATORY SYSTEM, NOT ELSEWHERE CLASSIFIED: ICD-10-CM

## 2023-03-21 DIAGNOSIS — I05.9 MITRAL VALVE DISEASE: ICD-10-CM

## 2023-03-21 DIAGNOSIS — C85.16 B-CELL LYMPHOMA OF INTRAPELVIC LYMPH NODES, UNSPECIFIED B-CELL LYMPHOMA TYPE (HCC): ICD-10-CM

## 2023-03-21 DIAGNOSIS — I95.0 IDIOPATHIC HYPOTENSION: ICD-10-CM

## 2023-03-21 DIAGNOSIS — R00.0 TACHYCARDIA: ICD-10-CM

## 2023-03-21 DIAGNOSIS — I82.722 CHRONIC DEEP VEIN THROMBOSIS (DVT) OF LEFT UPPER EXTREMITY, UNSPECIFIED VEIN (HCC): ICD-10-CM

## 2023-03-21 LAB
LV EF: 58 %
LVEF MODALITY: NORMAL

## 2023-03-21 PROCEDURE — 93306 TTE W/DOPPLER COMPLETE: CPT | Performed by: INTERNAL MEDICINE

## 2023-03-22 ENCOUNTER — TELEPHONE (OUTPATIENT)
Dept: CARDIOLOGY CLINIC | Age: 68
End: 2023-03-22

## 2023-03-22 NOTE — TELEPHONE ENCOUNTER
----- Message from DIONI Padilla CNP sent at 3/22/2023  9:18 AM EDT -----    ----- Message -----  From: Victorino Moore Incoming Cardiovascular Results From [facility]  Sent: 3/21/2023   5:17 PM EDT  To: DIONI Padilla CNP    Summary   Left ventricular function and size is normal, EF is estimated at 55-60%. Mild left ventricular hypertrophy. Grade I diastolic dysfunction. No regional wall motion abnormalities were detected. Moderately dilated left atrium. Aneurysmal interatrial septum. The mitral valve leaflets are thicken,anterior leaflet appears to be   prolapsed. Moderate mitral regurgitation   Mild tricuspid regurgitation; RVSP is 30 mmHg. No evidence of pericardial effusion. Spoke with regarding results of echo patient voiced understanding.

## 2023-05-02 ENCOUNTER — HOSPITAL ENCOUNTER (OUTPATIENT)
Dept: INFUSION THERAPY | Age: 68
End: 2023-05-02
Payer: MEDICARE

## 2023-05-02 ENCOUNTER — OFFICE VISIT (OUTPATIENT)
Dept: ONCOLOGY | Age: 68
End: 2023-05-02
Payer: MEDICARE

## 2023-05-02 ENCOUNTER — HOSPITAL ENCOUNTER (OUTPATIENT)
Dept: INFUSION THERAPY | Age: 68
Discharge: HOME | End: 2023-05-02
Payer: MEDICARE

## 2023-05-02 VITALS
DIASTOLIC BLOOD PRESSURE: 44 MMHG | WEIGHT: 119.8 LBS | HEART RATE: 77 BPM | BODY MASS INDEX: 22.05 KG/M2 | TEMPERATURE: 97.1 F | SYSTOLIC BLOOD PRESSURE: 98 MMHG | RESPIRATION RATE: 16 BRPM | HEIGHT: 62 IN

## 2023-05-02 DIAGNOSIS — D64.9 ANEMIA, UNSPECIFIED TYPE: Primary | ICD-10-CM

## 2023-05-02 DIAGNOSIS — C83.30 DIFFUSE LARGE B-CELL LYMPHOMA, UNSPECIFIED BODY REGION (HCC): ICD-10-CM

## 2023-05-02 DIAGNOSIS — C85.16 B-CELL LYMPHOMA OF INTRAPELVIC LYMPH NODES, UNSPECIFIED B-CELL LYMPHOMA TYPE (HCC): ICD-10-CM

## 2023-05-02 LAB
ALBUMIN SERPL-MCNC: 3.7 GM/DL (ref 3.4–5)
ALP BLD-CCNC: 68 IU/L (ref 40–129)
ALT SERPL-CCNC: 11 U/L (ref 10–40)
ANION GAP SERPL CALCULATED.3IONS-SCNC: 10 MMOL/L (ref 4–16)
AST SERPL-CCNC: 19 IU/L (ref 15–37)
BASOPHILS ABSOLUTE: 0 K/CU MM
BASOPHILS RELATIVE PERCENT: 0 % (ref 0–1)
BILIRUB SERPL-MCNC: 0.3 MG/DL (ref 0–1)
BUN SERPL-MCNC: 15 MG/DL (ref 6–23)
CALCIUM SERPL-MCNC: 9.2 MG/DL (ref 8.3–10.6)
CHLORIDE BLD-SCNC: 105 MMOL/L (ref 99–110)
CO2: 27 MMOL/L (ref 21–32)
CREAT SERPL-MCNC: 0.7 MG/DL (ref 0.6–1.1)
DIFFERENTIAL TYPE: ABNORMAL
EOSINOPHILS ABSOLUTE: 0.1 K/CU MM
EOSINOPHILS RELATIVE PERCENT: 2.8 % (ref 0–3)
FERRITIN: 833 NG/ML (ref 15–150)
GFR SERPL CREATININE-BSD FRML MDRD: >60 ML/MIN/1.73M2
GLUCOSE SERPL-MCNC: 98 MG/DL (ref 70–99)
HCT VFR BLD CALC: 29.8 % (ref 37–47)
HEMOGLOBIN: 9.8 GM/DL (ref 12.5–16)
IRON: 82 UG/DL (ref 37–145)
LACTATE DEHYDROGENASE: 179 IU/L (ref 120–246)
LYMPHOCYTES ABSOLUTE: 0.7 K/CU MM
LYMPHOCYTES RELATIVE PERCENT: 17.5 % (ref 24–44)
MCH RBC QN AUTO: 31.7 PG (ref 27–31)
MCHC RBC AUTO-ENTMCNC: 32.9 % (ref 32–36)
MCV RBC AUTO: 96.4 FL (ref 78–100)
MONOCYTES ABSOLUTE: 0.4 K/CU MM
MONOCYTES RELATIVE PERCENT: 9.9 % (ref 0–4)
PCT TRANSFERRIN: 38 % (ref 10–44)
PDW BLD-RTO: 12.4 % (ref 11.7–14.9)
PLATELET # BLD: 170 K/CU MM (ref 140–440)
PMV BLD AUTO: 10 FL (ref 7.5–11.1)
POTASSIUM SERPL-SCNC: 3.9 MMOL/L (ref 3.5–5.1)
RBC # BLD: 3.09 M/CU MM (ref 4.2–5.4)
SEGMENTED NEUTROPHILS ABSOLUTE COUNT: 2.8 K/CU MM
SEGMENTED NEUTROPHILS RELATIVE PERCENT: 69.8 % (ref 36–66)
SODIUM BLD-SCNC: 142 MMOL/L (ref 135–145)
TOTAL IRON BINDING CAPACITY: 215 UG/DL (ref 250–450)
TOTAL PROTEIN: 5.3 GM/DL (ref 6.4–8.2)
UNSATURATED IRON BINDING CAPACITY: 133 UG/DL (ref 110–370)
WBC # BLD: 3.9 K/CU MM (ref 4–10.5)

## 2023-05-02 PROCEDURE — 83540 ASSAY OF IRON: CPT

## 2023-05-02 PROCEDURE — 80053 COMPREHEN METABOLIC PANEL: CPT

## 2023-05-02 PROCEDURE — G8420 CALC BMI NORM PARAMETERS: HCPCS | Performed by: INTERNAL MEDICINE

## 2023-05-02 PROCEDURE — 2580000003 HC RX 258: Performed by: INTERNAL MEDICINE

## 2023-05-02 PROCEDURE — 1090F PRES/ABSN URINE INCON ASSESS: CPT | Performed by: INTERNAL MEDICINE

## 2023-05-02 PROCEDURE — 85025 COMPLETE CBC W/AUTO DIFF WBC: CPT

## 2023-05-02 PROCEDURE — 83615 LACTATE (LD) (LDH) ENZYME: CPT

## 2023-05-02 PROCEDURE — 1123F ACP DISCUSS/DSCN MKR DOCD: CPT | Performed by: INTERNAL MEDICINE

## 2023-05-02 PROCEDURE — 83550 IRON BINDING TEST: CPT

## 2023-05-02 PROCEDURE — 6360000002 HC RX W HCPCS: Performed by: INTERNAL MEDICINE

## 2023-05-02 PROCEDURE — 99213 OFFICE O/P EST LOW 20 MIN: CPT | Performed by: INTERNAL MEDICINE

## 2023-05-02 PROCEDURE — 99211 OFF/OP EST MAY X REQ PHY/QHP: CPT

## 2023-05-02 PROCEDURE — 1036F TOBACCO NON-USER: CPT | Performed by: INTERNAL MEDICINE

## 2023-05-02 PROCEDURE — 3017F COLORECTAL CA SCREEN DOC REV: CPT | Performed by: INTERNAL MEDICINE

## 2023-05-02 PROCEDURE — G8427 DOCREV CUR MEDS BY ELIG CLIN: HCPCS | Performed by: INTERNAL MEDICINE

## 2023-05-02 PROCEDURE — 36591 DRAW BLOOD OFF VENOUS DEVICE: CPT

## 2023-05-02 PROCEDURE — 82728 ASSAY OF FERRITIN: CPT

## 2023-05-02 PROCEDURE — G8399 PT W/DXA RESULTS DOCUMENT: HCPCS | Performed by: INTERNAL MEDICINE

## 2023-05-02 RX ORDER — SODIUM CHLORIDE 0.9 % (FLUSH) 0.9 %
10 SYRINGE (ML) INJECTION PRN
Status: DISCONTINUED | OUTPATIENT
Start: 2023-05-02 | End: 2023-05-03 | Stop reason: HOSPADM

## 2023-05-02 RX ORDER — HEPARIN SODIUM (PORCINE) LOCK FLUSH IV SOLN 100 UNIT/ML 100 UNIT/ML
500 SOLUTION INTRAVENOUS PRN
Status: DISCONTINUED | OUTPATIENT
Start: 2023-05-02 | End: 2023-05-03 | Stop reason: HOSPADM

## 2023-05-02 RX ADMIN — HEPARIN 500 UNITS: 100 SYRINGE at 09:34

## 2023-05-02 RX ADMIN — SODIUM CHLORIDE, PRESERVATIVE FREE 10 ML: 5 INJECTION INTRAVENOUS at 09:34

## 2023-05-02 NOTE — PROGRESS NOTES
Patient arrived to treatment suite for mediport draw. Right chest mediport accessed and blood drawn from site and sent to lab for processing. Mediport heparinized and de-accessed, band-aid applied. Patient tolerated well. Left treatment suite ambulatory. Patient to stop at check-out for next doctor appointment.

## 2023-05-02 NOTE — PROGRESS NOTES
MA Rooming Questions  Patient: Cayetano Ann  MRN: [id number]    Date: 5/2/2023        1. Do you have any new issues?   no         2. Do you need any refills on medications?    no    3. Have you had any imaging done since your last visit? yes - Dexa, Mammo, Colonoscopy-    4. Have you been hospitalized or seen in the emergency room since your last visit here?   no    5. Did the patient have a depression screening completed today?  No    No data recorded     PHQ-9 Given to (if applicable):               PHQ-9 Score (if applicable):                     [] Positive     []  Negative              Does question #9 need addressed (if applicable)                     [] Yes    []  No               Eveline Mojica CMA

## 2023-05-15 ENCOUNTER — OFFICE VISIT (OUTPATIENT)
Dept: FAMILY MEDICINE CLINIC | Age: 68
End: 2023-05-15
Payer: MEDICARE

## 2023-05-15 VITALS
DIASTOLIC BLOOD PRESSURE: 72 MMHG | HEIGHT: 62 IN | SYSTOLIC BLOOD PRESSURE: 114 MMHG | HEART RATE: 82 BPM | BODY MASS INDEX: 21.53 KG/M2 | WEIGHT: 117 LBS

## 2023-05-15 DIAGNOSIS — J34.89 SINUS DRAINAGE: ICD-10-CM

## 2023-05-15 DIAGNOSIS — F31.70 BIPOLAR DISORDER IN FULL REMISSION, MOST RECENT EPISODE UNSPECIFIED TYPE (HCC): ICD-10-CM

## 2023-05-15 DIAGNOSIS — Z23 NEED FOR SHINGLES VACCINE: ICD-10-CM

## 2023-05-15 DIAGNOSIS — J34.89 RHINORRHEA: ICD-10-CM

## 2023-05-15 DIAGNOSIS — R00.0 TACHYCARDIA: Primary | ICD-10-CM

## 2023-05-15 DIAGNOSIS — Z85.72 HISTORY OF B-CELL LYMPHOMA: ICD-10-CM

## 2023-05-15 DIAGNOSIS — M81.0 POSTMENOPAUSAL OSTEOPOROSIS: ICD-10-CM

## 2023-05-15 DIAGNOSIS — Z13.220 SCREENING CHOLESTEROL LEVEL: ICD-10-CM

## 2023-05-15 PROCEDURE — 1123F ACP DISCUSS/DSCN MKR DOCD: CPT | Performed by: FAMILY MEDICINE

## 2023-05-15 PROCEDURE — G8427 DOCREV CUR MEDS BY ELIG CLIN: HCPCS | Performed by: FAMILY MEDICINE

## 2023-05-15 PROCEDURE — 1036F TOBACCO NON-USER: CPT | Performed by: FAMILY MEDICINE

## 2023-05-15 PROCEDURE — G8420 CALC BMI NORM PARAMETERS: HCPCS | Performed by: FAMILY MEDICINE

## 2023-05-15 PROCEDURE — G8399 PT W/DXA RESULTS DOCUMENT: HCPCS | Performed by: FAMILY MEDICINE

## 2023-05-15 PROCEDURE — 1090F PRES/ABSN URINE INCON ASSESS: CPT | Performed by: FAMILY MEDICINE

## 2023-05-15 PROCEDURE — 3017F COLORECTAL CA SCREEN DOC REV: CPT | Performed by: FAMILY MEDICINE

## 2023-05-15 PROCEDURE — 99214 OFFICE O/P EST MOD 30 MIN: CPT | Performed by: FAMILY MEDICINE

## 2023-05-15 RX ORDER — DIVALPROEX SODIUM 500 MG/1
1000 TABLET, EXTENDED RELEASE ORAL NIGHTLY
Qty: 60 TABLET | Refills: 9 | Status: SHIPPED | OUTPATIENT
Start: 2023-05-15

## 2023-05-15 RX ORDER — LORATADINE 10 MG/1
10 TABLET ORAL DAILY
Qty: 30 TABLET | Refills: 9 | Status: SHIPPED | OUTPATIENT
Start: 2023-05-15

## 2023-05-15 RX ORDER — METOPROLOL SUCCINATE 25 MG/1
25 TABLET, EXTENDED RELEASE ORAL DAILY
Qty: 90 TABLET | Refills: 3 | Status: SHIPPED | OUTPATIENT
Start: 2023-05-15

## 2023-05-15 RX ORDER — PEN NEEDLE, DIABETIC 31 GX3/16"
NEEDLE, DISPOSABLE MISCELLANEOUS
Qty: 100 EACH | Refills: 3 | Status: SHIPPED | OUTPATIENT
Start: 2023-05-15

## 2023-05-15 SDOH — ECONOMIC STABILITY: HOUSING INSECURITY
IN THE LAST 12 MONTHS, WAS THERE A TIME WHEN YOU DID NOT HAVE A STEADY PLACE TO SLEEP OR SLEPT IN A SHELTER (INCLUDING NOW)?: NO

## 2023-05-15 SDOH — ECONOMIC STABILITY: FOOD INSECURITY: WITHIN THE PAST 12 MONTHS, THE FOOD YOU BOUGHT JUST DIDN'T LAST AND YOU DIDN'T HAVE MONEY TO GET MORE.: NEVER TRUE

## 2023-05-15 SDOH — ECONOMIC STABILITY: FOOD INSECURITY: WITHIN THE PAST 12 MONTHS, YOU WORRIED THAT YOUR FOOD WOULD RUN OUT BEFORE YOU GOT MONEY TO BUY MORE.: NEVER TRUE

## 2023-05-15 SDOH — ECONOMIC STABILITY: INCOME INSECURITY: HOW HARD IS IT FOR YOU TO PAY FOR THE VERY BASICS LIKE FOOD, HOUSING, MEDICAL CARE, AND HEATING?: NOT HARD AT ALL

## 2023-05-15 NOTE — PROGRESS NOTES
Patient ID: Natalie Nowak 1955    . Chief Complaint   Patient presents with    Hypertension    Allergies         Hypertension  This is a chronic problem. Past treatments include beta blockers. There are no compliance problems. Allergies  Presents for follow-up visit. (Bipolar: stable on depakote and would like to continue taking) Episode frequency: takes Claritin. Timing: in May but not sure if yearly. Allergens exposed to: unknown. Tachycardia  This is a chronic problem. The current episode started more than 1 month ago. Treatments tried: Toprol. The treatment provided moderate relief. Mental Health Problem  Primary symptoms comment: Bipolar: stable on depakote and would like to continue taking. Sinus drainage:  4 weeks. No fever, chills, or sweats.  Does not know if it's allergies or not    Patient Active Problem List   Diagnosis    Family history of malignant neoplasm of gastrointestinal tract    B-cell lymphoma (HCC)    Idiopathic hypotension    Mitral valve disease    Pancytopenia (HCC)    Pain syndrome, chronic    Tachycardia    Former smoker    History of B-cell lymphoma    Chronic systolic (congestive) heart failure    Osteopenia of multiple sites       Past Surgical History:   Procedure Laterality Date    BREAST BIOPSY Left     COLONOSCOPY  9/4/2015    normal colon    COLONOSCOPY N/A 2/28/2020    COLONOSCOPY DIAGNOSTIC performed by Sara Omer MD at [place] [address] N/A 1/7/2020    PORT INSERTION performed by Mortimer Cross, MD at [facility] Left 2/20/2020    PORT REMOVAL LEFT performed by Mortimer Cross, MD at [address] [facility] Right 3/10/2020    RIGHT PORT INSERTION performed by Mortimer Cross, MD at [address] [place] [address]  as a kid    TUBAL LIGATION  20+ yrs ago    UPPER GASTROINTESTINAL ENDOSCOPY N/A 2/27/2020    EGD DIAGNOSTIC ONLY performed by Sara Omer MD at [facility] ENDOSCOPY       Family History   Problem Relation Age of Onset    Breast Cancer

## 2023-05-17 ENCOUNTER — TELEMEDICINE (OUTPATIENT)
Dept: FAMILY MEDICINE CLINIC | Age: 68
End: 2023-05-17
Payer: MEDICARE

## 2023-05-17 DIAGNOSIS — Z00.00 INITIAL MEDICARE ANNUAL WELLNESS VISIT: Primary | ICD-10-CM

## 2023-05-17 PROCEDURE — 3017F COLORECTAL CA SCREEN DOC REV: CPT | Performed by: FAMILY MEDICINE

## 2023-05-17 PROCEDURE — G0438 PPPS, INITIAL VISIT: HCPCS | Performed by: FAMILY MEDICINE

## 2023-05-17 PROCEDURE — 1123F ACP DISCUSS/DSCN MKR DOCD: CPT | Performed by: FAMILY MEDICINE

## 2023-05-17 ASSESSMENT — PATIENT HEALTH QUESTIONNAIRE - PHQ9
1. LITTLE INTEREST OR PLEASURE IN DOING THINGS: 0
3. TROUBLE FALLING OR STAYING ASLEEP: 0
2. FEELING DOWN, DEPRESSED OR HOPELESS: 0
SUM OF ALL RESPONSES TO PHQ QUESTIONS 1-9: 0
SUM OF ALL RESPONSES TO PHQ QUESTIONS 1-9: 0
6. FEELING BAD ABOUT YOURSELF - OR THAT YOU ARE A FAILURE OR HAVE LET YOURSELF OR YOUR FAMILY DOWN: 0
8. MOVING OR SPEAKING SO SLOWLY THAT OTHER PEOPLE COULD HAVE NOTICED. OR THE OPPOSITE, BEING SO FIGETY OR RESTLESS THAT YOU HAVE BEEN MOVING AROUND A LOT MORE THAN USUAL: 0
10. IF YOU CHECKED OFF ANY PROBLEMS, HOW DIFFICULT HAVE THESE PROBLEMS MADE IT FOR YOU TO DO YOUR WORK, TAKE CARE OF THINGS AT HOME, OR GET ALONG WITH OTHER PEOPLE: 0
SUM OF ALL RESPONSES TO PHQ9 QUESTIONS 1 & 2: 0
SUM OF ALL RESPONSES TO PHQ QUESTIONS 1-9: 0
7. TROUBLE CONCENTRATING ON THINGS, SUCH AS READING THE NEWSPAPER OR WATCHING TELEVISION: 0
5. POOR APPETITE OR OVEREATING: 0
SUM OF ALL RESPONSES TO PHQ QUESTIONS 1-9: 0
4. FEELING TIRED OR HAVING LITTLE ENERGY: 0
9. THOUGHTS THAT YOU WOULD BE BETTER OFF DEAD, OR OF HURTING YOURSELF: 0

## 2023-05-17 ASSESSMENT — LIFESTYLE VARIABLES
HOW MANY STANDARD DRINKS CONTAINING ALCOHOL DO YOU HAVE ON A TYPICAL DAY: PATIENT DOES NOT DRINK
HOW OFTEN DO YOU HAVE A DRINK CONTAINING ALCOHOL: NEVER

## 2023-05-17 NOTE — PATIENT INSTRUCTIONS
Personalized Preventive Plan for Tameka Joy - 5/17/2023  Medicare offers a range of preventive health benefits. Some of the tests and screenings are paid in full while other may be subject to a deductible, co-insurance, and/or copay. Some of these benefits include a comprehensive review of your medical history including lifestyle, illnesses that may run in your family, and various assessments and screenings as appropriate. After reviewing your medical record and screening and assessments performed today your provider may have ordered immunizations, labs, imaging, and/or referrals for you. A list of these orders (if applicable) as well as your Preventive Care list are included within your After Visit Summary for your review. Other Preventive Recommendations:    A preventive eye exam performed by an eye specialist is recommended every 1-2 years to screen for glaucoma; cataracts, macular degeneration, and other eye disorders. A preventive dental visit is recommended every 6 months. Try to get at least 150 minutes of exercise per week or 10,000 steps per day on a pedometer . Order or download the FREE \"Exercise & Physical Activity: Your Everyday Guide\" from [employer]. Call 9-669.230.1761 or search [employer] online. You need 6717-8352 mg of calcium and 6293-1301 IU of vitamin D per day. It is possible to meet your calcium requirement with diet alone, but a vitamin D supplement is usually necessary to meet this goal.  When exposed to the sun, use a sunscreen that protects against both UVA and UVB radiation with an SPF of 30 or greater. Reapply every 2 to 3 hours or after sweating, drying off with a towel, or swimming. Always wear a seat belt when traveling in a car. Always wear a helmet when riding a bicycle or motorcycle.

## 2023-05-17 NOTE — PROGRESS NOTES
Medicare Annual Wellness Visit    Jonatan Mesa is here for Medicare AWV    Assessment & Plan   Initial Medicare annual wellness visit      Recommendations for Preventive Services Due: see orders and patient instructions/AVS.  Recommended screening schedule for the next 5-10 years is provided to the patient in written form: see Patient Instructions/AVS.     No follow-ups on file. Subjective       Patient's complete Health Risk Assessment and screening values have been reviewed and are found in Flowsheets. The following problems were reviewed today and where indicated follow up appointments were made and/or referrals ordered. Positive Risk Factor Screenings with Interventions:    Fall Risk:  Do you feel unsteady or are you worried about falling? : (!) yes (bad knee, getting injections, very careful)  2 or more falls in past year?: no  Fall with injury in past year?: no     Interventions:    Patient comments: states she has a bad knee and has been getting injections. States she is very careful when ambulating, but does not use a cane or walker for stability. Patient declines any further evaluation or treatment              Weight and Activity:  Physical Activity: Inactive    Days of Exercise per Week: 0 days    Minutes of Exercise per Session: 0 min     On average, how many days per week do you engage in moderate to strenuous exercise (like a brisk walk)?: 0 days  Have you lost any weight without trying in the past 3 months?: No  There is no height or weight on file to calculate BMI. Inactivity Interventions:  Patient comments: states that she will be starting to walk for exercise once the weather stays warm.   Patient declined any further interventions or treatment           Advanced Directives:  Do you have a Living Will?: (!) No    Intervention:  has NO advanced directive - information provided verbally                       Objective      Patient-Reported Vitals  No data recorded     Unable to

## 2023-05-30 ENCOUNTER — OFFICE VISIT (OUTPATIENT)
Dept: CARDIOLOGY CLINIC | Age: 68
End: 2023-05-30
Payer: MEDICARE

## 2023-05-30 VITALS
HEART RATE: 60 BPM | DIASTOLIC BLOOD PRESSURE: 64 MMHG | HEIGHT: 63 IN | WEIGHT: 118 LBS | BODY MASS INDEX: 20.91 KG/M2 | SYSTOLIC BLOOD PRESSURE: 110 MMHG

## 2023-05-30 DIAGNOSIS — I50.22 CHRONIC SYSTOLIC (CONGESTIVE) HEART FAILURE (HCC): ICD-10-CM

## 2023-05-30 DIAGNOSIS — I05.9 MITRAL VALVE DISEASE: ICD-10-CM

## 2023-05-30 DIAGNOSIS — R00.0 TACHYCARDIA: Primary | ICD-10-CM

## 2023-05-30 DIAGNOSIS — C85.16 B-CELL LYMPHOMA OF INTRAPELVIC LYMPH NODES, UNSPECIFIED B-CELL LYMPHOMA TYPE (HCC): ICD-10-CM

## 2023-05-30 DIAGNOSIS — I95.0 IDIOPATHIC HYPOTENSION: ICD-10-CM

## 2023-05-30 DIAGNOSIS — Z87.891 FORMER SMOKER: ICD-10-CM

## 2023-05-30 DIAGNOSIS — Z80.0 FAMILY HISTORY OF MALIGNANT NEOPLASM OF GASTROINTESTINAL TRACT: ICD-10-CM

## 2023-05-30 DIAGNOSIS — Z85.72 HISTORY OF B-CELL LYMPHOMA: ICD-10-CM

## 2023-05-30 PROCEDURE — 1036F TOBACCO NON-USER: CPT | Performed by: INTERNAL MEDICINE

## 2023-05-30 PROCEDURE — 3017F COLORECTAL CA SCREEN DOC REV: CPT | Performed by: INTERNAL MEDICINE

## 2023-05-30 PROCEDURE — G8399 PT W/DXA RESULTS DOCUMENT: HCPCS | Performed by: INTERNAL MEDICINE

## 2023-05-30 PROCEDURE — G8427 DOCREV CUR MEDS BY ELIG CLIN: HCPCS | Performed by: INTERNAL MEDICINE

## 2023-05-30 PROCEDURE — 1123F ACP DISCUSS/DSCN MKR DOCD: CPT | Performed by: INTERNAL MEDICINE

## 2023-05-30 PROCEDURE — 99214 OFFICE O/P EST MOD 30 MIN: CPT | Performed by: INTERNAL MEDICINE

## 2023-05-30 PROCEDURE — 1090F PRES/ABSN URINE INCON ASSESS: CPT | Performed by: INTERNAL MEDICINE

## 2023-05-30 PROCEDURE — G8420 CALC BMI NORM PARAMETERS: HCPCS | Performed by: INTERNAL MEDICINE

## 2023-05-30 NOTE — PATIENT INSTRUCTIONS
Please be informed that if you contact our office outside of normal business hours the physician on call cannot help with any scheduling or rescheduling issues, procedure instruction questions or any type of medication issue. We advise you for any urgent/emergency that you go to the nearest emergency room! PLEASE CALL OUR OFFICE DURING NORMAL BUSINESS HOURS    Monday - Friday   8 am to 5 pm    [address]: [phone]    [place]:  [address] Laboratory Locations - No appointment necessary. Sites open Monday to Friday. Call your preferred location for test preparation, business   hours and other information you need. [employer] accepts [employer]. [address] Jadyn Salazar, [address] [facility]  Phone: [phone] [place]  [address] [facility]  [address] [place], [address] [facility]  Phone: [phone]     **It is YOUR responsibilty to bring medication bottles and/or updated medication list to [address] [place] [address]. This will allow us to better serve you and all your healthcare needs**  Thank you for allowing us to care for you today! We want to ensure we can follow your treatment plan and we strive to give you the best outcomes and experience possible. If you ever have a life threatening emergency and call 911 - for an ambulance (EMS)   Our providers can only care for you at:   [facility] or [facility]. Even if you have someone take you or you drive yourself we can only care for you in a [facility]. Our providers are not setup at the other healthcare locations!

## 2023-05-30 NOTE — PROGRESS NOTES
CARDIOLOGY  NOTE        Chief Complaint: Hypotention    HPI:   Adina Man is a 79y.o. year old who has Past medical history as noted below. She finished chemo and radiation in July 2020, She says blood pressure is better . She is not on midodrine any more  She is on metoprolol due to tachycardia though she denies any palpitations her echo shows severe MR with mitral valve prolapse . No ankle swelling , she has no SOB , she says diarrhea is better on imodium , she had B cell lymphoma of bowel   Adina Man has history of bipolar disorder and lymphoma. Initially she was symptomatic with blood pressure in the 80s causing dizziness and lightheadedness but  improved on  midodrine.  Nevertheless weaned off it now She is not on midodrine since chemo is done    echo  In March 2021 showed  mitral valve prolapse although there is moderate to severe  mitral regurgitation  Denies any signs of congestive heart failure like ankle swelling shortness of breath her main complaints of feeling tired and fatigued low energy  She is rasing her 5 yr old great grand daughter\" Maliktylor \"      Current Outpatient Medications   Medication Sig Dispense Refill    Insulin Pen Needle (TECHLITE PEN NEEDLES) 31G X 5 MM MISC use 1 NEEDLE WITH PEN once daily 100 each 3    divalproex (DEPAKOTE ER) 500 MG extended release tablet Take 2 tablets by mouth nightly 60 tablet 9    metoprolol succinate (TOPROL XL) 25 MG extended release tablet Take 1 tablet by mouth daily 90 tablet 3    loratadine (CLARITIN) 10 MG tablet Take 1 tablet by mouth daily 30 tablet 9    Caltrate 600+D Plus Minerals (CALTRATE) 600-800 MG-UNIT TABS tablet Take 1 tablet by mouth 2 times daily 180 tablet 3    ibuprofen (ADVIL;MOTRIN) 800 MG tablet Take 1 tablet by mouth every 6 hours as needed for Pain      ondansetron (ZOFRAN) 8 MG tablet Take 1 tablet by mouth every 8 hours as needed for Nausea or Vomiting      QNASL 80 MCG/ACT AERS nasal spray

## 2023-07-25 ENCOUNTER — HOSPITAL ENCOUNTER (OUTPATIENT)
Dept: INFUSION THERAPY | Age: 68
Discharge: HOME | End: 2023-07-25
Payer: MEDICARE

## 2023-07-25 DIAGNOSIS — C85.16 B-CELL LYMPHOMA OF INTRAPELVIC LYMPH NODES, UNSPECIFIED B-CELL LYMPHOMA TYPE (HCC): Primary | ICD-10-CM

## 2023-07-25 PROCEDURE — 2580000003 HC RX 258: Performed by: INTERNAL MEDICINE

## 2023-07-25 PROCEDURE — 6360000002 HC RX W HCPCS: Performed by: INTERNAL MEDICINE

## 2023-07-25 PROCEDURE — 96523 IRRIG DRUG DELIVERY DEVICE: CPT

## 2023-07-25 RX ORDER — SODIUM CHLORIDE 0.9 % (FLUSH) 0.9 %
20 SYRINGE (ML) INJECTION PRN
Status: DISCONTINUED | OUTPATIENT
Start: 2023-07-25 | End: 2023-07-26 | Stop reason: HOSPADM

## 2023-07-25 RX ORDER — HEPARIN 100 UNIT/ML
500 SYRINGE INTRAVENOUS PRN
Status: DISCONTINUED | OUTPATIENT
Start: 2023-07-25 | End: 2023-07-26 | Stop reason: HOSPADM

## 2023-07-25 RX ADMIN — HEPARIN 500 UNITS: 100 SYRINGE at 15:00

## 2023-07-25 RX ADMIN — SODIUM CHLORIDE, PRESERVATIVE FREE 20 ML: 5 INJECTION INTRAVENOUS at 15:00

## 2023-07-25 NOTE — PROGRESS NOTES
Pt here for port flush. Port accessed with blood return noted. Port flushed with 20 cc NS and 10 cc blood wasted. Port then flushed with 20 cc NS and 500 units of heparin. Port then Standard [place].  Pt left ambulatory discharge instructions given

## 2023-08-01 ENCOUNTER — HOSPITAL ENCOUNTER (OUTPATIENT)
Dept: INFUSION THERAPY | Age: 68
Discharge: HOME | End: 2023-08-01
Payer: MEDICARE

## 2023-08-01 ENCOUNTER — OFFICE VISIT (OUTPATIENT)
Dept: ONCOLOGY | Age: 68
End: 2023-08-01
Payer: MEDICARE

## 2023-08-01 VITALS
WEIGHT: 121.2 LBS | OXYGEN SATURATION: 98 % | TEMPERATURE: 97.1 F | HEIGHT: 63 IN | HEART RATE: 84 BPM | DIASTOLIC BLOOD PRESSURE: 62 MMHG | BODY MASS INDEX: 21.48 KG/M2 | SYSTOLIC BLOOD PRESSURE: 124 MMHG

## 2023-08-01 DIAGNOSIS — D64.9 ANEMIA, UNSPECIFIED TYPE: Primary | ICD-10-CM

## 2023-08-01 DIAGNOSIS — R53.83 OTHER FATIGUE: ICD-10-CM

## 2023-08-01 PROCEDURE — G8427 DOCREV CUR MEDS BY ELIG CLIN: HCPCS | Performed by: INTERNAL MEDICINE

## 2023-08-01 PROCEDURE — 99213 OFFICE O/P EST LOW 20 MIN: CPT | Performed by: INTERNAL MEDICINE

## 2023-08-01 PROCEDURE — 1036F TOBACCO NON-USER: CPT | Performed by: INTERNAL MEDICINE

## 2023-08-01 PROCEDURE — 1123F ACP DISCUSS/DSCN MKR DOCD: CPT | Performed by: INTERNAL MEDICINE

## 2023-08-01 PROCEDURE — 1090F PRES/ABSN URINE INCON ASSESS: CPT | Performed by: INTERNAL MEDICINE

## 2023-08-01 PROCEDURE — 99211 OFF/OP EST MAY X REQ PHY/QHP: CPT

## 2023-08-01 PROCEDURE — G8399 PT W/DXA RESULTS DOCUMENT: HCPCS | Performed by: INTERNAL MEDICINE

## 2023-08-01 PROCEDURE — G8420 CALC BMI NORM PARAMETERS: HCPCS | Performed by: INTERNAL MEDICINE

## 2023-08-01 PROCEDURE — 3017F COLORECTAL CA SCREEN DOC REV: CPT | Performed by: INTERNAL MEDICINE

## 2023-08-01 ASSESSMENT — PATIENT HEALTH QUESTIONNAIRE - PHQ9
SUM OF ALL RESPONSES TO PHQ QUESTIONS 1-9: 0
SUM OF ALL RESPONSES TO PHQ QUESTIONS 1-9: 0
1. LITTLE INTEREST OR PLEASURE IN DOING THINGS: 0
SUM OF ALL RESPONSES TO PHQ9 QUESTIONS 1 & 2: 0
SUM OF ALL RESPONSES TO PHQ QUESTIONS 1-9: 0
SUM OF ALL RESPONSES TO PHQ QUESTIONS 1-9: 0
2. FEELING DOWN, DEPRESSED OR HOPELESS: 0

## 2023-08-01 NOTE — PROGRESS NOTES
MA Rooming Questions  Patient: Li Rand  MRN: [id number]    Date: 8/1/2023        1. Do you have any new issues?   no         2. Do you need any refills on medications?    no    3. Have you had any imaging done since your last visit? yes - labs here     4. Have you been hospitalized or seen in the emergency room since your last visit here?   no    5. Did the patient have a depression screening completed today?  Yes    PHQ-9 Total Score: 0 (8/1/2023 11:01 AM)       PHQ-9 Given to (if applicable):               PHQ-9 Score (if applicable):                     [] Positive     []  Negative              Does question #9 need addressed (if applicable)                     [] Yes    []  No               Dillon Ruiz CMA
chemotherapy on June 5, 2020. PET CT in June 2020 scan revealed:   Significantly decreased size and uptake of the masses within the lower abdomen/pelvis, consistent with treatment response. Notable: ABDOMEN/PELVIS: No abnormal uptake within the solid organs. Significantly decreased size of the left paramidline lower abdominal mass which measures approximately 3.1 x 1.6 cm and demonstrates maximum SUV of 2.2. Significantly improved bilateral pelvic uptake with a residual focus of intense uptake in the right hemipelvis measuring approximately 2.8 x 2.8 cm and demonstrating maximum SUV of 8.0. She had pelvic radiation therapy from July 27, 2020 through August 20, 2020. She received 36 Gy. She completed radiation therapy 8/26/2020. Imaging studies optional since the PET scan is completely negative in November 2020.    11/19/2020 PET : continued response to treatment. Abnormal uptake in the deep right hemipelvis has resolved. The lower abdominal mass appears slightly smaller, although difficult to accurately measure without intravenous contrast, and demonstrates similar mild uptake. No new disease. Anemic work-up on November 13, 2020 was grossly unremarkable. Bone density on December 21, 2020 showed osteoporosis at lumbar spine. I recommend to discuss with family doctor about oral versus IV bisphosphonate versus Reclast.  I recommend to continue with vitamin D and calcium. I remind her about Mediport flush every 3 months. She was seen by GI and reportedly colonoscopy in November 2020 was okay. She takes Imodium once or twice a day. Diarrhea has improved. Stool specimen was obtained by GI.      2/23/2021 WBC was 2.9, hemoglobin 10.2, platelet 066. Absolute neutrophils was 1.4. She has covid vaccine. Son had Covid infection.  was sick and hospitalized due to congestive heart failure and anemia. She has loose stool when eating greasy meals. She will follow up with GI.   Mammogram in

## 2023-08-25 DIAGNOSIS — F31.70 BIPOLAR DISORDER IN FULL REMISSION, MOST RECENT EPISODE UNSPECIFIED TYPE (HCC): ICD-10-CM

## 2023-08-25 RX ORDER — DIVALPROEX SODIUM 500 MG/1
1000 TABLET, EXTENDED RELEASE ORAL NIGHTLY
Qty: 180 TABLET | Refills: 1 | Status: SHIPPED | OUTPATIENT
Start: 2023-08-25

## 2023-09-22 ENCOUNTER — TELEPHONE (OUTPATIENT)
Dept: FAMILY MEDICINE CLINIC | Age: 68
End: 2023-09-22

## 2023-09-22 NOTE — TELEPHONE ENCOUNTER
Patient called requesting a script of Ibuprofen 800 mg be sent to her pharmacy. Patient stated it is for her knee pain.  Please advise

## 2023-09-24 DIAGNOSIS — G89.29 CHRONIC KNEE PAIN, UNSPECIFIED LATERALITY: Primary | ICD-10-CM

## 2023-09-24 DIAGNOSIS — M25.569 CHRONIC KNEE PAIN, UNSPECIFIED LATERALITY: Primary | ICD-10-CM

## 2023-09-24 RX ORDER — IBUPROFEN 400 MG/1
400 TABLET ORAL EVERY 8 HOURS PRN
Qty: 50 TABLET | Refills: 1 | Status: SHIPPED | OUTPATIENT
Start: 2023-09-24 | End: 2023-09-26

## 2023-09-24 NOTE — TELEPHONE ENCOUNTER
Studies indicate that the 400 mg dose is just as effective and with less side effects. So will send the 400 mg dose instead.   Recommend the following for safer ways to help reduce her pain as:    PAIN MANAGEMENT TIPS:    Apply ice or cold pack 20 minutes/ hour  Salon pas  Voltaren gel  Lidocaine patches or cream  Aspercreme, BenGay, IcyHot, Capsaicin etc.  Tumeric capsules  Tylenol 1000mg 3 times per day

## 2023-09-25 ENCOUNTER — TELEPHONE (OUTPATIENT)
Dept: FAMILY MEDICINE CLINIC | Age: 68
End: 2023-09-25

## 2023-09-25 ENCOUNTER — TELEPHONE (OUTPATIENT)
Dept: ONCOLOGY | Age: 68
End: 2023-09-25

## 2023-09-25 DIAGNOSIS — C83.30 DIFFUSE LARGE B-CELL LYMPHOMA, UNSPECIFIED BODY REGION (HCC): Primary | ICD-10-CM

## 2023-09-25 NOTE — TELEPHONE ENCOUNTER
Patient called requesting her to get some IV fluids, states she is feeling weak and tired and had some bright red blood out of her nose

## 2023-09-25 NOTE — TELEPHONE ENCOUNTER
Patient called wanting to schedule apt she is throwing up blood and blowing nose with bright red she is weak and having chills told her ER   unfortunately Dr Arden Suazo is out of the office   Patient understood

## 2023-09-26 ENCOUNTER — OFFICE VISIT (OUTPATIENT)
Dept: FAMILY MEDICINE CLINIC | Age: 68
End: 2023-09-26
Payer: MEDICARE

## 2023-09-26 VITALS
SYSTOLIC BLOOD PRESSURE: 100 MMHG | HEART RATE: 72 BPM | DIASTOLIC BLOOD PRESSURE: 64 MMHG | BODY MASS INDEX: 20.69 KG/M2 | WEIGHT: 116.8 LBS | TEMPERATURE: 98.4 F

## 2023-09-26 DIAGNOSIS — R39.9 UTI SYMPTOMS: Primary | ICD-10-CM

## 2023-09-26 DIAGNOSIS — R07.9 CHEST PAIN, UNSPECIFIED TYPE: ICD-10-CM

## 2023-09-26 DIAGNOSIS — Z85.72 HISTORY OF B-CELL LYMPHOMA: ICD-10-CM

## 2023-09-26 DIAGNOSIS — G89.29 CHRONIC KNEE PAIN, UNSPECIFIED LATERALITY: ICD-10-CM

## 2023-09-26 DIAGNOSIS — R61 NIGHT SWEATS: ICD-10-CM

## 2023-09-26 DIAGNOSIS — R00.0 SINUS TACHYCARDIA: ICD-10-CM

## 2023-09-26 DIAGNOSIS — M25.569 CHRONIC KNEE PAIN, UNSPECIFIED LATERALITY: ICD-10-CM

## 2023-09-26 DIAGNOSIS — M54.50 ACUTE MIDLINE LOW BACK PAIN WITHOUT SCIATICA: ICD-10-CM

## 2023-09-26 DIAGNOSIS — Z91.148 NON COMPLIANCE W MEDICATION REGIMEN: ICD-10-CM

## 2023-09-26 DIAGNOSIS — N89.8 VAGINAL DISCHARGE: ICD-10-CM

## 2023-09-26 LAB
BILIRUBIN, POC: ABNORMAL
BLOOD URINE, POC: ABNORMAL
CLARITY, POC: CLEAR
COLOR, POC: YELLOW
GLUCOSE URINE, POC: ABNORMAL
KETONES, POC: ABNORMAL
LEUKOCYTE EST, POC: ABNORMAL
NITRITE, POC: ABNORMAL
PH, POC: 6
PROTEIN, POC: ABNORMAL
SPECIFIC GRAVITY, POC: 1.01
UROBILINOGEN, POC: 1

## 2023-09-26 PROCEDURE — 1036F TOBACCO NON-USER: CPT | Performed by: FAMILY MEDICINE

## 2023-09-26 PROCEDURE — G8399 PT W/DXA RESULTS DOCUMENT: HCPCS | Performed by: FAMILY MEDICINE

## 2023-09-26 PROCEDURE — 1090F PRES/ABSN URINE INCON ASSESS: CPT | Performed by: FAMILY MEDICINE

## 2023-09-26 PROCEDURE — G8427 DOCREV CUR MEDS BY ELIG CLIN: HCPCS | Performed by: FAMILY MEDICINE

## 2023-09-26 PROCEDURE — 81002 URINALYSIS NONAUTO W/O SCOPE: CPT | Performed by: FAMILY MEDICINE

## 2023-09-26 PROCEDURE — 3017F COLORECTAL CA SCREEN DOC REV: CPT | Performed by: FAMILY MEDICINE

## 2023-09-26 PROCEDURE — 99215 OFFICE O/P EST HI 40 MIN: CPT | Performed by: FAMILY MEDICINE

## 2023-09-26 PROCEDURE — 1123F ACP DISCUSS/DSCN MKR DOCD: CPT | Performed by: FAMILY MEDICINE

## 2023-09-26 PROCEDURE — G8420 CALC BMI NORM PARAMETERS: HCPCS | Performed by: FAMILY MEDICINE

## 2023-09-26 PROCEDURE — 93000 ELECTROCARDIOGRAM COMPLETE: CPT | Performed by: FAMILY MEDICINE

## 2023-09-26 RX ORDER — IBUPROFEN 400 MG/1
400 TABLET ORAL EVERY 8 HOURS PRN
Qty: 50 TABLET | Refills: 1 | Status: SHIPPED | OUTPATIENT
Start: 2023-09-26

## 2023-09-26 NOTE — PROGRESS NOTES
20+ yrs ago    UPPER GASTROINTESTINAL ENDOSCOPY N/A 2/27/2020    EGD DIAGNOSTIC ONLY performed by Ariel Benavides MD at [facility] ENDOSCOPY       Family History   Problem Relation Age of Onset    Breast Cancer Mother     High Blood Pressure Mother     Heart Attack Mother     Colon Cancer Father         \"cancer in the bowel\"    Heart Attack Sister     Drug Abuse Sister     Heart Attack Sister     Drug Abuse Sister     No Known Problems Brother     Breast Cancer Paternal Aunt     Ovarian Cancer Neg Hx        Current Outpatient Medications on File Prior to Visit   Medication Sig Dispense Refill    divalproex (DEPAKOTE ER) 500 MG extended release tablet Take 2 tablets by mouth nightly 180 tablet 1    Insulin Pen Needle (TECHLITE PEN NEEDLES) 31G X 5 MM MISC use 1 NEEDLE WITH PEN once daily 100 each 3    metoprolol succinate (TOPROL XL) 25 MG extended release tablet Take 1 tablet by mouth daily 90 tablet 3    loratadine (CLARITIN) 10 MG tablet Take 1 tablet by mouth daily 30 tablet 9    Caltrate 600+D Plus Minerals (CALTRATE) 600-800 MG-UNIT TABS tablet Take 1 tablet by mouth 2 times daily 180 tablet 3    teriparatide (FORTEO) 620 MCG/2.48ML SOPN injection Inject 0.08 mLs into the skin daily for 30 doses 3 mL 12    ibuprofen (ADVIL;MOTRIN) 800 MG tablet Take 1 tablet by mouth every 6 hours as needed for Pain      ondansetron (ZOFRAN) 8 MG tablet Take 1 tablet by mouth every 8 hours as needed for Nausea or Vomiting      QNASL 80 MCG/ACT AERS nasal spray       loperamide (IMODIUM A-D) 2 MG tablet Take 1 tablet by mouth 4 times daily as needed for Diarrhea 60 tablet 1    MULTIPLE VITAMIN PO Take by mouth daily       No current facility-administered medications on file prior to visit. Objective:         Physical Exam  Vitals and nursing note reviewed. Chaperone present: joni. Constitutional:       General: She is not in acute distress. Appearance: She is well-developed.    HENT:      Head: Normocephalic

## 2023-09-27 ENCOUNTER — HOSPITAL ENCOUNTER (OUTPATIENT)
Dept: GENERAL RADIOLOGY | Age: 68
Discharge: HOME | End: 2023-09-27
Payer: MEDICARE

## 2023-09-27 ENCOUNTER — HOSPITAL ENCOUNTER (OUTPATIENT)
Age: 68
Discharge: HOME | End: 2023-09-27
Payer: MEDICARE

## 2023-09-27 ENCOUNTER — TELEPHONE (OUTPATIENT)
Dept: FAMILY MEDICINE CLINIC | Age: 68
End: 2023-09-27

## 2023-09-27 ENCOUNTER — HOSPITAL ENCOUNTER (OUTPATIENT)
Dept: GENERAL RADIOLOGY | Age: 68
End: 2023-09-27
Payer: MEDICARE

## 2023-09-27 DIAGNOSIS — R07.9 CHEST PAIN, UNSPECIFIED TYPE: ICD-10-CM

## 2023-09-27 DIAGNOSIS — Z85.72 HISTORY OF B-CELL LYMPHOMA: ICD-10-CM

## 2023-09-27 DIAGNOSIS — M54.50 ACUTE MIDLINE LOW BACK PAIN WITHOUT SCIATICA: ICD-10-CM

## 2023-09-27 DIAGNOSIS — B96.89 BACTERIAL VAGINOSIS: ICD-10-CM

## 2023-09-27 DIAGNOSIS — N76.0 BACTERIAL VAGINOSIS: ICD-10-CM

## 2023-09-27 DIAGNOSIS — R61 NIGHT SWEATS: Primary | ICD-10-CM

## 2023-09-27 DIAGNOSIS — R61 NIGHT SWEATS: ICD-10-CM

## 2023-09-27 LAB
ANION GAP SERPL CALCULATED.3IONS-SCNC: 17 MMOL/L (ref 4–16)
BACTERIA UR CULT: ABNORMAL
BASOPHILS ABSOLUTE: 0 K/CU MM
BASOPHILS RELATIVE PERCENT: 0.4 % (ref 0–1)
BUN SERPL-MCNC: 23 MG/DL (ref 6–23)
C TRACH DNA CVX QL NAA+PROBE: NEGATIVE
CALCIUM SERPL-MCNC: 9.4 MG/DL (ref 8.3–10.6)
CANDIDA DNA VAG QL NAA+PROBE: ABNORMAL
CHLORIDE BLD-SCNC: 104 MMOL/L (ref 99–110)
CO2: 20 MMOL/L (ref 21–32)
CREAT SERPL-MCNC: 1.1 MG/DL (ref 0.6–1.1)
DIFFERENTIAL TYPE: ABNORMAL
EOSINOPHILS ABSOLUTE: 0.1 K/CU MM
EOSINOPHILS RELATIVE PERCENT: 2.3 % (ref 0–3)
ERYTHROCYTE SEDIMENTATION RATE: 20 MM/HR (ref 0–30)
G VAGINALIS DNA SPEC QL NAA+PROBE: ABNORMAL
GFR SERPL CREATININE-BSD FRML MDRD: 55 ML/MIN/1.73M2
GLUCOSE SERPL-MCNC: 90 MG/DL (ref 70–99)
HCT VFR BLD CALC: 30.6 % (ref 37–47)
HEMOGLOBIN: 9.8 GM/DL (ref 12.5–16)
IMMATURE NEUTROPHIL %: 1.9 % (ref 0–0.43)
LYMPHOCYTES ABSOLUTE: 1.3 K/CU MM
LYMPHOCYTES RELATIVE PERCENT: 27.4 % (ref 24–44)
MCH RBC QN AUTO: 31.6 PG (ref 27–31)
MCHC RBC AUTO-ENTMCNC: 32 % (ref 32–36)
MCV RBC AUTO: 98.7 FL (ref 78–100)
MONOCYTES ABSOLUTE: 0.3 K/CU MM
MONOCYTES RELATIVE PERCENT: 6.8 % (ref 0–4)
N GONORRHOEA DNA CERV MUCUS QL NAA+PROBE: NEGATIVE
NUCLEATED RBC %: 0 %
ORGANISM: ABNORMAL
PDW BLD-RTO: 12.9 % (ref 11.7–14.9)
PLATELET # BLD: 209 K/CU MM (ref 140–440)
PMV BLD AUTO: 10.1 FL (ref 7.5–11.1)
POTASSIUM SERPL-SCNC: 3.8 MMOL/L (ref 3.5–5.1)
RBC # BLD: 3.1 M/CU MM (ref 4.2–5.4)
SEGMENTED NEUTROPHILS ABSOLUTE COUNT: 3 K/CU MM
SEGMENTED NEUTROPHILS RELATIVE PERCENT: 61.2 % (ref 36–66)
SODIUM BLD-SCNC: 141 MMOL/L (ref 135–145)
T VAGINALIS DNA VAG QL NAA+PROBE: ABNORMAL
TOTAL IMMATURE NEUTOROPHIL: 0.09 K/CU MM
TOTAL NUCLEATED RBC: 0 K/CU MM
WBC # BLD: 4.9 K/CU MM (ref 4–10.5)

## 2023-09-27 PROCEDURE — 36415 COLL VENOUS BLD VENIPUNCTURE: CPT

## 2023-09-27 PROCEDURE — 80048 BASIC METABOLIC PNL TOTAL CA: CPT

## 2023-09-27 PROCEDURE — 72100 X-RAY EXAM L-S SPINE 2/3 VWS: CPT

## 2023-09-27 PROCEDURE — 71046 X-RAY EXAM CHEST 2 VIEWS: CPT

## 2023-09-27 PROCEDURE — 85652 RBC SED RATE AUTOMATED: CPT

## 2023-09-27 PROCEDURE — 85025 COMPLETE CBC W/AUTO DIFF WBC: CPT

## 2023-09-27 RX ORDER — METRONIDAZOLE 500 MG/1
500 TABLET ORAL 2 TIMES DAILY
Qty: 14 TABLET | Refills: 0 | Status: SHIPPED | OUTPATIENT
Start: 2023-09-27 | End: 2023-10-04

## 2023-09-27 NOTE — TELEPHONE ENCOUNTER
Pt states she is feeling much better. She does not wish to have IVF at this time. She states that she is having night sweats again where she is soaking her pillow at night. She said that Dr. Julianne Ramirez told her to let him know if these started again. Informed pt I would send message to nurses and someone will follow up with her in regards to this.

## 2023-09-27 NOTE — TELEPHONE ENCOUNTER
Called patient @ [phone] to address concerns after discussing with physician. Advised that order placed for CT CAP per physician instruction d/t night sweats. Patient voices understanding. Patient states that PCP recently ordered imaging. This RN called [facility] and informed that Dr. Rodolfo Valdovinos ordered CXR and CT lumbar spine. Office updated on imaging Dr. Roque Schuster has ordered. No further needs addressed at this time. No further needs addressed at this time.

## 2023-09-28 ENCOUNTER — TELEPHONE (OUTPATIENT)
Dept: ONCOLOGY | Age: 68
End: 2023-09-28

## 2023-09-28 NOTE — TELEPHONE ENCOUNTER
9/28/23 - left pt vm for the 10/10/23 CT CAP at [facility] arrival time of 1:00 for a 1:30 appt - no contrast no prep. I left my direct line for the pt to call back and confirm the appt. 9/28/23 - pt called back and confirmed the appts.

## 2023-10-02 DIAGNOSIS — R61 NIGHT SWEATS: ICD-10-CM

## 2023-10-02 DIAGNOSIS — M54.50 ACUTE MIDLINE LOW BACK PAIN WITHOUT SCIATICA: ICD-10-CM

## 2023-10-02 DIAGNOSIS — R93.7 ABNORMAL X-RAY OF LUMBAR SPINE: ICD-10-CM

## 2023-10-02 DIAGNOSIS — Z85.72 HISTORY OF B-CELL LYMPHOMA: Primary | ICD-10-CM

## 2023-10-04 ENCOUNTER — HOSPITAL ENCOUNTER (OUTPATIENT)
Age: 68
Discharge: HOME | End: 2023-10-04
Payer: MEDICARE

## 2023-10-04 LAB — TSH SERPL DL<=0.005 MIU/L-ACNC: 2.03 UIU/ML (ref 0.27–4.2)

## 2023-10-04 PROCEDURE — 36415 COLL VENOUS BLD VENIPUNCTURE: CPT

## 2023-10-04 PROCEDURE — 84443 ASSAY THYROID STIM HORMONE: CPT

## 2023-10-12 ENCOUNTER — HOSPITAL ENCOUNTER (OUTPATIENT)
Dept: CT IMAGING | Age: 68
Discharge: HOME | End: 2023-10-12
Attending: INTERNAL MEDICINE
Payer: MEDICARE

## 2023-10-12 ENCOUNTER — HOSPITAL ENCOUNTER (OUTPATIENT)
Dept: MRI IMAGING | Age: 68
Discharge: HOME | End: 2023-10-12
Attending: FAMILY MEDICINE
Payer: MEDICARE

## 2023-10-12 ENCOUNTER — HOSPITAL ENCOUNTER (OUTPATIENT)
Age: 68
Discharge: HOME | End: 2023-10-12
Attending: INTERNAL MEDICINE
Payer: MEDICARE

## 2023-10-12 DIAGNOSIS — R93.7 ABNORMAL X-RAY OF LUMBAR SPINE: ICD-10-CM

## 2023-10-12 DIAGNOSIS — C83.30 DIFFUSE LARGE B-CELL LYMPHOMA, UNSPECIFIED BODY REGION (HCC): ICD-10-CM

## 2023-10-12 DIAGNOSIS — Z85.72 HISTORY OF B-CELL LYMPHOMA: ICD-10-CM

## 2023-10-12 DIAGNOSIS — M54.50 ACUTE MIDLINE LOW BACK PAIN WITHOUT SCIATICA: ICD-10-CM

## 2023-10-12 DIAGNOSIS — R61 NIGHT SWEATS: ICD-10-CM

## 2023-10-12 PROCEDURE — 71250 CT THORAX DX C-: CPT

## 2023-10-12 PROCEDURE — 74176 CT ABD & PELVIS W/O CONTRAST: CPT

## 2023-10-12 PROCEDURE — 72148 MRI LUMBAR SPINE W/O DYE: CPT

## 2023-10-13 ENCOUNTER — CLINICAL DOCUMENTATION (OUTPATIENT)
Dept: ONCOLOGY | Age: 68
End: 2023-10-13

## 2023-10-13 NOTE — PROGRESS NOTES
Physician recommending to see patient sooner to review CT results. Patient placed on schedule for OV on 10/19/2023 @ 0830. Attempted to call patient @ [phone] to update; however, no answer. VM left with this RN direct number requesting call back to confirm message received.

## 2023-10-14 NOTE — PROGRESS NOTES
Patient Name: Onur Watkins  Patient : 1955  Patient MRN: [id number]     Primary Oncologist: Nany Hunter MD  Referring Provider: Chichi Grove MD     Date of Service: 10/13/2023      Chief Complaint:   No chief complaint on file. She came in for follow-up visit. Problem List:      Family history of malignant neoplasm of gastrointestinal tract     B-cell lymphoma (HCC)     Idiopathic hypotension     Mitral valve disease     Pancytopenia (HCC)     Severe malnutrition (HCC)     Deep vein thrombosis (DVT) of left upper extremity      Pain syndrome, chronic     HPI:   Juancho Goodrich is a pleasant 76year-old [address] [place] [address] female patient who was referred for right axillary lymphadenopathy, status post needle biopsy in 2016, which did not show any pathological abnormalities. I talked to Dr. Zia Gooden, who favored benign lymph node. Right axillary lymph node biopsy in 2015 revealed benign lymph node hyperplasia. Mammogram in 2016 revealed low suspicion for malignancy, with several enlarged right axillary lymph nodes, 3.1 by 0.8 by 2.3 cm, 0.9 by 0.8 by 1 cm, 1.8 by 1.4 by 1.5, and 1.4 by 1.5 cm. None demonstrated significant hypervascularity. I offered her an excisional lymph node biopsy versus surveillance with followup ultrasound. She opted to go with the second one. Blood test in 2015 revealed normal CBC with normal calcium and alk phos, white cell count 5.8, hemoglobin 11.9, platelet was 761. Blood test on 2016 revealed normal CBC, with white cell count 6.5, hemoglobin 12.5, platelet 126. SED rate was 17, rheumatoid factor less than 10. Ultrasound of the right breast and axilla revealed no significant interval change in the right axillary adenopathy, which still has remained most likely reactive in etiology. This was not seen on any mammogram dating back beyond . She opted to go to see the surgeon; therefore, I referred her to see Dr. Antonio De Leon.    She was

## 2023-10-19 ENCOUNTER — OFFICE VISIT (OUTPATIENT)
Dept: ONCOLOGY | Age: 68
End: 2023-10-19
Payer: MEDICARE

## 2023-10-19 ENCOUNTER — HOSPITAL ENCOUNTER (OUTPATIENT)
Dept: INFUSION THERAPY | Age: 68
Discharge: HOME | End: 2023-10-19
Payer: MEDICARE

## 2023-10-19 VITALS
DIASTOLIC BLOOD PRESSURE: 65 MMHG | BODY MASS INDEX: 20.98 KG/M2 | RESPIRATION RATE: 14 BRPM | TEMPERATURE: 96.9 F | HEART RATE: 96 BPM | HEIGHT: 63 IN | SYSTOLIC BLOOD PRESSURE: 133 MMHG | WEIGHT: 118.4 LBS

## 2023-10-19 DIAGNOSIS — C83.30 DIFFUSE LARGE B-CELL LYMPHOMA, UNSPECIFIED BODY REGION (HCC): Primary | ICD-10-CM

## 2023-10-19 PROCEDURE — G8427 DOCREV CUR MEDS BY ELIG CLIN: HCPCS | Performed by: INTERNAL MEDICINE

## 2023-10-19 PROCEDURE — 1123F ACP DISCUSS/DSCN MKR DOCD: CPT | Performed by: INTERNAL MEDICINE

## 2023-10-19 PROCEDURE — G8399 PT W/DXA RESULTS DOCUMENT: HCPCS | Performed by: INTERNAL MEDICINE

## 2023-10-19 PROCEDURE — G8484 FLU IMMUNIZE NO ADMIN: HCPCS | Performed by: INTERNAL MEDICINE

## 2023-10-19 PROCEDURE — 99211 OFF/OP EST MAY X REQ PHY/QHP: CPT

## 2023-10-19 PROCEDURE — 99214 OFFICE O/P EST MOD 30 MIN: CPT | Performed by: INTERNAL MEDICINE

## 2023-10-19 PROCEDURE — 1090F PRES/ABSN URINE INCON ASSESS: CPT | Performed by: INTERNAL MEDICINE

## 2023-10-19 PROCEDURE — 1036F TOBACCO NON-USER: CPT | Performed by: INTERNAL MEDICINE

## 2023-10-19 PROCEDURE — G8420 CALC BMI NORM PARAMETERS: HCPCS | Performed by: INTERNAL MEDICINE

## 2023-10-19 PROCEDURE — 3017F COLORECTAL CA SCREEN DOC REV: CPT | Performed by: INTERNAL MEDICINE

## 2023-10-19 RX ORDER — LORAZEPAM 0.5 MG/1
0.5 TABLET ORAL DAILY
Qty: 2 TABLET | Refills: 0 | Status: SHIPPED | OUTPATIENT
Start: 2023-10-19 | End: 2023-10-21

## 2023-10-19 NOTE — PROGRESS NOTES
MA Rooming Questions  Patient: Taiwo Llanes  MRN: [id number]    Date: 10/19/2023        1. Do you have any new issues?   no         2. Do you need any refills on medications?    no    3. Have you had any imaging done since your last visit? yes - MRI + CT SCAN  @ [facility]    4. Have you been hospitalized or seen in the emergency room since your last visit here?   no    5. Did the patient have a depression screening completed today?  No    No data recorded     PHQ-9 Given to (if applicable):               PHQ-9 Score (if applicable):                     [] Positive     []  Negative              Does question #9 need addressed (if applicable)                     [] Yes    []  No               Lindsey Olivares MA

## 2023-10-23 ENCOUNTER — HOSPITAL ENCOUNTER (OUTPATIENT)
Dept: INFUSION THERAPY | Age: 68
Discharge: HOME | End: 2023-10-23
Payer: MEDICARE

## 2023-10-23 DIAGNOSIS — C85.16 B-CELL LYMPHOMA OF INTRAPELVIC LYMPH NODES, UNSPECIFIED B-CELL LYMPHOMA TYPE (HCC): Primary | ICD-10-CM

## 2023-10-23 PROCEDURE — 96523 IRRIG DRUG DELIVERY DEVICE: CPT

## 2023-10-23 PROCEDURE — 6360000002 HC RX W HCPCS

## 2023-10-23 PROCEDURE — 2580000003 HC RX 258: Performed by: INTERNAL MEDICINE

## 2023-10-23 RX ORDER — HEPARIN 100 UNIT/ML
SYRINGE INTRAVENOUS
Status: COMPLETED
Start: 2023-10-23 | End: 2023-10-23

## 2023-10-23 RX ORDER — HEPARIN 100 UNIT/ML
500 SYRINGE INTRAVENOUS PRN
Status: DISCONTINUED | OUTPATIENT
Start: 2023-10-23 | End: 2023-10-24 | Stop reason: HOSPADM

## 2023-10-23 RX ORDER — SODIUM CHLORIDE 0.9 % (FLUSH) 0.9 %
10 SYRINGE (ML) INJECTION PRN
Status: DISCONTINUED | OUTPATIENT
Start: 2023-10-23 | End: 2023-10-24 | Stop reason: HOSPADM

## 2023-10-23 RX ADMIN — SODIUM CHLORIDE, PRESERVATIVE FREE 10 ML: 5 INJECTION INTRAVENOUS at 14:48

## 2023-10-23 RX ADMIN — HEPARIN 500 UNITS: 100 SYRINGE at 14:49

## 2023-10-23 NOTE — PROGRESS NOTES
Pt arrived to treatment suite for scheduled port flush. No blood return noted. Flushed and locked with normal saline and heparin. Mediport de-accessed per protocol. AVS declined. Discharge ambulatory in stable condition.   Kam Palma RN

## 2023-11-16 DIAGNOSIS — Z78.0 POST-MENOPAUSAL: ICD-10-CM

## 2023-11-16 DIAGNOSIS — M81.0 POSTMENOPAUSAL OSTEOPOROSIS: ICD-10-CM

## 2023-11-16 RX ORDER — TERIPARATIDE 250 UG/ML
20 INJECTION, SOLUTION SUBCUTANEOUS DAILY
Qty: 3 ML | Refills: 2 | Status: SHIPPED | OUTPATIENT
Start: 2023-11-16 | End: 2024-03-08

## 2023-11-16 RX ORDER — TERIPARATIDE 250 UG/ML
20 INJECTION, SOLUTION SUBCUTANEOUS DAILY
Qty: 3 ML | Refills: 12 | Status: CANCELLED | OUTPATIENT
Start: 2023-11-16 | End: 2023-12-16

## 2023-11-16 NOTE — TELEPHONE ENCOUNTER
Patient came in and said her pharm at [facility] has no refills to fill medication. The medication Teriparatide was originally filled and sent to [facility] then patient did a transfer to [facility] on 08/21/23 patient then received medication on 08/30/23   There is refills . [facility] told me unable to fill somehow scripted got closed they need a new script.

## 2023-11-21 ENCOUNTER — TELEPHONE (OUTPATIENT)
Dept: ONCOLOGY | Age: 68
End: 2023-11-21

## 2023-11-21 NOTE — TELEPHONE ENCOUNTER
----- Message from Cyrus Michelle MD sent at 11/19/2023  7:56 AM EST -----  OK with me  Thanks  ----- Message -----  From: Malou Perez  Sent: 11/17/2023  10:09 AM EST  To: Cyrus Michelle MD    Pt has a bone marrow scheduled on 1/10/24 (Weds). Pt called and would like to move this until February.  Is this okay with you and also when would you like to do this in Feb?    Thanks,    Alex French

## 2023-11-30 ENCOUNTER — OFFICE VISIT (OUTPATIENT)
Dept: CARDIOLOGY CLINIC | Age: 68
End: 2023-11-30
Payer: MEDICARE

## 2023-11-30 VITALS
BODY MASS INDEX: 21.97 KG/M2 | DIASTOLIC BLOOD PRESSURE: 66 MMHG | HEIGHT: 63 IN | HEART RATE: 54 BPM | WEIGHT: 124 LBS | SYSTOLIC BLOOD PRESSURE: 104 MMHG | OXYGEN SATURATION: 100 %

## 2023-11-30 DIAGNOSIS — Z87.891 FORMER SMOKER: ICD-10-CM

## 2023-11-30 DIAGNOSIS — R00.0 TACHYCARDIA: ICD-10-CM

## 2023-11-30 DIAGNOSIS — I95.0 IDIOPATHIC HYPOTENSION: ICD-10-CM

## 2023-11-30 DIAGNOSIS — I05.9 MITRAL VALVE DISEASE: Primary | ICD-10-CM

## 2023-11-30 DIAGNOSIS — I82.722 CHRONIC DEEP VEIN THROMBOSIS (DVT) OF LEFT UPPER EXTREMITY, UNSPECIFIED VEIN (HCC): ICD-10-CM

## 2023-11-30 DIAGNOSIS — Z85.72 HISTORY OF B-CELL LYMPHOMA: ICD-10-CM

## 2023-11-30 DIAGNOSIS — I50.22 CHRONIC SYSTOLIC (CONGESTIVE) HEART FAILURE (HCC): ICD-10-CM

## 2023-11-30 PROCEDURE — 1123F ACP DISCUSS/DSCN MKR DOCD: CPT | Performed by: NURSE PRACTITIONER

## 2023-11-30 PROCEDURE — 3017F COLORECTAL CA SCREEN DOC REV: CPT | Performed by: NURSE PRACTITIONER

## 2023-11-30 PROCEDURE — 99214 OFFICE O/P EST MOD 30 MIN: CPT | Performed by: NURSE PRACTITIONER

## 2023-11-30 PROCEDURE — G8427 DOCREV CUR MEDS BY ELIG CLIN: HCPCS | Performed by: NURSE PRACTITIONER

## 2023-11-30 PROCEDURE — G8399 PT W/DXA RESULTS DOCUMENT: HCPCS | Performed by: NURSE PRACTITIONER

## 2023-11-30 PROCEDURE — G8420 CALC BMI NORM PARAMETERS: HCPCS | Performed by: NURSE PRACTITIONER

## 2023-11-30 PROCEDURE — G8484 FLU IMMUNIZE NO ADMIN: HCPCS | Performed by: NURSE PRACTITIONER

## 2023-11-30 PROCEDURE — 1036F TOBACCO NON-USER: CPT | Performed by: NURSE PRACTITIONER

## 2023-11-30 PROCEDURE — 1090F PRES/ABSN URINE INCON ASSESS: CPT | Performed by: NURSE PRACTITIONER

## 2023-11-30 ASSESSMENT — ENCOUNTER SYMPTOMS
COUGH: 0
SHORTNESS OF BREATH: 0

## 2023-11-30 NOTE — PATIENT INSTRUCTIONS
Please be informed that if you contact our office outside of normal business hours the physician on call cannot help with any scheduling or rescheduling issues, procedure instruction questions or any type of medication issue. We advise you for any urgent/emergency that you go to the nearest emergency room! PLEASE CALL OUR OFFICE DURING NORMAL BUSINESS HOURS    Monday - Friday   8 am to 5 pm    [place]: [address]: [phone]    [employer]:  [phone]    **It is YOUR responsibilty to bring medication bottles and/or updated medication list to [facility]. This will allow us to better serve you and all your healthcare needs**    Thank you for allowing us to care for you today! We want to ensure we can follow your treatment plan and we strive to give you the best outcomes and experience possible. If you ever have a life threatening emergency and call 911 - for an ambulance (EMS)   Our providers can only care for you at:   [facility] or [facility]. Even if you have someone take you or you drive yourself we can only care for you in a [facility] facility. Our providers are not setup at the other healthcare locations! [facility] Locations - No appointment necessary. Sites open Monday to Friday. Call your preferred location for test preparation, business   hours and other information you need. [employer] accepts [employer]. [address] [place] [address]. [address] [place] [address]. Phil, [facility]  Phone: [phone]       We are committed to providing you the best care possible. If you receive a survey after visiting one of our offices, please take time to share your experience concerning your physician office visit. These surveys are confidential and no health information about you is shared. We are eager to improve for you and we are counting on your feedback to help make that happen.

## 2023-11-30 NOTE — PROGRESS NOTES
Vascular: No carotid bruit. Cardiovascular:      Rate and Rhythm: Normal rate and regular rhythm. Pulses: Normal pulses. Heart sounds: Normal heart sounds. No murmur heard. Pulmonary:      Effort: Pulmonary effort is normal. No respiratory distress. Breath sounds: Normal breath sounds. Musculoskeletal:         General: No swelling or deformity. Cervical back: Neck supple. No muscular tenderness. Neurological:      Mental Status: She is alert. Lab Review   Lab Results   Component Value Date/Time    CHOL 234 06/19/2017 12:00 AM    TRIG 130 06/19/2017 12:00 AM    HDL 66 06/19/2017 12:00 AM           Echo 1/16/2020   Summary   Left ventricular systolic function is low normal.   Ejection fraction is visually estimated at 50%. Paradoxical motion of the interventricular septum; possible conduction   delay. Indeterminate diastolic function; E/A flow reversal is noted. Mitral valve prolapse is present. Moderate to severe mitral regurgitation is present. Moderate tricuspid regurgitation is present. RVSP is 25 mmHg. No evidence of any pericardial effusion. Mobile interatrial septum. Incidental finding: cyst vs mass near/attached to the right kidney. Echo 3/17/21  Summary   Left ventricular function is normal, EF is estimated at 55-60%. Grade I diastolic dysfunction. Mild left ventricular hypertrophy. Bi atrial enlargement noted. The mitral valve appears to slightly prolapse. Moderate to severe mitral and tricuspid regurgitation is present. Aneurysmal interatrial septum. RVSP is 34 mmHg. No evidence of pericardial effusion.        ASSESSMENT/PLAN:  Idiopathic hypotension  Improved now that she is not on chemo anymore      B cell lymphoma  Followed by Dr Kelsie Coe she is in remission, she has a mediport on right side and still has some pain on her left side     Anemia  Bone marrow biopsies planned soon  Explained high ferritin levels therefore no iron

## 2023-12-29 DIAGNOSIS — F31.70 BIPOLAR DISORDER IN FULL REMISSION, MOST RECENT EPISODE UNSPECIFIED TYPE (HCC): ICD-10-CM

## 2024-01-02 DIAGNOSIS — M85.89 OSTEOPENIA OF MULTIPLE SITES: ICD-10-CM

## 2024-01-02 DIAGNOSIS — F31.70 BIPOLAR DISORDER IN FULL REMISSION, MOST RECENT EPISODE UNSPECIFIED TYPE (HCC): ICD-10-CM

## 2024-01-02 RX ORDER — CAL/D3/MAG11/ZINC/COP/MANG/BOR 600 MG-800
1 TABLET ORAL 2 TIMES DAILY
Qty: 180 TABLET | Refills: 3 | OUTPATIENT
Start: 2024-01-02

## 2024-01-02 RX ORDER — DIVALPROEX SODIUM 500 MG/1
TABLET, EXTENDED RELEASE ORAL
Qty: 60 TABLET | OUTPATIENT
Start: 2024-01-02

## 2024-01-02 RX ORDER — DIVALPROEX SODIUM 500 MG/1
1000 TABLET, EXTENDED RELEASE ORAL NIGHTLY
Qty: 180 TABLET | Refills: 1 | OUTPATIENT
Start: 2024-01-02

## 2024-01-03 DIAGNOSIS — F31.70 BIPOLAR DISORDER IN FULL REMISSION, MOST RECENT EPISODE UNSPECIFIED TYPE (HCC): ICD-10-CM

## 2024-01-04 RX ORDER — DIVALPROEX SODIUM 500 MG/1
TABLET, EXTENDED RELEASE ORAL
Qty: 60 TABLET | OUTPATIENT
Start: 2024-01-04

## 2024-01-08 NOTE — PROGRESS NOTES
Lottie JOSE Joel  1955  [id number]      Primary Oncologist: Erwin Carias MD  Referring Provider:     1/10/2024     BONE MARROW BIOPSY & ASPIRATION    Consent:  Lottie Maldonado voiced an understanding of the risks/benefits and the answers to their questions, then proceeded to sign and date the consent.  A written consent was obtained from the patient and placed into the chart. Consent includes:  1. Explanation of the potential risks and benefits of this procedure, alternative methods of treatment, and risks despite precautions.  2. An understanding of the current diagnosis to make treatment decisions.  3. Administration of anesthetics as deemed advisable for the procedure.   4. An understanding that tissues and products may be removed and sent to pathology or disposed of by an appropriate source per practice standard.    A time out was performed verifying the patient name, date of birth, procedure and location of the procedure.        Procedure - BM Asp and Bx     Indication: anemia     Anesthesia:   10 ml 1 % Lidocaine                       0.5  mg Lorazepam     Patient position:      Bone Marrow Needle:      Procedure Steps:   The patient was placed in the prone position. The area was prepped and draped in a sterile fashion. Using an \"I\" needle a bone marrow aspirate was obtained from the  iliac crest.  Then using Jamshidi needle a bone marrow biopsy was obtained. A sterile bandage was applied. No significant bleeding.  Sample sent for histology, flow cytometry, and cytogenetics.  In addition sample was sent for r/o MDS.     The patient is given the usual after care instructions. The patient will keep the area clearn and dry for 24 hours. Appropriate pain control instructions were discussed.The patient knows to call [phone] with any signs of bleeding, infection, or other difficulties.         Erwin Carias MD

## 2024-01-10 ENCOUNTER — PROCEDURE VISIT (OUTPATIENT)
Dept: ONCOLOGY | Age: 69
End: 2024-01-10

## 2024-01-10 ENCOUNTER — HOSPITAL ENCOUNTER (OUTPATIENT)
Dept: INFUSION THERAPY | Age: 69
Discharge: HOME | End: 2024-01-10
Payer: MEDICARE

## 2024-01-10 VITALS
HEART RATE: 103 BPM | HEIGHT: 62 IN | RESPIRATION RATE: 16 BRPM | TEMPERATURE: 97.2 F | DIASTOLIC BLOOD PRESSURE: 65 MMHG | SYSTOLIC BLOOD PRESSURE: 114 MMHG | OXYGEN SATURATION: 96 % | BODY MASS INDEX: 23 KG/M2 | WEIGHT: 125 LBS

## 2024-01-10 DIAGNOSIS — D64.9 ANEMIA, UNSPECIFIED TYPE: ICD-10-CM

## 2024-01-10 DIAGNOSIS — R53.83 OTHER FATIGUE: ICD-10-CM

## 2024-01-10 DIAGNOSIS — D64.9 ANEMIA, UNSPECIFIED TYPE: Primary | ICD-10-CM

## 2024-01-10 LAB
ALBUMIN SERPL-MCNC: 4.4 GM/DL (ref 3.4–5)
ALP BLD-CCNC: 74 IU/L (ref 40–128)
ALT SERPL-CCNC: 7 U/L (ref 10–40)
ANION GAP SERPL CALCULATED.3IONS-SCNC: 12 MMOL/L (ref 7–16)
AST SERPL-CCNC: 14 IU/L (ref 15–37)
BASOPHILS ABSOLUTE: 0 K/CU MM
BASOPHILS RELATIVE PERCENT: 0.3 % (ref 0–1)
BILIRUB SERPL-MCNC: 0.5 MG/DL (ref 0–1)
BUN SERPL-MCNC: 12 MG/DL (ref 6–23)
CALCIUM SERPL-MCNC: 10.2 MG/DL (ref 8.3–10.6)
CHLORIDE BLD-SCNC: 107 MMOL/L (ref 99–110)
CO2: 26 MMOL/L (ref 21–32)
CREAT SERPL-MCNC: 0.9 MG/DL (ref 0.6–1.1)
DIFFERENTIAL TYPE: ABNORMAL
EOSINOPHILS ABSOLUTE: 0.2 K/CU MM
EOSINOPHILS RELATIVE PERCENT: 2.5 % (ref 0–3)
FERRITIN: 705 NG/ML (ref 15–150)
FOLATE SERPL-MCNC: 11.8 NG/ML (ref 3.1–17.5)
GFR SERPL CREATININE-BSD FRML MDRD: >60 ML/MIN/1.73M2
GLUCOSE SERPL-MCNC: 87 MG/DL (ref 70–99)
HCT VFR BLD CALC: 33.9 % (ref 37–47)
HEMOGLOBIN: 11.2 GM/DL (ref 12.5–16)
IRON: 52 UG/DL (ref 37–145)
LACTATE DEHYDROGENASE: 217 IU/L (ref 120–246)
LYMPHOCYTES ABSOLUTE: 1.6 K/CU MM
LYMPHOCYTES RELATIVE PERCENT: 24.4 % (ref 24–44)
MCH RBC QN AUTO: 31.5 PG (ref 27–31)
MCHC RBC AUTO-ENTMCNC: 33 % (ref 32–36)
MCV RBC AUTO: 95.5 FL (ref 78–100)
MONOCYTES ABSOLUTE: 0.6 K/CU MM
MONOCYTES RELATIVE PERCENT: 8.5 % (ref 0–4)
PCT TRANSFERRIN: 18 % (ref 10–44)
PDW BLD-RTO: 12.5 % (ref 11.7–14.9)
PLATELET # BLD: 243 K/CU MM (ref 140–440)
PMV BLD AUTO: 9.4 FL (ref 7.5–11.1)
POTASSIUM SERPL-SCNC: 4.2 MMOL/L (ref 3.5–5.1)
RBC # BLD: 3.55 M/CU MM (ref 4.2–5.4)
RETICULOCYTE COUNT PCT: 1.8 % (ref 0.2–2.2)
SEGMENTED NEUTROPHILS ABSOLUTE COUNT: 4.1 K/CU MM
SEGMENTED NEUTROPHILS RELATIVE PERCENT: 64.3 % (ref 36–66)
SODIUM BLD-SCNC: 145 MMOL/L (ref 135–145)
TOTAL IRON BINDING CAPACITY: 285 UG/DL (ref 250–450)
TOTAL PROTEIN: 6.1 GM/DL (ref 6.4–8.2)
TSH SERPL DL<=0.005 MIU/L-ACNC: 1.71 UIU/ML (ref 0.27–4.2)
UNSATURATED IRON BINDING CAPACITY: 233 UG/DL (ref 110–370)
VITAMIN B-12: 924.6 PG/ML (ref 211–911)
WBC # BLD: 6.4 K/CU MM (ref 4–10.5)

## 2024-01-10 PROCEDURE — 36415 COLL VENOUS BLD VENIPUNCTURE: CPT

## 2024-01-10 PROCEDURE — 80053 COMPREHEN METABOLIC PANEL: CPT

## 2024-01-10 PROCEDURE — 83550 IRON BINDING TEST: CPT

## 2024-01-10 PROCEDURE — 85025 COMPLETE CBC W/AUTO DIFF WBC: CPT

## 2024-01-10 PROCEDURE — 82746 ASSAY OF FOLIC ACID SERUM: CPT

## 2024-01-10 PROCEDURE — 82607 VITAMIN B-12: CPT

## 2024-01-10 PROCEDURE — 84443 ASSAY THYROID STIM HORMONE: CPT

## 2024-01-10 PROCEDURE — 83540 ASSAY OF IRON: CPT

## 2024-01-10 PROCEDURE — 83615 LACTATE (LD) (LDH) ENZYME: CPT

## 2024-01-10 PROCEDURE — 85045 AUTOMATED RETICULOCYTE COUNT: CPT

## 2024-01-10 PROCEDURE — 82728 ASSAY OF FERRITIN: CPT

## 2024-01-10 NOTE — PROGRESS NOTES
MA Rooming Questions  Patient: Lottie Maldonado  MRN: [id number]    Date: 1/10/2024        1. Pt here for a bone marrow procedure today      5. Did the patient have a depression screening completed today? No    No data recorded     PHQ-9 Given to (if applicable):               PHQ-9 Score (if applicable):                     [] Positive     []  Negative              Does question #9 need addressed (if applicable)                     [] Yes    []  No               nAn Clinton MA

## 2024-01-25 ENCOUNTER — OFFICE VISIT (OUTPATIENT)
Dept: FAMILY MEDICINE CLINIC | Age: 69
End: 2024-01-25

## 2024-01-25 VITALS
BODY MASS INDEX: 22.46 KG/M2 | DIASTOLIC BLOOD PRESSURE: 79 MMHG | HEART RATE: 100 BPM | SYSTOLIC BLOOD PRESSURE: 122 MMHG | WEIGHT: 122.8 LBS | OXYGEN SATURATION: 100 % | TEMPERATURE: 98 F

## 2024-01-25 DIAGNOSIS — Z20.2 POSSIBLE EXPOSURE TO STD: Primary | ICD-10-CM

## 2024-01-25 DIAGNOSIS — Z23 NEEDS FLU SHOT: ICD-10-CM

## 2024-01-25 LAB
CHROMOSOME, BONE MARROW: NORMAL
COMMENT: NORMAL
GDT REPLACEMENT: NORMAL

## 2024-01-25 ASSESSMENT — PATIENT HEALTH QUESTIONNAIRE - PHQ9
SUM OF ALL RESPONSES TO PHQ QUESTIONS 1-9: 0
7. TROUBLE CONCENTRATING ON THINGS, SUCH AS READING THE NEWSPAPER OR WATCHING TELEVISION: 0
5. POOR APPETITE OR OVEREATING: 0
8. MOVING OR SPEAKING SO SLOWLY THAT OTHER PEOPLE COULD HAVE NOTICED. OR THE OPPOSITE, BEING SO FIGETY OR RESTLESS THAT YOU HAVE BEEN MOVING AROUND A LOT MORE THAN USUAL: 0
SUM OF ALL RESPONSES TO PHQ QUESTIONS 1-9: 0
4. FEELING TIRED OR HAVING LITTLE ENERGY: 0
SUM OF ALL RESPONSES TO PHQ9 QUESTIONS 1 & 2: 0
6. FEELING BAD ABOUT YOURSELF - OR THAT YOU ARE A FAILURE OR HAVE LET YOURSELF OR YOUR FAMILY DOWN: 0
1. LITTLE INTEREST OR PLEASURE IN DOING THINGS: 0
9. THOUGHTS THAT YOU WOULD BE BETTER OFF DEAD, OR OF HURTING YOURSELF: 0
10. IF YOU CHECKED OFF ANY PROBLEMS, HOW DIFFICULT HAVE THESE PROBLEMS MADE IT FOR YOU TO DO YOUR WORK, TAKE CARE OF THINGS AT HOME, OR GET ALONG WITH OTHER PEOPLE: 0
SUM OF ALL RESPONSES TO PHQ QUESTIONS 1-9: 0
SUM OF ALL RESPONSES TO PHQ QUESTIONS 1-9: 0
2. FEELING DOWN, DEPRESSED OR HOPELESS: 0
3. TROUBLE FALLING OR STAYING ASLEEP: 0

## 2024-01-25 NOTE — PROGRESS NOTES
Patient ID: Lottie Maldonado 1955    Chief Complaint   Patient presents with    Vaginitis     No burning, no itching, creamy white thick discharge, no odor,    Exposure to STD     Might have been exposures, not to sure         HPI    Vaginal discomfort: with intercourse.  Was using olive oil for lubrication. Last intercourse was last night.  Was painful and wonders if exposed to STD      Patient Active Problem List   Diagnosis    Family history of malignant neoplasm of gastrointestinal tract    B-cell lymphoma (HCC)    Idiopathic hypotension    Mitral valve disease    Pancytopenia (HCC)    Pain syndrome, chronic    Tachycardia    Former smoker    History of B-cell lymphoma    Chronic systolic (congestive) heart failure    Osteopenia of multiple sites       Past Surgical History:   Procedure Laterality Date    BREAST BIOPSY Left     COLONOSCOPY  9/4/2015    normal colon    COLONOSCOPY N/A 2/28/2020    COLONOSCOPY DIAGNOSTIC performed by Anoop Smith MD at [facility] ENDOSCOPY    PORT SURGERY N/A 1/7/2020    PORT INSERTION performed by Hailee Miranda MD at [facility] OR    PORT SURGERY Left 2/20/2020    PORT REMOVAL LEFT performed by Hailee Miranda MD at [facility] OR    PORT SURGERY Right 3/10/2020    RIGHT PORT INSERTION performed by Hailee Miranda MD at [facility] OR    TONSILLECTOMY  as a kid    TUBAL LIGATION  20+ yrs ago    UPPER GASTROINTESTINAL ENDOSCOPY N/A 2/27/2020    EGD DIAGNOSTIC ONLY performed by Anoop Smith MD at [facility] ENDOSCOPY       Family History   Problem Relation Age of Onset    Breast Cancer Mother     High Blood Pressure Mother     Heart Attack Mother     Colon Cancer Father         \"cancer in the bowel\"    Heart Attack Sister     Drug Abuse Sister     Heart Attack Sister     Drug Abuse Sister     No Known Problems Brother     Breast Cancer Paternal Aunt     Ovarian Cancer Neg Hx        Current Outpatient Medications on File Prior to Visit   Medication Sig Dispense Refill    teriparatide (FORTEO) 620 MCG/2.48ML

## 2024-01-26 LAB
CANDIDA DNA VAG QL NAA+PROBE: NORMAL
G VAGINALIS DNA SPEC QL NAA+PROBE: NORMAL
T VAGINALIS DNA VAG QL NAA+PROBE: NORMAL

## 2024-01-26 NOTE — PATIENT INSTRUCTIONS
BRING YOUR MEDICATION BOTTLES TO ALL APPOINTMENTS    Check MY CHART for test results.  If you have forgotten your password, call [phone].    The diagnoses and medications listed in this after visit summary may not be up to date.  Check MY CHART in 28-48 hours for corrections.      Late cancellation policy: So that we can better accommodate people who are sick, please give our office 24 hour notice for an appointment cancellation.      Missed appointments: Your care is very important to us.  It is important that you keep your scheduled appointments.   Multiple missed appointments will lead to a dismissal from the office.      Patients arriving late will be worked into the schedule as time permits, with patients arriving on time taken as scheduled. Late arriving patients are more than welcome to wait or reschedule their appointments.    Please allow 5-7 business days for paperwork to be processed.

## 2024-01-29 LAB
C TRACH DNA CVX QL NAA+PROBE: NEGATIVE
N GONORRHOEA DNA CERV MUCUS QL NAA+PROBE: NEGATIVE

## 2024-02-02 ENCOUNTER — HOSPITAL ENCOUNTER (OUTPATIENT)
Dept: INFUSION THERAPY | Age: 69
Discharge: HOME | End: 2024-02-02
Payer: MEDICARE

## 2024-02-02 ENCOUNTER — OFFICE VISIT (OUTPATIENT)
Dept: ONCOLOGY | Age: 69
End: 2024-02-02
Payer: MEDICARE

## 2024-02-02 VITALS
WEIGHT: 121 LBS | SYSTOLIC BLOOD PRESSURE: 122 MMHG | BODY MASS INDEX: 22.26 KG/M2 | HEIGHT: 62 IN | TEMPERATURE: 96.7 F | RESPIRATION RATE: 16 BRPM | OXYGEN SATURATION: 100 % | DIASTOLIC BLOOD PRESSURE: 70 MMHG | HEART RATE: 81 BPM

## 2024-02-02 DIAGNOSIS — C85.16 B-CELL LYMPHOMA OF INTRAPELVIC LYMPH NODES, UNSPECIFIED B-CELL LYMPHOMA TYPE (HCC): Primary | ICD-10-CM

## 2024-02-02 DIAGNOSIS — D64.9 ANEMIA, UNSPECIFIED TYPE: Primary | ICD-10-CM

## 2024-02-02 PROCEDURE — 1090F PRES/ABSN URINE INCON ASSESS: CPT | Performed by: INTERNAL MEDICINE

## 2024-02-02 PROCEDURE — G8427 DOCREV CUR MEDS BY ELIG CLIN: HCPCS | Performed by: INTERNAL MEDICINE

## 2024-02-02 PROCEDURE — G8420 CALC BMI NORM PARAMETERS: HCPCS | Performed by: INTERNAL MEDICINE

## 2024-02-02 PROCEDURE — 2580000003 HC RX 258: Performed by: INTERNAL MEDICINE

## 2024-02-02 PROCEDURE — 1123F ACP DISCUSS/DSCN MKR DOCD: CPT | Performed by: INTERNAL MEDICINE

## 2024-02-02 PROCEDURE — G8484 FLU IMMUNIZE NO ADMIN: HCPCS | Performed by: INTERNAL MEDICINE

## 2024-02-02 PROCEDURE — 99213 OFFICE O/P EST LOW 20 MIN: CPT | Performed by: INTERNAL MEDICINE

## 2024-02-02 PROCEDURE — 6360000002 HC RX W HCPCS: Performed by: INTERNAL MEDICINE

## 2024-02-02 PROCEDURE — 1036F TOBACCO NON-USER: CPT | Performed by: INTERNAL MEDICINE

## 2024-02-02 PROCEDURE — 3017F COLORECTAL CA SCREEN DOC REV: CPT | Performed by: INTERNAL MEDICINE

## 2024-02-02 PROCEDURE — G8399 PT W/DXA RESULTS DOCUMENT: HCPCS | Performed by: INTERNAL MEDICINE

## 2024-02-02 PROCEDURE — 96523 IRRIG DRUG DELIVERY DEVICE: CPT

## 2024-02-02 RX ORDER — SODIUM CHLORIDE 0.9 % (FLUSH) 0.9 %
20 SYRINGE (ML) INJECTION PRN
Status: DISCONTINUED | OUTPATIENT
Start: 2024-02-02 | End: 2024-02-03 | Stop reason: HOSPADM

## 2024-02-02 RX ORDER — SODIUM CHLORIDE 0.9 % (FLUSH) 0.9 %
10 SYRINGE (ML) INJECTION PRN
OUTPATIENT
Start: 2024-02-02

## 2024-02-02 RX ORDER — SODIUM CHLORIDE 0.9 % (FLUSH) 0.9 %
20 SYRINGE (ML) INJECTION PRN
OUTPATIENT
Start: 2024-02-02

## 2024-02-02 RX ORDER — SODIUM CHLORIDE 0.9 % (FLUSH) 0.9 %
10 SYRINGE (ML) INJECTION PRN
Status: CANCELLED | OUTPATIENT
Start: 2024-02-02

## 2024-02-02 RX ORDER — HEPARIN 100 UNIT/ML
500 SYRINGE INTRAVENOUS PRN
OUTPATIENT
Start: 2024-02-02

## 2024-02-02 RX ORDER — WATER 10 ML/10ML
2.2 INJECTION INTRAMUSCULAR; INTRAVENOUS; SUBCUTANEOUS ONCE
Status: CANCELLED | OUTPATIENT
Start: 2024-02-02 | End: 2024-02-02

## 2024-02-02 RX ORDER — HEPARIN 100 UNIT/ML
500 SYRINGE INTRAVENOUS PRN
Status: DISCONTINUED | OUTPATIENT
Start: 2024-02-02 | End: 2024-02-03 | Stop reason: HOSPADM

## 2024-02-02 RX ORDER — WATER 10 ML/10ML
2.2 INJECTION INTRAMUSCULAR; INTRAVENOUS; SUBCUTANEOUS ONCE
OUTPATIENT
Start: 2024-02-02 | End: 2024-02-02

## 2024-02-02 RX ADMIN — HEPARIN 500 UNITS: 100 SYRINGE at 12:44

## 2024-02-02 RX ADMIN — SODIUM CHLORIDE, PRESERVATIVE FREE 20 ML: 5 INJECTION INTRAVENOUS at 12:44

## 2024-02-02 NOTE — PROGRESS NOTES
MA Rooming Questions  Patient: Lottie Maldonado  MRN: [id number]    Date: 2/2/2024        1. Do you have any new issues?   no         2. Do you need any refills on medications?    no    3. Have you had any imaging done since your last visit?   no    4. Have you been hospitalized or seen in the emergency room since your last visit here?   no    5. Did the patient have a depression screening completed today? No    No data recorded     PHQ-9 Given to (if applicable):               PHQ-9 Score (if applicable):                     [] Positive     []  Negative              Does question #9 need addressed (if applicable)                     [] Yes    []  No               Ann Clinton MA      
0.5 01/10/2024    ALKPHOS 74 01/10/2024    AST 14 (L) 01/10/2024    ALT 7 (L) 01/10/2024    LABGLOM >60 01/10/2024    GFRAA >60 07/18/2022    PHOS 3.2 04/13/2021    MG 1.8 05/14/2022    POCGLU 132 (H) 02/25/2020     Lab Results   Component Value Date     01/10/2024     Lab Results   Component Value Date    TSHHS 1.710 01/10/2024    T4FREE 0.96 04/13/2021     Immunology:  Lab Results   Component Value Date    PROT 6.1 (L) 01/10/2024     Coagulation Panel:  Lab Results   Component Value Date    PROTIME 13.1 05/16/2022    INR 1.02 05/16/2022    APTT 41.0 (H) 05/16/2022     Anemia Panel:  Lab Results   Component Value Date    LQQKPGYT27 924.6 (H) 01/10/2024    FOLATE 11.8 01/10/2024     11/13/2020 Ferritin: 911 (H) Iron: 42 TIBC: 219 (L) Transferrin %: 19 Folate: >20.0 (H) Vitamin B-12: 1154 (H)  4/13/2021 Ferritin: 733 (H) Iron: 110 TIBC: 219 (L) Transferrin %: 50 (H)    Assessment & Plan:  1. She was diagnosed with diffuse large B-cell lymphoma, no double hit. She has first R-CHOP in January 7, 2020.. ECHO showed LVEF at 50%.  She had Mediport placement. She has second cycle R-CHOP in February 2020.  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 CT chest, abdomen and pelvis showed response. She completed course of antibiotic for pseudomonas bacteremia. She resumed chemotherapy on April 3, 2020. 18 2020. She completed chemotherapy on June 5, 2020.  PET CT scan on 6/18/2020 showed response, still with intense uptake in the right hemipelvis measuring approximately 2.8 x 2.8 cm and demonstrating maximum SUV of 8.0.  She completed pelvic radiation on 8/26/2020.    PET scan in November 2020 showed remission.  Imaging studies optional.    10/12/2023 CT chest due to night sweat:  1. No acute intrathoracic abnormality.  2. No evidence of metastatic disease within the thorax.   10/13/2023 CT AP:   1. No acute abdominopelvic abnormality.  2. No evidence of metastatic disease within the abdomen or pelvis.  3.

## 2024-02-02 NOTE — PROGRESS NOTES
Patient arrived to treatment suite for port flush.  Right chest mediport accessed and flushed with normal saline, good blood return noted.  Flushed and heparin locked, band-aid applied.  Patient tolerated well.  Left treatment suite ambulatory.

## 2024-02-15 ENCOUNTER — OFFICE VISIT (OUTPATIENT)
Dept: FAMILY MEDICINE CLINIC | Age: 69
End: 2024-02-15
Payer: MEDICARE

## 2024-02-15 VITALS
HEIGHT: 62 IN | DIASTOLIC BLOOD PRESSURE: 80 MMHG | SYSTOLIC BLOOD PRESSURE: 108 MMHG | BODY MASS INDEX: 22.23 KG/M2 | WEIGHT: 120.8 LBS | HEART RATE: 80 BPM

## 2024-02-15 DIAGNOSIS — N89.8 VAGINAL DISCHARGE: Primary | ICD-10-CM

## 2024-02-15 DIAGNOSIS — Z13.220 SCREENING CHOLESTEROL LEVEL: ICD-10-CM

## 2024-02-15 DIAGNOSIS — M81.0 POSTMENOPAUSAL OSTEOPOROSIS: ICD-10-CM

## 2024-02-15 DIAGNOSIS — Z23 NEED FOR SHINGLES VACCINE: ICD-10-CM

## 2024-02-15 DIAGNOSIS — R00.0 TACHYCARDIA: ICD-10-CM

## 2024-02-15 DIAGNOSIS — J34.89 RHINORRHEA: ICD-10-CM

## 2024-02-15 DIAGNOSIS — M85.89 OSTEOPENIA OF MULTIPLE SITES: ICD-10-CM

## 2024-02-15 DIAGNOSIS — Z29.11 NEED FOR PROPHYLACTIC VACCINATION AND INOCULATION AGAINST RESPIRATORY SYNCYTIAL VIRUS (RSV): ICD-10-CM

## 2024-02-15 DIAGNOSIS — Z23 NEED FOR TETANUS BOOSTER: ICD-10-CM

## 2024-02-15 DIAGNOSIS — F31.70 BIPOLAR DISORDER IN FULL REMISSION, MOST RECENT EPISODE UNSPECIFIED TYPE (HCC): ICD-10-CM

## 2024-02-15 DIAGNOSIS — Z12.31 ENCOUNTER FOR SCREENING MAMMOGRAM FOR MALIGNANT NEOPLASM OF BREAST: ICD-10-CM

## 2024-02-15 LAB
CHOLEST SERPL-MCNC: 168 MG/DL (ref 0–199)
HDLC SERPL-MCNC: 62 MG/DL (ref 40–60)
LDLC SERPL CALC-MCNC: 90 MG/DL
TRIGL SERPL-MCNC: 81 MG/DL (ref 0–150)
VLDLC SERPL CALC-MCNC: 16 MG/DL

## 2024-02-15 PROCEDURE — 1123F ACP DISCUSS/DSCN MKR DOCD: CPT | Performed by: FAMILY MEDICINE

## 2024-02-15 PROCEDURE — 3017F COLORECTAL CA SCREEN DOC REV: CPT | Performed by: FAMILY MEDICINE

## 2024-02-15 PROCEDURE — G8484 FLU IMMUNIZE NO ADMIN: HCPCS | Performed by: FAMILY MEDICINE

## 2024-02-15 PROCEDURE — 36415 COLL VENOUS BLD VENIPUNCTURE: CPT | Performed by: FAMILY MEDICINE

## 2024-02-15 PROCEDURE — 99214 OFFICE O/P EST MOD 30 MIN: CPT | Performed by: FAMILY MEDICINE

## 2024-02-15 PROCEDURE — G8427 DOCREV CUR MEDS BY ELIG CLIN: HCPCS | Performed by: FAMILY MEDICINE

## 2024-02-15 PROCEDURE — G8399 PT W/DXA RESULTS DOCUMENT: HCPCS | Performed by: FAMILY MEDICINE

## 2024-02-15 PROCEDURE — 1090F PRES/ABSN URINE INCON ASSESS: CPT | Performed by: FAMILY MEDICINE

## 2024-02-15 PROCEDURE — 1036F TOBACCO NON-USER: CPT | Performed by: FAMILY MEDICINE

## 2024-02-15 PROCEDURE — G8420 CALC BMI NORM PARAMETERS: HCPCS | Performed by: FAMILY MEDICINE

## 2024-02-15 RX ORDER — PEN NEEDLE, DIABETIC 31 GX3/16"
NEEDLE, DISPOSABLE MISCELLANEOUS
Qty: 100 EACH | Refills: 3 | Status: SHIPPED | OUTPATIENT
Start: 2024-02-15

## 2024-02-15 RX ORDER — METOPROLOL SUCCINATE 25 MG/1
25 TABLET, EXTENDED RELEASE ORAL DAILY
Qty: 90 TABLET | Refills: 3 | Status: SHIPPED | OUTPATIENT
Start: 2024-02-15

## 2024-02-15 RX ORDER — LORATADINE 10 MG/1
10 TABLET ORAL DAILY
Qty: 90 TABLET | Refills: 3 | Status: SHIPPED | OUTPATIENT
Start: 2024-02-15

## 2024-02-15 RX ORDER — DIVALPROEX SODIUM 500 MG/1
1000 TABLET, EXTENDED RELEASE ORAL NIGHTLY
Qty: 180 TABLET | Refills: 1 | Status: SHIPPED | OUTPATIENT
Start: 2024-02-15

## 2024-02-15 RX ORDER — FLUTICASONE PROPIONATE 50 MCG
2 SPRAY, SUSPENSION (ML) NASAL DAILY
Qty: 3 EACH | Refills: 3 | Status: SHIPPED | OUTPATIENT
Start: 2024-02-15

## 2024-02-15 NOTE — PATIENT INSTRUCTIONS
NEW OFFICE HOURS: M-TH 7AM-5PM      BRING YOUR MEDICATION BOTTLES TO ALL APPOINTMENTS    Check MY CHART for test results.  If you have forgotten your password, call [phone].    The diagnoses and medications listed in this after visit summary may not be up to date.  Check MY CHART in 28-48 hours for corrections.      Late cancellation policy: So that we can better accommodate people who are sick, please give our office 24 hour notice for an appointment cancellation.      Missed appointments: Your care is very important to us.  It is important that you keep your scheduled appointments.   Multiple missed appointments will lead to a dismissal from the office.      Patients arriving late will be worked into the schedule as time permits, with patients arriving on time taken as scheduled. Late arriving patients are more than welcome to wait or reschedule their appointments.    Please allow 5-7 business days for paperwork to be processed.

## 2024-02-15 NOTE — PROGRESS NOTES
Patient ID: Lottie Maldonado 1955    .  Chief Complaint   Patient presents with    Allergies    Knee Pain     Chronic knee pain     Vaginitis     Vaginitis still having creamy white thick discharge, no odor,        Hypertension         Knee Pain   There was no injury mechanism. The pain is moderate. She has tried NSAIDs (rarely takes NSAIDS) for the symptoms.   Hypertension  This is a chronic problem. Risk factors for coronary artery disease include post-menopausal state. Past treatments include beta blockers. There are no compliance problems.    Tachycardia  This is a chronic problem. The current episode started more than 1 month ago. Treatments tried: Toprol. The treatment provided moderate relief.   Mental Health Problem  Primary symptoms comment: Bipolar: stable on depakote and would like to continue taking.   Allergic Rhinitis   Presents for follow-up (has tried claritin, but still has runny nose) visit. (Bipolar: stable on depakote and would like to continue taking) The problem occurs daily. Symptom severity has been moderate.     Vaginal discharge: still with white discharge.  Has not been exposed to infection since she was tested    Postmenopausal: taking Forteo      Patient Active Problem List   Diagnosis    Family history of malignant neoplasm of gastrointestinal tract    B-cell lymphoma (HCC)    Idiopathic hypotension    Mitral valve disease    Pancytopenia (HCC)    Pain syndrome, chronic    Tachycardia    Former smoker    History of B-cell lymphoma    Chronic systolic (congestive) heart failure    Osteopenia of multiple sites       Past Surgical History:   Procedure Laterality Date    BREAST BIOPSY Left     COLONOSCOPY  9/4/2015    normal colon    COLONOSCOPY N/A 2/28/2020    COLONOSCOPY DIAGNOSTIC performed by Anoop Smith MD at [facility] ENDOSCOPY    PORT SURGERY N/A 1/7/2020    PORT INSERTION performed by Hailee Miranda MD at [facility] OR    PORT SURGERY Left 2/20/2020    PORT REMOVAL LEFT performed by

## 2024-02-26 ENCOUNTER — INITIAL CONSULT (OUTPATIENT)
Dept: OBGYN | Age: 69
End: 2024-02-26
Payer: MEDICARE

## 2024-02-26 VITALS
BODY MASS INDEX: 24.15 KG/M2 | HEIGHT: 60 IN | SYSTOLIC BLOOD PRESSURE: 112 MMHG | WEIGHT: 123 LBS | DIASTOLIC BLOOD PRESSURE: 73 MMHG

## 2024-02-26 DIAGNOSIS — Z13.820 ENCOUNTER FOR SCREENING FOR OSTEOPOROSIS: ICD-10-CM

## 2024-02-26 DIAGNOSIS — Z01.419 WELL WOMAN EXAM WITH ROUTINE GYNECOLOGICAL EXAM: ICD-10-CM

## 2024-02-26 DIAGNOSIS — N89.8 VAGINAL DISCHARGE: ICD-10-CM

## 2024-02-26 DIAGNOSIS — N85.2 ENLARGED UTERUS: ICD-10-CM

## 2024-02-26 DIAGNOSIS — Z12.31 SCREENING MAMMOGRAM FOR BREAST CANCER: ICD-10-CM

## 2024-02-26 DIAGNOSIS — R39.15 URINARY URGENCY: Primary | ICD-10-CM

## 2024-02-26 LAB
BILIRUBIN, POC: NORMAL
BLOOD URINE, POC: NORMAL
CLARITY, POC: NORMAL
COLOR, POC: NORMAL
GLUCOSE URINE, POC: NORMAL
KETONES, POC: NORMAL
LEUKOCYTE EST, POC: NORMAL
NITRITE, POC: NORMAL
PH, POC: NORMAL
PROTEIN, POC: NORMAL
SPECIFIC GRAVITY, POC: NORMAL
UROBILINOGEN, POC: NORMAL

## 2024-02-26 PROCEDURE — G0101 CA SCREEN;PELVIC/BREAST EXAM: HCPCS | Performed by: OBSTETRICS & GYNECOLOGY

## 2024-02-26 PROCEDURE — G8399 PT W/DXA RESULTS DOCUMENT: HCPCS | Performed by: OBSTETRICS & GYNECOLOGY

## 2024-02-26 PROCEDURE — 1123F ACP DISCUSS/DSCN MKR DOCD: CPT | Performed by: OBSTETRICS & GYNECOLOGY

## 2024-02-26 PROCEDURE — 1036F TOBACCO NON-USER: CPT | Performed by: OBSTETRICS & GYNECOLOGY

## 2024-02-26 PROCEDURE — 81002 URINALYSIS NONAUTO W/O SCOPE: CPT | Performed by: OBSTETRICS & GYNECOLOGY

## 2024-02-26 PROCEDURE — G8484 FLU IMMUNIZE NO ADMIN: HCPCS | Performed by: OBSTETRICS & GYNECOLOGY

## 2024-02-26 PROCEDURE — 3017F COLORECTAL CA SCREEN DOC REV: CPT | Performed by: OBSTETRICS & GYNECOLOGY

## 2024-02-26 PROCEDURE — G8428 CUR MEDS NOT DOCUMENT: HCPCS | Performed by: OBSTETRICS & GYNECOLOGY

## 2024-02-26 PROCEDURE — G8420 CALC BMI NORM PARAMETERS: HCPCS | Performed by: OBSTETRICS & GYNECOLOGY

## 2024-02-26 PROCEDURE — 1090F PRES/ABSN URINE INCON ASSESS: CPT | Performed by: OBSTETRICS & GYNECOLOGY

## 2024-02-26 NOTE — PROGRESS NOTES
24    Lottie Maldonado  1955    Chief Complaint   Patient presents with    Consultation     Referral from Tahira Yang MD for vaginal discharge. White/yellowish in color, thin x 1 month. Pt also c/o urinary urgency x2 wks , Pt has been taking cranberry and drink water. Pap  ago negative, mammo  negative, dexa  osteoporosis otc vit d3  and forteo injections, colonoscopy  negative.     Gynecologic Exam     Annual exam,         Lottie Maldonado is a 68 y.o. female who presents today for evaluation of see above.    Past Medical History:   Diagnosis Date    B-cell lymphoma (HCC)     Bipolar 1 disorder (HCC)     \"on Depakote for this\"    Chronic systolic (congestive) heart failure 2/15/2023    History of external beam radiation therapy     Pelvis 3,600 cGy per  at [facility]    Hx of echocardiogram 2021    Left ventricular function is normal, EF is estimated at 55-60%,mitral valve appears to slightly prolapse,       Past Surgical History:   Procedure Laterality Date    BREAST BIOPSY Left     COLONOSCOPY  2015    normal colon    COLONOSCOPY N/A 2020    COLONOSCOPY DIAGNOSTIC performed by Anoop Smith MD at [facility] ENDOSCOPY    PORT SURGERY N/A 2020    PORT INSERTION performed by Hailee Miranda MD at [facility] OR    PORT SURGERY Left 2020    PORT REMOVAL LEFT performed by Hailee Miranda MD at [facility] OR    PORT SURGERY Right 3/10/2020    RIGHT PORT INSERTION performed by Hailee Miranda MD at [facility] OR    TONSILLECTOMY  as a kid    TUBAL LIGATION  20+ yrs ago    UPPER GASTROINTESTINAL ENDOSCOPY N/A 2020    EGD DIAGNOSTIC ONLY performed by Anoop Smith MD at [facility] ENDOSCOPY       Social History     Tobacco Use    Smoking status: Former     Current packs/day: 0.00     Average packs/day: 1 pack/day for 10.0 years (10.0 ttl pk-yrs)     Types: Cigarettes     Start date: 1978     Quit date: 1988     Years since quittin.1    Smokeless tobacco: Never   Vaping Use

## 2024-02-27 LAB
HBV SURFACE AG SERPL QL IA: NORMAL
HERPES SIMPLEX VIRUS 1 IGG: POSITIVE
HERPES SIMPLEX VIRUS 2 IGG: POSITIVE
HIV 1+2 AB+HIV1 P24 AG SERPL QL IA: NORMAL
HIV 2 AB SERPL QL IA: NORMAL
HIV1 AB SERPL QL IA: NORMAL
HIV1 P24 AG SERPL QL IA: NORMAL
REAGIN+T PALLIDUM IGG+IGM SERPL-IMP: NORMAL

## 2024-02-27 RX ORDER — DIPHENHYDRAMINE HCL 25 MG
1 TABLET,DISINTEGRATING ORAL 2 TIMES DAILY
Qty: 720 TABLET | Refills: 3 | Status: SHIPPED | OUTPATIENT
Start: 2024-02-27

## 2024-02-28 LAB
C TRACH DNA SPEC QL NAA+PROBE: NOT DETECTED
CANDIDA RRNA VAG QL PROBE: NOT DETECTED
CANDIDA RRNA VAG QL PROBE: NOT DETECTED
CARBAPENEM RESISTANCE OXA-48 GENE BY PCR: NOT DETECTED
CEPHALOSPORIN RESISTANCE AMPC GENE: NOT DETECTED
ESBL RESISTANCE: NOT DETECTED
G VAGINALIS RRNA GENITAL QL PROBE: ABNORMAL
M HOMINIS DNA BLD QL NAA+PROBE: NOT DETECTED
MACROLIDE RESISTANCE: ABNORMAL
METHICILLIN RESISTANCE: NOT DETECTED
MYCOPLASMA DNA SPEC QL NAA+PROBE: NOT DETECTED
N GONORRHOEA DNA SPEC QL NAA+PROBE: NOT DETECTED
OTHER MICROORG DNA SPEC QL NAA+PROBE: DETECTED
OTHER MICROORG DNA SPEC QL NAA+PROBE: NOT DETECTED
QUINOLONE AND FLUOROQUINOLONE RESISTANCE: NOT DETECTED
T VAGINALIS RRNA SPEC QL NAA+PROBE: NOT DETECTED
TETRACYCLINE RESISTANCE: ABNORMAL
TRIMETHOPRIM/SULFONAMIDE RESISTANCE: NOT DETECTED

## 2024-02-29 RX ORDER — DOXYCYCLINE HYCLATE 100 MG
100 TABLET ORAL 2 TIMES DAILY
Qty: 14 TABLET | Refills: 0 | Status: SHIPPED | OUTPATIENT
Start: 2024-02-29 | End: 2024-03-07

## 2024-02-29 RX ORDER — METRONIDAZOLE 500 MG/1
500 TABLET ORAL 2 TIMES DAILY
Qty: 14 TABLET | Refills: 0 | Status: SHIPPED | OUTPATIENT
Start: 2024-02-29 | End: 2024-03-07

## 2024-03-01 DIAGNOSIS — M81.0 POSTMENOPAUSAL OSTEOPOROSIS: ICD-10-CM

## 2024-03-01 DIAGNOSIS — Z78.0 POST-MENOPAUSAL: ICD-10-CM

## 2024-03-04 RX ORDER — TERIPARATIDE 250 UG/ML
INJECTION, SOLUTION SUBCUTANEOUS
Qty: 2.48 ML | OUTPATIENT
Start: 2024-03-04

## 2024-03-07 PROBLEM — D25.9 UTERINE FIBROID: Status: ACTIVE | Noted: 2024-03-07

## 2024-03-13 ENCOUNTER — TELEPHONE (OUTPATIENT)
Dept: CARDIOLOGY CLINIC | Age: 69
End: 2024-03-13

## 2024-03-13 ENCOUNTER — OFFICE VISIT (OUTPATIENT)
Dept: OBGYN | Age: 69
End: 2024-03-13
Payer: MEDICARE

## 2024-03-13 VITALS
DIASTOLIC BLOOD PRESSURE: 77 MMHG | HEIGHT: 60 IN | BODY MASS INDEX: 23.36 KG/M2 | SYSTOLIC BLOOD PRESSURE: 117 MMHG | WEIGHT: 119 LBS

## 2024-03-13 DIAGNOSIS — D21.9 FIBROIDS: ICD-10-CM

## 2024-03-13 DIAGNOSIS — R10.2 PELVIC PAIN: ICD-10-CM

## 2024-03-13 DIAGNOSIS — N85.2 ENLARGED UTERUS: Primary | ICD-10-CM

## 2024-03-13 DIAGNOSIS — R10.2 PELVIC PRESSURE IN FEMALE: ICD-10-CM

## 2024-03-13 PROCEDURE — G8420 CALC BMI NORM PARAMETERS: HCPCS | Performed by: OBSTETRICS & GYNECOLOGY

## 2024-03-13 PROCEDURE — 99213 OFFICE O/P EST LOW 20 MIN: CPT | Performed by: OBSTETRICS & GYNECOLOGY

## 2024-03-13 PROCEDURE — 1123F ACP DISCUSS/DSCN MKR DOCD: CPT | Performed by: OBSTETRICS & GYNECOLOGY

## 2024-03-13 PROCEDURE — 1036F TOBACCO NON-USER: CPT | Performed by: OBSTETRICS & GYNECOLOGY

## 2024-03-13 PROCEDURE — 1090F PRES/ABSN URINE INCON ASSESS: CPT | Performed by: OBSTETRICS & GYNECOLOGY

## 2024-03-13 PROCEDURE — G8484 FLU IMMUNIZE NO ADMIN: HCPCS | Performed by: OBSTETRICS & GYNECOLOGY

## 2024-03-13 PROCEDURE — G8427 DOCREV CUR MEDS BY ELIG CLIN: HCPCS | Performed by: OBSTETRICS & GYNECOLOGY

## 2024-03-13 PROCEDURE — 3017F COLORECTAL CA SCREEN DOC REV: CPT | Performed by: OBSTETRICS & GYNECOLOGY

## 2024-03-13 PROCEDURE — G8399 PT W/DXA RESULTS DOCUMENT: HCPCS | Performed by: OBSTETRICS & GYNECOLOGY

## 2024-03-13 NOTE — TELEPHONE ENCOUNTER
Proceed with surgery no further testing needed   Can hold anticoagulation for 2 days before surgery   moderate  risk for surgery  Hold aspirin for procedure

## 2024-03-13 NOTE — TELEPHONE ENCOUNTER
robotic hysterectomy with bilateral salpingo-oophorectomy and excision of vulvar lesion DX: fibroids, vulvar lesion, enlarged uterus, pelvic pain  Dr. Copeland will be doing surgery ASAP with general anesthesia

## 2024-03-14 NOTE — PROGRESS NOTES
Cardiac clearance sent and requested from Dr. Mcdaniel 3/13/24  
Left voicemail for patient regarding scheduling surgery with Dr. Copeland  
Per [employer], no prior auth required for [id number] [facility] 23 hour  Ref # [id number]  3/15/24-7/31/24  Ready to schedule  
          Immunization History   Administered Date(s) Administered    COVID-19, MODERNA BLUE border, Primary or Immunocompromised, (age 12y+), IM, 100 mcg/0.5mL 2021, 2022    COVID-19, MODERNA Bivalent, (age 12y+), IM, 50 mcg/0.5 mL 2022    COVID-19, MODERNA, ( formula), (age 12y+), IM, 50mcg/0.5mL 2024    COVID-19, PFIZER PURPLE top, DILUTE for use, (age 12 y+), 30mcg/0.3mL 2021, 2021    COVID-19, [place] Vaccine, Vaccine Unspecified 2021, 2021, 2021    Influenza Virus Vaccine 2019    Influenza, FLUAD, (age 65 y+), Adjuvanted, 0.5mL 2020, 2024    Influenza, FLUZONE (age 65 y+), High Dose, 0.7mL 10/05/2021, 2022    Pneumococcal, PCV20, PREVNAR 20, (age 6w+), IM, 0.5mL 2022    Pneumococcal, PPSV23, PNEUMOVAX 23, (age 2y+), SC/IM, 0.5mL 2019, 2020    TD 5LF, TENIVAC, (age 7y+), IM, 0.5mL 2002       Review of Systems  All other systems reviewed and are negative    /77 (Site: Right Upper Arm, Position: Sitting, Cuff Size: Medium Adult)   Ht 1.524 m (5')   Wt 54 kg (119 lb)   LMP 2000   BMI 23.24 kg/m²     Physical Exam    No results found for this visit on 24.    ASSESSMENT AND PLAN   Diagnosis Orders   1. Enlarged uterus        2. Fibroids        3. Pelvic pain        4. Pelvic pressure in female            Ultrasound reviewed,results showed myomatous uterus. Discussed surgical options for fibroids. Labs reviewed, shown HSV. Discussed prevention and treatment. Repeat cultures in 2 weeks for ureaplasma.     The patient desires definitive surgical treatment for her fibroids.  She is having progressive symptoms including pelvic pressure, pelvic pain, and bladder pressure which is her newest symptom.  Plan for robotic hysterectomy and BSO    Chaperone Kayla Lindsay was present for the entire appointment.       Return in about 2 weeks (around 3/27/2024).    Zaid Copeland MD

## 2024-03-21 ENCOUNTER — OFFICE VISIT (OUTPATIENT)
Dept: OBGYN | Age: 69
End: 2024-03-21
Payer: MEDICARE

## 2024-03-21 ENCOUNTER — ANESTHESIA EVENT (OUTPATIENT)
Dept: OPERATING ROOM | Age: 69
End: 2024-03-21
Payer: MEDICARE

## 2024-03-21 VITALS
HEIGHT: 60 IN | BODY MASS INDEX: 23.56 KG/M2 | WEIGHT: 120 LBS | SYSTOLIC BLOOD PRESSURE: 121 MMHG | DIASTOLIC BLOOD PRESSURE: 82 MMHG

## 2024-03-21 DIAGNOSIS — N85.2 ENLARGED UTERUS: Primary | ICD-10-CM

## 2024-03-21 DIAGNOSIS — D21.9 FIBROIDS: ICD-10-CM

## 2024-03-21 DIAGNOSIS — R10.2 PELVIC PAIN: ICD-10-CM

## 2024-03-21 PROCEDURE — G8399 PT W/DXA RESULTS DOCUMENT: HCPCS | Performed by: OBSTETRICS & GYNECOLOGY

## 2024-03-21 PROCEDURE — 1090F PRES/ABSN URINE INCON ASSESS: CPT | Performed by: OBSTETRICS & GYNECOLOGY

## 2024-03-21 PROCEDURE — G8420 CALC BMI NORM PARAMETERS: HCPCS | Performed by: OBSTETRICS & GYNECOLOGY

## 2024-03-21 PROCEDURE — 3017F COLORECTAL CA SCREEN DOC REV: CPT | Performed by: OBSTETRICS & GYNECOLOGY

## 2024-03-21 PROCEDURE — 1036F TOBACCO NON-USER: CPT | Performed by: OBSTETRICS & GYNECOLOGY

## 2024-03-21 PROCEDURE — 1123F ACP DISCUSS/DSCN MKR DOCD: CPT | Performed by: OBSTETRICS & GYNECOLOGY

## 2024-03-21 PROCEDURE — G8484 FLU IMMUNIZE NO ADMIN: HCPCS | Performed by: OBSTETRICS & GYNECOLOGY

## 2024-03-21 PROCEDURE — G8427 DOCREV CUR MEDS BY ELIG CLIN: HCPCS | Performed by: OBSTETRICS & GYNECOLOGY

## 2024-03-21 PROCEDURE — 99214 OFFICE O/P EST MOD 30 MIN: CPT | Performed by: OBSTETRICS & GYNECOLOGY

## 2024-03-21 RX ORDER — IBUPROFEN 800 MG/1
800 TABLET ORAL EVERY 8 HOURS PRN
Qty: 30 TABLET | Refills: 1 | Status: SHIPPED | OUTPATIENT
Start: 2024-03-21

## 2024-03-21 RX ORDER — OXYCODONE HYDROCHLORIDE AND ACETAMINOPHEN 5; 325 MG/1; MG/1
1 TABLET ORAL EVERY 6 HOURS PRN
Qty: 20 TABLET | Refills: 0 | Status: SHIPPED | OUTPATIENT
Start: 2024-03-21 | End: 2024-03-26

## 2024-03-21 NOTE — ANESTHESIA PRE PROCEDURE
Department of Anesthesiology  Preprocedure Note       Name:  Lottie Maldonado   Age:  68 y.o.  :  1955                                          MRN:  [id number]         Date:  3/21/2024      Surgeon: Surgeon(s):  Zaid Copeland MD    Procedure: HYSTERECTOMY LAPAROSCOPIC ROBOTIC WITH BILATERAL SALPINGO-OOPHORECTOMY  VULVA LESION EXCISION    Medications prior to admission:   Prior to Admission medications    Medication Sig Start Date End Date Taking? Authorizing Provider   teriparatide (FORTEO) 620 MCG/2.48ML SOPN injection Inject 0.08 mLs into the skin daily 3/4/24   Tahira Yang MD   calcium carb-cholecalciferol (CALCIUM 600+D3) 600-20 MG-MCG TABS TAKE 1 TABLET BY MOUTH TWICE DAILY 24   Tahira Yang MD   fluticasone (FLONASE) 50 MCG/ACT nasal spray 2 sprays by Each Nostril route daily 2/15/24   Tahira Yang MD   loratadine (CLARITIN) 10 MG tablet Take 1 tablet by mouth daily 2/15/24   Tahira Yang MD   metoprolol succinate (TOPROL XL) 25 MG extended release tablet Take 1 tablet by mouth daily 2/15/24   Tahira Yang MD   Insulin Pen Needle (TECHLITE PEN NEEDLES) 31G X 5 MM MISC use 1 NEEDLE WITH PEN once daily 2/15/24   Tahira Yang MD   divalproex (DEPAKOTE ER) 500 MG extended release tablet Take 2 tablets by mouth nightly 2/15/24   Tahira Yang MD   ibuprofen (ADVIL;MOTRIN) 400 MG tablet Take 1 tablet by mouth every 8 hours as needed for Pain 23   Tahira Yang MD   Caltrate 600+D Plus Minerals (CALTRATE) 600-800 MG-UNIT TABS tablet Take 1 tablet by mouth 2 times daily 23   Tahira Yang MD   ondansetron (ZOFRAN) 8 MG tablet Take 1 tablet by mouth every 8 hours as needed for Nausea or Vomiting    ProviderBelgica MD   QNASL 80 MCG/ACT AERS nasal spray  10/13/22   Brian Moss MD   loperamide (IMODIUM A-D) 2 MG tablet Take 1 tablet by mouth 4 times daily as needed for Diarrhea 20   Erwin Carias MD   MULTIPLE VITAMIN PO Take by mouth  History    Revised by Anju Whatley MA on 3/14/2024 at 9:29 AM (current version)  Revised by Anju Whatley MA on 3/14/2024 at 9:29 AM  Created by Joshua Metz MD on 3/14/2024 at 9:27 AM                Neuro/Psych:   (+) psychiatric history:depression/anxiety              ROS comment: Bipolar 1 disorder GI/Hepatic/Renal:   (+) GERD:, bowel prep         ROS comment: GI bleed.   Endo/Other:    (+) blood dyscrasia::., malignancy/cancer.          Pt had no PAT visit        ROS comment: B-cell lymphoma  Pancytopenia Abdominal:             Vascular:   + DVT.       Other Findings:         Anesthesia Plan      general     ASA 4       Induction: intravenous.    MIPS: Postoperative opioids intended.  Anesthetic plan and risks discussed with patient.      Plan discussed with CRNA.    Attending anesthesiologist reviewed and agrees with Preprocedure content              DIONI Lei CRNA   3/21/2024        Pre Anesthesia Evaluation complete. Anesthesia plan, risks, benefits, alternatives, and personnel discussed with patient and/or legal guardian. Patient and/or legal guardian verbalized an understanding and agreed to proceed. Anesthesia plan discussed with care team members and agreed upon.  DIONI Lei CRNA  3/21/2024

## 2024-03-21 NOTE — PROGRESS NOTES
.Surgery @ [facility] on 3/22/24 1100 arrival 0900    NOTHING TO EAT OR DRINK AFTER MIDNIGHT DAY OF SURGERY    1. Enter thru the hospital main entrance on day of surgery, check in at the Information Desk. If you arrive prior to 6:00am, enter thru the ER entrance.    2. Follow the directions as prescribed by the doctor for your procedure and medications.         Morning of surgery take: Metoprolol with sips of water          Stop vitamins, supplements and NSAIDS:  today     3. Check with your Doctor regarding stopping blood thinners and follow their instructions.    4. Do not smoke, vape or use chewing tobacco morning of surgery. Do not drink any alcoholic beverages 24 hours prior to surgery.       This includes NA Beer. No street drugs 7 days prior to surgery.    5. If you have dentures, contacts of glasses they will be removed before going to the OR; please bring a case.    6. Please bring picture ID, insurance card, paperwork from the doctor’s office (H & P, Consent, & card for implantable devices).    7. Take a shower with an antibacterial soap the night before surgery and the morning of surgery. Do not put anything on your skin      After your morning shower.    8. You will need a responsible adult to drive you home and check on you after surgery.

## 2024-03-22 ENCOUNTER — HOSPITAL ENCOUNTER (OUTPATIENT)
Age: 69
Setting detail: OUTPATIENT SURGERY
Discharge: HOME | End: 2024-03-23
Attending: OBSTETRICS & GYNECOLOGY | Admitting: OBSTETRICS & GYNECOLOGY
Payer: MEDICARE

## 2024-03-22 ENCOUNTER — ANESTHESIA (OUTPATIENT)
Dept: OPERATING ROOM | Age: 69
End: 2024-03-22
Payer: MEDICARE

## 2024-03-22 DIAGNOSIS — R10.2 PELVIC PAIN IN FEMALE: ICD-10-CM

## 2024-03-22 DIAGNOSIS — N90.89 VULVAR LESION: ICD-10-CM

## 2024-03-22 DIAGNOSIS — N85.2 ENLARGED UTERUS: ICD-10-CM

## 2024-03-22 DIAGNOSIS — D25.9 UTERINE LEIOMYOMA, UNSPECIFIED LOCATION: ICD-10-CM

## 2024-03-22 LAB
BASOPHILS ABSOLUTE: 0 K/CU MM
BASOPHILS RELATIVE PERCENT: 0.2 % (ref 0–1)
DIFFERENTIAL TYPE: ABNORMAL
EOSINOPHILS ABSOLUTE: 0.1 K/CU MM
EOSINOPHILS RELATIVE PERCENT: 1.7 % (ref 0–3)
HCT VFR BLD CALC: 30.2 % (ref 37–47)
HEMOGLOBIN: 9.8 GM/DL (ref 12.5–16)
IMMATURE NEUTROPHIL %: 1.2 % (ref 0–0.43)
LYMPHOCYTES ABSOLUTE: 1.3 K/CU MM
LYMPHOCYTES RELATIVE PERCENT: 33.3 % (ref 24–44)
MCH RBC QN AUTO: 31.4 PG (ref 27–31)
MCHC RBC AUTO-ENTMCNC: 32.5 % (ref 32–36)
MCV RBC AUTO: 96.8 FL (ref 78–100)
MONOCYTES ABSOLUTE: 0.5 K/CU MM
MONOCYTES RELATIVE PERCENT: 11.2 % (ref 0–4)
NUCLEATED RBC %: 0 %
PDW BLD-RTO: 13.2 % (ref 11.7–14.9)
PLATELET # BLD: 159 K/CU MM (ref 140–440)
PMV BLD AUTO: 9.9 FL (ref 7.5–11.1)
RBC # BLD: 3.12 M/CU MM (ref 4.2–5.4)
SEGMENTED NEUTROPHILS ABSOLUTE COUNT: 2.1 K/CU MM
SEGMENTED NEUTROPHILS RELATIVE PERCENT: 52.4 % (ref 36–66)
TOTAL IMMATURE NEUTOROPHIL: 0.05 K/CU MM
TOTAL NUCLEATED RBC: 0 K/CU MM
WBC # BLD: 4 K/CU MM (ref 4–10.5)

## 2024-03-22 PROCEDURE — 2709999900 HC NON-CHARGEABLE SUPPLY: Performed by: OBSTETRICS & GYNECOLOGY

## 2024-03-22 PROCEDURE — 56501 DESTROY VULVA LESIONS SIM: CPT | Performed by: OBSTETRICS & GYNECOLOGY

## 2024-03-22 PROCEDURE — 6360000002 HC RX W HCPCS

## 2024-03-22 PROCEDURE — 6370000000 HC RX 637 (ALT 250 FOR IP): Performed by: OBSTETRICS & GYNECOLOGY

## 2024-03-22 PROCEDURE — 88309 TISSUE EXAM BY PATHOLOGIST: CPT | Performed by: PATHOLOGY

## 2024-03-22 PROCEDURE — 2580000003 HC RX 258: Performed by: OBSTETRICS & GYNECOLOGY

## 2024-03-22 PROCEDURE — 6360000002 HC RX W HCPCS: Performed by: OBSTETRICS & GYNECOLOGY

## 2024-03-22 PROCEDURE — 3600000002 HC SURGERY LEVEL 2 BASE: Performed by: OBSTETRICS & GYNECOLOGY

## 2024-03-22 PROCEDURE — 3700000001 HC ADD 15 MINUTES (ANESTHESIA): Performed by: OBSTETRICS & GYNECOLOGY

## 2024-03-22 PROCEDURE — 3700000000 HC ANESTHESIA ATTENDED CARE: Performed by: OBSTETRICS & GYNECOLOGY

## 2024-03-22 PROCEDURE — 2720000010 HC SURG SUPPLY STERILE: Performed by: OBSTETRICS & GYNECOLOGY

## 2024-03-22 PROCEDURE — 2500000003 HC RX 250 WO HCPCS

## 2024-03-22 PROCEDURE — 85025 COMPLETE CBC W/AUTO DIFF WBC: CPT

## 2024-03-22 PROCEDURE — 2500000003 HC RX 250 WO HCPCS: Performed by: OBSTETRICS & GYNECOLOGY

## 2024-03-22 PROCEDURE — 6360000002 HC RX W HCPCS: Performed by: ANESTHESIOLOGY

## 2024-03-22 PROCEDURE — 88307 TISSUE EXAM BY PATHOLOGIST: CPT | Performed by: PATHOLOGY

## 2024-03-22 PROCEDURE — 88311 DECALCIFY TISSUE: CPT | Performed by: PATHOLOGY

## 2024-03-22 PROCEDURE — 88342 IMHCHEM/IMCYTCHM 1ST ANTB: CPT | Performed by: PATHOLOGY

## 2024-03-22 PROCEDURE — A4217 STERILE WATER/SALINE, 500 ML: HCPCS | Performed by: OBSTETRICS & GYNECOLOGY

## 2024-03-22 PROCEDURE — 3600000012 HC SURGERY LEVEL 2 ADDTL 15MIN: Performed by: OBSTETRICS & GYNECOLOGY

## 2024-03-22 PROCEDURE — 2580000003 HC RX 258: Performed by: ANESTHESIOLOGY

## 2024-03-22 PROCEDURE — 7100000000 HC PACU RECOVERY - FIRST 15 MIN: Performed by: OBSTETRICS & GYNECOLOGY

## 2024-03-22 PROCEDURE — 88341 IMHCHEM/IMCYTCHM EA ADD ANTB: CPT | Performed by: PATHOLOGY

## 2024-03-22 PROCEDURE — 7100000001 HC PACU RECOVERY - ADDTL 15 MIN: Performed by: OBSTETRICS & GYNECOLOGY

## 2024-03-22 PROCEDURE — 58571 TLH W/T/O 250 G OR LESS: CPT | Performed by: OBSTETRICS & GYNECOLOGY

## 2024-03-22 RX ORDER — ACETAMINOPHEN 500 MG
1000 TABLET ORAL EVERY 8 HOURS
Status: DISCONTINUED | OUTPATIENT
Start: 2024-03-22 | End: 2024-03-23 | Stop reason: HOSPADM

## 2024-03-22 RX ORDER — SODIUM CHLORIDE 9 MG/ML
500 INJECTION, SOLUTION INTRAVENOUS PRN
Status: DISCONTINUED | OUTPATIENT
Start: 2024-03-22 | End: 2024-03-23 | Stop reason: HOSPADM

## 2024-03-22 RX ORDER — FLUTICASONE PROPIONATE 50 MCG
2 SPRAY, SUSPENSION (ML) NASAL
Status: DISCONTINUED | OUTPATIENT
Start: 2024-03-22 | End: 2024-03-23 | Stop reason: HOSPADM

## 2024-03-22 RX ORDER — NICOTINE 21 MG/24HR
1 PATCH, TRANSDERMAL 24 HOURS TRANSDERMAL DAILY
Status: DISCONTINUED | OUTPATIENT
Start: 2024-03-22 | End: 2024-03-23 | Stop reason: HOSPADM

## 2024-03-22 RX ORDER — SODIUM CHLORIDE 0.9 % (FLUSH) 0.9 %
5-40 SYRINGE (ML) INJECTION EVERY 12 HOURS SCHEDULED
Status: DISCONTINUED | OUTPATIENT
Start: 2024-03-22 | End: 2024-03-22 | Stop reason: HOSPADM

## 2024-03-22 RX ORDER — ENOXAPARIN SODIUM 100 MG/ML
40 INJECTION SUBCUTANEOUS DAILY
Status: DISCONTINUED | OUTPATIENT
Start: 2024-03-23 | End: 2024-03-23 | Stop reason: HOSPADM

## 2024-03-22 RX ORDER — CEFAZOLIN SODIUM 1 G/3ML
INJECTION, POWDER, FOR SOLUTION INTRAMUSCULAR; INTRAVENOUS PRN
Status: DISCONTINUED | OUTPATIENT
Start: 2024-03-22 | End: 2024-03-22 | Stop reason: SDUPTHER

## 2024-03-22 RX ORDER — FENTANYL CITRATE 50 UG/ML
INJECTION, SOLUTION INTRAMUSCULAR; INTRAVENOUS PRN
Status: DISCONTINUED | OUTPATIENT
Start: 2024-03-22 | End: 2024-03-22 | Stop reason: SDUPTHER

## 2024-03-22 RX ORDER — SODIUM CHLORIDE 0.9 % (FLUSH) 0.9 %
5-40 SYRINGE (ML) INJECTION EVERY 12 HOURS SCHEDULED
Status: DISCONTINUED | OUTPATIENT
Start: 2024-03-22 | End: 2024-03-23 | Stop reason: HOSPADM

## 2024-03-22 RX ORDER — FENTANYL CITRATE 50 UG/ML
INJECTION, SOLUTION INTRAMUSCULAR; INTRAVENOUS
Status: DISPENSED
Start: 2024-03-22 | End: 2024-03-23

## 2024-03-22 RX ORDER — ONDANSETRON 2 MG/ML
4 INJECTION INTRAMUSCULAR; INTRAVENOUS
Status: COMPLETED | OUTPATIENT
Start: 2024-03-22 | End: 2024-03-22

## 2024-03-22 RX ORDER — LIDOCAINE HYDROCHLORIDE 20 MG/ML
INJECTION, SOLUTION INTRAVENOUS PRN
Status: DISCONTINUED | OUTPATIENT
Start: 2024-03-22 | End: 2024-03-22 | Stop reason: SDUPTHER

## 2024-03-22 RX ORDER — DROPERIDOL 2.5 MG/ML
0.62 INJECTION, SOLUTION INTRAMUSCULAR; INTRAVENOUS
Status: DISCONTINUED | OUTPATIENT
Start: 2024-03-22 | End: 2024-03-22 | Stop reason: HOSPADM

## 2024-03-22 RX ORDER — METOPROLOL SUCCINATE 25 MG/1
25 TABLET, EXTENDED RELEASE ORAL DAILY
Status: DISCONTINUED | OUTPATIENT
Start: 2024-03-23 | End: 2024-03-23 | Stop reason: HOSPADM

## 2024-03-22 RX ORDER — IBUPROFEN 400 MG/1
400 TABLET ORAL EVERY 12 HOURS SCHEDULED
Status: DISCONTINUED | OUTPATIENT
Start: 2024-03-22 | End: 2024-03-23 | Stop reason: HOSPADM

## 2024-03-22 RX ORDER — MAGNESIUM HYDROXIDE 1200 MG/15ML
LIQUID ORAL CONTINUOUS PRN
Status: COMPLETED | OUTPATIENT
Start: 2024-03-22 | End: 2024-03-22

## 2024-03-22 RX ORDER — OXYCODONE HYDROCHLORIDE 5 MG/1
5 TABLET ORAL EVERY 4 HOURS PRN
Status: DISCONTINUED | OUTPATIENT
Start: 2024-03-22 | End: 2024-03-23 | Stop reason: HOSPADM

## 2024-03-22 RX ORDER — ROCURONIUM BROMIDE 10 MG/ML
INJECTION, SOLUTION INTRAVENOUS PRN
Status: DISCONTINUED | OUTPATIENT
Start: 2024-03-22 | End: 2024-03-22 | Stop reason: SDUPTHER

## 2024-03-22 RX ORDER — DEXAMETHASONE SODIUM PHOSPHATE 4 MG/ML
INJECTION, SOLUTION INTRA-ARTICULAR; INTRALESIONAL; INTRAMUSCULAR; INTRAVENOUS; SOFT TISSUE PRN
Status: DISCONTINUED | OUTPATIENT
Start: 2024-03-22 | End: 2024-03-22 | Stop reason: SDUPTHER

## 2024-03-22 RX ORDER — BUPIVACAINE HYDROCHLORIDE 5 MG/ML
INJECTION, SOLUTION PERINEURAL
Status: COMPLETED | OUTPATIENT
Start: 2024-03-22 | End: 2024-03-22

## 2024-03-22 RX ORDER — DIVALPROEX SODIUM 500 MG/1
1000 TABLET, EXTENDED RELEASE ORAL NIGHTLY
Status: DISCONTINUED | OUTPATIENT
Start: 2024-03-22 | End: 2024-03-23 | Stop reason: HOSPADM

## 2024-03-22 RX ORDER — PHENYLEPHRINE HCL IN 0.9% NACL 1 MG/10 ML
SYRINGE (ML) INTRAVENOUS PRN
Status: DISCONTINUED | OUTPATIENT
Start: 2024-03-22 | End: 2024-03-22 | Stop reason: SDUPTHER

## 2024-03-22 RX ORDER — OXYCODONE HYDROCHLORIDE 5 MG/1
10 TABLET ORAL EVERY 4 HOURS PRN
Status: DISCONTINUED | OUTPATIENT
Start: 2024-03-22 | End: 2024-03-23 | Stop reason: HOSPADM

## 2024-03-22 RX ORDER — FENTANYL CITRATE 50 UG/ML
50 INJECTION, SOLUTION INTRAMUSCULAR; INTRAVENOUS EVERY 5 MIN PRN
Status: DISCONTINUED | OUTPATIENT
Start: 2024-03-22 | End: 2024-03-22 | Stop reason: HOSPADM

## 2024-03-22 RX ORDER — NALOXONE HYDROCHLORIDE 0.4 MG/ML
INJECTION, SOLUTION INTRAMUSCULAR; INTRAVENOUS; SUBCUTANEOUS PRN
Status: DISCONTINUED | OUTPATIENT
Start: 2024-03-22 | End: 2024-03-22 | Stop reason: HOSPADM

## 2024-03-22 RX ORDER — SODIUM CHLORIDE 9 MG/ML
INJECTION, SOLUTION INTRAVENOUS PRN
Status: DISCONTINUED | OUTPATIENT
Start: 2024-03-22 | End: 2024-03-22 | Stop reason: HOSPADM

## 2024-03-22 RX ORDER — LABETALOL HYDROCHLORIDE 5 MG/ML
10 INJECTION, SOLUTION INTRAVENOUS
Status: DISCONTINUED | OUTPATIENT
Start: 2024-03-22 | End: 2024-03-22 | Stop reason: HOSPADM

## 2024-03-22 RX ORDER — ONDANSETRON 2 MG/ML
INJECTION INTRAMUSCULAR; INTRAVENOUS PRN
Status: DISCONTINUED | OUTPATIENT
Start: 2024-03-22 | End: 2024-03-22 | Stop reason: SDUPTHER

## 2024-03-22 RX ORDER — ONDANSETRON 2 MG/ML
4 INJECTION INTRAMUSCULAR; INTRAVENOUS EVERY 6 HOURS PRN
Status: CANCELLED | OUTPATIENT
Start: 2024-03-22

## 2024-03-22 RX ORDER — BISACODYL 5 MG/1
5 TABLET, DELAYED RELEASE ORAL DAILY
Status: DISCONTINUED | OUTPATIENT
Start: 2024-03-23 | End: 2024-03-23 | Stop reason: HOSPADM

## 2024-03-22 RX ORDER — ONDANSETRON 4 MG/1
4 TABLET, ORALLY DISINTEGRATING ORAL EVERY 8 HOURS PRN
Status: CANCELLED | OUTPATIENT
Start: 2024-03-22

## 2024-03-22 RX ORDER — GINSENG 100 MG
CAPSULE ORAL
Status: COMPLETED | OUTPATIENT
Start: 2024-03-22 | End: 2024-03-22

## 2024-03-22 RX ORDER — HYDRALAZINE HYDROCHLORIDE 20 MG/ML
10 INJECTION INTRAMUSCULAR; INTRAVENOUS
Status: DISCONTINUED | OUTPATIENT
Start: 2024-03-22 | End: 2024-03-22 | Stop reason: HOSPADM

## 2024-03-22 RX ORDER — PROPOFOL 10 MG/ML
INJECTION, EMULSION INTRAVENOUS PRN
Status: DISCONTINUED | OUTPATIENT
Start: 2024-03-22 | End: 2024-03-22 | Stop reason: SDUPTHER

## 2024-03-22 RX ORDER — SODIUM CHLORIDE, SODIUM LACTATE, POTASSIUM CHLORIDE, CALCIUM CHLORIDE 600; 310; 30; 20 MG/100ML; MG/100ML; MG/100ML; MG/100ML
INJECTION, SOLUTION INTRAVENOUS CONTINUOUS
Status: DISCONTINUED | OUTPATIENT
Start: 2024-03-22 | End: 2024-03-22 | Stop reason: HOSPADM

## 2024-03-22 RX ORDER — ONDANSETRON 2 MG/ML
4 INJECTION INTRAMUSCULAR; INTRAVENOUS EVERY 6 HOURS PRN
Status: DISCONTINUED | OUTPATIENT
Start: 2024-03-22 | End: 2024-03-23 | Stop reason: HOSPADM

## 2024-03-22 RX ORDER — SODIUM CHLORIDE, SODIUM LACTATE, POTASSIUM CHLORIDE, CALCIUM CHLORIDE 600; 310; 30; 20 MG/100ML; MG/100ML; MG/100ML; MG/100ML
1000 INJECTION, SOLUTION INTRAVENOUS CONTINUOUS
Status: DISCONTINUED | OUTPATIENT
Start: 2024-03-22 | End: 2024-03-23 | Stop reason: HOSPADM

## 2024-03-22 RX ORDER — SODIUM CHLORIDE 0.9 % (FLUSH) 0.9 %
5-40 SYRINGE (ML) INJECTION PRN
Status: DISCONTINUED | OUTPATIENT
Start: 2024-03-22 | End: 2024-03-22 | Stop reason: HOSPADM

## 2024-03-22 RX ORDER — OXYCODONE HYDROCHLORIDE 5 MG/1
5 TABLET ORAL
Status: DISCONTINUED | OUTPATIENT
Start: 2024-03-22 | End: 2024-03-22 | Stop reason: HOSPADM

## 2024-03-22 RX ORDER — BUPIVACAINE HYDROCHLORIDE AND EPINEPHRINE 2.5; 5 MG/ML; UG/ML
INJECTION, SOLUTION EPIDURAL; INFILTRATION; INTRACAUDAL; PERINEURAL
Status: COMPLETED | OUTPATIENT
Start: 2024-03-22 | End: 2024-03-22

## 2024-03-22 RX ORDER — SODIUM CHLORIDE 0.9 % (FLUSH) 0.9 %
5-40 SYRINGE (ML) INJECTION PRN
Status: DISCONTINUED | OUTPATIENT
Start: 2024-03-22 | End: 2024-03-23 | Stop reason: HOSPADM

## 2024-03-22 RX ORDER — PROMETHAZINE HYDROCHLORIDE 12.5 MG/1
12.5 TABLET ORAL EVERY 6 HOURS PRN
Status: DISCONTINUED | OUTPATIENT
Start: 2024-03-22 | End: 2024-03-23 | Stop reason: HOSPADM

## 2024-03-22 RX ADMIN — CEFAZOLIN 2 G: 1 INJECTION, POWDER, FOR SOLUTION INTRAMUSCULAR; INTRAVENOUS; PARENTERAL at 13:05

## 2024-03-22 RX ADMIN — FLUTICASONE PROPIONATE 2 SPRAY: 50 SPRAY, METERED NASAL at 20:53

## 2024-03-22 RX ADMIN — FENTANYL CITRATE 50 MCG: 50 INJECTION, SOLUTION INTRAMUSCULAR; INTRAVENOUS at 13:36

## 2024-03-22 RX ADMIN — HYDROMORPHONE HYDROCHLORIDE 0.25 MG: 1 INJECTION, SOLUTION INTRAMUSCULAR; INTRAVENOUS; SUBCUTANEOUS at 18:51

## 2024-03-22 RX ADMIN — ONDANSETRON 4 MG: 2 INJECTION INTRAMUSCULAR; INTRAVENOUS at 22:46

## 2024-03-22 RX ADMIN — PROPOFOL 150 MG: 10 INJECTION, EMULSION INTRAVENOUS at 13:01

## 2024-03-22 RX ADMIN — DEXAMETHASONE SODIUM PHOSPHATE 4 MG: 4 INJECTION, SOLUTION INTRAMUSCULAR; INTRAVENOUS at 13:05

## 2024-03-22 RX ADMIN — SUGAMMADEX 200 MG: 100 INJECTION, SOLUTION INTRAVENOUS at 15:07

## 2024-03-22 RX ADMIN — FENTANYL CITRATE 50 MCG: 50 INJECTION, SOLUTION INTRAMUSCULAR; INTRAVENOUS at 15:27

## 2024-03-22 RX ADMIN — Medication 50 MCG: at 13:24

## 2024-03-22 RX ADMIN — Medication 100 MCG: at 13:03

## 2024-03-22 RX ADMIN — SODIUM CHLORIDE, POTASSIUM CHLORIDE, SODIUM LACTATE AND CALCIUM CHLORIDE: 600; 310; 30; 20 INJECTION, SOLUTION INTRAVENOUS at 09:40

## 2024-03-22 RX ADMIN — ROCURONIUM BROMIDE 50 MG: 10 INJECTION INTRAVENOUS at 13:01

## 2024-03-22 RX ADMIN — DIVALPROEX SODIUM 1000 MG: 500 TABLET, FILM COATED, EXTENDED RELEASE ORAL at 20:53

## 2024-03-22 RX ADMIN — ROCURONIUM BROMIDE 10 MG: 10 INJECTION INTRAVENOUS at 14:06

## 2024-03-22 RX ADMIN — ACETAMINOPHEN 1000 MG: 500 TABLET ORAL at 23:49

## 2024-03-22 RX ADMIN — ONDANSETRON 4 MG: 2 INJECTION INTRAMUSCULAR; INTRAVENOUS at 13:05

## 2024-03-22 RX ADMIN — FENTANYL CITRATE 25 MCG: 50 INJECTION, SOLUTION INTRAMUSCULAR; INTRAVENOUS at 15:17

## 2024-03-22 RX ADMIN — Medication 100 MCG: at 13:06

## 2024-03-22 RX ADMIN — FENTANYL CITRATE 25 MCG: 50 INJECTION, SOLUTION INTRAMUSCULAR; INTRAVENOUS at 13:01

## 2024-03-22 RX ADMIN — FENTANYL CITRATE 50 MCG: 50 INJECTION, SOLUTION INTRAMUSCULAR; INTRAVENOUS at 15:55

## 2024-03-22 RX ADMIN — LIDOCAINE HYDROCHLORIDE 40 MG: 20 INJECTION, SOLUTION INTRAVENOUS at 13:01

## 2024-03-22 RX ADMIN — Medication 100 MCG: at 13:11

## 2024-03-22 RX ADMIN — ONDANSETRON 4 MG: 2 INJECTION INTRAMUSCULAR; INTRAVENOUS at 16:06

## 2024-03-22 RX ADMIN — Medication 50 MCG: at 13:18

## 2024-03-22 RX ADMIN — IBUPROFEN 400 MG: 400 TABLET, FILM COATED ORAL at 20:52

## 2024-03-22 ASSESSMENT — PAIN SCALES - GENERAL
PAINLEVEL_OUTOF10: 7
PAINLEVEL_OUTOF10: 1
PAINLEVEL_OUTOF10: 5
PAINLEVEL_OUTOF10: 6
PAINLEVEL_OUTOF10: 0
PAINLEVEL_OUTOF10: 5
PAINLEVEL_OUTOF10: 7

## 2024-03-22 ASSESSMENT — PAIN DESCRIPTION - FREQUENCY
FREQUENCY: INTERMITTENT
FREQUENCY: INTERMITTENT

## 2024-03-22 ASSESSMENT — PAIN DESCRIPTION - ONSET
ONSET: ON-GOING
ONSET: GRADUAL

## 2024-03-22 ASSESSMENT — PAIN DESCRIPTION - ORIENTATION
ORIENTATION: LOWER;MID
ORIENTATION: LOWER;MID
ORIENTATION: RIGHT
ORIENTATION: MID

## 2024-03-22 ASSESSMENT — PAIN DESCRIPTION - LOCATION
LOCATION: ABDOMEN

## 2024-03-22 ASSESSMENT — PAIN - FUNCTIONAL ASSESSMENT
PAIN_FUNCTIONAL_ASSESSMENT: ACTIVITIES ARE NOT PREVENTED
PAIN_FUNCTIONAL_ASSESSMENT: PREVENTS OR INTERFERES SOME ACTIVE ACTIVITIES AND ADLS
PAIN_FUNCTIONAL_ASSESSMENT: PREVENTS OR INTERFERES SOME ACTIVE ACTIVITIES AND ADLS
PAIN_FUNCTIONAL_ASSESSMENT: ACTIVITIES ARE NOT PREVENTED
PAIN_FUNCTIONAL_ASSESSMENT: 0-10
PAIN_FUNCTIONAL_ASSESSMENT: PREVENTS OR INTERFERES SOME ACTIVE ACTIVITIES AND ADLS

## 2024-03-22 ASSESSMENT — PAIN DESCRIPTION - DESCRIPTORS
DESCRIPTORS: DISCOMFORT;SORE
DESCRIPTORS: SORE
DESCRIPTORS: ACHING;CRAMPING;DISCOMFORT
DESCRIPTORS: DISCOMFORT;SORE
DESCRIPTORS: ACHING;DISCOMFORT;CRAMPING

## 2024-03-22 ASSESSMENT — PAIN DESCRIPTION - PAIN TYPE
TYPE: ACUTE PAIN;SURGICAL PAIN
TYPE: ACUTE PAIN;SURGICAL PAIN

## 2024-03-22 NOTE — ANESTHESIA POSTPROCEDURE EVALUATION
Department of Anesthesiology  Postprocedure Note    Patient: Lottie Maldonado  MRN: [id number]  YOB: 1955  Date of evaluation: 3/22/2024    Procedure Summary       Date: 03/22/24 Room / Location: [address]    Anesthesia Start: 1251 Anesthesia Stop: 1521    Procedures:       HYSTERECTOMY LAPAROSCOPIC ROBOTIC WITH BILATERAL SALPINGO-OOPHORECTOMY (Abdomen/Perineum)      VULVA LESION EXCISION (Vulva)      CYSTOSCOPY (Urethra) Diagnosis:       Uterine leiomyoma, unspecified location      Vulvar lesion      Enlarged uterus      Pelvic pain in female      (Uterine leiomyoma, unspecified location [D25.9])      (Vulvar lesion [N90.89])      (Enlarged uterus [N85.2])      (Pelvic pain in female [R10.2])    Surgeons: Zaid Copeland MD Responsible Provider: Percy Wallis MD    Anesthesia Type: General ASA Status: 4            Anesthesia Type: General    Jimena Phase I: Jimena Score: 8    Jimena Phase II:      Anesthesia Post Evaluation    Patient location during evaluation: PACU  Patient participation: complete - patient participated  Level of consciousness: awake and alert  Airway patency: patent  Nausea & Vomiting: no nausea and no vomiting  Cardiovascular status: hemodynamically stable  Respiratory status: spontaneous ventilation and room air  Hydration status: stable  Pain management: adequate    No notable events documented.

## 2024-03-22 NOTE — PROGRESS NOTES
\"cancer in the bowel\"    Heart Attack Sister     Drug Abuse Sister     Heart Attack Sister     Drug Abuse Sister     No Known Problems Brother     Breast Cancer Paternal Aunt     Ovarian Cancer Neg Hx        Current Outpatient Medications   Medication Sig Dispense Refill    oxyCODONE-acetaminophen (PERCOCET) 5-325 MG per tablet Take 1 tablet by mouth every 6 hours as needed for Pain for up to 5 days. Intended supply: 3 days. Take lowest dose possible to manage pain Max Daily Amount: 4 tablets 20 tablet 0    ibuprofen (IBU) 800 MG tablet Take 1 tablet by mouth every 8 hours as needed for Pain Take with food. 30 tablet 1    teriparatide (FORTEO) 620 MCG/2.48ML SOPN injection Inject 0.08 mLs into the skin daily 8 mL 1    calcium carb-cholecalciferol (CALCIUM 600+D3) 600-20 MG-MCG TABS TAKE 1 TABLET BY MOUTH TWICE DAILY 720 tablet 3    fluticasone (FLONASE) 50 MCG/ACT nasal spray 2 sprays by Each Nostril route daily 3 each 3    loratadine (CLARITIN) 10 MG tablet Take 1 tablet by mouth daily 90 tablet 3    metoprolol succinate (TOPROL XL) 25 MG extended release tablet Take 1 tablet by mouth daily 90 tablet 3    Insulin Pen Needle (TECHLITE PEN NEEDLES) 31G X 5 MM MISC use 1 NEEDLE WITH PEN once daily 100 each 3    divalproex (DEPAKOTE ER) 500 MG extended release tablet Take 2 tablets by mouth nightly 180 tablet 1    ibuprofen (ADVIL;MOTRIN) 400 MG tablet Take 1 tablet by mouth every 8 hours as needed for Pain 50 tablet 1    Caltrate 600+D Plus Minerals (CALTRATE) 600-800 MG-UNIT TABS tablet Take 1 tablet by mouth 2 times daily 180 tablet 3    ondansetron (ZOFRAN) 8 MG tablet Take 1 tablet by mouth every 8 hours as needed for Nausea or Vomiting      QNASL 80 MCG/ACT AERS nasal spray       loperamide (IMODIUM A-D) 2 MG tablet Take 1 tablet by mouth 4 times daily as needed for Diarrhea 60 tablet 1    MULTIPLE VITAMIN PO Take by mouth daily (Patient not taking: Reported on 3/21/2024)       No current facility-administered

## 2024-03-22 NOTE — FLOWSHEET NOTE
Dr. Copeland notified, Updated on droplet precaution; no new orders. Infection control will address on Monday per Nursing Supervisor.

## 2024-03-22 NOTE — H&P
Expand All Collapse All    3/21/24     Lottie Maldonado  1955          Chief Complaint   Patient presents with    Pelvic Pain       Pt c/o pelvic pain, vulvar lesion. Here to discuss options.          Lottie Maldonado is a 68 y.o. female who presents today for evaluation of see above.     Past Medical History        Past Medical History:   Diagnosis Date    B-cell lymphoma (HCC)      Bipolar 1 disorder (HCC)       \"on Depakote for this\"    Chronic systolic (congestive) heart failure 02/15/2023    Fibroid      History of external beam radiation therapy      Pelvis 3,600 cGy per  at [facility]    Hx of echocardiogram 2021     Left ventricular function is normal, EF is estimated at 55-60%,mitral valve appears to slightly prolapse,            Past Surgical History         Past Surgical History:   Procedure Laterality Date    BREAST BIOPSY Left      COLONOSCOPY   2015     normal colon    COLONOSCOPY N/A 2020     COLONOSCOPY DIAGNOSTIC performed by Anoop Smith MD at [facility] ENDOSCOPY    PORT SURGERY N/A 2020     PORT INSERTION performed by Hailee Miranda MD at [facility] OR    PORT SURGERY Left 2020     PORT REMOVAL LEFT performed by Hailee Miranda MD at [facility] OR    PORT SURGERY Right 3/10/2020     RIGHT PORT INSERTION performed by Hailee Miranda MD at [facility] OR    TONSILLECTOMY   as a kid    TUBAL LIGATION   20+ yrs ago    UPPER GASTROINTESTINAL ENDOSCOPY N/A 2020     EGD DIAGNOSTIC ONLY performed by Anoop Smith MD at [facility] ENDOSCOPY            Social History            Tobacco Use    Smoking status: Former       Current packs/day: 0.00       Average packs/day: 1 pack/day for 10.0 years (10.0 ttl pk-yrs)       Types: Cigarettes       Start date: 1978       Quit date: 1988       Years since quittin.2    Smokeless tobacco: Never   Vaping Use    Vaping Use: Never used   Substance Use Topics    Alcohol use: No       Comment: 1-2 CUPS OF HOT TEA    Drug use: No         Family  History         Family History   Problem Relation Age of Onset    Breast Cancer Mother      High Blood Pressure Mother      Heart Attack Mother      Colon Cancer Father           \"cancer in the bowel\"    Heart Attack Sister      Drug Abuse Sister      Heart Attack Sister      Drug Abuse Sister      No Known Problems Brother      Breast Cancer Paternal Aunt      Ovarian Cancer Neg Hx              Current Facility-Administered Medications          Current Outpatient Medications   Medication Sig Dispense Refill    oxyCODONE-acetaminophen (PERCOCET) 5-325 MG per tablet Take 1 tablet by mouth every 6 hours as needed for Pain for up to 5 days. Intended supply: 3 days. Take lowest dose possible to manage pain Max Daily Amount: 4 tablets 20 tablet 0    ibuprofen (IBU) 800 MG tablet Take 1 tablet by mouth every 8 hours as needed for Pain Take with food. 30 tablet 1    teriparatide (FORTEO) 620 MCG/2.48ML SOPN injection Inject 0.08 mLs into the skin daily 8 mL 1    calcium carb-cholecalciferol (CALCIUM 600+D3) 600-20 MG-MCG TABS TAKE 1 TABLET BY MOUTH TWICE DAILY 720 tablet 3    fluticasone (FLONASE) 50 MCG/ACT nasal spray 2 sprays by Each Nostril route daily 3 each 3    loratadine (CLARITIN) 10 MG tablet Take 1 tablet by mouth daily 90 tablet 3    metoprolol succinate (TOPROL XL) 25 MG extended release tablet Take 1 tablet by mouth daily 90 tablet 3    Insulin Pen Needle (TECHLITE PEN NEEDLES) 31G X 5 MM MISC use 1 NEEDLE WITH PEN once daily 100 each 3    divalproex (DEPAKOTE ER) 500 MG extended release tablet Take 2 tablets by mouth nightly 180 tablet 1    ibuprofen (ADVIL;MOTRIN) 400 MG tablet Take 1 tablet by mouth every 8 hours as needed for Pain 50 tablet 1    Caltrate 600+D Plus Minerals (CALTRATE) 600-800 MG-UNIT TABS tablet Take 1 tablet by mouth 2 times daily 180 tablet 3    ondansetron (ZOFRAN) 8 MG tablet Take 1 tablet by mouth every 8 hours as needed for Nausea or Vomiting        QNASL 80 MCG/ACT AERS nasal spray       No results found for this visit on 03/21/24.     ASSESSMENT AND PLAN    Diagnosis Orders   1. Enlarged uterus          2. Fibroids          3. Pelvic pain  oxyCODONE-acetaminophen (PERCOCET) 5-325 MG per tablet             Discussed Hysterectomy laparoscopic robotic with bilateral salpingo-oophorectomy and vulva lesion excision.     Patricia Lindsay was present for the entire appointment.        Return in about 2 weeks (around 4/4/2024).     Zaid Copeland MD

## 2024-03-22 NOTE — FLOWSHEET NOTE
Notified Nancy, Nursing Supervisor, in regards to droplet precautions from infection dated 5/2022.   Verified with Gaye, Infection Control, that order was not needed. Order will be removed on Monday.   Nurse to notified Dr. Copeland.

## 2024-03-23 VITALS
HEART RATE: 90 BPM | BODY MASS INDEX: 21.09 KG/M2 | TEMPERATURE: 98 F | RESPIRATION RATE: 16 BRPM | WEIGHT: 119 LBS | OXYGEN SATURATION: 99 % | HEIGHT: 63 IN | SYSTOLIC BLOOD PRESSURE: 98 MMHG | DIASTOLIC BLOOD PRESSURE: 61 MMHG

## 2024-03-23 LAB
HCT VFR BLD CALC: 25.6 % (ref 37–47)
HEMOGLOBIN: 8.2 GM/DL (ref 12.5–16)
MCH RBC QN AUTO: 31.5 PG (ref 27–31)
MCHC RBC AUTO-ENTMCNC: 32 % (ref 32–36)
MCV RBC AUTO: 98.5 FL (ref 78–100)
PDW BLD-RTO: 13.2 % (ref 11.7–14.9)
PLATELET # BLD: 146 K/CU MM (ref 140–440)
PMV BLD AUTO: 10.1 FL (ref 7.5–11.1)
RBC # BLD: 2.6 M/CU MM (ref 4.2–5.4)
WBC # BLD: 6.5 K/CU MM (ref 4–10.5)

## 2024-03-23 PROCEDURE — 85027 COMPLETE CBC AUTOMATED: CPT

## 2024-03-23 PROCEDURE — 6360000002 HC RX W HCPCS: Performed by: OBSTETRICS & GYNECOLOGY

## 2024-03-23 PROCEDURE — 2580000003 HC RX 258: Performed by: OBSTETRICS & GYNECOLOGY

## 2024-03-23 PROCEDURE — 6370000000 HC RX 637 (ALT 250 FOR IP): Performed by: OBSTETRICS & GYNECOLOGY

## 2024-03-23 RX ORDER — FERROUS SULFATE 325(65) MG
325 TABLET ORAL
Qty: 90 TABLET | Refills: 1 | Status: SHIPPED | OUTPATIENT
Start: 2024-03-23

## 2024-03-23 RX ORDER — PROMETHAZINE HYDROCHLORIDE 12.5 MG/1
12.5 TABLET ORAL EVERY 6 HOURS PRN
Qty: 30 TABLET | Refills: 1 | Status: SHIPPED | OUTPATIENT
Start: 2024-03-23 | End: 2024-03-30

## 2024-03-23 RX ADMIN — IBUPROFEN 400 MG: 400 TABLET, FILM COATED ORAL at 08:49

## 2024-03-23 RX ADMIN — SODIUM CHLORIDE, POTASSIUM CHLORIDE, SODIUM LACTATE AND CALCIUM CHLORIDE 1000 ML: 600; 310; 30; 20 INJECTION, SOLUTION INTRAVENOUS at 02:32

## 2024-03-23 RX ADMIN — ENOXAPARIN SODIUM 40 MG: 100 INJECTION SUBCUTANEOUS at 06:07

## 2024-03-23 RX ADMIN — BISACODYL 5 MG: 5 TABLET, COATED ORAL at 08:49

## 2024-03-23 ASSESSMENT — PAIN SCALES - GENERAL: PAINLEVEL_OUTOF10: 3

## 2024-03-23 ASSESSMENT — PAIN DESCRIPTION - RADICULAR PAIN: RADICULAR_PAIN: ABSENT

## 2024-03-23 ASSESSMENT — PAIN DESCRIPTION - LOCATION: LOCATION: ABDOMEN

## 2024-03-23 NOTE — PLAN OF CARE
Problem: Chronic Conditions and Co-morbidities  Goal: Patient's chronic conditions and co-morbidity symptoms are monitored and maintained or improved  3/23/2024 0913 by Daksha Trevino RN  Outcome: Adequate for Discharge  3/22/2024 2227 by Freida Karimi RN  Outcome: Progressing     Problem: Discharge Planning  Goal: Discharge to home or other facility with appropriate resources  3/23/2024 0913 by Daksha Trevino RN  Outcome: Adequate for Discharge  3/22/2024 2227 by Freida Karimi RN  Outcome: Progressing     Problem: Pain  Goal: Verbalizes/displays adequate comfort level or baseline comfort level  3/23/2024 0913 by Daksha Trevino RN  Outcome: Adequate for Discharge  Flowsheets (Taken 3/22/2024 2349 by Freida Karimi RN)  Verbalizes/displays adequate comfort level or baseline comfort level: Encourage patient to monitor pain and request assistance  3/22/2024 2227 by Freida Karimi RN  Outcome: Progressing  Flowsheets (Taken 3/22/2024 2052)  Verbalizes/displays adequate comfort level or baseline comfort level: Encourage patient to monitor pain and request assistance     Problem: Safety - Adult  Goal: Free from fall injury  3/23/2024 0913 by Daksha Trevino RN  Outcome: Adequate for Discharge  3/22/2024 2227 by Freida Karimi RN  Outcome: Progressing

## 2024-03-23 NOTE — OP NOTE
Department of Obstetrics and Gynecology   Operative Report        Pre-operative Diagnosis: Pelvic pain, uterine fibroids  Post-operative Diagnosis:  Same    Procedure: Robotically assisted laparoscopic hysterectomy with bilateral salpingo-oophorectomy  Surgeon:  Zaid Copeland MD  Zurita: Dr. Salazar   Anesthesia: General  Findings: Enlarged multifibroid uterus with the largest fibroid being on the posterior right aspect of the uterine body.  Estimated blood loss: 100 cc  Specimens: Uterus cervix fallopian tubes and ovaries    Complications:  none    Condition:  good    Narrative:  Patient was taken the operating room where general anesthesia established and found to be adequate.  The patient placed in a low lithotomy position utilizing yellowfin stirrups and prepped and draped in usual sterile fashion.  Palafox catheter was placed in the bladder.  Bhavana uterine manipulator with tone and vaginal occluder balloon were attached to the uterus and cervix.  Care was turned to the abdomen where a small supraumbilical skin incision was made directly 5 mm trocar and sleeve inserted without complications.  CO2 insufflation was initiated.  Patient was placed in Trendelenburg position.  On each side of this port 8 mm robotic ports were placed.  In a triangle placement superior to the right side ports an assist port was placed.  The robot was then brought to the bedside and docked.  Force bipolar and sealer were used in the 2 ports.  With the sealer the bilateral infundibulopelvic ligaments, round ligaments broad ligaments were transected.  Bladder flap was created and advanced beyond the cone.  The bilateral cardinal ligaments were transected with vessel sealer.  Cervix was then circumferentially incised with monopolar cautery on the scissors.  Specimen was removed from vagina.  Vaginal apex was then closed with a running lock suture of 2-0 V-Loc suture.  Pelvis was irrigated and suctioned.  Good hemostasis was noted at  normal and at low pressure.  20 cc of 0.5% Marcaine plain was placed within the peritoneal cavity.  All CO2 was instruments and trocars were removed.  Skin edges of the laparoscopic ports were reapproximated with interrupted sutures of 4-0 Monocryl.  Cystoscopy was performed with a 70 degree cystoscope and normal saline irrigation.  Good jetting of urine was noted from the bilateral ureteral orifices.  No evidence of trauma was seen to the bladder.  No evidence of trauma was seen to the bladder.  Palafox catheter was replaced.  Polypoid lesion on the right labia majora was then elevated with an Allis clamp excised with needle tip cautery and reapproximated with 1 interrupted suture of 4-0 Monocryl.  Good hemostasis was noted.  Patient tolerated the procedure well.  All counts correct at the end of procedure.  Patient taken to recovery room in satisfactory condition.

## 2024-03-23 NOTE — DISCHARGE SUMMARY
Physician Discharge Summary     Patient ID:  Lottie Maldonado  [id number]  68 y.o.  1955    Admit date: 3/22/2024    Discharge date and time:      Admitting Physician: Zaid Copeland MD    Discharge Diagnoses: Uterine leiomyoma, unspecified location [D25.9]  Vulvar lesion [N90.89]  Enlarged uterus [N85.2]  Pelvic pain in female [R10.2]    Discharged Condition: good    Indication for Admission: Pelvic pain, uterine fibroids    Procedures Performed: Robotically assisted total laparoscopic hysterectomy and vulvar biopsy    Hospital Course: Patient was admitted on the day of surgery, and underwent an uncomplicated procedure.  Postoperative day #1 without complications or problems.      Disposition: home    Patient Instructions:   Activity: no lifting, Driving, or Strenuous exercise for 6 weeks  Diet: regular diet  Wound Care: keep wound clean and dry    Discharge Medication:      Medication List        START taking these medications      promethazine 12.5 MG tablet  Commonly known as: PHENERGAN  Take 1 tablet by mouth every 6 hours as needed for Nausea            CHANGE how you take these medications      ibuprofen 800 MG tablet  Commonly known as: IBU  Take 1 tablet by mouth every 8 hours as needed for Pain Take with food.  What changed: Another medication with the same name was removed. Continue taking this medication, and follow the directions you see here.            CONTINUE taking these medications      Calcium 600+D3 600-20 MG-MCG Tabs  Generic drug: calcium carb-cholecalciferol  TAKE 1 TABLET BY MOUTH TWICE DAILY     Caltrate 600+D Plus Minerals 600-800 MG-UNIT Tabs tablet  Take 1 tablet by mouth 2 times daily     divalproex 500 MG extended release tablet  Commonly known as: DEPAKOTE ER  Take 2 tablets by mouth nightly     fluticasone 50 MCG/ACT nasal spray  Commonly known as: FLONASE  2 sprays by Each Nostril route daily     loperamide 2 MG tablet  Commonly known as: IMODIUM A-D  Take 1 tablet by

## 2024-03-23 NOTE — DISCHARGE SUMMARY
Pt in wheelchair, paperwork and education discussed, follow up apt and RX info given. IV out and site WNL. Pain well controlled, bleeding WNL, lap sites WN. Education on post op self care and warning signs discussed. All questions asked and answered.  Left with boyfriend in stable condition.

## 2024-03-23 NOTE — PLAN OF CARE
Problem: Chronic Conditions and Co-morbidities  Goal: Patient's chronic conditions and co-morbidity symptoms are monitored and maintained or improved  Outcome: Progressing     Problem: Discharge Planning  Goal: Discharge to home or other facility with appropriate resources  Outcome: Progressing     Problem: Pain  Goal: Verbalizes/displays adequate comfort level or baseline comfort level  3/22/2024 2227 by Freida Karimi, RN  Outcome: Progressing  Flowsheets (Taken 3/22/2024 2052)  Verbalizes/displays adequate comfort level or baseline comfort level: Encourage patient to monitor pain and request assistance  3/22/2024 1749 by Tammy Velásquez, RN  Outcome: Progressing     Problem: Safety - Adult  Goal: Free from fall injury  Outcome: Progressing

## 2024-04-01 DIAGNOSIS — R00.0 TACHYCARDIA: ICD-10-CM

## 2024-04-01 RX ORDER — METOPROLOL SUCCINATE 25 MG/1
25 TABLET, EXTENDED RELEASE ORAL DAILY
Qty: 90 TABLET | Refills: 3 | OUTPATIENT
Start: 2024-04-01

## 2024-04-03 ENCOUNTER — OFFICE VISIT (OUTPATIENT)
Dept: OBGYN | Age: 69
End: 2024-04-03
Payer: MEDICARE

## 2024-04-03 VITALS
HEIGHT: 60 IN | DIASTOLIC BLOOD PRESSURE: 80 MMHG | BODY MASS INDEX: 22.78 KG/M2 | WEIGHT: 116 LBS | SYSTOLIC BLOOD PRESSURE: 121 MMHG

## 2024-04-03 DIAGNOSIS — N90.1 VULVAR INTRAEPITHELIAL NEOPLASIA (VIN) GRADE 2: ICD-10-CM

## 2024-04-03 DIAGNOSIS — Z22.39 CARRIER OF UREAPLASMA UREALYTICUM: Primary | ICD-10-CM

## 2024-04-03 PROCEDURE — 1123F ACP DISCUSS/DSCN MKR DOCD: CPT | Performed by: OBSTETRICS & GYNECOLOGY

## 2024-04-03 PROCEDURE — G8399 PT W/DXA RESULTS DOCUMENT: HCPCS | Performed by: OBSTETRICS & GYNECOLOGY

## 2024-04-03 PROCEDURE — G8420 CALC BMI NORM PARAMETERS: HCPCS | Performed by: OBSTETRICS & GYNECOLOGY

## 2024-04-03 PROCEDURE — 1090F PRES/ABSN URINE INCON ASSESS: CPT | Performed by: OBSTETRICS & GYNECOLOGY

## 2024-04-03 PROCEDURE — 1036F TOBACCO NON-USER: CPT | Performed by: OBSTETRICS & GYNECOLOGY

## 2024-04-03 PROCEDURE — G8427 DOCREV CUR MEDS BY ELIG CLIN: HCPCS | Performed by: OBSTETRICS & GYNECOLOGY

## 2024-04-03 PROCEDURE — 3017F COLORECTAL CA SCREEN DOC REV: CPT | Performed by: OBSTETRICS & GYNECOLOGY

## 2024-04-03 PROCEDURE — 99213 OFFICE O/P EST LOW 20 MIN: CPT | Performed by: OBSTETRICS & GYNECOLOGY

## 2024-04-06 LAB
M GENITALIUM DNA SPEC QL NAA+PROBE: NOT DETECTED
M HOMINIS DNA SPEC QL NAA+PROBE: NOT DETECTED
SPECIMEN SOURCE: NORMAL
U PARVUM DNA SPEC QL NAA+PROBE: NOT DETECTED
U UREALYTICUM DNA SPEC QL NAA+PROBE: NOT DETECTED

## 2024-04-11 ENCOUNTER — HOSPITAL ENCOUNTER (OUTPATIENT)
Dept: WOMENS IMAGING | Age: 69
Discharge: HOME | End: 2024-04-11
Attending: OBSTETRICS & GYNECOLOGY
Payer: MEDICARE

## 2024-04-11 ENCOUNTER — APPOINTMENT (OUTPATIENT)
Dept: WOMENS IMAGING | Age: 69
End: 2024-04-11
Attending: OBSTETRICS & GYNECOLOGY
Payer: MEDICARE

## 2024-04-11 VITALS — WEIGHT: 117 LBS | HEIGHT: 63 IN | BODY MASS INDEX: 20.73 KG/M2

## 2024-04-11 DIAGNOSIS — Z12.31 ENCOUNTER FOR SCREENING MAMMOGRAM FOR MALIGNANT NEOPLASM OF BREAST: ICD-10-CM

## 2024-04-11 PROCEDURE — 77063 BREAST TOMOSYNTHESIS BI: CPT

## 2024-05-01 ENCOUNTER — OFFICE VISIT (OUTPATIENT)
Dept: OBGYN | Age: 69
End: 2024-05-01

## 2024-05-01 VITALS — SYSTOLIC BLOOD PRESSURE: 110 MMHG | WEIGHT: 118 LBS | DIASTOLIC BLOOD PRESSURE: 75 MMHG | BODY MASS INDEX: 20.9 KG/M2

## 2024-05-01 DIAGNOSIS — Z09 POSTOPERATIVE EXAMINATION: Primary | ICD-10-CM

## 2024-05-01 PROCEDURE — 99024 POSTOP FOLLOW-UP VISIT: CPT | Performed by: OBSTETRICS & GYNECOLOGY

## 2024-05-01 NOTE — PROGRESS NOTES
5/1/24    Lottie Maldonado  1955    Chief Complaint   Patient presents with    Post-Op Check     3/22/24  Robotically assisted laparoscopic hysterectomy with bilateral salpingo-oophorectomy. No c/o today.        Lottie Maldonado is a 68 y.o. female who presents today for evaluation of see above.    Past Medical History:   Diagnosis Date    B-cell lymphoma (HCC)     Bipolar 1 disorder (HCC)     \"on Depakote for this\"    Chronic systolic (congestive) heart failure 02/15/2023    Fibroid     History of external beam radiation therapy 2020    Pelvis 3,600 cGy per  at [facility]    Hx of echocardiogram 03/17/2021    Left ventricular function is normal, EF is estimated at 55-60%,mitral valve appears to slightly prolapse,       Past Surgical History:   Procedure Laterality Date    BREAST BIOPSY Left     benign    COLONOSCOPY  09/04/2015    normal colon    COLONOSCOPY N/A 02/28/2020    COLONOSCOPY DIAGNOSTIC performed by Anoop Smith MD at [facility] ENDOSCOPY    CYSTOSCOPY N/A 03/22/2024    CYSTOSCOPY performed by Zaid Copeland MD at [facility] OR    HYSTERECTOMY (CERVIX STATUS UNKNOWN) N/A 03/22/2024    HYSTERECTOMY LAPAROSCOPIC ROBOTIC WITH BILATERAL SALPINGO-OOPHORECTOMY performed by Zaid Copeland MD at [facility] OR    PORT SURGERY N/A 01/07/2020    PORT INSERTION performed by Hailee Miranda MD at [facility] OR    PORT SURGERY Left 02/20/2020    PORT REMOVAL LEFT performed by Hailee Miranda MD at [facility] OR    PORT SURGERY Right 03/10/2020    RIGHT PORT INSERTION performed by Hailee Miranda MD at [facility] OR    TONSILLECTOMY  as a kid    TUBAL LIGATION  20+ yrs ago    UPPER GASTROINTESTINAL ENDOSCOPY N/A 02/27/2020    EGD DIAGNOSTIC ONLY performed by Anoop Smith MD at [facility] ENDOSCOPY    VULVA SURGERY N/A 03/22/2024    VULVA LESION EXCISION performed by Zaid Copeland MD at [facility] OR       Social History     Tobacco Use    Smoking status: Former     Current packs/day: 0.00     Average packs/day: 1 pack/day for 10.0 years (10.0 ttl

## 2024-05-08 ENCOUNTER — HOSPITAL ENCOUNTER (OUTPATIENT)
Dept: INFUSION THERAPY | Age: 69
Discharge: HOME | End: 2024-05-08
Payer: MEDICARE

## 2024-05-08 DIAGNOSIS — C85.16 B-CELL LYMPHOMA OF INTRAPELVIC LYMPH NODES, UNSPECIFIED B-CELL LYMPHOMA TYPE (HCC): Primary | ICD-10-CM

## 2024-05-08 PROCEDURE — 2580000003 HC RX 258: Performed by: INTERNAL MEDICINE

## 2024-05-08 PROCEDURE — 96523 IRRIG DRUG DELIVERY DEVICE: CPT

## 2024-05-08 RX ORDER — HEPARIN 100 UNIT/ML
500 SYRINGE INTRAVENOUS PRN
OUTPATIENT
Start: 2024-05-08

## 2024-05-08 RX ORDER — SODIUM CHLORIDE 0.9 % (FLUSH) 0.9 %
20 SYRINGE (ML) INJECTION PRN
OUTPATIENT
Start: 2024-05-08

## 2024-05-08 RX ORDER — WATER 10 ML/10ML
2.2 INJECTION INTRAMUSCULAR; INTRAVENOUS; SUBCUTANEOUS ONCE
OUTPATIENT
Start: 2024-05-08 | End: 2024-05-08

## 2024-05-08 RX ORDER — SODIUM CHLORIDE 0.9 % (FLUSH) 0.9 %
20 SYRINGE (ML) INJECTION PRN
Status: DISCONTINUED | OUTPATIENT
Start: 2024-05-08 | End: 2024-05-09 | Stop reason: HOSPADM

## 2024-05-08 RX ORDER — SODIUM CHLORIDE 0.9 % (FLUSH) 0.9 %
10 SYRINGE (ML) INJECTION PRN
OUTPATIENT
Start: 2024-05-08

## 2024-05-08 RX ADMIN — SODIUM CHLORIDE, PRESERVATIVE FREE 20 ML: 5 INJECTION INTRAVENOUS at 10:58

## 2024-05-08 NOTE — PROGRESS NOTES
Pt arrived to treatment suite for port flush.  Mediport accessed per protocol.  Flushes easily, no blood return noted.  Mediport de-accessed per protocol.  AVS provided.  Discharge ambulatory in stable condition. Ana Ayoub RN

## 2024-05-22 ENCOUNTER — TELEMEDICINE (OUTPATIENT)
Dept: FAMILY MEDICINE CLINIC | Age: 69
End: 2024-05-22
Payer: MEDICARE

## 2024-05-22 DIAGNOSIS — Z00.00 MEDICARE ANNUAL WELLNESS VISIT, SUBSEQUENT: Primary | ICD-10-CM

## 2024-05-22 PROCEDURE — 3017F COLORECTAL CA SCREEN DOC REV: CPT | Performed by: FAMILY MEDICINE

## 2024-05-22 PROCEDURE — 1123F ACP DISCUSS/DSCN MKR DOCD: CPT | Performed by: FAMILY MEDICINE

## 2024-05-22 PROCEDURE — G0439 PPPS, SUBSEQ VISIT: HCPCS | Performed by: FAMILY MEDICINE

## 2024-05-22 RX ORDER — MAGNESIUM 200 MG
200 TABLET ORAL DAILY
COMMUNITY

## 2024-05-22 SDOH — ECONOMIC STABILITY: FOOD INSECURITY: WITHIN THE PAST 12 MONTHS, THE FOOD YOU BOUGHT JUST DIDN'T LAST AND YOU DIDN'T HAVE MONEY TO GET MORE.: NEVER TRUE

## 2024-05-22 SDOH — ECONOMIC STABILITY: INCOME INSECURITY: HOW HARD IS IT FOR YOU TO PAY FOR THE VERY BASICS LIKE FOOD, HOUSING, MEDICAL CARE, AND HEATING?: NOT HARD AT ALL

## 2024-05-22 SDOH — ECONOMIC STABILITY: FOOD INSECURITY: WITHIN THE PAST 12 MONTHS, YOU WORRIED THAT YOUR FOOD WOULD RUN OUT BEFORE YOU GOT MONEY TO BUY MORE.: NEVER TRUE

## 2024-05-22 ASSESSMENT — LIFESTYLE VARIABLES
HOW OFTEN DO YOU HAVE A DRINK CONTAINING ALCOHOL: NEVER
HOW MANY STANDARD DRINKS CONTAINING ALCOHOL DO YOU HAVE ON A TYPICAL DAY: PATIENT DOES NOT DRINK

## 2024-05-22 ASSESSMENT — PATIENT HEALTH QUESTIONNAIRE - PHQ9
4. FEELING TIRED OR HAVING LITTLE ENERGY: NOT AT ALL
10. IF YOU CHECKED OFF ANY PROBLEMS, HOW DIFFICULT HAVE THESE PROBLEMS MADE IT FOR YOU TO DO YOUR WORK, TAKE CARE OF THINGS AT HOME, OR GET ALONG WITH OTHER PEOPLE: NOT DIFFICULT AT ALL
7. TROUBLE CONCENTRATING ON THINGS, SUCH AS READING THE NEWSPAPER OR WATCHING TELEVISION: NOT AT ALL
SUM OF ALL RESPONSES TO PHQ9 QUESTIONS 1 & 2: 0
2. FEELING DOWN, DEPRESSED OR HOPELESS: NOT AT ALL
8. MOVING OR SPEAKING SO SLOWLY THAT OTHER PEOPLE COULD HAVE NOTICED. OR THE OPPOSITE, BEING SO FIGETY OR RESTLESS THAT YOU HAVE BEEN MOVING AROUND A LOT MORE THAN USUAL: NOT AT ALL
SUM OF ALL RESPONSES TO PHQ QUESTIONS 1-9: 0
9. THOUGHTS THAT YOU WOULD BE BETTER OFF DEAD, OR OF HURTING YOURSELF: NOT AT ALL
SUM OF ALL RESPONSES TO PHQ QUESTIONS 1-9: 0
SUM OF ALL RESPONSES TO PHQ QUESTIONS 1-9: 0
1. LITTLE INTEREST OR PLEASURE IN DOING THINGS: NOT AT ALL
5. POOR APPETITE OR OVEREATING: NOT AT ALL
SUM OF ALL RESPONSES TO PHQ QUESTIONS 1-9: 0
3. TROUBLE FALLING OR STAYING ASLEEP: NOT AT ALL
6. FEELING BAD ABOUT YOURSELF - OR THAT YOU ARE A FAILURE OR HAVE LET YOURSELF OR YOUR FAMILY DOWN: NOT AT ALL

## 2024-05-22 NOTE — PATIENT INSTRUCTIONS
Personalized Preventive Plan for Lottie Maldonado - 5/22/2024  Medicare offers a range of preventive health benefits. Some of the tests and screenings are paid in full while other may be subject to a deductible, co-insurance, and/or copay.    Some of these benefits include a comprehensive review of your medical history including lifestyle, illnesses that may run in your family, and various assessments and screenings as appropriate.    After reviewing your medical record and screening and assessments performed today your provider may have ordered immunizations, labs, imaging, and/or referrals for you.  A list of these orders (if applicable) as well as your Preventive Care list are included within your After Visit Summary for your review.    Other Preventive Recommendations:    A preventive eye exam performed by an eye specialist is recommended every 1-2 years to screen for glaucoma; cataracts, macular degeneration, and other eye disorders.  A preventive dental visit is recommended every 6 months.  Try to get at least 150 minutes of exercise per week or 10,000 steps per day on a pedometer .  Order or download the FREE \"Exercise & Physical Activity: Your Everyday Guide\" from [employer]. Call 1-589.968.9494 or search [employer] online.  You need 0540-5947 mg of calcium and 0210-2893 IU of vitamin D per day. It is possible to meet your calcium requirement with diet alone, but a vitamin D supplement is usually necessary to meet this goal.  When exposed to the sun, use a sunscreen that protects against both UVA and UVB radiation with an SPF of 30 or greater. Reapply every 2 to 3 hours or after sweating, drying off with a towel, or swimming.  Always wear a seat belt when traveling in a car. Always wear a helmet when riding a bicycle or motorcycle.

## 2024-05-23 NOTE — PROGRESS NOTES
This encounter was performed under my, Tahira Yang MD’s, direct supervision, 5/22/2024.   
Zaid Brody MD   ibuprofen (IBU) 800 MG tablet Take 1 tablet by mouth every 8 hours as needed for Pain Take with food. Yes Zaid Copeland MD   teriparatide (FORTEO) 620 MCG/2.48ML SOPN injection Inject 0.08 mLs into the skin daily Yes Tahira Yang MD   calcium carb-cholecalciferol (CALCIUM 600+D3) 600-20 MG-MCG TABS TAKE 1 TABLET BY MOUTH TWICE DAILY Yes Tahira Yang MD   fluticasone (FLONASE) 50 MCG/ACT nasal spray 2 sprays by Each Nostril route daily Yes Tahira Yang MD   loratadine (CLARITIN) 10 MG tablet Take 1 tablet by mouth daily Yes Tahira Yang MD   metoprolol succinate (TOPROL XL) 25 MG extended release tablet Take 1 tablet by mouth daily Yes Tahira Yang MD   Insulin Pen Needle (TECHLITE PEN NEEDLES) 31G X 5 MM MISC use 1 NEEDLE WITH PEN once daily Yes Tahira Yang MD   divalproex (DEPAKOTE ER) 500 MG extended release tablet Take 2 tablets by mouth nightly Yes Tahira Yang MD   Caltrate 600+D Plus Minerals (CALTRATE) 600-800 MG-UNIT TABS tablet Take 1 tablet by mouth 2 times daily Yes Tahira Yang MD   ondansetron (ZOFRAN) 8 MG tablet Take 1 tablet by mouth every 8 hours as needed for Nausea or Vomiting Yes [place] MD   QNASL 80 MCG/ACT AERS nasal spray  Yes Brian Moss MD   loperamide (IMODIUM A-D) 2 MG tablet Take 1 tablet by mouth 4 times daily as needed for Diarrhea Yes Erwin Carias MD   MULTIPLE VITAMIN PO Take by mouth daily Yes Provider, Historical, MD       CareTeam (Including outside providers/suppliers regularly involved in providing care):   Patient Care Team:  Tahira Yang MD as PCP - General (Family Medicine)  Tahira Yang MD as PCP - Empaneled Provider  Joshua Metz MD as Consulting Physician (Cardiology)  Zaid Copeland MD as Consulting Physician (Obstetrics & Gynecology)     Reviewed and updated this visit:  Allergies  Meds  Med Hx  Surg Hx  Soc Hx  Fam Hx      I, Suzanne Nair LPN, 5/22/2024, performed

## 2024-05-28 RX ORDER — PEN NEEDLE, DIABETIC 31 GX5/16"
NEEDLE, DISPOSABLE MISCELLANEOUS
Qty: 100 EACH | OUTPATIENT
Start: 2024-05-28

## 2024-06-03 ENCOUNTER — TELEPHONE (OUTPATIENT)
Dept: CARDIOLOGY CLINIC | Age: 69
End: 2024-06-03

## 2024-06-11 ENCOUNTER — TELEPHONE (OUTPATIENT)
Dept: CARDIOLOGY CLINIC | Age: 69
End: 2024-06-11

## 2024-06-11 NOTE — TELEPHONE ENCOUNTER
Left Ventricle: Normal left ventricular systolic function with a visually estimated EF of 55 - 60%. Left ventricle size is normal. Normal wall thickness. Normal wall motion. Grade I diastolic dysfunction with normal LAP.    Mitral Valve: Thickened leaflet. Leaflet prolapse noted. Moderate regurgitation with multiple jets.    Tricuspid Valve: Mild regurgitation. The estimated RVSP is 32 mmHg.    Left Atrium: Left atrium is moderately dilated.    Interatrial Septum: Hypermobile interatrial septum.    Pericardium: No pericardial effusion.    Image quality is good.    No significant changes noted from previous study in 3/2023.   spoke with pt regarding echo , pt voiced understanding.

## 2024-07-03 ENCOUNTER — PROCEDURE VISIT (OUTPATIENT)
Dept: OBGYN | Age: 69
End: 2024-07-03

## 2024-07-03 VITALS
SYSTOLIC BLOOD PRESSURE: 105 MMHG | WEIGHT: 116 LBS | DIASTOLIC BLOOD PRESSURE: 73 MMHG | BODY MASS INDEX: 20.55 KG/M2 | HEIGHT: 63 IN

## 2024-07-03 DIAGNOSIS — D25.9 UTERINE LEIOMYOMA, UNSPECIFIED LOCATION: Primary | ICD-10-CM

## 2024-07-03 DIAGNOSIS — N90.1 VULVAR INTRAEPITHELIAL NEOPLASIA (VIN) GRADE 2: ICD-10-CM

## 2024-07-04 NOTE — PROGRESS NOTES
Preprocedure diagnosis, CRYS 2    Acetic acid applied to the vulva and the vagina.  No gross lesions were seen.  Under colposcopy no white epithelium was noted on the vulva or in the vagina.  1 suture can she also be seen from her hysterectomy and this was removed at the apex.  The patient was instructed to follow-up in 6 months for repeat colposcopy.

## 2024-07-14 NOTE — PROGRESS NOTES
Patient Name: Lottie Maldonado  Patient : 1955  Patient MRN: [id number]     Primary Oncologist: Erwin Carias MD  Referring Provider: Tahira Yang MD     Date of Service: 2024      Chief Complaint:   No chief complaint on file.    She came in for follow-up visit.     Problem List:      Family history of malignant neoplasm of gastrointestinal tract     B-cell lymphoma (HCC)     Idiopathic hypotension     Mitral valve disease     Pancytopenia (HCC)     Severe malnutrition (HCC)     Deep vein thrombosis (DVT) of left upper extremity      Pain syndrome, chronic     HPI:   Lottie Maldonado is a pleasant 69 year-old AA female patient who was referred for right axillary lymphadenopathy, status post needle biopsy in 2016, which did not show any pathological abnormalities.  I talked to Dr. Naranjo, who favored benign lymph node.  Right axillary lymph node biopsy in 2015 revealed benign lymph node hyperplasia.  Mammogram in 2016 revealed low suspicion for malignancy, with several enlarged right axillary lymph nodes, 3.1 by 0.8 by 2.3 cm, 0.9 by 0.8 by 1 cm, 1.8 by 1.4 by 1.5, and 1.4 by 1.5 cm.  None demonstrated significant hypervascularity.   I offered her an excisional lymph node biopsy versus surveillance with followup ultrasound.  She opted to go with the second one. 2015 normal CBC with normal calcium and alk phos, white cell count 5.8, hemoglobin 11.9, platelet 189.   2016 white cell count 6.5, hemoglobin 12.5, platelet 251.  SED rate 17, rheumatoid factor less than 10.    Ultrasound of the right breast and axilla revealed no significant interval change in the right axillary adenopathy, which still has remained most likely reactive in etiology.  This was not seen on any mammogram dating back beyond .  She opted to go to see the surgeon; therefore, I referred her to see Dr. Miranda.   She was last seen in our office 2016. Ms. Maldonado was seen by Dr. Miranda March

## 2024-08-02 ENCOUNTER — HOSPITAL ENCOUNTER (OUTPATIENT)
Dept: INFUSION THERAPY | Age: 69
Discharge: HOME | End: 2024-08-02
Payer: MEDICARE

## 2024-08-02 DIAGNOSIS — Z13.820 ENCOUNTER FOR SCREENING FOR OSTEOPOROSIS: ICD-10-CM

## 2024-08-02 DIAGNOSIS — D64.9 ANEMIA, UNSPECIFIED TYPE: ICD-10-CM

## 2024-08-02 DIAGNOSIS — C85.16 B-CELL LYMPHOMA OF INTRAPELVIC LYMPH NODES, UNSPECIFIED B-CELL LYMPHOMA TYPE (HCC): Primary | ICD-10-CM

## 2024-08-02 LAB
ALBUMIN SERPL-MCNC: 3.8 GM/DL (ref 3.4–5)
ALP BLD-CCNC: 67 IU/L (ref 40–129)
ALT SERPL-CCNC: 10 U/L (ref 10–40)
ANION GAP SERPL CALCULATED.3IONS-SCNC: 12 MMOL/L (ref 7–16)
AST SERPL-CCNC: 19 IU/L (ref 15–37)
BASOPHILS ABSOLUTE: 0 K/CU MM
BASOPHILS RELATIVE PERCENT: 0.5 % (ref 0–1)
BILIRUB SERPL-MCNC: 0.3 MG/DL (ref 0–1)
BUN SERPL-MCNC: 14 MG/DL (ref 6–23)
CALCIUM SERPL-MCNC: 9.5 MG/DL (ref 8.3–10.6)
CHLORIDE BLD-SCNC: 105 MMOL/L (ref 99–110)
CO2: 25 MMOL/L (ref 21–32)
CREAT SERPL-MCNC: 0.8 MG/DL (ref 0.6–1.1)
DIFFERENTIAL TYPE: ABNORMAL
EOSINOPHILS ABSOLUTE: 0.2 K/CU MM
EOSINOPHILS RELATIVE PERCENT: 4 % (ref 0–3)
FERRITIN: 905 NG/ML (ref 15–150)
GFR, ESTIMATED: 80 ML/MIN/1.73M2
GLUCOSE SERPL-MCNC: 81 MG/DL (ref 70–99)
HCT VFR BLD CALC: 31.2 % (ref 37–47)
HEMOGLOBIN: 10.2 GM/DL (ref 12.5–16)
IRON: 83 UG/DL (ref 37–145)
LACTATE DEHYDROGENASE: 196 IU/L (ref 120–246)
LYMPHOCYTES ABSOLUTE: 1.6 K/CU MM
LYMPHOCYTES RELATIVE PERCENT: 36.6 % (ref 24–44)
MCH RBC QN AUTO: 31.8 PG (ref 27–31)
MCHC RBC AUTO-ENTMCNC: 32.7 % (ref 32–36)
MCV RBC AUTO: 97.2 FL (ref 78–100)
MONOCYTES ABSOLUTE: 0.5 K/CU MM
MONOCYTES RELATIVE PERCENT: 10.8 % (ref 0–4)
NEUTROPHILS ABSOLUTE: 2 K/CU MM
NEUTROPHILS RELATIVE PERCENT: 48.1 % (ref 36–66)
PCT TRANSFERRIN: 38 % (ref 10–44)
PDW BLD-RTO: 12.7 % (ref 11.7–14.9)
PLATELET # BLD: 161 K/CU MM (ref 140–440)
PMV BLD AUTO: 10.3 FL (ref 7.5–11.1)
POTASSIUM SERPL-SCNC: 3.9 MMOL/L (ref 3.5–5.1)
RBC # BLD: 3.21 M/CU MM (ref 4.2–5.4)
SODIUM BLD-SCNC: 142 MMOL/L (ref 135–145)
TOTAL IRON BINDING CAPACITY: 218 UG/DL (ref 250–450)
TOTAL PROTEIN: 5.6 GM/DL (ref 6.4–8.2)
UNSATURATED IRON BINDING CAPACITY: 135 UG/DL (ref 110–370)
WBC # BLD: 4.2 K/CU MM (ref 4–10.5)

## 2024-08-02 PROCEDURE — 36591 DRAW BLOOD OFF VENOUS DEVICE: CPT

## 2024-08-02 PROCEDURE — 83615 LACTATE (LD) (LDH) ENZYME: CPT

## 2024-08-02 PROCEDURE — 82728 ASSAY OF FERRITIN: CPT

## 2024-08-02 PROCEDURE — 83540 ASSAY OF IRON: CPT

## 2024-08-02 PROCEDURE — 2580000003 HC RX 258: Performed by: INTERNAL MEDICINE

## 2024-08-02 PROCEDURE — 83550 IRON BINDING TEST: CPT

## 2024-08-02 PROCEDURE — 85025 COMPLETE CBC W/AUTO DIFF WBC: CPT

## 2024-08-02 PROCEDURE — 80053 COMPREHEN METABOLIC PANEL: CPT

## 2024-08-02 RX ORDER — SODIUM CHLORIDE 0.9 % (FLUSH) 0.9 %
20 SYRINGE (ML) INJECTION PRN
Status: DISCONTINUED | OUTPATIENT
Start: 2024-08-02 | End: 2024-08-03 | Stop reason: HOSPADM

## 2024-08-02 RX ORDER — SODIUM CHLORIDE 0.9 % (FLUSH) 0.9 %
10 SYRINGE (ML) INJECTION PRN
OUTPATIENT
Start: 2024-08-02

## 2024-08-02 RX ORDER — WATER 10 ML/10ML
2.2 INJECTION INTRAMUSCULAR; INTRAVENOUS; SUBCUTANEOUS ONCE
OUTPATIENT
Start: 2024-08-02 | End: 2024-08-02

## 2024-08-02 RX ORDER — HEPARIN 100 UNIT/ML
500 SYRINGE INTRAVENOUS PRN
OUTPATIENT
Start: 2024-08-02

## 2024-08-02 RX ORDER — SODIUM CHLORIDE 0.9 % (FLUSH) 0.9 %
20 SYRINGE (ML) INJECTION PRN
OUTPATIENT
Start: 2024-08-02

## 2024-08-02 RX ADMIN — SODIUM CHLORIDE, PRESERVATIVE FREE 20 ML: 5 INJECTION INTRAVENOUS at 09:39

## 2024-08-02 NOTE — PROGRESS NOTES
Ambulated to infusion area. Right mediport accessed, [facility] blood return, labs drawn. Mediport flushed per protocol then De- accessed. No complaints voiced. AVS declined. Discharged in stable condition.

## 2024-08-09 ENCOUNTER — HOSPITAL ENCOUNTER (OUTPATIENT)
Dept: INFUSION THERAPY | Age: 69
Discharge: HOME | End: 2024-08-09
Payer: MEDICARE

## 2024-08-09 ENCOUNTER — OFFICE VISIT (OUTPATIENT)
Dept: ONCOLOGY | Age: 69
End: 2024-08-09
Payer: MEDICARE

## 2024-08-09 VITALS
TEMPERATURE: 97.5 F | OXYGEN SATURATION: 98 % | DIASTOLIC BLOOD PRESSURE: 61 MMHG | WEIGHT: 116.2 LBS | SYSTOLIC BLOOD PRESSURE: 106 MMHG | BODY MASS INDEX: 20.59 KG/M2 | HEIGHT: 63 IN | HEART RATE: 89 BPM

## 2024-08-09 DIAGNOSIS — D61.818 PANCYTOPENIA (HCC): ICD-10-CM

## 2024-08-09 DIAGNOSIS — R53.83 OTHER FATIGUE: ICD-10-CM

## 2024-08-09 DIAGNOSIS — G40.89 OTHER SEIZURES (HCC): ICD-10-CM

## 2024-08-09 DIAGNOSIS — C83.30 DIFFUSE LARGE B-CELL LYMPHOMA, UNSPECIFIED BODY REGION (HCC): ICD-10-CM

## 2024-08-09 DIAGNOSIS — D64.9 ANEMIA, UNSPECIFIED TYPE: Primary | ICD-10-CM

## 2024-08-09 PROBLEM — R56.9 UNSPECIFIED CONVULSIONS (HCC): Status: ACTIVE | Noted: 2024-08-09

## 2024-08-09 PROCEDURE — 1123F ACP DISCUSS/DSCN MKR DOCD: CPT | Performed by: INTERNAL MEDICINE

## 2024-08-09 PROCEDURE — G8427 DOCREV CUR MEDS BY ELIG CLIN: HCPCS | Performed by: INTERNAL MEDICINE

## 2024-08-09 PROCEDURE — G8420 CALC BMI NORM PARAMETERS: HCPCS | Performed by: INTERNAL MEDICINE

## 2024-08-09 PROCEDURE — 99211 OFF/OP EST MAY X REQ PHY/QHP: CPT

## 2024-08-09 PROCEDURE — 99213 OFFICE O/P EST LOW 20 MIN: CPT | Performed by: INTERNAL MEDICINE

## 2024-08-09 PROCEDURE — 1036F TOBACCO NON-USER: CPT | Performed by: INTERNAL MEDICINE

## 2024-08-09 PROCEDURE — G8399 PT W/DXA RESULTS DOCUMENT: HCPCS | Performed by: INTERNAL MEDICINE

## 2024-08-09 PROCEDURE — 3017F COLORECTAL CA SCREEN DOC REV: CPT | Performed by: INTERNAL MEDICINE

## 2024-08-09 PROCEDURE — 1090F PRES/ABSN URINE INCON ASSESS: CPT | Performed by: INTERNAL MEDICINE

## 2024-08-09 NOTE — PROGRESS NOTES
MA Rooming Questions  Patient: Lottie Maldonado  MRN: [id number]    Date: 8/9/2024        1. Do you have any new issues?   no         2. Do you need any refills on medications?    no    3. Have you had any imaging done since your last visit?   yes - labs 8/2 derek 4/11    4. Have you been hospitalized or seen in the emergency room since your last visit here?   no    5. Did the patient have a depression screening completed today? No    No data recorded     PHQ-9 Given to (if applicable):               PHQ-9 Score (if applicable):                     [] Positive     []  Negative              Does question #9 need addressed (if applicable)                     [] Yes    []  No               Mary Alice Whalen CMA

## 2024-08-15 ENCOUNTER — OFFICE VISIT (OUTPATIENT)
Dept: FAMILY MEDICINE CLINIC | Age: 69
End: 2024-08-15
Payer: MEDICARE

## 2024-08-15 VITALS
WEIGHT: 115.8 LBS | OXYGEN SATURATION: 94 % | SYSTOLIC BLOOD PRESSURE: 132 MMHG | DIASTOLIC BLOOD PRESSURE: 84 MMHG | BODY MASS INDEX: 20.51 KG/M2 | HEART RATE: 100 BPM

## 2024-08-15 DIAGNOSIS — F31.70 BIPOLAR DISORDER IN FULL REMISSION, MOST RECENT EPISODE UNSPECIFIED TYPE (HCC): Primary | ICD-10-CM

## 2024-08-15 DIAGNOSIS — M81.0 POSTMENOPAUSAL OSTEOPOROSIS: ICD-10-CM

## 2024-08-15 DIAGNOSIS — R23.8 SKIN PIMPLE: ICD-10-CM

## 2024-08-15 DIAGNOSIS — R89.4 POSITIVE TEST FOR HERPES SIMPLEX VIRUS (HSV) ANTIBODY: ICD-10-CM

## 2024-08-15 DIAGNOSIS — Z78.0 POST-MENOPAUSAL: ICD-10-CM

## 2024-08-15 PROCEDURE — 99214 OFFICE O/P EST MOD 30 MIN: CPT | Performed by: FAMILY MEDICINE

## 2024-08-15 PROCEDURE — G8420 CALC BMI NORM PARAMETERS: HCPCS | Performed by: FAMILY MEDICINE

## 2024-08-15 PROCEDURE — G8427 DOCREV CUR MEDS BY ELIG CLIN: HCPCS | Performed by: FAMILY MEDICINE

## 2024-08-15 PROCEDURE — 1123F ACP DISCUSS/DSCN MKR DOCD: CPT | Performed by: FAMILY MEDICINE

## 2024-08-15 PROCEDURE — 1036F TOBACCO NON-USER: CPT | Performed by: FAMILY MEDICINE

## 2024-08-15 PROCEDURE — 3017F COLORECTAL CA SCREEN DOC REV: CPT | Performed by: FAMILY MEDICINE

## 2024-08-15 PROCEDURE — G8399 PT W/DXA RESULTS DOCUMENT: HCPCS | Performed by: FAMILY MEDICINE

## 2024-08-15 PROCEDURE — 1090F PRES/ABSN URINE INCON ASSESS: CPT | Performed by: FAMILY MEDICINE

## 2024-08-15 RX ORDER — DIVALPROEX SODIUM 500 MG/1
1000 TABLET, EXTENDED RELEASE ORAL NIGHTLY
Qty: 180 TABLET | Refills: 1 | Status: SHIPPED | OUTPATIENT
Start: 2024-08-15

## 2024-08-15 RX ORDER — TERIPARATIDE 250 UG/ML
20 INJECTION, SOLUTION SUBCUTANEOUS DAILY
Qty: 8 ML | Refills: 1 | Status: SHIPPED | OUTPATIENT
Start: 2024-08-15

## 2024-08-21 ENCOUNTER — LAB (OUTPATIENT)
Dept: FAMILY MEDICINE CLINIC | Age: 69
End: 2024-08-21
Payer: MEDICARE

## 2024-08-21 DIAGNOSIS — Z23 NEED FOR INFLUENZA VACCINATION: Primary | ICD-10-CM

## 2024-08-21 PROCEDURE — 90653 IIV ADJUVANT VACCINE IM: CPT | Performed by: FAMILY MEDICINE

## 2024-08-21 PROCEDURE — G0008 ADMIN INFLUENZA VIRUS VAC: HCPCS | Performed by: FAMILY MEDICINE

## 2024-08-29 ENCOUNTER — OFFICE VISIT (OUTPATIENT)
Dept: CARDIOLOGY CLINIC | Age: 69
End: 2024-08-29
Payer: MEDICARE

## 2024-08-29 VITALS
HEIGHT: 63 IN | HEART RATE: 89 BPM | WEIGHT: 118.2 LBS | DIASTOLIC BLOOD PRESSURE: 64 MMHG | BODY MASS INDEX: 20.94 KG/M2 | SYSTOLIC BLOOD PRESSURE: 110 MMHG

## 2024-08-29 DIAGNOSIS — I82.722 CHRONIC DEEP VEIN THROMBOSIS (DVT) OF LEFT UPPER EXTREMITY, UNSPECIFIED VEIN (HCC): ICD-10-CM

## 2024-08-29 DIAGNOSIS — R00.0 TACHYCARDIA: ICD-10-CM

## 2024-08-29 DIAGNOSIS — Z87.891 FORMER SMOKER: ICD-10-CM

## 2024-08-29 DIAGNOSIS — I95.0 IDIOPATHIC HYPOTENSION: ICD-10-CM

## 2024-08-29 DIAGNOSIS — I05.9 MITRAL VALVE DISEASE: Primary | ICD-10-CM

## 2024-08-29 PROCEDURE — G8420 CALC BMI NORM PARAMETERS: HCPCS | Performed by: NURSE PRACTITIONER

## 2024-08-29 PROCEDURE — 1090F PRES/ABSN URINE INCON ASSESS: CPT | Performed by: NURSE PRACTITIONER

## 2024-08-29 PROCEDURE — 99214 OFFICE O/P EST MOD 30 MIN: CPT | Performed by: NURSE PRACTITIONER

## 2024-08-29 PROCEDURE — 1036F TOBACCO NON-USER: CPT | Performed by: NURSE PRACTITIONER

## 2024-08-29 PROCEDURE — G8427 DOCREV CUR MEDS BY ELIG CLIN: HCPCS | Performed by: NURSE PRACTITIONER

## 2024-08-29 PROCEDURE — G8399 PT W/DXA RESULTS DOCUMENT: HCPCS | Performed by: NURSE PRACTITIONER

## 2024-08-29 PROCEDURE — 93000 ELECTROCARDIOGRAM COMPLETE: CPT | Performed by: NURSE PRACTITIONER

## 2024-08-29 PROCEDURE — 1123F ACP DISCUSS/DSCN MKR DOCD: CPT | Performed by: NURSE PRACTITIONER

## 2024-08-29 PROCEDURE — 3017F COLORECTAL CA SCREEN DOC REV: CPT | Performed by: NURSE PRACTITIONER

## 2024-08-29 RX ORDER — METOPROLOL SUCCINATE 25 MG/1
25 TABLET, EXTENDED RELEASE ORAL 2 TIMES DAILY
Qty: 180 TABLET | Refills: 4 | Status: SHIPPED | OUTPATIENT
Start: 2024-08-29

## 2024-08-29 ASSESSMENT — ENCOUNTER SYMPTOMS
COUGH: 0
SHORTNESS OF BREATH: 0

## 2024-08-29 NOTE — PATIENT INSTRUCTIONS
Please be informed that if you contact our office outside of normal business hours the physician on call cannot help with any scheduling or rescheduling issues, procedure instruction questions or any type of medication issue.    We advise you for any urgent/emergency that you go to the nearest emergency room!    PLEASE CALL OUR OFFICE DURING NORMAL BUSINESS HOURS    Monday - Friday   8 am to 5 pm    [place]: [phone]    [place]: [phone]    [place]:  [phone]    Thank you for allowing us to care for you today!   We want to ensure we can follow your treatment plan and we strive to give you the best outcomes and experience possible.   If you ever have a life threatening emergency and call 911 - for an ambulance (EMS)   Our providers can only care for you at:   [facility] or [facility].   Even if you have someone take you or you drive yourself we can only care for you in a [facility] facility. Our providers are not setup at the other healthcare locations!     **It is YOUR responsibilty to bring medication bottles and/or updated medication list to EACH APPOINTMENT. This will allow us to better serve you and all your healthcare needs**    We are committed to providing you the best care possible.    If you receive a survey after visiting one of our offices, please take time to share your experience concerning your physician office visit.  These surveys are confidential and no health information about you is shared.    We are eager to improve for you and we are counting on your feedback to help make that happen.

## 2024-08-29 NOTE — PROGRESS NOTES
CARDIOLOGY  NOTE    2024    Lottie Maldonado (:  1955) is a 69 y.o. female,an established patient with Dr. Mcdaniel, here for evaluation of the following chief complaint(s):  Follow-up (Pt denies any cardiac sx )        SUBJECTIVE/OBJECTIVE:    RIGOBERTO Tafoya is here to follow up on her cardiovascular health.     She states that she has been doing fairly well. She is here to review her echo.    She has a history of B cell lymphoma, biploar, CHF,  former smoker, Mitral valve disease, seizures, osteopenia, pancytopenia, DVT, uterine fibroid, and vulvar lesion.     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    Patient is a former smoker. Patient denies issues obtaining, taking, or adverse side effects from medications. Patient is active and does not do organized exercise.       Review of Systems   Constitutional:  Negative for fatigue and fever.   Respiratory:  Negative for cough and shortness of breath.    Cardiovascular:  Negative for chest pain, palpitations and leg swelling.   Musculoskeletal:  Negative for arthralgias and gait problem.   Neurological:  Negative for dizziness, syncope, weakness, light-headedness and headaches.       Vitals:    24 1527   BP: 110/64   Site: Left Upper Arm   Position: Sitting   Cuff Size: Medium Adult   Pulse: 89   Weight: 53.6 kg (118 lb 3.2 oz)   Height: 1.6 m (5' 3\")         Wt Readings from Last 3 Encounters:   24 53.6 kg (118 lb 3.2 oz)   08/15/24 52.5 kg (115 lb 12.8 oz)   24 52.7 kg (116 lb 3.2 oz)       BP Readings from Last 3 Encounters:   24 110/64   08/15/24 132/84   24 106/61       Prior to Admission medications    Medication Sig Start Date End Date Taking? Authorizing Provider   divalproex (DEPAKOTE ER) 500 MG extended release tablet Take 2 tablets by mouth nightly 8/15/24  Yes Tahira Yang MD   teriparatide (FORTEO) 620 MCG/2.48ML SOPN injection Inject 0.08 mLs into the skin daily  Normal heart sounds. No murmur heard.  Pulmonary:      Effort: Pulmonary effort is normal. No respiratory distress.      Breath sounds: Normal breath sounds.   Musculoskeletal:         General: No swelling or deformity.      Cervical back: Neck supple. No muscular tenderness.   Neurological:      Mental Status: She is alert.         Lab Review   Lab Results   Component Value Date/Time    CHOL 168 02/15/2024 09:40 AM    TRIG 81 02/15/2024 09:40 AM    HDL 62 02/15/2024 09:40 AM           Echo 1/16/2020   Summary   Left ventricular systolic function is low normal.   Ejection fraction is visually estimated at 50%.   Paradoxical motion of the interventricular septum; possible conduction   delay.   Indeterminate diastolic function; E/A flow reversal is noted.   Mitral valve prolapse is present.   Moderate to severe mitral regurgitation is present.   Moderate tricuspid regurgitation is present. RVSP is 25 mmHg.   No evidence of any pericardial effusion.   Mobile interatrial septum.   Incidental finding: cyst vs mass near/attached to the right kidney.           Echo 3/17/21  Summary   Left ventricular function is normal, EF is estimated at 55-60%.   Grade I diastolic dysfunction.   Mild left ventricular hypertrophy.   Bi atrial enlargement noted.   The mitral valve appears to slightly prolapse.   Moderate to severe mitral and tricuspid regurgitation is present.   Aneurysmal interatrial septum.   RVSP is 34 mmHg.   No evidence of pericardial effusion.       ASSESSMENT/PLAN:  Idiopathic hypotension  Improved now that she is not on chemo anymore      B cell lymphoma  Followed by Dr Mcgovern she is in remission, she has a mediport on right side and still has some pain on her left side     Anemia  Resolved slowly after having hysterectomy.      Tachycardia  Improved but more than goal  Increase metoprolol 25 mg BID, at risk for afib  No documented afib so far      DVT  BLANCAE 8/2020  Completed treatment with hematology  Continue to

## 2024-09-18 ENCOUNTER — OFFICE VISIT (OUTPATIENT)
Dept: CARDIOLOGY CLINIC | Age: 69
End: 2024-09-18
Payer: MEDICARE

## 2024-09-18 VITALS
DIASTOLIC BLOOD PRESSURE: 62 MMHG | SYSTOLIC BLOOD PRESSURE: 100 MMHG | BODY MASS INDEX: 20.98 KG/M2 | HEIGHT: 63 IN | HEART RATE: 54 BPM | WEIGHT: 118.4 LBS

## 2024-09-18 DIAGNOSIS — I05.9 MITRAL VALVE DISEASE: ICD-10-CM

## 2024-09-18 DIAGNOSIS — R00.0 TACHYCARDIA: Primary | ICD-10-CM

## 2024-09-18 DIAGNOSIS — I82.722 CHRONIC DEEP VEIN THROMBOSIS (DVT) OF LEFT UPPER EXTREMITY, UNSPECIFIED VEIN (HCC): ICD-10-CM

## 2024-09-18 DIAGNOSIS — C85.16 B-CELL LYMPHOMA OF INTRAPELVIC LYMPH NODES, UNSPECIFIED B-CELL LYMPHOMA TYPE (HCC): ICD-10-CM

## 2024-09-18 PROCEDURE — 1090F PRES/ABSN URINE INCON ASSESS: CPT | Performed by: NURSE PRACTITIONER

## 2024-09-18 PROCEDURE — G8427 DOCREV CUR MEDS BY ELIG CLIN: HCPCS | Performed by: NURSE PRACTITIONER

## 2024-09-18 PROCEDURE — 1123F ACP DISCUSS/DSCN MKR DOCD: CPT | Performed by: NURSE PRACTITIONER

## 2024-09-18 PROCEDURE — G8399 PT W/DXA RESULTS DOCUMENT: HCPCS | Performed by: NURSE PRACTITIONER

## 2024-09-18 PROCEDURE — 3017F COLORECTAL CA SCREEN DOC REV: CPT | Performed by: NURSE PRACTITIONER

## 2024-09-18 PROCEDURE — 99214 OFFICE O/P EST MOD 30 MIN: CPT | Performed by: NURSE PRACTITIONER

## 2024-09-18 PROCEDURE — G8420 CALC BMI NORM PARAMETERS: HCPCS | Performed by: NURSE PRACTITIONER

## 2024-09-18 PROCEDURE — 1036F TOBACCO NON-USER: CPT | Performed by: NURSE PRACTITIONER

## 2024-09-18 ASSESSMENT — ENCOUNTER SYMPTOMS
COUGH: 0
SHORTNESS OF BREATH: 0

## 2024-10-13 NOTE — PROGRESS NOTES
Patient Name: Lottie Maldonado  Patient : 1955  Patient MRN: [id number]     Primary Oncologist: Erwin Carias MD  Referring Provider: Tahira Yang MD     Date of Service: 2024      Chief Complaint:   Chief Complaint   Patient presents with    Follow-up     3 month follow up     She came in for follow-up visit.     Problem List:      Family history of malignant neoplasm of gastrointestinal tract     B-cell lymphoma (HCC)     Idiopathic hypotension     Mitral valve disease     Pancytopenia (HCC)     Severe malnutrition (HCC)     Deep vein thrombosis (DVT) of left upper extremity      Pain syndrome, chronic     HPI:   Lottie Maldonado is a pleasant 69 year-old AA female patient who was referred for right axillary lymphadenopathy, status post needle biopsy in 2016, which did not show any pathological abnormalities.  I talked to Dr. Naranjo, who favored benign lymph node.  Right axillary lymph node biopsy in 2015 revealed benign lymph node hyperplasia.  Mammogram in 2016 revealed low suspicion for malignancy, with several enlarged right axillary lymph nodes, 3.1 by 0.8 by 2.3 cm, 0.9 by 0.8 by 1 cm, 1.8 by 1.4 by 1.5, and 1.4 by 1.5 cm.  None demonstrated significant hypervascularity.   I offered her an excisional lymph node biopsy versus surveillance with followup ultrasound.  She opted to go with the second one. 2015 normal CBC with normal calcium and alk phos, white cell count 5.8, hemoglobin 11.9, platelet 189.   2016 white cell count 6.5, hemoglobin 12.5, platelet 251.  SED rate 17, rheumatoid factor less than 10.    Ultrasound of the right breast and axilla revealed no significant interval change in the right axillary adenopathy, which still has remained most likely reactive in etiology.  This was not seen on any mammogram dating back beyond .  She opted to go to see the surgeon; therefore, I referred her to see Dr. Miranda.   She was last seen in our office

## 2024-11-04 ENCOUNTER — HOSPITAL ENCOUNTER (OUTPATIENT)
Dept: INFUSION THERAPY | Age: 69
Discharge: HOME | End: 2024-11-04
Payer: MEDICARE

## 2024-11-04 DIAGNOSIS — R53.83 OTHER FATIGUE: ICD-10-CM

## 2024-11-04 DIAGNOSIS — D64.9 ANEMIA, UNSPECIFIED TYPE: ICD-10-CM

## 2024-11-04 LAB
ALBUMIN SERPL-MCNC: 4.1 G/DL (ref 3.4–5)
ALBUMIN/GLOB SERPL: 2.2 {RATIO} (ref 1.1–2.2)
ALP SERPL-CCNC: 77 U/L (ref 40–129)
ALT SERPL-CCNC: 24 U/L (ref 10–40)
ANION GAP SERPL CALCULATED.3IONS-SCNC: 13 MMOL/L (ref 9–17)
AST SERPL-CCNC: 27 U/L (ref 15–37)
BASOPHILS # BLD: 0.02 K/UL
BASOPHILS NFR BLD: 0 % (ref 0–1)
BILIRUB SERPL-MCNC: 0.3 MG/DL (ref 0–1)
BUN SERPL-MCNC: 17 MG/DL (ref 7–20)
CALCIUM SERPL-MCNC: 10.3 MG/DL (ref 8.3–10.6)
CHLORIDE SERPL-SCNC: 106 MMOL/L (ref 99–110)
CO2 SERPL-SCNC: 26 MMOL/L (ref 21–32)
CREAT SERPL-MCNC: 0.8 MG/DL (ref 0.6–1.2)
EOSINOPHIL # BLD: 0.2 K/UL
EOSINOPHILS RELATIVE PERCENT: 3 % (ref 0–3)
ERYTHROCYTE [DISTWIDTH] IN BLOOD BY AUTOMATED COUNT: 13.1 % (ref 11.7–14.9)
ERYTHROCYTE [SEDIMENTATION RATE] IN BLOOD BY WESTERGREN METHOD: 1 MM/HR (ref 0–30)
FERRITIN SERPL-MCNC: 836 NG/ML (ref 15–150)
GFR, ESTIMATED: 67 ML/MIN/1.73M2
GLUCOSE SERPL-MCNC: 102 MG/DL (ref 74–99)
HCT VFR BLD AUTO: 33.6 % (ref 37–47)
HGB BLD-MCNC: 10.9 G/DL (ref 12.5–16)
IRON SATN MFR SERPL: 41 % (ref 15–50)
IRON SERPL-MCNC: 115 UG/DL (ref 37–145)
LDH SERPL-CCNC: 201 U/L (ref 100–190)
LYMPHOCYTES NFR BLD: 1.92 K/UL
LYMPHOCYTES RELATIVE PERCENT: 33 % (ref 24–44)
MCH RBC QN AUTO: 31.5 PG (ref 27–31)
MCHC RBC AUTO-ENTMCNC: 32.4 G/DL (ref 32–36)
MCV RBC AUTO: 97.1 FL (ref 78–100)
MONOCYTES NFR BLD: 0.46 K/UL
MONOCYTES NFR BLD: 8 % (ref 0–4)
NEUTROPHILS NFR BLD: 56 % (ref 36–66)
NEUTS SEG NFR BLD: 3.31 K/UL
PLATELET # BLD AUTO: 233 K/UL (ref 140–440)
PMV BLD AUTO: 9.9 FL (ref 7.5–11.1)
POTASSIUM SERPL-SCNC: 3.9 MMOL/L (ref 3.5–5.1)
PROT SERPL-MCNC: 5.9 G/DL (ref 6.4–8.2)
RBC # BLD AUTO: 3.46 M/UL (ref 4.2–5.4)
SODIUM SERPL-SCNC: 145 MMOL/L (ref 136–145)
T4 FREE SERPL-MCNC: 1 NG/DL (ref 0.9–1.8)
TIBC SERPL-MCNC: 278 UG/DL (ref 260–445)
TSH SERPL DL<=0.05 MIU/L-ACNC: 5.16 UIU/ML (ref 0.27–4.2)
UNSATURATED IRON BINDING CAPACITY: 163 UG/DL (ref 110–370)
WBC OTHER # BLD: 5.9 K/UL (ref 4–10.5)

## 2024-11-04 PROCEDURE — 84439 ASSAY OF FREE THYROXINE: CPT

## 2024-11-04 PROCEDURE — 84443 ASSAY THYROID STIM HORMONE: CPT

## 2024-11-04 PROCEDURE — 82728 ASSAY OF FERRITIN: CPT

## 2024-11-04 PROCEDURE — 83540 ASSAY OF IRON: CPT

## 2024-11-04 PROCEDURE — 83550 IRON BINDING TEST: CPT

## 2024-11-04 PROCEDURE — 36415 COLL VENOUS BLD VENIPUNCTURE: CPT

## 2024-11-04 PROCEDURE — 80053 COMPREHEN METABOLIC PANEL: CPT

## 2024-11-04 PROCEDURE — 82525 ASSAY OF COPPER: CPT

## 2024-11-04 PROCEDURE — 85025 COMPLETE CBC W/AUTO DIFF WBC: CPT

## 2024-11-04 PROCEDURE — 85652 RBC SED RATE AUTOMATED: CPT

## 2024-11-04 PROCEDURE — 83615 LACTATE (LD) (LDH) ENZYME: CPT

## 2024-11-06 LAB — COPPER SERPL-MCNC: 100.1 UG/DL (ref 80–155)

## 2024-11-11 ENCOUNTER — OFFICE VISIT (OUTPATIENT)
Dept: ONCOLOGY | Age: 69
End: 2024-11-11
Payer: MEDICARE

## 2024-11-11 ENCOUNTER — HOSPITAL ENCOUNTER (OUTPATIENT)
Dept: INFUSION THERAPY | Age: 69
Discharge: HOME | End: 2024-11-11
Payer: MEDICARE

## 2024-11-11 VITALS
TEMPERATURE: 97.2 F | SYSTOLIC BLOOD PRESSURE: 118 MMHG | DIASTOLIC BLOOD PRESSURE: 71 MMHG | HEIGHT: 63 IN | HEART RATE: 99 BPM | BODY MASS INDEX: 21.69 KG/M2 | OXYGEN SATURATION: 95 % | WEIGHT: 122.4 LBS

## 2024-11-11 DIAGNOSIS — C83.30 DIFFUSE LARGE B-CELL LYMPHOMA, UNSPECIFIED BODY REGION (HCC): Primary | ICD-10-CM

## 2024-11-11 DIAGNOSIS — D64.9 ANEMIA, UNSPECIFIED TYPE: Primary | ICD-10-CM

## 2024-11-11 PROCEDURE — G8427 DOCREV CUR MEDS BY ELIG CLIN: HCPCS | Performed by: INTERNAL MEDICINE

## 2024-11-11 PROCEDURE — G8420 CALC BMI NORM PARAMETERS: HCPCS | Performed by: INTERNAL MEDICINE

## 2024-11-11 PROCEDURE — 96523 IRRIG DRUG DELIVERY DEVICE: CPT

## 2024-11-11 PROCEDURE — 1123F ACP DISCUSS/DSCN MKR DOCD: CPT | Performed by: INTERNAL MEDICINE

## 2024-11-11 PROCEDURE — 99213 OFFICE O/P EST LOW 20 MIN: CPT | Performed by: INTERNAL MEDICINE

## 2024-11-11 PROCEDURE — G8482 FLU IMMUNIZE ORDER/ADMIN: HCPCS | Performed by: INTERNAL MEDICINE

## 2024-11-11 PROCEDURE — 1090F PRES/ABSN URINE INCON ASSESS: CPT | Performed by: INTERNAL MEDICINE

## 2024-11-11 PROCEDURE — 1126F AMNT PAIN NOTED NONE PRSNT: CPT | Performed by: INTERNAL MEDICINE

## 2024-11-11 PROCEDURE — 1036F TOBACCO NON-USER: CPT | Performed by: INTERNAL MEDICINE

## 2024-11-11 PROCEDURE — 3017F COLORECTAL CA SCREEN DOC REV: CPT | Performed by: INTERNAL MEDICINE

## 2024-11-11 PROCEDURE — 2580000003 HC RX 258: Performed by: INTERNAL MEDICINE

## 2024-11-11 PROCEDURE — G8399 PT W/DXA RESULTS DOCUMENT: HCPCS | Performed by: INTERNAL MEDICINE

## 2024-11-11 PROCEDURE — 1159F MED LIST DOCD IN RCRD: CPT | Performed by: INTERNAL MEDICINE

## 2024-11-11 RX ORDER — SODIUM CHLORIDE 0.9 % (FLUSH) 0.9 %
20 SYRINGE (ML) INJECTION PRN
OUTPATIENT
Start: 2024-11-11

## 2024-11-11 RX ORDER — SODIUM CHLORIDE 0.9 % (FLUSH) 0.9 %
10 SYRINGE (ML) INJECTION PRN
OUTPATIENT
Start: 2024-11-11

## 2024-11-11 RX ORDER — WATER 10 ML/10ML
2.2 INJECTION INTRAMUSCULAR; INTRAVENOUS; SUBCUTANEOUS ONCE
OUTPATIENT
Start: 2024-11-11 | End: 2024-11-11

## 2024-11-11 RX ORDER — SODIUM CHLORIDE 0.9 % (FLUSH) 0.9 %
20 SYRINGE (ML) INJECTION PRN
Status: DISCONTINUED | OUTPATIENT
Start: 2024-11-11 | End: 2024-11-12 | Stop reason: HOSPADM

## 2024-11-11 RX ORDER — HEPARIN 100 UNIT/ML
500 SYRINGE INTRAVENOUS PRN
OUTPATIENT
Start: 2024-11-11

## 2024-11-11 RX ADMIN — SODIUM CHLORIDE, PRESERVATIVE FREE 20 ML: 5 INJECTION INTRAVENOUS at 09:33

## 2024-11-11 NOTE — PROGRESS NOTES
MA Rooming Questions  Patient: Lottie Maldonado  MRN: [id number]    Date: 11/11/2024        1. Do you have any new issues?   no         2. Do you need any refills on medications?    no    3. Have you had any imaging done since your last visit?   no    4. Have you been hospitalized or seen in the emergency room since your last visit here?   no    5. Did the patient have a depression screening completed today? No    No data recorded     PHQ-9 Given to (if applicable):               PHQ-9 Score (if applicable):                     [] Positive     []  Negative              Does question #9 need addressed (if applicable)                     [] Yes    []  No               Kayla Briones MA

## 2024-11-11 NOTE — PROGRESS NOTES
Patient arrived to treatment suite after OV with Dr. Hood for port flush.  Right chest mediport accessed and flushed with normal saline, negative blood return noted. Per patient, often times they have to try position changes such as lifting arm or laying back for blood to return. Patient does not need labs today. Flushed and saline locked, band-aid applied.  Patient tolerated well.  Left treatment suite ambulatory.  RTC 02/12 for next port flush.

## 2024-12-31 ENCOUNTER — OFFICE VISIT (OUTPATIENT)
Dept: FAMILY MEDICINE CLINIC | Age: 69
End: 2024-12-31
Payer: MEDICARE

## 2024-12-31 VITALS
DIASTOLIC BLOOD PRESSURE: 78 MMHG | OXYGEN SATURATION: 97 % | HEIGHT: 63 IN | WEIGHT: 122 LBS | BODY MASS INDEX: 21.62 KG/M2 | HEART RATE: 77 BPM | SYSTOLIC BLOOD PRESSURE: 122 MMHG

## 2024-12-31 DIAGNOSIS — G89.29 CHRONIC PAIN OF RIGHT KNEE: Primary | ICD-10-CM

## 2024-12-31 DIAGNOSIS — M25.561 CHRONIC PAIN OF RIGHT KNEE: Primary | ICD-10-CM

## 2024-12-31 DIAGNOSIS — M17.11 PRIMARY OSTEOARTHRITIS OF RIGHT KNEE: ICD-10-CM

## 2024-12-31 DIAGNOSIS — G47.62 NOCTURNAL LEG CRAMPS: ICD-10-CM

## 2024-12-31 PROCEDURE — G8427 DOCREV CUR MEDS BY ELIG CLIN: HCPCS | Performed by: FAMILY MEDICINE

## 2024-12-31 PROCEDURE — G8482 FLU IMMUNIZE ORDER/ADMIN: HCPCS | Performed by: FAMILY MEDICINE

## 2024-12-31 PROCEDURE — 1036F TOBACCO NON-USER: CPT | Performed by: FAMILY MEDICINE

## 2024-12-31 PROCEDURE — 99213 OFFICE O/P EST LOW 20 MIN: CPT | Performed by: FAMILY MEDICINE

## 2024-12-31 PROCEDURE — 1090F PRES/ABSN URINE INCON ASSESS: CPT | Performed by: FAMILY MEDICINE

## 2024-12-31 PROCEDURE — G8420 CALC BMI NORM PARAMETERS: HCPCS | Performed by: FAMILY MEDICINE

## 2024-12-31 PROCEDURE — 3017F COLORECTAL CA SCREEN DOC REV: CPT | Performed by: FAMILY MEDICINE

## 2024-12-31 PROCEDURE — G8399 PT W/DXA RESULTS DOCUMENT: HCPCS | Performed by: FAMILY MEDICINE

## 2024-12-31 PROCEDURE — 1123F ACP DISCUSS/DSCN MKR DOCD: CPT | Performed by: FAMILY MEDICINE

## 2024-12-31 PROCEDURE — 1159F MED LIST DOCD IN RCRD: CPT | Performed by: FAMILY MEDICINE

## 2024-12-31 RX ORDER — IBUPROFEN 800 MG/1
800 TABLET, FILM COATED ORAL EVERY 8 HOURS PRN
Qty: 30 TABLET | Refills: 1 | Status: SHIPPED | OUTPATIENT
Start: 2024-12-31

## 2024-12-31 NOTE — PROGRESS NOTES
as a kid    TUBAL LIGATION  20+ yrs ago    UPPER GASTROINTESTINAL ENDOSCOPY N/A 02/27/2020    EGD DIAGNOSTIC ONLY performed by Anoop Smith MD at [facility] ENDOSCOPY    VULVA SURGERY N/A 03/22/2024    VULVA LESION EXCISION performed by Zaid Copeland MD at [facility] OR       Family History   Problem Relation Age of Onset    Breast Cancer Mother 50    High Blood Pressure Mother     Heart Attack Mother     Arthritis Mother     Diabetes Mother     Osteoarthritis Mother     Colon Cancer Father         \"cancer in the bowel\"    Heart Attack Sister     Drug Abuse Sister     Heart Attack Sister     Drug Abuse Sister     High Blood Pressure Brother     Breast Cancer Paternal Aunt     Arthritis Maternal Grandmother     Ovarian Cancer Neg Hx        Current Outpatient Medications on File Prior to Visit   Medication Sig Dispense Refill    metoprolol succinate (TOPROL XL) 25 MG extended release tablet Take 1 tablet by mouth in the morning and at bedtime 180 tablet 4    divalproex (DEPAKOTE ER) 500 MG extended release tablet Take 2 tablets by mouth nightly 180 tablet 1    teriparatide (FORTEO) 620 MCG/2.48ML SOPN injection Inject 0.08 mLs into the skin daily 8 mL 1    magnesium 200 MG TABS tablet Take 1 tablet by mouth daily      calcium carb-cholecalciferol (CALCIUM 600+D3) 600-20 MG-MCG TABS TAKE 1 TABLET BY MOUTH TWICE DAILY 720 tablet 3    fluticasone (FLONASE) 50 MCG/ACT nasal spray 2 sprays by Each Nostril route daily 3 each 3    loratadine (CLARITIN) 10 MG tablet Take 1 tablet by mouth daily 90 tablet 3    ondansetron (ZOFRAN) 8 MG tablet Take 1 tablet by mouth every 8 hours as needed for Nausea or Vomiting      QNASL 80 MCG/ACT AERS nasal spray       MULTIPLE VITAMIN PO Take by mouth daily      Insulin Pen Needle (TECHLITE PEN NEEDLES) 31G X 5 MM MISC use 1 NEEDLE WITH PEN once daily 100 each 3    loperamide (IMODIUM A-D) 2 MG tablet Take 1 tablet by mouth 4 times daily as needed for Diarrhea 60 tablet 1     No current

## 2024-12-31 NOTE — PATIENT INSTRUCTIONS
WELCOME DR. MEZA TO OUR PRACTICE!!!!!!    BRING YOUR MEDICATION BOTTLES TO ALL APPOINTMENTS    Check MY CHART for test results.  If you have forgotten your password, call [phone].  The diagnoses and medications listed in this after visit summary may not be up to date.  Check MY CHART in 28-48 hours for corrections.      Late cancellation policy: So that we can better accommodate people who are sick, please give our office 24 hour notice for an appointment cancellation.    Missed appointments: Your care is very important to us.  It is important that you keep your scheduled appointments.   Multiple missed appointments will lead to a dismissal from the office.    Patients arriving late will be worked into the schedule as time permits, with patients arriving on time taken as scheduled. Late arriving patients are more than welcome to wait or reschedule their appointments.    Please allow 5-7 business days for paperwork to be processed.

## 2025-01-05 ASSESSMENT — PATIENT HEALTH QUESTIONNAIRE - PHQ9
3. TROUBLE FALLING OR STAYING ASLEEP: NOT AT ALL
9. THOUGHTS THAT YOU WOULD BE BETTER OFF DEAD, OR OF HURTING YOURSELF: NOT AT ALL
6. FEELING BAD ABOUT YOURSELF - OR THAT YOU ARE A FAILURE OR HAVE LET YOURSELF OR YOUR FAMILY DOWN: NOT AT ALL
6. FEELING BAD ABOUT YOURSELF - OR THAT YOU ARE A FAILURE OR HAVE LET YOURSELF OR YOUR FAMILY DOWN: NOT AT ALL
1. LITTLE INTEREST OR PLEASURE IN DOING THINGS: NOT AT ALL
SUM OF ALL RESPONSES TO PHQ QUESTIONS 1-9: 0
SUM OF ALL RESPONSES TO PHQ QUESTIONS 1-9: 0
7. TROUBLE CONCENTRATING ON THINGS, SUCH AS READING THE NEWSPAPER OR WATCHING TELEVISION: NOT AT ALL
5. POOR APPETITE OR OVEREATING: NOT AT ALL
7. TROUBLE CONCENTRATING ON THINGS, SUCH AS READING THE NEWSPAPER OR WATCHING TELEVISION: NOT AT ALL
SUM OF ALL RESPONSES TO PHQ QUESTIONS 1-9: 0
2. FEELING DOWN, DEPRESSED OR HOPELESS: NOT AT ALL
SUM OF ALL RESPONSES TO PHQ9 QUESTIONS 1 & 2: 0
4. FEELING TIRED OR HAVING LITTLE ENERGY: NOT AT ALL
8. MOVING OR SPEAKING SO SLOWLY THAT OTHER PEOPLE COULD HAVE NOTICED. OR THE OPPOSITE - BEING SO FIDGETY OR RESTLESS THAT YOU HAVE BEEN MOVING AROUND A LOT MORE THAN USUAL: NOT AT ALL
3. TROUBLE FALLING OR STAYING ASLEEP: NOT AT ALL
10. IF YOU CHECKED OFF ANY PROBLEMS, HOW DIFFICULT HAVE THESE PROBLEMS MADE IT FOR YOU TO DO YOUR WORK, TAKE CARE OF THINGS AT HOME, OR GET ALONG WITH OTHER PEOPLE: NOT DIFFICULT AT ALL
8. MOVING OR SPEAKING SO SLOWLY THAT OTHER PEOPLE COULD HAVE NOTICED. OR THE OPPOSITE, BEING SO FIGETY OR RESTLESS THAT YOU HAVE BEEN MOVING AROUND A LOT MORE THAN USUAL: NOT AT ALL
1. LITTLE INTEREST OR PLEASURE IN DOING THINGS: NOT AT ALL
SUM OF ALL RESPONSES TO PHQ QUESTIONS 1-9: 0
2. FEELING DOWN, DEPRESSED OR HOPELESS: NOT AT ALL
10. IF YOU CHECKED OFF ANY PROBLEMS, HOW DIFFICULT HAVE THESE PROBLEMS MADE IT FOR YOU TO DO YOUR WORK, TAKE CARE OF THINGS AT HOME, OR GET ALONG WITH OTHER PEOPLE: NOT DIFFICULT AT ALL
5. POOR APPETITE OR OVEREATING: NOT AT ALL
SUM OF ALL RESPONSES TO PHQ QUESTIONS 1-9: 0
9. THOUGHTS THAT YOU WOULD BE BETTER OFF DEAD, OR OF HURTING YOURSELF: NOT AT ALL
4. FEELING TIRED OR HAVING LITTLE ENERGY: NOT AT ALL

## 2025-01-06 ENCOUNTER — HOSPITAL ENCOUNTER (OUTPATIENT)
Age: 70
Setting detail: SPECIMEN
Discharge: HOME | End: 2025-01-06
Payer: MEDICARE

## 2025-01-06 ENCOUNTER — OFFICE VISIT (OUTPATIENT)
Dept: OBGYN | Age: 70
End: 2025-01-06
Payer: MEDICARE

## 2025-01-06 VITALS
DIASTOLIC BLOOD PRESSURE: 70 MMHG | WEIGHT: 124 LBS | BODY MASS INDEX: 21.97 KG/M2 | HEART RATE: 91 BPM | SYSTOLIC BLOOD PRESSURE: 105 MMHG | HEIGHT: 63 IN

## 2025-01-06 DIAGNOSIS — N90.1 VULVAR INTRAEPITHELIAL NEOPLASIA (VIN) GRADE 2: Primary | ICD-10-CM

## 2025-01-06 PROCEDURE — 56820 COLPOSCOPY VULVA: CPT | Performed by: OBSTETRICS & GYNECOLOGY

## 2025-01-06 PROCEDURE — 88341 IMHCHEM/IMCYTCHM EA ADD ANTB: CPT | Performed by: PATHOLOGY

## 2025-01-06 PROCEDURE — 88305 TISSUE EXAM BY PATHOLOGIST: CPT | Performed by: PATHOLOGY

## 2025-01-06 PROCEDURE — 88342 IMHCHEM/IMCYTCHM 1ST ANTB: CPT | Performed by: PATHOLOGY

## 2025-01-06 PROCEDURE — 36415 COLL VENOUS BLD VENIPUNCTURE: CPT

## 2025-01-08 ENCOUNTER — OFFICE VISIT (OUTPATIENT)
Dept: CARDIOLOGY CLINIC | Age: 70
End: 2025-01-08
Payer: MEDICARE

## 2025-01-08 VITALS
DIASTOLIC BLOOD PRESSURE: 76 MMHG | WEIGHT: 124.6 LBS | HEART RATE: 93 BPM | OXYGEN SATURATION: 97 % | HEIGHT: 63 IN | BODY MASS INDEX: 22.08 KG/M2 | SYSTOLIC BLOOD PRESSURE: 120 MMHG

## 2025-01-08 DIAGNOSIS — I95.0 IDIOPATHIC HYPOTENSION: ICD-10-CM

## 2025-01-08 DIAGNOSIS — R00.0 TACHYCARDIA: ICD-10-CM

## 2025-01-08 DIAGNOSIS — I05.9 MITRAL VALVE DISEASE: Primary | ICD-10-CM

## 2025-01-08 DIAGNOSIS — Z87.891 FORMER SMOKER: ICD-10-CM

## 2025-01-08 PROCEDURE — G8420 CALC BMI NORM PARAMETERS: HCPCS | Performed by: NURSE PRACTITIONER

## 2025-01-08 PROCEDURE — 1159F MED LIST DOCD IN RCRD: CPT | Performed by: NURSE PRACTITIONER

## 2025-01-08 PROCEDURE — 1123F ACP DISCUSS/DSCN MKR DOCD: CPT | Performed by: NURSE PRACTITIONER

## 2025-01-08 PROCEDURE — 1036F TOBACCO NON-USER: CPT | Performed by: NURSE PRACTITIONER

## 2025-01-08 PROCEDURE — G8399 PT W/DXA RESULTS DOCUMENT: HCPCS | Performed by: NURSE PRACTITIONER

## 2025-01-08 PROCEDURE — M1299 PR FLU IMMUNIZE ORDER/ADMIN: HCPCS | Performed by: NURSE PRACTITIONER

## 2025-01-08 PROCEDURE — 3017F COLORECTAL CA SCREEN DOC REV: CPT | Performed by: NURSE PRACTITIONER

## 2025-01-08 PROCEDURE — G8427 DOCREV CUR MEDS BY ELIG CLIN: HCPCS | Performed by: NURSE PRACTITIONER

## 2025-01-08 PROCEDURE — 99214 OFFICE O/P EST MOD 30 MIN: CPT | Performed by: NURSE PRACTITIONER

## 2025-01-08 PROCEDURE — 1090F PRES/ABSN URINE INCON ASSESS: CPT | Performed by: NURSE PRACTITIONER

## 2025-01-08 RX ORDER — METOPROLOL SUCCINATE 50 MG/1
50 TABLET, EXTENDED RELEASE ORAL 2 TIMES DAILY
Qty: 180 TABLET | Refills: 3 | Status: SHIPPED | OUTPATIENT
Start: 2025-01-08

## 2025-01-08 ASSESSMENT — ENCOUNTER SYMPTOMS
SHORTNESS OF BREATH: 0
COUGH: 0

## 2025-01-08 NOTE — PATIENT INSTRUCTIONS
Please be informed that if you contact our office outside of normal business hours the physician on call cannot help with any scheduling or rescheduling issues, procedure instruction questions or any type of medication issue.    We advise you for any urgent/emergency that you go to the nearest emergency room!    PLEASE CALL OUR OFFICE DURING NORMAL BUSINESS HOURS    Monday - Friday   8 am to 5 pm    [place]: [phone]    [place]: [phone]    [place]:  [phone]  Thank you for allowing us to care for you today!   We want to ensure we can follow your treatment plan and we strive to give you the best outcomes and experience possible.   If you ever have a life threatening emergency and call 911 - for an ambulance (EMS)   Our providers can only care for you at:   [facility] or [facility].   Even if you have someone take you or you drive yourself we can only care for you in a [facility] facility. Our providers are not setup at the other healthcare locations!   **It is YOUR responsibilty to bring medication bottles and/or updated medication list to EACH APPOINTMENT. This will allow us to better serve you and all your healthcare needs**  We are committed to providing you the best care possible.    If you receive a survey after visiting one of our offices, please take time to share your experience concerning your physician office visit.  These surveys are confidential and no health information about you is shared.    We are eager to improve for you and we are counting on your feedback to help make that happen.

## 2025-01-08 NOTE — PROGRESS NOTES
heard.  Pulmonary:      Effort: Pulmonary effort is normal. No respiratory distress.      Breath sounds: Normal breath sounds.   Musculoskeletal:         General: No swelling or deformity.      Cervical back: Neck supple. No muscular tenderness.   Neurological:      Mental Status: She is alert.         Lab Review   Lab Results   Component Value Date/Time    CHOL 168 02/15/2024 09:40 AM    TRIG 81 02/15/2024 09:40 AM    HDL 62 02/15/2024 09:40 AM           Echo 1/16/2020   Summary   Left ventricular systolic function is low normal.   Ejection fraction is visually estimated at 50%.   Paradoxical motion of the interventricular septum; possible conduction delay.   Indeterminate diastolic function; E/A flow reversal is noted.   Mitral valve prolapse is present.   Moderate to severe mitral regurgitation is present.   Moderate tricuspid regurgitation is present. RVSP is 25 mmHg.   No evidence of any pericardial effusion.   Mobile interatrial septum.   Incidental finding: cyst vs mass near/attached to the right kidney.           Echo 3/17/21  Summary   Left ventricular function is normal, EF is estimated at 55-60%.   Grade I diastolic dysfunction.   Mild left ventricular hypertrophy.   Bi atrial enlargement noted.   The mitral valve appears to slightly prolapse.   Moderate to severe mitral and tricuspid regurgitation is present.   Aneurysmal interatrial septum.   RVSP is 34 mmHg.   No evidence of pericardial effusion.       ASSESSMENT/PLAN:  Idiopathic hypotension  Improved now that she is not on chemo anymore      B cell lymphoma  Followed by Dr Mcgovern she is in remission  She has not needed medication for over a year  she has a mediport on right side and still has some pain on her left side     Anemia  Resolved slowly after having hysterectomy.      Tachycardia  Improved but more than goal  Increase metoprolol 50 mg BID, at risk for afib  No documented afib so far     Rare dizziness  Resolves with standing still  She

## 2025-01-09 ENCOUNTER — TELEPHONE (OUTPATIENT)
Dept: CARDIOLOGY CLINIC | Age: 70
End: 2025-01-09

## 2025-01-09 LAB — SURGICAL PATHOLOGY REPORT: NORMAL

## 2025-01-10 ENCOUNTER — TELEPHONE (OUTPATIENT)
Dept: CARDIOLOGY CLINIC | Age: 70
End: 2025-01-10

## 2025-01-20 SDOH — ECONOMIC STABILITY: FOOD INSECURITY: WITHIN THE PAST 12 MONTHS, THE FOOD YOU BOUGHT JUST DIDN'T LAST AND YOU DIDN'T HAVE MONEY TO GET MORE.: NEVER TRUE

## 2025-01-20 SDOH — ECONOMIC STABILITY: INCOME INSECURITY: IN THE LAST 12 MONTHS, WAS THERE A TIME WHEN YOU WERE NOT ABLE TO PAY THE MORTGAGE OR RENT ON TIME?: NO

## 2025-01-20 SDOH — ECONOMIC STABILITY: TRANSPORTATION INSECURITY
IN THE PAST 12 MONTHS, HAS THE LACK OF TRANSPORTATION KEPT YOU FROM MEDICAL APPOINTMENTS OR FROM GETTING MEDICATIONS?: NO

## 2025-01-20 SDOH — ECONOMIC STABILITY: TRANSPORTATION INSECURITY
IN THE PAST 12 MONTHS, HAS LACK OF TRANSPORTATION KEPT YOU FROM MEETINGS, WORK, OR FROM GETTING THINGS NEEDED FOR DAILY LIVING?: NO

## 2025-01-20 SDOH — ECONOMIC STABILITY: FOOD INSECURITY: WITHIN THE PAST 12 MONTHS, YOU WORRIED THAT YOUR FOOD WOULD RUN OUT BEFORE YOU GOT MONEY TO BUY MORE.: NEVER TRUE

## 2025-01-21 ENCOUNTER — TELEPHONE (OUTPATIENT)
Dept: CARDIOLOGY CLINIC | Age: 70
End: 2025-01-21

## 2025-01-21 NOTE — TELEPHONE ENCOUNTER
Notified and understood       Echo (TTE) limited   Limited echo to evaulate MR.    Left Ventricle: Normal left ventricular systolic function with a visually estimated EF of 50 - 55%. Left ventricle size is normal. Normal wall thickness.    Mitral Valve: Thickened leaflets. Leaflet prolapse noted. Moderate to severe regurgitation with multiple jets. Systolic flow reversal in the pulmonary veins.    Tricuspid Valve: Moderate regurgitation. The estimated RVSP is 29 mmHg.    Left Atrium: Left atrium is moderately dilated.    Interatrial Septum: Hypermobile interatrial septum.    Pericardium: No pericardial effusion.    Image quality is good.

## 2025-01-21 NOTE — PROGRESS NOTES
Vulvar colposcopy    Diagnosis: CRYS 2    Acetic acid applied to the vulva and introitus.  Inspection was performed with the colposcope.  The vulva was seen in its entirety.  No abnormal vessels white epithelium erosions or excoriations noted

## 2025-01-22 ENCOUNTER — OFFICE VISIT (OUTPATIENT)
Dept: OBGYN | Age: 70
End: 2025-01-22
Payer: MEDICARE

## 2025-01-22 VITALS
BODY MASS INDEX: 22.14 KG/M2 | SYSTOLIC BLOOD PRESSURE: 119 MMHG | WEIGHT: 125 LBS | HEART RATE: 86 BPM | DIASTOLIC BLOOD PRESSURE: 82 MMHG

## 2025-01-22 DIAGNOSIS — N90.1 VULVAR INTRAEPITHELIAL NEOPLASIA (VIN) GRADE 2: Primary | ICD-10-CM

## 2025-01-22 DIAGNOSIS — A60.9 HSV (HERPES SIMPLEX VIRUS) ANOGENITAL INFECTION: ICD-10-CM

## 2025-01-22 PROCEDURE — 3017F COLORECTAL CA SCREEN DOC REV: CPT | Performed by: OBSTETRICS & GYNECOLOGY

## 2025-01-22 PROCEDURE — G8427 DOCREV CUR MEDS BY ELIG CLIN: HCPCS | Performed by: OBSTETRICS & GYNECOLOGY

## 2025-01-22 PROCEDURE — G8420 CALC BMI NORM PARAMETERS: HCPCS | Performed by: OBSTETRICS & GYNECOLOGY

## 2025-01-22 PROCEDURE — 1123F ACP DISCUSS/DSCN MKR DOCD: CPT | Performed by: OBSTETRICS & GYNECOLOGY

## 2025-01-22 PROCEDURE — G8399 PT W/DXA RESULTS DOCUMENT: HCPCS | Performed by: OBSTETRICS & GYNECOLOGY

## 2025-01-22 PROCEDURE — 1159F MED LIST DOCD IN RCRD: CPT | Performed by: OBSTETRICS & GYNECOLOGY

## 2025-01-22 PROCEDURE — 1036F TOBACCO NON-USER: CPT | Performed by: OBSTETRICS & GYNECOLOGY

## 2025-01-22 PROCEDURE — 99214 OFFICE O/P EST MOD 30 MIN: CPT | Performed by: OBSTETRICS & GYNECOLOGY

## 2025-01-22 PROCEDURE — 1090F PRES/ABSN URINE INCON ASSESS: CPT | Performed by: OBSTETRICS & GYNECOLOGY

## 2025-01-22 RX ORDER — VALACYCLOVIR HYDROCHLORIDE 1 G/1
1000 TABLET, FILM COATED ORAL DAILY
Qty: 5 TABLET | Refills: 3 | Status: SHIPPED | OUTPATIENT
Start: 2025-01-22 | End: 2025-01-27

## 2025-01-22 NOTE — PROGRESS NOTES
Needle (TECHLITE PEN NEEDLES) 31G X 5 MM MISC use 1 NEEDLE WITH PEN once daily 100 each 3    ondansetron (ZOFRAN) 8 MG tablet Take 1 tablet by mouth every 8 hours as needed for Nausea or Vomiting      QNASL 80 MCG/ACT AERS nasal spray       loperamide (IMODIUM A-D) 2 MG tablet Take 1 tablet by mouth 4 times daily as needed for Diarrhea 60 tablet 1    MULTIPLE VITAMIN PO Take by mouth daily       No current facility-administered medications for this visit.       Allergies   Allergen Reactions    Abilify [Aripiprazole]     Iodine     Penicillins     Codeine Nausea And Vomiting           Immunization History   Administered Date(s) Administered    COVID-19, MODERNA BLUE border, Primary or Immunocompromised, (age 12y+), IM, 100 mcg/0.5mL 2021, 2022    COVID-19, MODERNA Bivalent, (age 12y+), IM, 50 mcg/0.5 mL 2022    COVID-19, MODERNA, (age 12y+), IM, 50mcg/0.5mL 2024    COVID-19, PFIZER PURPLE top, DILUTE for use, (age 12 y+), 30mcg/0.3mL 2021, 2021    COVID-19, [place] Vaccine, Vaccine Unspecified 2021, 2021, 2021    Influenza Virus Vaccine 2019    Influenza, FLUAD, (age 65 y+), IM, Quadv, 0.5mL 2020, 2024    Influenza, FLUAD, (age 65 y+), IM, Trivalent PF, 0.5mL 2024    Influenza, FLUZONE High Dose (age 65 y+), IM, Quadv, 0.7mL 10/05/2021, 2022    Pneumococcal, PCV20, PREVNAR 20, (age 6w+), IM, 0.5mL 2022    Pneumococcal, PPSV23, PNEUMOVAX 23, (age 2y+), SC/IM, 0.5mL 2019, 2020    TD 5LF, TENIVAC, (age 7y+), IM, 0.5mL 2002       Review of Systems  All other systems reviewed and are negative    /82 (Site: Left Upper Arm, Position: Sitting, Cuff Size: Small Adult)   Pulse 86   Wt 56.7 kg (125 lb)   LMP 2000   BMI 22.14 kg/m²     Physical Exam  Healing well.  Sutures removed  No results found for this visit on 25.    ASSESSMENT AND PLAN   Diagnosis Orders   1. Vulvar intraepithelial neoplasia

## 2025-01-24 DIAGNOSIS — J34.89 RHINORRHEA: ICD-10-CM

## 2025-01-24 RX ORDER — FLUTICASONE PROPIONATE 50 MCG
2 SPRAY, SUSPENSION (ML) NASAL DAILY
Qty: 48 G | OUTPATIENT
Start: 2025-01-24

## 2025-01-25 DIAGNOSIS — J34.89 RHINORRHEA: ICD-10-CM

## 2025-01-27 RX ORDER — LORATADINE 10 MG/1
10 TABLET ORAL DAILY
Qty: 90 TABLET | Refills: 3 | OUTPATIENT
Start: 2025-01-27

## 2025-02-06 ENCOUNTER — OFFICE VISIT (OUTPATIENT)
Dept: FAMILY MEDICINE CLINIC | Age: 70
End: 2025-02-06
Payer: MEDICARE

## 2025-02-06 VITALS
WEIGHT: 123 LBS | OXYGEN SATURATION: 94 % | HEART RATE: 77 BPM | SYSTOLIC BLOOD PRESSURE: 98 MMHG | BODY MASS INDEX: 21.79 KG/M2 | DIASTOLIC BLOOD PRESSURE: 82 MMHG

## 2025-02-06 DIAGNOSIS — Z23 NEED FOR TETANUS BOOSTER: ICD-10-CM

## 2025-02-06 DIAGNOSIS — Z23 NEED FOR COVID-19 VACCINE: ICD-10-CM

## 2025-02-06 DIAGNOSIS — J34.89 RHINORRHEA: ICD-10-CM

## 2025-02-06 DIAGNOSIS — M85.89 OSTEOPENIA OF MULTIPLE SITES: ICD-10-CM

## 2025-02-06 DIAGNOSIS — Z23 NEED FOR SHINGLES VACCINE: ICD-10-CM

## 2025-02-06 DIAGNOSIS — M17.11 PRIMARY OSTEOARTHRITIS OF RIGHT KNEE: Primary | ICD-10-CM

## 2025-02-06 DIAGNOSIS — F31.70 BIPOLAR DISORDER IN FULL REMISSION, MOST RECENT EPISODE UNSPECIFIED TYPE (HCC): ICD-10-CM

## 2025-02-06 DIAGNOSIS — Z29.11 NEED FOR PROPHYLACTIC VACCINATION AND INOCULATION AGAINST RESPIRATORY SYNCYTIAL VIRUS (RSV): ICD-10-CM

## 2025-02-06 PROCEDURE — 3017F COLORECTAL CA SCREEN DOC REV: CPT | Performed by: FAMILY MEDICINE

## 2025-02-06 PROCEDURE — 99214 OFFICE O/P EST MOD 30 MIN: CPT | Performed by: FAMILY MEDICINE

## 2025-02-06 PROCEDURE — 1159F MED LIST DOCD IN RCRD: CPT | Performed by: FAMILY MEDICINE

## 2025-02-06 PROCEDURE — 1123F ACP DISCUSS/DSCN MKR DOCD: CPT | Performed by: FAMILY MEDICINE

## 2025-02-06 PROCEDURE — G2211 COMPLEX E/M VISIT ADD ON: HCPCS | Performed by: FAMILY MEDICINE

## 2025-02-06 PROCEDURE — G8427 DOCREV CUR MEDS BY ELIG CLIN: HCPCS | Performed by: FAMILY MEDICINE

## 2025-02-06 PROCEDURE — G8420 CALC BMI NORM PARAMETERS: HCPCS | Performed by: FAMILY MEDICINE

## 2025-02-06 PROCEDURE — 1090F PRES/ABSN URINE INCON ASSESS: CPT | Performed by: FAMILY MEDICINE

## 2025-02-06 PROCEDURE — G8399 PT W/DXA RESULTS DOCUMENT: HCPCS | Performed by: FAMILY MEDICINE

## 2025-02-06 PROCEDURE — 1036F TOBACCO NON-USER: CPT | Performed by: FAMILY MEDICINE

## 2025-02-06 RX ORDER — ALENDRONATE SODIUM 70 MG/1
70 TABLET ORAL
Qty: 13 TABLET | Refills: 3 | Status: SHIPPED | OUTPATIENT
Start: 2025-02-06 | End: 2025-02-06 | Stop reason: ALTCHOICE

## 2025-02-06 RX ORDER — FLUTICASONE PROPIONATE 50 MCG
2 SPRAY, SUSPENSION (ML) NASAL DAILY
Qty: 3 EACH | Refills: 3 | Status: SHIPPED | OUTPATIENT
Start: 2025-02-06

## 2025-02-06 RX ORDER — DIVALPROEX SODIUM 500 MG/1
1000 TABLET, FILM COATED, EXTENDED RELEASE ORAL NIGHTLY
Qty: 180 TABLET | Refills: 3 | Status: SHIPPED | OUTPATIENT
Start: 2025-02-06

## 2025-02-06 NOTE — PROGRESS NOTES
Patient ID: Lottie Maldonado 1955    .  Chief Complaint   Patient presents with    Hypertension    Allergies    Knee Pain     Right knee pain going to hold off on injection today since she started back working pain seems to better.              History of Present Illness  The patient is a 69-year-old female who presents for follow-up on her right knee pain, osteoporosis, allergies, and health maintenance.    Right Knee Pain  - Reports an improvement in her right knee pain, attributing this to increased mobility since returning to work.  - Has not received the prescribed diclofenac gel from her pharmacy.  - Expresses a desire to postpone any injections for as long as possible.    Osteoporosis  - Has been on a regimen of Forteo injections for approximately 4 to 5 years, a treatment she opted for over oral medication.  - Has a scheduled appointment with Dr. Mohr in 2 months.  - No history of Fosamax use.  - Reports no issues with acid reflux or heartburn since her cancer diagnosis, although she did experience severe symptoms prior to this.  - Current supplement intake includes calcium twice daily and a multivitamin.    Allergies  - Takes Claritin as needed and uses Flonase nasal spray.  - Has enough Claritin at home and does not need a refill.    Heart Health  - On a medication to help keep her heart rate lower.  - Was called by her cardiologist's office and asked to let them know if she is more short of breath, which she is, but it is not enough for her to up her appointment yet.    Supplemental information: She had a COVID-19 vaccine last year. She has had shingles before.    MEDICATIONS  Current: Forteo, Claritin, Flonase, calcium.    IMMUNIZATIONS  She had a COVID-19 vaccine last year.    Patient Active Problem List   Diagnosis    Family history of malignant neoplasm of gastrointestinal tract    Diffuse large B-cell lymphoma, unspecified body region (HCC)    Idiopathic hypotension    Mitral valve disease

## 2025-02-12 ENCOUNTER — HOSPITAL ENCOUNTER (OUTPATIENT)
Dept: INFUSION THERAPY | Age: 70
Discharge: HOME | End: 2025-02-12
Payer: MEDICARE

## 2025-02-12 DIAGNOSIS — C83.30 DIFFUSE LARGE B-CELL LYMPHOMA, UNSPECIFIED BODY REGION (HCC): Primary | ICD-10-CM

## 2025-02-12 PROCEDURE — 2500000003 HC RX 250 WO HCPCS: Performed by: INTERNAL MEDICINE

## 2025-02-12 PROCEDURE — 96523 IRRIG DRUG DELIVERY DEVICE: CPT

## 2025-02-12 RX ORDER — SODIUM CHLORIDE 0.9 % (FLUSH) 0.9 %
20 SYRINGE (ML) INJECTION PRN
Status: DISCONTINUED | OUTPATIENT
Start: 2025-02-12 | End: 2025-02-13 | Stop reason: HOSPADM

## 2025-02-12 RX ADMIN — SODIUM CHLORIDE, PRESERVATIVE FREE 30 ML: 5 INJECTION INTRAVENOUS at 10:25

## 2025-02-12 NOTE — PROGRESS NOTES
Pt to tx suite for port flush. Port accessed with blood return noted. Flushed with 30 cc NS and de-accessed.  Pt left ambulatory declined discharge instructions

## 2025-03-26 RX ORDER — DIPHENHYDRAMINE HCL 25 MG
1 TABLET,DISINTEGRATING ORAL 2 TIMES DAILY
Qty: 720 TABLET | Refills: 3 | Status: SHIPPED | OUTPATIENT
Start: 2025-03-26

## 2025-04-04 DIAGNOSIS — R00.0 TACHYCARDIA: ICD-10-CM

## 2025-04-04 RX ORDER — METOPROLOL SUCCINATE 25 MG/1
25 TABLET, EXTENDED RELEASE ORAL DAILY
Qty: 90 TABLET | Refills: 3 | OUTPATIENT
Start: 2025-04-04

## 2025-04-08 ENCOUNTER — OFFICE VISIT (OUTPATIENT)
Dept: CARDIOLOGY CLINIC | Age: 70
End: 2025-04-08
Payer: MEDICARE

## 2025-04-08 VITALS
HEART RATE: 71 BPM | WEIGHT: 124 LBS | BODY MASS INDEX: 21.97 KG/M2 | HEIGHT: 63 IN | SYSTOLIC BLOOD PRESSURE: 118 MMHG | DIASTOLIC BLOOD PRESSURE: 72 MMHG

## 2025-04-08 DIAGNOSIS — Z87.891 FORMER SMOKER: Primary | ICD-10-CM

## 2025-04-08 DIAGNOSIS — R00.0 TACHYCARDIA: ICD-10-CM

## 2025-04-08 DIAGNOSIS — Z85.72 HISTORY OF B-CELL LYMPHOMA: ICD-10-CM

## 2025-04-08 DIAGNOSIS — I05.9 MITRAL VALVE DISEASE: ICD-10-CM

## 2025-04-08 DIAGNOSIS — C83.30 DIFFUSE LARGE B-CELL LYMPHOMA, UNSPECIFIED BODY REGION (HCC): ICD-10-CM

## 2025-04-08 DIAGNOSIS — I95.0 IDIOPATHIC HYPOTENSION: ICD-10-CM

## 2025-04-08 DIAGNOSIS — D61.818 PANCYTOPENIA: ICD-10-CM

## 2025-04-08 PROCEDURE — G8399 PT W/DXA RESULTS DOCUMENT: HCPCS | Performed by: INTERNAL MEDICINE

## 2025-04-08 PROCEDURE — G8420 CALC BMI NORM PARAMETERS: HCPCS | Performed by: INTERNAL MEDICINE

## 2025-04-08 PROCEDURE — 1123F ACP DISCUSS/DSCN MKR DOCD: CPT | Performed by: INTERNAL MEDICINE

## 2025-04-08 PROCEDURE — G8427 DOCREV CUR MEDS BY ELIG CLIN: HCPCS | Performed by: INTERNAL MEDICINE

## 2025-04-08 PROCEDURE — 1036F TOBACCO NON-USER: CPT | Performed by: INTERNAL MEDICINE

## 2025-04-08 PROCEDURE — 99214 OFFICE O/P EST MOD 30 MIN: CPT | Performed by: INTERNAL MEDICINE

## 2025-04-08 PROCEDURE — 1090F PRES/ABSN URINE INCON ASSESS: CPT | Performed by: INTERNAL MEDICINE

## 2025-04-08 PROCEDURE — 3017F COLORECTAL CA SCREEN DOC REV: CPT | Performed by: INTERNAL MEDICINE

## 2025-04-08 PROCEDURE — 1159F MED LIST DOCD IN RCRD: CPT | Performed by: INTERNAL MEDICINE

## 2025-04-08 RX ORDER — METOPROLOL SUCCINATE 50 MG/1
50 TABLET, EXTENDED RELEASE ORAL 2 TIMES DAILY
Qty: 180 TABLET | Refills: 3 | Status: SHIPPED | OUTPATIENT
Start: 2025-04-08

## 2025-04-08 NOTE — PROGRESS NOTES
CLINICAL STAFF DOCUMENTATION    Dr. Joshua Maldonado  1955  [id number]    Have you had any Chest Pain recently? - No    Have you had any Shortness of Breath - Yes, on exertion, not always     Have you had any dizziness - No    Have you had any palpitations recently? - No  Any thyroid issues? - No    Do you have any edema - swelling in No        When did you have your last labs drawn 11/2024  What doctor ordered Filix   Do we have the labs in their chart Yes    Do you need any prescriptions refilled? - Yes    Do you have a surgery or procedure scheduled in the near future - No    Do use tobacco products? - No  Do you drink alcohol? - No  Do you use any illicit drugs? - No  Caffeine? - Yes, pepsi daily       Check medication list thoroughly!!! AND RECONCILE OUTSIDE MEDICATIONS  If dose has changed change the entire order not just the MG  BE SURE TO ASK PATIENT IF THEY NEED MEDICATION REFILLS  Verify Pharmacy and update if incorrect  Add to every patient's \"wrap up\" the following dot phrase AFTERVISITCARDIOHEARTHOUSE and ensure we explain this to our patients   
on her left side     Tachycardia  Continue  metoprolol 50 mg XL bid  , at risk for afib? No documented afib so far   She notices hr goes up if she she does not take her meds       Mitral valve disease  I think she gest symptomatic with exertion   She does seems to have moderate to severe  mitral regurgitation with mitral valve prolapse may eventually need  STEVEN  she is more physically improved at this stage we will continue to monitor her mitral valve she does not appear to be in heart failure or seem to be affected by MR.  She did have radiation for lymphoma but was mostly for abdomen  Check echo again before deciding to get STEVEN, continue metoprolol to keep hr in 70 's      Dyslipidemia :  All available lab work was reviewed.  Necessary orders were placed , instructions given by myself       Counseled extensively and medication compliance urged.  We discussed that for the  prevention of ASCVD our  goal is aggressive risk modification.Patient is encouraged to exercise even a brisk walk for 30 minutes  at least 3 to 4 times a week   Various goals were discussed and questions answered. Continue current medications. Appropriate prescriptions are addressed and refills ordered.  Questions answered and patient verbalizes understanding.  Call for any problems, questions, or concerns.    Continue all other medications of all above medical condition listed as is.    No follow-ups on file.    Please note this report has been partially produced using speech recognition software and may contain errors related to that system including errors in grammar, punctuation, and spelling, as well as words and phrases that may be inappropriate. If there are any questions or concerns please feel free to contact the dictating provider for clarification.   An  electronic signature was used to authenticate this note.    --Sayed Ru Mcdaniel MD on 5/12/2020 at 2:42 PM    [id number]}

## 2025-04-08 NOTE — PATIENT INSTRUCTIONS
Thank you for allowing us to care for you today!   We want to ensure we can follow your treatment plan and we strive to give you the best outcomes and experience possible.   If you ever have a life threatening emergency and call 911 - for an ambulance (EMS)  REMEMBER  Our providers can only care for you at:   [facility] or [facility]   Even if you have someone take you or you drive yourself we can only care for you in a [facility] facility. Our providers are not setup at the other healthcare locations!    PLEASE CALL OUR OFFICE DURING NORMAL BUSINESS HOURS  Monday through Friday 8 am to 5 pm  AFTER HOURS the physician on-call cannot help with scheduling, rescheduling, procedure instruction questions or any type of medication need or issue.  [facility] P:[phone] - [facility] P:[phone] - [facility] P:[phone]      If you receive a survey:  We would appreciate you taking the time to share your experience concerning your provider visit in the office.    These surveys are confidential!  We are eager to improve and are counting on you to share your feedback so we can ensure you get the best care possible.

## 2025-04-27 NOTE — PROGRESS NOTES
Patient Name: Lottie Maldonado  Patient : 1955  Patient MRN: [id number]     Primary Oncologist: Erwin Carias MD  Referring Provider: Tahira Yang MD     Date of Service: 2025      Chief Complaint:   Chief Complaint   Patient presents with    Follow-up     She came in for follow-up visit.     Problem List:      Family history of malignant neoplasm of gastrointestinal tract     B-cell lymphoma (HCC)     Idiopathic hypotension     Mitral valve disease     Pancytopenia (HCC)     Severe malnutrition (HCC)     Deep vein thrombosis (DVT) of left upper extremity      Pain syndrome, chronic     HPI:   Lottie Maldonado is a pleasant 69 year-old AA female patient who was referred for right axillary lymphadenopathy, status post needle biopsy in 2016, which did not show any pathological abnormalities.  I talked to Dr. Naranjo, who favored benign lymph node.  Right axillary lymph node biopsy in 2015 revealed benign lymph node hyperplasia.  Mammogram in 2016 revealed low suspicion for malignancy, with several enlarged right axillary lymph nodes, 3.1 by 0.8 by 2.3 cm, 0.9 by 0.8 by 1 cm, 1.8 by 1.4 by 1.5, and 1.4 by 1.5 cm.  None demonstrated significant hypervascularity.   I offered her an excisional lymph node biopsy versus surveillance with followup ultrasound.  She opted to go with the second one. 2015 normal CBC with normal calcium and alk phos, white cell count 5.8, hemoglobin 11.9, platelet 189.   2016 white cell count 6.5, hemoglobin 12.5, platelet 251.  SED rate 17, rheumatoid factor less than 10.    Ultrasound of the right breast and axilla revealed no significant interval change in the right axillary adenopathy, which still has remained most likely reactive in etiology.  This was not seen on any mammogram dating back beyond .  She opted to go to see the surgeon; therefore, I referred her to see Dr. Miranda.   She was last seen in our office 2016. Ms. Maldonado

## 2025-05-06 ENCOUNTER — HOSPITAL ENCOUNTER (OUTPATIENT)
Dept: WOMENS IMAGING | Age: 70
Discharge: HOME | End: 2025-05-06
Payer: MEDICARE

## 2025-05-06 VITALS — WEIGHT: 125 LBS | HEIGHT: 63 IN | BODY MASS INDEX: 22.15 KG/M2

## 2025-05-06 DIAGNOSIS — Z12.31 ENCOUNTER FOR SCREENING MAMMOGRAM FOR BREAST CANCER: ICD-10-CM

## 2025-05-06 PROCEDURE — 77063 BREAST TOMOSYNTHESIS BI: CPT

## 2025-05-12 ENCOUNTER — HOSPITAL ENCOUNTER (OUTPATIENT)
Dept: INFUSION THERAPY | Age: 70
Discharge: HOME | End: 2025-05-12
Payer: MEDICARE

## 2025-05-12 DIAGNOSIS — D64.9 ANEMIA, UNSPECIFIED TYPE: ICD-10-CM

## 2025-05-12 DIAGNOSIS — C83.30 DIFFUSE LARGE B-CELL LYMPHOMA, UNSPECIFIED BODY REGION (HCC): Primary | ICD-10-CM

## 2025-05-12 LAB
ALBUMIN SERPL-MCNC: 4 G/DL (ref 3.4–5)
ALBUMIN/GLOB SERPL: 1.9 {RATIO} (ref 1.1–2.2)
ALP SERPL-CCNC: 69 U/L (ref 40–129)
ALT SERPL-CCNC: 17 U/L (ref 10–40)
ANION GAP SERPL CALCULATED.3IONS-SCNC: 12 MMOL/L (ref 9–17)
AST SERPL-CCNC: 27 U/L (ref 15–37)
BASOPHILS # BLD: 0.02 K/UL
BASOPHILS NFR BLD: 0 % (ref 0–1)
BILIRUB SERPL-MCNC: 0.3 MG/DL (ref 0–1)
BUN SERPL-MCNC: 17 MG/DL (ref 7–20)
CALCIUM SERPL-MCNC: 9.9 MG/DL (ref 8.3–10.6)
CHLORIDE SERPL-SCNC: 107 MMOL/L (ref 99–110)
CO2 SERPL-SCNC: 21 MMOL/L (ref 21–32)
CREAT SERPL-MCNC: 0.8 MG/DL (ref 0.6–1.2)
EOSINOPHIL # BLD: 0.17 K/UL
EOSINOPHILS RELATIVE PERCENT: 3 % (ref 0–3)
ERYTHROCYTE [DISTWIDTH] IN BLOOD BY AUTOMATED COUNT: 12.6 % (ref 11.7–14.9)
FERRITIN SERPL-MCNC: 976 NG/ML (ref 15–150)
GFR, ESTIMATED: 76 ML/MIN/1.73M2
GLUCOSE SERPL-MCNC: 82 MG/DL (ref 74–99)
HCT VFR BLD AUTO: 33.2 % (ref 37–47)
HGB BLD-MCNC: 10.8 G/DL (ref 12.5–16)
IRON SATN MFR SERPL: 37 % (ref 15–50)
IRON SERPL-MCNC: 99 UG/DL (ref 37–145)
LDH SERPL-CCNC: 236 U/L (ref 100–190)
LYMPHOCYTES NFR BLD: 1.76 K/UL
LYMPHOCYTES RELATIVE PERCENT: 32 % (ref 24–44)
MCH RBC QN AUTO: 31.9 PG (ref 27–31)
MCHC RBC AUTO-ENTMCNC: 32.5 G/DL (ref 32–36)
MCV RBC AUTO: 97.9 FL (ref 78–100)
MONOCYTES NFR BLD: 0.57 K/UL
MONOCYTES NFR BLD: 10 % (ref 0–5)
NEUTROPHILS NFR BLD: 54 % (ref 36–66)
NEUTS SEG NFR BLD: 2.99 K/UL
PLATELET # BLD AUTO: 180 K/UL (ref 140–440)
PMV BLD AUTO: 10.7 FL (ref 7.5–11.1)
POTASSIUM SERPL-SCNC: 4.2 MMOL/L (ref 3.5–5.1)
PROT SERPL-MCNC: 6 G/DL (ref 6.4–8.2)
RBC # BLD AUTO: 3.39 M/UL (ref 4.2–5.4)
SODIUM SERPL-SCNC: 139 MMOL/L (ref 136–145)
TIBC SERPL-MCNC: 270 UG/DL (ref 260–445)
UNSATURATED IRON BINDING CAPACITY: 171 UG/DL (ref 110–370)
WBC OTHER # BLD: 5.5 K/UL (ref 4–10.5)

## 2025-05-12 PROCEDURE — 82728 ASSAY OF FERRITIN: CPT

## 2025-05-12 PROCEDURE — 83550 IRON BINDING TEST: CPT

## 2025-05-12 PROCEDURE — 83615 LACTATE (LD) (LDH) ENZYME: CPT

## 2025-05-12 PROCEDURE — 85025 COMPLETE CBC W/AUTO DIFF WBC: CPT

## 2025-05-12 PROCEDURE — 36591 DRAW BLOOD OFF VENOUS DEVICE: CPT

## 2025-05-12 PROCEDURE — 80053 COMPREHEN METABOLIC PANEL: CPT

## 2025-05-12 PROCEDURE — 2500000003 HC RX 250 WO HCPCS: Performed by: INTERNAL MEDICINE

## 2025-05-12 PROCEDURE — 83540 ASSAY OF IRON: CPT

## 2025-05-12 RX ORDER — SODIUM CHLORIDE 0.9 % (FLUSH) 0.9 %
20 SYRINGE (ML) INJECTION PRN
Status: DISCONTINUED | OUTPATIENT
Start: 2025-05-12 | End: 2025-05-13 | Stop reason: HOSPADM

## 2025-05-12 RX ADMIN — SODIUM CHLORIDE, PRESERVATIVE FREE 30 ML: 5 INJECTION INTRAVENOUS at 10:01

## 2025-05-12 NOTE — PROGRESS NOTES
Pt to tx suite for port draw. Port accessed with blood return noted. Flushed with 20 cc NS and 10 cc blood wasted. Labs drawn and sent. Port then flushed with 20 cc NS and de-accessed. Pt left ambulatory declined discharge instructions. Next OV 5/23 next port flush scheduled correctly.

## 2025-05-23 ENCOUNTER — OFFICE VISIT (OUTPATIENT)
Dept: ONCOLOGY | Age: 70
End: 2025-05-23
Payer: MEDICARE

## 2025-05-23 ENCOUNTER — HOSPITAL ENCOUNTER (OUTPATIENT)
Dept: INFUSION THERAPY | Age: 70
Discharge: HOME | End: 2025-05-23
Payer: MEDICARE

## 2025-05-23 VITALS
TEMPERATURE: 97.1 F | HEIGHT: 63 IN | DIASTOLIC BLOOD PRESSURE: 78 MMHG | HEART RATE: 80 BPM | SYSTOLIC BLOOD PRESSURE: 122 MMHG | WEIGHT: 124.8 LBS | OXYGEN SATURATION: 100 % | BODY MASS INDEX: 22.11 KG/M2

## 2025-05-23 DIAGNOSIS — I82.722: ICD-10-CM

## 2025-05-23 DIAGNOSIS — D64.9 ANEMIA, UNSPECIFIED TYPE: Primary | ICD-10-CM

## 2025-05-23 PROCEDURE — 3017F COLORECTAL CA SCREEN DOC REV: CPT | Performed by: INTERNAL MEDICINE

## 2025-05-23 PROCEDURE — 1159F MED LIST DOCD IN RCRD: CPT | Performed by: INTERNAL MEDICINE

## 2025-05-23 PROCEDURE — 1123F ACP DISCUSS/DSCN MKR DOCD: CPT | Performed by: INTERNAL MEDICINE

## 2025-05-23 PROCEDURE — 1090F PRES/ABSN URINE INCON ASSESS: CPT | Performed by: INTERNAL MEDICINE

## 2025-05-23 PROCEDURE — 1126F AMNT PAIN NOTED NONE PRSNT: CPT | Performed by: INTERNAL MEDICINE

## 2025-05-23 PROCEDURE — G8420 CALC BMI NORM PARAMETERS: HCPCS | Performed by: INTERNAL MEDICINE

## 2025-05-23 PROCEDURE — G8427 DOCREV CUR MEDS BY ELIG CLIN: HCPCS | Performed by: INTERNAL MEDICINE

## 2025-05-23 PROCEDURE — 1036F TOBACCO NON-USER: CPT | Performed by: INTERNAL MEDICINE

## 2025-05-23 PROCEDURE — G8399 PT W/DXA RESULTS DOCUMENT: HCPCS | Performed by: INTERNAL MEDICINE

## 2025-05-23 PROCEDURE — 99213 OFFICE O/P EST LOW 20 MIN: CPT | Performed by: INTERNAL MEDICINE

## 2025-05-23 PROCEDURE — 99212 OFFICE O/P EST SF 10 MIN: CPT

## 2025-05-23 NOTE — PROGRESS NOTES
MA Rooming Questions  Patient: Lottie Maldonado  MRN: [id number]    Date: 5/23/2025        1. Do you have any new issues?   no         2. Do you need any refills on medications?    no    3. Have you had any imaging done since your last visit?   yes - lab 5/12 and mammo 5/6    4. Have you been hospitalized or seen in the emergency room since your last visit here?   no    5. Did the patient have a depression screening completed today? No    No data recorded     PHQ-9 Given to (if applicable):               PHQ-9 Score (if applicable):                     [] Positive     []  Negative              Does question #9 need addressed (if applicable)                     [] Yes    []  No               Kayla Briones MA

## 2025-05-27 SDOH — HEALTH STABILITY: PHYSICAL HEALTH: ON AVERAGE, HOW MANY DAYS PER WEEK DO YOU ENGAGE IN MODERATE TO STRENUOUS EXERCISE (LIKE A BRISK WALK)?: 0 DAYS

## 2025-05-27 ASSESSMENT — LIFESTYLE VARIABLES
HOW OFTEN DO YOU HAVE A DRINK CONTAINING ALCOHOL: 1
HOW MANY STANDARD DRINKS CONTAINING ALCOHOL DO YOU HAVE ON A TYPICAL DAY: 0
HOW OFTEN DO YOU HAVE A DRINK CONTAINING ALCOHOL: NEVER
HOW OFTEN DO YOU HAVE SIX OR MORE DRINKS ON ONE OCCASION: 1
HOW MANY STANDARD DRINKS CONTAINING ALCOHOL DO YOU HAVE ON A TYPICAL DAY: PATIENT DOES NOT DRINK

## 2025-05-27 ASSESSMENT — PATIENT HEALTH QUESTIONNAIRE - PHQ9
SUM OF ALL RESPONSES TO PHQ QUESTIONS 1-9: 2
SUM OF ALL RESPONSES TO PHQ QUESTIONS 1-9: 2
1. LITTLE INTEREST OR PLEASURE IN DOING THINGS: MORE THAN HALF THE DAYS
2. FEELING DOWN, DEPRESSED OR HOPELESS: NOT AT ALL
SUM OF ALL RESPONSES TO PHQ QUESTIONS 1-9: 2
SUM OF ALL RESPONSES TO PHQ QUESTIONS 1-9: 2

## 2025-05-28 ENCOUNTER — TELEMEDICINE (OUTPATIENT)
Dept: FAMILY MEDICINE CLINIC | Age: 70
End: 2025-05-28
Payer: MEDICARE

## 2025-05-28 DIAGNOSIS — Z00.00 MEDICARE ANNUAL WELLNESS VISIT, SUBSEQUENT: Primary | ICD-10-CM

## 2025-05-28 PROCEDURE — G0439 PPPS, SUBSEQ VISIT: HCPCS | Performed by: FAMILY MEDICINE

## 2025-05-28 PROCEDURE — 3017F COLORECTAL CA SCREEN DOC REV: CPT | Performed by: FAMILY MEDICINE

## 2025-05-28 PROCEDURE — 1159F MED LIST DOCD IN RCRD: CPT | Performed by: FAMILY MEDICINE

## 2025-05-28 PROCEDURE — 1123F ACP DISCUSS/DSCN MKR DOCD: CPT | Performed by: FAMILY MEDICINE

## 2025-05-28 ASSESSMENT — PATIENT HEALTH QUESTIONNAIRE - PHQ9
9. THOUGHTS THAT YOU WOULD BE BETTER OFF DEAD, OR OF HURTING YOURSELF: NOT AT ALL
8. MOVING OR SPEAKING SO SLOWLY THAT OTHER PEOPLE COULD HAVE NOTICED. OR THE OPPOSITE, BEING SO FIGETY OR RESTLESS THAT YOU HAVE BEEN MOVING AROUND A LOT MORE THAN USUAL: NOT AT ALL
SUM OF ALL RESPONSES TO PHQ QUESTIONS 1-9: 2
SUM OF ALL RESPONSES TO PHQ QUESTIONS 1-9: 2
4. FEELING TIRED OR HAVING LITTLE ENERGY: NOT AT ALL
SUM OF ALL RESPONSES TO PHQ QUESTIONS 1-9: 2
SUM OF ALL RESPONSES TO PHQ QUESTIONS 1-9: 2
6. FEELING BAD ABOUT YOURSELF - OR THAT YOU ARE A FAILURE OR HAVE LET YOURSELF OR YOUR FAMILY DOWN: NOT AT ALL
2. FEELING DOWN, DEPRESSED OR HOPELESS: NOT AT ALL
3. TROUBLE FALLING OR STAYING ASLEEP: NOT AT ALL
1. LITTLE INTEREST OR PLEASURE IN DOING THINGS: MORE THAN HALF THE DAYS
7. TROUBLE CONCENTRATING ON THINGS, SUCH AS READING THE NEWSPAPER OR WATCHING TELEVISION: NOT AT ALL
10. IF YOU CHECKED OFF ANY PROBLEMS, HOW DIFFICULT HAVE THESE PROBLEMS MADE IT FOR YOU TO DO YOUR WORK, TAKE CARE OF THINGS AT HOME, OR GET ALONG WITH OTHER PEOPLE: NOT DIFFICULT AT ALL
5. POOR APPETITE OR OVEREATING: NOT AT ALL

## 2025-05-28 NOTE — PATIENT INSTRUCTIONS
Personalized Preventive Plan for Lottie Maldonado - 5/28/2025  Medicare offers a range of preventive health benefits. Some of the tests and screenings are paid in full while other may be subject to a deductible, co-insurance, and/or copay.  Some of these benefits include a comprehensive review of your medical history including lifestyle, illnesses that may run in your family, and various assessments and screenings as appropriate.  After reviewing your medical record and screening and assessments performed today your provider may have ordered immunizations, labs, imaging, and/or referrals for you.  A list of these orders (if applicable) as well as your Preventive Care list are included within your After Visit Summary for your review.

## 2025-05-28 NOTE — PROGRESS NOTES
Medicare Annual Wellness Visit    Lottie Maldonado is here for Medicare AWV    Assessment & Plan   Medicare annual wellness visit, subsequent     No follow-ups on file.     Subjective       Patient's complete Health Risk Assessment and screening values have been reviewed and are found in Flowsheets. The following problems were reviewed today and where indicated follow up appointments were made and/or referrals ordered.    Positive Risk Factor Screenings with Interventions:              Inactivity:  On average, how many days per week do you engage in moderate to strenuous exercise (like a brisk walk)?: 0 days (!) Abnormal  On average, how many minutes do you engage in exercise at this level?: 0 min  Interventions:  Patient declined any further interventions or treatment           Advanced Directives:  Do you have a Living Will?: (!) No    Intervention:  has NO advanced directive - information provided verbally.                     Objective    Patient-Reported Vitals  No data recorded     Unable to obtain 3 vital signs due to patient not having equipment to take blood pressure/temperature.                Allergies   Allergen Reactions    Abilify [Aripiprazole]     Iodine     Penicillins     Codeine Nausea And Vomiting     Prior to Visit Medications    Medication Sig Taking? Authorizing Provider   metoprolol succinate (TOPROL XL) 50 MG extended release tablet Take 1 tablet by mouth in the morning and at bedtime Yes Joshua Metz MD   CALCIUM 600+D3 600-20 MG-MCG TABS TAKE 1 TABLET BY MOUTH TWICE DAILY Yes Tahira Yang MD   diclofenac sodium (VOLTAREN) 1 % GEL Apply 4 g topically 4 times daily Yes Tahira Yang MD   fluticasone (FLONASE) 50 MCG/ACT nasal spray 2 sprays by Each Nostril route daily Yes Tahira Yang MD   divalproex (DEPAKOTE ER) 500 MG extended release tablet Take 2 tablets by mouth nightly Yes Tahira Yang MD   ibuprofen (IBU) 800 MG tablet Take 1 tablet by mouth every 8 hours as needed

## 2025-07-23 ENCOUNTER — PROCEDURE VISIT (OUTPATIENT)
Dept: OBGYN | Age: 70
End: 2025-07-23

## 2025-07-23 ENCOUNTER — HOSPITAL ENCOUNTER (OUTPATIENT)
Age: 70
Setting detail: SPECIMEN
Discharge: HOME | End: 2025-07-23

## 2025-07-23 VITALS
DIASTOLIC BLOOD PRESSURE: 71 MMHG | SYSTOLIC BLOOD PRESSURE: 113 MMHG | BODY MASS INDEX: 21.83 KG/M2 | WEIGHT: 123.2 LBS | HEIGHT: 63 IN | HEART RATE: 75 BPM

## 2025-07-23 DIAGNOSIS — A60.9 HSV (HERPES SIMPLEX VIRUS) ANOGENITAL INFECTION: ICD-10-CM

## 2025-07-23 DIAGNOSIS — N90.1 VULVAR INTRAEPITHELIAL NEOPLASIA (VIN) GRADE 2: ICD-10-CM

## 2025-07-23 RX ORDER — VALACYCLOVIR HYDROCHLORIDE 1 G/1
1000 TABLET, FILM COATED ORAL DAILY
Qty: 5 TABLET | Refills: 5 | Status: SHIPPED | OUTPATIENT
Start: 2025-07-23 | End: 2025-07-28

## 2025-07-23 NOTE — PROGRESS NOTES
7/23/25    Lottie Maldonado  1955    Chief Complaint   Patient presents with    Procedure     Pt here for vulvar colposcopy, abnormal bx 1/6/2025 showing vulvar intraepithelial neoplasia (CRYS) grade 3. Consent signed.         Lottie Maldonado is a 70 y.o. female who presents today for evaluation of see above.  She also complains of multiple recent outbreaks of her herpes.    Past Medical History:   Diagnosis Date    Abnormal Pap smear of cervix     B-cell lymphoma (HCC)     Bipolar 1 disorder (HCC)     \"on Depakote for this\"    Depression 1997    Fibroid     History of external beam radiation therapy 2020    Pelvis 3,600 cGy per  at [facility]    Hx of echocardiogram 03/17/2021    Left ventricular function is normal, EF is estimated at 55-60%,mitral valve appears to slightly prolapse,    Vulvar intraepithelial neoplasia (CRYS) grade 3        Past Surgical History:   Procedure Laterality Date    BREAST BIOPSY Left     benign    COLONOSCOPY  09/04/2015    normal colon    COLONOSCOPY N/A 02/28/2020    COLONOSCOPY DIAGNOSTIC performed by Anoop Smith MD at [facility] ENDOSCOPY    CYSTOSCOPY N/A 03/22/2024    CYSTOSCOPY performed by Zaid Copeland MD at [facility] OR    HYSTERECTOMY (CERVIX STATUS UNKNOWN) N/A 03/22/2024    HYSTERECTOMY LAPAROSCOPIC ROBOTIC WITH BILATERAL SALPINGO-OOPHORECTOMY performed by Zaid Copeland MD at [facility] OR    PORT SURGERY N/A 01/07/2020    PORT INSERTION performed by Hailee Miranda MD at [facility] OR    PORT SURGERY Left 02/20/2020    PORT REMOVAL LEFT performed by Hailee Miranda MD at [facility] OR    PORT SURGERY Right 03/10/2020    RIGHT PORT INSERTION performed by Hailee Miranda MD at [facility] OR    TONSILLECTOMY  as a kid    TUBAL LIGATION  20+ yrs ago    UPPER GASTROINTESTINAL ENDOSCOPY N/A 02/27/2020    EGD DIAGNOSTIC ONLY performed by Anoop Smith MD at [facility] ENDOSCOPY    VULVA SURGERY N/A 03/22/2024    VULVA LESION EXCISION performed by Zaid Copeland MD at [facility] OR       Social History

## 2025-07-28 LAB — SURGICAL PATHOLOGY REPORT: NORMAL

## 2025-08-06 ENCOUNTER — OFFICE VISIT (OUTPATIENT)
Dept: OBGYN | Age: 70
End: 2025-08-06
Payer: MEDICARE

## 2025-08-06 VITALS
DIASTOLIC BLOOD PRESSURE: 76 MMHG | HEIGHT: 63 IN | WEIGHT: 124.6 LBS | HEART RATE: 81 BPM | SYSTOLIC BLOOD PRESSURE: 119 MMHG | BODY MASS INDEX: 22.08 KG/M2

## 2025-08-06 DIAGNOSIS — N90.1 VULVAR INTRAEPITHELIAL NEOPLASIA (VIN) GRADE 2: Primary | ICD-10-CM

## 2025-08-06 PROCEDURE — G8427 DOCREV CUR MEDS BY ELIG CLIN: HCPCS | Performed by: OBSTETRICS & GYNECOLOGY

## 2025-08-06 PROCEDURE — 1090F PRES/ABSN URINE INCON ASSESS: CPT | Performed by: OBSTETRICS & GYNECOLOGY

## 2025-08-06 PROCEDURE — 1123F ACP DISCUSS/DSCN MKR DOCD: CPT | Performed by: OBSTETRICS & GYNECOLOGY

## 2025-08-06 PROCEDURE — 99214 OFFICE O/P EST MOD 30 MIN: CPT | Performed by: OBSTETRICS & GYNECOLOGY

## 2025-08-06 PROCEDURE — 3017F COLORECTAL CA SCREEN DOC REV: CPT | Performed by: OBSTETRICS & GYNECOLOGY

## 2025-08-06 PROCEDURE — G8420 CALC BMI NORM PARAMETERS: HCPCS | Performed by: OBSTETRICS & GYNECOLOGY

## 2025-08-06 PROCEDURE — G8399 PT W/DXA RESULTS DOCUMENT: HCPCS | Performed by: OBSTETRICS & GYNECOLOGY

## 2025-08-06 PROCEDURE — 1036F TOBACCO NON-USER: CPT | Performed by: OBSTETRICS & GYNECOLOGY

## 2025-08-06 PROCEDURE — 1159F MED LIST DOCD IN RCRD: CPT | Performed by: OBSTETRICS & GYNECOLOGY

## 2025-08-06 RX ORDER — IMIQUIMOD 12.5 MG/.25G
CREAM TOPICAL
Qty: 24 EACH | Refills: 0 | Status: SHIPPED | OUTPATIENT
Start: 2025-08-06 | End: 2025-08-13

## 2025-08-19 ENCOUNTER — HOSPITAL ENCOUNTER (OUTPATIENT)
Dept: INFUSION THERAPY | Age: 70
Discharge: HOME | End: 2025-08-19
Payer: MEDICARE

## 2025-08-19 DIAGNOSIS — D64.9 ANEMIA, UNSPECIFIED TYPE: ICD-10-CM

## 2025-08-19 DIAGNOSIS — C83.30 DIFFUSE LARGE B-CELL LYMPHOMA, UNSPECIFIED BODY REGION (HCC): Primary | ICD-10-CM

## 2025-08-19 LAB
ALBUMIN SERPL-MCNC: 3.7 G/DL (ref 3.4–5)
ALBUMIN/GLOB SERPL: 2 {RATIO} (ref 1.1–2.2)
ALP SERPL-CCNC: 55 U/L (ref 40–129)
ALT SERPL-CCNC: 15 U/L (ref 10–40)
ANION GAP SERPL CALCULATED.3IONS-SCNC: 10 MMOL/L (ref 9–17)
AST SERPL-CCNC: 23 U/L (ref 15–37)
BASOPHILS # BLD: 0.01 K/UL
BASOPHILS NFR BLD: 0 % (ref 0–1)
BILIRUB SERPL-MCNC: 0.3 MG/DL (ref 0–1)
BUN SERPL-MCNC: 16 MG/DL (ref 7–20)
CALCIUM SERPL-MCNC: 9.5 MG/DL (ref 8.3–10.6)
CHLORIDE SERPL-SCNC: 107 MMOL/L (ref 99–110)
CO2 SERPL-SCNC: 25 MMOL/L (ref 21–32)
CREAT SERPL-MCNC: 0.9 MG/DL (ref 0.6–1.2)
EOSINOPHIL # BLD: 0.09 K/UL
EOSINOPHILS RELATIVE PERCENT: 2 % (ref 0–3)
ERYTHROCYTE [DISTWIDTH] IN BLOOD BY AUTOMATED COUNT: 13.1 % (ref 11.7–14.9)
FERRITIN SERPL-MCNC: 791 NG/ML (ref 15–150)
FOLATE SERPL-MCNC: 12.1 NG/ML (ref 4.8–24.2)
GFR, ESTIMATED: 65 ML/MIN/1.73M2
GLUCOSE SERPL-MCNC: 91 MG/DL (ref 74–99)
HCT VFR BLD AUTO: 28.9 % (ref 37–47)
HGB BLD-MCNC: 9.5 G/DL (ref 12.5–16)
IRON SATN MFR SERPL: 38 % (ref 15–50)
IRON SERPL-MCNC: 93 UG/DL (ref 37–145)
LDH SERPL-CCNC: 231 U/L (ref 100–190)
LYMPHOCYTES NFR BLD: 1.44 K/UL
LYMPHOCYTES RELATIVE PERCENT: 37 % (ref 24–44)
MCH RBC QN AUTO: 32.3 PG (ref 27–31)
MCHC RBC AUTO-ENTMCNC: 32.9 G/DL (ref 32–36)
MCV RBC AUTO: 98.3 FL (ref 78–100)
MONOCYTES NFR BLD: 0.63 K/UL
MONOCYTES NFR BLD: 16 % (ref 0–5)
NEUTROPHILS NFR BLD: 44 % (ref 36–66)
NEUTS SEG NFR BLD: 1.72 K/UL
PLATELET # BLD AUTO: 155 K/UL (ref 140–440)
PMV BLD AUTO: 9.5 FL (ref 7.5–11.1)
POTASSIUM SERPL-SCNC: 3.8 MMOL/L (ref 3.5–5.1)
PROT SERPL-MCNC: 5.5 G/DL (ref 6.4–8.2)
RBC # BLD AUTO: 2.94 M/UL (ref 4.2–5.4)
SODIUM SERPL-SCNC: 142 MMOL/L (ref 136–145)
TIBC SERPL-MCNC: 247 UG/DL (ref 260–445)
UNSATURATED IRON BINDING CAPACITY: 154 UG/DL (ref 110–370)
VIT B12 SERPL-MCNC: 972 PG/ML (ref 211–911)
WBC OTHER # BLD: 3.9 K/UL (ref 4–10.5)

## 2025-08-19 PROCEDURE — 83615 LACTATE (LD) (LDH) ENZYME: CPT

## 2025-08-19 PROCEDURE — 82728 ASSAY OF FERRITIN: CPT

## 2025-08-19 PROCEDURE — 36591 DRAW BLOOD OFF VENOUS DEVICE: CPT

## 2025-08-19 PROCEDURE — 81256 HFE GENE: CPT

## 2025-08-19 PROCEDURE — 84630 ASSAY OF ZINC: CPT

## 2025-08-19 PROCEDURE — 85025 COMPLETE CBC W/AUTO DIFF WBC: CPT

## 2025-08-19 PROCEDURE — 83540 ASSAY OF IRON: CPT

## 2025-08-19 PROCEDURE — 82746 ASSAY OF FOLIC ACID SERUM: CPT

## 2025-08-19 PROCEDURE — 80053 COMPREHEN METABOLIC PANEL: CPT

## 2025-08-19 PROCEDURE — 82525 ASSAY OF COPPER: CPT

## 2025-08-19 PROCEDURE — 82607 VITAMIN B-12: CPT

## 2025-08-19 PROCEDURE — 83550 IRON BINDING TEST: CPT

## 2025-08-19 RX ORDER — HEPARIN 100 UNIT/ML
500 SYRINGE INTRAVENOUS PRN
Status: CANCELLED | OUTPATIENT
Start: 2025-08-19

## 2025-08-19 RX ORDER — SODIUM CHLORIDE 0.9 % (FLUSH) 0.9 %
20 SYRINGE (ML) INJECTION PRN
OUTPATIENT
Start: 2025-08-19

## 2025-08-19 RX ORDER — SODIUM CHLORIDE 0.9 % (FLUSH) 0.9 %
20 SYRINGE (ML) INJECTION PRN
Status: DISCONTINUED | OUTPATIENT
Start: 2025-08-19 | End: 2025-08-20 | Stop reason: HOSPADM

## 2025-08-19 RX ORDER — SODIUM CHLORIDE 0.9 % (FLUSH) 0.9 %
10 SYRINGE (ML) INJECTION PRN
Status: CANCELLED | OUTPATIENT
Start: 2025-08-19

## 2025-08-19 RX ORDER — WATER 10 ML/10ML
2.2 INJECTION INTRAMUSCULAR; INTRAVENOUS; SUBCUTANEOUS ONCE
OUTPATIENT
Start: 2025-08-19 | End: 2025-08-19

## 2025-08-19 RX ORDER — WATER 10 ML/10ML
2.2 INJECTION INTRAMUSCULAR; INTRAVENOUS; SUBCUTANEOUS ONCE
Status: CANCELLED | OUTPATIENT
Start: 2025-08-19 | End: 2025-08-19

## 2025-08-19 RX ORDER — SODIUM CHLORIDE 0.9 % (FLUSH) 0.9 %
10 SYRINGE (ML) INJECTION PRN
OUTPATIENT
Start: 2025-08-19

## 2025-08-19 RX ORDER — HEPARIN 100 UNIT/ML
500 SYRINGE INTRAVENOUS PRN
OUTPATIENT
Start: 2025-08-19

## 2025-08-21 LAB
COPPER SERPL-MCNC: 91.8 UG/DL (ref 80–155)
ZINC SERPL-MCNC: 73.8 UG/DL (ref 60–120)

## 2025-08-26 ENCOUNTER — HOSPITAL ENCOUNTER (OUTPATIENT)
Dept: INFUSION THERAPY | Age: 70
Discharge: HOME | End: 2025-08-26
Payer: MEDICARE

## 2025-08-26 ENCOUNTER — OFFICE VISIT (OUTPATIENT)
Dept: ONCOLOGY | Age: 70
End: 2025-08-26
Payer: MEDICARE

## 2025-08-26 VITALS
BODY MASS INDEX: 22.47 KG/M2 | HEART RATE: 76 BPM | RESPIRATION RATE: 16 BRPM | DIASTOLIC BLOOD PRESSURE: 73 MMHG | TEMPERATURE: 98.1 F | OXYGEN SATURATION: 94 % | SYSTOLIC BLOOD PRESSURE: 124 MMHG | HEIGHT: 63 IN | WEIGHT: 126.8 LBS

## 2025-08-26 DIAGNOSIS — D64.9 ANEMIA, UNSPECIFIED TYPE: Primary | ICD-10-CM

## 2025-08-26 PROCEDURE — 1123F ACP DISCUSS/DSCN MKR DOCD: CPT | Performed by: INTERNAL MEDICINE

## 2025-08-26 PROCEDURE — G8427 DOCREV CUR MEDS BY ELIG CLIN: HCPCS | Performed by: INTERNAL MEDICINE

## 2025-08-26 PROCEDURE — 1090F PRES/ABSN URINE INCON ASSESS: CPT | Performed by: INTERNAL MEDICINE

## 2025-08-26 PROCEDURE — 3017F COLORECTAL CA SCREEN DOC REV: CPT | Performed by: INTERNAL MEDICINE

## 2025-08-26 PROCEDURE — G8420 CALC BMI NORM PARAMETERS: HCPCS | Performed by: INTERNAL MEDICINE

## 2025-08-26 PROCEDURE — 1159F MED LIST DOCD IN RCRD: CPT | Performed by: INTERNAL MEDICINE

## 2025-08-26 PROCEDURE — 99212 OFFICE O/P EST SF 10 MIN: CPT

## 2025-08-26 PROCEDURE — 99213 OFFICE O/P EST LOW 20 MIN: CPT | Performed by: INTERNAL MEDICINE

## 2025-08-26 PROCEDURE — 1036F TOBACCO NON-USER: CPT | Performed by: INTERNAL MEDICINE

## 2025-08-26 PROCEDURE — 1126F AMNT PAIN NOTED NONE PRSNT: CPT | Performed by: INTERNAL MEDICINE

## 2025-08-26 PROCEDURE — G8399 PT W/DXA RESULTS DOCUMENT: HCPCS | Performed by: INTERNAL MEDICINE

## (undated) DEVICE — SUTURE VCRL SZ 3-0 L27IN ABSRB UD L26MM SH 1/2 CIR J416H

## (undated) DEVICE — ANESTHESIA CIRCUIT ADULT-LF: Brand: MEDLINE INDUSTRIES, INC.

## (undated) DEVICE — NEEDLE HYPO 23GA L1.5IN TURQ POLYPR HUB S STL THN WALL IM

## (undated) DEVICE — DRAPE,T,LAPARO,TRANS,STERILE: Brand: MEDLINE

## (undated) DEVICE — INTENDED FOR TISSUE SEPARATION, AND OTHER PROCEDURES THAT REQUIRE A SHARP SURGICAL BLADE TO PUNCTURE OR CUT.: Brand: BARD-PARKER ® STAINLESS STEEL BLADES

## (undated) DEVICE — ELECTRODE ES AD CRDLSS PT RET REM POLYHESIVE

## (undated) DEVICE — CHLORAPREP 26ML ORANGE

## (undated) DEVICE — TUBING, SUCTION, 3/16" X 10', STRAIGHT: Brand: MEDLINE

## (undated) DEVICE — SNAP KOVER: Brand: UNBRANDED

## (undated) DEVICE — STERILE LATEX POWDER-FREE SURGICAL GLOVESWITH NITRILE COATING: Brand: PROTEXIS

## (undated) DEVICE — PENCIL ES CRD L10FT HND SWCHING ROCK SWCH W/ EDGE COAT BLDE

## (undated) DEVICE — SOLUTION IV IRRIG WATER 1000ML POUR BRL 2F7114

## (undated) DEVICE — SYRINGE, LUER LOCK, 10ML: Brand: MEDLINE

## (undated) DEVICE — 3M™ STERI-STRIP™ COMPOUND BENZOIN TINCTURE 40 BAGS/CARTON 4 CARTONS/CASE C1544: Brand: 3M™ STERI-STRIP™

## (undated) DEVICE — GLOVE ORANGE PI 7   MSG9070

## (undated) DEVICE — MATERNITY PAD,HEAVY: Brand: CURITY

## (undated) DEVICE — SKIN AFFIX SURG ADHESIVE 72/CS 0.55ML: Brand: MEDLINE

## (undated) DEVICE — PACK,BASIC,IX: Brand: MEDLINE

## (undated) DEVICE — GAUZE,SPONGE,4"X4",16PLY,XRAY,STRL,LF: Brand: MEDLINE

## (undated) DEVICE — 40586 ADVANCED TRENDELENBURG POSITIONING KIT: Brand: 40586 ADVANCED TRENDELENBURG POSITIONING KIT

## (undated) DEVICE — STRATAFIX SPRL PDS + 2-0 23CM CT-2

## (undated) DEVICE — GLOVE SURG SZ 75 L12IN FNGR THK87MIL WHT LTX FREE

## (undated) DEVICE — GLOVE SURG SZ 65 THK91MIL LTX FREE SYN POLYISOPRENE

## (undated) DEVICE — COVER,C-ARM,41X74: Brand: MEDLINE

## (undated) DEVICE — VESSEL SEALER EXTEND: Brand: ENDOWRIST

## (undated) DEVICE — COUNTER NDL 30 COUNT FOAM STRP SGL MAG

## (undated) DEVICE — DRAPE SHEET ULTRAGARD: Brand: MEDLINE

## (undated) DEVICE — NEEDLE HYPO 25GA L1.5IN BLU POLYPR HUB S STL REG BVL STR

## (undated) DEVICE — APPLICATOR MEDICATED 26 CC SOLUTION HI LT ORNG CHLORAPREP

## (undated) DEVICE — GOWN,SIRUS,POLYRNF,BRTHSLV,XLN/XL,20/CS: Brand: MEDLINE

## (undated) DEVICE — COLUMN DRAPE

## (undated) DEVICE — DECANTER FLD 9IN ST BG FOR ASEP TRNSF OF FLD

## (undated) DEVICE — LINER SUCT CANSTR 1500CC SEMI RIG W/ POR HYDROPHOBIC SHUT

## (undated) DEVICE — EXCEL 10FT (3.05 M) INSUFFLATION TUBING SET WITH 0.1 MICRON FILTER: Brand: EXCEL

## (undated) DEVICE — COUNTER NDL 60 COUNT FOAM STRP SGL MAG

## (undated) DEVICE — Z DISC USE 2764362 SEAL ENDOSCP INSTR DIA5-8MM UNIV FOR CANN DA VINCI XI

## (undated) DEVICE — MARKER SURG SKIN UTIL REGULAR/FINE 2 TIP W/ RUL AND 9 LBL

## (undated) DEVICE — LINER,SEMI-RIGID,3000CC,50EA/CS: Brand: MEDLINE

## (undated) DEVICE — GLOVE SURG SZ 65 CRM LTX FREE POLYISOPRENE POLYMER BEAD ANTI

## (undated) DEVICE — TOWEL,OR,DSP,ST,BLUE,STD,6/PK,12PK/CS: Brand: MEDLINE

## (undated) DEVICE — YANKAUER,FLEXIBLE HANDLE,REGLR CAPACITY: Brand: MEDLINE INDUSTRIES, INC.

## (undated) DEVICE — SOLUTION IV IRRIG POUR BRL 0.9% SODIUM CHL 2F7124

## (undated) DEVICE — MITT SURG PREP L ADH DISPOSABLE

## (undated) DEVICE — CONTAINER,SPECIMEN,OR STERILE,4OZ: Brand: MEDLINE

## (undated) DEVICE — SUTURE PROL SZ 3-0 L36IN NONABSORBABLE BLU L26MM SH 1/2 CIR 8522H

## (undated) DEVICE — GOWN,SIRUS,FABRNF,RAGLAN,L,ST,30/CS: Brand: MEDLINE

## (undated) DEVICE — SOLUTION IV 1000ML 0.9% SOD CHL FOR IRRIG PLAS CONT

## (undated) DEVICE — GOWN,ECLIPSE,POLYRNF,BRTHSLV,L,30/CS: Brand: MEDLINE

## (undated) DEVICE — SYRINGE MED 20ML STD CLR PLAS LUERLOCK TIP N CTRL DISP

## (undated) DEVICE — GLOVE SURG SZ 7 CRM LTX FREE POLYISOPRENE POLYMER BEAD ANTI

## (undated) DEVICE — STRIP SKIN CLSR W0.25XL4IN WHT SPUNBOUND FBR NYL HI ADH

## (undated) DEVICE — GLOVE ORANGE PI 7 1/2   MSG9075

## (undated) DEVICE — SYRINGE MED 10ML LUERLOCK TIP W/O SFTY DISP

## (undated) DEVICE — Z DUP USE 2257490 ADHESIVE SKIN CLSRE 036ML TPCL 2CTL CNCRLTE HIGH VSCSTY DRMB

## (undated) DEVICE — TUBING, SUCTION, 9/32" X 10', STRAIGHT: Brand: MEDLINE

## (undated) DEVICE — SUTURE VCRL SZ 4-0 L18IN ABSRB UD L19MM PS-2 3/8 CIR PRIM J496H

## (undated) DEVICE — SUTURE 3-0 VCRL CTD SH-1 J219H

## (undated) DEVICE — TRAY PREP DRY W/ PREM GLV 2 APPL 6 SPNG 2 UNDPD 1 OVERWRAP

## (undated) DEVICE — AIRSEAL 8 MM CANNULA CAP AND OBTURATOR WITH BLADELESS OPTICAL TIP COMPATIBLE WITH INTUITIVE DA VINCI XI AND DA VINCI X 8 MM INSTRUMENT CANNULA, STANDARD LENGTH: Brand: AIRSEAL

## (undated) DEVICE — MARKER,SKIN,WI/RULER AND LABELS: Brand: MEDLINE

## (undated) DEVICE — SUTURE MCRYL SZ 4-0 L27IN ABSRB UD L24MM PS-1 3/8 CIR PRIM Y935H

## (undated) DEVICE — SYSTEM ES CUP DIA3.5CM PNEUMO OCCL BLLN DISP FOR CLIN POS

## (undated) DEVICE — AIRSEAL 8 MM ACCESS PORT AND LOW PROFILE OBTURATOR WITH BLADELESS OPTICAL TIP, 120 MM LENGTH: Brand: AIRSEAL

## (undated) DEVICE — STANDARD HYPODERMIC NEEDLE,POLYPROPYLENE HUB: Brand: MONOJECT

## (undated) DEVICE — TRI-LUMEN FILTERED TUBE SET WITH ACTIVATED CHARCOAL FILTER: Brand: AIRSEAL

## (undated) DEVICE — TIP IU L8CM DIA6.7MM BLU SIL FLX DISP RUMI II

## (undated) DEVICE — SUTURE VCRL SZ 3-0 L27IN ABSRB UD L17MM RB-1 1/2 CIR J215H

## (undated) DEVICE — GAUZE,SPONGE,2"X2",8PLY,STERILE,LF,2'S: Brand: MEDLINE

## (undated) DEVICE — CLICKLINE OUTER SHEATH/SCISSORS INSERT: Brand: CLICKLINE

## (undated) DEVICE — BLADE CLIPPER GEN PURP NS

## (undated) DEVICE — TELFA NON-ADHERENT ABSORBENT DRESSING: Brand: TELFA

## (undated) DEVICE — Z DISCONTINUED NO SUB IDED TUBING ETCO2 AD L6.5FT NSL ORAL CVD PRNG NONFLARED TIP OVR

## (undated) DEVICE — PROTECTOR EYE PT SELF ADH NS OPT GRD LF

## (undated) DEVICE — TROCAR: Brand: KII FIOS FIRST ENTRY

## (undated) DEVICE — AIRSEAL SINGLE LUMEN FILTERED TUBE SET: Brand: AIRSEAL

## (undated) DEVICE — SURGICAL PROCEDURE PACK LITH ROBOTIC

## (undated) DEVICE — Z DISCONTINUED (USE MFG CAT MVABO)  TUBING GAS SAMPLING STD 6.5 FT FEMALE CONN SMRT CAPNOLINE

## (undated) DEVICE — APPLIER LIG CLP M L11IN TI STR RNG HNDL FOR 20 CLP DISP

## (undated) DEVICE — SYRINGE MED 50ML LUERLOCK TIP

## (undated) DEVICE — ARM DRAPE

## (undated) DEVICE — OBTURATOR ROBOTIC DIA8MM BLDELSS ENDOSCP DISP DA VINCI SI

## (undated) DEVICE — SET TBNG DISP TIP FOR AHTO